# Patient Record
Sex: MALE | Race: WHITE | NOT HISPANIC OR LATINO | URBAN - METROPOLITAN AREA
[De-identification: names, ages, dates, MRNs, and addresses within clinical notes are randomized per-mention and may not be internally consistent; named-entity substitution may affect disease eponyms.]

---

## 2021-01-15 DIAGNOSIS — Z23 NEED FOR VACCINATION: ICD-10-CM

## 2023-08-24 ENCOUNTER — APPOINTMENT (RX ONLY)
Dept: URBAN - METROPOLITAN AREA CLINIC 15 | Facility: CLINIC | Age: 78
Setting detail: DERMATOLOGY
End: 2023-08-24

## 2023-08-24 DIAGNOSIS — L57.0 ACTINIC KERATOSIS: ICD-10-CM

## 2023-08-24 DIAGNOSIS — D69.2 OTHER NONTHROMBOCYTOPENIC PURPURA: ICD-10-CM

## 2023-08-24 PROBLEM — D48.5 NEOPLASM OF UNCERTAIN BEHAVIOR OF SKIN: Status: ACTIVE | Noted: 2023-08-24

## 2023-08-24 PROCEDURE — 17000 DESTRUCT PREMALG LESION: CPT | Mod: 59

## 2023-08-24 PROCEDURE — 11102 TANGNTL BX SKIN SINGLE LES: CPT

## 2023-08-24 PROCEDURE — 99202 OFFICE O/P NEW SF 15 MIN: CPT | Mod: 25

## 2023-08-24 PROCEDURE — ? LIQUID NITROGEN

## 2023-08-24 PROCEDURE — ? DEFER

## 2023-08-24 PROCEDURE — 17003 DESTRUCT PREMALG LES 2-14: CPT

## 2023-08-24 PROCEDURE — ? COUNSELING

## 2023-08-24 PROCEDURE — ? BIOPSY BY SHAVE METHOD

## 2023-08-24 ASSESSMENT — LOCATION SIMPLE DESCRIPTION DERM
LOCATION SIMPLE: LEFT CHEEK
LOCATION SIMPLE: LEFT EAR
LOCATION SIMPLE: RIGHT EAR
LOCATION SIMPLE: RIGHT FOREARM
LOCATION SIMPLE: RIGHT HAND
LOCATION SIMPLE: LEFT FOREARM

## 2023-08-24 ASSESSMENT — LOCATION DETAILED DESCRIPTION DERM
LOCATION DETAILED: LEFT SUPERIOR HELIX
LOCATION DETAILED: RIGHT ULNAR DORSAL HAND
LOCATION DETAILED: LEFT PROXIMAL DORSAL FOREARM
LOCATION DETAILED: RIGHT RADIAL DORSAL HAND
LOCATION DETAILED: LEFT PROXIMAL RADIAL DORSAL FOREARM
LOCATION DETAILED: RIGHT SUPERIOR HELIX
LOCATION DETAILED: LEFT ANTIHELIX
LOCATION DETAILED: LEFT SUPERIOR CENTRAL MALAR CHEEK
LOCATION DETAILED: LEFT CENTRAL MALAR CHEEK
LOCATION DETAILED: RIGHT PROXIMAL DORSAL FOREARM
LOCATION DETAILED: RIGHT VENTRAL PROXIMAL FOREARM

## 2023-08-24 ASSESSMENT — LOCATION ZONE DERM
LOCATION ZONE: EAR
LOCATION ZONE: FACE
LOCATION ZONE: HAND
LOCATION ZONE: ARM

## 2023-08-24 NOTE — PROCEDURE: DEFER
Size Of Lesion In Cm (Optional): 0
Instructions (Optional): Patient requests minimal treatment today due to ongoing cardiology issues that he would like to address first\\n He will schedule a follow up to treat
Introduction Text (Please End With A Colon): Excision:
Detail Level: Detailed
Procedure To Be Performed At Next Visit: Cryotherapy

## 2023-08-24 NOTE — PROCEDURE: BIOPSY BY SHAVE METHOD
Detail Level: Detailed
Depth Of Biopsy: dermis
Was A Bandage Applied: Yes
Size Of Lesion In Cm: 1
X Size Of Lesion In Cm: 0
Biopsy Type: H and E
Biopsy Method: Personna blade
Anesthesia Type: 0.05% lidocaine without epinephrine
Hemostasis: Aluminum Chloride and Electrocautery
Wound Care: Petrolatum
Dressing: pressure dressing with telfa
Destruction After The Procedure: No
Type Of Destruction Used: Curettage
Curettage Text: The wound bed was treated with curettage after the biopsy was performed.
Cryotherapy Text: The wound bed was treated with cryotherapy after the biopsy was performed.
Electrodesiccation Text: The wound bed was treated with electrodesiccation after the biopsy was performed.
Electrodesiccation And Curettage Text: The wound bed was treated with electrodesiccation and curettage after the biopsy was performed.
Silver Nitrate Text: The wound bed was treated with silver nitrate after the biopsy was performed.
Lab: [id number]
Lab Facility: 
Consent: Written consent was obtained and risks were reviewed including but not limited to scarring, infection, bleeding, scabbing, incomplete removal, nerve damage and allergy to anesthesia.
Post-Care Instructions: I reviewed with the patient in detail post-care instructions. Patient is to keep the biopsy site dry overnight. Gentle cleansing daily.  Apply petroleum ointment daily until healed. Patient may apply hydrogen peroxide soaks to remove any crusting.
Notification Instructions: Patient will be notified of biopsy results. However, patient instructed to call the office if not contacted within 2 weeks.
Billing Type: Third-Party Bill
Information: Selecting Yes will display possible errors in your note based on the variables you have selected. This validation is only offered as a suggestion for you. PLEASE NOTE THAT THE VALIDATION TEXT WILL BE REMOVED WHEN YOU FINALIZE YOUR NOTE. IF YOU WANT TO FAX A PRELIMINARY NOTE YOU WILL NEED TO TOGGLE THIS TO 'NO' IF YOU DO NOT WANT IT IN YOUR FAXED NOTE.

## 2023-08-24 NOTE — PROCEDURE: LIQUID NITROGEN
Duration Of Freeze Thaw-Cycle (Seconds): 0
Post-Care Instructions: I reviewed with the patient in detail post-care instructions. Patient is to wear sunprotection, and avoid picking at any of the treated lesions. Pt may apply Vaseline to crusted or scabbing areas.
Detail Level: Detailed
Show Applicator Variable?: Yes
Render Note In Bullet Format When Appropriate: No
Consent: The patient's consent was obtained including but not limited to risks of crusting, scabbing, blistering, scarring, darker or lighter pigmentary change, recurrence, incomplete removal and infection.
Number Of Freeze-Thaw Cycles: 2 freeze-thaw cycles

## 2023-10-24 ENCOUNTER — APPOINTMENT (RX ONLY)
Dept: URBAN - METROPOLITAN AREA CLINIC 36 | Facility: CLINIC | Age: 78
Setting detail: DERMATOLOGY
End: 2023-10-24

## 2023-10-24 PROBLEM — D03.39 MELANOMA IN SITU OF OTHER PARTS OF FACE: Status: ACTIVE | Noted: 2023-10-24

## 2023-10-24 PROCEDURE — 11642 EXC F/E/E/N/L MAL+MRG 1.1-2: CPT

## 2023-10-24 PROCEDURE — ? EXCISION

## 2023-10-24 PROCEDURE — 13132 CMPLX RPR F/C/C/M/N/AX/G/H/F: CPT

## 2023-10-24 NOTE — PROCEDURE: EXCISION
Referring Physician (Optional): Renita Goldberg MD
Surgeon (Optional): Aron
Biopsy Photograph Reviewed: Yes
Previous Accession (Optional): [id number]
Size Of Lesion In Cm: 0.8
X Size Of Lesion In Cm (Optional): 0.4
Size Of Margin In Cm: 0.5
Anesthesia Volume In Cc: 10
Was An Eye Clamp Used?: No
Eye Clamp Note Details: An eye clamp was used during the procedure.
Excision Method: Fusiform
Saucerization Depth: dermis and superficial adipose tissue
Repair Type: Complex
Suturegard Retention Suture: 2-0 Nylon
Retention Suture Bite Size: 3 mm
Number Of Hemigard Strips Per Side: 1
Intermediate / Complex Repair - Final Wound Length In Cm: 3.3
Width Of Defect Perpendicular To Closure In Cm (Required): 1.4
Distance Of Undermining In Cm (Required): 1.5
Undermining Type: Entire Wound
Debridement Text: The wound edges were debrided prior to proceeding with the closure to facilitate wound healing.
Helical Rim Text: The closure involved the helical rim.
Vermilion Border Text: The closure involved the vermilion border.
Nostril Rim Text: The closure involved the nostril rim.
Retention Suture Text: Retention sutures were placed to support the closure and prevent dehiscence.
Primary Defect Length (In Cm): 0
Lab: [id number]
Lab Facility: 
Graft Donor Site Bandage (Optional-Leave Blank If You Don't Want In Note): Steri-strips and a pressure bandage were applied to the donor site.
Epidermal Closure Graft Donor Site (Optional): simple interrupted
Billing Type: Third-Party Bill
Excision Depth: fascia
Scalpel Size: 15 blade
Anesthesia Type: 1% lidocaine with epinephrine and a 1:10 solution of 8.4% sodium bicarbonate
Additional Anesthesia Volume In Cc: 6
Hemostasis: Electrocautery
Estimated Blood Loss (Cc): minimal
Detail Level: Detailed
Anesthesia Type: 1% lidocaine with epinephrine
Anesthesia Volume In Cc: 7
Deep Sutures: 4-0 Maxon
Epidermal Sutures: 5-0 Prolene
Epidermal Closure: running subcuticular
Wound Care: No ointment
Dressing: steri-strips
Wound Check: 21 days
Suturegard Intro: Intraoperative tissue expansion was performed, utilizing the SUTUREGARD device, in order to reduce wound tension.
Suturegard Body: The suture ends were repeatedly re-tightened and re-clamped to achieve the desired tissue expansion.
Hemigard Intro: Due to skin fragility and wound tension, it was decided to use HEMIGARD adhesive retention suture devices to permit a linear closure. The skin was cleaned and dried for a 6cm distance away from the wound. Excessive hair, if present, was removed to allow for adhesion.
Hemigard Postcare Instructions: The HEMIGARD strips are to remain completely dry for at least 5-7 days.
Positioning (Leave Blank If You Do Not Want): The patient was placed in a comfortable position exposing the surgical site.
Pre-Excision Curettage Text (Leave Blank If You Do Not Want): Prior to drawing the surgical margin the visible lesion was removed with electrodesiccation and curettage to clearly define the lesion size.
Complex Repair Preamble Text (Leave Blank If You Do Not Want): Extensive wide undermining was performed.
Intermediate Repair Preamble Text (Leave Blank If You Do Not Want): Undermining was performed with blunt dissection.
Curvilinear Excision Additional Text (Leave Blank If You Do Not Want): The margin was drawn around the clinically apparent lesion.  A curvilinear shape was then drawn on the skin incorporating the lesion and margins.  Incisions were then made along these lines to the appropriate tissue plane and the lesion was extirpated.
Fusiform Excision Additional Text (Leave Blank If You Do Not Want): The margin was drawn around the clinically apparent lesion.  A fusiform shape was then drawn on the skin incorporating the lesion and margins.  Incisions were then made along these lines to the appropriate tissue plane and the lesion was extirpated.
Elliptical Excision Additional Text (Leave Blank If You Do Not Want): The margin was drawn around the clinically apparent lesion.  An elliptical shape was then drawn on the skin incorporating the lesion and margins.  Incisions were then made along these lines to the appropriate tissue plane and the lesion was extirpated.
Saucerization Excision Additional Text (Leave Blank If You Do Not Want): The margin was drawn around the clinically apparent lesion.  Incisions were then made along these lines, in a tangential fashion, to the appropriate tissue plane and the lesion was extirpated.
Slit Excision Additional Text (Leave Blank If You Do Not Want): A linear line was drawn on the skin overlying the lesion. An incision was made slowly until the lesion was visualized.  Once visualized, the lesion was removed with blunt dissection.
Excisional Biopsy Additional Text (Leave Blank If You Do Not Want): The margin was drawn around the clinically apparent lesion. An elliptical shape was then drawn on the skin incorporating the lesion and margins.  Incisions were then made along these lines to the appropriate tissue plane and the lesion was extirpated.
Perilesional Excision Additional Text (Leave Blank If You Do Not Want): The margin was drawn around the clinically apparent lesion. Incisions were then made along these lines to the appropriate tissue plane and the lesion was extirpated.
Repair Performed By Another Provider Text (Leave Blank If You Do Not Want): After the tissue was excised the defect was repaired by another provider.
No Repair - Repaired With Adjacent Surgical Defect Text (Leave Blank If You Do Not Want): After the excision the defect was repaired concurrently with another surgical defect which was in close approximation.
Adjacent Tissue Transfer Text: The defect edges were debeveled with a #15 scalpel blade. Given the location of the defect and the proximity to free margins an adjacent tissue transfer was deemed most appropriate. Using a sterile surgical marker, an appropriate flap was drawn incorporating the defect and placing the expected incisions within the relaxed skin tension lines where possible. The area thus outlined was incised deep to adipose tissue with a #15 scalpel blade. The skin margins were undermined to an appropriate distance in all directions utilizing iris scissors and carried over to close the primary defect.
Advancement Flap (Single) Text: The defect edges were debeveled with a #15 scalpel blade.  Given the location of the defect and the proximity to free margins a single advancement flap was deemed most appropriate.  Using a sterile surgical marker, an appropriate advancement flap was drawn incorporating the defect and placing the expected incisions within the relaxed skin tension lines where possible.    The area thus outlined was incised deep to adipose tissue with a #15 scalpel blade.  The skin margins were undermined to an appropriate distance in all directions utilizing iris scissors.
Advancement Flap (Double) Text: The defect edges were debeveled with a #15 scalpel blade.  Given the location of the defect and the proximity to free margins a double advancement flap was deemed most appropriate.  Using a sterile surgical marker, the appropriate advancement flaps were drawn incorporating the defect and placing the expected incisions within the relaxed skin tension lines where possible.    The area thus outlined was incised deep to adipose tissue with a #15 scalpel blade.  The skin margins were undermined to an appropriate distance in all directions utilizing iris scissors.
Burow's Advancement Flap Text: The defect edges were debeveled with a #15 scalpel blade.  Given the location of the defect and the proximity to free margins a Burow's advancement flap was deemed most appropriate.  Using a sterile surgical marker, the appropriate advancement flap was drawn incorporating the defect and placing the expected incisions within the relaxed skin tension lines where possible.    The area thus outlined was incised deep to adipose tissue with a #15 scalpel blade.  The skin margins were undermined to an appropriate distance in all directions utilizing iris scissors.
Chonodrocutaneous Helical Advancement Flap Text: The defect edges were debeveled with a #15 scalpel blade.  Given the location of the defect and the proximity to free margins a chondrocutaneous helical advancement flap was deemed most appropriate.  Using a sterile surgical marker, the appropriate advancement flap was drawn incorporating the defect and placing the expected incisions within the relaxed skin tension lines where possible.    The area thus outlined was incised deep to adipose tissue with a #15 scalpel blade.  The skin margins were undermined to an appropriate distance in all directions utilizing iris scissors.
Crescentic Advancement Flap Text: The defect edges were debeveled with a #15 scalpel blade.  Given the location of the defect and the proximity to free margins a crescentic advancement flap was deemed most appropriate.  Using a sterile surgical marker, the appropriate advancement flap was drawn incorporating the defect and placing the expected incisions within the relaxed skin tension lines where possible.    The area thus outlined was incised deep to adipose tissue with a #15 scalpel blade.  The skin margins were undermined to an appropriate distance in all directions utilizing iris scissors.
A-T Advancement Flap Text: The defect edges were debeveled with a #15 scalpel blade.  Given the location of the defect, shape of the defect and the proximity to free margins an A-T advancement flap was deemed most appropriate.  Using a sterile surgical marker, an appropriate advancement flap was drawn incorporating the defect and placing the expected incisions within the relaxed skin tension lines where possible.    The area thus outlined was incised deep to adipose tissue with a #15 scalpel blade.  The skin margins were undermined to an appropriate distance in all directions utilizing iris scissors.
O-T Advancement Flap Text: The defect edges were debeveled with a #15 scalpel blade.  Given the location of the defect, shape of the defect and the proximity to free margins an O-T advancement flap was deemed most appropriate.  Using a sterile surgical marker, an appropriate advancement flap was drawn incorporating the defect and placing the expected incisions within the relaxed skin tension lines where possible.    The area thus outlined was incised deep to adipose tissue with a #15 scalpel blade.  The skin margins were undermined to an appropriate distance in all directions utilizing iris scissors.
O-L Flap Text: The defect edges were debeveled with a #15 scalpel blade.  Given the location of the defect, shape of the defect and the proximity to free margins an O-L flap was deemed most appropriate.  Using a sterile surgical marker, an appropriate advancement flap was drawn incorporating the defect and placing the expected incisions within the relaxed skin tension lines where possible.    The area thus outlined was incised deep to adipose tissue with a #15 scalpel blade.  The skin margins were undermined to an appropriate distance in all directions utilizing iris scissors.
O-Z Flap Text: The defect edges were debeveled with a #15 scalpel blade.  Given the location of the defect, shape of the defect and the proximity to free margins an O-Z flap was deemed most appropriate.  Using a sterile surgical marker, an appropriate transposition flap was drawn incorporating the defect and placing the expected incisions within the relaxed skin tension lines where possible. The area thus outlined was incised deep to adipose tissue with a #15 scalpel blade.  The skin margins were undermined to an appropriate distance in all directions utilizing iris scissors.
Double O-Z Flap Text: The defect edges were debeveled with a #15 scalpel blade.  Given the location of the defect, shape of the defect and the proximity to free margins a Double O-Z flap was deemed most appropriate.  Using a sterile surgical marker, an appropriate transposition flap was drawn incorporating the defect and placing the expected incisions within the relaxed skin tension lines where possible. The area thus outlined was incised deep to adipose tissue with a #15 scalpel blade.  The skin margins were undermined to an appropriate distance in all directions utilizing iris scissors.
V-Y Flap Text: The defect edges were debeveled with a #15 scalpel blade.  Given the location of the defect, shape of the defect and the proximity to free margins a V-Y flap was deemed most appropriate.  Using a sterile surgical marker, an appropriate advancement flap was drawn incorporating the defect and placing the expected incisions within the relaxed skin tension lines where possible.    The area thus outlined was incised deep to adipose tissue with a #15 scalpel blade.  The skin margins were undermined to an appropriate distance in all directions utilizing iris scissors.
Advancement-Rotation Flap Text: The defect edges were debeveled with a #15 scalpel blade.  Given the location of the defect, shape of the defect and the proximity to free margins an advancement-rotation flap was deemed most appropriate.  Using a sterile surgical marker, an appropriate flap was drawn incorporating the defect and placing the expected incisions within the relaxed skin tension lines where possible. The area thus outlined was incised deep to adipose tissue with a #15 scalpel blade.  The skin margins were undermined to an appropriate distance in all directions utilizing iris scissors.
Mercedes Flap Text: The defect edges were debeveled with a #15 scalpel blade.  Given the location of the defect, shape of the defect and the proximity to free margins a Mercedes flap was deemed most appropriate.  Using a sterile surgical marker, an appropriate advancement flap was drawn incorporating the defect and placing the expected incisions within the relaxed skin tension lines where possible. The area thus outlined was incised deep to adipose tissue with a #15 scalpel blade.  The skin margins were undermined to an appropriate distance in all directions utilizing iris scissors.
Modified Advancement Flap Text: The defect edges were debeveled with a #15 scalpel blade.  Given the location of the defect, shape of the defect and the proximity to free margins a modified advancement flap was deemed most appropriate.  Using a sterile surgical marker, an appropriate advancement flap was drawn incorporating the defect and placing the expected incisions within the relaxed skin tension lines where possible.    The area thus outlined was incised deep to adipose tissue with a #15 scalpel blade.  The skin margins were undermined to an appropriate distance in all directions utilizing iris scissors.
Mucosal Advancement Flap Text: Given the location of the defect, shape of the defect and the proximity to free margins a mucosal advancement flap was deemed most appropriate. Incisions were made with a 15 blade scalpel in the appropriate fashion along the cutaneous vermilion border and the mucosal lip. The remaining actinically damaged mucosal tissue was excised.  The mucosal advancement flap was then elevated to the gingival sulcus with care taken to preserve the neurovascular structures and advanced into the primary defect. Care was taken to ensure that precise realignment of the vermilion border was achieved.
Peng Advancement Flap Text: The defect edges were debeveled with a #15 scalpel blade.  Given the location of the defect, shape of the defect and the proximity to free margins a Peng advancement flap was deemed most appropriate.  Using a sterile surgical marker, an appropriate advancement flap was drawn incorporating the defect and placing the expected incisions within the relaxed skin tension lines where possible. The area thus outlined was incised deep to adipose tissue with a #15 scalpel blade.  The skin margins were undermined to an appropriate distance in all directions utilizing iris scissors.
Hatchet Flap Text: The defect edges were debeveled with a #15 scalpel blade.  Given the location of the defect, shape of the defect and the proximity to free margins a hatchet flap was deemed most appropriate.  Using a sterile surgical marker, an appropriate hatchet flap was drawn incorporating the defect and placing the expected incisions within the relaxed skin tension lines where possible.    The area thus outlined was incised deep to adipose tissue with a #15 scalpel blade.  The skin margins were undermined to an appropriate distance in all directions utilizing iris scissors.
Rotation Flap Text: The defect edges were debeveled with a #15 scalpel blade.  Given the location of the defect, shape of the defect and the proximity to free margins a rotation flap was deemed most appropriate.  Using a sterile surgical marker, an appropriate rotation flap was drawn incorporating the defect and placing the expected incisions within the relaxed skin tension lines where possible.    The area thus outlined was incised deep to adipose tissue with a #15 scalpel blade.  The skin margins were undermined to an appropriate distance in all directions utilizing iris scissors.
Bilateral Rotation Flap Text: The defect edges were debeveled with a #15 scalpel blade. Given the location of the defect, shape of the defect and the proximity to free margins a bilateral rotation flap was deemed most appropriate. Using a sterile surgical marker, an appropriate rotation flap was drawn incorporating the defect and placing the expected incisions within the relaxed skin tension lines where possible. The area thus outlined was incised deep to adipose tissue with a #15 scalpel blade. The skin margins were undermined to an appropriate distance in all directions utilizing iris scissors. Following this, the designed flap was carried over into the primary defect and sutured into place.
Spiral Flap Text: The defect edges were debeveled with a #15 scalpel blade.  Given the location of the defect, shape of the defect and the proximity to free margins a spiral flap was deemed most appropriate.  Using a sterile surgical marker, an appropriate rotation flap was drawn incorporating the defect and placing the expected incisions within the relaxed skin tension lines where possible. The area thus outlined was incised deep to adipose tissue with a #15 scalpel blade.  The skin margins were undermined to an appropriate distance in all directions utilizing iris scissors.
Staged Advancement Flap Text: The defect edges were debeveled with a #15 scalpel blade.  Given the location of the defect, shape of the defect and the proximity to free margins a staged advancement flap was deemed most appropriate.  Using a sterile surgical marker, an appropriate advancement flap was drawn incorporating the defect and placing the expected incisions within the relaxed skin tension lines where possible. The area thus outlined was incised deep to adipose tissue with a #15 scalpel blade.  The skin margins were undermined to an appropriate distance in all directions utilizing iris scissors.
Star Wedge Flap Text: The defect edges were debeveled with a #15 scalpel blade.  Given the location of the defect, shape of the defect and the proximity to free margins a star wedge flap was deemed most appropriate.  Using a sterile surgical marker, an appropriate rotation flap was drawn incorporating the defect and placing the expected incisions within the relaxed skin tension lines where possible. The area thus outlined was incised deep to adipose tissue with a #15 scalpel blade.  The skin margins were undermined to an appropriate distance in all directions utilizing iris scissors.
Transposition Flap Text: The defect edges were debeveled with a #15 scalpel blade.  Given the location of the defect and the proximity to free margins a transposition flap was deemed most appropriate.  Using a sterile surgical marker, an appropriate transposition flap was drawn incorporating the defect.    The area thus outlined was incised deep to adipose tissue with a #15 scalpel blade.  The skin margins were undermined to an appropriate distance in all directions utilizing iris scissors.
Muscle Hinge Flap Text: The defect edges were debeveled with a #15 scalpel blade.  Given the size, depth and location of the defect and the proximity to free margins a muscle hinge flap was deemed most appropriate.  Using a sterile surgical marker, an appropriate hinge flap was drawn incorporating the defect. The area thus outlined was incised with a #15 scalpel blade.  The skin margins were undermined to an appropriate distance in all directions utilizing iris scissors.
Mustarde Flap Text: The defect edges were debeveled with a #15 scalpel blade.  Given the size, depth and location of the defect and the proximity to free margins a Mustarde flap was deemed most appropriate. Using a sterile surgical marker, an appropriate flap was drawn incorporating the defect. The area thus outlined was incised with a #15 scalpel blade. The skin margins were undermined to an appropriate distance in all directions utilizing iris scissors. Following this, the designed flap was carried into the primary defect and sutured into place.
Nasal Turnover Hinge Flap Text: The defect edges were debeveled with a #15 scalpel blade.  Given the size, depth, location of the defect and the defect being full thickness a nasal turnover hinge flap was deemed most appropriate.  Using a sterile surgical marker, an appropriate hinge flap was drawn incorporating the defect. The area thus outlined was incised with a #15 scalpel blade. The flap was designed to recreate the nasal mucosal lining and the alar rim. The skin margins were undermined to an appropriate distance in all directions utilizing iris scissors.
Nasalis-Muscle-Based Myocutaneous Island Pedicle Flap Text: Using a #15 blade, an incision was made around the donor flap to the level of the nasalis muscle. Wide lateral undermining was then performed in both the subcutaneous plane above the nasalis muscle, and in a submuscular plane just above periosteum. This allowed the formation of a free nasalis muscle axial pedicle (based on the angular artery) which was still attached to the actual cutaneous flap, increasing its mobility and vascular viability. Hemostasis was obtained with pinpoint electrocoagulation. The flap was mobilized into position and the pivotal anchor points positioned and stabilized with buried interrupted sutures. Subcutaneous and dermal tissues were closed in a multilayered fashion with sutures. Tissue redundancies were excised, and the epidermal edges were apposed without significant tension and sutured with sutures.
Orbicularis Oris Muscle Flap Text: The defect edges were debeveled with a #15 scalpel blade.  Given that the defect affected the competency of the oral sphincter an obicularis oris muscle flap was deemed most appropriate to restore this competency and normal muscle function.  Using a sterile surgical marker, an appropriate flap was drawn incorporating the defect. The area thus outlined was incised with a #15 scalpel blade.
Melolabial Transposition Flap Text: The defect edges were debeveled with a #15 scalpel blade.  Given the location of the defect and the proximity to free margins a melolabial flap was deemed most appropriate.  Using a sterile surgical marker, an appropriate melolabial transposition flap was drawn incorporating the defect.    The area thus outlined was incised deep to adipose tissue with a #15 scalpel blade.  The skin margins were undermined to an appropriate distance in all directions utilizing iris scissors.
Rhombic Flap Text: The defect edges were debeveled with a #15 scalpel blade.  Given the location of the defect and the proximity to free margins a rhombic flap was deemed most appropriate.  Using a sterile surgical marker, an appropriate rhombic flap was drawn incorporating the defect.    The area thus outlined was incised deep to adipose tissue with a #15 scalpel blade.  The skin margins were undermined to an appropriate distance in all directions utilizing iris scissors.
Rhomboid Transposition Flap Text: The defect edges were debeveled with a #15 scalpel blade.  Given the location of the defect and the proximity to free margins a rhomboid transposition flap was deemed most appropriate.  Using a sterile surgical marker, an appropriate rhomboid flap was drawn incorporating the defect.    The area thus outlined was incised deep to adipose tissue with a #15 scalpel blade.  The skin margins were undermined to an appropriate distance in all directions utilizing iris scissors.
Bi-Rhombic Flap Text: The defect edges were debeveled with a #15 scalpel blade.  Given the location of the defect and the proximity to free margins a bi-rhombic flap was deemed most appropriate.  Using a sterile surgical marker, an appropriate rhombic flap was drawn incorporating the defect. The area thus outlined was incised deep to adipose tissue with a #15 scalpel blade.  The skin margins were undermined to an appropriate distance in all directions utilizing iris scissors.
Helical Rim Advancement Flap Text: The defect edges were debeveled with a #15 blade scalpel.  Given the location of the defect and the proximity to free margins (helical rim) a double helical rim advancement flap was deemed most appropriate.  Using a sterile surgical marker, the appropriate advancement flaps were drawn incorporating the defect and placing the expected incisions between the helical rim and antihelix where possible.  The area thus outlined was incised through and through with a #15 scalpel blade.  With a skin hook and iris scissors, the flaps were gently and sharply undermined and freed up.
Bilateral Helical Rim Advancement Flap Text: The defect edges were debeveled with a #15 blade scalpel.  Given the location of the defect and the proximity to free margins (helical rim) a bilateral helical rim advancement flap was deemed most appropriate.  Using a sterile surgical marker, the appropriate advancement flaps were drawn incorporating the defect and placing the expected incisions between the helical rim and antihelix where possible.  The area thus outlined was incised through and through with a #15 scalpel blade.  With a skin hook and iris scissors, the flaps were gently and sharply undermined and freed up.
Ear Star Wedge Flap Text: The defect edges were debeveled with a #15 blade scalpel.  Given the location of the defect and the proximity to free margins (helical rim) an ear star wedge flap was deemed most appropriate.  Using a sterile surgical marker, the appropriate flap was drawn incorporating the defect and placing the expected incisions between the helical rim and antihelix where possible.  The area thus outlined was incised through and through with a #15 scalpel blade.
Banner Transposition Flap Text: The defect edges were debeveled with a #15 scalpel blade.  Given the location of the defect and the proximity to free margins a Banner transposition flap was deemed most appropriate.  Using a sterile surgical marker, an appropriate flap drawn around the defect. The area thus outlined was incised deep to adipose tissue with a #15 scalpel blade.  The skin margins were undermined to an appropriate distance in all directions utilizing iris scissors.
Bilobed Flap Text: The defect edges were debeveled with a #15 scalpel blade.  Given the location of the defect and the proximity to free margins a bilobe flap was deemed most appropriate.  Using a sterile surgical marker, an appropriate bilobe flap drawn around the defect.    The area thus outlined was incised deep to adipose tissue with a #15 scalpel blade.  The skin margins were undermined to an appropriate distance in all directions utilizing iris scissors.
Bilobed Transposition Flap Text: The defect edges were debeveled with a #15 scalpel blade.  Given the location of the defect and the proximity to free margins a bilobed transposition flap was deemed most appropriate.  Using a sterile surgical marker, an appropriate bilobe flap drawn around the defect.    The area thus outlined was incised deep to adipose tissue with a #15 scalpel blade.  The skin margins were undermined to an appropriate distance in all directions utilizing iris scissors.
Trilobed Flap Text: The defect edges were debeveled with a #15 scalpel blade.  Given the location of the defect and the proximity to free margins a trilobed flap was deemed most appropriate.  Using a sterile surgical marker, an appropriate trilobed flap drawn around the defect.    The area thus outlined was incised deep to adipose tissue with a #15 scalpel blade.  The skin margins were undermined to an appropriate distance in all directions utilizing iris scissors.
Dorsal Nasal Flap Text: The defect edges were debeveled with a #15 scalpel blade.  Given the location of the defect and the proximity to free margins a dorsal nasal flap was deemed most appropriate.  Using a sterile surgical marker, an appropriate dorsal nasal flap was drawn around the defect.    The area thus outlined was incised deep to adipose tissue with a #15 scalpel blade.  The skin margins were undermined to an appropriate distance in all directions utilizing iris scissors.
Island Pedicle Flap Text: The defect edges were debeveled with a #15 scalpel blade.  Given the location of the defect, shape of the defect and the proximity to free margins an island pedicle advancement flap was deemed most appropriate.  Using a sterile surgical marker, an appropriate advancement flap was drawn incorporating the defect, outlining the appropriate donor tissue and placing the expected incisions within the relaxed skin tension lines where possible.    The area thus outlined was incised deep to adipose tissue with a #15 scalpel blade.  The skin margins were undermined to an appropriate distance in all directions around the primary defect and laterally outward around the island pedicle utilizing iris scissors.  There was minimal undermining beneath the pedicle flap.
Island Pedicle Flap With Canthal Suspension Text: The defect edges were debeveled with a #15 scalpel blade.  Given the location of the defect, shape of the defect and the proximity to free margins an island pedicle advancement flap was deemed most appropriate.  Using a sterile surgical marker, an appropriate advancement flap was drawn incorporating the defect, outlining the appropriate donor tissue and placing the expected incisions within the relaxed skin tension lines where possible. The area thus outlined was incised deep to adipose tissue with a #15 scalpel blade.  The skin margins were undermined to an appropriate distance in all directions around the primary defect and laterally outward around the island pedicle utilizing iris scissors.  There was minimal undermining beneath the pedicle flap. A suspension suture was placed in the canthal tendon to prevent tension and prevent ectropion.
Alar Island Pedicle Flap Text: The defect edges were debeveled with a #15 scalpel blade.  Given the location of the defect, shape of the defect and the proximity to the alar rim an island pedicle advancement flap was deemed most appropriate.  Using a sterile surgical marker, an appropriate advancement flap was drawn incorporating the defect, outlining the appropriate donor tissue and placing the expected incisions within the nasal ala running parallel to the alar rim. The area thus outlined was incised with a #15 scalpel blade.  The skin margins were undermined minimally to an appropriate distance in all directions around the primary defect and laterally outward around the island pedicle utilizing iris scissors.  There was minimal undermining beneath the pedicle flap.
Double Island Pedicle Flap Text: The defect edges were debeveled with a #15 scalpel blade.  Given the location of the defect, shape of the defect and the proximity to free margins a double island pedicle advancement flap was deemed most appropriate.  Using a sterile surgical marker, an appropriate advancement flap was drawn incorporating the defect, outlining the appropriate donor tissue and placing the expected incisions within the relaxed skin tension lines where possible.    The area thus outlined was incised deep to adipose tissue with a #15 scalpel blade.  The skin margins were undermined to an appropriate distance in all directions around the primary defect and laterally outward around the island pedicle utilizing iris scissors.  There was minimal undermining beneath the pedicle flap.
Island Pedicle Flap-Requiring Vessel Identification Text: The defect edges were debeveled with a #15 scalpel blade.  Given the location of the defect, shape of the defect and the proximity to free margins an island pedicle advancement flap was deemed most appropriate.  Using a sterile surgical marker, an appropriate advancement flap was drawn, based on the axial vessel mentioned above, incorporating the defect, outlining the appropriate donor tissue and placing the expected incisions within the relaxed skin tension lines where possible.    The area thus outlined was incised deep to adipose tissue with a #15 scalpel blade.  The skin margins were undermined to an appropriate distance in all directions around the primary defect and laterally outward around the island pedicle utilizing iris scissors.  There was minimal undermining beneath the pedicle flap.
Keystone Flap Text: The defect edges were debeveled with a #15 scalpel blade.  Given the location of the defect, shape of the defect a keystone flap was deemed most appropriate.  Using a sterile surgical marker, an appropriate keystone flap was drawn incorporating the defect, outlining the appropriate donor tissue and placing the expected incisions within the relaxed skin tension lines where possible. The area thus outlined was incised deep to adipose tissue with a #15 scalpel blade.  The skin margins were undermined to an appropriate distance in all directions around the primary defect and laterally outward around the flap utilizing iris scissors.
O-T Plasty Text: The defect edges were debeveled with a #15 scalpel blade.  Given the location of the defect, shape of the defect and the proximity to free margins an O-T plasty was deemed most appropriate.  Using a sterile surgical marker, an appropriate O-T plasty was drawn incorporating the defect and placing the expected incisions within the relaxed skin tension lines where possible.    The area thus outlined was incised deep to adipose tissue with a #15 scalpel blade.  The skin margins were undermined to an appropriate distance in all directions utilizing iris scissors.
O-Z Plasty Text: The defect edges were debeveled with a #15 scalpel blade.  Given the location of the defect, shape of the defect and the proximity to free margins an O-Z plasty (double transposition flap) was deemed most appropriate.  Using a sterile surgical marker, the appropriate transposition flaps were drawn incorporating the defect and placing the expected incisions within the relaxed skin tension lines where possible.    The area thus outlined was incised deep to adipose tissue with a #15 scalpel blade.  The skin margins were undermined to an appropriate distance in all directions utilizing iris scissors.  Hemostasis was achieved with electrocautery.  The flaps were then transposed into place, one clockwise and the other counterclockwise, and anchored with interrupted buried subcutaneous sutures.
Double O-Z Plasty Text: The defect edges were debeveled with a #15 scalpel blade.  Given the location of the defect, shape of the defect and the proximity to free margins a Double O-Z plasty (double transposition flap) was deemed most appropriate.  Using a sterile surgical marker, the appropriate transposition flaps were drawn incorporating the defect and placing the expected incisions within the relaxed skin tension lines where possible. The area thus outlined was incised deep to adipose tissue with a #15 scalpel blade.  The skin margins were undermined to an appropriate distance in all directions utilizing iris scissors.  Hemostasis was achieved with electrocautery.  The flaps were then transposed into place, one clockwise and the other counterclockwise, and anchored with interrupted buried subcutaneous sutures.
V-Y Plasty Text: The defect edges were debeveled with a #15 scalpel blade.  Given the location of the defect, shape of the defect and the proximity to free margins an V-Y advancement flap was deemed most appropriate.  Using a sterile surgical marker, an appropriate advancement flap was drawn incorporating the defect and placing the expected incisions within the relaxed skin tension lines where possible.    The area thus outlined was incised deep to adipose tissue with a #15 scalpel blade.  The skin margins were undermined to an appropriate distance in all directions utilizing iris scissors.
H Plasty Text: Given the location of the defect, shape of the defect and the proximity to free margins a H-plasty was deemed most appropriate for repair.  Using a sterile surgical marker, the appropriate advancement arms of the H-plasty were drawn incorporating the defect and placing the expected incisions within the relaxed skin tension lines where possible. The area thus outlined was incised deep to adipose tissue with a #15 scalpel blade. The skin margins were undermined to an appropriate distance in all directions utilizing iris scissors.  The opposing advancement arms were then advanced into place in opposite direction and anchored with interrupted buried subcutaneous sutures.
W Plasty Text: The lesion was extirpated to the level of the fat with a #15 scalpel blade.  Given the location of the defect, shape of the defect and the proximity to free margins a W-plasty was deemed most appropriate for repair.  Using a sterile surgical marker, the appropriate transposition arms of the W-plasty were drawn incorporating the defect and placing the expected incisions within the relaxed skin tension lines where possible.    The area thus outlined was incised deep to adipose tissue with a #15 scalpel blade.  The skin margins were undermined to an appropriate distance in all directions utilizing iris scissors.  The opposing transposition arms were then transposed into place in opposite direction and anchored with interrupted buried subcutaneous sutures.
Z Plasty Text: The lesion was extirpated to the level of the fat with a #15 scalpel blade.  Given the location of the defect, shape of the defect and the proximity to free margins a Z-plasty was deemed most appropriate for repair.  Using a sterile surgical marker, the appropriate transposition arms of the Z-plasty were drawn incorporating the defect and placing the expected incisions within the relaxed skin tension lines where possible.    The area thus outlined was incised deep to adipose tissue with a #15 scalpel blade.  The skin margins were undermined to an appropriate distance in all directions utilizing iris scissors.  The opposing transposition arms were then transposed into place in opposite direction and anchored with interrupted buried subcutaneous sutures.
Double Z Plasty Text: The lesion was extirpated to the level of the fat with a #15 scalpel blade. Given the location of the defect, shape of the defect and the proximity to free margins a double Z-plasty was deemed most appropriate for repair. Using a sterile surgical marker, the appropriate transposition arms of the double Z-plasty were drawn incorporating the defect and placing the expected incisions within the relaxed skin tension lines where possible. The area thus outlined was incised deep to adipose tissue with a #15 scalpel blade. The skin margins were undermined to an appropriate distance in all directions utilizing iris scissors. The opposing transposition arms were then transposed and carried over into place in opposite direction and anchored with interrupted buried subcutaneous sutures.
Zygomaticofacial Flap Text: Given the location of the defect, shape of the defect and the proximity to free margins a zygomaticofacial flap was deemed most appropriate for repair.  Using a sterile surgical marker, the appropriate flap was drawn incorporating the defect and placing the expected incisions within the relaxed skin tension lines where possible. The area thus outlined was incised deep to adipose tissue with a #15 scalpel blade with preservation of a vascular pedicle.  The skin margins were undermined to an appropriate distance in all directions utilizing iris scissors.  The flap was then placed into the defect and anchored with interrupted buried subcutaneous sutures.
Cheek Interpolation Flap Text: A decision was made to reconstruct the defect utilizing an interpolation axial flap and a staged reconstruction.  A telfa template was made of the defect.  This telfa template was then used to outline the Cheek Interpolation flap.  The donor area for the pedicle flap was then injected with anesthesia.  The flap was excised through the skin and subcutaneous tissue down to the layer of the underlying musculature.  The interpolation flap was carefully excised within this deep plane to maintain its blood supply.  The edges of the donor site were undermined.   The donor site was closed in a primary fashion.  The pedicle was then rotated into position and sutured.  Once the tube was sutured into place, adequate blood supply was confirmed with blanching and refill.  The pedicle was then wrapped with xeroform gauze and dressed appropriately with a telfa and gauze bandage to ensure continued blood supply and protect the attached pedicle.
Cheek-To-Nose Interpolation Flap Text: A decision was made to reconstruct the defect utilizing an interpolation axial flap and a staged reconstruction.  A telfa template was made of the defect.  This telfa template was then used to outline the Cheek-To-Nose Interpolation flap.  The donor area for the pedicle flap was then injected with anesthesia.  The flap was excised through the skin and subcutaneous tissue down to the layer of the underlying musculature.  The interpolation flap was carefully excised within this deep plane to maintain its blood supply.  The edges of the donor site were undermined.   The donor site was closed in a primary fashion.  The pedicle was then rotated into position and sutured.  Once the tube was sutured into place, adequate blood supply was confirmed with blanching and refill.  The pedicle was then wrapped with xeroform gauze and dressed appropriately with a telfa and gauze bandage to ensure continued blood supply and protect the attached pedicle.
Interpolation Flap Text: A decision was made to reconstruct the defect utilizing an interpolation axial flap and a staged reconstruction.  A telfa template was made of the defect.  This telfa template was then used to outline the interpolation flap.  The donor area for the pedicle flap was then injected with anesthesia.  The flap was excised through the skin and subcutaneous tissue down to the layer of the underlying musculature.  The interpolation flap was carefully excised within this deep plane to maintain its blood supply.  The edges of the donor site were undermined.   The donor site was closed in a primary fashion.  The pedicle was then rotated into position and sutured.  Once the tube was sutured into place, adequate blood supply was confirmed with blanching and refill.  The pedicle was then wrapped with xeroform gauze and dressed appropriately with a telfa and gauze bandage to ensure continued blood supply and protect the attached pedicle.
Melolabial Interpolation Flap Text: A decision was made to reconstruct the defect utilizing an interpolation axial flap and a staged reconstruction.  A telfa template was made of the defect.  This telfa template was then used to outline the melolabial interpolation flap.  The donor area for the pedicle flap was then injected with anesthesia.  The flap was excised through the skin and subcutaneous tissue down to the layer of the underlying musculature.  The pedicle flap was carefully excised within this deep plane to maintain its blood supply.  The edges of the donor site were undermined.   The donor site was closed in a primary fashion.  The pedicle was then rotated into position and sutured.  Once the tube was sutured into place, adequate blood supply was confirmed with blanching and refill.  The pedicle was then wrapped with xeroform gauze and dressed appropriately with a telfa and gauze bandage to ensure continued blood supply and protect the attached pedicle.
Mastoid Interpolation Flap Text: A decision was made to reconstruct the defect utilizing an interpolation axial flap and a staged reconstruction.  A telfa template was made of the defect.  This telfa template was then used to outline the mastoid interpolation flap.  The donor area for the pedicle flap was then injected with anesthesia.  The flap was excised through the skin and subcutaneous tissue down to the layer of the underlying musculature.  The pedicle flap was carefully excised within this deep plane to maintain its blood supply.  The edges of the donor site were undermined.   The donor site was closed in a primary fashion.  The pedicle was then rotated into position and sutured.  Once the tube was sutured into place, adequate blood supply was confirmed with blanching and refill.  The pedicle was then wrapped with xeroform gauze and dressed appropriately with a telfa and gauze bandage to ensure continued blood supply and protect the attached pedicle.
Posterior Auricular Interpolation Flap Text: A decision was made to reconstruct the defect utilizing an interpolation axial flap and a staged reconstruction.  A telfa template was made of the defect.  This telfa template was then used to outline the posterior auricular interpolation flap.  The donor area for the pedicle flap was then injected with anesthesia.  The flap was excised through the skin and subcutaneous tissue down to the layer of the underlying musculature.  The pedicle flap was carefully excised within this deep plane to maintain its blood supply.  The edges of the donor site were undermined.   The donor site was closed in a primary fashion.  The pedicle was then rotated into position and sutured.  Once the tube was sutured into place, adequate blood supply was confirmed with blanching and refill.  The pedicle was then wrapped with xeroform gauze and dressed appropriately with a telfa and gauze bandage to ensure continued blood supply and protect the attached pedicle.
Paramedian Forehead Flap Text: A decision was made to reconstruct the defect utilizing an interpolation axial flap and a staged reconstruction.  A telfa template was made of the defect.  This telfa template was then used to outline the paramedian forehead pedicle flap.  The donor area for the pedicle flap was then injected with anesthesia.  The flap was excised through the skin and subcutaneous tissue down to the layer of the underlying musculature.  The pedicle flap was carefully excised within this deep plane to maintain its blood supply.  The edges of the donor site were undermined.   The donor site was closed in a primary fashion.  The pedicle was then rotated into position and sutured.  Once the tube was sutured into place, adequate blood supply was confirmed with blanching and refill.  The pedicle was then wrapped with xeroform gauze and dressed appropriately with a telfa and gauze bandage to ensure continued blood supply and protect the attached pedicle.
Abbe Flap (Upper To Lower Lip) Text: The defect of the lower lip was assessed and measured.  Given the location and size of the defect, an Abbe flap was deemed most appropriate. Using a sterile surgical marker, an appropriate Abbe flap was measured and drawn on the upper lip. Local anesthesia was then infiltrated.  A scalpel was then used to incise the upper lip through and through the skin, vermilion, muscle and mucosa, leaving the flap pedicled on the opposite side.  The flap was then rotated and transferred to the lower lip defect.  The flap was then sutured into place with a three layer technique, closing the orbicularis oris muscle layer with subcutaneous buried sutures, followed by a mucosal layer and an epidermal layer.
Abbe Flap (Lower To Upper Lip) Text: The defect of the upper lip was assessed and measured.  Given the location and size of the defect, an Abbe flap was deemed most appropriate. Using a sterile surgical marker, an appropriate Abbe flap was measured and drawn on the lower lip. Local anesthesia was then infiltrated. A scalpel was then used to incise the upper lip through and through the skin, vermilion, muscle and mucosa, leaving the flap pedicled on the opposite side.  The flap was then rotated and transferred to the lower lip defect.  The flap was then sutured into place with a three layer technique, closing the orbicularis oris muscle layer with subcutaneous buried sutures, followed by a mucosal layer and an epidermal layer.
Estlander Flap (Upper To Lower Lip) Text: The defect of the lower lip was assessed and measured.  Given the location and size of the defect, an Estlander flap was deemed most appropriate. Using a sterile surgical marker, an appropriate Estlander flap was measured and drawn on the upper lip. Local anesthesia was then infiltrated. A scalpel was then used to incise the lateral aspect of the flap, through skin, muscle and mucosa, leaving the flap pedicled medially.  The flap was then rotated and positioned to fill the lower lip defect.  The flap was then sutured into place with a three layer technique, closing the orbicularis oris muscle layer with subcutaneous buried sutures, followed by a mucosal layer and an epidermal layer.
Lip Wedge Excision Repair Text: Given the location of the defect and the proximity to free margins a full thickness wedge repair was deemed most appropriate.  Using a sterile surgical marker, the appropriate repair was drawn incorporating the defect and placing the expected incisions perpendicular to the vermilion border.  The vermilion border was also meticulously outlined to ensure appropriate reapproximation during the repair.  The area thus outlined was incised through and through with a #15 scalpel blade.  The muscularis and dermis were reaproximated with deep sutures following hemostasis. Care was taken to realign the vermilion border before proceeding with the superficial closure.  Once the vermilion was realigned the superfical and mucosal closure was finished.
Ftsg Text: The defect edges were debeveled with a #15 scalpel blade.  Given the location of the defect, shape of the defect and the proximity to free margins a full thickness skin graft was deemed most appropriate.  Using a sterile surgical marker, the primary defect shape was transferred to the donor site. The area thus outlined was incised deep to adipose tissue with a #15 scalpel blade.  The harvested graft was then trimmed of adipose tissue until only dermis and epidermis was left.  The skin margins of the secondary defect were undermined to an appropriate distance in all directions utilizing iris scissors.  The secondary defect was closed with interrupted buried subcutaneous sutures.  The skin edges were then re-apposed with running  sutures.  The skin graft was then placed in the primary defect and oriented appropriately.
Split-Thickness Skin Graft Text: The defect edges were debeveled with a #15 scalpel blade.  Given the location of the defect, shape of the defect and the proximity to free margins a split thickness skin graft was deemed most appropriate.  Using a sterile surgical marker, the primary defect shape was transferred to the donor site. The split thickness graft was then harvested.  The skin graft was then placed in the primary defect and oriented appropriately.
Pinch Graft Text: The defect edges were debeveled with a #15 scalpel blade. Given the location of the defect, shape of the defect and the proximity to free margins a pinch graft was deemed most appropriate. Using a sterile surgical marker, the primary defect shape was transferred to the donor site. The area thus outlined was incised deep to adipose tissue with a #15 scalpel blade.  The harvested graft was then trimmed of adipose tissue until only dermis and epidermis was left. The skin margins of the secondary defect were undermined to an appropriate distance in all directions utilizing iris scissors.  The secondary defect was closed with interrupted buried subcutaneous sutures.  The skin edges were then re-apposed with running  sutures.  The skin graft was then placed in the primary defect and oriented appropriately.
Burow's Graft Text: The defect edges were debeveled with a #15 scalpel blade.  Given the location of the defect, shape of the defect, the proximity to free margins and the presence of a standing cone deformity a Burow's skin graft was deemed most appropriate. The standing cone was removed and this tissue was then trimmed to the shape of the primary defect. The adipose tissue was also removed until only dermis and epidermis were left.  The skin margins of the secondary defect were undermined to an appropriate distance in all directions utilizing iris scissors.  The secondary defect was closed with interrupted buried subcutaneous sutures.  The skin edges were then re-apposed with running  sutures.  The skin graft was then placed in the primary defect and oriented appropriately.
Cartilage Graft Text: The defect edges were debeveled with a #15 scalpel blade.  Given the location of the defect, shape of the defect, the fact the defect involved a full thickness cartilage defect a cartilage graft was deemed most appropriate.  An appropriate donor site was identified, cleansed, and anesthetized. The cartilage graft was then harvested and transferred to the recipient site, oriented appropriately and then sutured into place.  The secondary defect was then repaired using a primary closure.
Composite Graft Text: The defect edges were debeveled with a #15 scalpel blade.  Given the location of the defect, shape of the defect, the proximity to free margins and the fact the defect was full thickness a composite graft was deemed most appropriate.  The defect was outline and then transferred to the donor site.  A full thickness graft was then excised from the donor site. The graft was then placed in the primary defect, oriented appropriately and then sutured into place.  The secondary defect was then repaired using a primary closure.
Epidermal Autograft Text: The defect edges were debeveled with a #15 scalpel blade.  Given the location of the defect, shape of the defect and the proximity to free margins an epidermal autograft was deemed most appropriate.  Using a sterile surgical marker, the primary defect shape was transferred to the donor site. The epidermal graft was then harvested.  The skin graft was then placed in the primary defect and oriented appropriately.
Dermal Autograft Text: The defect edges were debeveled with a #15 scalpel blade.  Given the location of the defect, shape of the defect and the proximity to free margins a dermal autograft was deemed most appropriate.  Using a sterile surgical marker, the primary defect shape was transferred to the donor site. The area thus outlined was incised deep to adipose tissue with a #15 scalpel blade.  The harvested graft was then trimmed of adipose and epidermal tissue until only dermis was left.  The skin graft was then placed in the primary defect and oriented appropriately.
Skin Substitute Text: The defect edges were debeveled with a #15 scalpel blade.  Given the location of the defect, shape of the defect and the proximity to free margins a skin substitute graft was deemed most appropriate.  The graft material was trimmed to fit the size of the defect. The graft was then placed in the primary defect and oriented appropriately.
Tissue Cultured Epidermal Autograft Text: The defect edges were debeveled with a #15 scalpel blade.  Given the location of the defect, shape of the defect and the proximity to free margins a tissue cultured epidermal autograft was deemed most appropriate.  The graft was then trimmed to fit the size of the defect.  The graft was then placed in the primary defect and oriented appropriately.
Xenograft Text: The defect edges were debeveled with a #15 scalpel blade.  Given the location of the defect, shape of the defect and the proximity to free margins a xenograft was deemed most appropriate.  The graft was then trimmed to fit the size of the defect.  The graft was then placed in the primary defect and oriented appropriately.
Purse String (Intermediate) Text: Given the location of the defect and the characteristics of the surrounding skin a purse string intermediate closure was deemed most appropriate.  Undermining was performed circumfirentially around the surgical defect.  A purse string suture was then placed and tightened.
Purse String (Simple) Text: Given the location of the defect and the characteristics of the surrounding skin a purse string simple closure was deemed most appropriate.  Undermining was performed circumferentially around the surgical defect.  A purse string suture was then placed and tightened.
Partial Purse String (Intermediate) Text: Given the location of the defect and the characteristics of the surrounding skin an intermediate purse string closure was deemed most appropriate.  Undermining was performed circumferentially around the surgical defect.  A purse string suture was then placed and tightened. Wound tension of the circular defect prevented complete closure of the wound.
Partial Purse String (Simple) Text: Given the location of the defect and the characteristics of the surrounding skin a simple purse string closure was deemed most appropriate.  Undermining was performed circumferentially around the surgical defect.  A purse string suture was then placed and tightened. Wound tension of the circular defect prevented complete closure of the wound.
Complex Repair And Single Advancement Flap Text: The defect edges were debeveled with a #15 scalpel blade.  The primary defect was closed partially with a complex linear closure.  Given the location of the remaining defect, shape of the defect and the proximity to free margins a single advancement flap was deemed most appropriate for complete closure of the defect.  Using a sterile surgical marker, an appropriate advancement flap was drawn incorporating the defect and placing the expected incisions within the relaxed skin tension lines where possible.    The area thus outlined was incised deep to adipose tissue with a #15 scalpel blade.  The skin margins were undermined to an appropriate distance in all directions utilizing iris scissors.
Complex Repair And Double Advancement Flap Text: The defect edges were debeveled with a #15 scalpel blade.  The primary defect was closed partially with a complex linear closure.  Given the location of the remaining defect, shape of the defect and the proximity to free margins a double advancement flap was deemed most appropriate for complete closure of the defect.  Using a sterile surgical marker, an appropriate advancement flap was drawn incorporating the defect and placing the expected incisions within the relaxed skin tension lines where possible.    The area thus outlined was incised deep to adipose tissue with a #15 scalpel blade.  The skin margins were undermined to an appropriate distance in all directions utilizing iris scissors.
Complex Repair And Modified Advancement Flap Text: The defect edges were debeveled with a #15 scalpel blade.  The primary defect was closed partially with a complex linear closure.  Given the location of the remaining defect, shape of the defect and the proximity to free margins a modified advancement flap was deemed most appropriate for complete closure of the defect.  Using a sterile surgical marker, an appropriate advancement flap was drawn incorporating the defect and placing the expected incisions within the relaxed skin tension lines where possible.    The area thus outlined was incised deep to adipose tissue with a #15 scalpel blade.  The skin margins were undermined to an appropriate distance in all directions utilizing iris scissors.
Complex Repair And A-T Advancement Flap Text: The defect edges were debeveled with a #15 scalpel blade.  The primary defect was closed partially with a complex linear closure.  Given the location of the remaining defect, shape of the defect and the proximity to free margins an A-T advancement flap was deemed most appropriate for complete closure of the defect.  Using a sterile surgical marker, an appropriate advancement flap was drawn incorporating the defect and placing the expected incisions within the relaxed skin tension lines where possible.    The area thus outlined was incised deep to adipose tissue with a #15 scalpel blade.  The skin margins were undermined to an appropriate distance in all directions utilizing iris scissors.
Complex Repair And O-T Advancement Flap Text: The defect edges were debeveled with a #15 scalpel blade.  The primary defect was closed partially with a complex linear closure.  Given the location of the remaining defect, shape of the defect and the proximity to free margins an O-T advancement flap was deemed most appropriate for complete closure of the defect.  Using a sterile surgical marker, an appropriate advancement flap was drawn incorporating the defect and placing the expected incisions within the relaxed skin tension lines where possible.    The area thus outlined was incised deep to adipose tissue with a #15 scalpel blade.  The skin margins were undermined to an appropriate distance in all directions utilizing iris scissors.
Complex Repair And O-L Flap Text: The defect edges were debeveled with a #15 scalpel blade.  The primary defect was closed partially with a complex linear closure.  Given the location of the remaining defect, shape of the defect and the proximity to free margins an O-L flap was deemed most appropriate for complete closure of the defect.  Using a sterile surgical marker, an appropriate flap was drawn incorporating the defect and placing the expected incisions within the relaxed skin tension lines where possible.    The area thus outlined was incised deep to adipose tissue with a #15 scalpel blade.  The skin margins were undermined to an appropriate distance in all directions utilizing iris scissors.
Complex Repair And Bilobe Flap Text: The defect edges were debeveled with a #15 scalpel blade.  The primary defect was closed partially with a complex linear closure.  Given the location of the remaining defect, shape of the defect and the proximity to free margins a bilobe flap was deemed most appropriate for complete closure of the defect.  Using a sterile surgical marker, an appropriate advancement flap was drawn incorporating the defect and placing the expected incisions within the relaxed skin tension lines where possible.    The area thus outlined was incised deep to adipose tissue with a #15 scalpel blade.  The skin margins were undermined to an appropriate distance in all directions utilizing iris scissors.
Complex Repair And Melolabial Flap Text: The defect edges were debeveled with a #15 scalpel blade.  The primary defect was closed partially with a complex linear closure.  Given the location of the remaining defect, shape of the defect and the proximity to free margins a melolabial flap was deemed most appropriate for complete closure of the defect.  Using a sterile surgical marker, an appropriate advancement flap was drawn incorporating the defect and placing the expected incisions within the relaxed skin tension lines where possible.    The area thus outlined was incised deep to adipose tissue with a #15 scalpel blade.  The skin margins were undermined to an appropriate distance in all directions utilizing iris scissors.
Complex Repair And Rotation Flap Text: The defect edges were debeveled with a #15 scalpel blade.  The primary defect was closed partially with a complex linear closure.  Given the location of the remaining defect, shape of the defect and the proximity to free margins a rotation flap was deemed most appropriate for complete closure of the defect.  Using a sterile surgical marker, an appropriate advancement flap was drawn incorporating the defect and placing the expected incisions within the relaxed skin tension lines where possible.    The area thus outlined was incised deep to adipose tissue with a #15 scalpel blade.  The skin margins were undermined to an appropriate distance in all directions utilizing iris scissors.
Complex Repair And Rhombic Flap Text: The defect edges were debeveled with a #15 scalpel blade.  The primary defect was closed partially with a complex linear closure.  Given the location of the remaining defect, shape of the defect and the proximity to free margins a rhombic flap was deemed most appropriate for complete closure of the defect.  Using a sterile surgical marker, an appropriate advancement flap was drawn incorporating the defect and placing the expected incisions within the relaxed skin tension lines where possible.    The area thus outlined was incised deep to adipose tissue with a #15 scalpel blade.  The skin margins were undermined to an appropriate distance in all directions utilizing iris scissors.
Complex Repair And Transposition Flap Text: The defect edges were debeveled with a #15 scalpel blade.  The primary defect was closed partially with a complex linear closure.  Given the location of the remaining defect, shape of the defect and the proximity to free margins a transposition flap was deemed most appropriate for complete closure of the defect.  Using a sterile surgical marker, an appropriate advancement flap was drawn incorporating the defect and placing the expected incisions within the relaxed skin tension lines where possible.    The area thus outlined was incised deep to adipose tissue with a #15 scalpel blade.  The skin margins were undermined to an appropriate distance in all directions utilizing iris scissors.
Complex Repair And V-Y Plasty Text: The defect edges were debeveled with a #15 scalpel blade.  The primary defect was closed partially with a complex linear closure.  Given the location of the remaining defect, shape of the defect and the proximity to free margins a V-Y plasty was deemed most appropriate for complete closure of the defect.  Using a sterile surgical marker, an appropriate advancement flap was drawn incorporating the defect and placing the expected incisions within the relaxed skin tension lines where possible.    The area thus outlined was incised deep to adipose tissue with a #15 scalpel blade.  The skin margins were undermined to an appropriate distance in all directions utilizing iris scissors.
Complex Repair And M Plasty Text: The defect edges were debeveled with a #15 scalpel blade.  The primary defect was closed partially with a complex linear closure.  Given the location of the remaining defect, shape of the defect and the proximity to free margins an M plasty was deemed most appropriate for complete closure of the defect.  Using a sterile surgical marker, an appropriate advancement flap was drawn incorporating the defect and placing the expected incisions within the relaxed skin tension lines where possible.    The area thus outlined was incised deep to adipose tissue with a #15 scalpel blade.  The skin margins were undermined to an appropriate distance in all directions utilizing iris scissors.
Complex Repair And Double M Plasty Text: The defect edges were debeveled with a #15 scalpel blade.  The primary defect was closed partially with a complex linear closure.  Given the location of the remaining defect, shape of the defect and the proximity to free margins a double M plasty was deemed most appropriate for complete closure of the defect.  Using a sterile surgical marker, an appropriate advancement flap was drawn incorporating the defect and placing the expected incisions within the relaxed skin tension lines where possible.    The area thus outlined was incised deep to adipose tissue with a #15 scalpel blade.  The skin margins were undermined to an appropriate distance in all directions utilizing iris scissors.
Complex Repair And W Plasty Text: The defect edges were debeveled with a #15 scalpel blade.  The primary defect was closed partially with a complex linear closure.  Given the location of the remaining defect, shape of the defect and the proximity to free margins a W plasty was deemed most appropriate for complete closure of the defect.  Using a sterile surgical marker, an appropriate advancement flap was drawn incorporating the defect and placing the expected incisions within the relaxed skin tension lines where possible.    The area thus outlined was incised deep to adipose tissue with a #15 scalpel blade.  The skin margins were undermined to an appropriate distance in all directions utilizing iris scissors.
Complex Repair And Z Plasty Text: The defect edges were debeveled with a #15 scalpel blade.  The primary defect was closed partially with a complex linear closure.  Given the location of the remaining defect, shape of the defect and the proximity to free margins a Z plasty was deemed most appropriate for complete closure of the defect.  Using a sterile surgical marker, an appropriate advancement flap was drawn incorporating the defect and placing the expected incisions within the relaxed skin tension lines where possible.    The area thus outlined was incised deep to adipose tissue with a #15 scalpel blade.  The skin margins were undermined to an appropriate distance in all directions utilizing iris scissors.
Complex Repair And Dorsal Nasal Flap Text: The defect edges were debeveled with a #15 scalpel blade.  The primary defect was closed partially with a complex linear closure.  Given the location of the remaining defect, shape of the defect and the proximity to free margins a dorsal nasal flap was deemed most appropriate for complete closure of the defect.  Using a sterile surgical marker, an appropriate flap was drawn incorporating the defect and placing the expected incisions within the relaxed skin tension lines where possible.    The area thus outlined was incised deep to adipose tissue with a #15 scalpel blade.  The skin margins were undermined to an appropriate distance in all directions utilizing iris scissors.
Complex Repair And Ftsg Text: The defect edges were debeveled with a #15 scalpel blade.  The primary defect was closed partially with a complex linear closure.  Given the location of the defect, shape of the defect and the proximity to free margins a full thickness skin graft was deemed most appropriate to repair the remaining defect.  The graft was trimmed to fit the size of the remaining defect.  The graft was then placed in the primary defect, oriented appropriately, and sutured into place.
Complex Repair And Burow's Graft Text: The defect edges were debeveled with a #15 scalpel blade.  The primary defect was closed partially with a complex linear closure.  Given the location of the defect, shape of the defect, the proximity to free margins and the presence of a standing cone deformity a Burow's graft was deemed most appropriate to repair the remaining defect.  The graft was trimmed to fit the size of the remaining defect.  The graft was then placed in the primary defect, oriented appropriately, and sutured into place.
Complex Repair And Split-Thickness Skin Graft Text: The defect edges were debeveled with a #15 scalpel blade.  The primary defect was closed partially with a complex linear closure.  Given the location of the defect, shape of the defect and the proximity to free margins a split thickness skin graft was deemed most appropriate to repair the remaining defect.  The graft was trimmed to fit the size of the remaining defect.  The graft was then placed in the primary defect, oriented appropriately, and sutured into place.
Complex Repair And Epidermal Autograft Text: The defect edges were debeveled with a #15 scalpel blade.  The primary defect was closed partially with a complex linear closure.  Given the location of the defect, shape of the defect and the proximity to free margins an epidermal autograft was deemed most appropriate to repair the remaining defect.  The graft was trimmed to fit the size of the remaining defect.  The graft was then placed in the primary defect, oriented appropriately, and sutured into place.
Complex Repair And Dermal Autograft Text: The defect edges were debeveled with a #15 scalpel blade.  The primary defect was closed partially with a complex linear closure.  Given the location of the defect, shape of the defect and the proximity to free margins an dermal autograft was deemed most appropriate to repair the remaining defect.  The graft was trimmed to fit the size of the remaining defect.  The graft was then placed in the primary defect, oriented appropriately, and sutured into place.
Complex Repair And Tissue Cultured Epidermal Autograft Text: The defect edges were debeveled with a #15 scalpel blade.  The primary defect was closed partially with a complex linear closure.  Given the location of the defect, shape of the defect and the proximity to free margins an tissue cultured epidermal autograft was deemed most appropriate to repair the remaining defect.  The graft was trimmed to fit the size of the remaining defect.  The graft was then placed in the primary defect, oriented appropriately, and sutured into place.
Complex Repair And Xenograft Text: The defect edges were debeveled with a #15 scalpel blade.  The primary defect was closed partially with a complex linear closure.  Given the location of the defect, shape of the defect and the proximity to free margins a xenograft was deemed most appropriate to repair the remaining defect.  The graft was trimmed to fit the size of the remaining defect.  The graft was then placed in the primary defect, oriented appropriately, and sutured into place.
Complex Repair And Skin Substitute Graft Text: The defect edges were debeveled with a #15 scalpel blade.  The primary defect was closed partially with a complex linear closure.  Given the location of the remaining defect, shape of the defect and the proximity to free margins a skin substitute graft was deemed most appropriate to repair the remaining defect.  The graft was trimmed to fit the size of the remaining defect.  The graft was then placed in the primary defect, oriented appropriately, and sutured into place.
Path Notes (To The Dermatopathologist): Please check margins.
Consent was obtained from the patient. The risks and benefits to therapy were discussed in detail. Specifically, the risks of infection, scarring, bleeding, prolonged wound healing, incomplete removal, allergy to anesthesia, nerve injury and recurrence were addressed. Prior to the procedure, the treatment site was clearly identified and confirmed by the patient. All components of Universal Protocol/PAUSE Rule completed.
Post-Care Instructions: I reviewed with the patient in detail post-care instructions. Patient is not to engage in any heavy lifting, exercise, or swimming for the next 14 days. Should the patient develop any fevers, chills, bleeding, severe pain patient will contact the office immediately.
Home Suture Removal Text: Patient was provided a home suture removal kit and will remove their sutures at home.  If they have any questions or difficulties they will call the office.
Where Do You Want The Question To Include Opioid Counseling Located?: Case Summary Tab
Information: Selecting Yes will display possible errors in your note based on the variables you have selected. This validation is only offered as a suggestion for you. PLEASE NOTE THAT THE VALIDATION TEXT WILL BE REMOVED WHEN YOU FINALIZE YOUR NOTE. IF YOU WANT TO FAX A PRELIMINARY NOTE YOU WILL NEED TO TOGGLE THIS TO 'NO' IF YOU DO NOT WANT IT IN YOUR FAXED NOTE.

## 2023-10-26 ENCOUNTER — APPOINTMENT (RX ONLY)
Dept: URBAN - METROPOLITAN AREA CLINIC 22 | Facility: CLINIC | Age: 78
Setting detail: DERMATOLOGY
End: 2023-10-26

## 2023-10-26 DIAGNOSIS — Z85.820 PERSONAL HISTORY OF MALIGNANT MELANOMA OF SKIN: ICD-10-CM

## 2023-10-26 DIAGNOSIS — L81.4 OTHER MELANIN HYPERPIGMENTATION: ICD-10-CM

## 2023-10-26 DIAGNOSIS — L82.1 OTHER SEBORRHEIC KERATOSIS: ICD-10-CM

## 2023-10-26 DIAGNOSIS — L57.0 ACTINIC KERATOSIS: ICD-10-CM

## 2023-10-26 DIAGNOSIS — D22 MELANOCYTIC NEVI: ICD-10-CM

## 2023-10-26 PROBLEM — D22.5 MELANOCYTIC NEVI OF TRUNK: Status: ACTIVE | Noted: 2023-10-26

## 2023-10-26 PROCEDURE — 99213 OFFICE O/P EST LOW 20 MIN: CPT | Mod: 25,24

## 2023-10-26 PROCEDURE — ? PRESCRIPTION

## 2023-10-26 PROCEDURE — 17000 DESTRUCT PREMALG LESION: CPT | Mod: 79

## 2023-10-26 PROCEDURE — ? SUNSCREEN RECOMMENDATIONS

## 2023-10-26 PROCEDURE — ? LIQUID NITROGEN

## 2023-10-26 PROCEDURE — ? COUNSELING

## 2023-10-26 PROCEDURE — ? TREATMENT REGIMEN

## 2023-10-26 PROCEDURE — 17003 DESTRUCT PREMALG LES 2-14: CPT | Mod: 79

## 2023-10-26 ASSESSMENT — LOCATION DETAILED DESCRIPTION DERM
LOCATION DETAILED: NASAL DORSUM
LOCATION DETAILED: LEFT CENTRAL TEMPLE
LOCATION DETAILED: RIGHT CRUS OF HELIX
LOCATION DETAILED: LEFT CENTRAL MALAR CHEEK
LOCATION DETAILED: RIGHT DORSAL THUMB METACARPOPHALANGEAL JOINT
LOCATION DETAILED: INFERIOR THORACIC SPINE
LOCATION DETAILED: INFERIOR MID FOREHEAD
LOCATION DETAILED: RIGHT TRAGUS
LOCATION DETAILED: SUPERIOR MID FOREHEAD
LOCATION DETAILED: RIGHT INFERIOR CENTRAL MALAR CHEEK
LOCATION DETAILED: RIGHT SUPERIOR HELIX
LOCATION DETAILED: LEFT VENTRAL PROXIMAL FOREARM
LOCATION DETAILED: RIGHT NASAL ALA
LOCATION DETAILED: RIGHT VENTRAL PROXIMAL FOREARM
LOCATION DETAILED: RIGHT RADIAL DORSAL HAND
LOCATION DETAILED: LEFT MEDIAL ZYGOMA
LOCATION DETAILED: RIGHT CENTRAL TEMPLE
LOCATION DETAILED: RIGHT SUPERIOR LATERAL NECK
LOCATION DETAILED: RIGHT CENTRAL MALAR CHEEK
LOCATION DETAILED: LEFT DISTAL POSTERIOR UPPER ARM

## 2023-10-26 ASSESSMENT — LOCATION SIMPLE DESCRIPTION DERM
LOCATION SIMPLE: RIGHT CHEEK
LOCATION SIMPLE: RIGHT THUMB
LOCATION SIMPLE: INFERIOR FOREHEAD
LOCATION SIMPLE: RIGHT TEMPLE
LOCATION SIMPLE: LEFT TEMPLE
LOCATION SIMPLE: RIGHT HAND
LOCATION SIMPLE: LEFT CHEEK
LOCATION SIMPLE: NECK
LOCATION SIMPLE: SUPERIOR FOREHEAD
LOCATION SIMPLE: NOSE
LOCATION SIMPLE: LEFT UPPER ARM
LOCATION SIMPLE: UPPER BACK
LOCATION SIMPLE: LEFT ZYGOMA
LOCATION SIMPLE: RIGHT FOREARM
LOCATION SIMPLE: RIGHT NOSE
LOCATION SIMPLE: RIGHT EAR
LOCATION SIMPLE: LEFT FOREARM

## 2023-10-26 ASSESSMENT — LOCATION ZONE DERM
LOCATION ZONE: EAR
LOCATION ZONE: HAND
LOCATION ZONE: FINGER
LOCATION ZONE: ARM
LOCATION ZONE: NECK
LOCATION ZONE: TRUNK
LOCATION ZONE: NOSE
LOCATION ZONE: FACE

## 2023-10-26 NOTE — PROCEDURE: TREATMENT REGIMEN
Initiate Treatment: Compounded 5FU on forehead, nose, temples and cheeks twice a day for 5 days
Detail Level: Simple

## 2023-10-26 NOTE — PROCEDURE: SUNSCREEN RECOMMENDATIONS
Products Recommended: Rodrigo molina \\nElta md UV clear
Detail Level: Zone
General Sunscreen Counseling: I recommended a broad spectrum sunscreen with a SPF of 30 or higher.  I explained that SPF 30 sunscreens block approximately 97 percent of the sun's harmful rays.  Sunscreens should be applied at least 15 minutes prior to expected sun exposure and then every 2 hours after that as long as sun exposure continues. If swimming or exercising sunscreen should be reapplied every 45 minutes to an hour after getting wet or sweating.  One ounce, or the equivalent of a shot glass full of sunscreen, is adequate to protect the skin not covered by a bathing suit. I also recommended a lip balm with a sunscreen as well. Sun protective clothing can be used in lieu of sunscreen but must be worn the entire time you are exposed to the sun's rays.

## 2023-10-26 NOTE — PROCEDURE: MIPS QUALITY
Detail Level: Detailed
Quality 226: Preventive Care And Screening: Tobacco Use: Screening And Cessation Intervention: Patient screened for tobacco use and is an ex/non-smoker
Quality 130: Documentation Of Current Medications In The Medical Record: Current Medications Documented
Quality 137: Melanoma: Continuity Of Care - Recall System: Patient information entered into a recall system that includes: target date for the next exam specified AND a process to follow up with patients regarding missed or unscheduled appointments
none

## 2023-10-26 NOTE — PROCEDURE: LIQUID NITROGEN
Detail Level: Detailed
Render Note In Bullet Format When Appropriate: No
Duration Of Freeze Thaw-Cycle (Seconds): 0
Post-Care Instructions: I reviewed with the patient in detail post-care instructions. Patient is to wear sunprotection, and avoid picking at any of the treated lesions. Pt may apply Vaseline to crusted or scabbing areas.
Show Aperture Variable?: Yes
Number Of Freeze-Thaw Cycles: 2 freeze-thaw cycles
Consent: The patient's consent was obtained including but not limited to risks of crusting, scabbing, blistering, scarring, darker or lighter pigmentary change, recurrence, incomplete removal and infection.

## 2023-10-31 ENCOUNTER — APPOINTMENT (RX ONLY)
Dept: URBAN - METROPOLITAN AREA CLINIC 36 | Facility: CLINIC | Age: 78
Setting detail: DERMATOLOGY
End: 2023-10-31

## 2023-10-31 DIAGNOSIS — Z48.02 ENCOUNTER FOR REMOVAL OF SUTURES: ICD-10-CM

## 2023-10-31 PROCEDURE — ? SUTURE REMOVAL (GLOBAL PERIOD)

## 2023-10-31 ASSESSMENT — LOCATION ZONE DERM: LOCATION ZONE: FACE

## 2023-10-31 ASSESSMENT — LOCATION SIMPLE DESCRIPTION DERM: LOCATION SIMPLE: RIGHT CHEEK

## 2023-10-31 ASSESSMENT — LOCATION DETAILED DESCRIPTION DERM: LOCATION DETAILED: RIGHT CENTRAL MALAR CHEEK

## 2023-10-31 NOTE — PROCEDURE: SUTURE REMOVAL (GLOBAL PERIOD)
Detail Level: Detailed
Add 19402 Cpt? (Important Note: In 2017 The Use Of 54889 Is Being Tracked By Cms To Determine Future Global Period Reimbursement For Global Periods): no

## 2023-12-20 ENCOUNTER — APPOINTMENT (RX ONLY)
Dept: URBAN - METROPOLITAN AREA CLINIC 36 | Facility: CLINIC | Age: 78
Setting detail: DERMATOLOGY
End: 2023-12-20

## 2023-12-20 DIAGNOSIS — Z41.9 ENCOUNTER FOR PROCEDURE FOR PURPOSES OTHER THAN REMEDYING HEALTH STATE, UNSPECIFIED: ICD-10-CM

## 2023-12-20 PROCEDURE — ? FRAXEL

## 2023-12-20 ASSESSMENT — LOCATION ZONE DERM: LOCATION ZONE: FACE

## 2023-12-20 ASSESSMENT — LOCATION SIMPLE DESCRIPTION DERM: LOCATION SIMPLE: RIGHT CHEEK

## 2023-12-20 ASSESSMENT — LOCATION DETAILED DESCRIPTION DERM: LOCATION DETAILED: RIGHT CENTRAL MALAR CHEEK

## 2023-12-20 NOTE — PROCEDURE: FRAXEL
Detail Level: Zone
Wavelength: 1550nm
Consent: Written consent obtained, risks reviewed including but not limited to pain and incomplete improvement.
Large Plastic Eye Shield Text: The ocular mucosa was anesthetized with tetracaine. Once adequate anesthesia was optained, large plastic eye shields were inserted and remained in place until the procedure was completed.
Energy(Mj/Cm2): 1
Anesthesia Type: 1% lidocaine with epinephrine
Medium Metal Eye Shield Text: The ocular mucosa was anesthetized with tetracaine. Once adequate anesthesia was optained, medium metal eye shields were inserted and remained in place until the procedure was completed.
Indication: surgical scars
Add Post-Care Below To The Note: No
Tip: 15mm
Energy(Mj/Cm2): 70
Treatment Level: 5
Location: right cheek
Number Of Passes: 4
Post-Care Instructions: I reviewed with the patient in detail post-care instructions. Patient should avoid sun until area fully healed.
Large Metal Eye Shield Text: The ocular mucosa was anesthetized with tetracaine. Once adequate anesthesia was optained, large metal eye shields were inserted and remained in place until the procedure was completed.
Small Plastic Eye Shield Text: The ocular mucosa was anesthetized with tetracaine. Once adequate anesthesia was optained, small plastic eye shields were inserted and remained in place until the procedure was completed.
Medium Plastic Eye Shield Text: The ocular mucosa was anesthetized with tetracaine. Once adequate anesthesia was optained, medium plastic eye shields were inserted and remained in place until the procedure was completed.
Small Metal Eye Shield Text: The ocular mucosa was anesthetized with tetracaine. Once adequate anesthesia was optained, small metal eye shields were inserted and remained in place until the procedure was completed.

## 2024-03-13 ENCOUNTER — HOSPITAL ENCOUNTER (OUTPATIENT)
Dept: LAB | Facility: MEDICAL CENTER | Age: 79
End: 2024-03-13
Attending: FAMILY MEDICINE
Payer: MEDICARE

## 2024-03-13 LAB
ALBUMIN SERPL BCP-MCNC: 4.1 G/DL (ref 3.2–4.9)
ALBUMIN/GLOB SERPL: 1.4 G/DL
ALP SERPL-CCNC: 94 U/L (ref 30–99)
ALT SERPL-CCNC: 14 U/L (ref 2–50)
ANION GAP SERPL CALC-SCNC: 12 MMOL/L (ref 7–16)
AST SERPL-CCNC: 14 U/L (ref 12–45)
BASOPHILS # BLD AUTO: 0.7 % (ref 0–1.8)
BASOPHILS # BLD: 0.07 K/UL (ref 0–0.12)
BILIRUB SERPL-MCNC: 0.4 MG/DL (ref 0.1–1.5)
BUN SERPL-MCNC: 63 MG/DL (ref 8–22)
CALCIUM ALBUM COR SERPL-MCNC: 9 MG/DL (ref 8.5–10.5)
CALCIUM SERPL-MCNC: 9.1 MG/DL (ref 8.5–10.5)
CHLORIDE SERPL-SCNC: 114 MMOL/L (ref 96–112)
CO2 SERPL-SCNC: 16 MMOL/L (ref 20–33)
CREAT SERPL-MCNC: 5.6 MG/DL (ref 0.5–1.4)
EOSINOPHIL # BLD AUTO: 0.37 K/UL (ref 0–0.51)
EOSINOPHIL NFR BLD: 3.8 % (ref 0–6.9)
ERYTHROCYTE [DISTWIDTH] IN BLOOD BY AUTOMATED COUNT: 49.9 FL (ref 35.9–50)
GFR SERPLBLD CREATININE-BSD FMLA CKD-EPI: 10 ML/MIN/1.73 M 2
GLOBULIN SER CALC-MCNC: 2.9 G/DL (ref 1.9–3.5)
GLUCOSE SERPL-MCNC: 111 MG/DL (ref 65–99)
HCT VFR BLD AUTO: 33.1 % (ref 42–52)
HGB BLD-MCNC: 10.6 G/DL (ref 14–18)
IMM GRANULOCYTES # BLD AUTO: 0.03 K/UL (ref 0–0.11)
IMM GRANULOCYTES NFR BLD AUTO: 0.3 % (ref 0–0.9)
LYMPHOCYTES # BLD AUTO: 0.97 K/UL (ref 1–4.8)
LYMPHOCYTES NFR BLD: 10 % (ref 22–41)
MCH RBC QN AUTO: 28.6 PG (ref 27–33)
MCHC RBC AUTO-ENTMCNC: 32 G/DL (ref 32.3–36.5)
MCV RBC AUTO: 89.2 FL (ref 81.4–97.8)
MONOCYTES # BLD AUTO: 0.83 K/UL (ref 0–0.85)
MONOCYTES NFR BLD AUTO: 8.6 % (ref 0–13.4)
NEUTROPHILS # BLD AUTO: 7.41 K/UL (ref 1.82–7.42)
NEUTROPHILS NFR BLD: 76.6 % (ref 44–72)
NRBC # BLD AUTO: 0 K/UL
NRBC BLD-RTO: 0 /100 WBC (ref 0–0.2)
NT-PROBNP SERPL IA-MCNC: ABNORMAL PG/ML (ref 0–125)
PLATELET # BLD AUTO: 194 K/UL (ref 164–446)
PMV BLD AUTO: 10.7 FL (ref 9–12.9)
POTASSIUM SERPL-SCNC: 5.5 MMOL/L (ref 3.6–5.5)
PROT SERPL-MCNC: 7 G/DL (ref 6–8.2)
RBC # BLD AUTO: 3.71 M/UL (ref 4.7–6.1)
SODIUM SERPL-SCNC: 142 MMOL/L (ref 135–145)
WBC # BLD AUTO: 9.7 K/UL (ref 4.8–10.8)

## 2024-03-13 PROCEDURE — 83880 ASSAY OF NATRIURETIC PEPTIDE: CPT

## 2024-03-13 PROCEDURE — 85025 COMPLETE CBC W/AUTO DIFF WBC: CPT

## 2024-03-13 PROCEDURE — 36415 COLL VENOUS BLD VENIPUNCTURE: CPT

## 2024-03-13 PROCEDURE — 80053 COMPREHEN METABOLIC PANEL: CPT

## 2024-03-21 ENCOUNTER — HOSPITAL ENCOUNTER (OUTPATIENT)
Dept: LAB | Facility: MEDICAL CENTER | Age: 79
End: 2024-03-21
Attending: NURSE PRACTITIONER
Payer: MEDICARE

## 2024-03-21 LAB
ALBUMIN SERPL BCP-MCNC: 4 G/DL (ref 3.2–4.9)
BUN SERPL-MCNC: 85 MG/DL (ref 8–22)
CALCIUM ALBUM COR SERPL-MCNC: 9 MG/DL (ref 8.5–10.5)
CALCIUM SERPL-MCNC: 9 MG/DL (ref 8.5–10.5)
CHLORIDE SERPL-SCNC: 109 MMOL/L (ref 96–112)
CO2 SERPL-SCNC: 16 MMOL/L (ref 20–33)
CREAT SERPL-MCNC: 6.13 MG/DL (ref 0.5–1.4)
GFR SERPLBLD CREATININE-BSD FMLA CKD-EPI: 9 ML/MIN/1.73 M 2
GLUCOSE SERPL-MCNC: 97 MG/DL (ref 65–99)
PHOSPHATE SERPL-MCNC: 5.4 MG/DL (ref 2.5–4.5)
POTASSIUM SERPL-SCNC: 5 MMOL/L (ref 3.6–5.5)
SODIUM SERPL-SCNC: 140 MMOL/L (ref 135–145)

## 2024-03-21 PROCEDURE — 36415 COLL VENOUS BLD VENIPUNCTURE: CPT

## 2024-03-21 PROCEDURE — 80069 RENAL FUNCTION PANEL: CPT

## 2024-03-26 ENCOUNTER — HOSPITAL ENCOUNTER (OUTPATIENT)
Dept: LAB | Facility: MEDICAL CENTER | Age: 79
End: 2024-03-26
Attending: INTERNAL MEDICINE
Payer: MEDICARE

## 2024-03-26 LAB
APPEARANCE UR: CLEAR
BACTERIA #/AREA URNS HPF: NEGATIVE /HPF
BILIRUB UR QL STRIP.AUTO: NEGATIVE
COLOR UR: YELLOW
CREAT UR-MCNC: 113.77 MG/DL
CREAT UR-MCNC: 115.57 MG/DL
EPI CELLS #/AREA URNS HPF: NEGATIVE /HPF
GLUCOSE UR STRIP.AUTO-MCNC: NEGATIVE MG/DL
HYALINE CASTS #/AREA URNS LPF: ABNORMAL /LPF
KETONES UR STRIP.AUTO-MCNC: NEGATIVE MG/DL
LEUKOCYTE ESTERASE UR QL STRIP.AUTO: NEGATIVE
MICRO URNS: ABNORMAL
MICROALBUMIN UR-MCNC: 17.4 MG/DL
MICROALBUMIN/CREAT UR: 153 MG/G (ref 0–30)
NITRITE UR QL STRIP.AUTO: NEGATIVE
PH UR STRIP.AUTO: 5.5 [PH] (ref 5–8)
PROT UR QL STRIP: 30 MG/DL
PROT UR-MCNC: 37 MG/DL (ref 0–15)
RBC # URNS HPF: ABNORMAL /HPF
RBC UR QL AUTO: NEGATIVE
SP GR UR STRIP.AUTO: 1.02
UROBILINOGEN UR STRIP.AUTO-MCNC: 0.2 MG/DL
WBC #/AREA URNS HPF: ABNORMAL /HPF

## 2024-03-26 PROCEDURE — 81001 URINALYSIS AUTO W/SCOPE: CPT

## 2024-03-26 PROCEDURE — 84156 ASSAY OF PROTEIN URINE: CPT

## 2024-03-26 PROCEDURE — 82570 ASSAY OF URINE CREATININE: CPT | Mod: 91

## 2024-03-26 PROCEDURE — 82043 UR ALBUMIN QUANTITATIVE: CPT

## 2024-03-27 ENCOUNTER — HOSPITAL ENCOUNTER (OUTPATIENT)
Dept: LAB | Facility: MEDICAL CENTER | Age: 79
End: 2024-03-27
Attending: INTERNAL MEDICINE
Payer: MEDICARE

## 2024-03-27 LAB
ALBUMIN SERPL BCP-MCNC: 4.3 G/DL (ref 3.2–4.9)
BUN SERPL-MCNC: 63 MG/DL (ref 8–22)
CALCIUM ALBUM COR SERPL-MCNC: 8.6 MG/DL (ref 8.5–10.5)
CALCIUM SERPL-MCNC: 8.8 MG/DL (ref 8.5–10.5)
CHLORIDE SERPL-SCNC: 104 MMOL/L (ref 96–112)
CO2 SERPL-SCNC: 21 MMOL/L (ref 20–33)
CREAT SERPL-MCNC: 5.33 MG/DL (ref 0.5–1.4)
GFR SERPLBLD CREATININE-BSD FMLA CKD-EPI: 10 ML/MIN/1.73 M 2
GLUCOSE SERPL-MCNC: 111 MG/DL (ref 65–99)
PHOSPHATE SERPL-MCNC: 4.4 MG/DL (ref 2.5–4.5)
POTASSIUM SERPL-SCNC: 4.3 MMOL/L (ref 3.6–5.5)
SODIUM SERPL-SCNC: 140 MMOL/L (ref 135–145)

## 2024-03-27 PROCEDURE — 80069 RENAL FUNCTION PANEL: CPT

## 2024-03-27 PROCEDURE — 36415 COLL VENOUS BLD VENIPUNCTURE: CPT

## 2024-04-04 ENCOUNTER — HOSPITAL ENCOUNTER (OUTPATIENT)
Dept: LAB | Facility: MEDICAL CENTER | Age: 79
End: 2024-04-04
Attending: INTERNAL MEDICINE
Payer: MEDICARE

## 2024-04-04 ENCOUNTER — HOSPITAL ENCOUNTER (OUTPATIENT)
Dept: LAB | Facility: MEDICAL CENTER | Age: 79
End: 2024-04-04
Attending: FAMILY MEDICINE
Payer: MEDICARE

## 2024-04-04 LAB
APPEARANCE UR: CLEAR
BACTERIA #/AREA URNS HPF: NEGATIVE /HPF
BASOPHILS # BLD AUTO: 0.7 % (ref 0–1.8)
BASOPHILS # BLD: 0.07 K/UL (ref 0–0.12)
BILIRUB UR QL STRIP.AUTO: NEGATIVE
COLOR UR: YELLOW
EOSINOPHIL # BLD AUTO: 0.49 K/UL (ref 0–0.51)
EOSINOPHIL NFR BLD: 5.2 % (ref 0–6.9)
EPI CELLS #/AREA URNS HPF: NEGATIVE /HPF
ERYTHROCYTE [DISTWIDTH] IN BLOOD BY AUTOMATED COUNT: 45.5 FL (ref 35.9–50)
EST. AVERAGE GLUCOSE BLD GHB EST-MCNC: 128 MG/DL
GLUCOSE UR STRIP.AUTO-MCNC: NEGATIVE MG/DL
HBA1C MFR BLD: 6.1 % (ref 4–5.6)
HCT VFR BLD AUTO: 33.1 % (ref 42–52)
HGB BLD-MCNC: 10.6 G/DL (ref 14–18)
HYALINE CASTS #/AREA URNS LPF: ABNORMAL /LPF
IMM GRANULOCYTES # BLD AUTO: 0.04 K/UL (ref 0–0.11)
IMM GRANULOCYTES NFR BLD AUTO: 0.4 % (ref 0–0.9)
KETONES UR STRIP.AUTO-MCNC: NEGATIVE MG/DL
LEUKOCYTE ESTERASE UR QL STRIP.AUTO: NEGATIVE
LYMPHOCYTES # BLD AUTO: 0.86 K/UL (ref 1–4.8)
LYMPHOCYTES NFR BLD: 9.1 % (ref 22–41)
MCH RBC QN AUTO: 28.3 PG (ref 27–33)
MCHC RBC AUTO-ENTMCNC: 32 G/DL (ref 32.3–36.5)
MCV RBC AUTO: 88.3 FL (ref 81.4–97.8)
MICRO URNS: ABNORMAL
MONOCYTES # BLD AUTO: 0.78 K/UL (ref 0–0.85)
MONOCYTES NFR BLD AUTO: 8.3 % (ref 0–13.4)
NEUTROPHILS # BLD AUTO: 7.18 K/UL (ref 1.82–7.42)
NEUTROPHILS NFR BLD: 76.3 % (ref 44–72)
NITRITE UR QL STRIP.AUTO: NEGATIVE
NRBC # BLD AUTO: 0 K/UL
NRBC BLD-RTO: 0 /100 WBC (ref 0–0.2)
PH UR STRIP.AUTO: 7 [PH] (ref 5–8)
PLATELET # BLD AUTO: 215 K/UL (ref 164–446)
PMV BLD AUTO: 10.8 FL (ref 9–12.9)
PROT UR QL STRIP: 30 MG/DL
RBC # BLD AUTO: 3.75 M/UL (ref 4.7–6.1)
RBC # URNS HPF: ABNORMAL /HPF
RBC UR QL AUTO: NEGATIVE
SP GR UR STRIP.AUTO: 1.01
UROBILINOGEN UR STRIP.AUTO-MCNC: 0.2 MG/DL
WBC # BLD AUTO: 9.4 K/UL (ref 4.8–10.8)
WBC #/AREA URNS HPF: ABNORMAL /HPF

## 2024-04-04 PROCEDURE — 83550 IRON BINDING TEST: CPT

## 2024-04-04 PROCEDURE — 83540 ASSAY OF IRON: CPT

## 2024-04-04 PROCEDURE — 82570 ASSAY OF URINE CREATININE: CPT

## 2024-04-04 PROCEDURE — 82728 ASSAY OF FERRITIN: CPT

## 2024-04-04 PROCEDURE — 81001 URINALYSIS AUTO W/SCOPE: CPT

## 2024-04-04 PROCEDURE — 83036 HEMOGLOBIN GLYCOSYLATED A1C: CPT | Mod: GA

## 2024-04-04 PROCEDURE — 85025 COMPLETE CBC W/AUTO DIFF WBC: CPT

## 2024-04-04 PROCEDURE — 36415 COLL VENOUS BLD VENIPUNCTURE: CPT

## 2024-04-04 PROCEDURE — 84156 ASSAY OF PROTEIN URINE: CPT

## 2024-04-04 PROCEDURE — 80069 RENAL FUNCTION PANEL: CPT

## 2024-04-04 PROCEDURE — 82570 ASSAY OF URINE CREATININE: CPT | Mod: 91

## 2024-04-04 PROCEDURE — 82043 UR ALBUMIN QUANTITATIVE: CPT

## 2024-04-05 LAB
ALBUMIN SERPL BCP-MCNC: 4.3 G/DL (ref 3.2–4.9)
BUN SERPL-MCNC: 63 MG/DL (ref 8–22)
CALCIUM ALBUM COR SERPL-MCNC: 8.7 MG/DL (ref 8.5–10.5)
CALCIUM SERPL-MCNC: 8.9 MG/DL (ref 8.5–10.5)
CHLORIDE SERPL-SCNC: 102 MMOL/L (ref 96–112)
CO2 SERPL-SCNC: 24 MMOL/L (ref 20–33)
CREAT SERPL-MCNC: 5.59 MG/DL (ref 0.5–1.4)
CREAT UR-MCNC: 81.69 MG/DL
CREAT UR-MCNC: 82.38 MG/DL
FERRITIN SERPL-MCNC: 178 NG/ML (ref 22–322)
GFR SERPLBLD CREATININE-BSD FMLA CKD-EPI: 10 ML/MIN/1.73 M 2
GLUCOSE SERPL-MCNC: 116 MG/DL (ref 65–99)
IRON SATN MFR SERPL: 15 % (ref 15–55)
IRON SERPL-MCNC: 41 UG/DL (ref 50–180)
MICROALBUMIN UR-MCNC: 17.3 MG/DL
MICROALBUMIN/CREAT UR: 210 MG/G (ref 0–30)
PHOSPHATE SERPL-MCNC: 4.8 MG/DL (ref 2.5–4.5)
POTASSIUM SERPL-SCNC: 4.1 MMOL/L (ref 3.6–5.5)
PROT UR-MCNC: 38 MG/DL (ref 0–15)
PROT/CREAT UR: 465 MG/G (ref 15–68)
SODIUM SERPL-SCNC: 142 MMOL/L (ref 135–145)
TIBC SERPL-MCNC: 268 UG/DL (ref 250–450)
UIBC SERPL-MCNC: 227 UG/DL (ref 110–370)

## 2024-04-24 ENCOUNTER — HOSPITAL ENCOUNTER (OUTPATIENT)
Dept: LAB | Facility: MEDICAL CENTER | Age: 79
End: 2024-04-24
Attending: FAMILY MEDICINE
Payer: MEDICARE

## 2024-04-24 PROCEDURE — 84480 ASSAY TRIIODOTHYRONINE (T3): CPT

## 2024-04-24 PROCEDURE — 83880 ASSAY OF NATRIURETIC PEPTIDE: CPT

## 2024-04-24 PROCEDURE — 82607 VITAMIN B-12: CPT

## 2024-04-24 PROCEDURE — 84439 ASSAY OF FREE THYROXINE: CPT

## 2024-04-24 PROCEDURE — 80053 COMPREHEN METABOLIC PANEL: CPT

## 2024-04-24 PROCEDURE — 36415 COLL VENOUS BLD VENIPUNCTURE: CPT

## 2024-04-24 PROCEDURE — 84443 ASSAY THYROID STIM HORMONE: CPT

## 2024-04-24 PROCEDURE — 82746 ASSAY OF FOLIC ACID SERUM: CPT

## 2024-04-25 LAB
ALBUMIN SERPL BCP-MCNC: 3.9 G/DL (ref 3.2–4.9)
ALBUMIN/GLOB SERPL: 1.1 G/DL
ALP SERPL-CCNC: 87 U/L (ref 30–99)
ALT SERPL-CCNC: 43 U/L (ref 2–50)
ANION GAP SERPL CALC-SCNC: 16 MMOL/L (ref 7–16)
AST SERPL-CCNC: 25 U/L (ref 12–45)
BILIRUB SERPL-MCNC: 0.5 MG/DL (ref 0.1–1.5)
BUN SERPL-MCNC: 70 MG/DL (ref 8–22)
CALCIUM ALBUM COR SERPL-MCNC: 9.2 MG/DL (ref 8.5–10.5)
CALCIUM SERPL-MCNC: 9.1 MG/DL (ref 8.5–10.5)
CHLORIDE SERPL-SCNC: 101 MMOL/L (ref 96–112)
CO2 SERPL-SCNC: 21 MMOL/L (ref 20–33)
CREAT SERPL-MCNC: 6.72 MG/DL (ref 0.5–1.4)
FOLATE SERPL-MCNC: 5 NG/ML
GFR SERPLBLD CREATININE-BSD FMLA CKD-EPI: 8 ML/MIN/1.73 M 2
GLOBULIN SER CALC-MCNC: 3.4 G/DL (ref 1.9–3.5)
GLUCOSE SERPL-MCNC: 132 MG/DL (ref 65–99)
NT-PROBNP SERPL IA-MCNC: ABNORMAL PG/ML (ref 0–125)
POTASSIUM SERPL-SCNC: 4.2 MMOL/L (ref 3.6–5.5)
PROT SERPL-MCNC: 7.3 G/DL (ref 6–8.2)
SODIUM SERPL-SCNC: 138 MMOL/L (ref 135–145)
T3 SERPL-MCNC: 77.8 NG/DL (ref 60–181)
T4 FREE SERPL-MCNC: 1.24 NG/DL (ref 0.93–1.7)
TSH SERPL DL<=0.005 MIU/L-ACNC: 1.21 UIU/ML (ref 0.38–5.33)
VIT B12 SERPL-MCNC: 614 PG/ML (ref 211–911)

## 2024-04-26 ENCOUNTER — HOSPITAL ENCOUNTER (OUTPATIENT)
Dept: LAB | Facility: MEDICAL CENTER | Age: 79
End: 2024-04-26
Attending: STUDENT IN AN ORGANIZED HEALTH CARE EDUCATION/TRAINING PROGRAM
Payer: MEDICARE

## 2024-04-26 ENCOUNTER — TELEPHONE (OUTPATIENT)
Dept: VASCULAR SURGERY | Facility: MEDICAL CENTER | Age: 79
End: 2024-04-26
Payer: MEDICARE

## 2024-04-26 LAB
HAV IGM SERPL QL IA: NORMAL
HBV CORE AB SERPL QL IA: NONREACTIVE
HBV CORE IGM SER QL: NORMAL
HBV SURFACE AB SERPL IA-ACNC: <3.5 MIU/ML (ref 0–10)
HBV SURFACE AG SER QL: NORMAL
HCV AB SER QL: NORMAL

## 2024-04-26 PROCEDURE — 80074 ACUTE HEPATITIS PANEL: CPT

## 2024-04-26 PROCEDURE — 36415 COLL VENOUS BLD VENIPUNCTURE: CPT

## 2024-04-26 PROCEDURE — 86706 HEP B SURFACE ANTIBODY: CPT

## 2024-04-26 PROCEDURE — 86704 HEP B CORE ANTIBODY TOTAL: CPT

## 2024-04-26 PROCEDURE — 86480 TB TEST CELL IMMUN MEASURE: CPT

## 2024-04-29 LAB
GAMMA INTERFERON BACKGROUND BLD IA-ACNC: 0.03 IU/ML
M TB IFN-G BLD-IMP: NEGATIVE
M TB IFN-G CD4+ BCKGRND COR BLD-ACNC: 0 IU/ML
MITOGEN IGNF BCKGRD COR BLD-ACNC: 6.6 IU/ML
QFT TB2 - NIL TBQ2: 0 IU/ML

## 2024-05-09 ENCOUNTER — OFFICE VISIT (OUTPATIENT)
Dept: VASCULAR SURGERY | Facility: MEDICAL CENTER | Age: 79
End: 2024-05-09
Payer: MEDICARE

## 2024-05-09 VITALS
HEART RATE: 72 BPM | TEMPERATURE: 98.5 F | WEIGHT: 175 LBS | SYSTOLIC BLOOD PRESSURE: 132 MMHG | HEIGHT: 70 IN | DIASTOLIC BLOOD PRESSURE: 60 MMHG | BODY MASS INDEX: 25.05 KG/M2 | OXYGEN SATURATION: 96 %

## 2024-05-09 DIAGNOSIS — N18.6 ESRD (END STAGE RENAL DISEASE) (HCC): ICD-10-CM

## 2024-05-09 PROCEDURE — 3075F SYST BP GE 130 - 139MM HG: CPT | Performed by: SURGERY

## 2024-05-09 PROCEDURE — 3078F DIAST BP <80 MM HG: CPT | Performed by: SURGERY

## 2024-05-09 PROCEDURE — 99203 OFFICE O/P NEW LOW 30 MIN: CPT | Mod: 50,59,25,24 | Performed by: SURGERY

## 2024-05-09 RX ORDER — SODIUM BICARBONATE 650 MG/1
TABLET ORAL
COMMUNITY

## 2024-05-09 RX ORDER — MONTELUKAST SODIUM 10 MG/1
10 TABLET ORAL
COMMUNITY

## 2024-05-09 RX ORDER — NITROGLYCERIN 0.4 MG/1
TABLET SUBLINGUAL
COMMUNITY

## 2024-05-09 RX ORDER — ROSUVASTATIN CALCIUM 20 MG/1
1 TABLET, COATED ORAL
COMMUNITY

## 2024-05-09 RX ORDER — TAMSULOSIN HYDROCHLORIDE 0.4 MG/1
1 CAPSULE ORAL DAILY
COMMUNITY

## 2024-05-09 RX ORDER — METOPROLOL SUCCINATE 100 MG/1
150 TABLET, EXTENDED RELEASE ORAL
COMMUNITY

## 2024-05-09 RX ORDER — AMLODIPINE BESYLATE 10 MG/1
1 TABLET ORAL DAILY
COMMUNITY

## 2024-05-09 RX ORDER — TERAZOSIN 5 MG/1
1 CAPSULE ORAL
COMMUNITY

## 2024-05-09 RX ORDER — FUROSEMIDE 40 MG/1
40 TABLET ORAL DAILY
COMMUNITY

## 2024-05-09 RX ORDER — CLOPIDOGREL BISULFATE 75 MG
1 TABLET ORAL DAILY
COMMUNITY

## 2024-05-09 ASSESSMENT — FIBROSIS 4 INDEX: FIB4 SCORE: 1.35

## 2024-05-09 NOTE — H&P
Vascular Surgery            New Patient Consultation    Patient:Edy Bennett  MRN:[id number]  Primary care physician:Denise Carver M.D.  Referring Provider: Denise Carver M.D.    Vascular Consultant: Shilo Mercedes MD    Date: 5/9/2024  _____________________________________________________    Chief Complaint:     Chief Complaint   Patient presents with    New Patient     ESRD         History of Present Illness:   Edy Bennett  is a 78 y.o. year old male who suffers from end-stage renal disease and was referred for evaluation of an AV fistula.  The patient has a PermCath in his right anterior chest wall.  On examination he appears to have an adequate cephalic vein in the left arm for radiocephalic fistula.    Past Medical History:   No past medical history on file.  Past Surgical History:   No past surgical history on file.  Allergies:   No Known Allergies  Medications:     Outpatient Encounter Medications as of 5/9/2024   Medication Sig Dispense Refill    amLODIPine (NORVASC) 10 MG Tab Take 1 Tablet by mouth every day.      apixaban (ELIQUIS) 5mg Tab Take 2.5 mg by mouth.      furosemide (LASIX) 40 MG Tab Take 40 mg by mouth 2 times a day.      metoprolol SR (TOPROL XL) 100 MG TABLET SR 24 HR TAKE 1.5 TABLETS BY MOUTH DAILY      montelukast (SINGULAIR) 10 MG Tab Take 10 mg by mouth.      PLAVIX 75 MG Tab Take 1 Tablet by mouth every day.      rosuvastatin (CRESTOR) 20 MG Tab Take 1 Tablet by mouth every day.      sodium bicarbonate (SODIUM BICARBONATE) 650 MG Tab Take  by mouth.      tamsulosin (FLOMAX) 0.4 MG capsule Take 1 Capsule by mouth.      terazosin (HYTRIN) 5 MG Cap Take 1 Capsule by mouth.      nitroglycerin (NITROSTAT) 0.4 MG SL Tab Place  under the tongue.       No facility-administered encounter medications on file as of 5/9/2024.     Social History:     Social History     Socioeconomic History    Marital status:      Spouse name: Not on file    Number of  "children: Not on file    Years of education: Not on file    Highest education level: Not on file   Occupational History    Not on file   Tobacco Use    Smoking status: Never    Smokeless tobacco: Never   Vaping Use    Vaping Use: Never used   Substance and Sexual Activity    Alcohol use: Never    Drug use: Never    Sexual activity: Not on file   Other Topics Concern    Not on file   Social History Narrative    Not on file     Social Determinants of Health     Financial Resource Strain: Not on file   Food Insecurity: Not on file   Transportation Needs: Not on file   Physical Activity: Not on file   Stress: Not on file   Social Connections: Not on file   Intimate Partner Violence: Not on file   Housing Stability: Not on file      Social History     Tobacco Use   Smoking Status Never   Smokeless Tobacco Never     Social History     Substance and Sexual Activity   Alcohol Use Never     Social History     Substance and Sexual Activity   Drug Use Never      Family History:   No family history on file.    Review of Systems:   Constitutional: No complaints     Exam:   /60 (BP Location: Right arm, Patient Position: Sitting, BP Cuff Size: Adult)   Pulse 72   Temp 36.9 °C (98.5 °F) (Temporal)   Ht 1.778 m (5' 10\")   Wt 79.4 kg (175 lb)   SpO2 96%   BMI 25.11 kg/m²     Constitutional: Alert, oriented, no acute distress  HEENT:  Normocephalic and atraumatic, EOMI  Neck:   Supple, no JVD,   Cardiovascular: Regular rate and rhythm,   Pulmonary:  Good air entry bilaterally,    Abdominal:  Soft, non-tender, non-distended  Musculoskeletal: No tenderness, no deformity  Neurological:  CN II-XII grossly intact, no focal deficits  Skin:   Skin is warm and dry. No rash noted.  Vascular exam: Upper extremities warm and adequately perfused, no edema     Lower extremities warm and adequately perfused, no edema           Imaging:       Assessment and Plan:   -End-stage renal disease need for hemodialysis " access    Plan-radiocephalic AV fistula left upper extremity    Discussed the risk benefits alternatives expectations and patient agrees to proceed.             _____________________________________________________  Shilo Mercedes MD  [facility] Vascular Surgery [facility]  [phone]  [address], [place] [address]

## 2024-05-15 ENCOUNTER — TELEPHONE (OUTPATIENT)
Dept: VASCULAR SURGERY | Facility: MEDICAL CENTER | Age: 79
End: 2024-05-15
Payer: MEDICARE

## 2024-05-15 NOTE — TELEPHONE ENCOUNTER
I called patient and left a message for him to call back to schedule procedure with Dr. Mercedes.

## 2024-05-17 ENCOUNTER — HOSPITAL ENCOUNTER (OUTPATIENT)
Facility: MEDICAL CENTER | Age: 79
End: 2024-05-17
Attending: SURGERY | Admitting: SURGERY
Payer: MEDICARE

## 2024-05-17 ENCOUNTER — TELEPHONE (OUTPATIENT)
Dept: VASCULAR SURGERY | Facility: MEDICAL CENTER | Age: 79
End: 2024-05-17
Payer: MEDICARE

## 2024-05-17 NOTE — TELEPHONE ENCOUNTER
Procedure on 6/10 @ 0730 with Dr. Mercedes.      Blood thinner instructions:     Hold Eliquis 48 hours prior to surgery and continue taking Plavix per Dr. Mercedes.

## 2024-05-17 NOTE — TELEPHONE ENCOUNTER
Patient is scheduled for procedure with Dr. Mercedes for 6/10 @ 7:30 am. Patient is to check in @ 5:30 am in the [facility] 1st floor.     Patient has been instructed nothing to eat or drink 8 hours prior and he will need a .    Patient is taking Eliquis and Plavix. Pending instructions per MD.

## 2024-05-20 ENCOUNTER — APPOINTMENT (OUTPATIENT)
Dept: ADMISSIONS | Facility: MEDICAL CENTER | Age: 79
End: 2024-05-20
Attending: SURGERY
Payer: MEDICARE

## 2024-05-24 ENCOUNTER — PRE-ADMISSION TESTING (OUTPATIENT)
Dept: ADMISSIONS | Facility: MEDICAL CENTER | Age: 79
End: 2024-05-24
Attending: SURGERY
Payer: MEDICARE

## 2024-05-24 RX ORDER — SITAGLIPTIN 25 MG/1
1 TABLET, FILM COATED ORAL DAILY
COMMUNITY

## 2024-06-06 ENCOUNTER — TELEPHONE (OUTPATIENT)
Dept: VASCULAR SURGERY | Facility: MEDICAL CENTER | Age: 79
End: 2024-06-06
Payer: MEDICARE

## 2024-06-06 NOTE — TELEPHONE ENCOUNTER
I called pt and left a message to confirm his procedure with Dr. Mercedes for 6/10 @ 7:30 am. Pt to check in @ 5:30 am in the [address].

## 2024-06-10 NOTE — OR NURSING
Pt's wife called to report pt not feeling well-has not been able to sleep all night, has non productive cough, increasing SOB, and overall generalized edema.  Pt reports last dialysis was Friday, 6/7.  Encouraged wife to take pt to nearest ER for treatment. Will notify Dr. Mercedes of pt condition and reschedule procedure.

## 2024-06-13 ENCOUNTER — TELEPHONE (OUTPATIENT)
Dept: VASCULAR SURGERY | Facility: MEDICAL CENTER | Age: 79
End: 2024-06-13
Payer: MEDICARE

## 2024-06-13 ENCOUNTER — APPOINTMENT (OUTPATIENT)
Dept: ADMISSIONS | Facility: MEDICAL CENTER | Age: 79
End: 2024-06-13
Attending: SURGERY
Payer: MEDICARE

## 2024-06-13 ENCOUNTER — HOSPITAL ENCOUNTER (OUTPATIENT)
Facility: MEDICAL CENTER | Age: 79
End: 2024-06-13
Attending: SURGERY | Admitting: SURGERY
Payer: MEDICARE

## 2024-06-13 DIAGNOSIS — G89.18 POST-OP PAIN: ICD-10-CM

## 2024-06-13 NOTE — TELEPHONE ENCOUNTER
I called patient and scheduled procedure with Dr. Mercedes for 7/01 @ 8:30 am. Patient is to check in @ 6:30 am in the [facility] 1st floor.     Patient has been instructed nothing to eat or drink 8 hours prior and he will need a .     Patient has been instructed to hold his Eliquis 48 hours prior to procedure per surgery protocol.

## 2024-06-19 ENCOUNTER — PRE-ADMISSION TESTING (OUTPATIENT)
Dept: ADMISSIONS | Facility: MEDICAL CENTER | Age: 79
End: 2024-06-19
Attending: SURGERY
Payer: MEDICARE

## 2024-06-19 NOTE — OR NURSING
PAT Medications Instructions per Anesthesia guidelines sent via email. Pt verbalized understanding of medication instructions and that instructions would be sent via email. Pt's wife reported that Dr Mercedes's office gave instructions for Eliquis and Plavix. Educated pt and pts wife to follow Dr Mercedes's Instruction on medications     Pt reported he had and EKG done at [facility], Faxed a medical record request

## 2024-06-20 ENCOUNTER — PRE-ADMISSION TESTING (OUTPATIENT)
Dept: ADMISSIONS | Facility: MEDICAL CENTER | Age: 79
End: 2024-06-20
Attending: SURGERY
Payer: MEDICARE

## 2024-06-20 DIAGNOSIS — Z01.812 PRE-PROCEDURAL LABORATORY EXAMINATION: ICD-10-CM

## 2024-06-20 LAB
ALBUMIN SERPL BCP-MCNC: 4.4 G/DL (ref 3.2–4.9)
ALBUMIN/GLOB SERPL: 1.5 G/DL
ALP SERPL-CCNC: 92 U/L (ref 30–99)
ALT SERPL-CCNC: 38 U/L (ref 2–50)
ANION GAP SERPL CALC-SCNC: 16 MMOL/L (ref 7–16)
AST SERPL-CCNC: 23 U/L (ref 12–45)
BASOPHILS # BLD AUTO: 1.2 % (ref 0–1.8)
BASOPHILS # BLD: 0.1 K/UL (ref 0–0.12)
BILIRUB SERPL-MCNC: 0.5 MG/DL (ref 0.1–1.5)
BUN SERPL-MCNC: 41 MG/DL (ref 8–22)
CALCIUM ALBUM COR SERPL-MCNC: 9.6 MG/DL (ref 8.5–10.5)
CALCIUM SERPL-MCNC: 9.9 MG/DL (ref 8.5–10.5)
CHLORIDE SERPL-SCNC: 97 MMOL/L (ref 96–112)
CO2 SERPL-SCNC: 30 MMOL/L (ref 20–33)
CREAT SERPL-MCNC: 4.34 MG/DL (ref 0.5–1.4)
EOSINOPHIL # BLD AUTO: 0.4 K/UL (ref 0–0.51)
EOSINOPHIL NFR BLD: 4.9 % (ref 0–6.9)
ERYTHROCYTE [DISTWIDTH] IN BLOOD BY AUTOMATED COUNT: 53.1 FL (ref 35.9–50)
GFR SERPLBLD CREATININE-BSD FMLA CKD-EPI: 13 ML/MIN/1.73 M 2
GLOBULIN SER CALC-MCNC: 3 G/DL (ref 1.9–3.5)
GLUCOSE SERPL-MCNC: 126 MG/DL (ref 65–99)
HCT VFR BLD AUTO: 36 % (ref 42–52)
HGB BLD-MCNC: 11.4 G/DL (ref 14–18)
IMM GRANULOCYTES # BLD AUTO: 0.07 K/UL (ref 0–0.11)
IMM GRANULOCYTES NFR BLD AUTO: 0.9 % (ref 0–0.9)
LYMPHOCYTES # BLD AUTO: 1.48 K/UL (ref 1–4.8)
LYMPHOCYTES NFR BLD: 18.3 % (ref 22–41)
MCH RBC QN AUTO: 29.5 PG (ref 27–33)
MCHC RBC AUTO-ENTMCNC: 31.7 G/DL (ref 32.3–36.5)
MCV RBC AUTO: 93.3 FL (ref 81.4–97.8)
MONOCYTES # BLD AUTO: 0.77 K/UL (ref 0–0.85)
MONOCYTES NFR BLD AUTO: 9.5 % (ref 0–13.4)
NEUTROPHILS # BLD AUTO: 5.27 K/UL (ref 1.82–7.42)
NEUTROPHILS NFR BLD: 65.2 % (ref 44–72)
NRBC # BLD AUTO: 0 K/UL
NRBC BLD-RTO: 0 /100 WBC (ref 0–0.2)
PLATELET # BLD AUTO: 277 K/UL (ref 164–446)
PMV BLD AUTO: 10.3 FL (ref 9–12.9)
POTASSIUM SERPL-SCNC: 4.3 MMOL/L (ref 3.6–5.5)
PROT SERPL-MCNC: 7.4 G/DL (ref 6–8.2)
RBC # BLD AUTO: 3.86 M/UL (ref 4.7–6.1)
SODIUM SERPL-SCNC: 143 MMOL/L (ref 135–145)
WBC # BLD AUTO: 8.1 K/UL (ref 4.8–10.8)

## 2024-06-20 PROCEDURE — 85025 COMPLETE CBC W/AUTO DIFF WBC: CPT

## 2024-06-20 PROCEDURE — 80053 COMPREHEN METABOLIC PANEL: CPT

## 2024-06-20 PROCEDURE — 36415 COLL VENOUS BLD VENIPUNCTURE: CPT

## 2024-06-28 ENCOUNTER — TELEPHONE (OUTPATIENT)
Dept: VASCULAR SURGERY | Facility: MEDICAL CENTER | Age: 79
End: 2024-06-28
Payer: MEDICARE

## 2024-07-01 ENCOUNTER — ANESTHESIA EVENT (OUTPATIENT)
Dept: SURGERY | Facility: MEDICAL CENTER | Age: 79
End: 2024-07-01
Payer: MEDICARE

## 2024-07-01 ENCOUNTER — ANESTHESIA (OUTPATIENT)
Dept: SURGERY | Facility: MEDICAL CENTER | Age: 79
End: 2024-07-01
Payer: MEDICARE

## 2024-07-01 VITALS
RESPIRATION RATE: 16 BRPM | HEART RATE: 63 BPM | SYSTOLIC BLOOD PRESSURE: 143 MMHG | HEIGHT: 70 IN | OXYGEN SATURATION: 95 % | WEIGHT: 171.3 LBS | DIASTOLIC BLOOD PRESSURE: 68 MMHG | BODY MASS INDEX: 24.52 KG/M2 | TEMPERATURE: 95.9 F

## 2024-07-01 LAB
ALBUMIN SERPL BCP-MCNC: 4.1 G/DL (ref 3.2–4.9)
ALBUMIN/GLOB SERPL: 1.5 G/DL
ALP SERPL-CCNC: 84 U/L (ref 30–99)
ALT SERPL-CCNC: 28 U/L (ref 2–50)
ANION GAP SERPL CALC-SCNC: 20 MMOL/L (ref 7–16)
AST SERPL-CCNC: 17 U/L (ref 12–45)
BILIRUB SERPL-MCNC: 0.4 MG/DL (ref 0.1–1.5)
BUN SERPL-MCNC: 71 MG/DL (ref 8–22)
CALCIUM ALBUM COR SERPL-MCNC: 9.2 MG/DL (ref 8.5–10.5)
CALCIUM SERPL-MCNC: 9.3 MG/DL (ref 8.5–10.5)
CHLORIDE SERPL-SCNC: 97 MMOL/L (ref 96–112)
CO2 SERPL-SCNC: 23 MMOL/L (ref 20–33)
CREAT SERPL-MCNC: 7.1 MG/DL (ref 0.5–1.4)
EKG IMPRESSION: NORMAL
ERYTHROCYTE [DISTWIDTH] IN BLOOD BY AUTOMATED COUNT: 49.2 FL (ref 35.9–50)
GFR SERPLBLD CREATININE-BSD FMLA CKD-EPI: 7 ML/MIN/1.73 M 2
GLOBULIN SER CALC-MCNC: 2.8 G/DL (ref 1.9–3.5)
GLUCOSE SERPL-MCNC: 123 MG/DL (ref 65–99)
HCT VFR BLD AUTO: 32.3 % (ref 42–52)
HGB BLD-MCNC: 10.5 G/DL (ref 14–18)
INR PPP: 1.17 (ref 0.87–1.13)
MCH RBC QN AUTO: 29.7 PG (ref 27–33)
MCHC RBC AUTO-ENTMCNC: 32.5 G/DL (ref 32.3–36.5)
MCV RBC AUTO: 91.2 FL (ref 81.4–97.8)
PLATELET # BLD AUTO: 216 K/UL (ref 164–446)
PMV BLD AUTO: 10.2 FL (ref 9–12.9)
POTASSIUM SERPL-SCNC: 3.8 MMOL/L (ref 3.6–5.5)
PROT SERPL-MCNC: 6.9 G/DL (ref 6–8.2)
PROTHROMBIN TIME: 15 SEC (ref 12–14.6)
RBC # BLD AUTO: 3.54 M/UL (ref 4.7–6.1)
SODIUM SERPL-SCNC: 140 MMOL/L (ref 135–145)
WBC # BLD AUTO: 8.9 K/UL (ref 4.8–10.8)

## 2024-07-01 PROCEDURE — 700101 HCHG RX REV CODE 250: Performed by: SURGERY

## 2024-07-01 PROCEDURE — 160009 HCHG ANES TIME/MIN: Performed by: SURGERY

## 2024-07-01 PROCEDURE — 93010 ELECTROCARDIOGRAM REPORT: CPT | Performed by: INTERNAL MEDICINE

## 2024-07-01 PROCEDURE — 700105 HCHG RX REV CODE 258: Performed by: SURGERY

## 2024-07-01 PROCEDURE — 160035 HCHG PACU - 1ST 60 MINS PHASE I: Performed by: SURGERY

## 2024-07-01 PROCEDURE — 160048 HCHG OR STATISTICAL LEVEL 1-5: Performed by: SURGERY

## 2024-07-01 PROCEDURE — 160028 HCHG SURGERY MINUTES - 1ST 30 MINS LEVEL 3: Performed by: SURGERY

## 2024-07-01 PROCEDURE — 160039 HCHG SURGERY MINUTES - EA ADDL 1 MIN LEVEL 3: Performed by: SURGERY

## 2024-07-01 PROCEDURE — 110454 HCHG SHELL REV 250: Performed by: SURGERY

## 2024-07-01 PROCEDURE — 160025 RECOVERY II MINUTES (STATS): Performed by: SURGERY

## 2024-07-01 PROCEDURE — 85610 PROTHROMBIN TIME: CPT

## 2024-07-01 PROCEDURE — 80053 COMPREHEN METABOLIC PANEL: CPT

## 2024-07-01 PROCEDURE — 700101 HCHG RX REV CODE 250: Performed by: ANESTHESIOLOGY

## 2024-07-01 PROCEDURE — 85027 COMPLETE CBC AUTOMATED: CPT

## 2024-07-01 PROCEDURE — 36821 AV FUSION DIRECT ANY SITE: CPT | Mod: LT | Performed by: SURGERY

## 2024-07-01 PROCEDURE — 700111 HCHG RX REV CODE 636 W/ 250 OVERRIDE (IP): Performed by: SURGERY

## 2024-07-01 PROCEDURE — 93005 ELECTROCARDIOGRAM TRACING: CPT | Performed by: SURGERY

## 2024-07-01 PROCEDURE — 160002 HCHG RECOVERY MINUTES (STAT): Performed by: SURGERY

## 2024-07-01 PROCEDURE — 700105 HCHG RX REV CODE 258: Performed by: ANESTHESIOLOGY

## 2024-07-01 PROCEDURE — 160046 HCHG PACU - 1ST 60 MINS PHASE II: Performed by: SURGERY

## 2024-07-01 PROCEDURE — 700111 HCHG RX REV CODE 636 W/ 250 OVERRIDE (IP): Performed by: ANESTHESIOLOGY

## 2024-07-01 RX ORDER — HYDROCODONE BITARTRATE AND ACETAMINOPHEN 5; 325 MG/1; MG/1
1 TABLET ORAL EVERY 4 HOURS PRN
Qty: 10 TABLET | Refills: 0 | Status: SHIPPED | OUTPATIENT
Start: 2024-07-01 | End: 2024-07-03

## 2024-07-01 RX ORDER — SODIUM CHLORIDE 9 MG/ML
INJECTION, SOLUTION INTRAVENOUS
Status: DISCONTINUED | OUTPATIENT
Start: 2024-07-01 | End: 2024-07-01 | Stop reason: SURG

## 2024-07-01 RX ORDER — MAGNESIUM HYDROXIDE 1200 MG/15ML
LIQUID ORAL
Status: COMPLETED | OUTPATIENT
Start: 2024-07-01 | End: 2024-07-01

## 2024-07-01 RX ORDER — SODIUM CHLORIDE 9 MG/ML
INJECTION, SOLUTION INTRAVENOUS CONTINUOUS
Status: DISCONTINUED | OUTPATIENT
Start: 2024-07-01 | End: 2024-07-01 | Stop reason: HOSPADM

## 2024-07-01 RX ORDER — ONDANSETRON 2 MG/ML
INJECTION INTRAMUSCULAR; INTRAVENOUS PRN
Status: DISCONTINUED | OUTPATIENT
Start: 2024-07-01 | End: 2024-07-01 | Stop reason: SURG

## 2024-07-01 RX ORDER — HYDROMORPHONE HYDROCHLORIDE 1 MG/ML
0.1 INJECTION, SOLUTION INTRAMUSCULAR; INTRAVENOUS; SUBCUTANEOUS
Status: DISCONTINUED | OUTPATIENT
Start: 2024-07-01 | End: 2024-07-01 | Stop reason: HOSPADM

## 2024-07-01 RX ORDER — HALOPERIDOL 5 MG/ML
1 INJECTION INTRAMUSCULAR
Status: DISCONTINUED | OUTPATIENT
Start: 2024-07-01 | End: 2024-07-01 | Stop reason: HOSPADM

## 2024-07-01 RX ORDER — OXYCODONE HCL 5 MG/5 ML
10 SOLUTION, ORAL ORAL
Status: DISCONTINUED | OUTPATIENT
Start: 2024-07-01 | End: 2024-07-01 | Stop reason: HOSPADM

## 2024-07-01 RX ORDER — HYDROMORPHONE HYDROCHLORIDE 1 MG/ML
0.2 INJECTION, SOLUTION INTRAMUSCULAR; INTRAVENOUS; SUBCUTANEOUS
Status: DISCONTINUED | OUTPATIENT
Start: 2024-07-01 | End: 2024-07-01 | Stop reason: HOSPADM

## 2024-07-01 RX ORDER — ETOMIDATE 2 MG/ML
INJECTION INTRAVENOUS PRN
Status: DISCONTINUED | OUTPATIENT
Start: 2024-07-01 | End: 2024-07-01 | Stop reason: SURG

## 2024-07-01 RX ORDER — DIPHENHYDRAMINE HYDROCHLORIDE 50 MG/ML
12.5 INJECTION INTRAMUSCULAR; INTRAVENOUS
Status: DISCONTINUED | OUTPATIENT
Start: 2024-07-01 | End: 2024-07-01 | Stop reason: HOSPADM

## 2024-07-01 RX ORDER — MIDAZOLAM HYDROCHLORIDE 1 MG/ML
INJECTION INTRAMUSCULAR; INTRAVENOUS PRN
Status: DISCONTINUED | OUTPATIENT
Start: 2024-07-01 | End: 2024-07-01 | Stop reason: SURG

## 2024-07-01 RX ORDER — HYDRALAZINE HYDROCHLORIDE 20 MG/ML
5 INJECTION INTRAMUSCULAR; INTRAVENOUS
Status: DISCONTINUED | OUTPATIENT
Start: 2024-07-01 | End: 2024-07-01 | Stop reason: HOSPADM

## 2024-07-01 RX ORDER — MEPERIDINE HYDROCHLORIDE 25 MG/ML
12.5 INJECTION INTRAMUSCULAR; INTRAVENOUS; SUBCUTANEOUS
Status: DISCONTINUED | OUTPATIENT
Start: 2024-07-01 | End: 2024-07-01 | Stop reason: HOSPADM

## 2024-07-01 RX ORDER — LIDOCAINE HYDROCHLORIDE 20 MG/ML
INJECTION, SOLUTION EPIDURAL; INFILTRATION; INTRACAUDAL; PERINEURAL PRN
Status: DISCONTINUED | OUTPATIENT
Start: 2024-07-01 | End: 2024-07-01 | Stop reason: SURG

## 2024-07-01 RX ORDER — EPHEDRINE SULFATE 50 MG/ML
INJECTION, SOLUTION INTRAVENOUS PRN
Status: DISCONTINUED | OUTPATIENT
Start: 2024-07-01 | End: 2024-07-01 | Stop reason: SURG

## 2024-07-01 RX ORDER — ACETAMINOPHEN 500 MG
1000 TABLET ORAL EVERY 6 HOURS PRN
COMMUNITY

## 2024-07-01 RX ORDER — SODIUM CHLORIDE 9 MG/ML
INJECTION, SOLUTION INTRAVENOUS ONCE
Status: DISCONTINUED | OUTPATIENT
Start: 2024-07-01 | End: 2024-07-01 | Stop reason: HOSPADM

## 2024-07-01 RX ORDER — CEFAZOLIN SODIUM 1 G/3ML
INJECTION, POWDER, FOR SOLUTION INTRAMUSCULAR; INTRAVENOUS PRN
Status: DISCONTINUED | OUTPATIENT
Start: 2024-07-01 | End: 2024-07-01 | Stop reason: SURG

## 2024-07-01 RX ORDER — LABETALOL HYDROCHLORIDE 5 MG/ML
5 INJECTION, SOLUTION INTRAVENOUS
Status: DISCONTINUED | OUTPATIENT
Start: 2024-07-01 | End: 2024-07-01 | Stop reason: HOSPADM

## 2024-07-01 RX ORDER — OXYCODONE HCL 5 MG/5 ML
5 SOLUTION, ORAL ORAL
Status: DISCONTINUED | OUTPATIENT
Start: 2024-07-01 | End: 2024-07-01 | Stop reason: HOSPADM

## 2024-07-01 RX ORDER — DEXAMETHASONE SODIUM PHOSPHATE 4 MG/ML
INJECTION, SOLUTION INTRA-ARTICULAR; INTRALESIONAL; INTRAMUSCULAR; INTRAVENOUS; SOFT TISSUE PRN
Status: DISCONTINUED | OUTPATIENT
Start: 2024-07-01 | End: 2024-07-01 | Stop reason: SURG

## 2024-07-01 RX ORDER — HYDROMORPHONE HYDROCHLORIDE 1 MG/ML
0.4 INJECTION, SOLUTION INTRAMUSCULAR; INTRAVENOUS; SUBCUTANEOUS
Status: DISCONTINUED | OUTPATIENT
Start: 2024-07-01 | End: 2024-07-01 | Stop reason: HOSPADM

## 2024-07-01 RX ADMIN — EPHEDRINE SULFATE 5 MG: 50 INJECTION, SOLUTION INTRAVENOUS at 09:21

## 2024-07-01 RX ADMIN — ETOMIDATE 10 MG: 2 INJECTION, SOLUTION INTRAVENOUS at 08:56

## 2024-07-01 RX ADMIN — SODIUM CHLORIDE: 9 INJECTION, SOLUTION INTRAVENOUS at 08:51

## 2024-07-01 RX ADMIN — LIDOCAINE HYDROCHLORIDE 100 MG: 20 INJECTION, SOLUTION EPIDURAL; INFILTRATION; INTRACAUDAL at 08:56

## 2024-07-01 RX ADMIN — DEXAMETHASONE SODIUM PHOSPHATE 8 MG: 4 INJECTION INTRA-ARTICULAR; INTRALESIONAL; INTRAMUSCULAR; INTRAVENOUS; SOFT TISSUE at 08:56

## 2024-07-01 RX ADMIN — PROPOFOL 100 MG: 10 INJECTION, EMULSION INTRAVENOUS at 08:56

## 2024-07-01 RX ADMIN — ONDANSETRON 8 MG: 2 INJECTION INTRAMUSCULAR; INTRAVENOUS at 09:24

## 2024-07-01 RX ADMIN — FENTANYL CITRATE 50 MCG: 50 INJECTION, SOLUTION INTRAMUSCULAR; INTRAVENOUS at 09:07

## 2024-07-01 RX ADMIN — CEFAZOLIN 2 G: 1 INJECTION, POWDER, FOR SOLUTION INTRAMUSCULAR; INTRAVENOUS at 08:56

## 2024-07-01 RX ADMIN — ROCURONIUM BROMIDE 20 MG: 10 INJECTION, SOLUTION INTRAVENOUS at 09:06

## 2024-07-01 RX ADMIN — MIDAZOLAM HYDROCHLORIDE 2 MG: 2 INJECTION, SOLUTION INTRAMUSCULAR; INTRAVENOUS at 08:51

## 2024-07-01 RX ADMIN — FENTANYL CITRATE 25 MCG: 50 INJECTION, SOLUTION INTRAMUSCULAR; INTRAVENOUS at 09:27

## 2024-07-01 RX ADMIN — FENTANYL CITRATE 25 MCG: 50 INJECTION, SOLUTION INTRAMUSCULAR; INTRAVENOUS at 09:18

## 2024-07-01 RX ADMIN — EPHEDRINE SULFATE 5 MG: 50 INJECTION, SOLUTION INTRAVENOUS at 09:22

## 2024-07-01 ASSESSMENT — PAIN DESCRIPTION - PAIN TYPE
TYPE: SURGICAL PAIN

## 2024-07-01 ASSESSMENT — FIBROSIS 4 INDEX: FIB4 SCORE: 1.02

## 2024-07-16 ENCOUNTER — OFFICE VISIT (OUTPATIENT)
Dept: VASCULAR SURGERY | Facility: MEDICAL CENTER | Age: 79
End: 2024-07-16
Payer: MEDICARE

## 2024-07-16 VITALS
SYSTOLIC BLOOD PRESSURE: 124 MMHG | WEIGHT: 171 LBS | DIASTOLIC BLOOD PRESSURE: 56 MMHG | HEART RATE: 73 BPM | TEMPERATURE: 98.7 F | OXYGEN SATURATION: 99 % | HEIGHT: 70 IN | BODY MASS INDEX: 24.48 KG/M2

## 2024-07-16 DIAGNOSIS — N18.6 ESRD (END STAGE RENAL DISEASE) (HCC): ICD-10-CM

## 2024-07-16 PROCEDURE — 99024 POSTOP FOLLOW-UP VISIT: CPT | Performed by: SURGERY

## 2024-07-16 PROCEDURE — 3074F SYST BP LT 130 MM HG: CPT | Performed by: SURGERY

## 2024-07-16 PROCEDURE — 3078F DIAST BP <80 MM HG: CPT | Performed by: SURGERY

## 2024-07-16 ASSESSMENT — FIBROSIS 4 INDEX: FIB4 SCORE: 1.13

## 2024-07-19 ENCOUNTER — TELEPHONE (OUTPATIENT)
Dept: VASCULAR SURGERY | Facility: MEDICAL CENTER | Age: 79
End: 2024-07-19
Payer: MEDICARE

## 2024-08-20 ENCOUNTER — APPOINTMENT (OUTPATIENT)
Dept: ADMISSIONS | Facility: MEDICAL CENTER | Age: 79
End: 2024-08-20
Attending: SURGERY
Payer: MEDICARE

## 2024-08-26 ENCOUNTER — PRE-ADMISSION TESTING (OUTPATIENT)
Dept: ADMISSIONS | Facility: MEDICAL CENTER | Age: 79
End: 2024-08-26
Attending: SURGERY
Payer: MEDICARE

## 2024-08-26 RX ORDER — FUROSEMIDE 40 MG
40 TABLET ORAL
COMMUNITY
Start: 2024-07-23

## 2024-08-26 RX ORDER — SEVELAMER CARBONATE 800 MG/1
800 TABLET, FILM COATED ORAL
COMMUNITY

## 2024-08-26 NOTE — OR NURSING
Preadmit Note:     Pt and wife educated on medications to hold day of procedure (9/16/2024) and instructed to stop vitamins and supplements per Anesthesia Protocol. Pt acknowleged understanding of NPO status, pre procedure instructions, and location check-in. All questions answered.

## 2024-09-11 ENCOUNTER — TELEPHONE (OUTPATIENT)
Dept: VASCULAR SURGERY | Facility: MEDICAL CENTER | Age: 79
End: 2024-09-11
Payer: MEDICARE

## 2024-09-11 NOTE — TELEPHONE ENCOUNTER
I called patient and confirmed procedure with Dr. Miller for 9/16 @ 7:30 am and checking in @ 5:30 am in the [facility] 2nd floor.

## 2024-09-16 ENCOUNTER — ANESTHESIA EVENT (OUTPATIENT)
Dept: SURGERY | Facility: MEDICAL CENTER | Age: 79
End: 2024-09-16
Payer: MEDICARE

## 2024-09-16 ENCOUNTER — PHARMACY VISIT (OUTPATIENT)
Dept: PHARMACY | Facility: MEDICAL CENTER | Age: 79
End: 2024-09-16
Payer: COMMERCIAL

## 2024-09-16 ENCOUNTER — HOSPITAL ENCOUNTER (OUTPATIENT)
Facility: MEDICAL CENTER | Age: 79
End: 2024-09-16
Attending: SURGERY | Admitting: SURGERY
Payer: MEDICARE

## 2024-09-16 ENCOUNTER — ANESTHESIA (OUTPATIENT)
Dept: SURGERY | Facility: MEDICAL CENTER | Age: 79
End: 2024-09-16
Payer: MEDICARE

## 2024-09-16 VITALS
HEART RATE: 71 BPM | DIASTOLIC BLOOD PRESSURE: 71 MMHG | WEIGHT: 173.72 LBS | BODY MASS INDEX: 24.87 KG/M2 | TEMPERATURE: 97.9 F | HEIGHT: 70 IN | RESPIRATION RATE: 18 BRPM | OXYGEN SATURATION: 90 % | SYSTOLIC BLOOD PRESSURE: 142 MMHG

## 2024-09-16 DIAGNOSIS — G89.18 POSTOPERATIVE PAIN: ICD-10-CM

## 2024-09-16 LAB
ALBUMIN SERPL BCP-MCNC: 4 G/DL (ref 3.2–4.9)
ALBUMIN/GLOB SERPL: 1.5 G/DL
ALP SERPL-CCNC: 85 U/L (ref 30–99)
ALT SERPL-CCNC: 63 U/L (ref 2–50)
ANION GAP SERPL CALC-SCNC: 18 MMOL/L (ref 7–16)
AST SERPL-CCNC: 36 U/L (ref 12–45)
BILIRUB SERPL-MCNC: 0.6 MG/DL (ref 0.1–1.5)
BUN SERPL-MCNC: 63 MG/DL (ref 8–22)
CALCIUM ALBUM COR SERPL-MCNC: 9.2 MG/DL (ref 8.5–10.5)
CALCIUM SERPL-MCNC: 9.2 MG/DL (ref 8.5–10.5)
CHLORIDE SERPL-SCNC: 97 MMOL/L (ref 96–112)
CO2 SERPL-SCNC: 24 MMOL/L (ref 20–33)
CREAT SERPL-MCNC: 7.39 MG/DL (ref 0.5–1.4)
ERYTHROCYTE [DISTWIDTH] IN BLOOD BY AUTOMATED COUNT: 52.8 FL (ref 35.9–50)
GFR SERPLBLD CREATININE-BSD FMLA CKD-EPI: 7 ML/MIN/1.73 M 2
GLOBULIN SER CALC-MCNC: 2.7 G/DL (ref 1.9–3.5)
GLUCOSE SERPL-MCNC: 131 MG/DL (ref 65–99)
HCT VFR BLD AUTO: 35.1 % (ref 42–52)
HGB BLD-MCNC: 11.3 G/DL (ref 14–18)
INR PPP: 1.41 (ref 0.87–1.13)
MCH RBC QN AUTO: 31.4 PG (ref 27–33)
MCHC RBC AUTO-ENTMCNC: 32.2 G/DL (ref 32.3–36.5)
MCV RBC AUTO: 97.5 FL (ref 81.4–97.8)
PLATELET # BLD AUTO: 185 K/UL (ref 164–446)
PMV BLD AUTO: 11 FL (ref 9–12.9)
POTASSIUM SERPL-SCNC: 4.3 MMOL/L (ref 3.6–5.5)
PROT SERPL-MCNC: 6.7 G/DL (ref 6–8.2)
PROTHROMBIN TIME: 17.5 SEC (ref 12–14.6)
RBC # BLD AUTO: 3.6 M/UL (ref 4.7–6.1)
SODIUM SERPL-SCNC: 139 MMOL/L (ref 135–145)
WBC # BLD AUTO: 8.6 K/UL (ref 4.8–10.8)

## 2024-09-16 PROCEDURE — 700101 HCHG RX REV CODE 250: Performed by: ANESTHESIOLOGY

## 2024-09-16 PROCEDURE — 36415 COLL VENOUS BLD VENIPUNCTURE: CPT

## 2024-09-16 PROCEDURE — 160036 HCHG PACU - EA ADDL 30 MINS PHASE I: Performed by: SURGERY

## 2024-09-16 PROCEDURE — 160028 HCHG SURGERY MINUTES - 1ST 30 MINS LEVEL 3: Performed by: SURGERY

## 2024-09-16 PROCEDURE — 49324 LAP INSERT TUNNEL IP CATH: CPT | Performed by: SURGERY

## 2024-09-16 PROCEDURE — 80053 COMPREHEN METABOLIC PANEL: CPT

## 2024-09-16 PROCEDURE — 700111 HCHG RX REV CODE 636 W/ 250 OVERRIDE (IP): Mod: JZ

## 2024-09-16 PROCEDURE — 160048 HCHG OR STATISTICAL LEVEL 1-5: Performed by: SURGERY

## 2024-09-16 PROCEDURE — 700105 HCHG RX REV CODE 258: Performed by: SURGERY

## 2024-09-16 PROCEDURE — 160025 RECOVERY II MINUTES (STATS): Performed by: SURGERY

## 2024-09-16 PROCEDURE — 85027 COMPLETE CBC AUTOMATED: CPT

## 2024-09-16 PROCEDURE — RXMED WILLOW AMBULATORY MEDICATION CHARGE: Performed by: SURGERY

## 2024-09-16 PROCEDURE — 160002 HCHG RECOVERY MINUTES (STAT): Performed by: SURGERY

## 2024-09-16 PROCEDURE — 160035 HCHG PACU - 1ST 60 MINS PHASE I: Performed by: SURGERY

## 2024-09-16 PROCEDURE — 700101 HCHG RX REV CODE 250: Performed by: SURGERY

## 2024-09-16 PROCEDURE — 85610 PROTHROMBIN TIME: CPT

## 2024-09-16 PROCEDURE — 160039 HCHG SURGERY MINUTES - EA ADDL 1 MIN LEVEL 3: Performed by: SURGERY

## 2024-09-16 PROCEDURE — C1750 CATH, HEMODIALYSIS,LONG-TERM: HCPCS | Performed by: SURGERY

## 2024-09-16 PROCEDURE — 700111 HCHG RX REV CODE 636 W/ 250 OVERRIDE (IP): Performed by: ANESTHESIOLOGY

## 2024-09-16 PROCEDURE — 700102 HCHG RX REV CODE 250 W/ 637 OVERRIDE(OP): Performed by: ANESTHESIOLOGY

## 2024-09-16 PROCEDURE — 160046 HCHG PACU - 1ST 60 MINS PHASE II: Performed by: SURGERY

## 2024-09-16 PROCEDURE — A9270 NON-COVERED ITEM OR SERVICE: HCPCS | Performed by: ANESTHESIOLOGY

## 2024-09-16 PROCEDURE — 160009 HCHG ANES TIME/MIN: Performed by: SURGERY

## 2024-09-16 DEVICE — IMPLANTABLE DEVICE: Type: IMPLANTABLE DEVICE | Site: ABDOMEN | Status: FUNCTIONAL

## 2024-09-16 RX ORDER — HYDROMORPHONE HYDROCHLORIDE 1 MG/ML
0.1 INJECTION, SOLUTION INTRAMUSCULAR; INTRAVENOUS; SUBCUTANEOUS
Status: DISCONTINUED | OUTPATIENT
Start: 2024-09-16 | End: 2024-09-16 | Stop reason: HOSPADM

## 2024-09-16 RX ORDER — OXYCODONE HCL 5 MG/5 ML
10 SOLUTION, ORAL ORAL
Status: COMPLETED | OUTPATIENT
Start: 2024-09-16 | End: 2024-09-16

## 2024-09-16 RX ORDER — DIPHENHYDRAMINE HYDROCHLORIDE 50 MG/ML
12.5 INJECTION INTRAMUSCULAR; INTRAVENOUS
Status: DISCONTINUED | OUTPATIENT
Start: 2024-09-16 | End: 2024-09-16 | Stop reason: HOSPADM

## 2024-09-16 RX ORDER — HYDROMORPHONE HYDROCHLORIDE 1 MG/ML
0.2 INJECTION, SOLUTION INTRAMUSCULAR; INTRAVENOUS; SUBCUTANEOUS
Status: DISCONTINUED | OUTPATIENT
Start: 2024-09-16 | End: 2024-09-16 | Stop reason: HOSPADM

## 2024-09-16 RX ORDER — ALBUTEROL SULFATE 5 MG/ML
2.5 SOLUTION RESPIRATORY (INHALATION)
Status: DISCONTINUED | OUTPATIENT
Start: 2024-09-16 | End: 2024-09-16 | Stop reason: HOSPADM

## 2024-09-16 RX ORDER — BUPIVACAINE HYDROCHLORIDE AND EPINEPHRINE 5; 5 MG/ML; UG/ML
INJECTION, SOLUTION EPIDURAL; INTRACAUDAL; PERINEURAL
Status: DISCONTINUED
Start: 2024-09-16 | End: 2024-09-16 | Stop reason: HOSPADM

## 2024-09-16 RX ORDER — BUPIVACAINE HYDROCHLORIDE AND EPINEPHRINE 5; 5 MG/ML; UG/ML
INJECTION, SOLUTION PERINEURAL
Status: DISCONTINUED | OUTPATIENT
Start: 2024-09-16 | End: 2024-09-16 | Stop reason: HOSPADM

## 2024-09-16 RX ORDER — MEPERIDINE HYDROCHLORIDE 25 MG/ML
12.5 INJECTION INTRAMUSCULAR; INTRAVENOUS; SUBCUTANEOUS
Status: DISCONTINUED | OUTPATIENT
Start: 2024-09-16 | End: 2024-09-16 | Stop reason: HOSPADM

## 2024-09-16 RX ORDER — SODIUM CHLORIDE 9 MG/ML
INJECTION, SOLUTION INTRAVENOUS ONCE
Status: COMPLETED | OUTPATIENT
Start: 2024-09-16 | End: 2024-09-16

## 2024-09-16 RX ORDER — OXYCODONE HCL 5 MG/5 ML
5 SOLUTION, ORAL ORAL
Status: COMPLETED | OUTPATIENT
Start: 2024-09-16 | End: 2024-09-16

## 2024-09-16 RX ORDER — CEFAZOLIN SODIUM 1 G/3ML
INJECTION, POWDER, FOR SOLUTION INTRAMUSCULAR; INTRAVENOUS PRN
Status: DISCONTINUED | OUTPATIENT
Start: 2024-09-16 | End: 2024-09-16 | Stop reason: SURG

## 2024-09-16 RX ORDER — TRAMADOL HYDROCHLORIDE 50 MG/1
50-100 TABLET ORAL EVERY 6 HOURS PRN
Qty: 15 TABLET | Refills: 0 | Status: SHIPPED | OUTPATIENT
Start: 2024-09-16 | End: 2024-09-19

## 2024-09-16 RX ORDER — HYDROMORPHONE HYDROCHLORIDE 1 MG/ML
0.4 INJECTION, SOLUTION INTRAMUSCULAR; INTRAVENOUS; SUBCUTANEOUS
Status: DISCONTINUED | OUTPATIENT
Start: 2024-09-16 | End: 2024-09-16 | Stop reason: HOSPADM

## 2024-09-16 RX ORDER — HALOPERIDOL 5 MG/ML
1 INJECTION INTRAMUSCULAR
Status: DISCONTINUED | OUTPATIENT
Start: 2024-09-16 | End: 2024-09-16 | Stop reason: HOSPADM

## 2024-09-16 RX ORDER — ONDANSETRON 2 MG/ML
4 INJECTION INTRAMUSCULAR; INTRAVENOUS
Status: DISCONTINUED | OUTPATIENT
Start: 2024-09-16 | End: 2024-09-16 | Stop reason: HOSPADM

## 2024-09-16 RX ORDER — METOCLOPRAMIDE HYDROCHLORIDE 5 MG/ML
INJECTION INTRAMUSCULAR; INTRAVENOUS PRN
Status: DISCONTINUED | OUTPATIENT
Start: 2024-09-16 | End: 2024-09-16 | Stop reason: SURG

## 2024-09-16 RX ADMIN — SUGAMMADEX 200 MG: 100 INJECTION, SOLUTION INTRAVENOUS at 08:43

## 2024-09-16 RX ADMIN — METOCLOPRAMIDE HYDROCHLORIDE 10 MG: 5 INJECTION INTRAMUSCULAR; INTRAVENOUS at 08:12

## 2024-09-16 RX ADMIN — CEFAZOLIN 2 G: 1 INJECTION, POWDER, FOR SOLUTION INTRAMUSCULAR; INTRAVENOUS at 08:17

## 2024-09-16 RX ADMIN — SODIUM CHLORIDE: 9 INJECTION, SOLUTION INTRAVENOUS at 06:56

## 2024-09-16 RX ADMIN — FENTANYL CITRATE 50 MCG: 50 INJECTION, SOLUTION INTRAMUSCULAR; INTRAVENOUS at 09:15

## 2024-09-16 RX ADMIN — ROCURONIUM BROMIDE 30 MG: 10 INJECTION, SOLUTION INTRAVENOUS at 08:12

## 2024-09-16 RX ADMIN — OXYCODONE HYDROCHLORIDE 10 MG: 5 SOLUTION ORAL at 09:15

## 2024-09-16 RX ADMIN — PROPOFOL 140 MG: 10 INJECTION, EMULSION INTRAVENOUS at 08:12

## 2024-09-16 ASSESSMENT — FIBROSIS 4 INDEX
FIB4 SCORE: 1.13
FIB4 SCORE: 1.13

## 2024-09-16 ASSESSMENT — PAIN SCALES - GENERAL: PAIN_LEVEL: 2

## 2024-09-16 ASSESSMENT — PAIN DESCRIPTION - PAIN TYPE
TYPE: SURGICAL PAIN

## 2024-09-16 NOTE — OR NURSING
0909 Report received from MANOJ Sheldon. Assumed care of patient at this time.    0946 Report called to phase 2 RN, April, all question and concerns addressed at this time.    0954 Patient taken to phase 2 on navid.

## 2024-09-16 NOTE — OR NURSING
0845 received patient from OR. Report from Anesthesiologist and OR RN. Patient on 6L at 99 % O2. VSS. Monitor connected. No airway in place. S/P peritoneal dialysis cath placement, incision visualized dressing in place, CDI.     2396 Hearing aids given back to patient. Tolerating oral fluids well.     2604 Report given to Phuong ARANDA.

## 2024-09-16 NOTE — H&P
VASCULAR SURGERY SERVICE  H&P    -------------------------------------------------------------------------------------------------  Date: 9/16/2024    Surgeon: Marco Antonio Miller MD  -------------------------------------------------------------------------------------------------    HPI:  This is a 78 y.o. male who is presenting today for elective surgery.  No specific complaints at this time. Questions addressed.      Past Medical History:   Diagnosis Date    Arrhythmia 03/13/2024    Due to kidney failure treatment    Breath shortness     with exertion    Cataract     shon iol    Congestive heart failure (HCC)     Diabetes ([facility])     Dialysis patient ([facility])     monday wednesday friday at [facility]    Hemorrhagic disorder ([facility])     plavix eliquis    High cholesterol     Hypertension 05/24/2024    states controlled    Pneumonia     3/24    Renal disorder     Stroke ([facility]) 2021    tia       Past Surgical History:   Procedure Laterality Date    AV FISTULA CREATION Left 07/01/2024    Procedure: CREATION OF LEFT UPPER EXTREMITY DIALYSIS FISTULA;  Surgeon: Shilo Mercedes M.D.;  Location: SURGERY [facility];  Service: General    OTHER ORTHOPEDIC SURGERY  2004    bunion r foot    OTHER      tonsils adenoids    OTHER      dialysis catheter    OTHER      shon iol    OTHER CARDIAC SURGERY      cardiac stents       Current Facility-Administered Medications   Medication Dose Route Frequency Provider Last Rate Last Admin    lidocaine (Xylocaine) 1 % injection 0.5 mL  0.5 mL Intradermal Once PRN Marco Antonio Miller M.D.        BUPIVACAINE-EPINEPHRINE (PF) 0.5% [id number] INJ SOLN                Social History     Socioeconomic History    Marital status:      Spouse name: Not on file    Number of children: Not on file    Years of education: Not on file    Highest education level: Not on file   Occupational History    Not on file   Tobacco Use    Smoking status: Never    Smokeless tobacco: Never    Tobacco comments:     " Used chewing tobacco many years ago.   Vaping Use    Vaping status: Never Used   Substance and Sexual Activity    Alcohol use: Not Currently    Drug use: Never    Sexual activity: Not on file   Other Topics Concern    Not on file   Social History Narrative    Not on file     Social Determinants of Health     Financial Resource Strain: Not on file   Food Insecurity: Not on file   Transportation Needs: Not on file   Physical Activity: Not on file   Stress: Not on file   Social Connections: Not on file   Intimate Partner Violence: Not on file   Housing Stability: Not on file       Family History   Problem Relation Age of Onset    Cancer Mother         Brain cancer    Heart Disease Father         Several By-pass Surgeries    Cancer Daughter         Thyroid Cancer    Heart Disease Brother         Fatal Heart Attack       Allergies:  Patient has no known allergies.    Review of Systems:  Noncontributory except as per HPI    Physical Exam:  BP (!) 156/83   Pulse 75   Temp 36.7 °C (98 °F) (Temporal)   Resp 18   Ht 1.778 m (5' 10\")   Wt 78.8 kg (173 lb 11.6 oz)   SpO2 94%     Constitutional: Alert, oriented, no acute distress  HEENT:  Normocephalic and atraumatic, EOMI  Neck:   Supple, no JVD,   Cardiovascular: Regular rate and rhythm,   Pulmonary:  Good air entry bilaterally,    Abdominal:  Soft, non-tender, non-distended     Aortic impulse not widened  Musculoskeletal: No edema, no tenderness  Neurological:  CN II-XII grossly intact, no focal deficits  Skin:   Skin is warm and dry. No rash noted.  Psychiatric:  Normal mood and affect.  Vascular:  Mature LRCAVF with strong thrill, left hand well perfused    Labs:  Recent Labs     09/16/24  0639   WBC 8.6   RBC 3.60*   HEMOGLOBIN 11.3*   HEMATOCRIT 35.1*   MCV 97.5   MCH 31.4   MCHC 32.2*   RDW 52.8*   PLATELETCT 185   MPV 11.0                   Assessment/Plan:  This is a 78 y.o. male here today for elective surgery: laparoscopic CAPD catheter placement    I " explained the details of the operation, alternatives, and potential risks, including but not limited to bleeding, infection, and risks of anesthesia.  All questions were answered. Patient understands and agrees to proceed.      Marco Antonio Miller MD  [facility] Vascular Surgery   ___________________________________________________________________  Patient:Edy Juaquin Bennett   MRN:[id number]   CSN:[id number]

## 2024-09-16 NOTE — ANESTHESIA TIME REPORT
Anesthesia Start and Stop Event Times       Date Time Event    9/16/2024 0808 Ready for Procedure     0808 Anesthesia Start     0846 Anesthesia Stop          Responsible Staff  09/16/24      Name Role Begin End    Bart Mohamud M.D. Anesth 0808 0846          Overtime Reason:  no overtime (within assigned shift)    Comments:

## 2024-09-16 NOTE — PROGRESS NOTES
[facility]  Vascular Surgery Service  ___________________________________       9/16/2024    To Whom it May Concern:      Edy Bennett underwent left arm fistula creation at [facility] on 7/1/24.    The fistula has matured nicely and is OK to access any time for hemodialysis.     Once the fistula has been used 3 times with no difficulties the tunneled hemodialysis catheter can be removed, which can be done either at the [facility] center on [facility] or at my office.     If there are any questions or concerns, please contact my office at [phone]     Thank you,          Marco Antonio Miller MD  [facility] Vascular Surgery  [address] [place] [address]   Office [phone]

## 2024-09-16 NOTE — OP REPORT
VASCULAR SURGERY SERVICE                       Operative Note  _____________________________________________________    Date: 2024    Patient: Edy Bennett  : 1945  MRN: [id number]  _____________________________________________________      Preoperative Diagnosis:  -End-stage renal disease on peritoneal dialysis    Postoperative Diagnosis:  -End-stage renal disease on peritoneal dialysis    Procedure:  -Laparoscopic insertion of CAPD catheter (50093)    _____________________________________________________    Surgeon:                                 Marco Antonio Miller MD    Assistant:   none    Anesthesia:                             General anesthesia plus local anesthetic    EBL:                                        minimal    Complications:                        none    Disposition:                             Tolerated well, sent to recovery in stable condition    Justification for use of Surgical First Assist:  An experienced first assistant was utilized during this operation due to the complexity of the operation.  My assistant participated with patient preparation for surgery, incision, surgical exposure including retraction, dissection, and ligation to isolate the target structures and preserve nearby structures, and closure of the field of dissection.  The presence of the expert assist increased the efficiency of the operation and decreased the risk of intraoperative surgical complications.    _____________________________________________________      Procedure Summary:  Following informed consent, the patient was placed supine on the operating table, and general anesthesia was administered.  The patient was prepped and draped sterile in the usual fashion. Surgical time-out was called, and everyone was in agreement.  The patient did receive preoperative prophylactic antibiotics.      Local anesthetic was used at all incisions.  We began with a 5 mm incision in the left upper  quadrant and inserted a Veress needle and insufflated to a pressure of 15.  A 5 mm port was placed and the camera was inserted.  There is no evidence of trauma from insertion of the Veress needle or trocar.  The abdomen was inspected and there was no evidence of diffuse inflammatory or malignant process.  Local anesthetic was infiltrated overlying the right rectus muscle and then a 1 cm incision was made.  A subcutaneous tunnel was made with an 11 mm trocar from the right upper quadrant down to the right lower quadrant and then inserted through the fascia into the intra-abdominal space.  Using this port we advanced a peritoneal catheter down into the pelvis and then the port was removed and the 2 cuffs were positioned in the tunnel in the abdominal wall.  Insufflation was released and the ports were removed.  The incisions were closed with subcuticular absorbable suture and bandages were applied to the incisions and the catheter site.  The threaded connector was attached to the end of the catheter followed by the transfer kit and a Betadine cap was applied to the end of the transfer kit.      The patient was then sent to recovery in stable condition.  All counts were correct at the conclusion of the case.      Marco Antonio Miller MD  [facility]

## 2024-09-16 NOTE — ANESTHESIA PROCEDURE NOTES
Airway    Date/Time: 9/16/2024 8:12 AM    Performed by: Bart Mohamud M.D.  Authorized by: Bart Mohamud M.D.    Location:  OR  Urgency:  Elective  Indications for Airway Management:  Anesthesia      Spontaneous Ventilation: absent    Sedation Level:  Deep  Preoxygenated: Yes    Patient Position:  Sniffing  Final Airway Type:  Endotracheal airway  Final Endotracheal Airway:  ETT  Cuffed: Yes    Technique Used for Successful ETT Placement:  Direct laryngoscopy    Insertion Site:  Oral  Blade Type:  Denisse  Laryngoscope Blade/Videolaryngoscope Blade Size:  3  ETT Size (mm):  7.0  Measured from:  Teeth  ETT to Teeth (cm):  21  Placement Verified by: auscultation and capnometry    Cormack-Lehane Classification:  Grade IIa - partial view of glottis  Number of Attempts at Approach:  1

## 2024-09-16 NOTE — ANESTHESIA PREPROCEDURE EVALUATION
Case: [id number] Date/Time: 09/16/24 0715    Procedure: LAPAROSCOPIC INSERTION OF PERITONEAL DIALYSIS CATHETER PLACEMENT    Pre-op diagnosis: END STAGE RENAL DISEASE    Location: [facility] ROOM 23 / SURGERY SAME DAY [facility]    Surgeons: Marco Antonio Miller M.D.            Relevant Problems   No relevant active problems       Physical Exam    Airway   Mallampati: II  TM distance: >3 FB  Neck ROM: full       Cardiovascular - normal exam  Rhythm: regular  Rate: normal  (-) murmur     Dental - normal exam           Pulmonary - normal exam  Breath sounds clear to auscultation     Abdominal    Neurological - normal exam                   Anesthesia Plan    ASA 3       Plan - general       Airway plan will be ETT          Induction: intravenous    Postoperative Plan: Postoperative administration of opioids is intended.    Pertinent diagnostic labs and testing reviewed    Informed Consent:    Anesthetic plan and risks discussed with patient.    Use of blood products discussed with: patient whom consented to blood products.

## 2024-09-16 NOTE — DISCHARGE INSTRUCTIONS
_____________________________________________________  _____________________________________________________      Discharge Instructions: Laparoscopic Peritoneal Catheter Insertion  ================================================    1. DIET: Resume regular diet    2. ACTIVITIES: After discharge from the hospital, you may resume routine activities. However, there should be no heavy lifting (greater than 15 pounds) and no strenuous activities for 2 weeks. Otherwise, routine activities of daily living are acceptable.    3. DRIVING: You may drive whenever you are off pain medications and are able to perform the activities needed to drive, i.e. turning, bending, twisting, etc.    4. BATHING: OK to shower starting two days after surgery.  Avoid submerging the incisions in water (tub, bath, pool) for at least a week. If the incisions are covered with skin glue it is waterproof and it will start to peel off on its own in 5-7 days which is normal.  If you have white bandages, you can remove then two days after surgery but leave the bandage on the catheter site.    5. BOWEL FUNCTION: A few patients, after this operation, will develop either frequent or loose stools after meals. This usually corrects itself after a few days to a few weeks. If this occurs, do not worry; it is not unusual and will resolve. Much more common than loose stools, is constipation. The combination of pain medication and decreased activity level can cause constipation in otherwise normal patients. If you feel this is occurring, take a laxative (Milk of Magnesia, Ex-Lax, Senokot, etc.) until the problem has resolved.    6. PAIN MEDICATION: You may be given a prescription for pain medication at discharge. Please take these as directed. It is important to remember not to take medications on an empty stomach as this may cause nausea.  You can transition from the prescription-strength pain medication to over-the-counter medications as your pain improves,  such as tylenol, ibuprofen, or Aleve.    7.CALL IF YOU HAVE: (1) Fevers to more than 101.5 F, (2) Unusual chest or leg pain, (3) Drainage or fluid from incision that may be foul smelling, increased tenderness or soreness at the wound or the wound edges are no longer together, redness or swelling at the incision site. Please do not hesitate to call with any other questions.     8. APPOINTMENT: Contact our office at [phone] to schedule a follow-up appointment about 2 weeks following your procedure.    If you have any additional questions, please do not hesitate to call the office and speak to either myself or the physician on call.    Office address:   [facility]  [facility] [address]  [place] [id number] [phone]  Fax [phone]     _____________________________________________________  _____________________________________________________      If any questions arise, call your provider.  If your provider is not available, please feel free to call the Surgical Center at [phone].    MEDICATIONS: Resume taking daily medication.  Take prescribed pain medication with food.  If no medication is prescribed, you may take non-aspirin pain medication if needed.  PAIN MEDICATION CAN BE VERY CONSTIPATING.  Take a stool softener or laxative such as senokot, pericolace, or milk of magnesia if needed.    Last pain medication given: oxycodone given at 9:15 am. You may take your tramadol after 12 pm.    What to Expect Post Anesthesia    Rest and take it easy for the first 24 hours.  A responsible adult is recommended to remain with you during that time.  It is normal to feel sleepy.  We encourage you to not do anything that requires balance, judgment or coordination.    FOR 24 HOURS DO NOT:  Drive, operate machinery or run household appliances.  Drink beer or alcoholic beverages.  Make important decisions or sign legal documents.    To avoid nausea, slowly advance diet as tolerated, avoiding spicy or  greasy foods for the first day.  Add more substantial food to your diet according to your provider's instructions.  Babies can be fed formula or breast milk as soon as they are hungry.  INCREASE FLUIDS AND FIBER TO AVOID CONSTIPATION.    MILD FLU-LIKE SYMPTOMS ARE NORMAL.  YOU MAY EXPERIENCE GENERALIZED MUSCLE ACHES, THROAT IRRITATION, HEADACHE AND/OR SOME NAUSEA.

## 2024-09-16 NOTE — ANESTHESIA POSTPROCEDURE EVALUATION
Patient: Edy Bennett    Procedure Summary       Date: 09/16/24 Room / Location: [facility] ROOM 23 / SURGERY SAME DAY [facility]    Anesthesia Start: 0808 Anesthesia Stop: 0846    Procedure: LAPAROSCOPIC INSERTION OF PERITONEAL DIALYSIS CATHETER PLACEMENT (Abdomen) Diagnosis: (END STAGE RENAL DISEASE)    Surgeons: Marco Antonio Miller M.D. Responsible Provider: Bart Mohamud M.D.    Anesthesia Type: general ASA Status: 3            Final Anesthesia Type: general  Last vitals  BP   Blood Pressure : (!) 145/83    Temp   36.6 °C (97.9 °F)    Pulse   72   Resp   18    SpO2   99 %      Anesthesia Post Evaluation    Patient location during evaluation: PACU  Patient participation: complete - patient participated  Level of consciousness: awake and alert  Pain score: 2    Airway patency: patent  Anesthetic complications: no  Cardiovascular status: hemodynamically stable  Respiratory status: acceptable  Hydration status: euvolemic    PONV: none          No notable events documented.     Nurse Pain Score: 0 (NPRS)

## 2024-09-16 NOTE — OR NURSING
0954 Pt arrived from PACU, transferred from [facility] to chair, tolerated well.  Visualized abdominal dressing and lap stab, CDI. Small amount of serous drainage to gauze.  VSS.   1000 Wife brought to bedside.  Pt admits to minor abdominal pain, denies nausea.   1005 Discharge instructions reviewed with pt and wife.  Answered all questions and concerns.    1010 Pt up to change clothes, tolerated well. Pt meets dischare criteria.   1015 Pt PIV removed, tolerated well.    1018 Pt escorted out via wheelchair to d/c home with wife.

## 2024-10-01 ENCOUNTER — APPOINTMENT (OUTPATIENT)
Dept: VASCULAR SURGERY | Facility: MEDICAL CENTER | Age: 79
End: 2024-10-01
Payer: MEDICARE

## 2024-10-01 VITALS
OXYGEN SATURATION: 97 % | HEART RATE: 71 BPM | WEIGHT: 174.2 LBS | HEIGHT: 70 IN | TEMPERATURE: 97.5 F | BODY MASS INDEX: 24.94 KG/M2 | DIASTOLIC BLOOD PRESSURE: 62 MMHG | SYSTOLIC BLOOD PRESSURE: 128 MMHG

## 2024-10-01 DIAGNOSIS — N18.6 ESRD (END STAGE RENAL DISEASE) (HCC): ICD-10-CM

## 2024-10-01 PROCEDURE — 99999 PR NO CHARGE: CPT | Performed by: SURGERY

## 2024-10-01 ASSESSMENT — FIBROSIS 4 INDEX: FIB4 SCORE: 1.89

## 2024-11-12 ENCOUNTER — HOSPITAL ENCOUNTER (OUTPATIENT)
Dept: RADIOLOGY | Facility: MEDICAL CENTER | Age: 79
End: 2024-11-12
Attending: FAMILY MEDICINE
Payer: MEDICARE

## 2024-11-12 DIAGNOSIS — I25.10 ATHEROSCLEROSIS OF NATIVE CORONARY ARTERY OF NATIVE HEART WITHOUT ANGINA PECTORIS: ICD-10-CM

## 2024-11-12 DIAGNOSIS — Z49.02: ICD-10-CM

## 2024-11-12 DIAGNOSIS — R20.9 COLD FEET: ICD-10-CM

## 2024-11-12 PROCEDURE — 74018 RADEX ABDOMEN 1 VIEW: CPT

## 2024-11-12 PROCEDURE — 93922 UPR/L XTREMITY ART 2 LEVELS: CPT

## 2024-12-12 ENCOUNTER — APPOINTMENT (OUTPATIENT)
Dept: VASCULAR SURGERY | Facility: MEDICAL CENTER | Age: 79
End: 2024-12-12
Payer: MEDICARE

## 2024-12-12 VITALS
HEART RATE: 66 BPM | BODY MASS INDEX: 24.91 KG/M2 | HEIGHT: 70 IN | SYSTOLIC BLOOD PRESSURE: 104 MMHG | WEIGHT: 174 LBS | OXYGEN SATURATION: 100 % | DIASTOLIC BLOOD PRESSURE: 56 MMHG | TEMPERATURE: 97.3 F

## 2024-12-12 DIAGNOSIS — K40.90 REDUCIBLE LEFT INGUINAL HERNIA: ICD-10-CM

## 2024-12-12 DIAGNOSIS — T85.611A PERITONEAL DIALYSIS CATHETER DYSFUNCTION, INITIAL ENCOUNTER (HCC): ICD-10-CM

## 2024-12-12 DIAGNOSIS — N18.6 ESRD (END STAGE RENAL DISEASE) (HCC): ICD-10-CM

## 2024-12-12 PROCEDURE — 3074F SYST BP LT 130 MM HG: CPT | Performed by: SURGERY

## 2024-12-12 PROCEDURE — 3078F DIAST BP <80 MM HG: CPT | Performed by: SURGERY

## 2024-12-12 PROCEDURE — 99214 OFFICE O/P EST MOD 30 MIN: CPT | Performed by: SURGERY

## 2024-12-12 ASSESSMENT — FIBROSIS 4 INDEX: FIB4 SCORE: 1.89

## 2024-12-12 NOTE — PROGRESS NOTES
Vascular Surgery  Presurgical H&P    Patient:Edy Bennett  MRN:[id number]  Primary care physician:Denise Carver M.D.    Vascular Consultant: Marco Antonio Miller MD    12/12/2024  _____________________________________________________    Chief Complaint/Reason for Visit:     Malfunctioning peritoneal dialysis catheter  Reducible left inguinal hernia    History of Present Illness:   Edy Bennett is a 79 y.o. year old male with end-stage renal disease on hemodialysis who underwent placement of a peritoneal dialysis catheter on 9/16/2024.  Apparently the catheter has been intermittently functional.  The peritoneal dialysis team has also been hesitant to start using the catheter for peritoneal dialysis because they identified that he has a reducible left inguinal hernia.  Patient has previous imaging that shows that the catheter had flipped up into the abdominal cavity out of the pelvis, and then more recent imaging which shows that the catheter had gone back down into the pelvis, so the exact current position of the catheter is unknown.    Patient sees Dr. Rose and Dr. Milian at [facility] nephrology      Past Medical History:     Past Medical History:   Diagnosis Date    Arrhythmia 03/13/2024    Due to kidney failure treatment    Breath shortness     with exertion    Cataract     shon iol    Congestive heart failure (HCC)     Diabetes (HCC)     Dialysis patient (HCC)     monday wednesday friday at [facility]    Hemorrhagic disorder ([facility])     plavix eliquis    High cholesterol     Hypertension 05/24/2024    states controlled    Pneumonia     3/24    Renal disorder     Stroke ([facility]) 2021    tia     Past Surgical History:     Past Surgical History:   Procedure Laterality Date    CATH PLACEMENT CAPD N/A 9/16/2024    Procedure: LAPAROSCOPIC INSERTION OF PERITONEAL DIALYSIS CATHETER PLACEMENT;  Surgeon: Marco Antonio Miller M.D.;  Location: SURGERY SAME DAY [facility];  Service: Vascular    AV FISTULA  CREATION Left 07/01/2024    Procedure: CREATION OF LEFT UPPER EXTREMITY DIALYSIS FISTULA;  Surgeon: Shilo Mercedes M.D.;  Location: SURGERY [facility];  Service: General    OTHER ORTHOPEDIC SURGERY  2004    bunion r foot    OTHER      tonsils adenoids    OTHER      dialysis catheter    OTHER      shon iol    OTHER CARDIAC SURGERY      cardiac stents     Allergies:   No Known Allergies  Medications:     Outpatient Encounter Medications as of 12/12/2024   Medication Sig Dispense Refill    furosemide (LASIX) 40 MG Tab Take 40 mg by mouth. DO NOT TAKE DAY OF SURGERY 9/16/2024      Budeson-Glycopyrrol-Formoterol (BREZTRI AEROSPHERE INH) Inhale 1 Puff every day.     MEDICATION INSTRUCTIONS FOR SURGERY/PROCEDURE SCHEDULED FOR 9/16/2024   CONTINUE TAKING MED PRIOR TO SURGERY      sevelamer carbonate (RENVELA) 800 MG Tab tablet Take 800 mg by mouth 3 times a day with meals.  MEDICATION INSTRUCTIONS FOR SURGERY/PROCEDURE SCHEDULED FOR 9/16/2024   CONTINUE TAKING MED PRIOR TO SURGERY      acetaminophen (TYLENOL) 500 MG Tab Take 1,000 mg by mouth every 6 hours as needed for Moderate Pain. MEDICATION INSTRUCTIONS FOR SURGERY/PROCEDURE SCHEDULED FOR 9/16/2024   CONTINUE TAKING MED PRIOR TO SURGERY      amLODIPine (NORVASC) 5 MG Tab Take 5 mg by mouth every day.     MEDICATION INSTRUCTIONS FOR SURGERY/PROCEDURE SCHEDULED FOR 9/16/2024   CONTINUE TAKING MED PRIOR TO SURGERY      apixaban (ELIQUIS) 5mg Tab Take 5 mg by mouth 2 times a day. REFER TO PRESCRIBING DOCTOR OR SURGEON FOR SURGERY ON 9/16/2024      furosemide (LASIX) 80 MG Tab Take 40 mg by mouth every day.    (Patient not taking: Reported on 8/26/2024)      metoprolol SR (TOPROL XL) 100 MG TABLET SR 24 HR Take 100 mg by mouth at bedtime.   MEDICATION INSTRUCTIONS FOR SURGERY/PROCEDURE SCHEDULED FOR 9/16/2024   CONTINUE TAKING MED PRIOR TO SURGERY      montelukast (SINGULAIR) 10 MG Tab Take 10 mg by mouth at bedtime. MEDICATION INSTRUCTIONS FOR SURGERY/PROCEDURE SCHEDULED  FOR 9/16/2024   CONTINUE TAKING MED PRIOR TO SURGERY      PLAVIX 75 MG Tab Take 1 Tablet by mouth every day.   REFER TO PRESCRIBING DOCTOR OR SURGEON FOR SURGERY ON 9/16/2024      rosuvastatin (CRESTOR) 20 MG Tab Take 1 Tablet by mouth at bedtime. MEDICATION INSTRUCTIONS FOR SURGERY/PROCEDURE SCHEDULED FOR 9/16/2024   CONTINUE TAKING MED PRIOR TO SURGERY      tamsulosin (FLOMAX) 0.4 MG capsule Take 0.4 mg by mouth every day. MEDICATION INSTRUCTIONS FOR SURGERY/PROCEDURE SCHEDULED FOR 9/16/2024   CONTINUE TAKING MED PRIOR TO SURGERY      terazosin (HYTRIN) 5 MG Cap Take 1 Capsule by mouth at bedtime. MEDICATION INSTRUCTIONS FOR SURGERY/PROCEDURE SCHEDULED FOR 9/16/2024   CONTINUE TAKING MED PRIOR TO SURGERY      nitroglycerin (NITROSTAT) 0.4 MG SL Tab Place 0.4 mg under the tongue as needed for Chest Pain.     MEDICATION INSTRUCTIONS FOR SURGERY/PROCEDURE SCHEDULED FOR 9/16/2024   CONTINUE TAKING MED PRIOR TO SURGERY       No facility-administered encounter medications on file as of 12/12/2024.     Social History:     Social History     Socioeconomic History    Marital status:      Spouse name: Not on file    Number of children: Not on file    Years of education: Not on file    Highest education level: Not on file   Occupational History    Not on file   Tobacco Use    Smoking status: Never    Smokeless tobacco: Never    Tobacco comments:     Used chewing tobacco many years ago.   Vaping Use    Vaping status: Never Used   Substance and Sexual Activity    Alcohol use: Not Currently    Drug use: Never    Sexual activity: Not on file   Other Topics Concern    Not on file   Social History Narrative    Not on file     Social Drivers of Health     Financial Resource Strain: Not on file   Food Insecurity: Not on file   Transportation Needs: Not on file   Physical Activity: Not on file   Stress: Not on file   Social Connections: Not on file   Intimate Partner Violence: Not on file   Housing Stability: Not on file     "  Social History     Tobacco Use   Smoking Status Never   Smokeless Tobacco Never   Tobacco Comments    Used chewing tobacco many years ago.     Social History     Substance and Sexual Activity   Alcohol Use Not Currently     Social History     Substance and Sexual Activity   Drug Use Never      Family History:     Family History   Problem Relation Age of Onset    Cancer Mother         Brain cancer    Heart Disease Father         Several By-pass Surgeries    Cancer Daughter         Thyroid Cancer    Heart Disease Brother         Fatal Heart Attack       Review of Systems:   Constitutional: Negative for fever or chills  HENT:    Negative for hearing loss or tinnitus    Eyes:    Negative for blurred vision or loss of vision  Respiratory:  Negative for cough or hemoptysis  Cardiac:  Negative for chest pain or palpitations  Vascular:  Negative for claudication, Negative for rest pain  Gastrointestinal: Negative for vomiting or abdominal pain     Negative for hematochezia or melena   Genitourinary: Negative for dysuria or hematuria   Musculoskeletal: Negative for myalgias or acute joint pain  Skin:   Negative for itching or rash  Neurological:  Negative for dizziness or headaches     Negative for speech disturbance     Negative for extremity weakness or paresthesias  Endo/Heme:  Negative for easy bruising or bleeding         Exam:   /56 (BP Location: Right arm, Patient Position: Sitting, BP Cuff Size: Adult)   Pulse 66   Temp 36.3 °C (97.3 °F) (Temporal)   Ht 1.778 m (5' 10\")   Wt 78.9 kg (174 lb)   SpO2 100%   BMI 24.97 kg/m²     Constitutional: Alert, oriented, no acute distress  HEENT:  Normocephalic and atraumatic, EOMI  Neck:   Supple, no JVD,   Cardiovascular: Regular rate and rhythm,   Pulmonary:  Good air entry bilaterally,    Abdominal:  Soft, non-tender, non-distended.  PD catheter site clean dry and intact  Musculoskeletal: No tenderness, no deformity  Neurological:  CN II-XII grossly intact, no " focal deficits  Skin:   Skin is warm and dry. No rash noted.  Vascular exam: Extremities warm and well-perfused      Assessment and Plan:   -End-stage renal disease on dialysis  -Malfunctioning peritoneal dialysis catheter  -Reducible left inguinal hernia    Patient will need a laparoscopic repositioning or replacement of his peritoneal dialysis catheter.  I can also perform a laparoscopic totally extraperitoneal repair of his inguinal hernia at the same time.  A TEPP repair will minimize violation of the peritoneal lining and should have minimal impact on the function of his peritoneal dialysis catheter going forward.  Patient was initially referred to Dr. Garrison to repair this hernia however I think it would be more efficient surgically for me to proceed and repair both during the same procedure.  Patient has routine hemodialysis sessions on Monday Wednesday and Friday at 3:00 in the afternoon.  We will try to schedule around this or if surgery will interfere with his scheduled dialysis then we will adjust his dialysis schedule temporarily to accommodate surgery.    _____________________________________________________  Marco Antonio Miller MD  [facility]  [phone]  [address]  [address], [place] [address]

## 2024-12-17 ENCOUNTER — APPOINTMENT (OUTPATIENT)
Dept: ADMISSIONS | Facility: MEDICAL CENTER | Age: 79
End: 2024-12-17
Attending: SURGERY
Payer: MEDICARE

## 2024-12-18 ENCOUNTER — ANESTHESIA (OUTPATIENT)
Dept: SURGERY | Facility: MEDICAL CENTER | Age: 79
End: 2024-12-18
Payer: MEDICARE

## 2024-12-18 ENCOUNTER — PHARMACY VISIT (OUTPATIENT)
Dept: PHARMACY | Facility: MEDICAL CENTER | Age: 79
End: 2024-12-18
Payer: COMMERCIAL

## 2024-12-18 ENCOUNTER — HOSPITAL ENCOUNTER (OUTPATIENT)
Facility: MEDICAL CENTER | Age: 79
End: 2024-12-18
Attending: SURGERY | Admitting: SURGERY
Payer: MEDICARE

## 2024-12-18 ENCOUNTER — ANESTHESIA EVENT (OUTPATIENT)
Dept: SURGERY | Facility: MEDICAL CENTER | Age: 79
End: 2024-12-18
Payer: MEDICARE

## 2024-12-18 VITALS
TEMPERATURE: 97.3 F | WEIGHT: 167.11 LBS | DIASTOLIC BLOOD PRESSURE: 64 MMHG | SYSTOLIC BLOOD PRESSURE: 115 MMHG | OXYGEN SATURATION: 93 % | BODY MASS INDEX: 23.92 KG/M2 | HEIGHT: 70 IN | RESPIRATION RATE: 18 BRPM | HEART RATE: 61 BPM

## 2024-12-18 DIAGNOSIS — G89.18 POSTOPERATIVE PAIN: ICD-10-CM

## 2024-12-18 PROBLEM — N28.9 RENAL DISEASE: Status: ACTIVE | Noted: 2024-12-18

## 2024-12-18 PROBLEM — I10 HTN (HYPERTENSION): Status: ACTIVE | Noted: 2024-12-18

## 2024-12-18 PROBLEM — N18.9 CHRONIC RENAL INSUFFICIENCY: Status: ACTIVE | Noted: 2024-12-18

## 2024-12-18 LAB
ALBUMIN SERPL BCP-MCNC: 3.9 G/DL (ref 3.2–4.9)
ALBUMIN/GLOB SERPL: 1.4 G/DL
ALP SERPL-CCNC: 105 U/L (ref 30–99)
ALT SERPL-CCNC: 38 U/L (ref 2–50)
ANION GAP SERPL CALC-SCNC: 16 MMOL/L (ref 7–16)
AST SERPL-CCNC: 24 U/L (ref 12–45)
BILIRUB SERPL-MCNC: 0.7 MG/DL (ref 0.1–1.5)
BUN SERPL-MCNC: 40 MG/DL (ref 8–22)
CALCIUM ALBUM COR SERPL-MCNC: 10 MG/DL (ref 8.5–10.5)
CALCIUM SERPL-MCNC: 9.9 MG/DL (ref 8.5–10.5)
CHLORIDE SERPL-SCNC: 98 MMOL/L (ref 96–112)
CO2 SERPL-SCNC: 24 MMOL/L (ref 20–33)
CREAT SERPL-MCNC: 5.24 MG/DL (ref 0.5–1.4)
EKG IMPRESSION: NORMAL
ERYTHROCYTE [DISTWIDTH] IN BLOOD BY AUTOMATED COUNT: 60 FL (ref 35.9–50)
GFR SERPLBLD CREATININE-BSD FMLA CKD-EPI: 11 ML/MIN/1.73 M 2
GLOBULIN SER CALC-MCNC: 2.8 G/DL (ref 1.9–3.5)
GLUCOSE BLD STRIP.AUTO-MCNC: 109 MG/DL (ref 65–99)
GLUCOSE SERPL-MCNC: 110 MG/DL (ref 65–99)
HCT VFR BLD AUTO: 38.8 % (ref 42–52)
HGB BLD-MCNC: 12.6 G/DL (ref 14–18)
MCH RBC QN AUTO: 31.2 PG (ref 27–33)
MCHC RBC AUTO-ENTMCNC: 32.5 G/DL (ref 32.3–36.5)
MCV RBC AUTO: 96 FL (ref 81.4–97.8)
PLATELET # BLD AUTO: 201 K/UL (ref 164–446)
PMV BLD AUTO: 11.1 FL (ref 9–12.9)
POTASSIUM SERPL-SCNC: 4.7 MMOL/L (ref 3.6–5.5)
PROT SERPL-MCNC: 6.7 G/DL (ref 6–8.2)
RBC # BLD AUTO: 4.04 M/UL (ref 4.7–6.1)
SODIUM SERPL-SCNC: 138 MMOL/L (ref 135–145)
WBC # BLD AUTO: 8 K/UL (ref 4.8–10.8)

## 2024-12-18 PROCEDURE — 160041 HCHG SURGERY MINUTES - EA ADDL 1 MIN LEVEL 4: Performed by: SURGERY

## 2024-12-18 PROCEDURE — 82962 GLUCOSE BLOOD TEST: CPT

## 2024-12-18 PROCEDURE — 700111 HCHG RX REV CODE 636 W/ 250 OVERRIDE (IP): Performed by: STUDENT IN AN ORGANIZED HEALTH CARE EDUCATION/TRAINING PROGRAM

## 2024-12-18 PROCEDURE — 700105 HCHG RX REV CODE 258: Performed by: SURGERY

## 2024-12-18 PROCEDURE — 80053 COMPREHEN METABOLIC PANEL: CPT

## 2024-12-18 PROCEDURE — 160009 HCHG ANES TIME/MIN: Performed by: SURGERY

## 2024-12-18 PROCEDURE — 85027 COMPLETE CBC AUTOMATED: CPT

## 2024-12-18 PROCEDURE — 160046 HCHG PACU - 1ST 60 MINS PHASE II: Performed by: SURGERY

## 2024-12-18 PROCEDURE — 160025 RECOVERY II MINUTES (STATS): Performed by: SURGERY

## 2024-12-18 PROCEDURE — 700101 HCHG RX REV CODE 250: Performed by: STUDENT IN AN ORGANIZED HEALTH CARE EDUCATION/TRAINING PROGRAM

## 2024-12-18 PROCEDURE — 93005 ELECTROCARDIOGRAM TRACING: CPT | Mod: TC | Performed by: SURGERY

## 2024-12-18 PROCEDURE — 700101 HCHG RX REV CODE 250: Performed by: SURGERY

## 2024-12-18 PROCEDURE — 93010 ELECTROCARDIOGRAM REPORT: CPT | Performed by: INTERNAL MEDICINE

## 2024-12-18 PROCEDURE — 160035 HCHG PACU - 1ST 60 MINS PHASE I: Performed by: SURGERY

## 2024-12-18 PROCEDURE — C1781 MESH (IMPLANTABLE): HCPCS | Performed by: SURGERY

## 2024-12-18 PROCEDURE — 160029 HCHG SURGERY MINUTES - 1ST 30 MINS LEVEL 4: Performed by: SURGERY

## 2024-12-18 PROCEDURE — 160002 HCHG RECOVERY MINUTES (STAT): Performed by: SURGERY

## 2024-12-18 PROCEDURE — RXMED WILLOW AMBULATORY MEDICATION CHARGE: Performed by: SURGERY

## 2024-12-18 PROCEDURE — 160048 HCHG OR STATISTICAL LEVEL 1-5: Performed by: SURGERY

## 2024-12-18 DEVICE — MESH PROGRIP LAPROSCOPIC SELF FIXATING (1/CA): Type: IMPLANTABLE DEVICE | Status: FUNCTIONAL

## 2024-12-18 RX ORDER — HALOPERIDOL 5 MG/ML
1 INJECTION INTRAMUSCULAR
Status: DISCONTINUED | OUTPATIENT
Start: 2024-12-18 | End: 2024-12-18 | Stop reason: HOSPADM

## 2024-12-18 RX ORDER — CLOPIDOGREL BISULFATE 75 MG/1
75 TABLET ORAL DAILY
COMMUNITY

## 2024-12-18 RX ORDER — OXYCODONE HCL 5 MG/5 ML
5 SOLUTION, ORAL ORAL
Status: DISCONTINUED | OUTPATIENT
Start: 2024-12-18 | End: 2024-12-18 | Stop reason: HOSPADM

## 2024-12-18 RX ORDER — GENTAMICIN SULFATE 1 MG/G
1 CREAM TOPICAL
COMMUNITY
Start: 2024-12-10

## 2024-12-18 RX ORDER — ROCURONIUM BROMIDE 10 MG/ML
INJECTION, SOLUTION INTRAVENOUS PRN
Status: DISCONTINUED | OUTPATIENT
Start: 2024-12-18 | End: 2024-12-18 | Stop reason: SURG

## 2024-12-18 RX ORDER — ONDANSETRON 2 MG/ML
INJECTION INTRAMUSCULAR; INTRAVENOUS PRN
Status: DISCONTINUED | OUTPATIENT
Start: 2024-12-18 | End: 2024-12-18 | Stop reason: SURG

## 2024-12-18 RX ORDER — ALBUTEROL SULFATE 5 MG/ML
2.5 SOLUTION RESPIRATORY (INHALATION)
Status: DISCONTINUED | OUTPATIENT
Start: 2024-12-18 | End: 2024-12-18 | Stop reason: HOSPADM

## 2024-12-18 RX ORDER — OXYCODONE HCL 5 MG/5 ML
10 SOLUTION, ORAL ORAL
Status: DISCONTINUED | OUTPATIENT
Start: 2024-12-18 | End: 2024-12-18 | Stop reason: HOSPADM

## 2024-12-18 RX ORDER — EPHEDRINE SULFATE 50 MG/ML
5 INJECTION, SOLUTION INTRAVENOUS
Status: DISCONTINUED | OUTPATIENT
Start: 2024-12-18 | End: 2024-12-18 | Stop reason: HOSPADM

## 2024-12-18 RX ORDER — HYDROCODONE BITARTRATE AND ACETAMINOPHEN 5; 325 MG/1; MG/1
1-2 TABLET ORAL EVERY 4 HOURS PRN
Qty: 20 TABLET | Refills: 0 | Status: SHIPPED | OUTPATIENT
Start: 2024-12-18 | End: 2024-12-21

## 2024-12-18 RX ORDER — ONDANSETRON 2 MG/ML
4 INJECTION INTRAMUSCULAR; INTRAVENOUS
Status: DISCONTINUED | OUTPATIENT
Start: 2024-12-18 | End: 2024-12-18 | Stop reason: HOSPADM

## 2024-12-18 RX ORDER — HYDRALAZINE HYDROCHLORIDE 20 MG/ML
5 INJECTION INTRAMUSCULAR; INTRAVENOUS
Status: DISCONTINUED | OUTPATIENT
Start: 2024-12-18 | End: 2024-12-18 | Stop reason: HOSPADM

## 2024-12-18 RX ORDER — DIPHENHYDRAMINE HYDROCHLORIDE 50 MG/ML
12.5 INJECTION INTRAMUSCULAR; INTRAVENOUS
Status: DISCONTINUED | OUTPATIENT
Start: 2024-12-18 | End: 2024-12-18 | Stop reason: HOSPADM

## 2024-12-18 RX ORDER — BUPIVACAINE HYDROCHLORIDE AND EPINEPHRINE 5; 5 MG/ML; UG/ML
INJECTION, SOLUTION EPIDURAL; INTRACAUDAL; PERINEURAL
Status: DISCONTINUED | OUTPATIENT
Start: 2024-12-18 | End: 2024-12-18 | Stop reason: HOSPADM

## 2024-12-18 RX ORDER — DEXAMETHASONE SODIUM PHOSPHATE 4 MG/ML
INJECTION, SOLUTION INTRA-ARTICULAR; INTRALESIONAL; INTRAMUSCULAR; INTRAVENOUS; SOFT TISSUE PRN
Status: DISCONTINUED | OUTPATIENT
Start: 2024-12-18 | End: 2024-12-18 | Stop reason: SURG

## 2024-12-18 RX ORDER — SODIUM CHLORIDE 9 MG/ML
INJECTION, SOLUTION INTRAVENOUS ONCE
Status: COMPLETED | OUTPATIENT
Start: 2024-12-18 | End: 2024-12-18

## 2024-12-18 RX ORDER — CEFAZOLIN SODIUM 1 G/3ML
INJECTION, POWDER, FOR SOLUTION INTRAMUSCULAR; INTRAVENOUS PRN
Status: DISCONTINUED | OUTPATIENT
Start: 2024-12-18 | End: 2024-12-18 | Stop reason: SURG

## 2024-12-18 RX ORDER — LABETALOL HYDROCHLORIDE 5 MG/ML
5 INJECTION, SOLUTION INTRAVENOUS
Status: DISCONTINUED | OUTPATIENT
Start: 2024-12-18 | End: 2024-12-18 | Stop reason: HOSPADM

## 2024-12-18 RX ORDER — DEXMEDETOMIDINE HYDROCHLORIDE 100 UG/ML
INJECTION, SOLUTION INTRAVENOUS PRN
Status: DISCONTINUED | OUTPATIENT
Start: 2024-12-18 | End: 2024-12-18 | Stop reason: SURG

## 2024-12-18 RX ADMIN — PROPOFOL 150 MG: 10 INJECTION, EMULSION INTRAVENOUS at 11:07

## 2024-12-18 RX ADMIN — CEFAZOLIN 2 G: 1 INJECTION, POWDER, FOR SOLUTION INTRAMUSCULAR; INTRAVENOUS at 11:10

## 2024-12-18 RX ADMIN — ROCURONIUM BROMIDE 50 MG: 50 INJECTION, SOLUTION INTRAVENOUS at 11:08

## 2024-12-18 RX ADMIN — ROCURONIUM BROMIDE 10 MG: 50 INJECTION, SOLUTION INTRAVENOUS at 12:07

## 2024-12-18 RX ADMIN — DEXMEDETOMIDINE HYDROCHLORIDE 10 MCG: 100 INJECTION, SOLUTION INTRAVENOUS at 11:16

## 2024-12-18 RX ADMIN — ONDANSETRON 4 MG: 2 INJECTION INTRAMUSCULAR; INTRAVENOUS at 12:29

## 2024-12-18 RX ADMIN — DEXMEDETOMIDINE HYDROCHLORIDE 15 MCG: 100 INJECTION, SOLUTION INTRAVENOUS at 11:06

## 2024-12-18 RX ADMIN — SUGAMMADEX 200 MG: 100 INJECTION, SOLUTION INTRAVENOUS at 12:29

## 2024-12-18 RX ADMIN — DEXAMETHASONE SODIUM PHOSPHATE 4 MG: 4 INJECTION INTRA-ARTICULAR; INTRALESIONAL; INTRAMUSCULAR; INTRAVENOUS; SOFT TISSUE at 11:12

## 2024-12-18 RX ADMIN — FENTANYL CITRATE 50 MCG: 50 INJECTION, SOLUTION INTRAMUSCULAR; INTRAVENOUS at 12:29

## 2024-12-18 RX ADMIN — SODIUM CHLORIDE: 9 INJECTION, SOLUTION INTRAVENOUS at 10:50

## 2024-12-18 ASSESSMENT — PAIN DESCRIPTION - PAIN TYPE
TYPE: SURGICAL PAIN
TYPE: SURGICAL PAIN

## 2024-12-18 ASSESSMENT — FIBROSIS 4 INDEX: FIB4 SCORE: 1.89

## 2024-12-18 NOTE — ANESTHESIA POSTPROCEDURE EVALUATION
Patient: Edy Bennett    Procedure Summary       Date: 12/18/24 Room / Location: [place] [address] / SURGERY [facility]    Anesthesia Start: 1050 Anesthesia Stop: 1235    Procedures:       LAPAROSCOPIC LEFT INGUINAL HERNIA REPAIR WITH MESH, LAPAROSCOPIC REPOSITIONING OF PERITONEAL DIALYSIS CATHETER (Abdomen)      REPOSITION, CATHETER, CAPD (Abdomen) Diagnosis: (END STAGE RENAL DISEASE, LEFT INGUINAL HERNIA)    Surgeons: Marco Antonio Miller M.D. Responsible Provider: Vindo Cadet M.D.    Anesthesia Type: general ASA Status: 3            Final Anesthesia Type: general  Last vitals  BP   Blood Pressure : 114/59    Temp   36.3 °C (97.3 °F)    Pulse   62   Resp   13    SpO2   95 %      Anesthesia Post Evaluation    Patient location during evaluation: PACU  Patient participation: complete - patient participated  Level of consciousness: awake and alert    Airway patency: patent  Anesthetic complications: no  Cardiovascular status: hemodynamically stable  Respiratory status: acceptable  Hydration status: euvolemic    PONV: none          No notable events documented.     Nurse Pain Score: 4 (NPRS)

## 2024-12-18 NOTE — ANESTHESIA TIME REPORT
Anesthesia Start and Stop Event Times       Date Time Event    12/18/2024 1035 Ready for Procedure     1050 Anesthesia Start     1235 Anesthesia Stop          Responsible Staff  12/18/24      Name Role Begin Ashanti Brock Ma, M.D. Anesth 1050 1235          Overtime Reason:  no overtime (within assigned shift)    Comments:

## 2024-12-18 NOTE — PROGRESS NOTES
Medication history reviewed with PT's spouse, Kailey at bedside    Med rec is complete per spouse reporting    Allergies reviewed.     Spouse denies any outpatient antibiotics in the last 30 days.     Patient is taking Eliquis 5mg. Last dose was 12/15/2024.    PT receives dialysis from [facility] on [address] ([phone]) on Mon, Wed and Fri. Last treatment was 12/16/2024. PT no longer receives Mircera as part of Dialysis. Last Mircera dose was 09/18/2024.  PT does not receive antibiotics as part of dialysis.    Preferred pharmacy for this visit - [facility] on [address] ([phone])

## 2024-12-18 NOTE — ANESTHESIA PREPROCEDURE EVALUATION
Case: [id number] Date/Time: 12/18/24 0945    Procedures:       LAPAROSCOPIC LEFT INGUINAL HERNIA REPAIR WITH MESH, LAPAROSCOPIC REPOSITIONING OF PERITONEAL DIALYSIS CATHETER      INSERTION, CATHETER, CAPD    Pre-op diagnosis: END STAGE RENAL DISEASE, INGUINAL HERNIA    Location: [facility] OR 14 / SURGERY [facility]    Surgeons: Marco Antonio Miller M.D.            Relevant Problems   CARDIAC   (positive) HTN (hypertension)         (positive) Chronic renal insufficiency   (positive) Renal disease       Physical Exam    Airway   Mallampati: II  TM distance: >3 FB  Neck ROM: full       Cardiovascular - normal exam  Rhythm: regular  Rate: normal  (-) murmur     Dental - normal exam           Pulmonary - normal exam  Breath sounds clear to auscultation     Abdominal    Neurological - normal exam                   Anesthesia Plan    ASA 3   ASA physical status 3 criteria: ESRD undergoing regularly scheduled dialysis    Plan - general       Airway plan will be ETT          Induction: intravenous    Postoperative Plan: Postoperative administration of opioids is intended.    Pertinent diagnostic labs and testing reviewed    Informed Consent:    Anesthetic plan and risks discussed with patient.    Use of blood products discussed with: patient whom consented to blood products.

## 2024-12-18 NOTE — OP REPORT
Operative Report     Date:    12/18/2024    Pre-operative Diagnosis:  Reducible left inguinal hernia, malfunctioning CAPD catheter    Post-operative Diagnosis: Reducible left inguinal hernia, malfunctioning CAPD catheter    Procedure:   Laparoscopic Left Inguinal Hernia Repair with Mesh      Laparoscopic repositioning of existing CAPD catheter    Surgeon:   Marc oAntonio Miller M.D.      Assistant:    Keerthi Stapleton PA-C    Anesthesia:   General plus local 0.5% Marcaine with epinephrine          Blood Loss:   Minimal    Specimen:   None    Findings:   Moderate indirect hernia      Laparoscopic Progrip mesh for the repair    Wound classification: Clean    Disposition:   PACU in stable condition  ---------------------------------------------------------    History:  79 y.o. male evaluated in the clinic and found to have a reducible left inguinal hernia and also the patient has been having difficulty getting his peritoneal dialysis catheter to drain appropriately.  Plan will be to perform a laparoscopic totally extraperitoneal preperitoneal hernia repair and also reposition his CAPD catheter at the same time.  I had an extensive conversation with the patient regarding the recommendation for surgery.  I explained the details of the operation, alternatives, and potential risks, including but not limited to bleeding, infection, injury to vessels or nerves, injury to organs or intestines, and risks of anesthesia.  All questions were answered. They understand and agree to proceed.  Informed consent was obtained.    PROCEDURE:  The patient voluntarily voided their urinary bladder just prior to being brought back to the OR.  The patient was taken back to the operating room and placed in supine position.  General endotracheal anesthesia was administered and the patient was intubated. Intravenous antibiotics were administered by the anesthesiologist in correct time interval. Sequential compression devices were placed. All bony  prominences were protected.  The abdomen was prepped and draped in a sterile fashion.  A time-out was performed and the patient and procedure were both verified.      Marcaine 0.5% with epinephrine was used to infiltrate all port sites. A 2cm transverse incision was made below the umbilicus.  The subcutaneous tissues were spread bluntly to expose the fascia.  The anterior fascia was open sharply along the entire length of the incision to expose the right rectus muscular bundle.  This muscular bundle was swept laterally to expose the posterior fascia.  A 10mm dissecting balloon was then guided through the incision, along the pre-peritoneal space, and down to the pubic symphysis.  The balloon was then hand-pump inflated under direct visualization.  The balloon was deflated and removed.  A 12mm Storey port was placed through the incision, the balloon was inflated, and the collar was cinched down to the skin level to secure the port.  Gas was applied to the port and insufflation was achieved.  A 10mm laparoscope was placed through the port.  There was no evidence of vascular injury or injury to the peritoneum.  Dissection by the dissecting balloon was adequate.    Two 5mm ports were placed in the lower midline between the umbilicus and the pubic symphysis under direct visualization.  Dissection was begun on the left side.  The epigastric vascular bundle was identified in its usual location.  The loose areolar tissue was swept down laterally to free the peritoneum.  Dissection continued medially until the cord structures were identified.  The cord was carefully dissected free from surrounding attachments.  The vas deferens was identified and protected.  The femoral, direct, and indirect spaces were all examined and the peritoneum was swept down posteriorly.  The area of dissection was noted to be hemostatic. Pre-peritoneal fat was cleared off the pubic symphysis and Armani's ligament.    At this point a laparoscopic  progrip mesh was introduced into the pre-peritoneal space through the Storey port.  It was laid out flat and care was taken to cover the femoral, direct, and indirect spaces entirely.  The mesh overlapped the midline by 1cm.    The gas was released slowly and the mesh appeared to lay in the pre-peritoneal space without wrinkles or folds.  The Storey port was removed.      At this point I entered the peritoneal cavity with a Veress needle and established insufflation to a pressure of 15.  I inserted an 11 mm trocar at the umbilical site and examined the CAPD catheter position and it was found to be favoring the right side of the abdomen.  The catheter was clean and flushed well.  It was repositioned out of the pelvis and appeared to sit correctly.  The pneumoperitoneum was released.  The masoud-umbilical port site fascia was closed with an 0 vicryl suture.  The fascia was noted to be tight and well approximated.  All remaining ports were then removed.  All skin incisions were closed with interrupted 4-0 Vicryl subcuticular sutures and dressed with skin glue.     The patient tolerated the procedure well and there were no immediate apparent complications. All sponge, sharps, and instrument counts were correct on 2 separate occasions. The patient was awakened, extubated, and transferred to the PACU in satisfactory condition.     Marco Antonio Miller MD  [facility]   Vo[place] preferred or call my office [phone]  __________________________________________________  Patient:Edy Bennett   MRN:[id number]

## 2024-12-18 NOTE — PROGRESS NOTES
1234: Pt arrived from OR post surgery under anesthesia. Pt is arousable to voice. 3 sites with dermabond and peritoneal catheter site are CDI. Cardiac rhythm appears to be SR.     1300: Pt awake; tolerating water.     1400: Report to MANOJ Mackey.     1410: Pt to phase II with RN.

## 2024-12-18 NOTE — ANESTHESIA PROCEDURE NOTES
Airway    Date/Time: 12/18/2024 11:09 AM    Performed by: Vinod Cadet M.D.  Authorized by: Vinod Cadet M.D.    Location:  OR  Urgency:  Elective  Indications for Airway Management:  Anesthesia      Spontaneous Ventilation: absent    Sedation Level:  Deep  Preoxygenated: Yes    Mask Difficulty Assessment:  0 - not attempted  Final Airway Type:  Endotracheal airway  Final Endotracheal Airway:  ETT  Cuffed: Yes    Technique Used for Successful ETT Placement:  Direct laryngoscopy    Insertion Site:  Oral  Blade Type:  Denisse  Laryngoscope Blade/Videolaryngoscope Blade Size:  3  ETT Size (mm):  7.5  Measured from:  Lips  ETT to Lips (cm):  23  Placement Verified by: auscultation and capnometry    Cormack-Lehane Classification:  Grade I - full view of glottis  Number of Attempts at Approach:  1

## 2024-12-18 NOTE — OR NURSING
1416 Pt arrives to phase II from PACU. VSS. Pt reports 4/10 pain. Dressing to L abd clean, dry, intact.      Discharge instructions reviewed with pt and spouse . Both verbalize understanding. Copy of instructions sent home with pt.     IV removed. Dressing remains clean, dry and intact. VSS. Pt dressed independently.     Pt wheeled to car with all belongings.

## 2024-12-19 ENCOUNTER — TELEPHONE (OUTPATIENT)
Dept: VASCULAR SURGERY | Facility: MEDICAL CENTER | Age: 79
End: 2024-12-19
Payer: MEDICARE

## 2024-12-31 ENCOUNTER — OFFICE VISIT (OUTPATIENT)
Dept: VASCULAR SURGERY | Facility: MEDICAL CENTER | Age: 79
End: 2024-12-31
Payer: MEDICARE

## 2024-12-31 ENCOUNTER — HOSPITAL ENCOUNTER (OUTPATIENT)
Dept: RADIOLOGY | Facility: MEDICAL CENTER | Age: 79
End: 2024-12-31
Attending: FAMILY MEDICINE

## 2024-12-31 VITALS
OXYGEN SATURATION: 97 % | TEMPERATURE: 97.2 F | DIASTOLIC BLOOD PRESSURE: 58 MMHG | SYSTOLIC BLOOD PRESSURE: 102 MMHG | HEART RATE: 68 BPM | HEIGHT: 70 IN | BODY MASS INDEX: 23.19 KG/M2 | WEIGHT: 162 LBS

## 2024-12-31 ASSESSMENT — FIBROSIS 4 INDEX: FIB4 SCORE: 1.49

## 2024-12-31 NOTE — PROGRESS NOTES
"                 VASCULAR SURGERY                 Clinic Progress Note  _________________________________    Vitals for Today's Visit (12/31/2024)  /58 (BP Location: Right arm, Patient Position: Sitting, BP Cuff Size: Adult)   Pulse 68   Temp 36.2 °C (97.2 °F) (Temporal)   Ht 1.778 m (5' 10\")   Wt 73.5 kg (162 lb)   SpO2 97%   BMI 23.24 kg/m²   _________________________________          12/31/2024  ***                {[facility]:[id number]}  [facility]  [phone]  [address], [place] [address]  "

## 2025-01-08 ENCOUNTER — APPOINTMENT (OUTPATIENT)
Dept: ADMISSIONS | Facility: MEDICAL CENTER | Age: 80
End: 2025-01-08
Attending: SURGERY
Payer: MEDICARE

## 2025-01-10 ENCOUNTER — PRE-ADMISSION TESTING (OUTPATIENT)
Dept: ADMISSIONS | Facility: MEDICAL CENTER | Age: 80
End: 2025-01-10
Attending: SURGERY
Payer: MEDICARE

## 2025-01-10 NOTE — PREADMIT AVS NOTE
Current Medications   Medication Instructions    clopidogrel (PLAVIX) 75 MG Tab Follow instructions from surgeon or specialist.         acetaminophen (TYLENOL) 500 MG Tab As needed medication, may take if needed, including morning of procedure     traMADol (ULTRAM) 50 MG Tab As needed medication, may take if needed, including morning of procedure     furosemide (LASIX) 40 MG Tab Hold medication day of procedure    sevelamer carbonate (RENVELA) 800 MG Tab tablet Continue taking medication as prescribed    amLODIPine (NORVASC) 5 MG Tab Continue taking medication as prescribed, including morning of procedure     apixaban (ELIQUIS) 5mg Tab Follow instructions from surgeon or specialist.    metoprolol SR (TOPROL XL) 100 MG TABLET SR 24 HR Ok to continue and take night before procedure    montelukast (SINGULAIR) 10 MG Tab Ok to continue and take night before procedure    rosuvastatin (CRESTOR) 20 MG Tab Ok to continue and take night before procedure    tamsulosin (FLOMAX) 0.4 MG capsule Continue taking medication as prescribed, including morning of procedure     terazosin (HYTRIN) 5 MG Cap Ok to continue and take night before procedure    nitroglycerin (NITROSTAT) 0.4 MG SL Tab As needed medication, may take if needed, including morning of procedure

## 2025-01-10 NOTE — OR NURSING
RN tele pre admit appointment completed with spouse, Kailey:  Medication instructions given according to the Guideline for Pre-Operative Medication Management.  Spouse aware medication instructions can be reviewed in the pre admit AVS note.    Preparing For Your Procedure packet reviewed with patient including fasting and bathing guidelines.  Patient instructed to stop taking all vitamins and herbal supplements 7 days prior to surgery and instructed to stop any NSAIDS 5 days prior to surgery unless otherwise instructed by medical provider. Surgery date 1/14/2025.     Spouse verbalizes understanding of all instructions given. No further questions at this time.

## 2025-01-14 ENCOUNTER — ANESTHESIA (OUTPATIENT)
Dept: SURGERY | Facility: MEDICAL CENTER | Age: 80
End: 2025-01-14
Payer: MEDICARE

## 2025-01-14 ENCOUNTER — HOSPITAL ENCOUNTER (OUTPATIENT)
Facility: MEDICAL CENTER | Age: 80
End: 2025-01-14
Attending: SURGERY | Admitting: SURGERY
Payer: MEDICARE

## 2025-01-14 ENCOUNTER — APPOINTMENT (OUTPATIENT)
Dept: RADIOLOGY | Facility: MEDICAL CENTER | Age: 80
End: 2025-01-14
Attending: SURGERY
Payer: MEDICARE

## 2025-01-14 ENCOUNTER — ANESTHESIA EVENT (OUTPATIENT)
Dept: SURGERY | Facility: MEDICAL CENTER | Age: 80
End: 2025-01-14
Payer: MEDICARE

## 2025-01-14 VITALS
WEIGHT: 163.8 LBS | RESPIRATION RATE: 16 BRPM | BODY MASS INDEX: 23.45 KG/M2 | SYSTOLIC BLOOD PRESSURE: 120 MMHG | OXYGEN SATURATION: 97 % | DIASTOLIC BLOOD PRESSURE: 66 MMHG | HEART RATE: 73 BPM | HEIGHT: 70 IN | TEMPERATURE: 97.2 F

## 2025-01-14 LAB
ALBUMIN SERPL BCP-MCNC: 3.8 G/DL (ref 3.2–4.9)
ALBUMIN/GLOB SERPL: 1.5 G/DL
ALP SERPL-CCNC: 103 U/L (ref 30–99)
ALT SERPL-CCNC: 27 U/L (ref 2–50)
ANION GAP SERPL CALC-SCNC: 13 MMOL/L (ref 7–16)
AST SERPL-CCNC: 29 U/L (ref 12–45)
BILIRUB SERPL-MCNC: 0.9 MG/DL (ref 0.1–1.5)
BUN SERPL-MCNC: 28 MG/DL (ref 8–22)
CALCIUM ALBUM COR SERPL-MCNC: 9.6 MG/DL (ref 8.5–10.5)
CALCIUM SERPL-MCNC: 9.4 MG/DL (ref 8.5–10.5)
CHLORIDE SERPL-SCNC: 101 MMOL/L (ref 96–112)
CO2 SERPL-SCNC: 25 MMOL/L (ref 20–33)
CREAT SERPL-MCNC: 3.42 MG/DL (ref 0.5–1.4)
EKG IMPRESSION: NORMAL
ERYTHROCYTE [DISTWIDTH] IN BLOOD BY AUTOMATED COUNT: 69.2 FL (ref 35.9–50)
GFR SERPLBLD CREATININE-BSD FMLA CKD-EPI: 18 ML/MIN/1.73 M 2
GLOBULIN SER CALC-MCNC: 2.6 G/DL (ref 1.9–3.5)
GLUCOSE SERPL-MCNC: 117 MG/DL (ref 65–99)
HCT VFR BLD AUTO: 38.2 % (ref 42–52)
HGB BLD-MCNC: 12.2 G/DL (ref 14–18)
INR PPP: 1.47 (ref 0.87–1.13)
MCH RBC QN AUTO: 32.3 PG (ref 27–33)
MCHC RBC AUTO-ENTMCNC: 31.9 G/DL (ref 32.3–36.5)
MCV RBC AUTO: 101.1 FL (ref 81.4–97.8)
PLATELET # BLD AUTO: 161 K/UL (ref 164–446)
PMV BLD AUTO: 11 FL (ref 9–12.9)
POTASSIUM SERPL-SCNC: 4.2 MMOL/L (ref 3.6–5.5)
PROT SERPL-MCNC: 6.4 G/DL (ref 6–8.2)
PROTHROMBIN TIME: 17.9 SEC (ref 12–14.6)
RBC # BLD AUTO: 3.78 M/UL (ref 4.7–6.1)
SODIUM SERPL-SCNC: 139 MMOL/L (ref 135–145)
WBC # BLD AUTO: 6.1 K/UL (ref 4.8–10.8)

## 2025-01-14 PROCEDURE — C1874 STENT, COATED/COV W/DEL SYS: HCPCS | Performed by: SURGERY

## 2025-01-14 PROCEDURE — 37228 PR REVASCULARIZE TIBIAL/PERON ARTERY,ANGIOPL*: CPT | Mod: RT | Performed by: SURGERY

## 2025-01-14 PROCEDURE — 700111 HCHG RX REV CODE 636 W/ 250 OVERRIDE (IP): Mod: JZ | Performed by: STUDENT IN AN ORGANIZED HEALTH CARE EDUCATION/TRAINING PROGRAM

## 2025-01-14 PROCEDURE — 700105 HCHG RX REV CODE 258: Performed by: STUDENT IN AN ORGANIZED HEALTH CARE EDUCATION/TRAINING PROGRAM

## 2025-01-14 PROCEDURE — 700111 HCHG RX REV CODE 636 W/ 250 OVERRIDE (IP): Performed by: SURGERY

## 2025-01-14 PROCEDURE — 160025 RECOVERY II MINUTES (STATS): Performed by: SURGERY

## 2025-01-14 PROCEDURE — C1887 CATHETER, GUIDING: HCPCS | Performed by: SURGERY

## 2025-01-14 PROCEDURE — 160038 HCHG SURGERY MINUTES - EA ADDL 1 MIN LEVEL 2: Performed by: SURGERY

## 2025-01-14 PROCEDURE — 160027 HCHG SURGERY MINUTES - 1ST 30 MINS LEVEL 2: Performed by: SURGERY

## 2025-01-14 PROCEDURE — 36246 INS CATH ABD/L-EXT ART 2ND: CPT | Mod: 59,LT | Performed by: SURGERY

## 2025-01-14 PROCEDURE — C1894 INTRO/SHEATH, NON-LASER: HCPCS | Performed by: SURGERY

## 2025-01-14 PROCEDURE — C1769 GUIDE WIRE: HCPCS | Performed by: SURGERY

## 2025-01-14 PROCEDURE — 160046 HCHG PACU - 1ST 60 MINS PHASE II: Performed by: SURGERY

## 2025-01-14 PROCEDURE — 700102 HCHG RX REV CODE 250 W/ 637 OVERRIDE(OP): Performed by: STUDENT IN AN ORGANIZED HEALTH CARE EDUCATION/TRAINING PROGRAM

## 2025-01-14 PROCEDURE — 110454 HCHG SHELL REV 250: Performed by: SURGERY

## 2025-01-14 PROCEDURE — 75625 CONTRAST EXAM ABDOMINL AORTA: CPT | Mod: 26 | Performed by: SURGERY

## 2025-01-14 PROCEDURE — 85027 COMPLETE CBC AUTOMATED: CPT

## 2025-01-14 PROCEDURE — 36200 PLACE CATHETER IN AORTA: CPT | Mod: 59 | Performed by: SURGERY

## 2025-01-14 PROCEDURE — 700111 HCHG RX REV CODE 636 W/ 250 OVERRIDE (IP): Performed by: STUDENT IN AN ORGANIZED HEALTH CARE EDUCATION/TRAINING PROGRAM

## 2025-01-14 PROCEDURE — 700101 HCHG RX REV CODE 250: Performed by: STUDENT IN AN ORGANIZED HEALTH CARE EDUCATION/TRAINING PROGRAM

## 2025-01-14 PROCEDURE — 160002 HCHG RECOVERY MINUTES (STAT): Performed by: SURGERY

## 2025-01-14 PROCEDURE — 80053 COMPREHEN METABOLIC PANEL: CPT

## 2025-01-14 PROCEDURE — C1725 CATH, TRANSLUMIN NON-LASER: HCPCS | Performed by: SURGERY

## 2025-01-14 PROCEDURE — 160048 HCHG OR STATISTICAL LEVEL 1-5: Performed by: SURGERY

## 2025-01-14 PROCEDURE — 93010 ELECTROCARDIOGRAM REPORT: CPT | Performed by: INTERNAL MEDICINE

## 2025-01-14 PROCEDURE — 700117 HCHG RX CONTRAST REV CODE 255: Performed by: SURGERY

## 2025-01-14 PROCEDURE — C1760 CLOSURE DEV, VASC: HCPCS | Performed by: SURGERY

## 2025-01-14 PROCEDURE — 85610 PROTHROMBIN TIME: CPT

## 2025-01-14 PROCEDURE — 160036 HCHG PACU - EA ADDL 30 MINS PHASE I: Performed by: SURGERY

## 2025-01-14 PROCEDURE — 160009 HCHG ANES TIME/MIN: Performed by: SURGERY

## 2025-01-14 PROCEDURE — 502000 HCHG MISC OR IMPLANTS RC 0278: Performed by: SURGERY

## 2025-01-14 PROCEDURE — 160035 HCHG PACU - 1ST 60 MINS PHASE I: Performed by: SURGERY

## 2025-01-14 PROCEDURE — A9270 NON-COVERED ITEM OR SERVICE: HCPCS | Performed by: STUDENT IN AN ORGANIZED HEALTH CARE EDUCATION/TRAINING PROGRAM

## 2025-01-14 PROCEDURE — 93005 ELECTROCARDIOGRAM TRACING: CPT | Mod: TC | Performed by: SURGERY

## 2025-01-14 PROCEDURE — 75716 ARTERY X-RAYS ARMS/LEGS: CPT | Mod: 26,59 | Performed by: SURGERY

## 2025-01-14 DEVICE — DEVICE CLSR 6FR HMST IMPL SLF STS PLUS ANGIOSEAL (10EA/CA): Type: IMPLANTABLE DEVICE | Site: GROIN | Status: FUNCTIONAL

## 2025-01-14 DEVICE — IMPLANTABLE DEVICE: Type: IMPLANTABLE DEVICE | Site: GROIN | Status: FUNCTIONAL

## 2025-01-14 RX ORDER — HEPARIN SODIUM,PORCINE 1000/ML
VIAL (ML) INJECTION PRN
Status: DISCONTINUED | OUTPATIENT
Start: 2025-01-14 | End: 2025-01-14 | Stop reason: SURG

## 2025-01-14 RX ORDER — CEFAZOLIN SODIUM 1 G/3ML
INJECTION, POWDER, FOR SOLUTION INTRAMUSCULAR; INTRAVENOUS PRN
Status: DISCONTINUED | OUTPATIENT
Start: 2025-01-14 | End: 2025-01-14 | Stop reason: SURG

## 2025-01-14 RX ORDER — OXYCODONE HCL 5 MG/5 ML
10 SOLUTION, ORAL ORAL
Status: COMPLETED | OUTPATIENT
Start: 2025-01-14 | End: 2025-01-14

## 2025-01-14 RX ORDER — LABETALOL HYDROCHLORIDE 5 MG/ML
5 INJECTION, SOLUTION INTRAVENOUS
Status: DISCONTINUED | OUTPATIENT
Start: 2025-01-14 | End: 2025-01-14 | Stop reason: HOSPADM

## 2025-01-14 RX ORDER — ONDANSETRON 2 MG/ML
INJECTION INTRAMUSCULAR; INTRAVENOUS PRN
Status: DISCONTINUED | OUTPATIENT
Start: 2025-01-14 | End: 2025-01-14 | Stop reason: SURG

## 2025-01-14 RX ORDER — OXYCODONE HCL 5 MG/5 ML
5 SOLUTION, ORAL ORAL
Status: COMPLETED | OUTPATIENT
Start: 2025-01-14 | End: 2025-01-14

## 2025-01-14 RX ORDER — HEPARIN SODIUM,PORCINE 1000/ML
VIAL (ML) INJECTION
Status: DISCONTINUED | OUTPATIENT
Start: 2025-01-14 | End: 2025-01-14 | Stop reason: HOSPADM

## 2025-01-14 RX ORDER — SODIUM CHLORIDE 9 MG/ML
INJECTION, SOLUTION INTRAVENOUS
Status: DISCONTINUED | OUTPATIENT
Start: 2025-01-14 | End: 2025-01-14 | Stop reason: SURG

## 2025-01-14 RX ORDER — DIPHENHYDRAMINE HYDROCHLORIDE 50 MG/ML
12.5 INJECTION INTRAMUSCULAR; INTRAVENOUS
Status: DISCONTINUED | OUTPATIENT
Start: 2025-01-14 | End: 2025-01-14 | Stop reason: HOSPADM

## 2025-01-14 RX ORDER — IPRATROPIUM BROMIDE AND ALBUTEROL SULFATE 2.5; .5 MG/3ML; MG/3ML
3 SOLUTION RESPIRATORY (INHALATION)
Status: DISCONTINUED | OUTPATIENT
Start: 2025-01-14 | End: 2025-01-14 | Stop reason: HOSPADM

## 2025-01-14 RX ORDER — HYDRALAZINE HYDROCHLORIDE 20 MG/ML
5 INJECTION INTRAMUSCULAR; INTRAVENOUS
Status: DISCONTINUED | OUTPATIENT
Start: 2025-01-14 | End: 2025-01-14 | Stop reason: HOSPADM

## 2025-01-14 RX ORDER — SODIUM CHLORIDE, SODIUM LACTATE, POTASSIUM CHLORIDE, CALCIUM CHLORIDE 600; 310; 30; 20 MG/100ML; MG/100ML; MG/100ML; MG/100ML
INJECTION, SOLUTION INTRAVENOUS CONTINUOUS
Status: DISCONTINUED | OUTPATIENT
Start: 2025-01-14 | End: 2025-01-14 | Stop reason: HOSPADM

## 2025-01-14 RX ORDER — SODIUM CHLORIDE 9 MG/ML
INJECTION, SOLUTION INTRAVENOUS ONCE
Status: DISCONTINUED | OUTPATIENT
Start: 2025-01-14 | End: 2025-01-14 | Stop reason: HOSPADM

## 2025-01-14 RX ORDER — HYDROMORPHONE HYDROCHLORIDE 1 MG/ML
0.1 INJECTION, SOLUTION INTRAMUSCULAR; INTRAVENOUS; SUBCUTANEOUS
Status: DISCONTINUED | OUTPATIENT
Start: 2025-01-14 | End: 2025-01-14 | Stop reason: HOSPADM

## 2025-01-14 RX ORDER — IODIXANOL 270 MG/ML
INJECTION, SOLUTION INTRAVASCULAR
Status: DISCONTINUED | OUTPATIENT
Start: 2025-01-14 | End: 2025-01-14 | Stop reason: HOSPADM

## 2025-01-14 RX ORDER — LIDOCAINE HYDROCHLORIDE 20 MG/ML
INJECTION, SOLUTION EPIDURAL; INFILTRATION; INTRACAUDAL; PERINEURAL PRN
Status: DISCONTINUED | OUTPATIENT
Start: 2025-01-14 | End: 2025-01-14 | Stop reason: SURG

## 2025-01-14 RX ORDER — EPHEDRINE SULFATE 50 MG/ML
5 INJECTION, SOLUTION INTRAVENOUS
Status: DISCONTINUED | OUTPATIENT
Start: 2025-01-14 | End: 2025-01-14 | Stop reason: HOSPADM

## 2025-01-14 RX ORDER — HYDROMORPHONE HYDROCHLORIDE 1 MG/ML
0.2 INJECTION, SOLUTION INTRAMUSCULAR; INTRAVENOUS; SUBCUTANEOUS
Status: DISCONTINUED | OUTPATIENT
Start: 2025-01-14 | End: 2025-01-14 | Stop reason: HOSPADM

## 2025-01-14 RX ORDER — ONDANSETRON 2 MG/ML
4 INJECTION INTRAMUSCULAR; INTRAVENOUS
Status: DISCONTINUED | OUTPATIENT
Start: 2025-01-14 | End: 2025-01-14 | Stop reason: HOSPADM

## 2025-01-14 RX ORDER — HYDROMORPHONE HYDROCHLORIDE 1 MG/ML
0.4 INJECTION, SOLUTION INTRAMUSCULAR; INTRAVENOUS; SUBCUTANEOUS
Status: DISCONTINUED | OUTPATIENT
Start: 2025-01-14 | End: 2025-01-14 | Stop reason: HOSPADM

## 2025-01-14 RX ORDER — PROTAMINE SULFATE 10 MG/ML
INJECTION, SOLUTION INTRAVENOUS PRN
Status: DISCONTINUED | OUTPATIENT
Start: 2025-01-14 | End: 2025-01-14 | Stop reason: SURG

## 2025-01-14 RX ORDER — DEXAMETHASONE SODIUM PHOSPHATE 4 MG/ML
INJECTION, SOLUTION INTRA-ARTICULAR; INTRALESIONAL; INTRAMUSCULAR; INTRAVENOUS; SOFT TISSUE PRN
Status: DISCONTINUED | OUTPATIENT
Start: 2025-01-14 | End: 2025-01-14 | Stop reason: SURG

## 2025-01-14 RX ADMIN — DEXAMETHASONE SODIUM PHOSPHATE 4 MG: 4 INJECTION INTRA-ARTICULAR; INTRALESIONAL; INTRAMUSCULAR; INTRAVENOUS; SOFT TISSUE at 07:41

## 2025-01-14 RX ADMIN — OXYCODONE HYDROCHLORIDE 5 MG: 5 SOLUTION ORAL at 12:25

## 2025-01-14 RX ADMIN — FENTANYL CITRATE 25 MCG: 50 INJECTION, SOLUTION INTRAMUSCULAR; INTRAVENOUS at 09:45

## 2025-01-14 RX ADMIN — HEPARIN SODIUM 2000 UNITS: 1000 INJECTION, SOLUTION INTRAVENOUS; SUBCUTANEOUS at 08:36

## 2025-01-14 RX ADMIN — FENTANYL CITRATE 25 MCG: 50 INJECTION, SOLUTION INTRAMUSCULAR; INTRAVENOUS at 11:09

## 2025-01-14 RX ADMIN — PROPOFOL 150 MG: 10 INJECTION, EMULSION INTRAVENOUS at 07:38

## 2025-01-14 RX ADMIN — HEPARIN SODIUM 2000 UNITS: 1000 INJECTION, SOLUTION INTRAVENOUS; SUBCUTANEOUS at 09:06

## 2025-01-14 RX ADMIN — FENTANYL CITRATE 25 MCG: 50 INJECTION, SOLUTION INTRAMUSCULAR; INTRAVENOUS at 07:50

## 2025-01-14 RX ADMIN — FENTANYL CITRATE 25 MCG: 50 INJECTION, SOLUTION INTRAMUSCULAR; INTRAVENOUS at 09:36

## 2025-01-14 RX ADMIN — HEPARIN SODIUM 7000 UNITS: 1000 INJECTION, SOLUTION INTRAVENOUS; SUBCUTANEOUS at 08:06

## 2025-01-14 RX ADMIN — HEPARIN SODIUM 2000 UNITS: 1000 INJECTION, SOLUTION INTRAVENOUS; SUBCUTANEOUS at 09:36

## 2025-01-14 RX ADMIN — LIDOCAINE HYDROCHLORIDE 100 MG: 20 INJECTION, SOLUTION EPIDURAL; INFILTRATION; INTRACAUDAL; PERINEURAL at 07:38

## 2025-01-14 RX ADMIN — SODIUM CHLORIDE: 9 INJECTION, SOLUTION INTRAVENOUS at 07:33

## 2025-01-14 RX ADMIN — CEFAZOLIN 2 G: 1 INJECTION, POWDER, FOR SOLUTION INTRAMUSCULAR; INTRAVENOUS at 07:41

## 2025-01-14 RX ADMIN — PROTAMINE SULFATE 30 MG: 10 INJECTION, SOLUTION INTRAVENOUS at 10:30

## 2025-01-14 RX ADMIN — FENTANYL CITRATE 25 MCG: 50 INJECTION, SOLUTION INTRAMUSCULAR; INTRAVENOUS at 11:44

## 2025-01-14 RX ADMIN — FENTANYL CITRATE 25 MCG: 50 INJECTION, SOLUTION INTRAMUSCULAR; INTRAVENOUS at 12:00

## 2025-01-14 RX ADMIN — ONDANSETRON 4 MG: 2 INJECTION INTRAMUSCULAR; INTRAVENOUS at 10:02

## 2025-01-14 ASSESSMENT — PAIN DESCRIPTION - PAIN TYPE
TYPE: ACUTE PAIN
TYPE: ACUTE PAIN;SURGICAL PAIN

## 2025-01-14 ASSESSMENT — FIBROSIS 4 INDEX
FIB4 SCORE: 1.49
FIB4 SCORE: 1.49

## 2025-01-14 ASSESSMENT — PAIN SCALES - GENERAL: PAIN_LEVEL: 2

## 2025-01-14 NOTE — PROGRESS NOTES
Medication history reviewed with PT's spouse, Kailey at bedside     Med rec is complete per spouse reporting    Allergies reviewed.     Spouse denies any outpatient antibiotics in the last 30 days.     Patient is taking Eliquis 5mg. Last dose was 01/12/2025 in the PM.    PT receives dialysis on Mon, Wed and Fri from [facility] on [address] ([phone]). Last treatment was 01/13/2025. PT is scheduled for dialysis on 01/15/2025 at 1550. It is unknown if PT receives Mircera or antibiotics during dialysis.     Preferred pharmacy for this visit - [facility] on [address] ([phone])

## 2025-01-14 NOTE — ANESTHESIA PROCEDURE NOTES
Airway    Date/Time: 1/14/2025 7:40 AM    Performed by: Antonio Castro MD, PhD  Authorized by: Antonio Castro MD, PhD    Location:  OR  Urgency:  Elective  Indications for Airway Management:  Anesthesia      Spontaneous Ventilation: absent    Sedation Level:  Deep  Preoxygenated: Yes    Mask Difficulty Assessment:  1 - vent by mask  Final Airway Type:  Supraglottic airway  Final Supraglottic Airway:  Standard LMA    SGA Size:  4  Number of Attempts at Approach:  1

## 2025-01-14 NOTE — ANESTHESIA TIME REPORT
Anesthesia Start and Stop Event Times       Date Time Event    1/14/2025 0710 Ready for Procedure     0733 Anesthesia Start     1123 Anesthesia Stop          Responsible Staff  01/14/25      Name Role Begin End    Antonio Castro MD, PhD Anesth 0733 1123          Overtime Reason:  no overtime (within assigned shift)    Comments:

## 2025-01-14 NOTE — OR NURSING
1120 Pt arrived to PACU with Anesthesiologist and Kavita ARANDA. AAOx4. Even, unlabored respirations. VSS. mild pain. no nausea. Bilateral groin and L ankle dressing CDI.     1230 POC update given to Ruben over the phone. All questions answered.     1320 Pt meets phase 1 requirements of bedrest x2 hrs per Angela SCHUSTER criteria. Report called to MANOJ Quintanilla 1320.     1335 Pt transported to Phase 2-31 with RN. Chart and belongings with patient.

## 2025-01-14 NOTE — H&P
VASCULAR SURGERY SERVICE  H&P    -------------------------------------------------------------------------------------------------  Date: 1/14/2025    Surgeon: Marco Antonio Miller MD  -------------------------------------------------------------------------------------------------    HPI:  This is a 79 y.o. male who is presenting today for elective surgery: bilateral lower extremity angiogram with possible intervention.  No specific complaints at this time. Questions addressed.      Past Medical History:   Diagnosis Date    Arrhythmia 03/13/2024    Due to kidney failure treatment    Breath shortness     with exertion    Cataract     shon iol    Congestive heart failure ([facility])     Diabetes ([facility])     not on any medication at this time, being monitored at dialysis    Dialysis patient ([facility]    Foot ulcer ([facility])     left foot. being seen by wound care    Hemorrhagic disorder ([facility])     plavix eliquis    High cholesterol     Hypertension 05/24/2024    states controlled    Pain     Pneumonia     3/24    Renal disorder     Sleep apnea 01/10/2025    Home sleep study done 1/3/2025 - no results yet.    Stroke ([facility]) 2021    tia       Past Surgical History:   Procedure Laterality Date    INGUINAL HERNIA LAPAROSCOPIC N/A 12/18/2024    Procedure: LAPAROSCOPIC LEFT INGUINAL HERNIA REPAIR WITH MESH, LAPAROSCOPIC REPOSITIONING OF PERITONEAL DIALYSIS CATHETER;  Surgeon: Marco Antonio Miller M.D.;  Location: [facility];  Service: Vascular    CATH PLACEMENT CAPD N/A 12/18/2024    Procedure: REPOSITION, CATHETER, CAPD;  Surgeon: Marco Antonio Miller M.D.;  Location: [facility];  Service: Vascular    CATH PLACEMENT CAPD N/A 09/16/2024    Procedure: LAPAROSCOPIC INSERTION OF PERITONEAL DIALYSIS CATHETER PLACEMENT;  Surgeon: Marco Antonio Miller M.D.;  Location: SURGERY SAME DAY [facility];  Service: Vascular    AV FISTULA CREATION Left 07/01/2024    Procedure: CREATION OF LEFT  UPPER EXTREMITY DIALYSIS FISTULA;  Surgeon: Shilo Mercedes M.D.;  Location: SURGERY [facility];  Service: General    OTHER ORTHOPEDIC SURGERY  2004    bunion r foot    OTHER      tonsils adenoids    OTHER      dialysis catheter in upper right chest    OTHER      shon iol    OTHER CARDIAC SURGERY      cardiac stents       Current Facility-Administered Medications   Medication Dose Route Frequency Provider Last Rate Last Admin    lidocaine (Xylocaine) 1 % injection 0.5 mL  0.5 mL Intradermal Once PRN Marco Antonio Miller M.D.        NS infusion   Intravenous Once Marco Antonio Miller M.D.           Social History     Socioeconomic History    Marital status:      Spouse name: Not on file    Number of children: Not on file    Years of education: Not on file    Highest education level: Not on file   Occupational History    Not on file   Tobacco Use    Smoking status: Never    Smokeless tobacco: Never    Tobacco comments:     Used chewing tobacco many years ago.   Vaping Use    Vaping status: Never Used   Substance and Sexual Activity    Alcohol use: Not Currently    Drug use: Never    Sexual activity: Not on file   Other Topics Concern    Not on file   Social History Narrative    Not on file     Social Drivers of Health     Financial Resource Strain: Not on file   Food Insecurity: Not on file   Transportation Needs: Not on file   Physical Activity: Not on file   Stress: Not on file   Social Connections: Not on file   Intimate Partner Violence: Not on file   Housing Stability: Not on file       Family History   Problem Relation Age of Onset    Cancer Mother         Brain cancer    Heart Disease Father         Several By-pass Surgeries    Cancer Daughter         Thyroid Cancer    Heart Disease Brother         Fatal Heart Attack       Allergies:  Patient has no known allergies.    Review of Systems:  Noncontributory except as per HPI    Physical Exam:  /72   Pulse 74   Temp 36.4 °C (97.5 °F) (Temporal)   Resp  "18   Ht 1.778 m (5' 10\")   Wt 74.3 kg (163 lb 12.8 oz)   SpO2 97%     Constitutional: Alert, oriented, no acute distress  HEENT:  Normocephalic and atraumatic, EOMI  Neck:   Supple, no JVD,   Cardiovascular: Regular rate and rhythm,   Pulmonary:  Good air entry bilaterally,    Abdominal:  Soft, non-tender, non-distended     Aortic impulse not widened  Musculoskeletal: No edema, no tenderness  Neurological:  CN II-XII grossly intact, no focal deficits  Skin:   Skin is warm and dry. No rash noted.  Psychiatric:  Normal mood and affect.  Vascular:  Feet adequately perfused, pedal pulses nonpalpable.  Scattered toe ulcerations bilaterally     Labs:  Recent Labs     01/14/25 0614   WBC 6.1   RBC 3.78*   HEMOGLOBIN 12.2*   HEMATOCRIT 38.2*   .1*   MCH 32.3   MCHC 31.9*   RDW 69.2*   PLATELETCT 161*   MPV 11.0     Recent Labs     01/14/25 0614   SODIUM 139   POTASSIUM 4.2   CHLORIDE 101   CO2 25   GLUCOSE 117*   BUN 28*   CREATININE 3.42*   CALCIUM 9.4     Recent Labs     01/14/25 0614   INR 1.47*     Recent Labs     01/14/25 0614   ASTSGOT 29   ALTSGPT 27   TBILIRUBIN 0.9   ALKPHOSPHAT 103*   GLOBULIN 2.6   INR 1.47*       Assessment/Plan:  This is a 79 y.o. male who is presenting today for elective surgery: bilateral lower extremity angiogram with possible intervention.      I explained the details of the operation, alternatives, and potential risks, including but not limited to bleeding, infection, and risks of anesthesia.  All questions were answered. Patient understands and agrees to proceed.      Marco Antonio Miller MD  [facility]   ___________________________________________________________________  Patient:Edy Bennett   MRN:[id number]   CSN:[id number]    "

## 2025-01-14 NOTE — OR NURSING
Arrived from PACU AXO, Pt's VSS; denies N/V; states pain is at tolerable level. Dressing CDI to R, L groin & L ankle.     D/c orders received. IV dc'd. Pt changed into clothing with assistance. Discharge reviewed, Pt and family verbalized understanding and questions answered. Patient states ready to d/c home. Pt dc'd in w/c with wife.   No

## 2025-01-14 NOTE — OP REPORT
VASCULAR SURGERY SERVICE  Operative Note  ___________________________________    Date: 2025    Patient: Edy Bennett  : 1945  MRN: [id number]  ___________________________________      Preoperative Diagnosis:  -  ***    Postoperative Diagnosis:  -  ***    Procedure:  -  ***  -  ***  -  ***  -  ***  -  ***  -  ***    LLE angio with attempted wire placement antegrade and retrograde through the posterior tibial artery, unsuccessful  Right lower extremity angiogram with 3.5 mm shockwave intravascular lithotripsy of the right proximal peroneal artery, subsequent extravasation, subsequent placement of 4 mm x 15 mm balloon expandable covered papyrus stent  -------------------------------------------------------------------------------------------------    Surgeon:                                 Marco Antonio Miller MD    Assistant:   None    Anesthesia:                             General plus local anesthetic    EBL:                                        minimal***    Findings:   ***    Complications:                        none***    Disposition:                             Tolerated well, sent to recovery in stable condition***    -----------------------------------------------------------------------------------------------------    History:  Edy Bennett is a 79 y.o. male ***    Procedure Summary:  The patient was properly identified in the preoperative area, taken to the operating room, and placed in a supine position and IV sedation was given to make patient comfortable.  The patient was prepped and draped in the usual sterile fashion.  Surgical timeout was called to identify the correct patient, procedure, and equipment.  Everyone was in agreement.    Local anesthetic was infiltrated and then we accessed the ***        Patient tolerated the well and was sent to recovery in stable condition.      Postoperative plan:  ***      {Vascular Consultants:[id number]}  [facility]  How Severe Are Your Spot(S)?: moderate Surgery  [phone]       What Type Of Note Output Would You Prefer (Optional)?: Bullet Format What Is The Reason For Today's Visit?: Full Body Skin Examination to be performed at a later date.    At this point the left common femoral artery was accessed percutaneously with ultrasound guidance and an Omni Flush catheter was taken up over into the right common femoral artery.  We performed a right lower extremity runoff angiography which showed single-vessel peroneal runoff with a high-grade stenosis proximally.  I advanced a 90 cm sheath up over into the right popliteal artery and I crossed the peroneal artery occlusion and I treated it with shockwave intravascular lithotripsy.  After successful treatment angiogram was repeated and this showed significant extravasation from the artery.  At this point I reinflated the shockwave balloon and I left it in place for 5 minutes and when the balloon was taken down angiogram showed ongoing extravasation.  I then reinflated the balloon for 10 minutes and repeat angiogram showed ongoing extravasation.  At this point I reinflated the balloon and I gave a dose of protamine that was achieved near complete reversal of the patient's systemic heparin.  I allowed this to circulate for 3 minutes I then took the balloon down and allowed circulation through the area of extravasation and I then reinflated the balloon for 15 minutes.  When I took the balloon down again there was still ongoing extravasation.  The balloon was reinflated.  At this point it appeared that I would have no choice but to place a covered stent to control the extravasation.  The peroneal artery was approximately 3 mm in diameter the smallest covered stent that could potentially address the issue was a 4 mm balloon expandable covered coronary stent called a papyrus stent.  I selected a 15 mm version of the stent and I slid it into the area of extravasation and deployed it and there was no further extravasation.  There was also good patency of the peroneal artery and good perfusion of the right foot.    The femoral sheaths were removed and the sites were closed with 6 Romansh  Angio-Seal closure devices which deployed successfully.  The left posterior tibial sheath was removed and manual pressure was held for hemostasis.  Dry sterile bandages were placed.  Patient tolerated the well and was sent to recovery in stable condition.    Postoperative plan:  Patient has had successful revascularization of his right lower extremity.  We will monitor his clinical progress with wound healing, there is a good chance that the wounds will heal after this revascularization.  The left lower extremity still requires revascularization for limb salvage.  The patient is a candidate for a left popliteal to distal posterior tibial artery bypass which will be arranged at a later date.      Marco Antonio Miller MD  [facility]  [phone]       What Is The Reason For Today's Visit? (Being Monitored For X): concerning skin lesions on an annual basis

## 2025-01-14 NOTE — ANESTHESIA PREPROCEDURE EVALUATION
Case: [id number] Date/Time: 01/14/25 0715    Procedure: AORTOGRAM WITH RUNOFF, POSSIBLE INTERVENTION    Pre-op diagnosis: PERIPHERAL ARTERIAL OCCLUSIVE DISEASE    Location: [facility] OR 19 / SURGERY [facility]    Surgeons: Marco Antonio Miller M.D.          78 yo M w/ HTN, ESRD on peritoneal dialysis, poor perfusion to left foot      Relevant Problems   CARDIAC   (positive) HTN (hypertension)         (positive) Chronic renal insufficiency   (positive) Renal disease       Physical Exam    Airway   Mallampati: II  TM distance: >3 FB  Neck ROM: full       Cardiovascular - normal exam  Rhythm: regular  Rate: normal  (-) murmur     Dental - normal exam           Pulmonary - normal exam  Breath sounds clear to auscultation     Abdominal    Neurological - normal exam                   Anesthesia Plan    ASA 3   ASA physical status 3 criteria: ESRD undergoing regularly scheduled dialysis    Plan - general       Airway plan will be LMA          Induction: intravenous    Postoperative Plan: Postoperative administration of opioids is intended.    Pertinent diagnostic labs and testing reviewed    Informed Consent:    Anesthetic plan and risks discussed with patient.    Use of blood products discussed with: patient whom consented to blood products.

## 2025-01-14 NOTE — DISCHARGE INSTRUCTIONS
HOME CARE INSTRUCTIONS    ACTIVITY: Rest and take it easy for the first 24 hours.  A responsible adult is recommended to remain with you during that time.  It is normal to feel sleepy.  We encourage you to not do anything that requires balance, judgment or coordination.    FOR 24 HOURS DO NOT:  Drive, operate machinery or run household appliances.  Drink beer or alcoholic beverages.  Make important decisions or sign legal documents.    SPECIAL INSTRUCTIONS:          VASCULAR SURGERY SERVICE    Discharge Instructions  Procedure: Peripheral Angiography    1) Activity:  Ok to resume normal activities.  No strenuous activity or lifting over 15 pounds for 2 days    2) Bandage: Remove your bandage 1 day after the procedure.  A new bandage is not necessary.    3) Bathing:   It is OK to shower any time but no soaking in a bath or pool for 3 days after the procedure. The bandage is waterproof. After the dressing removed the puncture site can get wet directly.    4) Diet:  You can resume your normal diet after your procedure.    5) Medications:  You may resume all of your normal home medications. You can take Tylenol or Ibuprofen or similar over-the-counter medications for pain.    6) Follow-up appointments: Please call [phone] to schedule a follow up appointment with your surgeon in 2-4 weeks to discuss the results of the procedure.     7) Call [phone]  if you have any concerns after the procedure, such as increased pain, swelling, drainage, or other symptoms.  You should go to the ER if you are having severe symptoms, such as chest pain, shortness of breath, bleeding, or other similar symptoms.    Office address:   [facility]  [address]  [place] [id number] [phone]  Fax [phone]    DIET: To avoid nausea, slowly advance diet as tolerated, avoiding spicy or greasy foods for the first day.  Add more substantial food to your diet according to your physician's instructions.  Babies can be  fed formula or breast milk as soon as they are hungry.  INCREASE FLUIDS AND FIBER TO AVOID CONSTIPATION.    MEDICATIONS: Resume taking daily medication.  Take prescribed pain medication with food.  If no medication is prescribed, you may take non-aspirin pain medication if needed.  PAIN MEDICATION CAN BE VERY CONSTIPATING.  Take a stool softener or laxative such as senokot, pericolace, or milk of magnesia if needed.    Last pain medication given at 12:25pm Oxycodone.    A follow-up appointment should be arranged with your doctor; call to schedule.    You should CALL YOUR PHYSICIAN if you develop:  Fever greater than 101 degrees F.  Pain not relieved by medication, or persistent nausea or vomiting.  Excessive bleeding (blood soaking through dressing) or unexpected drainage from the wound.  Extreme redness or swelling around the incision site, drainage of pus or foul smelling drainage.  Inability to urinate or empty your bladder within 8 hours.  Problems with breathing or chest pain.    You should call 911 if you develop problems with breathing or chest pain.  If you are unable to contact your doctor or surgical center, you should go to the nearest emergency room or urgent care center.  Physician's telephone #: [phone]    MILD FLU-LIKE SYMPTOMS ARE NORMAL.  YOU MAY EXPERIENCE GENERALIZED MUSCLE ACHES, THROAT IRRITATION, HEADACHE AND/OR SOME NAUSEA.    If any questions arise, call your doctor.  If your doctor is not available, please feel free to call the Surgical Center at [phone].  The Center is open Monday through Friday from 7AM to 7PM.      A registered nurse may call you a few days after your surgery to see how you are doing after your procedure.    You may also receive a survey in the mail within the next two weeks and we ask that you take a few moments to complete the survey and return it to us.  Our goal is to provide you with very good care and we value your comments.     Depression / Suicide  Risk    As you are discharged from this [facility] facility, it is important to learn how to keep safe from harming yourself.    Recognize the warning signs:  Abrupt changes in personality, positive or negative- including increase in energy   Giving away possessions  Change in eating patterns- significant weight changes-  positive or negative  Change in sleeping patterns- unable to sleep or sleeping all the time   Unwillingness or inability to communicate  Depression  Unusual sadness, discouragement and loneliness  Talk of wanting to die  Neglect of personal appearance   Rebelliousness- reckless behavior  Withdrawal from people/activities they love  Confusion- inability to concentrate     If you or a loved one observes any of these behaviors or has concerns about self-harm, here's what you can do:  Talk about it- your feelings and reasons for harming yourself  Remove any means that you might use to hurt yourself (examples: pills, rope, extension cords, firearm)  Get professional help from the community (Mental Health, Substance Abuse, psychological counseling)  Do not be alone:Call your Safe Contact- someone whom you trust who will be there for you.  Call your local CRISIS HOTLINE 247-0279 or 498-947-1987  Call your local Children's Mobile Crisis Response Team [place] (480) 752-3412 or www.ReadyDock  Call the toll free National Suicide Prevention Hotlines   National Suicide Prevention Lifeline 042-207-JXZW (0452)  National Hope Line Network 800-SUICIDE (369-5897)    I acknowledge receipt and understanding of these Home Care instructions.

## 2025-01-14 NOTE — ANESTHESIA POSTPROCEDURE EVALUATION
Patient: Edy Bennett    Procedure Summary       Date: 01/14/25 Room / Location: [place] [address] / SURGERY [facility]    Anesthesia Start: 0733 Anesthesia Stop: 1123    Procedure: AORTOGRAM WITH RUNOFF, POSSIBLE INTERVENTION (Groin) Diagnosis: (PERIPHERAL ARTERIAL OCCLUSIVE DISEASE)    Surgeons: Marco Antonio Miller M.D. Responsible Provider: Antonio Castro MD, PhD    Anesthesia Type: general ASA Status: 3            Final Anesthesia Type: general  Last vitals  BP   Blood Pressure : 120/72    Temp   36.4 °C (97.5 °F)    Pulse   74   Resp   18    SpO2   97 %      Anesthesia Post Evaluation    Patient location during evaluation: PACU  Patient participation: complete - patient participated  Level of consciousness: awake  Pain score: 2    Airway patency: patent  Anesthetic complications: no  Cardiovascular status: hemodynamically stable  Respiratory status: acceptable  Hydration status: acceptable    PONV: none          No notable events documented.     Nurse Pain Score: 3 (NPRS)

## 2025-01-15 ENCOUNTER — OFFICE VISIT (OUTPATIENT)
Dept: VASCULAR SURGERY | Facility: MEDICAL CENTER | Age: 80
End: 2025-01-15
Payer: MEDICARE

## 2025-01-15 VITALS
HEIGHT: 68 IN | BODY MASS INDEX: 24.71 KG/M2 | OXYGEN SATURATION: 100 % | HEART RATE: 60 BPM | TEMPERATURE: 97.6 F | DIASTOLIC BLOOD PRESSURE: 56 MMHG | WEIGHT: 163 LBS | SYSTOLIC BLOOD PRESSURE: 94 MMHG

## 2025-01-15 DIAGNOSIS — I77.9 PAOD (PERIPHERAL ARTERIAL OCCLUSIVE DISEASE) (HCC): ICD-10-CM

## 2025-01-15 PROCEDURE — 3074F SYST BP LT 130 MM HG: CPT | Performed by: SURGERY

## 2025-01-15 PROCEDURE — 3078F DIAST BP <80 MM HG: CPT | Performed by: SURGERY

## 2025-01-15 PROCEDURE — 99024 POSTOP FOLLOW-UP VISIT: CPT | Performed by: SURGERY

## 2025-01-15 ASSESSMENT — FIBROSIS 4 INDEX: FIB4 SCORE: 2.67

## 2025-01-16 NOTE — PROGRESS NOTES
Vascular Surgery  Presurgical H&P    Patient:Edy Bennett  MRN:[id number]  Primary care physician:Denise Carver M.D.    Vascular Consultant: Marco Antonio Miller MD    1/16/2025  _____________________________________________________    Chief Complaint/Reason for Visit:     PAOD and bilateral nonhealing toe ulcerations    History of Present Illness:   Edy Bennett is a 79 y.o. year old male with known history of peripheral arterial disease predominantly affecting his tibial arteries.  Yesterday the patient underwent bilateral lower extremity angiogram.  This showed good patency of the iliac and femoral and popliteal vessels bilaterally.  On the right side he had complete chronic total occlusion of the anterior tibial and posterior tibial artery without option for recanalization, he did have a focal peroneal stenosis which was treated initially with shockwave intravascular lithotripsy followed by placement of a covered stent.  On the left side he has three-vessel tibial artery occlusion and an attempt was made to recanalize the posterior tibial artery which was unsuccessful.  Patient therefore still needs revascularization for the left foot in order to heal his toe ulcerations and it is only remaining option at this point would be a left popliteal to posterior tibial artery bypass.    Past Medical History:     Past Medical History:   Diagnosis Date    Arrhythmia 03/13/2024    Due to kidney failure treatment    Breath shortness     with exertion    Cataract     shon iol    Congestive heart failure ([facility])     Diabetes ([facility])     not on any medication at this time, being monitored at dialysis    Dialysis patient ([facility])     monday wednesday friday at [facility]    Foot ulcer ([facility])     left foot. being seen by wound care    Hemorrhagic disorder ([facility])     plavix eliquis    High cholesterol     Hypertension 05/24/2024    states controlled    Pain     Pneumonia     3/24    Renal disorder     Sleep apnea  01/10/2025    Home sleep study done 1/3/2025 - no results yet.    Stroke ([facility]) 2021    tia     Past Surgical History:     Past Surgical History:   Procedure Laterality Date    AORTOGRAM WITH RUNOFF  1/14/2025    Procedure: AORTOGRAM WITH RUNOFF, right peroneal artery intravascular lithotripsy and stent placement;  Surgeon: Marco Antonio Miller M.D.;  Location: SURGERY [facility];  Service: Vascular    INGUINAL HERNIA LAPAROSCOPIC N/A 12/18/2024    Procedure: LAPAROSCOPIC LEFT INGUINAL HERNIA REPAIR WITH MESH, LAPAROSCOPIC REPOSITIONING OF PERITONEAL DIALYSIS CATHETER;  Surgeon: Marco Antonio Miller M.D.;  Location: SURGERY [facility];  Service: Vascular    CATH PLACEMENT CAPD N/A 12/18/2024    Procedure: REPOSITION, CATHETER, CAPD;  Surgeon: Marco Antonio Miller M.D.;  Location: SURGERY [facility];  Service: Vascular    CATH PLACEMENT CAPD N/A 09/16/2024    Procedure: LAPAROSCOPIC INSERTION OF PERITONEAL DIALYSIS CATHETER PLACEMENT;  Surgeon: Marco Antonio Miller M.D.;  Location: SURGERY SAME DAY [facility];  Service: Vascular    AV FISTULA CREATION Left 07/01/2024    Procedure: CREATION OF LEFT UPPER EXTREMITY DIALYSIS FISTULA;  Surgeon: Shilo Mercedes M.D.;  Location: SURGERY [facility];  Service: General    OTHER ORTHOPEDIC SURGERY  2004    bunion r foot    OTHER      tonsils adenoids    OTHER      dialysis catheter in upper right chest    OTHER      shon iol    OTHER CARDIAC SURGERY      cardiac stents     Allergies:   No Known Allergies  Medications:     Outpatient Encounter Medications as of 1/15/2025   Medication Sig Dispense Refill    clopidogrel (PLAVIX) 75 MG Tab Take 75 mg by mouth every day.      gentamicin (GARAMYCIN) 0.1 % cream Apply 1 Application topically 1 time a day as needed (port access).      acetaminophen (TYLENOL) 500 MG Tab Take 500-1,000 mg by mouth every 6 hours as needed.      traMADol (ULTRAM) 50 MG Tab Take 50 mg by mouth 2 times a day as needed.      furosemide (LASIX) 40 MG Tab Take 40  mg by mouth 2 times a day.      sevelamer carbonate (RENVELA) 800 MG Tab tablet Take 800 mg by mouth 3 times a day with meals.      amLODIPine (NORVASC) 5 MG Tab Take 5 mg by mouth every day.         apixaban (ELIQUIS) 5mg Tab Take 5 mg by mouth 2 times a day.      metoprolol SR (TOPROL XL) 100 MG TABLET SR 24 HR Take 100 mg by mouth at bedtime.         montelukast (SINGULAIR) 10 MG Tab Take 10 mg by mouth at bedtime.      rosuvastatin (CRESTOR) 20 MG Tab Take 20 mg by mouth at bedtime.         tamsulosin (FLOMAX) 0.4 MG capsule Take 0.4 mg by mouth every day.         terazosin (HYTRIN) 5 MG Cap Take 1 Capsule by mouth at bedtime.         nitroglycerin (NITROSTAT) 0.4 MG SL Tab Place 0.4 mg under the tongue as needed for Chest Pain.          No facility-administered encounter medications on file as of 1/15/2025.     Social History:     Social History     Socioeconomic History    Marital status:      Spouse name: Not on file    Number of children: Not on file    Years of education: Not on file    Highest education level: Not on file   Occupational History    Not on file   Tobacco Use    Smoking status: Never    Smokeless tobacco: Never    Tobacco comments:     Used chewing tobacco many years ago.   Vaping Use    Vaping status: Never Used   Substance and Sexual Activity    Alcohol use: Not Currently    Drug use: Never    Sexual activity: Not on file   Other Topics Concern    Not on file   Social History Narrative    Not on file     Social Drivers of Health     Financial Resource Strain: Not on file   Food Insecurity: Not on file   Transportation Needs: Not on file   Physical Activity: Not on file   Stress: Not on file   Social Connections: Not on file   Intimate Partner Violence: Not on file   Housing Stability: Not on file      Social History     Tobacco Use   Smoking Status Never   Smokeless Tobacco Never   Tobacco Comments    Used chewing tobacco many years ago.     Social History     Substance and Sexual  "Activity   Alcohol Use Not Currently     Social History     Substance and Sexual Activity   Drug Use Never      Family History:     Family History   Problem Relation Age of Onset    Cancer Mother         Brain cancer    Heart Disease Father         Several By-pass Surgeries    Cancer Daughter         Thyroid Cancer    Heart Disease Brother         Fatal Heart Attack       Review of Systems:   Constitutional: Negative for fever or chills  HENT:   Negative for hearing loss or tinnitus    Eyes:    Negative for blurred vision or loss of vision  Respiratory:  Negative for cough or hemoptysis  Cardiac:  Negative for chest pain or palpitations  Vascular:  Negative for claudication, Negative for rest pain  Gastrointestinal: Negative for vomiting or abdominal pain     Negative for hematochezia or melena   Genitourinary: Negative for dysuria or hematuria   Musculoskeletal: Negative for myalgias or acute joint pain  Skin:   Negative for itching or rash  Neurological:  Negative for dizziness or headaches     Negative for speech disturbance     Negative for extremity weakness or paresthesias  Endo/Heme:  Negative for easy bruising or bleeding         Exam:   BP 94/56 (BP Location: Right arm, Patient Position: Sitting, BP Cuff Size: Adult)   Pulse 60   Temp 36.4 °C (97.6 °F) (Temporal)   Ht 1.727 m (5' 8\")   Wt 73.9 kg (163 lb)   SpO2 100%   BMI 24.78 kg/m²     Constitutional: Alert, oriented, no acute distress  HEENT:  Normocephalic and atraumatic, EOMI  Neck:   Supple, no JVD,   Cardiovascular: Regular rate and rhythm,   Pulmonary:  Good air entry bilaterally,    Abdominal:  Soft, non-tender, non-distended  Musculoskeletal: No tenderness, no deformity  Neurological:  CN II-XII grossly intact, no focal deficits  Skin:   Skin is warm and dry. No rash noted.  Vascular exam: Extremities warm and adequately perfused.  There are scattered toe ulcerations bilaterally.  Nonpalpable pedal pulses on the left side      Assessment " and Plan:   - Peripheral arterial occlusive disease with left lower extremity arterial insufficiency and nonhealing toe ulcerations.    Recommend left popliteal to posterior tibial bypass for limb salvage.I explained the details of the operation, alternatives, and potential risks, including but not limited to bleeding, infection, and risks of anesthesia.  All questions were answered. Patient understands and agrees to proceed.            _____________________________________________________  Marco Antonio Miller MD  [facility]  [phone]  [address], [place] [address]

## 2025-01-17 ENCOUNTER — PRE-ADMISSION TESTING (OUTPATIENT)
Dept: ADMISSIONS | Facility: MEDICAL CENTER | Age: 80
End: 2025-01-17
Payer: MEDICARE

## 2025-01-17 NOTE — PREADMIT AVS NOTE
Current Medications   Medication Instructions    clopidogrel (PLAVIX) 75 MG Tab FOLLOW INSTRUCTIONS FROM SURGEON OR SPECIALIST    gentamicin (GARAMYCIN) 0.1 % cream CONTINUE TAKING MEDICATION AS PRESCRIBED.     acetaminophen (TYLENOL) 500 MG Tab CONTINUE TAKING MEDICATION AS PRESCRIBED.     traMADol (ULTRAM) 50 MG Tab CONTINUE TAKING MEDICATION AS PRESCRIBED.     furosemide (LASIX) 40 MG Tab HOLD AM OF SURGERY/PROCEDURE.    sevelamer carbonate (RENVELA) 800 MG Tab tablet CONTINUE TAKING MEDICATION AS PRESCRIBED.     amLODIPine (NORVASC) 5 MG Tab CONTINUE TAKING MEDICATION AS PRESCRIBED.     apixaban (ELIQUIS) 5mg Tab FOLLOW INSTRUCTIONS FROM SURGEON OR SPECIALIST    metoprolol SR (TOPROL XL) 100 MG TABLET SR 24 HR CONTINUE TAKING MEDICATION AS PRESCRIBED.     montelukast (SINGULAIR) 10 MG Tab CONTINUE TAKING MEDICATION AS PRESCRIBED.     rosuvastatin (CRESTOR) 20 MG Tab CONTINUE TAKING MEDICATION AS PRESCRIBED.     tamsulosin (FLOMAX) 0.4 MG capsule CONTINUE TAKING MEDICATION AS PRESCRIBED.     terazosin (HYTRIN) 5 MG Cap CONTINUE TAKING MEDICATION AS PRESCRIBED.     nitroglycerin (NITROSTAT) 0.4 MG SL Tab CONTINUE TAKING MEDICATION AS PRESCRIBED.

## 2025-01-20 ENCOUNTER — HOSPITAL ENCOUNTER (INPATIENT)
Facility: MEDICAL CENTER | Age: 80
LOS: 3 days | End: 2025-01-23
Attending: SURGERY | Admitting: SURGERY
Payer: MEDICARE

## 2025-01-20 ENCOUNTER — ANESTHESIA EVENT (OUTPATIENT)
Dept: SURGERY | Facility: MEDICAL CENTER | Age: 80
End: 2025-01-20
Payer: MEDICARE

## 2025-01-20 ENCOUNTER — ANESTHESIA (OUTPATIENT)
Dept: SURGERY | Facility: MEDICAL CENTER | Age: 80
End: 2025-01-20
Payer: MEDICARE

## 2025-01-20 DIAGNOSIS — I73.9 PERIPHERAL ARTERIAL DISEASE (HCC): ICD-10-CM

## 2025-01-20 PROBLEM — G62.9 PERIPHERAL NEUROPATHY: Status: ACTIVE | Noted: 2025-01-20

## 2025-01-20 PROBLEM — Z99.2 ESRD ON PERITONEAL DIALYSIS (HCC): Status: ACTIVE | Noted: 2024-12-18

## 2025-01-20 PROBLEM — N18.6 ESRD ON PERITONEAL DIALYSIS (HCC): Status: ACTIVE | Noted: 2024-12-18

## 2025-01-20 PROBLEM — N18.9 ANEMIA IN CHRONIC RENAL DISEASE: Status: ACTIVE | Noted: 2025-01-20

## 2025-01-20 PROBLEM — N40.0 BPH (BENIGN PROSTATIC HYPERPLASIA): Status: ACTIVE | Noted: 2025-01-20

## 2025-01-20 PROBLEM — G47.00 INSOMNIA: Status: ACTIVE | Noted: 2025-01-20

## 2025-01-20 PROBLEM — E78.5 DYSLIPIDEMIA: Status: ACTIVE | Noted: 2025-01-20

## 2025-01-20 PROBLEM — D63.1 ANEMIA IN CHRONIC RENAL DISEASE: Status: ACTIVE | Noted: 2025-01-20

## 2025-01-20 LAB
ABO + RH BLD: NORMAL
ABO GROUP BLD: NORMAL
ALBUMIN SERPL BCP-MCNC: 3.7 G/DL (ref 3.2–4.9)
ALBUMIN/GLOB SERPL: 1.5 G/DL
ALP SERPL-CCNC: 98 U/L (ref 30–99)
ALT SERPL-CCNC: 13 U/L (ref 2–50)
ANION GAP SERPL CALC-SCNC: 21 MMOL/L (ref 7–16)
AST SERPL-CCNC: 28 U/L (ref 12–45)
BILIRUB SERPL-MCNC: 0.5 MG/DL (ref 0.1–1.5)
BLD GP AB SCN SERPL QL: NORMAL
BUN SERPL-MCNC: 68 MG/DL (ref 8–22)
CALCIUM ALBUM COR SERPL-MCNC: 9.2 MG/DL (ref 8.5–10.5)
CALCIUM SERPL-MCNC: 9 MG/DL (ref 8.5–10.5)
CHLORIDE SERPL-SCNC: 97 MMOL/L (ref 96–112)
CO2 SERPL-SCNC: 23 MMOL/L (ref 20–33)
CREAT SERPL-MCNC: 7.6 MG/DL (ref 0.5–1.4)
ERYTHROCYTE [DISTWIDTH] IN BLOOD BY AUTOMATED COUNT: 63.4 FL (ref 35.9–50)
GFR SERPLBLD CREATININE-BSD FMLA CKD-EPI: 7 ML/MIN/1.73 M 2
GLOBULIN SER CALC-MCNC: 2.4 G/DL (ref 1.9–3.5)
GLUCOSE BLD STRIP.AUTO-MCNC: 94 MG/DL (ref 65–99)
GLUCOSE BLD STRIP.AUTO-MCNC: 98 MG/DL (ref 65–99)
GLUCOSE SERPL-MCNC: 102 MG/DL (ref 65–99)
HCT VFR BLD AUTO: 39.4 % (ref 42–52)
HGB BLD-MCNC: 12.6 G/DL (ref 14–18)
INR PPP: 1.31 (ref 0.87–1.13)
MCH RBC QN AUTO: 31.6 PG (ref 27–33)
MCHC RBC AUTO-ENTMCNC: 32 G/DL (ref 32.3–36.5)
MCV RBC AUTO: 98.7 FL (ref 81.4–97.8)
PLATELET # BLD AUTO: 186 K/UL (ref 164–446)
PMV BLD AUTO: 11.1 FL (ref 9–12.9)
POTASSIUM SERPL-SCNC: 4.6 MMOL/L (ref 3.6–5.5)
PROT SERPL-MCNC: 6.1 G/DL (ref 6–8.2)
PROTHROMBIN TIME: 16.3 SEC (ref 12–14.6)
RBC # BLD AUTO: 3.99 M/UL (ref 4.7–6.1)
RH BLD: NORMAL
SODIUM SERPL-SCNC: 141 MMOL/L (ref 135–145)
WBC # BLD AUTO: 7.3 K/UL (ref 4.8–10.8)

## 2025-01-20 PROCEDURE — C1729 CATH, DRAINAGE: HCPCS | Performed by: SURGERY

## 2025-01-20 PROCEDURE — 85610 PROTHROMBIN TIME: CPT

## 2025-01-20 PROCEDURE — 160035 HCHG PACU - 1ST 60 MINS PHASE I: Performed by: SURGERY

## 2025-01-20 PROCEDURE — 700111 HCHG RX REV CODE 636 W/ 250 OVERRIDE (IP): Performed by: SURGERY

## 2025-01-20 PROCEDURE — 700101 HCHG RX REV CODE 250: Performed by: ANESTHESIOLOGY

## 2025-01-20 PROCEDURE — 160048 HCHG OR STATISTICAL LEVEL 1-5: Performed by: SURGERY

## 2025-01-20 PROCEDURE — 36415 COLL VENOUS BLD VENIPUNCTURE: CPT

## 2025-01-20 PROCEDURE — 160002 HCHG RECOVERY MINUTES (STAT): Performed by: SURGERY

## 2025-01-20 PROCEDURE — 3E0T3BZ INTRODUCTION OF ANESTHETIC AGENT INTO PERIPHERAL NERVES AND PLEXI, PERCUTANEOUS APPROACH: ICD-10-PCS | Performed by: ANESTHESIOLOGY

## 2025-01-20 PROCEDURE — A9270 NON-COVERED ITEM OR SERVICE: HCPCS | Performed by: PHYSICIAN ASSISTANT

## 2025-01-20 PROCEDURE — 82962 GLUCOSE BLOOD TEST: CPT | Mod: 91

## 2025-01-20 PROCEDURE — 160009 HCHG ANES TIME/MIN: Performed by: SURGERY

## 2025-01-20 PROCEDURE — 86901 BLOOD TYPING SEROLOGIC RH(D): CPT

## 2025-01-20 PROCEDURE — 86900 BLOOD TYPING SEROLOGIC ABO: CPT | Mod: 91

## 2025-01-20 PROCEDURE — 85027 COMPLETE CBC AUTOMATED: CPT

## 2025-01-20 PROCEDURE — 700105 HCHG RX REV CODE 258: Performed by: ANESTHESIOLOGY

## 2025-01-20 PROCEDURE — 3E1M39Z IRRIGATION OF PERITONEAL CAVITY USING DIALYSATE, PERCUTANEOUS APPROACH: ICD-10-PCS | Performed by: INTERNAL MEDICINE

## 2025-01-20 PROCEDURE — 770001 HCHG ROOM/CARE - MED/SURG/GYN PRIV*

## 2025-01-20 PROCEDURE — 99223 1ST HOSP IP/OBS HIGH 75: CPT | Performed by: HOSPITALIST

## 2025-01-20 PROCEDURE — 041N09Q BYPASS LEFT POPLITEAL ARTERY TO LOWER EXTREMITY ARTERY WITH AUTOLOGOUS VENOUS TISSUE, OPEN APPROACH: ICD-10-PCS | Performed by: SURGERY

## 2025-01-20 PROCEDURE — 700105 HCHG RX REV CODE 258: Performed by: SURGERY

## 2025-01-20 PROCEDURE — 64445 NJX AA&/STRD SCIATIC NRV IMG: CPT | Performed by: SURGERY

## 2025-01-20 PROCEDURE — 110454 HCHG SHELL REV 250: Performed by: SURGERY

## 2025-01-20 PROCEDURE — 06BQ0ZZ EXCISION OF LEFT SAPHENOUS VEIN, OPEN APPROACH: ICD-10-PCS | Performed by: SURGERY

## 2025-01-20 PROCEDURE — 160029 HCHG SURGERY MINUTES - 1ST 30 MINS LEVEL 4: Performed by: SURGERY

## 2025-01-20 PROCEDURE — 35571 ART BYP POP-TIBL-PRL-OTHER: CPT | Mod: LT | Performed by: SURGERY

## 2025-01-20 PROCEDURE — 700111 HCHG RX REV CODE 636 W/ 250 OVERRIDE (IP): Performed by: ANESTHESIOLOGY

## 2025-01-20 PROCEDURE — 80053 COMPREHEN METABOLIC PANEL: CPT

## 2025-01-20 PROCEDURE — 86850 RBC ANTIBODY SCREEN: CPT

## 2025-01-20 PROCEDURE — 90945 DIALYSIS ONE EVALUATION: CPT

## 2025-01-20 PROCEDURE — 160041 HCHG SURGERY MINUTES - EA ADDL 1 MIN LEVEL 4: Performed by: SURGERY

## 2025-01-20 PROCEDURE — 700111 HCHG RX REV CODE 636 W/ 250 OVERRIDE (IP): Performed by: STUDENT IN AN ORGANIZED HEALTH CARE EDUCATION/TRAINING PROGRAM

## 2025-01-20 PROCEDURE — 64447 NJX AA&/STRD FEMORAL NRV IMG: CPT | Performed by: SURGERY

## 2025-01-20 PROCEDURE — 700101 HCHG RX REV CODE 250: Performed by: SURGERY

## 2025-01-20 PROCEDURE — 700102 HCHG RX REV CODE 250 W/ 637 OVERRIDE(OP): Performed by: PHYSICIAN ASSISTANT

## 2025-01-20 RX ORDER — HEPARIN SODIUM,PORCINE 1000/ML
VIAL (ML) INJECTION PRN
Status: DISCONTINUED | OUTPATIENT
Start: 2025-01-20 | End: 2025-01-20 | Stop reason: SURG

## 2025-01-20 RX ORDER — FUROSEMIDE 20 MG/1
40 TABLET ORAL 2 TIMES DAILY
Status: DISCONTINUED | OUTPATIENT
Start: 2025-01-20 | End: 2025-01-23 | Stop reason: HOSPADM

## 2025-01-20 RX ORDER — OXYCODONE HCL 5 MG/5 ML
5 SOLUTION, ORAL ORAL
Status: DISCONTINUED | OUTPATIENT
Start: 2025-01-20 | End: 2025-01-20 | Stop reason: HOSPADM

## 2025-01-20 RX ORDER — TERAZOSIN 5 MG/1
5 CAPSULE ORAL
Status: DISCONTINUED | OUTPATIENT
Start: 2025-01-20 | End: 2025-01-23 | Stop reason: HOSPADM

## 2025-01-20 RX ORDER — ROSUVASTATIN CALCIUM 20 MG/1
20 TABLET, COATED ORAL
Status: DISCONTINUED | OUTPATIENT
Start: 2025-01-20 | End: 2025-01-23 | Stop reason: HOSPADM

## 2025-01-20 RX ORDER — CEFAZOLIN SODIUM 1 G/3ML
INJECTION, POWDER, FOR SOLUTION INTRAMUSCULAR; INTRAVENOUS PRN
Status: DISCONTINUED | OUTPATIENT
Start: 2025-01-20 | End: 2025-01-20 | Stop reason: SURG

## 2025-01-20 RX ORDER — SODIUM CHLORIDE 9 MG/ML
INJECTION, SOLUTION INTRAVENOUS ONCE
Status: COMPLETED | OUTPATIENT
Start: 2025-01-20 | End: 2025-01-21

## 2025-01-20 RX ORDER — SODIUM BICARBONATE 325 MG/1
325 TABLET ORAL 2 TIMES DAILY PRN
Status: ON HOLD | COMMUNITY
End: 2025-02-11

## 2025-01-20 RX ORDER — TAMSULOSIN HYDROCHLORIDE 0.4 MG/1
0.4 CAPSULE ORAL EVERY MORNING
Status: DISCONTINUED | OUTPATIENT
Start: 2025-01-21 | End: 2025-01-23 | Stop reason: HOSPADM

## 2025-01-20 RX ORDER — CLOPIDOGREL BISULFATE 75 MG/1
75 TABLET ORAL DAILY
Status: DISCONTINUED | OUTPATIENT
Start: 2025-01-21 | End: 2025-01-23 | Stop reason: HOSPADM

## 2025-01-20 RX ORDER — ONDANSETRON 2 MG/ML
INJECTION INTRAMUSCULAR; INTRAVENOUS PRN
Status: DISCONTINUED | OUTPATIENT
Start: 2025-01-20 | End: 2025-01-20 | Stop reason: SURG

## 2025-01-20 RX ORDER — AMLODIPINE BESYLATE 2.5 MG/1
2.5 TABLET ORAL EVERY MORNING
Status: DISCONTINUED | OUTPATIENT
Start: 2025-01-21 | End: 2025-01-23 | Stop reason: HOSPADM

## 2025-01-20 RX ORDER — SODIUM CHLORIDE 9 MG/ML
INJECTION, SOLUTION INTRAVENOUS CONTINUOUS
Status: DISCONTINUED | OUTPATIENT
Start: 2025-01-20 | End: 2025-01-20 | Stop reason: HOSPADM

## 2025-01-20 RX ORDER — HALOPERIDOL 5 MG/ML
1 INJECTION INTRAMUSCULAR
Status: DISCONTINUED | OUTPATIENT
Start: 2025-01-20 | End: 2025-01-20 | Stop reason: HOSPADM

## 2025-01-20 RX ORDER — SODIUM CHLORIDE 9 MG/ML
INJECTION, SOLUTION INTRAVENOUS
Status: DISCONTINUED | OUTPATIENT
Start: 2025-01-20 | End: 2025-01-20 | Stop reason: SURG

## 2025-01-20 RX ORDER — MEPERIDINE HYDROCHLORIDE 25 MG/ML
12.5 INJECTION INTRAMUSCULAR; INTRAVENOUS; SUBCUTANEOUS
Status: DISCONTINUED | OUTPATIENT
Start: 2025-01-20 | End: 2025-01-20 | Stop reason: HOSPADM

## 2025-01-20 RX ORDER — PROTAMINE SULFATE 10 MG/ML
INJECTION, SOLUTION INTRAVENOUS PRN
Status: DISCONTINUED | OUTPATIENT
Start: 2025-01-20 | End: 2025-01-20 | Stop reason: SURG

## 2025-01-20 RX ORDER — ACETAMINOPHEN 325 MG/1
650 TABLET ORAL EVERY 4 HOURS PRN
Status: DISCONTINUED | OUTPATIENT
Start: 2025-01-20 | End: 2025-01-23 | Stop reason: HOSPADM

## 2025-01-20 RX ORDER — SEVELAMER CARBONATE 800 MG/1
800 TABLET, FILM COATED ORAL
Status: DISCONTINUED | OUTPATIENT
Start: 2025-01-20 | End: 2025-01-22

## 2025-01-20 RX ORDER — METOPROLOL SUCCINATE 100 MG/1
100 TABLET, EXTENDED RELEASE ORAL
Status: DISCONTINUED | OUTPATIENT
Start: 2025-01-20 | End: 2025-01-23 | Stop reason: HOSPADM

## 2025-01-20 RX ORDER — MIDAZOLAM HYDROCHLORIDE 1 MG/ML
INJECTION INTRAMUSCULAR; INTRAVENOUS PRN
Status: DISCONTINUED | OUTPATIENT
Start: 2025-01-20 | End: 2025-01-20 | Stop reason: SURG

## 2025-01-20 RX ORDER — LIDOCAINE HYDROCHLORIDE 20 MG/ML
INJECTION, SOLUTION EPIDURAL; INFILTRATION; INTRACAUDAL; PERINEURAL PRN
Status: DISCONTINUED | OUTPATIENT
Start: 2025-01-20 | End: 2025-01-20 | Stop reason: SURG

## 2025-01-20 RX ORDER — DEXAMETHASONE SODIUM PHOSPHATE 4 MG/ML
INJECTION, SOLUTION INTRA-ARTICULAR; INTRALESIONAL; INTRAMUSCULAR; INTRAVENOUS; SOFT TISSUE PRN
Status: DISCONTINUED | OUTPATIENT
Start: 2025-01-20 | End: 2025-01-20 | Stop reason: SURG

## 2025-01-20 RX ORDER — NITROGLYCERIN 0.4 MG/1
0.4 TABLET SUBLINGUAL PRN
Status: DISCONTINUED | OUTPATIENT
Start: 2025-01-20 | End: 2025-01-23 | Stop reason: HOSPADM

## 2025-01-20 RX ORDER — LIDOCAINE HYDROCHLORIDE 40 MG/ML
SOLUTION TOPICAL PRN
Status: DISCONTINUED | OUTPATIENT
Start: 2025-01-20 | End: 2025-01-20 | Stop reason: SURG

## 2025-01-20 RX ORDER — ALBUTEROL SULFATE 5 MG/ML
2.5 SOLUTION RESPIRATORY (INHALATION)
Status: DISCONTINUED | OUTPATIENT
Start: 2025-01-20 | End: 2025-01-20 | Stop reason: HOSPADM

## 2025-01-20 RX ORDER — HEPARIN SODIUM,PORCINE 1000/ML
VIAL (ML) INJECTION
Status: DISCONTINUED | OUTPATIENT
Start: 2025-01-20 | End: 2025-01-20 | Stop reason: HOSPADM

## 2025-01-20 RX ORDER — SODIUM CHLORIDE, SODIUM GLUCONATE, SODIUM ACETATE, POTASSIUM CHLORIDE AND MAGNESIUM CHLORIDE 526; 502; 368; 37; 30 MG/100ML; MG/100ML; MG/100ML; MG/100ML; MG/100ML
INJECTION, SOLUTION INTRAVENOUS
Status: DISCONTINUED | OUTPATIENT
Start: 2025-01-20 | End: 2025-01-20 | Stop reason: SURG

## 2025-01-20 RX ORDER — OXYCODONE HCL 5 MG/5 ML
10 SOLUTION, ORAL ORAL
Status: DISCONTINUED | OUTPATIENT
Start: 2025-01-20 | End: 2025-01-20 | Stop reason: HOSPADM

## 2025-01-20 RX ORDER — HYDRALAZINE HYDROCHLORIDE 20 MG/ML
5 INJECTION INTRAMUSCULAR; INTRAVENOUS
Status: DISCONTINUED | OUTPATIENT
Start: 2025-01-20 | End: 2025-01-20 | Stop reason: HOSPADM

## 2025-01-20 RX ORDER — MONTELUKAST SODIUM 10 MG/1
10 TABLET ORAL
Status: DISCONTINUED | OUTPATIENT
Start: 2025-01-20 | End: 2025-01-23 | Stop reason: HOSPADM

## 2025-01-20 RX ORDER — SODIUM CHLORIDE 9 MG/ML
INJECTION, SOLUTION INTRAVENOUS CONTINUOUS
Status: ACTIVE | OUTPATIENT
Start: 2025-01-20 | End: 2025-01-21

## 2025-01-20 RX ORDER — EPHEDRINE SULFATE 50 MG/ML
INJECTION, SOLUTION INTRAVENOUS PRN
Status: DISCONTINUED | OUTPATIENT
Start: 2025-01-20 | End: 2025-01-20 | Stop reason: SURG

## 2025-01-20 RX ORDER — DIPHENHYDRAMINE HYDROCHLORIDE 50 MG/ML
12.5 INJECTION INTRAMUSCULAR; INTRAVENOUS
Status: DISCONTINUED | OUTPATIENT
Start: 2025-01-20 | End: 2025-01-20 | Stop reason: HOSPADM

## 2025-01-20 RX ORDER — ROPIVACAINE HYDROCHLORIDE 5 MG/ML
INJECTION, SOLUTION EPIDURAL; INFILTRATION; PERINEURAL PRN
Status: DISCONTINUED | OUTPATIENT
Start: 2025-01-20 | End: 2025-01-20 | Stop reason: SURG

## 2025-01-20 RX ADMIN — METOPROLOL SUCCINATE 100 MG: 100 TABLET, EXTENDED RELEASE ORAL at 22:23

## 2025-01-20 RX ADMIN — PROTAMINE SULFATE 40 MG: 10 INJECTION, SOLUTION INTRAVENOUS at 17:28

## 2025-01-20 RX ADMIN — TERAZOSIN HYDROCHLORIDE 5 MG: 5 CAPSULE ORAL at 22:23

## 2025-01-20 RX ADMIN — HEPARIN SODIUM 7000 UNITS: 1000 INJECTION, SOLUTION INTRAVENOUS; SUBCUTANEOUS at 15:52

## 2025-01-20 RX ADMIN — ROCURONIUM BROMIDE 25 MG: 10 INJECTION, SOLUTION INTRAVENOUS at 14:08

## 2025-01-20 RX ADMIN — ROPIVACAINE HYDROCHLORIDE 20 ML: 5 INJECTION EPIDURAL; INFILTRATION; PERINEURAL at 14:02

## 2025-01-20 RX ADMIN — CEFAZOLIN 2 G: 1 INJECTION, POWDER, FOR SOLUTION INTRAMUSCULAR; INTRAVENOUS at 14:08

## 2025-01-20 RX ADMIN — PROTAMINE SULFATE 40 MG: 10 INJECTION, SOLUTION INTRAVENOUS at 16:47

## 2025-01-20 RX ADMIN — SODIUM CHLORIDE: 9 INJECTION, SOLUTION INTRAVENOUS at 13:15

## 2025-01-20 RX ADMIN — ROPIVACAINE HYDROCHLORIDE 20 ML: 5 INJECTION EPIDURAL; INFILTRATION; PERINEURAL at 14:05

## 2025-01-20 RX ADMIN — SODIUM CHLORIDE, SODIUM GLUCONATE, SODIUM ACETATE, POTASSIUM CHLORIDE AND MAGNESIUM CHLORIDE: 526; 502; 368; 37; 30 INJECTION, SOLUTION INTRAVENOUS at 14:17

## 2025-01-20 RX ADMIN — EPHEDRINE SULFATE 10 MG: 50 INJECTION, SOLUTION INTRAVENOUS at 15:29

## 2025-01-20 RX ADMIN — ONDANSETRON 8 MG: 2 INJECTION INTRAMUSCULAR; INTRAVENOUS at 17:34

## 2025-01-20 RX ADMIN — HEPARIN SODIUM 7000 UNITS: 1000 INJECTION, SOLUTION INTRAVENOUS; SUBCUTANEOUS at 16:58

## 2025-01-20 RX ADMIN — ROSUVASTATIN CALCIUM 20 MG: 20 TABLET, FILM COATED ORAL at 22:23

## 2025-01-20 RX ADMIN — MONTELUKAST 10 MG: 10 TABLET, FILM COATED ORAL at 22:23

## 2025-01-20 RX ADMIN — MIDAZOLAM HYDROCHLORIDE 2 MG: 1 INJECTION, SOLUTION INTRAMUSCULAR; INTRAVENOUS at 13:54

## 2025-01-20 RX ADMIN — EPHEDRINE SULFATE 5 MG: 50 INJECTION, SOLUTION INTRAVENOUS at 16:58

## 2025-01-20 RX ADMIN — SODIUM CHLORIDE: 9 INJECTION, SOLUTION INTRAVENOUS at 13:54

## 2025-01-20 RX ADMIN — DEXAMETHASONE SODIUM PHOSPHATE 4 MG: 4 INJECTION INTRA-ARTICULAR; INTRALESIONAL; INTRAMUSCULAR; INTRAVENOUS; SOFT TISSUE at 14:05

## 2025-01-20 RX ADMIN — FENTANYL CITRATE 100 MCG: 50 INJECTION, SOLUTION INTRAMUSCULAR; INTRAVENOUS at 13:54

## 2025-01-20 RX ADMIN — LIDOCAINE HYDROCHLORIDE 4 ML: 40 SOLUTION TOPICAL at 14:09

## 2025-01-20 RX ADMIN — EPHEDRINE SULFATE 10 MG: 50 INJECTION, SOLUTION INTRAVENOUS at 14:42

## 2025-01-20 RX ADMIN — LIDOCAINE HYDROCHLORIDE 100 MG: 20 INJECTION, SOLUTION EPIDURAL; INFILTRATION; INTRACAUDAL; PERINEURAL at 14:08

## 2025-01-20 RX ADMIN — DEXAMETHASONE SODIUM PHOSPHATE 4 MG: 4 INJECTION INTRA-ARTICULAR; INTRALESIONAL; INTRAMUSCULAR; INTRAVENOUS; SOFT TISSUE at 14:02

## 2025-01-20 RX ADMIN — PROPOFOL 150 MG: 10 INJECTION, EMULSION INTRAVENOUS at 14:08

## 2025-01-20 ASSESSMENT — FIBROSIS 4 INDEX: FIB4 SCORE: 2.67

## 2025-01-20 NOTE — PROGRESS NOTES
Med rec complete per pt SO at bedside.     Plavix was last taken on 1/18/25  Eliquis was last taken on 1/17/25

## 2025-01-20 NOTE — ANESTHESIA PROCEDURE NOTES
Arterial Line    Performed by: Ananth Groves M.D.  Authorized by: Ananth Groves M.D.    Start Time:  1/20/2025 2:13 PM  End Time:  1/20/2025 2:15 PM  Localization: ultrasound guidance  Image captured, interpreted and electronically stored.  Patient Location:  OR  Indication: continuous blood pressure monitoring        Catheter Size:  20 G  Seldinger Technique?: Yes    Laterality:  Right  Site:  Radial artery  Line Secured:  Antimicrobial disc, tape and transparent dressing  Events: patient tolerated procedure well with no complications

## 2025-01-20 NOTE — ANESTHESIA PREPROCEDURE EVALUATION
Case: [id number] Anesthesia Start Date/Time: 01/20/25 1354    Procedure: LEFT LOWER EXTREMITY BYPASS, FEMORAL TIBIAL BYPASS    Anesthesia type: general, peripheral nerve block    Pre-op diagnosis: PERIPHEREAL ARTERIAL OCCLUSIVE DISEASE    Location: [facility] 04 / SURGERY [facility]    Surgeons: Marco Antonio Miller M.D.          ESRD-Dialysis  Chronic Anticoagulation  HLD  PAD  Peripheral Neuropathy    Relevant Problems   CARDIAC   (positive) HTN (hypertension)       Physical Exam    Airway   Mallampati: II  TM distance: >3 FB  Neck ROM: full       Cardiovascular - normal exam  Rhythm: regular  Rate: normal  (-) murmur     Dental - normal exam           Pulmonary - normal exam  Breath sounds clear to auscultation     Abdominal    Neurological - normal exam         Other findings: Pt has neuropathy in left foot              Anesthesia Plan    ASA 3   ASA physical status 3 criteria: ESRD undergoing regularly scheduled dialysis and a thrombophilic disease requiring anticoagulation    Plan - general and peripheral nerve block     Peripheral nerve block will be post-op pain control  Airway plan will be ETT          Induction: intravenous    Postoperative Plan: Postoperative administration of opioids is intended.    Pertinent diagnostic labs and testing reviewed    Informed Consent:    Anesthetic plan and risks discussed with patient and spouse.    Use of blood products discussed with: patient and spouse whom.

## 2025-01-20 NOTE — ANESTHESIA PROCEDURE NOTES
Peripheral Block    Date/Time: 1/20/2025 2:04 PM    Performed by: Ananth Groves M.D.  Authorized by: Ananth Groves M.D.    Patient Location:  OR  Start Time:  1/20/2025 2:04 PM  End Time:  1/20/2025 2:06 PM  Reason for Block: at surgeon's request and post-op pain management ONLY    patient identified, IV checked, site marked, risks and benefits discussed, surgical consent, monitors and equipment checked, pre-op evaluation and timeout performed    Patient Position:  Supine  Prep: ChloraPrep    Monitoring:  Heart rate, continuous pulse ox and cardiac monitor  Block Region:  Lower Extremity  Lower Extremity - Block Type:  SCIATIC nerve block, lateral approach    Laterality:  Left  Procedures: ultrasound guided  Image captured, interpreted and electronically stored.  Local Infiltration:  Lidocaine  Strength:  1 %  Dose:  3 ml  Block Type:  Single-shot  Needle Length:  100mm  Needle Gauge:  21 G  Needle Localization:  Ultrasound guidance  Ultrasound picture in chart  Injection Assessment:  Negative aspiration for heme, no paresthesia on injection, incremental injection and local visualized surrounding nerve on ultrasound

## 2025-01-20 NOTE — ANESTHESIA PROCEDURE NOTES
Airway    Date/Time: 1/20/2025 2:09 PM    Performed by: Ananth Groves M.D.  Authorized by: Ananth Groves M.D.    Location:  OR  Urgency:  Elective  Difficult Airway: No    Indications for Airway Management:  Anesthesia      Spontaneous Ventilation: absent    Sedation Level:  Deep  Preoxygenated: Yes    Patient Position:  Sniffing  Final Airway Type:  Endotracheal airway  Final Endotracheal Airway:  ETT  Cuffed: Yes    Technique Used for Successful ETT Placement:  Direct laryngoscopy  Devices/Methods Used in Placement:  Intubating stylet    Insertion Site:  Oral  Blade Type:  Denisse  Laryngoscope Blade/Videolaryngoscope Blade Size:  4  ETT Size (mm):  7.5  Measured from:  Teeth  ETT to Teeth (cm):  23  Placement Verified by: auscultation and capnometry    Cormack-Lehane Classification:  Grade I - full view of glottis  Number of Attempts at Approach:  1

## 2025-01-20 NOTE — ANESTHESIA PROCEDURE NOTES
Peripheral IV    Date/Time: 1/20/2025 2:17 PM    Performed by: Ananth Groves M.D.  Authorized by: Ananth Groves M.D.    Size:  18 G  Laterality:  Right  Peripheral IV Location:  Forearm  Site Prep:  Alcohol  Technique:  Direct puncture  Attempts:  1

## 2025-01-20 NOTE — ANESTHESIA PROCEDURE NOTES
Peripheral Block    Date/Time: 1/20/2025 2:01 PM    Performed by: Ananth Groves M.D.  Authorized by: Ananth Groves M.D.    Patient Location:  OR  Start Time:  1/20/2025 2:01 PM  End Time:  1/20/2025 2:03 PM  Reason for Block: at surgeon's request and post-op pain management ONLY    patient identified, IV checked, site marked, risks and benefits discussed, surgical consent, monitors and equipment checked, pre-op evaluation and timeout performed    Patient Position:  Supine  Prep: ChloraPrep    Monitoring:  Heart rate, continuous pulse ox and cardiac monitor  Block Region:  Lower Extremity  Lower Extremity - Block Type:  Selective FEMORAL nerve block at the Adductor Canal    Laterality:  Left  Procedures: ultrasound guided  Image captured, interpreted and electronically stored.  Local Infiltration:  Lidocaine  Strength:  1 %  Dose:  3 ml  Block Type:  Single-shot  Needle Length:  100mm  Needle Gauge:  21 G  Needle Localization:  Ultrasound guidance  Ultrasound picture in chart  Injection Assessment:  Negative aspiration for heme, no paresthesia on injection, incremental injection and local visualized surrounding nerve on ultrasound  Evidence of intravascular injection: No

## 2025-01-21 LAB
ALBUMIN SERPL BCP-MCNC: 3.2 G/DL (ref 3.2–4.9)
ALBUMIN/GLOB SERPL: 1.4 G/DL
ALP SERPL-CCNC: 91 U/L (ref 30–99)
ALT SERPL-CCNC: 11 U/L (ref 2–50)
ANION GAP SERPL CALC-SCNC: 20 MMOL/L (ref 7–16)
AST SERPL-CCNC: 29 U/L (ref 12–45)
BILIRUB SERPL-MCNC: 0.5 MG/DL (ref 0.1–1.5)
BUN SERPL-MCNC: 72 MG/DL (ref 8–22)
CALCIUM ALBUM COR SERPL-MCNC: 9.2 MG/DL (ref 8.5–10.5)
CALCIUM SERPL-MCNC: 8.6 MG/DL (ref 8.5–10.5)
CHLORIDE SERPL-SCNC: 93 MMOL/L (ref 96–112)
CO2 SERPL-SCNC: 23 MMOL/L (ref 20–33)
CREAT SERPL-MCNC: 7.51 MG/DL (ref 0.5–1.4)
ERYTHROCYTE [DISTWIDTH] IN BLOOD BY AUTOMATED COUNT: 63.8 FL (ref 35.9–50)
GFR SERPLBLD CREATININE-BSD FMLA CKD-EPI: 7 ML/MIN/1.73 M 2
GLOBULIN SER CALC-MCNC: 2.3 G/DL (ref 1.9–3.5)
GLUCOSE SERPL-MCNC: 320 MG/DL (ref 65–99)
HAV IGM SERPL QL IA: NORMAL
HBV CORE IGM SER QL: NORMAL
HBV SURFACE AB SERPL IA-ACNC: 195 MIU/ML (ref 0–10)
HBV SURFACE AG SER QL: NORMAL
HCT VFR BLD AUTO: 34.6 % (ref 42–52)
HCV AB SER QL: NORMAL
HGB BLD-MCNC: 10.9 G/DL (ref 14–18)
MCH RBC QN AUTO: 31.4 PG (ref 27–33)
MCHC RBC AUTO-ENTMCNC: 31.5 G/DL (ref 32.3–36.5)
MCV RBC AUTO: 99.7 FL (ref 81.4–97.8)
PHOSPHATE SERPL-MCNC: 7.4 MG/DL (ref 2.5–4.5)
PLATELET # BLD AUTO: 173 K/UL (ref 164–446)
PMV BLD AUTO: 11.3 FL (ref 9–12.9)
POTASSIUM SERPL-SCNC: 4.4 MMOL/L (ref 3.6–5.5)
PROT SERPL-MCNC: 5.5 G/DL (ref 6–8.2)
RBC # BLD AUTO: 3.47 M/UL (ref 4.7–6.1)
SODIUM SERPL-SCNC: 136 MMOL/L (ref 135–145)
WBC # BLD AUTO: 12.5 K/UL (ref 4.8–10.8)

## 2025-01-21 PROCEDURE — 700102 HCHG RX REV CODE 250 W/ 637 OVERRIDE(OP): Performed by: PHYSICIAN ASSISTANT

## 2025-01-21 PROCEDURE — 36415 COLL VENOUS BLD VENIPUNCTURE: CPT

## 2025-01-21 PROCEDURE — 97163 PT EVAL HIGH COMPLEX 45 MIN: CPT

## 2025-01-21 PROCEDURE — 80074 ACUTE HEPATITIS PANEL: CPT

## 2025-01-21 PROCEDURE — 99233 SBSQ HOSP IP/OBS HIGH 50: CPT | Performed by: GENERAL PRACTICE

## 2025-01-21 PROCEDURE — 770001 HCHG ROOM/CARE - MED/SURG/GYN PRIV*

## 2025-01-21 PROCEDURE — 80053 COMPREHEN METABOLIC PANEL: CPT

## 2025-01-21 PROCEDURE — 97535 SELF CARE MNGMENT TRAINING: CPT

## 2025-01-21 PROCEDURE — 700111 HCHG RX REV CODE 636 W/ 250 OVERRIDE (IP): Performed by: PHYSICIAN ASSISTANT

## 2025-01-21 PROCEDURE — 90945 DIALYSIS ONE EVALUATION: CPT

## 2025-01-21 PROCEDURE — 700102 HCHG RX REV CODE 250 W/ 637 OVERRIDE(OP): Performed by: GENERAL PRACTICE

## 2025-01-21 PROCEDURE — 85027 COMPLETE CBC AUTOMATED: CPT

## 2025-01-21 PROCEDURE — 84100 ASSAY OF PHOSPHORUS: CPT

## 2025-01-21 PROCEDURE — A9270 NON-COVERED ITEM OR SERVICE: HCPCS | Performed by: PHYSICIAN ASSISTANT

## 2025-01-21 PROCEDURE — A9270 NON-COVERED ITEM OR SERVICE: HCPCS | Performed by: GENERAL PRACTICE

## 2025-01-21 PROCEDURE — 86706 HEP B SURFACE ANTIBODY: CPT

## 2025-01-21 RX ORDER — DIPHENHYDRAMINE HYDROCHLORIDE 50 MG/ML
25 INJECTION INTRAMUSCULAR; INTRAVENOUS EVERY 6 HOURS PRN
Status: DISCONTINUED | OUTPATIENT
Start: 2025-01-21 | End: 2025-01-21

## 2025-01-21 RX ORDER — OXYCODONE HYDROCHLORIDE 10 MG/1
10 TABLET ORAL
Status: DISCONTINUED | OUTPATIENT
Start: 2025-01-21 | End: 2025-01-21

## 2025-01-21 RX ORDER — SCOPOLAMINE 1 MG/3D
1 PATCH, EXTENDED RELEASE TRANSDERMAL
Status: DISCONTINUED | OUTPATIENT
Start: 2025-01-21 | End: 2025-01-21

## 2025-01-21 RX ORDER — POLYETHYLENE GLYCOL 3350 17 G/17G
1 POWDER, FOR SOLUTION ORAL DAILY
Status: DISCONTINUED | OUTPATIENT
Start: 2025-01-21 | End: 2025-01-23 | Stop reason: HOSPADM

## 2025-01-21 RX ORDER — ONDANSETRON 2 MG/ML
4 INJECTION INTRAMUSCULAR; INTRAVENOUS EVERY 4 HOURS PRN
Status: DISCONTINUED | OUTPATIENT
Start: 2025-01-21 | End: 2025-01-23 | Stop reason: HOSPADM

## 2025-01-21 RX ORDER — OXYCODONE HYDROCHLORIDE 10 MG/1
10 TABLET ORAL
Status: DISCONTINUED | OUTPATIENT
Start: 2025-01-21 | End: 2025-01-23 | Stop reason: HOSPADM

## 2025-01-21 RX ORDER — OXYCODONE HYDROCHLORIDE 5 MG/1
5 TABLET ORAL EVERY 6 HOURS PRN
Qty: 20 TABLET | Refills: 0 | Status: SHIPPED | OUTPATIENT
Start: 2025-01-21 | End: 2025-01-26

## 2025-01-21 RX ORDER — HALOPERIDOL 5 MG/ML
1 INJECTION INTRAMUSCULAR EVERY 6 HOURS PRN
Status: DISCONTINUED | OUTPATIENT
Start: 2025-01-21 | End: 2025-01-21

## 2025-01-21 RX ORDER — MORPHINE SULFATE 4 MG/ML
4 INJECTION INTRAVENOUS
Status: DISCONTINUED | OUTPATIENT
Start: 2025-01-21 | End: 2025-01-21

## 2025-01-21 RX ORDER — DEXAMETHASONE SODIUM PHOSPHATE 4 MG/ML
4 INJECTION, SOLUTION INTRA-ARTICULAR; INTRALESIONAL; INTRAMUSCULAR; INTRAVENOUS; SOFT TISSUE
Status: DISCONTINUED | OUTPATIENT
Start: 2025-01-21 | End: 2025-01-21

## 2025-01-21 RX ORDER — HEPARIN SODIUM 5000 [USP'U]/ML
5000 INJECTION, SOLUTION INTRAVENOUS; SUBCUTANEOUS EVERY 8 HOURS
Status: DISCONTINUED | OUTPATIENT
Start: 2025-01-21 | End: 2025-01-21

## 2025-01-21 RX ORDER — GENTAMICIN SULFATE 1 MG/G
CREAM TOPICAL DAILY
Status: DISCONTINUED | OUTPATIENT
Start: 2025-01-22 | End: 2025-01-23 | Stop reason: HOSPADM

## 2025-01-21 RX ORDER — POLYETHYLENE GLYCOL 3350 17 G/17G
1 POWDER, FOR SOLUTION ORAL
Status: DISCONTINUED | OUTPATIENT
Start: 2025-01-21 | End: 2025-01-23 | Stop reason: HOSPADM

## 2025-01-21 RX ORDER — HYDROMORPHONE HYDROCHLORIDE 1 MG/ML
0.5 INJECTION, SOLUTION INTRAMUSCULAR; INTRAVENOUS; SUBCUTANEOUS
Status: DISCONTINUED | OUTPATIENT
Start: 2025-01-21 | End: 2025-01-23 | Stop reason: HOSPADM

## 2025-01-21 RX ORDER — AMOXICILLIN 250 MG
2 CAPSULE ORAL 2 TIMES DAILY
Status: DISCONTINUED | OUTPATIENT
Start: 2025-01-21 | End: 2025-01-23 | Stop reason: HOSPADM

## 2025-01-21 RX ORDER — OXYCODONE HYDROCHLORIDE 5 MG/1
5 TABLET ORAL
Status: DISCONTINUED | OUTPATIENT
Start: 2025-01-21 | End: 2025-01-21

## 2025-01-21 RX ORDER — OXYCODONE HYDROCHLORIDE 5 MG/1
5 TABLET ORAL
Status: DISCONTINUED | OUTPATIENT
Start: 2025-01-21 | End: 2025-01-23 | Stop reason: HOSPADM

## 2025-01-21 RX ADMIN — SENNOSIDES AND DOCUSATE SODIUM 2 TABLET: 50; 8.6 TABLET ORAL at 17:27

## 2025-01-21 RX ADMIN — ACETAMINOPHEN 650 MG: 325 TABLET ORAL at 20:57

## 2025-01-21 RX ADMIN — SEVELAMER CARBONATE 800 MG: 800 TABLET, FILM COATED ORAL at 12:20

## 2025-01-21 RX ADMIN — CLOPIDOGREL BISULFATE 75 MG: 75 TABLET ORAL at 06:38

## 2025-01-21 RX ADMIN — SEVELAMER CARBONATE 800 MG: 800 TABLET, FILM COATED ORAL at 17:26

## 2025-01-21 RX ADMIN — APIXABAN 5 MG: 5 TABLET, FILM COATED ORAL at 17:26

## 2025-01-21 RX ADMIN — HEPARIN SODIUM 5000 UNITS: 5000 INJECTION, SOLUTION INTRAVENOUS; SUBCUTANEOUS at 09:24

## 2025-01-21 RX ADMIN — OXYCODONE 5 MG: 5 TABLET ORAL at 10:31

## 2025-01-21 RX ADMIN — FUROSEMIDE 40 MG: 20 TABLET ORAL at 17:26

## 2025-01-21 RX ADMIN — TAMSULOSIN HYDROCHLORIDE 0.4 MG: 0.4 CAPSULE ORAL at 06:38

## 2025-01-21 RX ADMIN — POLYETHYLENE GLYCOL 3350 1 PACKET: 17 POWDER, FOR SOLUTION ORAL at 08:24

## 2025-01-21 RX ADMIN — MONTELUKAST 10 MG: 10 TABLET, FILM COATED ORAL at 20:53

## 2025-01-21 RX ADMIN — SEVELAMER CARBONATE 800 MG: 800 TABLET, FILM COATED ORAL at 08:23

## 2025-01-21 RX ADMIN — FUROSEMIDE 40 MG: 20 TABLET ORAL at 06:38

## 2025-01-21 RX ADMIN — SENNOSIDES AND DOCUSATE SODIUM 2 TABLET: 50; 8.6 TABLET ORAL at 08:23

## 2025-01-21 RX ADMIN — ROSUVASTATIN CALCIUM 20 MG: 20 TABLET, FILM COATED ORAL at 20:53

## 2025-01-21 RX ADMIN — AMLODIPINE BESYLATE 2.5 MG: 2.5 TABLET ORAL at 06:38

## 2025-01-21 SDOH — ECONOMIC STABILITY: TRANSPORTATION INSECURITY
IN THE PAST 12 MONTHS, HAS THE LACK OF TRANSPORTATION KEPT YOU FROM MEDICAL APPOINTMENTS OR FROM GETTING MEDICATIONS?: NO

## 2025-01-21 SDOH — ECONOMIC STABILITY: TRANSPORTATION INSECURITY
IN THE PAST 12 MONTHS, HAS LACK OF RELIABLE TRANSPORTATION KEPT YOU FROM MEDICAL APPOINTMENTS, MEETINGS, WORK OR FROM GETTING THINGS NEEDED FOR DAILY LIVING?: NO

## 2025-01-21 ASSESSMENT — COGNITIVE AND FUNCTIONAL STATUS - GENERAL
HELP NEEDED FOR BATHING: A LITTLE
MOVING TO AND FROM BED TO CHAIR: A LOT
MOBILITY SCORE: 10
TURNING FROM BACK TO SIDE WHILE IN FLAT BAD: A LOT
STANDING UP FROM CHAIR USING ARMS: A LOT
WALKING IN HOSPITAL ROOM: A LOT
MOVING TO AND FROM BED TO CHAIR: A LOT
SUGGESTED CMS G CODE MODIFIER MOBILITY: CL
TOILETING: A LITTLE
WALKING IN HOSPITAL ROOM: A LOT
DRESSING REGULAR UPPER BODY CLOTHING: A LITTLE
SUGGESTED CMS G CODE MODIFIER DAILY ACTIVITY: CJ
CLIMB 3 TO 5 STEPS WITH RAILING: TOTAL
DRESSING REGULAR UPPER BODY CLOTHING: A LITTLE
DAILY ACTIVITIY SCORE: 20
TURNING FROM BACK TO SIDE WHILE IN FLAT BAD: A LOT
SUGGESTED CMS G CODE MODIFIER MOBILITY: CL
DRESSING REGULAR LOWER BODY CLOTHING: A LITTLE
STANDING UP FROM CHAIR USING ARMS: A LOT
MOVING TO AND FROM BED TO CHAIR: A LOT
MOVING FROM LYING ON BACK TO SITTING ON SIDE OF FLAT BED: A LITTLE
TURNING FROM BACK TO SIDE WHILE IN FLAT BAD: A LITTLE
STANDING UP FROM CHAIR USING ARMS: A LOT
MOBILITY SCORE: 11
WALKING IN HOSPITAL ROOM: TOTAL
HELP NEEDED FOR BATHING: A LITTLE
MOVING FROM LYING ON BACK TO SITTING ON SIDE OF FLAT BED: A LOT
MOVING FROM LYING ON BACK TO SITTING ON SIDE OF FLAT BED: A LOT
DRESSING REGULAR LOWER BODY CLOTHING: A LITTLE
CLIMB 3 TO 5 STEPS WITH RAILING: TOTAL

## 2025-01-21 ASSESSMENT — PAIN DESCRIPTION - PAIN TYPE
TYPE: ACUTE PAIN;SURGICAL PAIN
TYPE: ACUTE PAIN
TYPE: ACUTE PAIN;SURGICAL PAIN

## 2025-01-21 ASSESSMENT — PATIENT HEALTH QUESTIONNAIRE - PHQ9
SUM OF ALL RESPONSES TO PHQ9 QUESTIONS 1 AND 2: 0
1. LITTLE INTEREST OR PLEASURE IN DOING THINGS: NOT AT ALL
2. FEELING DOWN, DEPRESSED, IRRITABLE, OR HOPELESS: NOT AT ALL

## 2025-01-21 ASSESSMENT — SOCIAL DETERMINANTS OF HEALTH (SDOH)

## 2025-01-21 ASSESSMENT — LIFESTYLE VARIABLES
AVERAGE NUMBER OF DAYS PER WEEK YOU HAVE A DRINK CONTAINING ALCOHOL: 0
DOES PATIENT WANT TO STOP DRINKING: NO
TOTAL SCORE: 0
EVER FELT BAD OR GUILTY ABOUT YOUR DRINKING: NO
ALCOHOL_USE: YES
CONSUMPTION TOTAL: NEGATIVE
HOW MANY TIMES IN THE PAST YEAR HAVE YOU HAD 5 OR MORE DRINKS IN A DAY: 0
ON A TYPICAL DAY WHEN YOU DRINK ALCOHOL HOW MANY DRINKS DO YOU HAVE: 1
TOTAL SCORE: 0
HAVE YOU EVER FELT YOU SHOULD CUT DOWN ON YOUR DRINKING: NO
HAVE PEOPLE ANNOYED YOU BY CRITICIZING YOUR DRINKING: NO
TOTAL SCORE: 0
EVER HAD A DRINK FIRST THING IN THE MORNING TO STEADY YOUR NERVES TO GET RID OF A HANGOVER: NO

## 2025-01-21 ASSESSMENT — GAIT ASSESSMENTS
DISTANCE (FEET): 1
GAIT LEVEL OF ASSIST: MODERATE ASSIST
ASSISTIVE DEVICE: FRONT WHEEL WALKER
DEVIATION: BRADYKINETIC;SHUFFLED GAIT

## 2025-01-21 NOTE — ANESTHESIA TIME REPORT
Anesthesia Start and Stop Event Times       Date Time Event    1/20/2025 1331 Ready for Procedure     1354 Anesthesia Start     1759 Anesthesia Stop          Responsible Staff  01/20/25      Name Role Begin End    Ananth Groves M.D. Anesth 1354 1539    Nicolás Lim D.O. Anesth 1539 1755          Overtime Reason:  overtime    Comments: Overtime for Germain

## 2025-01-21 NOTE — PROGRESS NOTES
[facility] Services Progress Note     CAPD Drain initiated at 0900 aseptically per Dr. EMILY Pa.  Better drain with pt repositioning to left side completed tx at 0940.     Total UF : 200 mL ( last fill 2000 mL-2200 mL CAPD drain)  Effluent clear and yellow  Tolerated well; PD catheter aseptically capped and clamped ; secured to patient.

## 2025-01-21 NOTE — OR NURSING
1756 Pt arrived to PACU with Anesthesiologist and OR RN. AAOx4. Even, unlabored respirations. VSS. Denies pain. Denies nausea. LLE dressing CDI with prevena vac.    1840 POC update given to Kailey, wife, over the phone. All questions answered. Belongings returned to bedside. Hearing aids back in patient's ears.     1905 Right radial art line removed. Gauze and tegaderm placed,     1944 Pt meets floor criteria. Report called to MANOJ Gonzalez on GSU. Pt transported to T429 with transport. Chart, belongings, and glasses with patient.

## 2025-01-21 NOTE — CONSULTS
[facility] -  CONSULTATION NOTE               Author: Kingston Pa M.D. Date & Time: 1/21/2025  8:42 AM       REASON FOR CONSULTATION:   Inpatient hemodialysis management.      HISTORY OF PRESENT ILLNESS:    79 y.o. male with HTN, ESRD on PD, PVD/ gangrenous left foot, BPH admitted for post op monitoring after he underwent left leg bypass surgery on 1/20 with Dr. Miller. He recently underwent hernial repair and was temporarily transferred to in center HD via [facility]. He has returned to  PD  4 days ago, doing low fill.volumes and has been tolerating at home.   Patient was connected to cycler last night. Total UF reported to be 276ml. Reports of low drain UF alarm last night but no major issues with PD overnight. Pt reports no complaints today morning  No F/C/N/V/CP/SOB.  No melena, hematochezia, hematemesis.  No HA, visual changes,or abdominal pain.    REVIEW OF SYSTEMS:    10 point ROS was performed and is as per HPI or otherwise negative    PAST MEDICAL HISTORY:   Past Medical History:   Diagnosis Date    A-V fistula ([facility])     Left Arm    Arrhythmia 03/13/2024    Due to kidney failure treatment    Breath shortness     with exertion    Cataract     shon iol    Congestive heart failure ([facility])     Diabetes ([facility])     not on any medication at this time, being monitored at dialysis    Dialysis patient ([facility])     monday wednesday friday at [facility]    Dialysis patient ([facility]) 01/17/2025    Starting Peritoneal Dialysis @ Home on 1/17/2025. Follows Nephrologist MD Milian.    Foot ulcer ([facility])     left foot. being seen by wound care    Hemorrhagic disorder ([facility])     plavix eliquis    High cholesterol     Hypertension 05/24/2024    states controlled    DARIEN (obstructive sleep apnea) 01/17/2025    O2 @ 2.5L Per Patient's Wife.    Pain     Peritoneal dialysis catheter in place ([facility]) 01/17/2025    Pneumonia     3/24    Renal disorder     Sleep apnea 01/10/2025    Home sleep study done  1/3/2025 - no results yet.    Stroke ([facility]) 2021    tia       PAST SURGICAL HISTORY:   Past Surgical History:   Procedure Laterality Date    AORTOGRAM WITH RUNOFF  1/14/2025    Procedure: AORTOGRAM WITH RUNOFF, right peroneal artery intravascular lithotripsy and stent placement;  Surgeon: Marco Antonio Miller M.D.;  Location: SURGERY [facility];  Service: Vascular    INGUINAL HERNIA LAPAROSCOPIC N/A 12/18/2024    Procedure: LAPAROSCOPIC LEFT INGUINAL HERNIA REPAIR WITH MESH, LAPAROSCOPIC REPOSITIONING OF PERITONEAL DIALYSIS CATHETER;  Surgeon: Marco Antonio Miller M.D.;  Location: SURGERY [facility];  Service: Vascular    CATH PLACEMENT CAPD N/A 12/18/2024    Procedure: REPOSITION, CATHETER, CAPD;  Surgeon: Marco Antonio Miller M.D.;  Location: SURGERY [facility];  Service: Vascular    CATH PLACEMENT CAPD N/A 09/16/2024    Procedure: LAPAROSCOPIC INSERTION OF PERITONEAL DIALYSIS CATHETER PLACEMENT;  Surgeon: Marco Antonio Miller M.D.;  Location: SURGERY SAME [facility];  Service: Vascular    AV FISTULA CREATION Left 07/01/2024    Procedure: CREATION OF LEFT UPPER EXTREMITY DIALYSIS FISTULA;  Surgeon: Shilo Mercedes M.D.;  Location: SURGERY [facility];  Service: General    OTHER ORTHOPEDIC SURGERY  2004    bunion r foot    OTHER      tonsils adenoids    OTHER      dialysis catheter in upper right chest    OTHER      shon iol    OTHER CARDIAC SURGERY      cardiac stents       FAMILY HISTORY:   family history includes Cancer in his daughter and mother; Heart Disease in his brother and father.    SOCIAL HISTORY:   Social History     Tobacco Use    Smoking status: Never    Smokeless tobacco: Never    Tobacco comments:     Used chewing tobacco many years ago.   Vaping Use    Vaping status: Never Used   Substance Use Topics    Alcohol use: Not Currently    Drug use: Never       MEDICATIONS:   Home medications reviewed and documented in chart    No current facility-administered medications on file prior to encounter.      Current Outpatient Medications on File Prior to Encounter   Medication Sig Dispense Refill    sodium bicarbonate 325 MG Tab Take 325 mg by mouth 2 times a day as needed. Indications: Heartburn      furosemide (LASIX) 40 MG Tab Take 40 mg by mouth 2 times a day.      sevelamer carbonate (RENVELA) 800 MG Tab tablet Take 800 mg by mouth 3 times a day with meals.      amLODIPine (NORVASC) 5 MG Tab Take 2.5 mg by mouth every morning.         metoprolol SR (TOPROL XL) 100 MG TABLET SR 24 HR Take 100 mg by mouth at bedtime.         montelukast (SINGULAIR) 10 MG Tab Take 10 mg by mouth at bedtime.      rosuvastatin (CRESTOR) 20 MG Tab Take 20 mg by mouth at bedtime.         tamsulosin (FLOMAX) 0.4 MG capsule Take 0.4 mg by mouth every morning.         terazosin (HYTRIN) 5 MG Cap Take 1 Capsule by mouth at bedtime.         clopidogrel (PLAVIX) 75 MG Tab Take 75 mg by mouth every day.      gentamicin (GARAMYCIN) 0.1 % cream Apply 1 Application topically 1 time a day as needed (port access).      acetaminophen (TYLENOL) 500 MG Tab Take 500-1,000 mg by mouth every 6 hours as needed.      apixaban (ELIQUIS) 5mg Tab Take 5 mg by mouth 2 times a day.      nitroglycerin (NITROSTAT) 0.4 MG SL Tab Place 0.4 mg under the tongue as needed for Chest Pain.         Current Facility-Administered Medications   Medication Dose Route Frequency Provider Last Rate Last Admin    Pharmacy Consult Request ...Pain Management Review 1 Each  1 Each Other PHARMACY TO DOSE MICHELE MayberryA.-C.        ondansetron (Zofran) syringe/vial injection 4 mg  4 mg Intravenous Q4HRS PRN ANJU Mayberry.A.-C.        dexamethasone (Decadron) injection 4 mg  4 mg Intravenous Once PRN ANJU Mayberry.A.-C.        diphenhydrAMINE (Benadryl) injection 25 mg  25 mg Intravenous Q6HRS PRN ANJU Mayberry.A.-C.        haloperidol lactate (Haldol) injection 1 mg  1 mg Intravenous Q6HRS PRN ANJU Mayberry.A.-C.        scopolamine (Transderm-Scop) patch 1 Patch  1  Patch Transdermal Q72HRS PRN PEYTON Mayberry-C.        heparin injection 5,000 Units  5,000 Units Subcutaneous Q8HRS KRUNAL Mayberry.-C.   5,000 Units at 01/21/25 0824    oxyCODONE immediate-release (Roxicodone) tablet 5 mg  5 mg Oral Q3HRS PRN MICHELE MayberryA.-C.        Or    oxyCODONE immediate release (Roxicodone) tablet 10 mg  10 mg Oral Q3HRS PRN ANJU Mayberry.A.-C.        Or    HYDROmorphone (Dilaudid) injection 0.5 mg  0.5 mg Intravenous Q3HRS PRN KRUNAL Mayberry.-C.        [Held by provider] apixaban (Eliquis) tablet 5 mg  5 mg Oral BID OZIEL Sequeira.O.        senna-docusate (Pericolace Or Senokot S) 8.6-50 MG per tablet 2 Tablet  2 Tablet Oral BID Rosalie Lyon D.O.   2 Tablet at 01/21/25 0823    And    polyethylene glycol/lytes (Miralax) Packet 1 Packet  1 Packet Oral QDAY PRN OZIEL Sequeira.O.        polyethylene glycol/lytes (Miralax) Packet 1 Packet  1 Packet Oral DAILY Rosalie Lyon D.O.   1 Packet at 01/21/25 0824    amLODIPine (Norvasc) tablet 2.5 mg  2.5 mg Oral QAM ANJU Mayberry.A.-C.   2.5 mg at 01/21/25 0638    clopidogrel (Plavix) tablet 75 mg  75 mg Oral DAILY ANJU Mayberry.A.-C.   75 mg at 01/21/25 0638    furosemide (Lasix) tablet 40 mg  40 mg Oral BID ANJU Mayberry.A.-C.   40 mg at 01/21/25 0638    metoprolol SR (Toprol XL) tablet 100 mg  100 mg Oral QHS ANJU Mayberry.A.-C.   100 mg at 01/20/25 2223    montelukast (Singulair) tablet 10 mg  10 mg Oral QHS ANJU Mayberry.A.-C.   10 mg at 01/20/25 2223    nitroglycerin (Nitrostat) tablet 0.4 mg  0.4 mg Sublingual PRN Keerthi Stapleton P.A.-C.        rosuvastatin (Crestor) tablet 20 mg  20 mg Oral QHS MICHELE MayberryA.-C.   20 mg at 01/20/25 2223    sevelamer carbonate (Renvela) tablet 800 mg  800 mg Oral TID WITH MEALS MICHELE MayberryA.-CSaima   800 mg at 01/21/25 0823    tamsulosin (Flomax) capsule 0.4 mg  0.4 mg Oral QAM PEYTON Mayberry-CSaima   0.4 mg at 01/21/25 0638    terazosin (Hytrin) capsule 5 mg   "5 mg Oral QHS Keerthi Stapleton P.A.-C.   5 mg at 01/20/25 2223    acetaminophen (Tylenol) tablet 650 mg  650 mg Oral Q4HRS PRN Keerthi Stapleton P.A.-C.            ALLERGIES:  Patient has no known allergies.    VS:  /63   Pulse 70   Temp 36.5 °C (97.7 °F) (Temporal)   Resp 18   Ht 1.778 m (5' 10\")   Wt 72.7 kg (160 lb 4.4 oz)   SpO2 100%   BMI 23.00 kg/m²   Physical Exam  Constitutional:       General: He is in acute distress.      Appearance: He is ill-appearing.   Eyes:      Conjunctiva/sclera: Conjunctivae normal.   Cardiovascular:      Rate and Rhythm: Normal rate.   Pulmonary:      Effort: Pulmonary effort is normal.   Abdominal:      General: There is no distension.      Palpations: Abdomen is soft.      Tenderness: There is no abdominal tenderness.      Comments: PD cath in RLQ   Musculoskeletal:         General: No swelling.      Right lower leg: No edema.      Left lower leg: Edema present.      Comments: Mild edema in LLE wound vac in place    Skin:     General: Skin is warm.   Neurological:      Mental Status: He is oriented to person, place, and time.   Psychiatric:         Mood and Affect: Mood normal.            FLUID BALANCE:    Intake/Output Summary (Last 24 hours) at 1/21/2025 0449  Last data filed at 1/21/2025 0426  Gross per 24 hour   Intake 450 ml   Output 180 ml   Net 270 ml       LABS:  Recent Labs     01/20/25  1233 01/21/25  0017   RBC 3.99* 3.47*   HEMOGLOBIN 12.6* 10.9*   HEMATOCRIT 39.4* 34.6*   PLATELETCT 186 173   PROTHROMBTM 16.3*  --    INR 1.31*  --        Recent Labs     01/20/25  1233 01/21/25  0017   SODIUM 141 136   POTASSIUM 4.6 4.4   CHLORIDE 97 93*   CO2 23 23   GLUCOSE 102* 320*   BUN 68* 72*   CREATININE 7.60* 7.51*   CALCIUM 9.0 8.6        IMAGING:  All imaging reviewed from admission to present day    No orders to display       IMPRESSION:  # ESRD on CCPD  - Pt was transitioned to  iHD during masoud-op  s/p inguinal hernial repair,   - Still has [facility] PC  - Returned to " PD 4 days ago   - Home PD prescription CCPD 2.5% alternating with 1.5% 7 exchanges 8 hrs 30 min, total FV 7700cc, no last fill  # HTN  - Goal BP < 140/90  - At goal  # Anemia of CKD  - Goal Hgb 10-11  - At goal   # CKD-MBD  - Ca 8.6   - PO4 7.4   # Chronic hypoxic respiratory failure   - On home oxygen 2-2.5L at night       SUGGESTIONS:  - Continue CCPD during hospital stay home prescription   - PD exit site care per protocol   - No dietary protein restrictions  - Dose all meds per ESRD  - No need for ROSALIND as Hb at goal   - Continue home P binders  - Continue home BP medications   - Renal diet  - Need to arrange for dialysis perm cath removal as out patient if no issues with PD    I called his wife to find out about home PD prescription   Discussed with home PD nurse    Thank you for the consultation!

## 2025-01-21 NOTE — HOSPITAL COURSE
This is a 79-year-old male with past medical history of moderate aortic stenosis, hypertension, CAD with 3 SYDNEE 8/2024,???  On Eliquis, ESRD on PD, BPH, and severe PAD who was admitted on 1/20 for elective left popliteal to posterior tibial artery bypass.    Patient admitted for postoperative pain management.  Added Robaxin and gabapentin for pain.  Patient currently has Prevena in place, to continue with this.    Nephrology consulted for resumption of peritoneal dialysis.

## 2025-01-21 NOTE — THERAPY
"Physical Therapy   Initial Evaluation     Patient Name: Edy Bennett  Age:  79 y.o., Sex:  male  Medical Record #: [id number]  Today's Date: 1/21/2025     Precautions  Precautions: Fall Risk  Comments: L LE prevena    Assessment  Edy Bennett is a 79 y.o. male who presented 1/20/2025 with severe peripheral vascular disease and patient underwent left leg bypass surgery with Dr. Miller.  Continue at this point with postoperative pain management. Patient does have end-stage renal disease on peritoneal dialysis will get nephrology to continue with peritoneal dialysis while he is here.  Patient will need monitoring of his labs, vitals and optimization of his medication management including hypertension. Patient seen for PT eval and needed [employer] people to demo STS at EOB. He has significant L LE weakness and pain and is unable to bear weight. He has a supportive wife and will benefit from placement for further therapy at this time. Recommend IPR.        Plan    Physical Therapy Initial Treatment Plan   Treatment Plan : Bed Mobility, Equipment, Gait Training, Neuro Re-Education / Balance, Self Care / Home Evaluation, Stair Training, Therapeutic Exercise, Therapeutic Activities, Family / Caregiver Training  Treatment Frequency: 4 Times per Week  Duration: Until Therapy Goals Met    DC Equipment Recommendations: Unable to determine at this time  Discharge Recommendations: Recommend post-acute placement for additional physical therapy services prior to discharge home       Subjective    \"I can't move my L LE at all\"     Objective       01/21/25 1130   Precautions   Precautions Fall Risk   Comments L LE prevena   Vitals   Vitals Comments All VSS   Pain 0 - 10 Group   Location Leg   Location Orientation Left   Therapist Pain Assessment 6;During Activity;Nurse Notified   Prior Living Situation   Prior Services Intermittent Physical Support for ADL Per Family   Housing / Facility 1 Story House   Steps Into Home 0 "   Steps In Home 0   Bathroom Set up Walk In Shower;Shower Chair;Grab Bars   Equipment Owned Single Point Cane;Front-Wheel Walker;Tub / Shower Seat;Grab Bar(s) In Tub / Shower   Lives with - Patient's Self Care Capacity Spouse   Comments wife assists with PD and household duties. She can provide assist as needed for ADLs as well   Prior Level of Functional Mobility   Bed Mobility Independent   Transfer Status Independent   Ambulation Independent   Ambulation Distance community   Assistive Devices Used Front-Wheel Walker   Comments Patient recently started to used the FWW for mobility after his most recent hospital stay. Prior to that he was indep with all mobility   History of Falls   History of Falls No   Cognition    Cognition / Consciousness WDL   Level of Consciousness Alert   Comments [place], pleasant and  cooperative   Active ROM Upper Body   Active ROM Upper Body  WDL   Strength Upper Body   Upper Body Strength  WDL   Sensation Upper Body   Upper Extremity Sensation  WDL   Active ROM Lower Body    Active ROM Lower Body  X   Comments L knee flexion and extensio limited by weakness and pain   Strength Lower Body   Lower Body Strength  X   Lt Hip Flexion Strength 3- (F-)   Lt Knee Flexion Strength 2+ (P+)   Lt Knee Extension Strength 2 (P)   Lt Ankle Dorsiflexion Strength 2+ (P+)   Lt Ankle Plantar Flexion Strength 2+ (P+)   Comments L LE limited by pain   Sensation Lower Body   Lower Extremity Sensation   X   Comments reports his L LE in numb, but sensation is intact to LT   Coordination Upper Body   Coordination WDL   Coordination Lower Body    Coordination Lower Body  WDL   Other Treatments   Other Treatments Provided extensive dicussion about DC recs and expected progression of mobility. Educated about patn control and the pathologies of bedrest   Balance Assessment   Sitting Balance (Static) Fair   Sitting Balance (Dynamic) Fair -   Standing Balance (Static) Poor   Standing Balance (Dynamic) Poor -   Weight  Shift Sitting Fair   Weight Shift Standing Poor   Comments w/FWW   Bed Mobility    Supine to Sit Moderate Assist   Sit to Supine Moderate Assist   Scooting Minimal Assist   Comments needs assist for L LE management   Gait Analysis   Gait Level Of Assist Moderate Assist   Assistive Device Front Wheel Walker   Distance (Feet) 1   Deviation Bradykinetic;Shuffled Gait   Weight Bearing Status not putting weight on L LE 2/2 pain and weakness   Comments 3 side hops at EOB with [employer] people   Functional Mobility   Sit to Stand Moderate Assist   Bed, Chair, Wheelchair Transfer Unable to Participate   Mobility supine>EOB>STS>hops>BTB   6 Clicks Assessment - How much HELP from from another person do you currently need... (If the patient hasn't done an activity recently, how much help from another person do you think he/she would need if he/she tried?)   Turning from your back to your side while in a flat bed without using bedrails? 2   Moving from lying on your back to sitting on the side of a flat bed without using bedrails? 2   Moving to and from a bed to a chair (including a wheelchair)? 2   Standing up from a chair using your arms (e.g., wheelchair, or bedside chair)? 2   Walking in hospital room? 2   Climbing 3-5 steps with a railing? 1   6 clicks Mobility Score 11   Activity Tolerance   Sitting Edge of Bed 10 min   Standing 1 min   Edema / Skin Assessment   Comments L LE medial prevena   Patient / Family Goals    Patient / Family Goal #1 to walk   Short Term Goals    Short Term Goal # 1 in 6 visits patient will demo all bed mobility indep from flat surface for safe DC   Short Term Goal # 2 in 6 visits patient will demo all functional transfers with Sup and LRAD for safe DC   Short Term Goal # 3 in 6 visits patient will ambualte 200' with Sup and LRAD for safe DC   Education Group   Education Provided Role of Physical Therapist;Gait Training;Use of Assistive Device   Role of Physical Therapist Patient Response  Patient;Acceptance;Explanation;Demonstration;Verbal Demonstration;Action Demonstration   Gait Training Patient Response Patient;Acceptance;Explanation;Demonstration;Verbal Demonstration;Action Demonstration   Use of Assistive Device Patient Response Patient;Acceptance;Explanation;Demonstration;Verbal Demonstration;Action Demonstration   Physical Therapy Initial Treatment Plan    Treatment Plan  Bed Mobility;Equipment;Gait Training;Neuro Re-Education / Balance;Self Care / Home Evaluation;Stair Training;Therapeutic Exercise;Therapeutic Activities;Family / Caregiver Training   Treatment Frequency 4 Times per Week   Duration Until Therapy Goals Met   Problem List    Problems Pain;Impaired Bed Mobility;Impaired Transfers;Impaired Ambulation;Functional Strength Deficit;Impaired Balance;Impaired Coordination;Decreased Activity Tolerance   Anticipated Discharge Equipment and Recommendations   DC Equipment Recommendations Unable to determine at this time   Discharge Recommendations Recommend post-acute placement for additional physical therapy services prior to discharge home

## 2025-01-21 NOTE — CARE PLAN
The patient is Stable - Low risk of patient condition declining or worsening    Shift Goals  Clinical Goals: Pain/nausea control, HD, wound care, mobility, POC  Patient Goals: Pain control, POC  Family Goals: updates    Progress made toward(s) clinical / shift goals:  Medicated for pain; see MAR. Patient tolerated HD/PD Patient worked with PT, unable to bear weight on surgical leg.  MD notified.  2 RN skin check performed; see PN. Voiding in urinal+ BM. Patient tolerating diet. POC discussed, patient/wife verbalized understanding.     Patient is not progressing towards the following goals: NA.

## 2025-01-21 NOTE — OP REPORT
VASCULAR SURGERY SERVICE                       Operative Note  _____________________________________________________    Date: 2025    Patient: Edy Bennett  : 1945  MRN: [id number]  _____________________________________________________      Preoperative Diagnosis:  - ***    Postoperative Diagnosis:  Same    Procedure:  - ***    _____________________________________________________    Surgeon:                                 {Vascular Consultants:[id number]}    Assistant:   {Vascular Assists:[id number]}    Anesthesia:                             General anesthesia plus local anesthetic***    EBL:                                        minimal ***    Heparin:                                  Systemically heparinized***    Specimen:   None sent***    Findings:   ***    Complications:                        none***    Disposition:                             Tolerated well, sent to recovery in stable condition***    ***Justification for use of Surgical First Assist:  An experienced first assistant was utilized during this operation due to the complexity of the operation.  My assistant participated with patient preparation for surgery, incision, surgical exposure including retraction, dissection, and ligation to isolate the target structures and preserve nearby structures, and closure of the field of dissection.  The presence of the expert assist increased the efficiency of the operation and decreased the risk of intraoperative surgical complications.    _____________________________________________________      History:  79 y.o. male presenting with ***.      I conducted a thorough preoperative discussion regarding our findings and recommendations.  I explained the operation, alternatives, and potential risks, including but not limited to bleeding, infection, injury to vessels or nerves, possible multiple incisions, use of xray and contrast exposure, risks of anesthesia, and global risks such as stroke,  heart attack, pulmonary complications, and even potentially not surviving the operation or the recovery.  All questions were answered. They understand and agree to proceed.    Procedure Summary:  Following informed consent, the patient was placed supine on the operating table, and general anesthesia was administered.  The patient was prepped and draped sterile in the usual fashion. Surgical time-out was called, and everyone was in agreement.  The patient did receive preoperative prophylactic antibiotics.      ***    The patient was then sent to recovery in stable condition.  All counts were correct at the conclusion of the case.       Postoperative Plan:    ***      {Vascular Consultants:[id number]}  [facility]       opened longitudinally.  The vein was placed in a reversed configuration, a lora was fashioned on the vein and it was anastomosed to the popliteal artery in an end-to-side fashion using a running 6-0 Prolene suture line.  The anastomosis was pressurized and found to be hemostatic and the vein was pressurized and found to have good flow throughout.  The vein conduit was tunneled through the anatomic tunnel and the chest tube was removed.  The posterior tibial artery was clamped and opened longitudinally.  A lora was fashioned in the distal end of the vein and it was anastomosed to the posterior tibial artery in an end-to-side fashion using a running 6-0 Prolene suture line.  The anastomosis was flushed and irrigated prior to completion and once completed was pressurized and found to be hemostatic and then outflow was restored.  There was a brisk Doppler signal distal to the anastomosis and the foot appeared to be well-perfused.    The incisions were irrigated and suctioned clean.  Hemostasis was assured.  The long vein harvest incision was closed with multiple layers of absorbable suture and staples for the skin.  The incision used to expose the posterior tibial artery was closed with interrupted 2-0 nylon sutures.  A customizable Prevena bandage was used to cover both incisions.    The patient was then sent to recovery in stable condition.  All counts were correct at the conclusion of the case.     Postoperative Plan:    Patient has undergone successful bypass revascularization of the left foot.  He stands a good chance of healing his wounds.  We will monitor the patency of the bypass and the clinical progress of the wound healing.  If this bypass thromboses the patient will have no additional remaining revascularization options in the left leg.      Of note the patient has also undergone endovascular revascularization of the right leg which was performed 6 days ago.  There was single-vessel peroneal runoff and there  was a segmental occlusion of the proximal peroneal artery which was treated with intravascular lithotripsy and stent placement.  We will need to continue monitoring the perfusion of the right foot and healing of the right foot wounds as well.  If the stent in the peroneal artery occludes, there may be an option to perform a popliteal to peroneal interposition bypass to improve perfusion of the right foot foot and allow for wound healing        Marco Antonio Miller MD  [facility]

## 2025-01-21 NOTE — DISCHARGE PLANNING
PM&R referral from Dr. Sainz. Surgical debility with ongoing medical management Pending OT evaluation anticipate need for IRF level of care. Per chart review spouse support. Medicare provider. Consult pended at this time.

## 2025-01-21 NOTE — ASSESSMENT & PLAN NOTE
severe PAD who was admitted on 1/20 for elective left popliteal to posterior tibial artery bypass.  Continue pain management  Renal diet   Optimize fluid status

## 2025-01-21 NOTE — PROGRESS NOTES
4 Eyes Skin Assessment Completed by MANOJ Gonzalez and YOUNG Franks.    Head WDL  Ears WDL  Nose WDL  Mouth WDL  Neck WDL  Breast/Chest WDL  Shoulder Blades WDL  Spine WDL  (R) Arm/Elbow/Hand WDL  (L) Arm/Elbow/Hand WDL  Abdomen WDL  Groin WDL  Scrotum/Coccyx/Buttocks Non-Blanching and Discoloration  (R) Leg WDL  (L) Leg Incision, Prevena  (R) Heel/Foot/Toe  black, redness on toes  (L) Heel/Foot/Toe wound on heel, redness and black toes          Devices In Places Blood Pressure Cuff, Pulse Ox, Central Line, and Nasal Cannula      Interventions In Place Sacral Mepilex    Possible Skin Injury Yes    Pictures Uploaded Into Epic Yes  Wound Consult Placed Yes  RN Wound Prevention Protocol Ordered Yes

## 2025-01-21 NOTE — PROGRESS NOTES
Bedside report received from NOC RN; assumed care. Dialysis RNs bedside x 2 this morning from HD/PD. Assessment complete. A&O x 4. VSS. 100% on 2L NC, doffed and at 96%. Patient denying SOB, nausea, vomiting, dizziness. Numbness/tingling to  LLE. Medicated for pain; see MAR.  Abdomen round. Right upper chest HD cath; clamped; CDI. Peritoneal dialysis catheter clamped/taped, CDI.  LLE swelling/prevena plus wound vac with sang drainage noted in cannister. Hyperactive BS x 4 noted. + void; yellow urine noted in urinal.+ eructation. + flatus. LBM 01/21. Patient tolerating renal diet. Patient uncertain on ambulatory status, patient reported not sleeping entire NOC shift. Discussed plan of care with patient/wife. All questions answered. High fall risk. Bed alarm on/engaged. Bed in locked/lowest position.  Call light/personal belongings within reach.  All needs met, patient talking with wife at present time.

## 2025-01-21 NOTE — PROGRESS NOTES
[facility]     CCPD x 10 hours, 2000 mL fills x 4 cycles using all 1.5 % PD solution ordered per Dr. PANFILO Dailey    Orders reviewed,verified and updated, CCPD cycler therapy settings verified.    CCPD treatment initiated at 2045 without any issues  Patient and PD access assessed prior to therapy    Patient AOx4, resting calmly, interactive, (+) abd distention, denies pain/abd discomfort, stable VS.    Patient with intact and patent RLQ PD catheter aseptically connected per policy.    RLQ PD catheter and exit site in good condition, no signs of infection noted, cleansed with antiseptic and dressings changed per policy     Initial drain:  39 mL     Report given to primary care nurse Carlos Fuller RN     Patient on dwell 1/5 prior to departure from bedside.     Please contact on call dialysis RN for any issues with persistent alarms/assistance with troubleshooting or patient not tolerating therapy.      For on-call Dialysis RN, pls contact [employer] at [phone] or call [facility] PD service support hotline at [phone]

## 2025-01-21 NOTE — PROGRESS NOTES
"                 VASCULAR SURGERY               Inpatient Progress Note  _________________________________    Vitals (1/21/2025)  /63   Pulse 68   Temp 36.4 °C (97.5 °F) (Temporal)   Resp 16   Ht 1.778 m (5' 10\")   Wt 72.7 kg (160 lb 4.4 oz)   SpO2 99%   BMI 23.00 kg/m²   _________________________________    1/20/25 Left pop-PT bypass with irGSV (Angela/ROSELYN)    1/21/25  Stable overnight.  Pain controlled.  Patient was able to stand up and bear weight on his left leg.      Left lower extremity Prevena bandages clean dry and intact with minimal output.  No significant lower extremity edema.  Brisk left posterior tibial Doppler signal at the ankle consistent with bypass patency      -Bypass patent, left foot much better perfused, the patient has a good chance of healing his left toe ulcerations  -Activity as tolerated  -Restart Eliquis today.  Continue Plavix  -Patient was evaluated by physical therapy and we are considering sending him to acute rehab in the next day or 2      Appreciate Hospitalist service's support  Following along      Marco Antonio Miller MD  [facility]  [employer] preferred or call my office [phone]  __________________________________________________________________  Patient:Edy Bennett   MRN:[id number]   CSN:[id number]    "

## 2025-01-21 NOTE — PROGRESS NOTES
[facility] Progress Notes     Disconnected aseptically from PD Cycler at 0640.     Pt stable, VSS, alert and oriented.  Effluent clear and yellow, no signs of bleeding/fibrin clots.  Reported low drain UF overnight per patient     24 hour UF= +1685 ML (I.Drain= 39 ML + Total UF= 276 ML - Last fill= 2000 ML )  Dr. Post notified of patient positive balance output.      Report given to Ailyn ARANDA

## 2025-01-21 NOTE — PROGRESS NOTES
Virtual Nurse rounding complete. VRN portion of admission profile completed.     Round Needs: No needs at this time. Bedside RN entered room at the end of the call and PD RN at bedside.

## 2025-01-21 NOTE — H&P
Hospital Medicine History & Physical Note    Date of Service  1/20/2025    Primary Care Physician  Denise Carver M.D.    Consultants  vascular surgery, nephrology    Specialist Names: Dr Angela Dailey of nephrology    Code Status  No Order    Chief Complaint  No chief complaint on file.      History of Presenting Illness  Edy Bennett is a 79 y.o. male who presented 1/20/2025 with severe peripheral vascular disease and patient underwent left leg bypass surgery with Dr. Miller.  Continue at this point with postoperative pain management  Patient does have end-stage renal disease on peritoneal dialysis will get nephrology to continue with peritoneal dialysis while he is here.  Patient will need monitoring of his labs, vitals and optimization of his medication management including hypertension.    I discussed the plan of care with patient, bedside RN, and Dr Miller  and Dr Dailey of Nephrology.    Review of Systems  Review of Systems   Unable to perform ROS: Acuity of condition       Past Medical History   has a past medical history of A-V fistula ([facility]), Arrhythmia (03/13/2024), Breath shortness, Cataract, Congestive heart failure ([facility]), Diabetes ([facility]), Dialysis patient ([facility]), Dialysis patient ([facility]) (01/17/2025), Foot ulcer ([facility]), Hemorrhagic disorder ([facility]), High cholesterol, Hypertension (05/24/2024), DARIEN (obstructive sleep apnea) (01/17/2025), Pain, Peritoneal dialysis catheter in place ([facility]) (01/17/2025), Pneumonia, Renal disorder, Sleep apnea (01/10/2025), and Stroke ([facility]) (2021).    Surgical History   has a past surgical history that includes other; other orthopedic surgery (2004); other cardiac surgery; other; other; av fistula creation (Left, 07/01/2024); cath placement capd (N/A, 09/16/2024); inguinal hernia laparoscopic (N/A, 12/18/2024); cath placement capd (N/A, 12/18/2024); and aortogram with runoff (1/14/2025).     Family History  family history includes Cancer in his daughter and mother;  Heart Disease in his brother and father.   Family history reviewed with patient. There is no family history that is pertinent to the chief complaint.     Social History   reports that he has never smoked. He has never used smokeless tobacco. He reports that he does not currently use alcohol. He reports that he does not use drugs.    Allergies  No Known Allergies    Medications  Prior to Admission Medications   Prescriptions Last Dose Informant Patient Reported? Taking?   acetaminophen (TYLENOL) 500 MG Tab Unknown Significant Other, Patient Yes No   Sig: Take 500-1,000 mg by mouth every 6 hours as needed.   amLODIPine (NORVASC) 5 MG Tab 1/20/2025 at  9:00 AM Significant Other, Patient Yes Yes   Sig: Take 2.5 mg by mouth every morning.      apixaban (ELIQUIS) 5mg Tab 1/17/2025 Noon Significant Other, Patient Yes No   Sig: Take 5 mg by mouth 2 times a day.   clopidogrel (PLAVIX) 75 MG Tab 1/18/2025 Morning Significant Other, Patient Yes No   Sig: Take 75 mg by mouth every day.   furosemide (LASIX) 40 MG Tab 1/19/2025 Evening Significant Other, Patient Yes Yes   Sig: Take 40 mg by mouth 2 times a day.   gentamicin (GARAMYCIN) 0.1 % cream  Significant Other, Patient Yes No   Sig: Apply 1 Application topically 1 time a day as needed (port access).   metoprolol SR (TOPROL XL) 100 MG TABLET SR 24 HR 1/19/2025 Evening Significant Other, Patient Yes Yes   Sig: Take 100 mg by mouth at bedtime.      montelukast (SINGULAIR) 10 MG Tab 1/19/2025 Bedtime Significant Other, Patient Yes Yes   Sig: Take 10 mg by mouth at bedtime.   nitroglycerin (NITROSTAT) 0.4 MG SL Tab Unknown Significant Other, Patient Yes No   Sig: Place 0.4 mg under the tongue as needed for Chest Pain.   rosuvastatin (CRESTOR) 20 MG Tab 1/19/2025 Bedtime Significant Other, Patient Yes Yes   Sig: Take 20 mg by mouth at bedtime.      sevelamer carbonate (RENVELA) 800 MG Tab tablet 1/19/2025 Significant Other, Patient Yes Yes   Sig: Take 800 mg by mouth 3 times a  day with meals.   sodium bicarbonate 325 MG Tab 1/19/2025  Yes Yes   Sig: Take 325 mg by mouth 2 times a day as needed. Indications: Heartburn   tamsulosin (FLOMAX) 0.4 MG capsule 1/19/2025 Morning Significant Other, Patient Yes Yes   Sig: Take 0.4 mg by mouth every morning.      terazosin (HYTRIN) 5 MG Cap 1/19/2025 Bedtime Significant Other, Patient Yes Yes   Sig: Take 1 Capsule by mouth at bedtime.      traMADol (ULTRAM) 50 MG Tab 1/19/2025 Significant Other, Patient Yes Yes   Sig: Take 50 mg by mouth 2 times a day as needed.      Facility-Administered Medications: None       Physical Exam  Temp:  [36.3 °C (97.4 °F)] 36.3 °C (97.4 °F)  Pulse:  [64] 64  Resp:  [16] 16  BP: (129)/(60) 129/60  SpO2:  [99 %] 99 %  Blood Pressure : 129/60   Temperature: 36.3 °C (97.4 °F)   Pulse: 64   Respiration: 16   Pulse Oximetry: 99 %       Physical Exam  Vitals and nursing note reviewed. Exam conducted with a chaperone present.   Constitutional:       General: He is awake.      Appearance: Normal appearance. He is well-developed and normal weight. He is ill-appearing.      Interventions: Nasal cannula in place.   HENT:      Head: Normocephalic and atraumatic.      Right Ear: External ear normal.      Left Ear: External ear normal.      Nose: Nose normal.      Mouth/Throat:      Mouth: Mucous membranes are dry.      Pharynx: Oropharynx is clear. No oropharyngeal exudate or posterior oropharyngeal erythema.   Eyes:      General:         Right eye: No discharge.         Left eye: No discharge.      Extraocular Movements: Extraocular movements intact.      Conjunctiva/sclera: Conjunctivae normal.      Pupils: Pupils are equal, round, and reactive to light.   Neck:      Thyroid: No thyromegaly.      Vascular: No JVD.   Cardiovascular:      Rate and Rhythm: Normal rate and regular rhythm.      Pulses: Normal pulses.      Heart sounds: Normal heart sounds.   Pulmonary:      Effort: Accessory muscle usage present.      Breath sounds:  Decreased air movement present. Examination of the right-upper field reveals rhonchi. Examination of the left-upper field reveals rhonchi. Examination of the right-middle field reveals rhonchi. Examination of the left-middle field reveals rhonchi. Rhonchi present.   Chest:      Chest wall: No tenderness.   Abdominal:      General: Abdomen is flat. Bowel sounds are normal. There is no distension.      Palpations: Abdomen is soft. There is no mass.      Tenderness: There is no abdominal tenderness. There is no guarding or rebound.   Musculoskeletal:         General: Normal range of motion.      Cervical back: Normal range of motion and neck supple.      Right lower leg: No edema.      Left lower leg: No edema.   Lymphadenopathy:      Cervical: No cervical adenopathy.   Skin:     General: Skin is warm and dry.      Capillary Refill: Capillary refill takes more than 3 seconds.      Findings: Wound (Surgical wound) present. No rash.          Neurological:      General: No focal deficit present.      Mental Status: He is alert and oriented to person, place, and time. Mental status is at baseline.      GCS: GCS eye subscore is 4. GCS verbal subscore is 5. GCS motor subscore is 6.      Cranial Nerves: No cranial nerve deficit.      Sensory: No sensory deficit.      Deep Tendon Reflexes: Reflexes are normal and symmetric.   Psychiatric:         Attention and Perception: He is inattentive.         Mood and Affect: Affect is flat.         Speech: Speech is delayed.         Behavior: Behavior is slowed.         Cognition and Memory: Cognition is impaired.         Laboratory:  Recent Labs     01/20/25  1233   WBC 7.3   RBC 3.99*   HEMOGLOBIN 12.6*   HEMATOCRIT 39.4*   MCV 98.7*   MCH 31.6   MCHC 32.0*   RDW 63.4*   PLATELETCT 186   MPV 11.1     Recent Labs     01/20/25  1233   SODIUM 141   POTASSIUM 4.6   CHLORIDE 97   CO2 23   GLUCOSE 102*   BUN 68*   CREATININE 7.60*   CALCIUM 9.0     Recent Labs     01/20/25  1233   ALTSGPT  "13   ASTSGOT 28   ALKPHOSPHAT 98   TBILIRUBIN 0.5   GLUCOSE 102*     Recent Labs     01/20/25  1233   INR 1.31*     No results for input(s): \"NTPROBNP\" in the last 72 hours.      No results for input(s): \"TROPONINT\" in the last 72 hours.    Imaging:  No orders to display       X-Ray:  I have personally reviewed the images and compared with prior images.  EKG:  I have personally reviewed the images and compared with prior images.    Assessment/Plan:  Justification for Admission Status  I anticipate this patient is appropriate for observation status at this time because   patient requires postoperative dialysis     Patient will need a Med/Surg bed on SURGICAL service .  The need is secondary to postoperative dialysis.    Peripheral arterial disease (HCC)- (present on admission)  Assessment & Plan  Patient has undergone left leg bypass this afternoon  Continue pain management  Diet per surgery  Optimize fluid status    HTN (hypertension)- (present on admission)  Assessment & Plan  Optimize blood pressure management keep systolic blood pressure less than 140 diastolic under 90  Continue metoprolol  mg nightly, Norvasc 5 mg daily, as needed labetalol    ESRD on peritoneal dialysis (HCC)- (present on admission)  Assessment & Plan  Continue peritoneal dialysis and nephrology consultation    BPH (benign prostatic hyperplasia)  Assessment & Plan  Continue terazosin and Flomax  Patient follows with urology    Insomnia- (present on admission)  Assessment & Plan  Continue tramadol 50 mg nightly    Dyslipidemia- (present on admission)  Assessment & Plan  Low-fat low-cholesterol diet  Crestor nightly  Fasting lipid panel    Anemia in chronic renal disease- (present on admission)  Assessment & Plan  Monitor H&H if drops below 7 or 21 transfuse currently he does not require transfusion  Check iron panel        VTE prophylaxis: SCDs/TEDs  "

## 2025-01-21 NOTE — ASSESSMENT & PLAN NOTE
Optimize blood pressure management keep systolic blood pressure less than 140 diastolic under 90  Continue metoprolol  mg nightly, Norvasc 5 mg daily, as needed labetalol

## 2025-01-21 NOTE — OR SURGEON
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    Anesthesiologist/Type of Anesthesia:  Anesthesiologist: Ananth Groves M.D.; Nicolás Lim D.O./General    Surgical Staff:  Circulator: Mikey Stone R.N.  Relief Circulator: Chan Leal R.N.  Relief Scrub: Lorena Pittman  Scrub Person: Constantino Madrigal  First Assist: Keerthi Stapleton P.A.-C.    Specimens removed if any:  * No specimens in log *    Estimated Blood Loss: 125cc    Findings: brisk PT doppler signal at the ankle at the conclusion of the case    Complications: none        1/20/2025 5:54 PM Marco Antonio Miller M.D.

## 2025-01-21 NOTE — PROGRESS NOTES
Hospital Medicine Daily Progress Note    Date of Service  1/21/2025    Chief Complaint  Edy Bennett is a 79 y.o. male admitted 1/20/2025 with PAD    Hospital Course  This is a 79-year-old male with past medical history of moderate aortic stenosis, hypertension, CAD with 3 SYDNEE 8/2024,???  On Eliquis, ESRD on PD, BPH, and severe PAD who was admitted on 1/20 for elective left popliteal to posterior tibial artery bypass.    Patient admitted for postoperative pain management.  Nephrology consulted for resumption of peritoneal dialysis.    Interval Problem Update  PAD - s/p bypass - prevanna in place     ESRD on PD - nephrology following - resume PD     PT/OT - recommend IRF - consult placed     Monitor hemoglobin, ensure pain is well-controlled, ensure patient is having flatus and bowel movement.    Patient's wife is at bedside, updated on plan of care, all question answered.     I have discussed this patient's plan of care and discharge plan at IDT rounds today with Case Management, Nursing, Nursing leadership, and other members of the IDT team.    Consultants/Specialty  vascular surgery    Code Status  Full Code    Disposition  Patient is not medically clear to discharge to IRF  I have placed the appropriate orders for post-discharge needs.    Review of Systems  Review of Systems   All other systems reviewed and are negative.       Physical Exam  Temp:  [36.4 °C (97.5 °F)-36.6 °C (97.9 °F)] 36.4 °C (97.5 °F)  Pulse:  [60-77] 68  Resp:  [14-18] 16  BP: (101-148)/(49-79) 114/63  SpO2:  [95 %-100 %] 99 %    Physical Exam  Vitals and nursing note reviewed.   Constitutional:       General: He is not in acute distress.     Appearance: He is ill-appearing.   HENT:      Head: Normocephalic and atraumatic.   Eyes:      Extraocular Movements: Extraocular movements intact.      Conjunctiva/sclera: Conjunctivae normal.      Pupils: Pupils are equal, round, and reactive to light.   Cardiovascular:      Rate and Rhythm:  Normal rate and regular rhythm.      Pulses: Normal pulses.      Heart sounds: No murmur heard.     No friction rub. No gallop.      Comments: Right TDC  Pulmonary:      Effort: Pulmonary effort is normal. No respiratory distress.      Breath sounds: Normal breath sounds. No wheezing, rhonchi or rales.   Abdominal:      General: Bowel sounds are normal. There is no distension.      Palpations: Abdomen is soft.      Tenderness: There is no abdominal tenderness.      Comments: PD catheter in place,no evidence of infection   Musculoskeletal:         General: No swelling or tenderness. Normal range of motion.      Cervical back: Normal range of motion and neck supple. No muscular tenderness.      Right lower leg: No edema.      Left lower leg: No edema.   Skin:     General: Skin is warm and dry.      Capillary Refill: Capillary refill takes less than 2 seconds.      Findings: No bruising, erythema or rash.      Comments: LLE prevanna, surgical site intact, no evidence of infection, noted ecchymosis   Neurological:      General: No focal deficit present.      Mental Status: He is alert and oriented to person, place, and time.         Fluids    Intake/Output Summary (Last 24 hours) at 1/21/2025 1447  Last data filed at 1/21/2025 0940  Gross per 24 hour   Intake 450 ml   Output -1305 ml   Net 1755 ml        Laboratory  Recent Labs     01/20/25  1233 01/21/25  0017   WBC 7.3 12.5*   RBC 3.99* 3.47*   HEMOGLOBIN 12.6* 10.9*   HEMATOCRIT 39.4* 34.6*   MCV 98.7* 99.7*   MCH 31.6 31.4   MCHC 32.0* 31.5*   RDW 63.4* 63.8*   PLATELETCT 186 173   MPV 11.1 11.3     Recent Labs     01/20/25  1233 01/21/25  0017   SODIUM 141 136   POTASSIUM 4.6 4.4   CHLORIDE 97 93*   CO2 23 23   GLUCOSE 102* 320*   BUN 68* 72*   CREATININE 7.60* 7.51*   CALCIUM 9.0 8.6     Recent Labs     01/20/25  1233   INR 1.31*               Imaging  No orders to display        Assessment/Plan  * Peripheral arterial disease (HCC)- (present on  admission)  Assessment & Plan  severe PAD who was admitted on 1/20 for elective left popliteal to posterior tibial artery bypass.  Continue pain management  Renal diet   Optimize fluid status    ESRD on peritoneal dialysis (HCC)- (present on admission)  Assessment & Plan  Continue peritoneal dialysis and nephrology consultation    BPH (benign prostatic hyperplasia)  Assessment & Plan  Continue terazosin and Flomax  Patient follows with urology    Insomnia- (present on admission)  Assessment & Plan  Continue tramadol 50 mg nightly    Dyslipidemia- (present on admission)  Assessment & Plan  Low-fat low-cholesterol diet  Crestor nightly    Anemia in chronic renal disease- (present on admission)  Assessment & Plan  Monitor H&H if drops below 7 or 21 transfuse currently he does not require transfusion    HTN (hypertension)- (present on admission)  Assessment & Plan  Optimize blood pressure management keep systolic blood pressure less than 140 diastolic under 90  Continue metoprolol  mg nightly, Norvasc 5 mg daily, as needed labetalol         VTE prophylaxis: Eliquis    I have performed a physical exam and reviewed and updated ROS and Plan today (1/21/2025). In review of yesterday's note (1/20/2025), there are no changes except as documented above.    Greater than 51 minutes spent prepping to see patient (e.g. reviewing EMR) obtaining and/or reviewing separately obtained history. Performing a medically appropriate examination and evaluation. Independently interpreting results and communicating results to patient/family/caregiver. Counseling and educating the patient/family/caregiver. Ordering medications, tests, and/or procedures. Referring and communicating with other health care professionals.  Documenting clinical information in EPIC. Care coordination.

## 2025-01-22 LAB
ALBUMIN SERPL BCP-MCNC: 2.7 G/DL (ref 3.2–4.9)
BUN SERPL-MCNC: 68 MG/DL (ref 8–22)
CALCIUM ALBUM COR SERPL-MCNC: 9.6 MG/DL (ref 8.5–10.5)
CALCIUM SERPL-MCNC: 8.6 MG/DL (ref 8.5–10.5)
CHLORIDE SERPL-SCNC: 95 MMOL/L (ref 96–112)
CO2 SERPL-SCNC: 24 MMOL/L (ref 20–33)
CREAT SERPL-MCNC: 7.2 MG/DL (ref 0.5–1.4)
ERYTHROCYTE [DISTWIDTH] IN BLOOD BY AUTOMATED COUNT: 63 FL (ref 35.9–50)
GFR SERPLBLD CREATININE-BSD FMLA CKD-EPI: 7 ML/MIN/1.73 M 2
GLUCOSE SERPL-MCNC: 160 MG/DL (ref 65–99)
HCT VFR BLD AUTO: 30.3 % (ref 42–52)
HGB BLD-MCNC: 9.7 G/DL (ref 14–18)
MCH RBC QN AUTO: 31.4 PG (ref 27–33)
MCHC RBC AUTO-ENTMCNC: 32 G/DL (ref 32.3–36.5)
MCV RBC AUTO: 98.1 FL (ref 81.4–97.8)
PHOSPHATE SERPL-MCNC: 7.5 MG/DL (ref 2.5–4.5)
PLATELET # BLD AUTO: 157 K/UL (ref 164–446)
PMV BLD AUTO: 11.3 FL (ref 9–12.9)
POTASSIUM SERPL-SCNC: 4.3 MMOL/L (ref 3.6–5.5)
RBC # BLD AUTO: 3.09 M/UL (ref 4.7–6.1)
SODIUM SERPL-SCNC: 135 MMOL/L (ref 135–145)
WBC # BLD AUTO: 8.6 K/UL (ref 4.8–10.8)

## 2025-01-22 PROCEDURE — 80069 RENAL FUNCTION PANEL: CPT

## 2025-01-22 PROCEDURE — 700102 HCHG RX REV CODE 250 W/ 637 OVERRIDE(OP): Performed by: GENERAL PRACTICE

## 2025-01-22 PROCEDURE — 36415 COLL VENOUS BLD VENIPUNCTURE: CPT

## 2025-01-22 PROCEDURE — 97602 WOUND(S) CARE NON-SELECTIVE: CPT

## 2025-01-22 PROCEDURE — 97165 OT EVAL LOW COMPLEX 30 MIN: CPT

## 2025-01-22 PROCEDURE — A9270 NON-COVERED ITEM OR SERVICE: HCPCS | Performed by: INTERNAL MEDICINE

## 2025-01-22 PROCEDURE — 90945 DIALYSIS ONE EVALUATION: CPT

## 2025-01-22 PROCEDURE — 97535 SELF CARE MNGMENT TRAINING: CPT

## 2025-01-22 PROCEDURE — A9270 NON-COVERED ITEM OR SERVICE: HCPCS | Performed by: PHYSICIAN ASSISTANT

## 2025-01-22 PROCEDURE — 85027 COMPLETE CBC AUTOMATED: CPT

## 2025-01-22 PROCEDURE — 770001 HCHG ROOM/CARE - MED/SURG/GYN PRIV*

## 2025-01-22 PROCEDURE — A9270 NON-COVERED ITEM OR SERVICE: HCPCS | Performed by: GENERAL PRACTICE

## 2025-01-22 PROCEDURE — 700102 HCHG RX REV CODE 250 W/ 637 OVERRIDE(OP): Performed by: PHYSICIAN ASSISTANT

## 2025-01-22 PROCEDURE — 99232 SBSQ HOSP IP/OBS MODERATE 35: CPT | Performed by: GENERAL PRACTICE

## 2025-01-22 PROCEDURE — 99223 1ST HOSP IP/OBS HIGH 75: CPT | Performed by: STUDENT IN AN ORGANIZED HEALTH CARE EDUCATION/TRAINING PROGRAM

## 2025-01-22 PROCEDURE — 700102 HCHG RX REV CODE 250 W/ 637 OVERRIDE(OP): Performed by: INTERNAL MEDICINE

## 2025-01-22 RX ORDER — GUAIFENESIN 600 MG/1
600 TABLET, EXTENDED RELEASE ORAL EVERY 12 HOURS
Status: DISCONTINUED | OUTPATIENT
Start: 2025-01-22 | End: 2025-01-23 | Stop reason: HOSPADM

## 2025-01-22 RX ORDER — SEVELAMER CARBONATE 800 MG/1
1600 TABLET, FILM COATED ORAL
Status: DISCONTINUED | OUTPATIENT
Start: 2025-01-22 | End: 2025-01-23 | Stop reason: HOSPADM

## 2025-01-22 RX ORDER — GUAIFENESIN 600 MG/1
600 TABLET, EXTENDED RELEASE ORAL EVERY 12 HOURS
Status: ON HOLD
Start: 2025-01-22 | End: 2025-02-11

## 2025-01-22 RX ADMIN — ACETAMINOPHEN 650 MG: 325 TABLET ORAL at 05:54

## 2025-01-22 RX ADMIN — OXYCODONE HYDROCHLORIDE 10 MG: 10 TABLET ORAL at 18:20

## 2025-01-22 RX ADMIN — OXYCODONE HYDROCHLORIDE 10 MG: 10 TABLET ORAL at 15:42

## 2025-01-22 RX ADMIN — SENNOSIDES AND DOCUSATE SODIUM 2 TABLET: 50; 8.6 TABLET ORAL at 20:39

## 2025-01-22 RX ADMIN — GENTAMICIN SULFATE 1 APPLICATION: 1 CREAM TOPICAL at 17:26

## 2025-01-22 RX ADMIN — SEVELAMER CARBONATE 800 MG: 800 TABLET, FILM COATED ORAL at 08:43

## 2025-01-22 RX ADMIN — MONTELUKAST 10 MG: 10 TABLET, FILM COATED ORAL at 20:40

## 2025-01-22 RX ADMIN — AMLODIPINE BESYLATE 2.5 MG: 2.5 TABLET ORAL at 08:44

## 2025-01-22 RX ADMIN — POLYETHYLENE GLYCOL 3350 1 PACKET: 17 POWDER, FOR SOLUTION ORAL at 08:42

## 2025-01-22 RX ADMIN — OXYCODONE 5 MG: 5 TABLET ORAL at 08:51

## 2025-01-22 RX ADMIN — TAMSULOSIN HYDROCHLORIDE 0.4 MG: 0.4 CAPSULE ORAL at 08:42

## 2025-01-22 RX ADMIN — FUROSEMIDE 40 MG: 20 TABLET ORAL at 08:42

## 2025-01-22 RX ADMIN — APIXABAN 5 MG: 5 TABLET, FILM COATED ORAL at 08:43

## 2025-01-22 RX ADMIN — CLOPIDOGREL BISULFATE 75 MG: 75 TABLET ORAL at 08:43

## 2025-01-22 RX ADMIN — ROSUVASTATIN CALCIUM 20 MG: 20 TABLET, FILM COATED ORAL at 20:40

## 2025-01-22 RX ADMIN — SEVELAMER CARBONATE 800 MG: 800 TABLET, FILM COATED ORAL at 11:37

## 2025-01-22 RX ADMIN — POLYETHYLENE GLYCOL 3350 1 PACKET: 17 POWDER, FOR SOLUTION ORAL at 08:44

## 2025-01-22 RX ADMIN — APIXABAN 5 MG: 5 TABLET, FILM COATED ORAL at 20:40

## 2025-01-22 RX ADMIN — SEVELAMER CARBONATE 1600 MG: 800 TABLET, FILM COATED ORAL at 17:26

## 2025-01-22 RX ADMIN — GUAIFENESIN 600 MG: 600 TABLET, EXTENDED RELEASE ORAL at 11:38

## 2025-01-22 ASSESSMENT — COGNITIVE AND FUNCTIONAL STATUS - GENERAL
TOILETING: A LOT
DRESSING REGULAR LOWER BODY CLOTHING: A LOT
HELP NEEDED FOR BATHING: A LOT
DAILY ACTIVITIY SCORE: 18
SUGGESTED CMS G CODE MODIFIER DAILY ACTIVITY: CK

## 2025-01-22 ASSESSMENT — PAIN DESCRIPTION - PAIN TYPE
TYPE: ACUTE PAIN
TYPE: ACUTE PAIN;SURGICAL PAIN
TYPE: ACUTE PAIN

## 2025-01-22 ASSESSMENT — ACTIVITIES OF DAILY LIVING (ADL): TOILETING: INDEPENDENT

## 2025-01-22 NOTE — PROGRESS NOTES
Bedside report received from NOC RN; assumed care. Dialysis RNs bedside at change of shift completing PD. Assessment complete. A&O x 4. VSS. 97% on RA. Patient denying SOB, nausea, vomiting, dizziness. Numbness/tingling to LLE. Medicated for pain; see MAR.  Abdomen round. Right upper chest HD cath; clamped; CDI. Peritoneal dialysis catheter clamped/taped, CDI.  LLE swelling/prevena plus wound vac with sang drainage noted in cannister. Normoactive BS x 4 noted. + void; yellow urine noted in urinal.+ eructation. + flatus. LBM 01/21. Patient tolerating renal diet. Patients goal is to get up to BSC/chair today. Discussed plan of care with patient. All questions answered. High fall risk. Bed frame alarm on/engaged. Bed in locked/lowest position.  Call light/personal belongings within reach.  All needs met, patient sitting upright in bed at present time.

## 2025-01-22 NOTE — PROGRESS NOTES
2 RN skin check complete. (See pictures)    Devices in place: PIV x 2 right forearm enwrapped in [place]. LAVF, Right upper chest perma cath. Peritoneal catheter to RLQ abdomen.  Prevena [place] to LLE from inner thigh to heel.     Scabs/skin tear noted to bilateral cheeks, mepitel one place. HD/Peritoneal cath with CDI dressings. LAVF, open to air beneath left no no sleeve. Abdomen round. Lap stabs from hernia surgery, CDI with dermabond/LAURA. Mild swelling/redness/scabs to lower abdomen/groin region. BLE swelling, left greater than right. LLE prevena plus wound vac, CDI, sang drainage noted in cannister. Scabs noted to BLE. Necrotic toes noted to bilateral feet (see pictures), painted with betadine. Right heel linear tear, CDI. Blanching/non-blanching redness to sacrum. Dry skin noted to heels.     Skin assessed under devices: above as much as possible.  Confirmed pressure ulcers found on: pending wound team.   New potential pressure ulcers noted on: sacum.   Wound consult placed: NA.     The following interventions in place:  TAPS system ordered. Q2 turns being prompted, patient able to turn self to the left/supine.  Offloading LLE upon pillows. Mepitel one himanshu cheeks.  Protective mepilex to heels/sacrum. Margaux cream applied.

## 2025-01-22 NOTE — DISCHARGE PLANNING
Following for post acute services. Spoke with spouse Kailey about goals and expectation for IRF level of care. Discussed therapy plan for 3 hours per day 5 days a week. Discussed therapy schedules 30/45 or 60 minute sessions typically 1.5 hours between breakfast and lunch and another 1.5 hours between lunch and dinner. Discussed PT/OT and SLP roles. Discussed potential length of stay. Discussed comprehensive medical surveillance by physiatry as well as hospitalist team. Discussed patient to nursing ratio. Discussed insurance Medicare / coverage for the program. Discussed visitation and clothing requirements. Kailey will be primary support for Keven  to return to the community. Discussed participation with therapy program when visiting.  Kailey would like to see how thing go in the morning and to see if the nerve block wears off so Keven can show more promise for a home discharge with OP support. Discussed HH and OP options. Discussed the ability to manage PD at [facility]hb . Kailey is in favor of IRF if needed. Kailey and Keven to discuss and call with a follow up.

## 2025-01-22 NOTE — PROGRESS NOTES
"[facility] -  PROGRESS NOTE               Author: Kingston Pa M.D. Date & Time: 1/22/2025  12:19 PM     HPI:  79 y.o. male with HTN, ESRD on PD, PVD/ gangrenous left foot, BPH admitted for monitoring s/p  left leg bypass surgery on 1/20 with Dr. Miller. He recently underwent hernial repair and was temporarily transferred to in center HD via [facility]. He has returned to  PD  4 days ago, doing low fill.volumes and has been tolerating at home.   Patient was connected to cycler last night. Total UF reported to be 276ml. Reports of low drain UF alarm last night but no major issues with PD overnight. Pt reports no complaints today morning  No F/C/N/V/CP/SOB.  No melena, hematochezia, hematemesis.  No HA, visual changes,or abdominal pain.       DAILY NEPHROLOGY SUMMARY:  1/21: consult done   1/22: No issues with PD overnight, UF 1137cc, VSS, , feels good, wife at bed side     REVIEW OF SYSTEMS:    10 point ROS reviewed and is as per HPI/daily summary or otherwise negative    PMH/PSH/SH/FH:  Reviewed and unchanged since admission note    CURRENT MEDICATIONS:  Reviewed from admission to present day    VS:  /77   Pulse 66   Temp 36.2 °C (97.2 °F) (Temporal)   Resp 18   Ht 1.778 m (5' 10\")   Wt 72.7 kg (160 lb 4.4 oz)   SpO2 97%   BMI 23.00 kg/m²   Physical Exam  Constitutional:       General: He is not in acute distress.     Appearance: He is not ill-appearing.   Eyes:      Conjunctiva/sclera: Conjunctivae normal.   Cardiovascular:      Rate and Rhythm: Normal rate.   Pulmonary:      Effort: Pulmonary effort is normal.   Abdominal:      General: There is no distension.      Palpations: Abdomen is soft.      Tenderness: There is no abdominal tenderness.      Comments: PD cath in RLQ   Musculoskeletal:         General: No swelling.      Right lower leg: No edema.      Left lower leg: Edema present.      Comments: Mild edema in LLE wound vac in place    Skin:     General: Skin is " warm.   Neurological:      Mental Status: He is oriented to person, place, and time.   Psychiatric:         Mood and Affect: Mood normal.         Fluids:    Intake/Output Summary (Last 24 hours) at 1/22/2025 1219  Last data filed at 1/22/2025 0743  Gross per 24 hour   Intake 120 ml   Output 1137 ml   Net -1017 ml       LABS:  Labs reviewed, pertinent labs below.    Recent Labs     01/20/25  1233 01/21/25  0017 01/22/25  0618   WBC 7.3 12.5* 8.6   RBC 3.99* 3.47* 3.09*   HEMOGLOBIN 12.6* 10.9* 9.7*   HEMATOCRIT 39.4* 34.6* 30.3*   MCV 98.7* 99.7* 98.1*   MCH 31.6 31.4 31.4   MCHC 32.0* 31.5* 32.0*   RDW 63.4* 63.8* 63.0*   PLATELETCT 186 173 157*   MPV 11.1 11.3 11.3       Recent Labs     01/20/25  1233 01/21/25  0017 01/22/25  0618   SODIUM 141 136 135   POTASSIUM 4.6 4.4 4.3   CHLORIDE 97 93* 95*   CO2 23 23 24   GLUCOSE 102* 320* 160*   BUN 68* 72* 68*   CREATININE 7.60* 7.51* 7.20*   CALCIUM 9.0 8.6 8.6        IMAGING:  All imaging reviewed from admission to present day      IMPRESSION:  # ESRD on CCPD  - Pt was transitioned to  iHD during masoud-op  s/p inguinal hernial repair,   - Still has [facility] PC  - Returned to PD 4 days ago   - Home PD prescription CCPD 2.5% alternating with 1.5% 7 exchanges 8 hrs 30 min, total FV 7700cc, no last fill  # HTN  - Goal BP < 140/90  - At goal  # Anemia of CKD  - Goal Hgb 10-11  - At goal   # CKD-MBD  - Ca 8.6   - PO4 7.4   # Chronic hypoxic respiratory failure   - On home oxygen 2-2.5L at night         SUGGESTIONS:  - Continue CCPD during hospital stay home prescription   - PD exit site care per protocol   - Bowel regimen to avoid constipation with PD  - No dietary protein restrictions  - Dose all meds per ESRD  - No need for ROSALIND as Hb at goal   - Continue home P binders,increase renvela 1600 mg tid ac   - Continue home BP medications   - Renal diet  - Arrange for dialysis perm cath removal as out patient     Pending discharge to [facility] rehab

## 2025-01-22 NOTE — CARE PLAN
Problem: Knowledge Deficit - Standard  Goal: Patient and family/care givers will demonstrate understanding of plan of care, disease process/condition, diagnostic tests and medications  Outcome: Progressing     Problem: Skin Integrity  Goal: Skin integrity is maintained or improved  Outcome: Progressing     Problem: Pain - Standard  Goal: Alleviation of pain or a reduction in pain to the patient’s comfort goal  Outcome: Progressing     Problem: Fall Risk  Goal: Patient will remain free from falls  Outcome: Progressing     Problem: Psychosocial  Goal: Patient's level of anxiety will decrease  Outcome: Progressing     Problem: Communication  Goal: The ability to communicate needs accurately and effectively will improve  Outcome: Progressing     Problem: Hemodynamics  Goal: Patient's hemodynamics, fluid balance and neurologic status will be stable or improve  Outcome: Progressing     Problem: Respiratory  Goal: Patient will achieve/maintain optimum respiratory ventilation and gas exchange  Outcome: Progressing     Problem: Nutrition  Goal: Patient's nutritional and fluid intake will be adequate or improve  Outcome: Progressing  Goal: Enteral nutrition will be maintained or improve  Outcome: Progressing  Goal: Enteral nutrition will be maintained or improve  Outcome: Progressing     Problem: Urinary Elimination  Goal: Establish and maintain regular urinary output  Outcome: Progressing     Problem: Bowel Elimination  Goal: Establish and maintain regular bowel function  Outcome: Progressing     Problem: Gastrointestinal Irritability  Goal: Nausea and vomiting will be absent or improve  Outcome: Progressing  Goal: Diarrhea will be absent or improved  Outcome: Progressing     Problem: Mobility  Goal: Patient's capacity to carry out activities will improve  Outcome: Progressing     Problem: Self Care  Goal: Patient will have the ability to perform ADLs independently or with assistance (bathe, groom, dress, toilet and  feed)  Outcome: Progressing     Problem: Infection - Standard  Goal: Patient will remain free from infection  Outcome: Progressing     Problem: Wound/ / Incision Healing  Goal: Patient's wound/surgical incision will decrease in size and heals properly  Outcome: Progressing   The patient is Stable - Low risk of patient condition declining or worsening    Shift Goals  Clinical Goals: diaylsis, pain management, skin integrity  Patient Goals: rest  Family Goals: updates    Progress made toward(s) clinical / shift goals:  Peritoneal dialysis in progress. Patient tolerating well without signs of complication. Dialysis RN changed dressing to both hemodialysis and peritoneal dialysis catheters.  Patient medicated for pain per MAR. See notes for skin check. Prevena plus in place to LLE C/D/I.     Patient is not progressing towards the following goals:

## 2025-01-22 NOTE — PROGRESS NOTES
Bedside report received.  Assessment complete.  A&O x 4. Patient calls appropriately.  Patient ambulates with X2 assist w/FWW. Bed alarm on. High Fall risk  Patient has 3/10 pain. Patient medicated per MAR.  Denies N&V. Tolerating renal diet.  Prevena plus to LLE   + flatus, + 1/21 BM per RN report.  Patient denies SOB.  SCD's refused.  Patient has family at bedside.  Review plan with of care with patient. Call light and personal belongings within reach. Hourly rounding in place. All needs met at this time.

## 2025-01-22 NOTE — ANESTHESIA POSTPROCEDURE EVALUATION
Patient: Edy Bennett    Procedure Summary       Date: 01/20/25 Room / Location: [facility] 04 / SURGERY [facility]    Anesthesia Start: 1354 Anesthesia Stop: 1759    Procedure: LEFT LOWER EXTREMITY BYPASS, POPLITIAL TO TIBIAL BYPASS (Left: Leg Lower) Diagnosis: (LEFT FOOT GANGRENE)    Surgeons: Marco Antonio Miller M.D. Responsible Provider: Nicolás Lim D.O.    Anesthesia Type: general, peripheral nerve block ASA Status: 3            Final Anesthesia Type: general, peripheral nerve block  Last vitals  BP   Blood Pressure : 116/59, Arterial BP: 118/48    Temp   36.4 °C (97.5 °F)    Pulse   66   Resp   18    SpO2   97 %      Anesthesia Post Evaluation    Patient location during evaluation: PACU  Patient participation: complete - patient participated  Level of consciousness: awake and alert    Airway patency: patent  Anesthetic complications: no  Cardiovascular status: hemodynamically stable  Respiratory status: acceptable  Hydration status: euvolemic    PONV: none    patient able to participate, but full recovery from regional anesthesia has not occurred and is not expected within the stipulated timeframe for the completion of the evaluation      No notable events documented.     Nurse Pain Score: 2 (NPRS)

## 2025-01-22 NOTE — PROGRESS NOTES
[facility] Medicine Daily Progress Note    Date of Service  1/22/2025    Chief Complaint  Edy Bennett is a 79 y.o. male admitted 1/20/2025 with PAD    Hospital Course  This is a 79-year-old male with past medical history of moderate aortic stenosis, hypertension, CAD with 3 SYDNEE 8/2024,???  On Eliquis, ESRD on PD, BPH, and severe PAD who was admitted on 1/20 for elective left popliteal to posterior tibial artery bypass.    Patient admitted for postoperative pain management.  Nephrology consulted for resumption of peritoneal dialysis.    Interval Problem Update  PAD - s/p bypass - prevanna in place.  Surgical site intact, no evidence of bleeding or infection.    ESRD on PD - nephrology following - resume PD     PT/OT - recommend IRF - consult placed     Hemoglobin stable, pain is well-controlled, patient had bowel movement 1/21.    Notified acute rehab the patient is medically clear, 1/22 AM.  Pending reevaluation by PT/OT today.    I have discussed this patient's plan of care and discharge plan at IDT rounds today with Case Management, Nursing, Nursing leadership, and other members of the IDT team.    Consultants/Specialty  vascular surgery    Code Status  Full Code    Disposition  Patient is medically clear to discharge to IRF  I have placed the appropriate orders for post-discharge needs.    Review of Systems  Review of Systems   All other systems reviewed and are negative.       Physical Exam  Temp:  [36.2 °C (97.2 °F)-36.4 °C (97.5 °F)] 36.2 °C (97.2 °F)  Pulse:  [64-68] 66  Resp:  [16-18] 18  BP: (113-137)/(58-77) 120/77  SpO2:  [97 %-99 %] 97 %    Physical Exam  Vitals and nursing note reviewed.   Constitutional:       General: He is not in acute distress.     Appearance: He is ill-appearing.   HENT:      Head: Normocephalic and atraumatic.   Eyes:      Extraocular Movements: Extraocular movements intact.      Conjunctiva/sclera: Conjunctivae normal.      Pupils: Pupils are equal, round, and reactive to  light.   Cardiovascular:      Rate and Rhythm: Normal rate and regular rhythm.      Pulses: Normal pulses.      Heart sounds: No murmur heard.     No friction rub. No gallop.      Comments: Right TDC  Pulmonary:      Effort: Pulmonary effort is normal. No respiratory distress.      Breath sounds: Normal breath sounds. No wheezing, rhonchi or rales.   Abdominal:      General: Bowel sounds are normal. There is no distension.      Palpations: Abdomen is soft.      Tenderness: There is no abdominal tenderness.      Comments: PD catheter in place,no evidence of infection   Musculoskeletal:         General: No swelling or tenderness. Normal range of motion.      Cervical back: Normal range of motion and neck supple. No muscular tenderness.      Right lower leg: No edema.      Left lower leg: No edema.   Skin:     General: Skin is warm and dry.      Capillary Refill: Capillary refill takes less than 2 seconds.      Findings: No bruising, erythema or rash.      Comments: LLE prevanna, surgical site intact, no evidence of infection, noted ecchymosis   Neurological:      General: No focal deficit present.      Mental Status: He is alert and oriented to person, place, and time.         Fluids    Intake/Output Summary (Last 24 hours) at 1/22/2025 1142  Last data filed at 1/22/2025 0743  Gross per 24 hour   Intake 120 ml   Output 1137 ml   Net -1017 ml        Laboratory  Recent Labs     01/20/25  1233 01/21/25  0017 01/22/25  0618   WBC 7.3 12.5* 8.6   RBC 3.99* 3.47* 3.09*   HEMOGLOBIN 12.6* 10.9* 9.7*   HEMATOCRIT 39.4* 34.6* 30.3*   MCV 98.7* 99.7* 98.1*   MCH 31.6 31.4 31.4   MCHC 32.0* 31.5* 32.0*   RDW 63.4* 63.8* 63.0*   PLATELETCT 186 173 157*   MPV 11.1 11.3 11.3     Recent Labs     01/20/25  1233 01/21/25  0017 01/22/25  0618   SODIUM 141 136 135   POTASSIUM 4.6 4.4 4.3   CHLORIDE 97 93* 95*   CO2 23 23 24   GLUCOSE 102* 320* 160*   BUN 68* 72* 68*   CREATININE 7.60* 7.51* 7.20*   CALCIUM 9.0 8.6 8.6     Recent Labs      01/20/25  1233   INR 1.31*               Imaging  No orders to display        Assessment/Plan  * Peripheral arterial disease (HCC)- (present on admission)  Assessment & Plan  severe PAD who was admitted on 1/20 for elective left popliteal to posterior tibial artery bypass.  Continue pain management  Renal diet   Optimize fluid status    ESRD on peritoneal dialysis (HCC)- (present on admission)  Assessment & Plan  Continue peritoneal dialysis and nephrology consultation    BPH (benign prostatic hyperplasia)  Assessment & Plan  Continue terazosin and Flomax  Patient follows with urology    Insomnia- (present on admission)  Assessment & Plan  Continue tramadol 50 mg nightly    Dyslipidemia- (present on admission)  Assessment & Plan  Low-fat low-cholesterol diet  Crestor nightly    Anemia in chronic renal disease- (present on admission)  Assessment & Plan  Monitor H&H if drops below 7 or 21 transfuse currently he does not require transfusion    HTN (hypertension)- (present on admission)  Assessment & Plan  Optimize blood pressure management keep systolic blood pressure less than 140 diastolic under 90  Continue metoprolol  mg nightly, Norvasc 5 mg daily, as needed labetalol         VTE prophylaxis: Eliquis    I have performed a physical exam and reviewed and updated ROS and Plan today (1/22/2025). In review of yesterday's note (1/21/2025), there are no changes except as documented above.

## 2025-01-22 NOTE — CONSULTS
Physical Medicine and Rehabilitation Consultation              Date of initial consultation: 2025  Requesting provider: Rosalie Lyon D.O.   Consulting provider: Livan Meade D.O.  Reason for consultation: assess for acute inpatient rehab appropriateness  LOS: 2 Day(s)    Chief complaint: bypass, weakness    HPI: The patient is a 79 y.o.  male with a past medical history of moderate aortic stenosis, hypertension, CAD with stents on eliquis, ESRD on PD, BPH and severe PAD who presented on 2025 10:52 AM for elective left bypass graft.  2025, the patient was taken to the OR for left popliteal to posterior tibial artery bypass by Marco Antonio Miller MD. Current labs remarkable for WBC 8.6, Hgb 9.7, platelets 157, creatinine 7.2, GFR 7. Vitals remarkable for 0 to 2 L supplemental O2.  Patient seen by PT with recommendation for post-acute placement. PM&R consulted to evaluate for possible inpatient rehab admission.      On my evaluation, the patient is complaining of left leg weakness, pain and swelling. He states he has decrease sensation over the lower left leg. His wife states she helps him with peritoneal dialysis and that she has 2 months of supplies at home. Patient denies fevers, chills, headache, chest pain, shortness of breath, cough, abdominal pain, nausea, vomiting, dysuria.      Social Hx:  Patient lives in a single-story house with spouse, who is able to help upon discharge.  0 DARIEL  At prior level of function, patient was independent with front wheel walker.    THERAPY:  PT: Functional mobility   : Ambulating 1 foot mod assist with front wheel walker, sit to stand mod assist.  Unable to do bed/chair transfer.    OT: ADLs  Pending    SLP:   None    IMAGIN2024: Ultrasound extremity artery lower bilateral with GLENYS  IMPRESSION:  1.  Occlusions likely chronic are noted in the distal anterior and posterior tibial arteries.  2.  No proximal high-grade stenoses or occlusions in the  lower extremity arterial vasculature.    PROCEDURES:  1/20/2025: Marco Antonio Miller MD  Left popliteal to posterior tibial artery bypass with ipsilateral reversed greater saphenous vein     PMH:  Past Medical History:   Diagnosis Date    A-V fistula (Formerly [facility])     Left Arm    Arrhythmia 03/13/2024    Due to kidney failure treatment    Breath shortness     with exertion    Cataract     shon iol    Congestive heart failure (Formerly [facility])     Diabetes (Formerly [facility])     not on any medication at this time, being monitored at dialysis    Dialysis patient (Formerly [facility])     monday wednesday friday at [facility]    Dialysis patient (Formerly [facility]) 01/17/2025    Starting Peritoneal Dialysis @ Home on 1/17/2025. Follows Nephrologist MD Milian.    Foot ulcer (Formerly [facility])     left foot. being seen by wound care    Hemorrhagic disorder (Formerly [facility])     plavix eliquis    High cholesterol     Hypertension 05/24/2024    states controlled    DARIEN (obstructive sleep apnea) 01/17/2025    O2 @ 2.5L Per Patient's Wife.    Pain     Peritoneal dialysis catheter in place (Formerly [facility]) 01/17/2025    Pneumonia     3/24    Renal disorder     Sleep apnea 01/10/2025    Home sleep study done 1/3/2025 - no results yet.    Stroke (Formerly [facility]) 2021    tia       PSH:  Past Surgical History:   Procedure Laterality Date    VA VEIN BYPASS GRAFT,FEM-TIBIAL Left 1/20/2025    Procedure: LEFT LOWER EXTREMITY BYPASS, POPLITIAL TO TIBIAL BYPASS;  Surgeon: Marco Antonio Miller M.D.;  Location: [facility];  Service: Vascular    AORTOGRAM WITH RUNOFF  1/14/2025    Procedure: AORTOGRAM WITH RUNOFF, right peroneal artery intravascular lithotripsy and stent placement;  Surgeon: Marco Antonio Miller M.D.;  Location: [facility];  Service: Vascular    INGUINAL HERNIA LAPAROSCOPIC N/A 12/18/2024    Procedure: LAPAROSCOPIC LEFT INGUINAL HERNIA REPAIR WITH MESH, LAPAROSCOPIC REPOSITIONING OF PERITONEAL DIALYSIS CATHETER;  Surgeon: Marco Antonio Miller M.D.;  Location: [facility];  Service: Vascular    CATH  PLACEMENT CAPD N/A 12/18/2024    Procedure: REPOSITION, CATHETER, CAPD;  Surgeon: Marco Antonio Miller M.D.;  Location: SURGERY [facility];  Service: Vascular    CATH PLACEMENT CAPD N/A 09/16/2024    Procedure: LAPAROSCOPIC INSERTION OF PERITONEAL DIALYSIS CATHETER PLACEMENT;  Surgeon: Marco Antonio Miller M.D.;  Location: SURGERY SAME DAY [facility];  Service: Vascular    AV FISTULA CREATION Left 07/01/2024    Procedure: CREATION OF LEFT UPPER EXTREMITY DIALYSIS FISTULA;  Surgeon: Shilo Mercedes M.D.;  Location: SURGERY [facility];  Service: General    OTHER ORTHOPEDIC SURGERY  2004    bunion r foot    OTHER      tonsils adenoids    OTHER      dialysis catheter in upper right chest    OTHER      shon iol    OTHER CARDIAC SURGERY      cardiac stents       FHX:  Family History   Problem Relation Age of Onset    Cancer Mother         Brain cancer    Heart Disease Father         Several By-pass Surgeries    Cancer Daughter         Thyroid Cancer    Heart Disease Brother         Fatal Heart Attack       Medications:  Current Facility-Administered Medications   Medication Dose    Pharmacy Consult Request ...Pain Management Review 1 Each  1 Each    ondansetron (Zofran) syringe/vial injection 4 mg  4 mg    oxyCODONE immediate-release (Roxicodone) tablet 5 mg  5 mg    Or    oxyCODONE immediate release (Roxicodone) tablet 10 mg  10 mg    Or    HYDROmorphone (Dilaudid) injection 0.5 mg  0.5 mg    apixaban (Eliquis) tablet 5 mg  5 mg    senna-docusate (Pericolace Or Senokot S) 8.6-50 MG per tablet 2 Tablet  2 Tablet    And    polyethylene glycol/lytes (Miralax) Packet 1 Packet  1 Packet    polyethylene glycol/lytes (Miralax) Packet 1 Packet  1 Packet    gentamicin (Garamycin) 0.1 % cream      amLODIPine (Norvasc) tablet 2.5 mg  2.5 mg    clopidogrel (Plavix) tablet 75 mg  75 mg    furosemide (Lasix) tablet 40 mg  40 mg    metoprolol SR (Toprol XL) tablet 100 mg  100 mg    montelukast (Singulair) tablet 10 mg  10 mg     "nitroglycerin (Nitrostat) tablet 0.4 mg  0.4 mg    rosuvastatin (Crestor) tablet 20 mg  20 mg    sevelamer carbonate (Renvela) tablet 800 mg  800 mg    tamsulosin (Flomax) capsule 0.4 mg  0.4 mg    terazosin (Hytrin) capsule 5 mg  5 mg    acetaminophen (Tylenol) tablet 650 mg  650 mg       Allergies:  No Known Allergies      Physical Exam:  Vitals: /77   Pulse 66   Temp 36.2 °C (97.2 °F) (Temporal)   Resp 18   Ht 1.778 m (5' 10\")   Wt 72.7 kg (160 lb 4.4 oz)   SpO2 97%   Gen: NAD  Head: Normocephalic, atraumatic  Eyes/ Nose/ Mouth: PERRL, moist mucous membranes  Cardio: good distal perfusion, warm extremities  Pulm: normal respiratory effort, no cyanosis   Abd: Soft, Nontender, Nondistended   Ext: Left leg with prevena wound vac over incision, leg swelling, and L great toe necrotic wound.   Skin:             Mental status: Alert. Oriented to person, place, month and year.   Speech: fluent, no aphasia or dysarthria    Motor:      Upper Extremity  Myotome R L   Shoulder flexion C5 5 5   Elbow flexion C5 5 5   Wrist extension C6 5 5   Elbow extension C7 5 5   Finger flexion C8 5 5   Finger abduction T1 5 5     Lower Extremity Myotome R L   Hip flexion L2 5 2/5   Knee extension L3 5 2/5   Ankle dorsiflexion L4 5 2/5   Toe extension L5 5 2/5   Ankle plantarflexion S1 5 2/5     Sensory:   Decreased sensation to light touch over left lower leg, otherwise intact to light touch through out arms and legs.    DTRs: 2+ in bilateral  biceps  No clonus at bilateral ankles  Negative babinski b/l  Negative Buckley b/l     Tone: no spasticity noted, no cogwheeling noted      Labs: Reviewed and significant for   Recent Labs     01/20/25  1233 01/21/25  0017 01/22/25  0618   RBC 3.99* 3.47* 3.09*   HEMOGLOBIN 12.6* 10.9* 9.7*   HEMATOCRIT 39.4* 34.6* 30.3*   PLATELETCT 186 173 157*   PROTHROMBTM 16.3*  --   --    INR 1.31*  --   --      Recent Labs     01/20/25  1233 01/21/25  0017 01/22/25  0618   SODIUM 141 136 135 "   POTASSIUM 4.6 4.4 4.3   CHLORIDE 97 93* 95*   CO2 23 23 24   GLUCOSE 102* 320* 160*   BUN 68* 72* 68*   CREATININE 7.60* 7.51* 7.20*   CALCIUM 9.0 8.6 8.6     Recent Results (from the past 24 hours)   CBC WITHOUT DIFFERENTIAL    Collection Time: 01/22/25  6:18 AM   Result Value Ref Range    WBC 8.6 4.8 - 10.8 K/uL    RBC 3.09 (L) 4.70 - 6.10 M/uL    Hemoglobin 9.7 (L) 14.0 - 18.0 g/dL    Hematocrit 30.3 (L) 42.0 - 52.0 %    MCV 98.1 (H) 81.4 - 97.8 fL    MCH 31.4 27.0 - 33.0 pg    MCHC 32.0 (L) 32.3 - 36.5 g/dL    RDW 63.0 (H) 35.9 - 50.0 fL    Platelet Count 157 (L) 164 - 446 K/uL    MPV 11.3 9.0 - 12.9 fL   Renal Function Panel    Collection Time: 01/22/25  6:18 AM   Result Value Ref Range    Sodium 135 135 - 145 mmol/L    Potassium 4.3 3.6 - 5.5 mmol/L    Chloride 95 (L) 96 - 112 mmol/L    Co2 24 20 - 33 mmol/L    Glucose 160 (H) 65 - 99 mg/dL    Creatinine 7.20 (HH) 0.50 - 1.40 mg/dL    Bun 68 (H) 8 - 22 mg/dL    Calcium 8.6 8.5 - 10.5 mg/dL    Correct Calcium 9.6 8.5 - 10.5 mg/dL    Phosphorus 7.5 (H) 2.5 - 4.5 mg/dL    Albumin 2.7 (L) 3.2 - 4.9 g/dL   ESTIMATED GFR    Collection Time: 01/22/25  6:18 AM   Result Value Ref Range    GFR (CKD-EPI) 7 (A) >60 mL/min/1.73 m 2         ASSESSMENT:  Patient is a 79 y.o. male admitted with severe PAD now s/p left popliteal to posterior tibial artery bypass.       [facility] Code / Diagnosis to Support: 0016 - Debility (Non-Cardiac, Non-Pulmonary)  See DISPO details below for recommendations on appropriate level of rehab for this diagnosis.    Barriers to transfer include: Insurance authorization, TCCs to verify disposition, medical clearance and bed availability     Assessment and Plan:    DISPO:  - patient is a good candidate for IRF. Need to confirm that the patient has all needed supplies and arrangements for successful home peritoneal dialysis. Another potential barrier to IRF is wound vac, however is a prevena vac and will likely be able to come off prior to discharge  home. Would need to confirm with surgeon.   - He has deficits in ADLs and mobility secondary to recent bypass graft. Patient has good family support from spouse and a stable discharge environment which is single-story home.   - TCC to assist with insurance auth and DC support    - PMR to follow in the periphery for rehab appropriateness, please reach out with questions or request for medical management     Medical Complexity:    Impaired mobility and ADLs  -PT and OT while in-house     Severe PAD  -has left great toe wound, appears necrotic at the tip of the toe.   -S/p Left popliteal to posterior tibial artery bypass with ipsilateral reversed greater saphenous vein by Marco Antonio Miller MD on 1/20/2025.  -wound care, currently with prevena wound vac. Definitive plan needed.   -Eliquis, Plavix    Pain management  -prn Tylenol, Oxy 5, Oxy 10, IV Dilaudid.  Using mostly Oxy 5 for pain relief.    ESRD  -On peritoneal dialysis at home, which was continued for hospitalization  -Nephrology following  -Lasix 40 mg po BID  -Sevelamer  -PD exit site skin care  -will need to make sure all needed arrangements are made for home PD prior to IRF. Wife says she has 2 months of supplies ready to go.     Anemia  -Likely from renal disease  -Hgb 12.6 --> 10.9 --> 9.7.  Will need to be monitored closely for need for transfusion.    Hypertension  -Amlodipine  -Metoprolol    Dyslipidemia  -Crestor    Seasonal allergies  -Montelukast    BPH  -Flomax    Bowel management  -MiraLAX, senna/docusate  -Last BM 1/21    DVT PPX: Eliquis as above      Thank you for allowing us to participate in the care of this patient.     Total time spent: 85 minutes.     Livan Meade D.O.   Physical Medicine and Rehabilitation     Please note that this dictation was created using voice recognition software. I have made every reasonable attempt to correct obvious errors, but there may be errors of grammar and possibly content that I did not discover before  finalizing the note.

## 2025-01-22 NOTE — PROGRESS NOTES
[facility] Services    Initial Drain: 3 mL    Patient is alert and oriented x 4, no reported  pain and discomfort. BP and HR stable and within normal limits, no SOB and afebrile. PD catheter on Right upper abdominal quadrant, and Right chest tunneled CVC dressing are clean dry and intact. PD and CVC access dressing changed aseptically per protocol and no sign and symptoms of infection. Gentamicin  ordered.      Patient aseptically connected to PD cycler at 2245. PD treatment troubleshooting and emergency disconnect in-services provided to PCN. All questions answered.    Report given to PCN PANFILO Mota RN     [facility] Answering service number: [phone]    See dialysis treatment flow sheet for details.

## 2025-01-22 NOTE — PROGRESS NOTES
[facility] Services Progress Notes     Disconnected aseptically from PD Cycler at 0720.     Pt stable, VSS, alert and oriented.  Effluent clear and yellow, no signs of bleeding/fibrin clots.  No alarms on machine was noted or reported before disconnection.     24 hour UF= 1137 ML  (I.Drain= 3 ML + Total UF= 1134 ML - Last fill= 0)     Report given to Ailyn ma RN

## 2025-01-22 NOTE — PROGRESS NOTES
4 Eyes Skin Assessment Completed by MANOJ Rao and MANOJ Michael     Head Scabs to R cheek  Ears WDL  Nose WDL  Mouth WDL  Neck WDL  Breast/Chest WDL  Shoulder Blades WDL  Spine WDL  (R) Arm/Elbow/Hand WDL  (L) Arm/Elbow/Hand AV fistula  Abdomen previous lap sites  Groin WDL  Scrotum/Coccyx/Buttocks redness blanching/Non-Blanching and Discoloration  (R) Leg WDL  (L) Leg Incision, Prevena, scab  (R) Heel/Foot/Toe  black, redness on toes, crack and red/blanching to heel, dry/flaky  (L) Heel/Foot/Toe redness/blanching heel and black toes, dry/flaky              Devices In Places: Pulse Ox, Central Line, and Nasal Cannula, peritoneal dialysis catheter, X2 PIV, Prevena Plus wound vac        Interventions In Place Sacral Mepilex, heel mepilex, low air loss mattress, pillows, barrier paste, patient able to turn independently but Q2 turn reminders in place     Possible Skin Injury Yes     Pictures Uploaded Into Epic Yes, done previously  Wound Consult Placed Yes, done previously  RN Wound Prevention Protocol Ordered Yes, done previously

## 2025-01-22 NOTE — PROGRESS NOTES
"                 VASCULAR SURGERY               Inpatient Progress Note  _________________________________    Vitals (1/21/2025)  /77   Pulse 66   Temp 36.2 °C (97.2 °F) (Temporal)   Resp 18   Ht 1.778 m (5' 10\")   Wt 72.7 kg (160 lb 4.4 oz)   SpO2 97%   BMI 23.00 kg/m²   _________________________________    1/20/25 Left pop-PT bypass with irGSV (Angela/ROSELYN)    1/21/25  Stable overnight.  Pain controlled.  Patient was able to stand up and bear weight on his left leg.    1/22/25  Stable overnight.  Pain controlled.  Patient was able to stand up and bear weight on his left leg yesterday, pending PT today.    Left lower extremity Prevena bandages clean dry and intact with minimal output.  No significant lower extremity edema.  Brisk left posterior tibial Doppler signal at the ankle consistent with bypass patency      -Bypass patent, left foot much better perfused, the patient has a good chance of healing his left toe ulcerations  -Activity as tolerated  -Restarted Eliquis.  Continue Plavix  -Patient was evaluated by physical therapy and we are considering sending him to acute rehab in the next day or 2      Appreciate Hospitalist service's support  Following along      Marco Antonio Miller MD  [facility]  [place] preferred or call my office [phone]  __________________________________________________________________  Patient:Edy Bennett   MRN:[id number]   CSN:[id number]    "

## 2025-01-23 ENCOUNTER — HOSPITAL ENCOUNTER (INPATIENT)
Facility: REHABILITATION | Age: 80
DRG: 299 | End: 2025-01-23
Attending: PHYSICAL MEDICINE & REHABILITATION | Admitting: PHYSICAL MEDICINE & REHABILITATION
Payer: MEDICARE

## 2025-01-23 VITALS
SYSTOLIC BLOOD PRESSURE: 120 MMHG | TEMPERATURE: 97.5 F | HEIGHT: 70 IN | RESPIRATION RATE: 18 BRPM | BODY MASS INDEX: 22.95 KG/M2 | WEIGHT: 160.27 LBS | OXYGEN SATURATION: 98 % | HEART RATE: 81 BPM | DIASTOLIC BLOOD PRESSURE: 63 MMHG

## 2025-01-23 PROBLEM — I73.9 PAD (PERIPHERAL ARTERY DISEASE) (HCC): Status: ACTIVE | Noted: 2025-01-23

## 2025-01-23 LAB
ALBUMIN SERPL BCP-MCNC: 3 G/DL (ref 3.2–4.9)
BUN SERPL-MCNC: 64 MG/DL (ref 8–22)
CALCIUM ALBUM COR SERPL-MCNC: 9.1 MG/DL (ref 8.5–10.5)
CALCIUM SERPL-MCNC: 8.3 MG/DL (ref 8.5–10.5)
CHLORIDE SERPL-SCNC: 94 MMOL/L (ref 96–112)
CO2 SERPL-SCNC: 24 MMOL/L (ref 20–33)
CREAT SERPL-MCNC: 7.52 MG/DL (ref 0.5–1.4)
ERYTHROCYTE [DISTWIDTH] IN BLOOD BY AUTOMATED COUNT: 62.6 FL (ref 35.9–50)
GFR SERPLBLD CREATININE-BSD FMLA CKD-EPI: 7 ML/MIN/1.73 M 2
GLUCOSE SERPL-MCNC: 175 MG/DL (ref 65–99)
HCT VFR BLD AUTO: 30.9 % (ref 42–52)
HGB BLD-MCNC: 9.9 G/DL (ref 14–18)
MCH RBC QN AUTO: 31.6 PG (ref 27–33)
MCHC RBC AUTO-ENTMCNC: 32 G/DL (ref 32.3–36.5)
MCV RBC AUTO: 98.7 FL (ref 81.4–97.8)
PHOSPHATE SERPL-MCNC: 7.1 MG/DL (ref 2.5–4.5)
PLATELET # BLD AUTO: 162 K/UL (ref 164–446)
PMV BLD AUTO: 11 FL (ref 9–12.9)
POTASSIUM SERPL-SCNC: 4.4 MMOL/L (ref 3.6–5.5)
RBC # BLD AUTO: 3.13 M/UL (ref 4.7–6.1)
SODIUM SERPL-SCNC: 135 MMOL/L (ref 135–145)
WBC # BLD AUTO: 8.6 K/UL (ref 4.8–10.8)

## 2025-01-23 PROCEDURE — 94760 N-INVAS EAR/PLS OXIMETRY 1: CPT

## 2025-01-23 PROCEDURE — 85027 COMPLETE CBC AUTOMATED: CPT

## 2025-01-23 PROCEDURE — A9270 NON-COVERED ITEM OR SERVICE: HCPCS | Performed by: PHYSICIAN ASSISTANT

## 2025-01-23 PROCEDURE — 90945 DIALYSIS ONE EVALUATION: CPT

## 2025-01-23 PROCEDURE — 700102 HCHG RX REV CODE 250 W/ 637 OVERRIDE(OP): Performed by: PHYSICIAN ASSISTANT

## 2025-01-23 PROCEDURE — 99239 HOSP IP/OBS DSCHRG MGMT >30: CPT | Performed by: GENERAL PRACTICE

## 2025-01-23 PROCEDURE — 700102 HCHG RX REV CODE 250 W/ 637 OVERRIDE(OP): Performed by: INTERNAL MEDICINE

## 2025-01-23 PROCEDURE — 700102 HCHG RX REV CODE 250 W/ 637 OVERRIDE(OP): Performed by: GENERAL PRACTICE

## 2025-01-23 PROCEDURE — 80069 RENAL FUNCTION PANEL: CPT

## 2025-01-23 PROCEDURE — 770010 HCHG ROOM/CARE - REHAB SEMI PRIVAT*

## 2025-01-23 PROCEDURE — A9270 NON-COVERED ITEM OR SERVICE: HCPCS | Performed by: PHYSICAL MEDICINE & REHABILITATION

## 2025-01-23 PROCEDURE — A9270 NON-COVERED ITEM OR SERVICE: HCPCS | Performed by: INTERNAL MEDICINE

## 2025-01-23 PROCEDURE — 3E1M39Z IRRIGATION OF PERITONEAL CAVITY USING DIALYSATE, PERCUTANEOUS APPROACH: ICD-10-PCS | Performed by: PHYSICAL MEDICINE & REHABILITATION

## 2025-01-23 PROCEDURE — 700102 HCHG RX REV CODE 250 W/ 637 OVERRIDE(OP): Performed by: PHYSICAL MEDICINE & REHABILITATION

## 2025-01-23 PROCEDURE — A9270 NON-COVERED ITEM OR SERVICE: HCPCS | Performed by: GENERAL PRACTICE

## 2025-01-23 PROCEDURE — 99223 1ST HOSP IP/OBS HIGH 75: CPT | Performed by: PHYSICAL MEDICINE & REHABILITATION

## 2025-01-23 PROCEDURE — 36415 COLL VENOUS BLD VENIPUNCTURE: CPT

## 2025-01-23 RX ORDER — POLYETHYLENE GLYCOL 3350 17 G/17G
1 POWDER, FOR SOLUTION ORAL
Status: DISCONTINUED | OUTPATIENT
Start: 2025-01-23 | End: 2025-01-23

## 2025-01-23 RX ORDER — METHOCARBAMOL 500 MG/1
500 TABLET, FILM COATED ORAL 4 TIMES DAILY
Status: DISCONTINUED | OUTPATIENT
Start: 2025-01-23 | End: 2025-01-30

## 2025-01-23 RX ORDER — AMOXICILLIN 250 MG
2 CAPSULE ORAL EVERY EVENING
Status: DISCONTINUED | OUTPATIENT
Start: 2025-01-23 | End: 2025-01-23

## 2025-01-23 RX ORDER — SEVELAMER CARBONATE 800 MG/1
1600 TABLET, FILM COATED ORAL
Status: ON HOLD
Start: 2025-01-23 | End: 2025-02-11

## 2025-01-23 RX ORDER — GENTAMICIN SULFATE 1 MG/G
CREAM TOPICAL DAILY
Status: DISCONTINUED | OUTPATIENT
Start: 2025-01-23 | End: 2025-02-14 | Stop reason: HOSPADM

## 2025-01-23 RX ORDER — MONTELUKAST SODIUM 10 MG/1
10 TABLET ORAL
Status: CANCELLED | OUTPATIENT
Start: 2025-01-23

## 2025-01-23 RX ORDER — GABAPENTIN 100 MG/1
100 CAPSULE ORAL 2 TIMES DAILY
Status: CANCELLED | OUTPATIENT
Start: 2025-01-23

## 2025-01-23 RX ORDER — LIDOCAINE 4 G/G
1-2 PATCH TOPICAL
Status: DISCONTINUED | OUTPATIENT
Start: 2025-01-23 | End: 2025-02-14 | Stop reason: HOSPADM

## 2025-01-23 RX ORDER — TAMSULOSIN HYDROCHLORIDE 0.4 MG/1
0.4 CAPSULE ORAL EVERY MORNING
Status: CANCELLED | OUTPATIENT
Start: 2025-01-24

## 2025-01-23 RX ORDER — GUAIFENESIN 600 MG/1
600 TABLET, EXTENDED RELEASE ORAL EVERY 12 HOURS
Status: DISCONTINUED | OUTPATIENT
Start: 2025-01-23 | End: 2025-02-07

## 2025-01-23 RX ORDER — TAMSULOSIN HYDROCHLORIDE 0.4 MG/1
0.4 CAPSULE ORAL EVERY MORNING
Status: DISCONTINUED | OUTPATIENT
Start: 2025-01-24 | End: 2025-01-30

## 2025-01-23 RX ORDER — ACETAMINOPHEN 325 MG/1
650 TABLET ORAL EVERY 4 HOURS PRN
Status: DISCONTINUED | OUTPATIENT
Start: 2025-01-23 | End: 2025-02-14 | Stop reason: HOSPADM

## 2025-01-23 RX ORDER — OXYCODONE HYDROCHLORIDE 10 MG/1
10 TABLET ORAL
Status: CANCELLED | OUTPATIENT
Start: 2025-01-23

## 2025-01-23 RX ORDER — OXYCODONE HYDROCHLORIDE 5 MG/1
5 TABLET ORAL
Status: DISCONTINUED | OUTPATIENT
Start: 2025-01-23 | End: 2025-01-29

## 2025-01-23 RX ORDER — BISACODYL 5 MG
5 TABLET, DELAYED RELEASE (ENTERIC COATED) ORAL
Status: DISCONTINUED | OUTPATIENT
Start: 2025-01-23 | End: 2025-02-14 | Stop reason: HOSPADM

## 2025-01-23 RX ORDER — GABAPENTIN 100 MG/1
100 CAPSULE ORAL 2 TIMES DAILY
Status: DISCONTINUED | OUTPATIENT
Start: 2025-01-23 | End: 2025-02-03

## 2025-01-23 RX ORDER — SODIUM PHOSPHATE,MONO-DIBASIC 19G-7G/118
1 ENEMA (ML) RECTAL
Status: DISCONTINUED | OUTPATIENT
Start: 2025-01-23 | End: 2025-01-26

## 2025-01-23 RX ORDER — GUAIFENESIN 600 MG/1
600 TABLET, EXTENDED RELEASE ORAL EVERY 12 HOURS
Status: CANCELLED | OUTPATIENT
Start: 2025-01-23

## 2025-01-23 RX ORDER — AMLODIPINE BESYLATE 5 MG/1
2.5 TABLET ORAL EVERY MORNING
Status: DISCONTINUED | OUTPATIENT
Start: 2025-01-24 | End: 2025-01-28

## 2025-01-23 RX ORDER — TERAZOSIN 5 MG/1
5 CAPSULE ORAL
Status: CANCELLED | OUTPATIENT
Start: 2025-01-23

## 2025-01-23 RX ORDER — AMOXICILLIN 250 MG
2 CAPSULE ORAL 2 TIMES DAILY
Status: DISCONTINUED | OUTPATIENT
Start: 2025-01-23 | End: 2025-02-14 | Stop reason: HOSPADM

## 2025-01-23 RX ORDER — CLOPIDOGREL BISULFATE 75 MG/1
75 TABLET ORAL DAILY
Status: CANCELLED | OUTPATIENT
Start: 2025-01-24

## 2025-01-23 RX ORDER — SEVELAMER CARBONATE 800 MG/1
1600 TABLET, FILM COATED ORAL
Status: CANCELLED | OUTPATIENT
Start: 2025-01-23

## 2025-01-23 RX ORDER — METHOCARBAMOL 500 MG/1
500 TABLET, FILM COATED ORAL 4 TIMES DAILY
Status: ON HOLD
Start: 2025-01-23 | End: 2025-02-11

## 2025-01-23 RX ORDER — ROSUVASTATIN CALCIUM 20 MG/1
20 TABLET, COATED ORAL
Status: CANCELLED | OUTPATIENT
Start: 2025-01-23

## 2025-01-23 RX ORDER — TERAZOSIN 5 MG/1
5 CAPSULE ORAL
Status: DISCONTINUED | OUTPATIENT
Start: 2025-01-23 | End: 2025-01-30

## 2025-01-23 RX ORDER — POLYETHYLENE GLYCOL 3350 17 G/17G
1 POWDER, FOR SOLUTION ORAL
Status: DISCONTINUED | OUTPATIENT
Start: 2025-01-23 | End: 2025-02-14 | Stop reason: HOSPADM

## 2025-01-23 RX ORDER — AMLODIPINE BESYLATE 5 MG/1
2.5 TABLET ORAL EVERY MORNING
Status: CANCELLED | OUTPATIENT
Start: 2025-01-24

## 2025-01-23 RX ORDER — OXYCODONE HYDROCHLORIDE 5 MG/1
5 TABLET ORAL
Status: CANCELLED | OUTPATIENT
Start: 2025-01-23

## 2025-01-23 RX ORDER — GABAPENTIN 100 MG/1
100 CAPSULE ORAL 2 TIMES DAILY
Status: DISCONTINUED | OUTPATIENT
Start: 2025-01-23 | End: 2025-01-23 | Stop reason: HOSPADM

## 2025-01-23 RX ORDER — ECHINACEA PURPUREA EXTRACT 125 MG
2 TABLET ORAL PRN
Status: DISCONTINUED | OUTPATIENT
Start: 2025-01-23 | End: 2025-02-14 | Stop reason: HOSPADM

## 2025-01-23 RX ORDER — CLOPIDOGREL BISULFATE 75 MG/1
75 TABLET ORAL DAILY
Status: DISCONTINUED | OUTPATIENT
Start: 2025-01-24 | End: 2025-02-14 | Stop reason: HOSPADM

## 2025-01-23 RX ORDER — FUROSEMIDE 40 MG/1
40 TABLET ORAL 2 TIMES DAILY
Status: DISCONTINUED | OUTPATIENT
Start: 2025-01-23 | End: 2025-01-28

## 2025-01-23 RX ORDER — ACETAMINOPHEN 325 MG/1
650 TABLET ORAL EVERY 4 HOURS PRN
Status: CANCELLED | OUTPATIENT
Start: 2025-01-23

## 2025-01-23 RX ORDER — SEVELAMER CARBONATE 800 MG/1
1600 TABLET, FILM COATED ORAL
Status: DISCONTINUED | OUTPATIENT
Start: 2025-01-23 | End: 2025-01-30

## 2025-01-23 RX ORDER — GENTAMICIN SULFATE 1 MG/G
CREAM TOPICAL DAILY
Status: DISCONTINUED | OUTPATIENT
Start: 2025-01-24 | End: 2025-01-23

## 2025-01-23 RX ORDER — METHOCARBAMOL 500 MG/1
500 TABLET, FILM COATED ORAL 4 TIMES DAILY
Status: DISCONTINUED | OUTPATIENT
Start: 2025-01-23 | End: 2025-01-23 | Stop reason: HOSPADM

## 2025-01-23 RX ORDER — MONTELUKAST SODIUM 10 MG/1
10 TABLET ORAL
Status: DISCONTINUED | OUTPATIENT
Start: 2025-01-23 | End: 2025-02-14 | Stop reason: HOSPADM

## 2025-01-23 RX ORDER — ALUMINA, MAGNESIA, AND SIMETHICONE 2400; 2400; 240 MG/30ML; MG/30ML; MG/30ML
20 SUSPENSION ORAL
Status: DISCONTINUED | OUTPATIENT
Start: 2025-01-23 | End: 2025-02-14 | Stop reason: HOSPADM

## 2025-01-23 RX ORDER — METOPROLOL SUCCINATE 100 MG/1
100 TABLET, EXTENDED RELEASE ORAL
Status: DISCONTINUED | OUTPATIENT
Start: 2025-01-23 | End: 2025-02-05

## 2025-01-23 RX ORDER — ONDANSETRON 2 MG/ML
4 INJECTION INTRAMUSCULAR; INTRAVENOUS 4 TIMES DAILY PRN
Status: DISCONTINUED | OUTPATIENT
Start: 2025-01-23 | End: 2025-02-14 | Stop reason: HOSPADM

## 2025-01-23 RX ORDER — GABAPENTIN 100 MG/1
100 CAPSULE ORAL 2 TIMES DAILY
Status: ON HOLD
Start: 2025-01-23 | End: 2025-02-11

## 2025-01-23 RX ORDER — HYDRALAZINE HYDROCHLORIDE 25 MG/1
25 TABLET, FILM COATED ORAL EVERY 8 HOURS PRN
Status: DISCONTINUED | OUTPATIENT
Start: 2025-01-23 | End: 2025-02-14 | Stop reason: HOSPADM

## 2025-01-23 RX ORDER — METHOCARBAMOL 500 MG/1
500 TABLET, FILM COATED ORAL 4 TIMES DAILY
Status: CANCELLED | OUTPATIENT
Start: 2025-01-23

## 2025-01-23 RX ORDER — ONDANSETRON 4 MG/1
4 TABLET, ORALLY DISINTEGRATING ORAL 4 TIMES DAILY PRN
Status: DISCONTINUED | OUTPATIENT
Start: 2025-01-23 | End: 2025-02-14 | Stop reason: HOSPADM

## 2025-01-23 RX ORDER — FUROSEMIDE 20 MG/1
40 TABLET ORAL 2 TIMES DAILY
Status: CANCELLED | OUTPATIENT
Start: 2025-01-23

## 2025-01-23 RX ORDER — OXYCODONE HYDROCHLORIDE 5 MG/1
5 TABLET ORAL
Status: DISCONTINUED | OUTPATIENT
Start: 2025-01-23 | End: 2025-02-04

## 2025-01-23 RX ORDER — TRAZODONE HYDROCHLORIDE 50 MG/1
50 TABLET ORAL
Status: DISCONTINUED | OUTPATIENT
Start: 2025-01-23 | End: 2025-02-14 | Stop reason: HOSPADM

## 2025-01-23 RX ORDER — GENTAMICIN SULFATE 1 MG/G
CREAM TOPICAL DAILY
Status: CANCELLED | OUTPATIENT
Start: 2025-01-24 | End: 2025-01-25

## 2025-01-23 RX ORDER — CARBOXYMETHYLCELLULOSE SODIUM 5 MG/ML
1 SOLUTION/ DROPS OPHTHALMIC PRN
Status: DISCONTINUED | OUTPATIENT
Start: 2025-01-23 | End: 2025-02-14 | Stop reason: HOSPADM

## 2025-01-23 RX ORDER — OXYCODONE HYDROCHLORIDE 10 MG/1
10 TABLET ORAL
Status: DISCONTINUED | OUTPATIENT
Start: 2025-01-23 | End: 2025-02-04

## 2025-01-23 RX ORDER — OMEPRAZOLE 20 MG/1
20 CAPSULE, DELAYED RELEASE ORAL DAILY
Status: DISCONTINUED | OUTPATIENT
Start: 2025-01-24 | End: 2025-02-03

## 2025-01-23 RX ORDER — METOPROLOL SUCCINATE 100 MG/1
100 TABLET, EXTENDED RELEASE ORAL
Status: CANCELLED | OUTPATIENT
Start: 2025-01-23

## 2025-01-23 RX ORDER — ROSUVASTATIN CALCIUM 10 MG/1
20 TABLET, COATED ORAL
Status: DISCONTINUED | OUTPATIENT
Start: 2025-01-23 | End: 2025-02-14 | Stop reason: HOSPADM

## 2025-01-23 RX ORDER — LACTULOSE 10 G/15ML
30 SOLUTION ORAL
Status: DISCONTINUED | OUTPATIENT
Start: 2025-01-23 | End: 2025-02-14 | Stop reason: HOSPADM

## 2025-01-23 RX ADMIN — SENNOSIDES AND DOCUSATE SODIUM 2 TABLET: 50; 8.6 TABLET ORAL at 21:14

## 2025-01-23 RX ADMIN — TAMSULOSIN HYDROCHLORIDE 0.4 MG: 0.4 CAPSULE ORAL at 09:41

## 2025-01-23 RX ADMIN — CLOPIDOGREL BISULFATE 75 MG: 75 TABLET ORAL at 09:42

## 2025-01-23 RX ADMIN — GENTAMICIN SULFATE: 1 CREAM TOPICAL at 18:15

## 2025-01-23 RX ADMIN — METHOCARBAMOL 500 MG: 500 TABLET ORAL at 21:00

## 2025-01-23 RX ADMIN — GUAIFENESIN 600 MG: 600 TABLET, EXTENDED RELEASE ORAL at 09:41

## 2025-01-23 RX ADMIN — MONTELUKAST 10 MG: 10 TABLET, FILM COATED ORAL at 21:15

## 2025-01-23 RX ADMIN — POLYETHYLENE GLYCOL 3350 1 PACKET: 17 POWDER, FOR SOLUTION ORAL at 09:41

## 2025-01-23 RX ADMIN — OXYCODONE HYDROCHLORIDE 10 MG: 10 TABLET ORAL at 01:57

## 2025-01-23 RX ADMIN — OXYCODONE 5 MG: 5 TABLET ORAL at 21:14

## 2025-01-23 RX ADMIN — TERAZOSIN HYDROCHLORIDE 5 MG: 5 CAPSULE ORAL at 21:15

## 2025-01-23 RX ADMIN — APIXABAN 5 MG: 5 TABLET, FILM COATED ORAL at 21:15

## 2025-01-23 RX ADMIN — ROSUVASTATIN CALCIUM 20 MG: 10 TABLET, FILM COATED ORAL at 21:15

## 2025-01-23 RX ADMIN — GABAPENTIN 100 MG: 100 CAPSULE ORAL at 21:14

## 2025-01-23 RX ADMIN — OXYCODONE HYDROCHLORIDE 10 MG: 10 TABLET ORAL at 18:46

## 2025-01-23 RX ADMIN — SENNOSIDES AND DOCUSATE SODIUM 2 TABLET: 50; 8.6 TABLET ORAL at 09:42

## 2025-01-23 RX ADMIN — SEVELAMER CARBONATE 1600 MG: 800 TABLET, FILM COATED ORAL at 18:09

## 2025-01-23 RX ADMIN — APIXABAN 5 MG: 5 TABLET, FILM COATED ORAL at 09:41

## 2025-01-23 RX ADMIN — SEVELAMER CARBONATE 1600 MG: 800 TABLET, FILM COATED ORAL at 09:41

## 2025-01-23 RX ADMIN — METHOCARBAMOL 500 MG: 500 TABLET ORAL at 18:09

## 2025-01-23 RX ADMIN — GUAIFENESIN 600 MG: 600 TABLET ORAL at 21:15

## 2025-01-23 ASSESSMENT — PAIN DESCRIPTION - PAIN TYPE
TYPE: ACUTE PAIN
TYPE: SURGICAL PAIN
TYPE: ACUTE PAIN

## 2025-01-23 ASSESSMENT — FIBROSIS 4 INDEX: FIB4 SCORE: 4.16

## 2025-01-23 ASSESSMENT — LIFESTYLE VARIABLES
TOTAL SCORE: 0
AVERAGE NUMBER OF DAYS PER WEEK YOU HAVE A DRINK CONTAINING ALCOHOL: 0
DOES PATIENT WANT TO STOP DRINKING: NO
HOW MANY TIMES IN THE PAST YEAR HAVE YOU HAD 5 OR MORE DRINKS IN A DAY: 0
ALCOHOL_USE: NO
TOTAL SCORE: 0
EVER_SMOKED: NEVER
HAVE YOU EVER FELT YOU SHOULD CUT DOWN ON YOUR DRINKING: NO
TOTAL SCORE: 0
EVER HAD A DRINK FIRST THING IN THE MORNING TO STEADY YOUR NERVES TO GET RID OF A HANGOVER: NO
ON A TYPICAL DAY WHEN YOU DRINK ALCOHOL HOW MANY DRINKS DO YOU HAVE: 0
CONSUMPTION TOTAL: NEGATIVE
HAVE PEOPLE ANNOYED YOU BY CRITICIZING YOUR DRINKING: NO
EVER FELT BAD OR GUILTY ABOUT YOUR DRINKING: NO

## 2025-01-23 ASSESSMENT — PATIENT HEALTH QUESTIONNAIRE - PHQ9
2. FEELING DOWN, DEPRESSED, IRRITABLE, OR HOPELESS: NOT AT ALL
SUM OF ALL RESPONSES TO PHQ9 QUESTIONS 1 AND 2: 0
1. LITTLE INTEREST OR PLEASURE IN DOING THINGS: NOT AT ALL
1. LITTLE INTEREST OR PLEASURE IN DOING THINGS: NOT AT ALL
SUM OF ALL RESPONSES TO PHQ9 QUESTIONS 1 AND 2: 0
2. FEELING DOWN, DEPRESSED, IRRITABLE, OR HOPELESS: NOT AT ALL

## 2025-01-23 NOTE — PREADMISSION SCREENING NOTE
Pre-Admission Screening Form    Patient Information:   Name: Edy Bennett     MRN: [id number]       : 1945      Age: 79 y.o.   Gender: male      Race: White [7]       Marital Status:  [2]  Family Contact: Kailey Bennett        Relationship: Spouse [17]  Home Phone: [phone]           Cell Phone: [phone]  Advanced Directives: None  Code Status:  FULL  Current Attending Provider: Rosalie Lyon D.O.  Referring Physician: Dr. Lyon      Physiatrist Consult: Dr. Meade       Referral Date: 10/22/2025  Primary Payor Source:  MEDICARE  Secondary Payor Source:  [facility]    Medical Information:   Date of Admission to Acute Care [place]2025  Room Number: T429/00  Rehabilitation Diagnosis: 0016 - Debility (Non-Cardiac, Non-Pulmonary)  Immunization History   Administered Date(s) Administered    COVID-19, mRNA, LNP-S, PF, lilian-sucrose, 30 mcg/0.3 mL 2021, 2022    Covid-19 Mrna (Spikevax) Moderna 12+ Years 2023    Hepatitis B Vaccine, CpG Adjuvanted (Heplisav-B) 2024, 2024, 2024, 2024    Influenza Vaccine Adult HD 2023    PFIZER PURPLE CAP SARS-COV-2 VACCINATION (12+) 2021, 03/10/2021    Zoster Vaccine Live (ZVL) (Zostavax) - HISTORICAL DATA 2013     No Known Allergies  Past Medical History:   Diagnosis Date    A-V fistula (HCC)     Left Arm    Arrhythmia 2024    Due to kidney failure treatment    Breath shortness     with exertion    Cataract     shon iol    Congestive heart failure (HCC)     Diabetes (HCC)     not on any medication at this time, being monitored at dialysis    Dialysis patient (Formerly [facility])      at [facility]    Dialysis patient (Formerly [facility]) 2025    Starting Peritoneal Dialysis @ Home on 2025. Follows Nephrologist MD Milian.    Foot ulcer (HCC)     left foot. being seen by wound care    Hemorrhagic disorder (Formerly [facility])     plavix eliquis    High cholesterol     Hypertension 2024     states controlled    DARIEN (obstructive sleep apnea) 01/17/2025    O2 @ 2.5L Per Patient's Wife.    Pain     Peritoneal dialysis catheter in place (Formerly [facility]) 01/17/2025    Pneumonia     3/24    Renal disorder     Sleep apnea 01/10/2025    Home sleep study done 1/3/2025 - no results yet.    Stroke (Formerly [facility]) 2021    tia     Past Surgical History:   Procedure Laterality Date    FL VEIN BYPASS GRAFT,FEM-TIBIAL Left 1/20/2025    Procedure: LEFT LOWER EXTREMITY BYPASS, POPLITIAL TO TIBIAL BYPASS;  Surgeon: Marco Antonio Miller M.D.;  Location: [facility];  Service: Vascular    AORTOGRAM WITH RUNOFF  1/14/2025    Procedure: AORTOGRAM WITH RUNOFF, right peroneal artery intravascular lithotripsy and stent placement;  Surgeon: Marco Antonio Miller M.D.;  Location: [facility];  Service: Vascular    INGUINAL HERNIA LAPAROSCOPIC N/A 12/18/2024    Procedure: LAPAROSCOPIC LEFT INGUINAL HERNIA REPAIR WITH MESH, LAPAROSCOPIC REPOSITIONING OF PERITONEAL DIALYSIS CATHETER;  Surgeon: Marco Antonio Miller M.D.;  Location: [facility];  Service: Vascular    CATH PLACEMENT CAPD N/A 12/18/2024    Procedure: REPOSITION, CATHETER, CAPD;  Surgeon: Marco Antonio Miller M.D.;  Location: [facility];  Service: Vascular    CATH PLACEMENT CAPD N/A 09/16/2024    Procedure: LAPAROSCOPIC INSERTION OF PERITONEAL DIALYSIS CATHETER PLACEMENT;  Surgeon: Marco Antonio Miller M.D.;  Location: SURGERY SAME DAY [facility];  Service: Vascular    AV FISTULA CREATION Left 07/01/2024    Procedure: CREATION OF LEFT UPPER EXTREMITY DIALYSIS FISTULA;  Surgeon: Shilo Mercedes M.D.;  Location: [facility];  Service: General    OTHER ORTHOPEDIC SURGERY  2004    bunion r foot    OTHER      tonsils adenoids    OTHER      dialysis catheter in upper right chest    OTHER      shon iol    OTHER CARDIAC SURGERY      cardiac stents       History Leading to Admission, Conditions that Caused the Need for Rehab (CMS):   Roderick Elias  M.D.  Physician  [facility]     H&P     Signed     Date of Service: 1/20/2025  4:01 PM    Expand All Collapse All    Hospital Medicine History & Physical Note     Date of Service  1/20/2025     Primary Care Physician  Denise Carver M.D.     Consultants  vascular surgery, nephrology     Specialist Names: Dr Angela Dailey of nephrology     Code Status  No Order     Chief Complaint  No chief complaint on file.        History of Presenting Illness  Edy Bennett is a 79 y.o. male who presented 1/20/2025 with severe peripheral vascular disease and patient underwent left leg bypass surgery with Dr. Miller.  Continue at this point with postoperative pain management  Patient does have end-stage renal disease on peritoneal dialysis will get nephrology to continue with peritoneal dialysis while he is here.  Patient will need monitoring of his labs, vitals and optimization of his medication management including hypertension.     I discussed the plan of care with patient, bedside RN, and Dr Miller  and Dr Dailey of Nephrology.     Justification for Admission Status  I anticipate this patient is appropriate for observation status at this time because   patient requires postoperative dialysis      Patient will need a Med/Surg bed on SURGICAL service .  The need is secondary to postoperative dialysis.     Peripheral arterial disease (HCC)- (present on admission)  Assessment & Plan  Patient has undergone left leg bypass this afternoon  Continue pain management  Diet per surgery  Optimize fluid status     HTN (hypertension)- (present on admission)  Assessment & Plan  Optimize blood pressure management keep systolic blood pressure less than 140 diastolic under 90  Continue metoprolol  mg nightly, Norvasc 5 mg daily, as needed labetalol     ESRD on peritoneal dialysis (HCC)- (present on admission)  Assessment & Plan  Continue peritoneal dialysis and nephrology consultation     BPH (benign prostatic  hyperplasia)  Assessment & Plan  Continue terazosin and Flomax  Patient follows with urology     Insomnia- (present on admission)  Assessment & Plan  Continue tramadol 50 mg nightly     Dyslipidemia- (present on admission)  Assessment & Plan  Low-fat low-cholesterol diet  Crestor nightly  Fasting lipid panel     Anemia in chronic renal disease- (present on admission)  Assessment & Plan  Monitor H&H if drops below 7 or 21 transfuse currently he does not require transfusion  Check iron panel           VTE prophylaxis: SCDs/TEDs      Marco Antonio Miller M.D.  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  Physician  Physical Medicine & Rehab     Consults     Signed     Date of Service: 1/22/2025 11:45 AM  Consult Orders  IP Consult For Physiatry [[id number]] ordered by Rosalie Lyon D.O. at 01/22/25 0902       Expand All Collapse All                                                    Physical Medicine and Rehabilitation Consultation                                                                                  Date of initial consultation: 1/22/2025  Requesting provider: Rosalei Lyon D.O.   Consulting provider: Livan Meade D.O.  Reason for consultation: assess for acute inpatient rehab appropriateness  LOS: 2 Day(s)     Chief complaint: bypass, weakness     HPI: The patient is a 79 y.o.  male with a past medical history of moderate aortic stenosis, hypertension, CAD with stents on eliquis, ESRD on PD, BPH and severe PAD who presented on 1/20/2025 10:52 AM for elective left bypass graft.   2025, the patient was taken to the OR for left popliteal to posterior tibial artery bypass by Marco Antonio Miller MD. Current labs remarkable for WBC 8.6, Hgb 9.7, platelets 157, creatinine 7.2, GFR 7. Vitals remarkable for 0 to 2 L supplemental O2.  Patient seen by PT with recommendation for post-acute placement. PM&R consulted to evaluate for possible inpatient rehab admission.       On my evaluation, the patient is complaining of left leg weakness, pain and swelling. He states he has decrease sensation over the lower left leg. His wife states she helps him with peritoneal dialysis and that she has 2 months of supplies at home. Patient denies fevers, chills, headache, chest pain, shortness of breath, cough, abdominal pain, nausea, vomiting, dysuria.       Social Hx:  Patient lives in a single-story house with spouse, who is able to help upon discharge.  0 DARIEL  At prior level of function, patient was independent with front wheel walker.     THERAPY:  PT: Functional mobility   : Ambulating 1 foot mod assist with front wheel walker, sit to stand mod assist.  Unable to do bed/chair transfer.     OT: ADLs  Pending     SLP:   None     IMAGIN2024: Ultrasound extremity artery lower bilateral with GLENYS  IMPRESSION:  1.  Occlusions likely chronic are noted in the distal anterior and posterior tibial arteries.  2.  No proximal high-grade stenoses or occlusions in the lower extremity arterial vasculature.     PROCEDURES:  2025: Marco Antonio Miller MD  Left popliteal to posterior tibial artery bypass with ipsilateral reversed greater saphenous vein      PMH:    Past Medical History  Past Medical History:  Diagnosis Date   A-V fistula ([facility])      Left Arm   Arrhythmia 2024    Due to kidney failure treatment   Breath shortness      with exertion   Cataract      shon iol   Congestive heart failure (HCC)     Diabetes ([facility])      not on any medication at this time, being monitored at dialysis   Dialysis patient  (Formerly [facility])      monday wednesday friday at [facility]   Dialysis patient (Formerly [facility]) 01/17/2025    Starting Peritoneal Dialysis @ Home on 1/17/2025. Follows Nephrologist MD Milian.   Foot ulcer (Formerly [facility])      left foot. being seen by wound care   Hemorrhagic disorder (Formerly [facility])      plavix eliquis   High cholesterol     Hypertension 05/24/2024    states controlled   DARIEN (obstructive sleep apnea) 01/17/2025    O2 @ 2.5L Per Patient's Wife.   Pain     Peritoneal dialysis catheter in place (Formerly [facility]) 01/17/2025   Pneumonia      3/24   Renal disorder     Sleep apnea 01/10/2025    Home sleep study done 1/3/2025 - no results yet.   Stroke (Formerly [facility]) 2021    tia          PSH:    Past Surgical History  Past Surgical History:  Procedure Laterality Date   LA VEIN BYPASS GRAFT,FEM-TIBIAL Left 1/20/2025    Procedure: LEFT LOWER EXTREMITY BYPASS, POPLITIAL TO TIBIAL BYPASS;  Surgeon: Marco Antonio Miller M.D.;  Location: SURGERY [facility];  Service: Vascular   AORTOGRAM WITH RUNOFF   1/14/2025    Procedure: AORTOGRAM WITH RUNOFF, right peroneal artery intravascular lithotripsy and stent placement;  Surgeon: Marco Antonio Miller M.D.;  Location: [facility];  Service: Vascular   INGUINAL HERNIA LAPAROSCOPIC N/A 12/18/2024    Procedure: LAPAROSCOPIC LEFT INGUINAL HERNIA REPAIR WITH MESH, LAPAROSCOPIC REPOSITIONING OF PERITONEAL DIALYSIS CATHETER;  Surgeon: Marco Antonio Miller M.D.;  Location: SURGERY [facility];  Service: Vascular   CATH PLACEMENT CAPD N/A 12/18/2024    Procedure: REPOSITION, CATHETER, CAPD;  Surgeon: Marco Antonio Miller M.D.;  Location: SURGERY [facility];  Service: Vascular   CATH PLACEMENT CAPD N/A 09/16/2024    Procedure: LAPAROSCOPIC INSERTION OF PERITONEAL DIALYSIS CATHETER PLACEMENT;  Surgeon: Marco Antonio Miller M.D.;  Location: SURGERY SAME DAY [facility];  Service: Vascular   AV FISTULA CREATION Left 07/01/2024    Procedure: CREATION OF LEFT UPPER EXTREMITY DIALYSIS FISTULA;  Surgeon: Shilo Mercedes M.D.;  Location:  "SURGERY [facility];  Service: General   OTHER ORTHOPEDIC SURGERY   2004    bunion r foot   OTHER        tonsils adenoids   OTHER        dialysis catheter in upper right chest   OTHER        shon iol   OTHER CARDIAC SURGERY        cardiac stents          FHX:    Family History  Family History  Problem Relation Age of Onset   Cancer Mother          Brain cancer   Heart Disease Father          Several By-pass Surgeries   Cancer Daughter          Thyroid Cancer   Heart Disease Brother          Fatal Heart Attack          Medications:    Current Facility-Administered Medications  Medication Dose   Pharmacy Consult Request ...Pain Management Review 1 Each  1 Each   ondansetron (Zofran) syringe/vial injection 4 mg  4 mg   oxyCODONE immediate-release (Roxicodone) tablet 5 mg  5 mg    Or   oxyCODONE immediate release (Roxicodone) tablet 10 mg  10 mg    Or   HYDROmorphone (Dilaudid) injection 0.5 mg  0.5 mg   apixaban (Eliquis) tablet 5 mg  5 mg   senna-docusate (Pericolace Or Senokot S) 8.6-50 MG per tablet 2 Tablet  2 Tablet    And   polyethylene glycol/lytes (Miralax) Packet 1 Packet  1 Packet   polyethylene glycol/lytes (Miralax) Packet 1 Packet  1 Packet   gentamicin (Garamycin) 0.1 % cream     amLODIPine (Norvasc) tablet 2.5 mg  2.5 mg   clopidogrel (Plavix) tablet 75 mg  75 mg   furosemide (Lasix) tablet 40 mg  40 mg   metoprolol SR (Toprol XL) tablet 100 mg  100 mg   montelukast (Singulair) tablet 10 mg  10 mg   nitroglycerin (Nitrostat) tablet 0.4 mg  0.4 mg   rosuvastatin (Crestor) tablet 20 mg  20 mg   sevelamer carbonate (Renvela) tablet 800 mg  800 mg   tamsulosin (Flomax) capsule 0.4 mg  0.4 mg   terazosin (Hytrin) capsule 5 mg  5 mg   acetaminophen (Tylenol) tablet 650 mg  650 mg        Allergies:    Allergies  No Known Allergies          Physical Exam:  Vitals: /77   Pulse 66   Temp 36.2 °C (97.2 °F) (Temporal)   Resp 18   Ht 1.778 m (5' 10\")   Wt 72.7 kg (160 lb 4.4 oz)   SpO2 97%   Gen: " NAD  Head: Normocephalic, atraumatic  Eyes/ Nose/ Mouth: PERRL, moist mucous membranes  Cardio: good distal perfusion, warm extremities  Pulm: normal respiratory effort, no cyanosis   Abd: Soft, Nontender, Nondistended   Ext: Left leg with prevena wound vac over incision, leg swelling, and L great toe necrotic wound.   Skin:                Mental status: Alert. Oriented to person, place, month and year.   Speech: fluent, no aphasia or dysarthria     Motor:                              Upper Extremity  Myotome R L  Shoulder flexion C5 5 5  Elbow flexion C5 5 5  Wrist extension C6 5 5  Elbow extension C7 5 5  Finger flexion C8 5 5  Finger abduction T1 5 5       Lower Extremity Myotome R L  Hip flexion L2 5 2/5  Knee extension L3 5 2/5  Ankle dorsiflexion L4 5 2/5  Toe extension L5 5 2/5  Ankle plantarflexion S1 5 2/5     Sensory:   Decreased sensation to light touch over left lower leg, otherwise intact to light touch through out arms and legs.     DTRs: 2+ in bilateral  biceps  No clonus at bilateral ankles  Negative babinski b/l  Negative Buckley b/l      Tone: no spasticity noted, no cogwheeling noted        Labs: Reviewed and significant for     Recent Labs    01/20/25  1233 01/21/25  0017 01/22/25  0618  RBC 3.99* 3.47* 3.09*  HEMOGLOBIN 12.6* 10.9* 9.7*  HEMATOCRIT 39.4* 34.6* 30.3*  PLATELETCT 186 173 157*  PROTHROMBTM 16.3*  --   --   INR 1.31*  --   --        Recent Labs    01/20/25  1233 01/21/25  0017 01/22/25  0618  SODIUM 141 136 135  POTASSIUM 4.6 4.4 4.3  CHLORIDE 97 93* 95*  CO2 23 23 24  GLUCOSE 102* 320* 160*  BUN 68* 72* 68*  CREATININE 7.60* 7.51* 7.20*  CALCIUM 9.0 8.6 8.6       Recent Results  Recent Results (from the past 24 hours)  CBC WITHOUT DIFFERENTIAL    Collection Time: 01/22/25  6:18 AM  Result Value Ref Range    WBC 8.6 4.8 - 10.8 K/uL    RBC 3.09 (L) 4.70 - 6.10 M/uL    Hemoglobin 9.7 (L) 14.0 - 18.0 g/dL    Hematocrit 30.3 (L) 42.0 - 52.0 %    MCV 98.1 (H) 81.4 - 97.8 fL     MCH 31.4 27.0 - 33.0 pg    MCHC 32.0 (L) 32.3 - 36.5 g/dL    RDW 63.0 (H) 35.9 - 50.0 fL    Platelet Count 157 (L) 164 - 446 K/uL    MPV 11.3 9.0 - 12.9 fL  Renal Function Panel    Collection Time: 01/22/25  6:18 AM  Result Value Ref Range    Sodium 135 135 - 145 mmol/L    Potassium 4.3 3.6 - 5.5 mmol/L    Chloride 95 (L) 96 - 112 mmol/L    Co2 24 20 - 33 mmol/L    Glucose 160 (H) 65 - 99 mg/dL    Creatinine 7.20 (HH) 0.50 - 1.40 mg/dL    Bun 68 (H) 8 - 22 mg/dL    Calcium 8.6 8.5 - 10.5 mg/dL    Correct Calcium 9.6 8.5 - 10.5 mg/dL    Phosphorus 7.5 (H) 2.5 - 4.5 mg/dL    Albumin 2.7 (L) 3.2 - 4.9 g/dL  ESTIMATED GFR    Collection Time: 01/22/25  6:18 AM  Result Value Ref Range    GFR (CKD-EPI) 7 (A) >60 mL/min/1.73 m 2             ASSESSMENT:  Patient is a 79 y.o. male admitted with severe PAD now s/p left popliteal to posterior tibial artery bypass.        [facility] Code / Diagnosis to Support: 0016 - Debility (Non-Cardiac, Non-Pulmonary)  See DISPO details below for recommendations on appropriate level of rehab for this diagnosis.     Barriers to transfer include: Insurance authorization, TCCs to verify disposition, medical clearance and bed availability      Assessment and Plan:     DISPO:  - patient is a good candidate for IRF. Need to confirm that the patient has all needed supplies and arrangements for successful home peritoneal dialysis. Another potential barrier to IRF is wound vac, however is a prevena vac and will likely be able to come off prior to discharge home. Would need to confirm with surgeon.   - He has deficits in ADLs and mobility secondary to recent bypass graft. Patient has good family support from spouse and a stable discharge environment which is single-story home.   - TCC to assist with insurance auth and DC support    - PMR to follow in the periphery for rehab appropriateness, please reach out with questions or request for medical management      Medical Complexity:     Impaired mobility and  ADLs  -PT and OT while in-house      Severe PAD  -has left great toe wound, appears necrotic at the tip of the toe.   -S/p Left popliteal to posterior tibial artery bypass with ipsilateral reversed greater saphenous vein by Marco Antonio Miller MD on 1/20/2025.  -wound care, currently with prevena wound vac. Definitive plan needed.   -Eliquis, Plavix     Pain management  -prn Tylenol, Oxy 5, Oxy 10, IV Dilaudid.  Using mostly Oxy 5 for pain relief.     ESRD  -On peritoneal dialysis at home, which was continued for hospitalization  -Nephrology following  -Lasix 40 mg po BID  -Sevelamer  -PD exit site skin care  -will need to make sure all needed arrangements are made for home PD prior to IRF. Wife says she has 2 months of supplies ready to go.      Anemia  -Likely from renal disease  -Hgb 12.6 --> 10.9 --> 9.7.  Will need to be monitored closely for need for transfusion.     Hypertension  -Amlodipine  -Metoprolol     Dyslipidemia  -Crestor     Seasonal allergies  -Montelukast     BPH  -Flomax     Bowel management  -MiraLAX, senna/docusate  -Last BM 1/21     DVT PPX: Eliquis as above        Thank you for allowing us to participate in the care of this patient.      Total time spent: 85 minutes.      Livan Meade D.O.   Physical Medicine and Rehabilitation      Please note that this dictation was created using voice recognition software. I have made every reasonable attempt to correct obvious errors, but there may be errors of grammar and possibly content that I did not discover before finalizing the note.        Co-morbidities:  as listed above and below   Potential Risk - Complications: Contractures, Deep Vein Thrombosis, Incontinence, Pain, Perceptual Impairment, Pressure Ulcer, and infection and wound risk Renal risk  Level of Risk: High    Ongoing Medical Management Needed (Medical/Nursing Needs):   Patient Active Problem List    Diagnosis Date Noted    Anemia in chronic renal disease 01/20/2025    Peripheral  "arterial disease (HCC) 01/20/2025    Dyslipidemia 01/20/2025    Peripheral neuropathy 01/20/2025    Insomnia 01/20/2025    BPH (benign prostatic hyperplasia) 01/20/2025    Renal disease 12/18/2024    ESRD on peritoneal dialysis (HCC) 12/18/2024    HTN (hypertension) 12/18/2024       Current Vital Signs:   Temperature: 36.4 °C (97.5 °F) Pulse: 81 Respiration: 18 Blood Pressure : 116/66  Weight: 72.7 kg (160 lb 4.4 oz) Height: 177.8 cm (5' 10\")  Pulse Oximetry: 98 % O2 (LPM): 0      Completed Laboratory Reports:  Recent Labs     01/20/25  1233 01/21/25  0017 01/22/25  0618 01/23/25  0224   WBC 7.3 12.5* 8.6 8.6   HEMOGLOBIN 12.6* 10.9* 9.7* 9.9*   HEMATOCRIT 39.4* 34.6* 30.3* 30.9*   PLATELETCT 186 173 157* 162*   SODIUM 141 136 135 135   POTASSIUM 4.6 4.4 4.3 4.4   BUN 68* 72* 68* 64*   CREATININE 7.60* 7.51* 7.20* 7.52*   ALBUMIN 3.7 3.2 2.7* 3.0*   GLUCOSE 102* 320* 160* 175*   INR 1.31*  --   --   --      Additional Labs: Not Applicable    Prior Living Situation:   Housing / Facility: 1 Story House  Steps Into Home: 0  Steps In Home: 0  Lives with - Patient's Self Care Capacity: Spouse  Equipment Owned: Single Point Cane, 4-Wheel Walker, Front-Wheel Walker, Grab Bar(s) In Tub / Shower, Grab Bar(s) By Toilet    Prior Level of Function / Living Situation:   Physical Therapy: Prior Services: Intermittent Physical Support for ADL Per Family  Housing / Facility: 1 Story House  Steps Into Home: 0  Steps In Home: 0  Bathroom Set up: Walk In Shower, Built-In Shower Chair, Grab Bars  Equipment Owned: Single Point Cane, 4-Wheel Walker, Front-Wheel Walker, Grab Bar(s) In Tub / Shower, Grab Bar(s) By Toilet  Lives with - Patient's Self Care Capacity: Spouse  Bed Mobility: Independent  Transfer Status: Independent  Ambulation: Independent  Assistive Devices Used: Front-Wheel Walker  Current Level of Function:   Gait Level Of Assist: Moderate Assist  Assistive Device: Front Wheel Walker  Distance (Feet): 1  Deviation: " Bradykinetic, Shuffled Gait  Weight Bearing Status: not putting weight on L LE 2/2 pain and weakness  Supine to Sit: Moderate Assist  Sit to Supine: Minimal Assist  Scooting: Minimal Assist  Comments: HOB slightly elevated  Sit to Stand: Moderate Assist  Bed, Chair, Wheelchair Transfer: Unable to Participate  Sitting in Chair: NT  Sitting Edge of Bed: >10 min  Standing: <1 min  Occupational Therapy:   Self Feeding: Independent  Grooming / Hygiene: Independent  Bathing: Independent  Dressing: Independent  Toileting: Independent  Medication Management: Independent  Laundry: Independent  Kitchen Mobility: Independent  Finances: Independent  Home Management: Independent  Shopping: Independent  Prior Level Of Mobility: Independent Without Device in Community, Independent Without Device in Home  Driving / Transportation: Driving Independent  Prior Services: Intermittent Physical Support for ADL Per Family  Housing / Facility: [address]  Current Level of Function:   Lower Body Dressing: Moderate Assist (don/doff socks)  Speech Language Pathology:      Rehabilitation Prognosis/Potential: Good  Estimated Length of Stay: 10-14 days    Nursing:      Continent    Scope/Intensity of Services Recommended:  Physical Therapy: 1.5 hr / day  5 days / week. Therapeutic Interventions Required: Maximize Endurance, Mobility, Strength, and Safety  Occupational Therapy: 1.5 hr / day 5 days / week. Therapeutic Interventions Required: Maximize Self Care, ADLs, IADLs, and Energy Conservation  Rehabilitation Nursin/7. Therapeutic Interventions Required: Monitor Pain, Skin, Wound(s), Vital Signs, Intake and Output, Labs, Safety, and Family Training  Rehabilitation Physician: 3 - 5 days / week. Therapeutic Interventions Required: Medical Management  Respiratory Care: evaluate. Therapeutic Interventions Required: Pulmonary Toileting  Dietician: Consult . Therapeutic Interventions Required: nutritional need     He requires 24-hour  rehabilitation nursing to manage bowel and bladder function, skin care, surgical incision, nutrition and fluid intake, pulmonary hygiene, pain control, safety, medication management, and patient/family goals. In addition, rehabilitation nursing will reiterate and reinforce therapy skills and equipment use, including ADLs, as well as provide education to the patient and family. Edy Bennett is willing to participate in and is able to tolerate the proposed plan of care.    Rehabilitation Goals and Plan (Expected frequency & duration of treatment in the IRF):   Return to the Community, Modified Independent Level of Care, Outpatient Support, and Family Able to Provide 24/7 Assistance  Anticipated Date of Rehabilitation Admission: 01/23/2025  Patient/Family oriented IRF level of care/facility/plan: Yes  Patient/Family willing to participate in IRF care/facility/plan: Yes  Patient able to tolerate IRF level of care proposed: Yes  Patient has potential to benefit IRF level of care proposed: Yes  Comments: Not Applicable    Special Needs or Precautions - Medical Necessity:  Safety Concerns/Precautions:  Fall Risk / High Risk for Falls and Balance  Complex Wound Care: Post surgical with prevenna  Pain Management  Special Equipment: PD  Cardiac Precautions  Current Medications:    Current Facility-Administered Medications Ordered in Epic   Medication Dose Route Frequency Provider Last Rate Last Admin    guaiFENesin ER (Mucinex) tablet 600 mg  600 mg Oral Q12HRS Rosalie Lyon D.O.   600 mg at 01/22/25 1138    sevelamer carbonate (Renvela) tablet 1,600 mg  1,600 mg Oral TID WITH MEALS Kingston Pa M.D.   1,600 mg at 01/22/25 1726    Pharmacy Consult Request ...Pain Management Review 1 Each  1 Each Other PHARMACY TO DOSE Keerthi Stapleton P.A.-C.        ondansetron (Zofran) syringe/vial injection 4 mg  4 mg Intravenous Q4HRS PRN Keerthi Stapleton P.A.-C.        oxyCODONE immediate-release (Roxicodone) tablet 5 mg  5 mg  Oral Q3HRS PRN ANJU Mayberry.A.-C.   5 mg at 01/22/25 0851    Or    oxyCODONE immediate release (Roxicodone) tablet 10 mg  10 mg Oral Q3HRS PRN ANJU Mayberry.A.-C.   10 mg at 01/23/25 0157    Or    HYDROmorphone (Dilaudid) injection 0.5 mg  0.5 mg Intravenous Q3HRS PRN ANJU Mayberry.A.-C.        apixaban (Eliquis) tablet 5 mg  5 mg Oral BID Rosalie Fabara, D.O.   5 mg at 01/22/25 2040    senna-docusate (Pericolace Or Senokot S) 8.6-50 MG per tablet 2 Tablet  2 Tablet Oral BID Rosalie Fabkaia, D.O.   2 Tablet at 01/22/25 2039    And    polyethylene glycol/lytes (Miralax) Packet 1 Packet  1 Packet Oral QDAY PRN Rosalie Lyon, D.O.   1 Packet at 01/22/25 0842    polyethylene glycol/lytes (Miralax) Packet 1 Packet  1 Packet Oral DAILY Rosalie Sonali, D.O.   1 Packet at 01/22/25 0844    gentamicin (Garamycin) 0.1 % cream   Topical DAILY Kingston Pa M.D.   1 Application at 01/22/25 1726    amLODIPine (Norvasc) tablet 2.5 mg  2.5 mg Oral QAM ANJU Mayberry.A.-C.   2.5 mg at 01/22/25 0844    clopidogrel (Plavix) tablet 75 mg  75 mg Oral DAILY ANJU Mayberry.A.-C.   75 mg at 01/22/25 0843    furosemide (Lasix) tablet 40 mg  40 mg Oral BID ANJU Mayberry.A.-C.   40 mg at 01/22/25 0842    metoprolol SR (Toprol XL) tablet 100 mg  100 mg Oral QHS Keerthi Stapleton P.A.-C.   100 mg at 01/20/25 2223    montelukast (Singulair) tablet 10 mg  10 mg Oral QHS ANJU Mabyerry.A.-C.   10 mg at 01/22/25 2040    nitroglycerin (Nitrostat) tablet 0.4 mg  0.4 mg Sublingual PRN PEYTON Mayberry-C.        rosuvastatin (Crestor) tablet 20 mg  20 mg Oral QHS PEYTON Mayberry-C.   20 mg at 01/22/25 2040    tamsulosin (Flomax) capsule 0.4 mg  0.4 mg Oral QAM PEYTON Mayberry-CSaima   0.4 mg at 01/22/25 0842    terazosin (Hytrin) capsule 5 mg  5 mg Oral QHS MICHELE MayberryASaima-C.   5 mg at 01/20/25 2223    acetaminophen (Tylenol) tablet 650 mg  650 mg Oral Q4HRS PRN MICHELE MayberryASaima-C.   650 mg at 01/22/25 0551      Current Outpatient Medications Ordered in Epic   Medication Sig Dispense Refill    sevelamer carbonate (RENVELA) 800 MG Tab tablet Take 2 Tablets by mouth 3 times a day with meals.      guaiFENesin ER (MUCINEX) 600 MG TABLET SR 12 HR Take 1 Tablet by mouth every 12 hours.      oxyCODONE immediate-release (ROXICODONE) 5 MG Tab Take 1 Tablet by mouth every 6 hours as needed for Severe Pain for up to 5 days. 20 Tablet 0     Diet:   DIET ORDERS (From admission to next 24h)       Start     Ordered    01/21/25 1449  Diet Order Diet: Renal; Second Modifier: (optional): Cardiac  ALL MEALS        Question Answer Comment   Diet: Renal    Second Modifier: (optional) Cardiac        01/21/25 1448                    Anticipated Discharge Destination / Patient/Family Goal:  Destination: Home with Assistance Support System: Spouse  Anticipated home health services: OT, PT, Nursing, and Aide  Previously used HH service/ provider: Not Applicable  Anticipated DME Needs: Walker  Outpatient Services: PT  Alternative resources to address additional identified needs:   No future appointments.  Joaquim Morillo  Clinical Admissions Coordinator     Discharge Planning     Signed     Date of Service: 1/21/2025  4:06 PM      Following for post acute services. Spoke with spouse Kailey about goals and expectation for IRF level of care. Discussed therapy plan for 3 hours per day 5 days a week. Discussed therapy schedules 30/45 or 60 minute sessions typically 1.5 hours between breakfast and lunch and another 1.5 hours between lunch and dinner. Discussed PT/OT and SLP roles. Discussed potential length of stay. Discussed comprehensive medical surveillance by physiatry as well as hospitalist team. Discussed patient to nursing ratio. Discussed insurance Medicare / coverage for the program. Discussed visitation and clothing requirements. Kailey will be primary support for Keven  to return to the community. Discussed participation with  therapy program when visiting.  Kailey would like to see how thing go in the morning and to see if the nerve block wears off so Keven can show more promise for a home discharge with OP support. Discussed HH and OP options. Discussed the ability to manage PD at rehb . Kailey is in favor of IRF if needed. Kailey and Keven to discuss and call with a follow up.     Joaquim Morillo  Clinical Admissions Coordinator     Discharge Planning     Signed     Date of Service: 1/22/2025  3:54 PM      Spouse called back and is interested in IRF level of care. Pending plan of care moving forward.           Pre-Screen Completed: 1/23/2025 7:53 AM Joaquim Morillo

## 2025-01-23 NOTE — THERAPY
Occupational Therapy   Initial Evaluation     Patient Name: Edy Bennett  Age:  79 y.o., Sex:  male  Medical Record #: [id number]  Today's Date: 1/22/2025     Precautions  Precautions: Fall Risk  Comments: L LE prevena    Assessment    Patient is 79 y.o. male admitted with severe peripheral vascular disease, s/p L LE bypass sx. Other pertinent medical history includes PAD, ESRD on dialysis, HTN, anemia, DLD, peripheral neuropathy, and insomnia. Pt seen for OT evaluation and treatment. Pt very motivated to work with therapy. Pt required min-mod A for bed mobility, mod A to stand, and mod A for socks. Pt lives with his wife who was present for eval and very supportive. Pt's spouse reported ability to assist as needed upon d/c. Pt and spouse educated regarding the role of OT, adaptive clothing/strategies for LB dressing, and the importance of elevation/ice at rest. Pt current functional performance limited by generalized weakness, impaired activity tolerance, impaired balance, and pain. Pt will benefit from skilled OT while admitted to acute care.     Plan    Occupational Therapy Initial Treatment Plan   Treatment Interventions: Self Care / Activities of Daily Living, Adaptive Equipment, Neuro Re-Education / Balance, Therapeutic Exercises, Therapeutic Activity, Family / Caregiver Training  Treatment Frequency: 4 Times per Week  Duration: Until Therapy Goals Met    DC Equipment Recommendations: Unable to determine at this time  Discharge Recommendations: Recommend post-acute placement for additional occupational therapy services prior to discharge home      Objective     01/22/25 1702   Prior Living Situation   Prior Services Intermittent Physical Support for ADL Per Family   Housing / Facility 1 Story House   Steps Into Home 0   Steps In Home 0   Bathroom Set up Walk In Shower;Built-In Shower Chair;Grab Bars   Equipment Owned Single Point Cane;4-Wheel Walker;Front-Wheel Walker;Grab Bar(s) In Tub / Shower;Grab  Bar(s) By Toilet   Lives with - Patient's Self Care Capacity Spouse   Comments Pt's spouse present and supportive.   Prior Level of ADL Function   Self Feeding Independent   Grooming / Hygiene Independent   Bathing Independent   Dressing Independent   Toileting Independent   Prior Level of IADL Function   Medication Management Independent   Laundry Independent   Kitchen Mobility Independent   Finances Independent   Home Management Independent   Shopping Independent   Prior Level Of Mobility Independent Without Device in Community;Independent Without Device in Home   Driving / Transportation Driving Independent   History of Falls   History of Falls No   Precautions   Precautions Fall Risk   Comments L LE prevena   Pain   Pain Scales 0 to 10 Scale    Pain 0 - 10 Group   Therapist Pain Assessment During Activity;Nurse Notified  (not rated, very motivated to work with therapy)   Non Verbal Descriptors   Non Verbal Scale  Calm   Cognition    Cognition / Consciousness WDL   Level of Consciousness Alert   Comments [place], very pleasant and cooperative, motivated to work with therapy   Passive ROM Upper Body   Passive ROM Upper Body WDL   Active ROM Upper Body   Active ROM Upper Body  WDL   Strength Upper Body   Upper Body Strength  WDL   Sensation Upper Body   Upper Extremity Sensation  WDL   Upper Body Muscle Tone   Upper Body Muscle Tone  WDL   Coordination Upper Body   Coordination WDL   Balance Assessment   Sitting Balance (Static) Fair   Sitting Balance (Dynamic) Fair   Standing Balance (Static) Poor   Standing Balance (Dynamic) Poor -   Weight Shift Sitting Fair   Weight Shift Standing Poor   Comments w/ FWW   Bed Mobility    Supine to Sit Moderate Assist   Sit to Supine Minimal Assist   Scooting Minimal Assist   Comments HOB slightly elevated   ADL Assessment   Lower Body Dressing Moderate Assist  (don/doff socks)   Functional Mobility   Sit to Stand Moderate Assist   Bed, Chair, Wheelchair Transfer Unable to  Participate   Mobility EOB>stand x2>lateral scoots with WB through LE>supine   Comments w/ FWW   Visual Perception   Visual Perception  Not Tested   Activity Tolerance   Sitting in Chair NT   Sitting Edge of Bed >10 min   Standing <1 min   Comments limited by weakness, pain   Patient / Family Goals   Patient / Family Goal #1 to get stronger   Short Term Goals   Short Term Goal # 1 Pt will perform ADL transfer w/ supv   Short Term Goal # 2 Pt will perform LB dressing w/ AE and supv   Short Term Goal # 3 Pt will perform toilet hygiene w/ min A   Education Group   Education Provided Role of Occupational Therapist;Activities of Daily Living  (importance of elevation and ice at rest, adaptive strategies/clothing for LB dressing)   Role of Occupational Therapist Patient Response Patient;Family;Acceptance;Explanation;Verbal Demonstration   ADL Patient Response Patient;Family;Acceptance;Explanation;Demonstration;Verbal Demonstration;Action Demonstration   Occupational Therapy Initial Treatment Plan    Treatment Interventions Self Care / Activities of Daily Living;Adaptive Equipment;Neuro Re-Education / Balance;Therapeutic Exercises;Therapeutic Activity;Family / Caregiver Training   Treatment Frequency 4 Times per Week   Duration Until Therapy Goals Met   Problem List   Problem List Decreased Active Daily Living Skills;Decreased Homemaking Skills;Decreased Functional Mobility;Decreased Activity Tolerance;Impaired Postural Control / Balance;Limited Knowledge of Post Op Precautions

## 2025-01-23 NOTE — DOCUMENTATION QUERY
[facility]                                                                       Query Response Note      PATIENT:               SWAPNA ROSS  ACCT #:                  [id number]  MRN:                     [id number]  :                      1945  ADMIT DATE:       2025 10:52 AM  DISCH DATE:          RESPONDING  PROVIDER #:        [id number]           QUERY TEXT:    POA deep tissue pressure injury sacrococcygeal area is noted in the Wound Team Eval. Can you clarify if this is a relevant diagnosis?    The patient's Clinical Indicators include:   Wound Note:  Sacrococcygeal area sDTI POA  Risk Factors: PAD, DM, htn  Treatment: wound team eval, TAPs turning system, sacral offloading dressing    Important Note:  if new diagnosis or change to documentation made via query please include in daily documentation and/or DC Summary    Thank you,  Spencer Rowan RN, BSN, CCDS  Clinical   Connect via [employer]  Options provided:   -- POA deep tissue pressure injury sacrococcygeal area ruled in   -- Other explanation of clinical findings, please specify   -- Findings of no clinical significance      Query created by: Spencer Rowan on 2025 8:16 AM    RESPONSE TEXT:    Findings of no clinical significance          Electronically signed by:  LILI NY MD 2025 8:19 AM

## 2025-01-23 NOTE — CARE PLAN
The patient is Stable - Low risk of patient condition declining or worsening    Shift Goals  Clinical Goals: Pain control, increase mobility, monitor BP, PO tolerance, CMS checks, rest  Patient Goals: Pain control, get OOB  Family Goals: updates    Progress made toward(s) clinical / shift goals:  Medicated for pain; see MAR. Patient EOB/stand x 2 over shift. Patient tolerating diet. + CMS in place. No new drainage in prevena [place]. Patient turning while in bed.     Patient is not progressing towards the following goals: NA.

## 2025-01-23 NOTE — PROGRESS NOTES
2 RN skin check complete. (See pictures)     Devices in place: PIV x 2 right forearm enwrapped in [place]. LAVF, Right upper chest perma cath. Peritoneal catheter to RLQ abdomen.  Prevena [place] to LLE from inner thigh to heel.      Scabs noted to bilateral cheeks, mepitel one place. HD/Peritoneal cath with CDI dressings. LAVF, open to air beneath left no no sleeve. Abdomen round. Lap stabs from hernia surgery, CDI with dermabond/LAURA. Mild swelling/redness/scabs to lower abdomen/groin region. BLE swelling, left greater than right. LLE prevena plus wound vac, CDI, sang drainage noted in cannister. Scabs noted to BLE. Necrotic toes noted to bilateral feet (see pictures), painted with betadine. Right heel linear tear, CDI. Blanching/non-blanching redness to sacrum. Dry skin noted to heels.      Skin assessed under devices: above as much as possible.  Confirmed pressure ulcers found on: pending wound team.   New potential pressure ulcers noted on: sacum.   Wound consult placed: NA.      The following interventions in place:  TAPS system to be placed after mobiity. Q2 turns being prompted, patient able to turn self to the left/supine.  Offloading LLE upon pillows. Mepitel removed. Protective mepilex to heels/sacrum. Margaux cream being applied.

## 2025-01-23 NOTE — PROGRESS NOTES
4 Eyes Skin Assessment Completed by MANOJ Rao and MANOJ Phillips     Head Scabs to R cheek, red spot on L lower eyelid  Ears WDL  Nose WDL  Mouth WDL  Neck WDL  Breast/Chest Hemodialysis catheter R chest   Shoulder Blades WDL  Spine WDL  (R) Arm/Elbow/Hand Bruising  (L) Arm/Elbow/Hand AV fistula  Abdomen previous lap sites  Groin WDL  Scrotum/Coccyx/Buttocks redness blanching/Non-Blanching and Discoloration  (R) Leg Scattered scabs  (L) Leg Incision, Prevena, scabs, swelling  (R) Heel/Foot/Toe  black, redness on toes, crack and red/blanching to heel, dry/flaky  (L) Heel/Foot/Toe redness/blanching heel and black toes, dry/flaky, bruising              Devices In Places: Pulse Ox, Central Line, and Nasal Cannula, peritoneal dialysis catheter, X2 PIV, Prevena Plus wound vac, Hemodialysis catheter         Interventions In Place Sacral Mepilex, heel mepilex, low air loss mattress, pillows, barrier paste, patient able to turn independently - Q2 turn reminders in place, heels floated on pillow     Possible Skin Injury Yes     Pictures Uploaded Into Epic Yes, done previously  Wound Consult Placed Yes, done previously  RN Wound Prevention Protocol Ordered Yes, done previously

## 2025-01-23 NOTE — PROGRESS NOTES
"                 VASCULAR SURGERY               Inpatient Progress Note  _________________________________    Vitals (1/21/2025)  /63   Pulse 81   Temp 36.4 °C (97.5 °F) (Temporal)   Resp 18   Ht 1.778 m (5' 10\")   Wt 72.7 kg (160 lb 4.4 oz)   SpO2 98%   BMI 23.00 kg/m²   _________________________________    1/20/25 Left pop-PT bypass with irGSV (Angela/ROSELYN)    1/21/25  Stable overnight.  Pain controlled.  Patient was able to stand up and bear weight on his left leg.    1/22/25  Stable overnight.  Pain controlled.  Patient was able to stand up and bear weight on his left leg yesterday, pending PT today.    Left lower extremity Prevena bandages clean dry and intact with minimal output.  No significant lower extremity edema.  Brisk left posterior tibial Doppler signal at the ankle consistent with bypass patency      -Bypass patent, left foot much better perfused, the patient has a good chance of healing his left toe ulcerations  -Activity as tolerated  -Restarted Eliquis.  Continue Plavix  -Patient was evaluated by physical therapy and we are considering sending him to acute rehab in the next day or 2      Appreciate Hospitalist service's support  Following along      Marco Antonio Miller MD  [facility]  [place] preferred or call my office [phone]  __________________________________________________________________  Patient:Edy Bennett   MRN:[id number]   CSN:[id number]    "

## 2025-01-23 NOTE — PROGRESS NOTES
Assumed care of patient at 0715. Bedside report received. Assessment complete.  A&Ox4. Denies CP/SOB.  Reporting 3/10 pain. Declined intervention at this time.  Educated patient regarding pharmacologic and non pharmacologic modalities for pain management.  Skin: LLE Prevena CDI, sacrum red/non-blanching, bilateral toes red/black with dryness/cracking to both heels. Heel mepilex in place, sacral mepilex in place.   Tolerating renal diet. Denies N/V.  + void. Last BM 1/21/25  Pt ambulates x2 assist.  L AV fistula +bruit + thrill on auscultation.  All needs met at this time. Call light within reach. Pt calls appropriately. Bed low and locked, non skid socks in place. Hourly rounding in place.

## 2025-01-23 NOTE — PROGRESS NOTES
Bedside report received.  Assessment complete.  A&O x 4. Patient calls appropriately.  Patient ambulates with max assist. Bed frame alarm on d/t low air loss mattress. High fall risk  Patient has 4/10 pain. Patient declines intervention at this time.  Denies N&V. Tolerating renal diet.  Prevena plus to LLE C/D/I.  + void, + flatus, - BM.  Patient denies SOB.  SCD's refused.  Patient is currently receiving peritoneal dialysis  Review plan with of care with patient. Call light and personal belongings within reach. Hourly rounding in place. All needs met at this time.

## 2025-01-23 NOTE — FLOWSHEET NOTE
01/23/25 1447   Events/Summary/Plan   Events/Summary/Plan RT Consult   Vital Signs   Pulse 81   Respiration 16   Pulse Oximetry 96 %   $ Pulse Oximetry (Spot Check) Yes   Respiratory Assessment   Level of Consciousness Alert   Chest Exam   Work Of Breathing / Effort Within Normal Limits   Breath Sounds   RUL Breath Sounds Clear   RML Breath Sounds Clear   RLL Breath Sounds Clear   ANAYELI Breath Sounds Clear   LLL Breath Sounds Clear   Oxygen   O2 Delivery Device Room air w/o2 available   Smoking History   Have you ever smoked Never

## 2025-01-23 NOTE — DISCHARGE SUMMARY
Discharge Summary    CHIEF COMPLAINT ON ADMISSION  No chief complaint on file.      Reason for Admission  Disease of artery (HCC)    Admission Date  1/20/2025     CODE STATUS  Full Code    HPI & HOSPITAL COURSE  This is a 79-year-old male with past medical history of moderate aortic stenosis, hypertension, CAD with 3 SYDNEE 8/2024,???  On Eliquis, ESRD on PD, BPH, and severe PAD who was admitted on 1/20 for elective left popliteal to posterior tibial artery bypass.    Patient admitted for postoperative pain management.  Added Robaxin and gabapentin for pain.  Patient currently has Prevena in place, to continue with this.    Nephrology consulted for resumption of peritoneal dialysis.    Therefore, he is discharged in good and stable condition to an inpatient rehabilitation hospital.    The patient met 2-midnight criteria for an inpatient stay at the time of discharge.      FOLLOW UP ITEMS POST DISCHARGE  Primary care physician  Nephrology  Vascular surgery    DISCHARGE DIAGNOSES  Principal Problem:    Peripheral arterial disease (HCC) (POA: Yes)  Active Problems:    ESRD on peritoneal dialysis (HCC) (POA: Yes)    HTN (hypertension) (POA: Yes)    Anemia in chronic renal disease (POA: Yes)    Dyslipidemia (POA: Yes)    Peripheral neuropathy (POA: Yes)    Insomnia (POA: Yes)    BPH (benign prostatic hyperplasia) (POA: Unknown)  Resolved Problems:    * No resolved hospital problems. *      FOLLOW UP  Denise Carver M.D.  [id number]  [place] [id number] [phone]    Follow up      Marco Antonio Miller M.D.  [address] [facility] [address]  [place] [id number] [phone]    Follow up        MEDICATIONS ON DISCHARGE     Medication List        START taking these medications        Instructions   gabapentin 100 MG Caps  Commonly known as: Neurontin   Take 1 Capsule by mouth 2 times a day.  Dose: 100 mg     guaiFENesin  MG Tb12  Commonly known as: Mucinex   Take 1 Tablet by mouth every 12 hours.  Dose:  600 mg     methocarbamol 500 MG Tabs  Commonly known as: Robaxin   Take 1 Tablet by mouth 4 times a day.  Dose: 500 mg     oxyCODONE immediate-release 5 MG Tabs  Commonly known as: Roxicodone   Take 1 Tablet by mouth every 6 hours as needed for Severe Pain for up to 5 days.  Dose: 5 mg            CHANGE how you take these medications        Instructions   sevelamer carbonate 800 MG Tabs tablet  What changed: how much to take  Commonly known as: Renvela   Take 2 Tablets by mouth 3 times a day with meals.  Dose: 1,600 mg            CONTINUE taking these medications        Instructions   acetaminophen 500 MG Tabs  Commonly known as: Tylenol   Take 500-1,000 mg by mouth every 6 hours as needed.  Dose: 500-1,000 mg     amLODIPine 5 MG Tabs  Commonly known as: Norvasc   Take 2.5 mg by mouth every morning.   Dose: 2.5 mg     apixaban 5mg Tabs  Commonly known as: Eliquis   Take 5 mg by mouth 2 times a day.  Dose: 5 mg     clopidogrel 75 MG Tabs  Commonly known as: Plavix   Take 75 mg by mouth every day.  Dose: 75 mg     furosemide 40 MG Tabs  Commonly known as: Lasix   Take 40 mg by mouth 2 times a day.  Dose: 40 mg     gentamicin 0.1 % cream  Commonly known as: Garamycin   Apply 1 Application topically 1 time a day as needed (port access).  Dose: 1 Application     metoprolol  MG Tb24  Commonly known as: Toprol XL   Take 100 mg by mouth at bedtime.   Dose: 100 mg     montelukast 10 MG Tabs  Commonly known as: Singulair   Take 10 mg by mouth at bedtime.  Dose: 10 mg     nitroglycerin 0.4 MG Subl  Commonly known as: Nitrostat   Place 0.4 mg under the tongue as needed for Chest Pain.  Dose: 0.4 mg     rosuvastatin 20 MG Tabs  Commonly known as: Crestor   Take 20 mg by mouth at bedtime.   Dose: 20 mg     sodium bicarbonate 325 MG Tabs   Take 325 mg by mouth 2 times a day as needed. Indications: Heartburn  Dose: 325 mg     tamsulosin 0.4 MG capsule  Commonly known as: Flomax   Take 0.4 mg by mouth every morning.   Dose:  0.4 mg     terazosin 5 MG Caps  Commonly known as: Hytrin   Take 1 Capsule by mouth at bedtime.   Dose: 1 Capsule            STOP taking these medications      traMADol 50 MG Tabs  Commonly known as: Ultram              Allergies  No Known Allergies    DIET  Orders Placed This Encounter   Procedures    Diet Order Diet: Renal; Second Modifier: (optional): Cardiac     Standing Status:   Standing     Number of Occurrences:   1     Order Specific Question:   Diet:     Answer:   Renal [8]     Order Specific Question:   Second Modifier: (optional)     Answer:   Cardiac [6]       ACTIVITY  As tolerated and directed by rehab.  Weight bearing as tolerated    LINES, DRAINS, AND WOUNDS  This is an automated list. Peripheral IVs will be removed prior to discharge.  Peripheral IV 01/20/25 20 G Anterior;Right Forearm (Active)   Site Assessment Clean;Dry;Intact 01/22/25 2040   Dressing Type Transparent 01/22/25 2040   Line Status Saline locked 01/22/25 2040   Dressing Status Clean;Dry;Intact 01/22/25 2040   Dressing Intervention N/A 01/22/25 2040   Dressing Change Due 01/27/25 01/22/25 2040   Infiltration Grading ([facility]) 0 01/22/25 2040   Phlebitis Scale ([facility] Only) 0 01/22/25 2040       Peripheral IV 01/20/25 18 G Right Forearm (Active)   Site Assessment Clean;Dry;Intact 01/22/25 2040   Dressing Type Transparent;Coban 01/22/25 2040   Line Status Saline locked 01/22/25 2040   Dressing Status Clean;Dry;Intact 01/22/25 2040   Dressing Intervention N/A 01/22/25 2040   Dressing Change Due 01/27/25 01/22/25 2040   Infiltration Grading ([facility]) 0 01/22/25 2040   Phlebitis Scale ([facility] Only) 0 01/22/25 2040     Peritoneal Dialysis Catheter Continuous cycling [place]-neck/flanged collar Right lower abdomen (Active)   Site Assessment Clean;Dry;Intact 01/23/25 0610   PD Catheter Status Deaccessed;Patent 01/23/25 0610   Site Condition No complications 01/23/25 0610   Drainage Description Clear;Yellow 01/23/25 0610   PD Dressing  Type Gauze 01/23/25 0610   Dressing Status Clean;Dry;Intact 01/23/25 0610   Dressing Intervention N/A 01/23/25 0610      Wound 07/01/24 Incision Arm Left Mastisol, steri strips, 4x4s,, tegaderm (Active)       Wound 09/16/24 Incision Abdomen Bilateral Dermabond (Active)       Wound 12/18/24 Incision Abdomen dermabond, 2 x 2 gauze , tegaderm (Active)       Wound 01/14/25 Incision Groin Bilateral 2x2, tegaderm (Active)       Wound 01/20/25 Incision Leg Left Luna, Prevena (Active)   Wound Image   01/20/25 1952   Site Assessment VISHAL 01/22/25 2040   Periwound Assessment VISHAL 01/22/25 2040   Margins Defined edges;VISHAL 01/22/25 2040   Closure Other (Comment) 01/22/25 2040   Drainage Amount VISHAL 01/22/25 2040   Drainage Description Sanguineous 01/22/25 2040   Dressing Status Clean;Dry;Intact 01/22/25 2040   Dressing Changed Observed 01/22/25 2040   Dressing Options Wound Vac 01/22/25 2040       Wound 01/20/25 Arterial Ulcer Toe, Hallux;Toe, 5th;Toe, 4th;Toe, 3rd;Toe, 2nd Bilateral (Active)   Wound Image     01/22/25 1700   Site Assessment Red;Painful;Black 01/22/25 2040   Periwound Assessment Red 01/22/25 2040   Margins Attached edges 01/22/25 2040   Closure None 01/22/25 2040   Drainage Amount None 01/22/25 2040   Treatments Offloading 01/22/25 2040   Wound Cleansing Povidone-Iodine 01/22/25 2040   Dressing Status Open to Air 01/22/25 2040   Dressing Cleansing/Solutions Normal Saline 01/22/25 0851   NEXT Weekly Photo (Inpatient Only) 01/29/25 01/22/25 1700   Wound Team Following Not following 01/22/25 1700   Non-staged Wound Description Full thickness 01/22/25 1700       Wound 01/20/25 Pressure Injury Coccyx evolving sDTI POA (Active)   Wound Image    01/22/25 1700   Site Assessment Pink;Red 01/22/25 2040   Periwound Assessment Intact 01/22/25 2040   Margins Attached edges 01/22/25 2040   Closure None 01/22/25 2040   Drainage Amount None 01/22/25 2040   Drainage Description Serosanguineous 01/22/25 1700   Treatments  Offloading 01/22/25 2040   Offloading/DME Other (comment) 01/21/25 2053   Dressing Status Clean;Dry;Intact 01/22/25 2040   Dressing Changed Observed 01/22/25 2040   Dressing Cleansing/Solutions Not Applicable 01/22/25 1700   Dressing Options Offloading Dressing - Sacral 01/22/25 2040   Dressing Change/Treatment Frequency Every 72 hrs, and As Needed 01/22/25 2040   NEXT Dressing Change/Treatment Date 01/25/25 01/22/25 2040   NEXT Weekly Photo (Inpatient Only) 01/29/25 01/22/25 1700   Wound Team Following Not following 01/22/25 1700   WOUND NURSE ONLY - Pressure Injury Stage DTPI 01/22/25 1700     HD Catheter Double Lumen (Active)   Site Assessment Clean;Dry;Intact 01/22/25 2040   Site Prep Chlorhexidine 01/21/25 2245   Treatment Performed Other (Comment) 01/22/25 0851   Dressing Type Transparent Film 01/22/25 2040   Dressing Status Clean;Dry;Intact 01/22/25 2040   Dressing Intervention N/A 01/22/25 2040   Dressing Change Due 01/25/25 01/22/25 2040      Peripheral IV 01/20/25 20 G Anterior;Right Forearm (Active)   Site Assessment Clean;Dry;Intact 01/22/25 2040   Dressing Type Transparent 01/22/25 2040   Line Status Saline locked 01/22/25 2040   Dressing Status Clean;Dry;Intact 01/22/25 2040   Dressing Intervention N/A 01/22/25 2040   Dressing Change Due 01/27/25 01/22/25 2040   Infiltration Grading ([facility]) 0 01/22/25 2040   Phlebitis Scale ([facility] Only) 0 01/22/25 2040       Peripheral IV 01/20/25 18 G Right Forearm (Active)   Site Assessment Clean;Dry;Intact 01/22/25 2040   Dressing Type Transparent;Coban 01/22/25 2040   Line Status Saline locked 01/22/25 2040   Dressing Status Clean;Dry;Intact 01/22/25 2040   Dressing Intervention N/A 01/22/25 2040   Dressing Change Due 01/27/25 01/22/25 2040   Infiltration Grading ([facility]) 0 01/22/25 2040   Phlebitis Scale ([facility] Only) 0 01/22/25 2040       Hemodialysis AV Graft/Fistula 01/21/25 Left AV fistula Forearm (Active)   AV Fistula Status Bruit absent;Thrill  absent 01/22/25 2040   Site Assessment Clean;Dry;Intact 01/22/25 2040   Status Patent 01/21/25 0823   Dressing Type Open to Air 01/22/25 2040   Dressing Status Clean;Dry;Intact 01/22/25 0851   Dressing Intervention N/A 01/22/25 2040               MENTAL STATUS ON TRANSFER             CONSULTATIONS  Vascular surgery    PROCEDURES  Left popliteal to posterior tibial artery bypass    LABORATORY  Lab Results   Component Value Date    SODIUM 135 01/23/2025    POTASSIUM 4.4 01/23/2025    CHLORIDE 94 (L) 01/23/2025    CO2 24 01/23/2025    GLUCOSE 175 (H) 01/23/2025    BUN 64 (H) 01/23/2025    CREATININE 7.52 (HH) 01/23/2025        Lab Results   Component Value Date    WBC 8.6 01/23/2025    HEMOGLOBIN 9.9 (L) 01/23/2025    HEMATOCRIT 30.9 (L) 01/23/2025    PLATELETCT 162 (L) 01/23/2025      No orders to display      Total time of the discharge process exceeds 45 minutes.

## 2025-01-23 NOTE — WOUND TEAM
[facility]  Inpatient Services  Initial Wound and Skin Care Evaluation    Admission Date: 1/20/2025     Last order of IP CONSULT TO WOUND CARE was found on 1/21/2025 from Hospital Encounter on 1/16/2025     HPI, PMH, SH: Reviewed    Past Surgical History:   Procedure Laterality Date    MO VEIN BYPASS GRAFT,FEM-TIBIAL Left 1/20/2025    Procedure: LEFT LOWER EXTREMITY BYPASS, POPLITIAL TO TIBIAL BYPASS;  Surgeon: Marco Antonio Miller M.D.;  Location: SURGERY [facility];  Service: Vascular    AORTOGRAM WITH RUNOFF  1/14/2025    Procedure: AORTOGRAM WITH RUNOFF, right peroneal artery intravascular lithotripsy and stent placement;  Surgeon: Marco Antonio Miller M.D.;  Location: [facility];  Service: Vascular    INGUINAL HERNIA LAPAROSCOPIC N/A 12/18/2024    Procedure: LAPAROSCOPIC LEFT INGUINAL HERNIA REPAIR WITH MESH, LAPAROSCOPIC REPOSITIONING OF PERITONEAL DIALYSIS CATHETER;  Surgeon: Marco Antonio Miller M.D.;  Location: SURGERY [facility];  Service: Vascular    CATH PLACEMENT CAPD N/A 12/18/2024    Procedure: REPOSITION, CATHETER, CAPD;  Surgeon: Marco Antonio Miller M.D.;  Location: SURGERY [facility];  Service: Vascular    CATH PLACEMENT CAPD N/A 09/16/2024    Procedure: LAPAROSCOPIC INSERTION OF PERITONEAL DIALYSIS CATHETER PLACEMENT;  Surgeon: Marco Antonio Miller M.D.;  Location: SURGERY [facility] DAY [facility];  Service: Vascular    AV FISTULA CREATION Left 07/01/2024    Procedure: CREATION OF LEFT UPPER EXTREMITY DIALYSIS FISTULA;  Surgeon: Shilo Mercedes M.D.;  Location: SURGERY [facility];  Service: General    OTHER ORTHOPEDIC SURGERY  2004    bunion r foot    OTHER      tonsils adenoids    OTHER      dialysis catheter in upper right chest    OTHER      shon iol    OTHER CARDIAC SURGERY      cardiac stents     Social History     Tobacco Use    Smoking status: Never    Smokeless tobacco: Never    Tobacco comments:     Used chewing tobacco many years ago.   Substance Use Topics    Alcohol  use: Not Currently     No chief complaint on file.    Diagnosis: Disease of artery (HCC) [I77.9]    Unit where seen by Wound Team: T429/00     WOUND CONSULT RELATED TO:  Sacrum and Bilateral Toes    WOUND TEAM PLAN OF CARE - Frequency of Follow-up:   Nursing to follow dressing orders written for wound care. Contact wound team if area fails to progress, deteriorates or with any questions/concerns if something comes up before next scheduled follow up (See below as to whether wound is following and frequency of wound follow up)   Not following, consult as needed  - any area    WOUND HISTORY:   Pt is a 79yr old male with severe PVD s/p LLE bypass last week. Pt now presents for RLE bypass. Pt has history of PAD, ESRD on peritoneal dialysis, HTN and peripheral neuropathy. Wound team was consulted regarding small wound to the sacrococcygeal and bilateral discolored toes. Pt states his sacral wound started at home due to decreased mobility and he has been sitting on pillows.        WOUND ASSESSMENT/LDA       Wound 01/20/25 Arterial Ulcer Toe, Hallux;Toe, 5th;Toe, 4th;Toe, 3rd;Toe, 2nd Bilateral (Active)   Wound Image     01/22/25 1700   Site Assessment Purple;Red;Black    Periwound Assessment Intact    Margins Attached edges    Closure None    Drainage Amount None    Treatments Offloading    Wound Cleansing Povidone-Iodine    Dressing Status Open to Air    NEXT Weekly Photo (Inpatient Only) 01/29/25    Wound Team Following Not following    Non-staged Wound Description Full thickness        Wound 01/20/25 Pressure Injury Coccyx evolving sDTI POA (Active)   Wound Image    01/22/25 1700   Site Assessment Pink;Purple;Red    Periwound Assessment Intact    Margins Attached edges    Closure None    Drainage Amount Scant    Drainage Description Serosanguineous    Treatments Cleansed;Site care;Offloading    Offloading/DME Other (comment)    Dressing Status Clean;Dry;Intact    Dressing Changed Changed    Dressing Cleansing/Solutions  Not Applicable    Dressing Options Offloading Dressing - Sacral    Dressing Change/Treatment Frequency Every 72 hrs, and As Needed    NEXT Dressing Change/Treatment Date 01/25/25    NEXT Weekly Photo (Inpatient Only) 01/29/25    Wound Team Following Not following    WOUND NURSE ONLY - Pressure Injury Stage DTPI                Vascular:    GLENYS:   No results found.    Lab Values:    Lab Results   Component Value Date/Time    WBC 8.6 01/22/2025 06:18 AM    RBC 3.09 (L) 01/22/2025 06:18 AM    HEMOGLOBIN 9.7 (L) 01/22/2025 06:18 AM    HEMATOCRIT 30.3 (L) 01/22/2025 06:18 AM    HBA1C 6.1 (H) 04/04/2024 10:25 AM         Culture Results show:  No results found for this or any previous visit (from the past 720 hours).    Pain Level/Medicated:  None, Tolerated without pain medication       INTERVENTIONS BY WOUND TEAM:  Chart and images reviewed. Discussed with bedside RN. All areas of concern (based on picture review, LDA review and discussion with bedside RN) have been thoroughly assessed. Documentation of areas based on significant findings. This RN in to assess patient. Performed standard wound care which includes appropriate positioning, dressing removal and non-selective debridement. Pictures and measurements obtained weekly if/when required.    Wound:  Sacrococcygeal area sDTI POA  Preparation for Dressing removal: Removed without difficulty  Cleansed/Non-selectively Debrided with:  Moist warm washcloth  Tory wound: Cleansed with Moist warm washcloth, Prepped with N/A  Primary Dressing:  Sacral offloading dressing  Secondary (Outer) Dressing: KODY Bed (Cloth chux removed and replaced with glide sheet and dri germaine pads)     Wound:  Bilateral Toes  Preparation for Dressing removal: Open to air  Cleansed/Non-selectively Debrided with:  N/A  Tory wound: Cleansed with N/A, Prepped with N/A  Primary Dressing:  Betadine    Bilateral heels intact, pt has fissure to the right lateral heel that is nearly resolved. Continued with  heel offloading dressings and pillows to further offload.     Advanced Wound Care Discharge Planning  Number of Clinicians necessary to complete wound care: 1  Is patient requiring IV pain medications for dressing changes:  No   Length of time for dressing change 30 min. (This does not include chart review, pre-medication time, set up, clean up or time spent charting.)    Interdisciplinary consultation: Patient, Bedside RN (Steph), Kamala AARON (Wound RN).  Pressure injury and staging reviewed with N/A.    EVALUATION / RATIONALE FOR TREATMENT:     Date:  01/22/25  Wound Status:  Initial evaluation    Pt with small POA sDTI to the sacrococcygeal area will likely evolve into a stage 2. Offloading measures in place. Cloth chux removed from bed. Pt bilateral toes with necrosis related to PAD. Heel offloading dressings and pillows used to offload pressure to heels.          Goals: Steady decrease in wound area and depth weekly.    NURSING PLAN OF CARE ORDERS:  Skin care: See Skin Care orders  RN Prevention Protocol    NUTRITION RECOMMENDATIONS   Wound Team Recommendations:  N/A    DIET ORDERS (From admission to next 24h)       Start     Ordered    01/21/25 1449  Diet Order Diet: Renal; Second Modifier: (optional): Cardiac  ALL MEALS        Question Answer Comment   Diet: Renal    Second Modifier: (optional) Cardiac        01/21/25 1448                    PREVENTATIVE INTERVENTIONS:    Q shift Jalen - performed per nursing policy  Q shift pressure point assessments - performed per nursing policy    Surface/Positioning  Low Airloss - Currently in Place  TAPs Turning system - Applied this Visit    Offloading/Redistribution  Sacral offloading dressing (Silicone dressing) - Changed  Heel offloading dressing (Silicone dressing) - Changed  Float Heels off Bed with Pillows - Applied this Visit           Containment/Moisture Prevention    Dri-germaine pad - Applied this Visit    Anticipated discharge plans:  Self/Family Care        Vac  Discharge Needs:  Vac Discharge plan is purely a recommendation from wound team and not a requirement for discharge unless otherwise stated by physician.  Not Applicable Pt not on a wound vac

## 2025-01-23 NOTE — PROGRESS NOTES
1445 Pt arrived at [facility] from transport via transport.  to follow. Initial assessment initiated. Pt oriented to room and facility routine and safety measures; pt education binder provided and discussed. Pt A/O x 4, continent of bowel and bladder. Max 2 assist for transfers. All wounds photographed and documented; photos uploaded to . Pt denies pain or discomfort at this time. Pt positioned for comfort in bed. Call light within reach, safety measures in place.

## 2025-01-23 NOTE — CARE PLAN
Problem: Knowledge Deficit - Standard  Goal: Patient and family/care givers will demonstrate understanding of plan of care, disease process/condition, diagnostic tests and medications  Outcome: Progressing     Problem: Skin Integrity  Goal: Skin integrity is maintained or improved  Outcome: Progressing     Problem: Pain - Standard  Goal: Alleviation of pain or a reduction in pain to the patient’s comfort goal  Outcome: Progressing     Problem: Fall Risk  Goal: Patient will remain free from falls  Outcome: Progressing     Problem: Psychosocial  Goal: Patient's level of anxiety will decrease  Outcome: Progressing     Problem: Communication  Goal: The ability to communicate needs accurately and effectively will improve  Outcome: Progressing     Problem: Hemodynamics  Goal: Patient's hemodynamics, fluid balance and neurologic status will be stable or improve  Outcome: Progressing     Problem: Respiratory  Goal: Patient will achieve/maintain optimum respiratory ventilation and gas exchange  Outcome: Progressing     Problem: Nutrition  Goal: Patient's nutritional and fluid intake will be adequate or improve  Outcome: Progressing  Goal: Enteral nutrition will be maintained or improve  Outcome: Progressing  Goal: Enteral nutrition will be maintained or improve  Outcome: Progressing     Problem: Urinary Elimination  Goal: Establish and maintain regular urinary output  Outcome: Progressing     Problem: Bowel Elimination  Goal: Establish and maintain regular bowel function  Outcome: Progressing     Problem: Gastrointestinal Irritability  Goal: Nausea and vomiting will be absent or improve  Outcome: Progressing  Goal: Diarrhea will be absent or improved  Outcome: Progressing     Problem: Mobility  Goal: Patient's capacity to carry out activities will improve  Outcome: Progressing     Problem: Self Care  Goal: Patient will have the ability to perform ADLs independently or with assistance (bathe, groom, dress, toilet and  feed)  Outcome: Progressing     Problem: Infection - Standard  Goal: Patient will remain free from infection  Outcome: Progressing     Problem: Wound/ / Incision Healing  Goal: Patient's wound/surgical incision will decrease in size and heals properly  Outcome: Progressing   The patient is Stable - Low risk of patient condition declining or worsening    Shift Goals  Clinical Goals: Pain management, skin intergrity, peritoneal dialysis  Patient Goals: rest, get at least 4 hours of sleep  Family Goals: updates    Progress made toward(s) clinical / shift goals:  Patient educated on the importance of mobility and Q2 turns to prevent skin breakdown. Patient verbalizes understanding. Patient received peritoneal dialysis overnight. Patient was able to sleep greater then four hours on NOC shift per patient goal. Prevena plus remains intact to LLE, pulse found via doppler. Patient LLE sensitive to touch and minimal movement.     Patient is not progressing towards the following goals:

## 2025-01-23 NOTE — PROGRESS NOTES
[facility] Services Progress Note     CCPD x 8.5 hours, 1100 mL fills x 7 cycles using 2.5% and 1.5% PD solution ordered per      CCPD treatment initiated at 1800 without any issues.  Patient AOx4, no stomach issues, no edema, denies pain/abd discomfort, stable VS.                 Patient with intact and patent RLQ PD catheter aseptically connected per policy.  RLQ PD catheter and exit site in good condition, no signs of infection noted, cleansed with antiseptic solution and dressing changed per policy. Gentamicin cream applied at exit site.     Initial drain: 2 mL      Report/Inservice given PCN Ana Maria RN, will relay instruction and troubleshooting to nocturnal nurse.     Patient on dwell 1/7 prior to departure from bedside.     Please contact on call dialysis RN for any issues with persistent alarms/assistance with troubleshooting or patient not tolerating therapy.      For on-call Dialysis RN, pls contact [facility] at [phone].

## 2025-01-23 NOTE — PROGRESS NOTES
"[facility] -  PROGRESS NOTE               Author: Kingston Pa M.D. Date & Time: 1/23/2025  9:04 AM     HPI:  79 y.o. male with HTN, ESRD on PD, PVD/ gangrenous left foot, BPH admitted for monitoring s/p  left leg bypass surgery on 1/20 with Dr. Miller. He recently underwent hernial repair and was temporarily transferred to in center HD via [facility]. He has returned to  PD  4 days ago, doing low fill.volumes and has been tolerating at home.   Patient was connected to cycler last night. Total UF reported to be 276ml. Reports of low drain UF alarm last night but no major issues with PD overnight. Pt reports no complaints today morning  No F/C/N/V/CP/SOB.  No melena, hematochezia, hematemesis.  No HA, visual changes,or abdominal pain.       DAILY NEPHROLOGY SUMMARY:  1/21: consult done   1/22: No issues with PD overnight, UF 1137cc, VSS, , feels good, wife at bed side   1/23: No issues with PD overnight 653 cc of UF , not had any BM yesterday and today    REVIEW OF SYSTEMS:    10 point ROS reviewed and is as per HPI/daily summary or otherwise negative    PMH/PSH/SH/FH:  Reviewed and unchanged since admission note    CURRENT MEDICATIONS:  Reviewed from admission to present day    VS:  /66   Pulse 81   Temp 36.4 °C (97.5 °F) (Temporal)   Resp 18   Ht 1.778 m (5' 10\")   Wt 72.7 kg (160 lb 4.4 oz)   SpO2 98%   BMI 23.00 kg/m²   Physical Exam  Constitutional:       General: He is not in acute distress.     Appearance: He is not ill-appearing.   Eyes:      Conjunctiva/sclera: Conjunctivae normal.   Cardiovascular:      Rate and Rhythm: Normal rate.   Pulmonary:      Effort: Pulmonary effort is normal.   Abdominal:      General: There is no distension.      Palpations: Abdomen is soft.      Tenderness: There is no abdominal tenderness.      Comments: PD cath in RLQ   Musculoskeletal:         General: No swelling.      Right lower leg: No edema.      Left lower leg: Edema present. "      Comments: Mild edema in LLE wound vac in place    Skin:     General: Skin is warm.   Neurological:      Mental Status: He is oriented to person, place, and time.   Psychiatric:         Mood and Affect: Mood normal.         Fluids:    Intake/Output Summary (Last 24 hours) at 1/23/2025 0904  Last data filed at 1/23/2025 0610  Gross per 24 hour   Intake --   Output 855 ml   Net -855 ml       LABS:  Labs reviewed, pertinent labs below.    Recent Labs     01/21/25 0017 01/22/25 0618 01/23/25 0224   WBC 12.5* 8.6 8.6   RBC 3.47* 3.09* 3.13*   HEMOGLOBIN 10.9* 9.7* 9.9*   HEMATOCRIT 34.6* 30.3* 30.9*   MCV 99.7* 98.1* 98.7*   MCH 31.4 31.4 31.6   MCHC 31.5* 32.0* 32.0*   RDW 63.8* 63.0* 62.6*   PLATELETCT 173 157* 162*   MPV 11.3 11.3 11.0       Recent Labs     01/21/25 0017 01/22/25 0618 01/23/25 0224   SODIUM 136 135 135   POTASSIUM 4.4 4.3 4.4   CHLORIDE 93* 95* 94*   CO2 23 24 24   GLUCOSE 320* 160* 175*   BUN 72* 68* 64*   CREATININE 7.51* 7.20* 7.52*   CALCIUM 8.6 8.6 8.3*        IMAGING:  All imaging reviewed from admission to present day      IMPRESSION:  # ESRD on CCPD  - Pt was transitioned to  iHD during masoud-op  s/p inguinal hernial repair,   - Still has [facility]  - Returned to PD 1/17/25  - Home PD prescription CCPD 2.5% alternating with 1.5% 7 exchanges 8 hrs 30 min, total FV 7700cc, no last fill  # HTN  - Goal BP < 140/90  - At goal  # Anemia of CKD  - Goal Hgb 10-11  - At goal   # CKD-MBD  - Ca 8.6   - PO4 7.4   # Chronic hypoxic respiratory failure   - On home oxygen 2-2.5L at night         SUGGESTIONS:  - Continue CCPD during hospital stay home prescription   - PD exit site care per protocol   - Bowel regimen to avoid constipation with PD  - No dietary protein restrictions  - Dose all meds per ESRD  - Start ROSALIND  TIW   - Continue home P binders, renvela increased to 1600 mg tid ac   - Continue home BP medications   - Renal diet  - Arrange for dialysis perm cath removal if PD treatments going well  > 1 week, resumed PD 1/17     [facility]

## 2025-01-23 NOTE — DISCHARGE PLANNING
Medically cleared transportation arranged with [facility] [employer] transport [id number] Spoke spouse she is aware. Attending team notified.

## 2025-01-23 NOTE — PROGRESS NOTES
[facility] Progress Note        [employer] disconnected aseptically at 0610. Pt stable, VSS, alert and oriented. Procedure explained to pt and pt verbalized understanding.     PD exit site CDI with gauze dressing in place.     Effluent clear yellow with no fibrin noted.     24 hour UF: 655mL (653mL total UF + 2mL initial drain - 0mL last fill)

## 2025-01-23 NOTE — DISCHARGE PLANNING
Case Management Discharge Planning    Admission Date: 1/20/2025  GMLOS: 4  ALOS: 3    6-Clicks ADL Score: 18  6-Clicks Mobility Score: 10  PT and/or OT Eval ordered: Yes  Post-acute Referrals Ordered: Yes  Post-acute Choice Obtained: Yes  Has referral(s) been sent to post-acute provider:  Yes      Anticipated Discharge Dispo:      DME Needed: No    Action(s) Taken: COBRA completed. Bedside RN notified.     Escalations Completed: None    Medically Clear: Yes

## 2025-01-23 NOTE — PROGRESS NOTES
PIVx2 DC. Educated on DC instructions. All questions/ concerns addressed. Signed copy put in appropriate folder, copy of instructions provided to patient, copy of instructions put in COBRA. COBRA given to medical transport. Pt DC by medical transport with prevena in place, working appropriately. Medical transport provided extra wound vac canister. Pt belongings brought to Rehab by wife. PT DC without incident.

## 2025-01-23 NOTE — WOUND TEAM
Assisted Wound RN Les with skin assessment and wound consult evaluation for pressure injury.  Additional staff necessary for turning/standing from chair, repositioning patient, assisting with dressing application and supplies and determining progress, assessment and staging of pressure injuries and/or complicated wound care.

## 2025-01-24 ENCOUNTER — APPOINTMENT (OUTPATIENT)
Dept: OCCUPATIONAL THERAPY | Facility: REHABILITATION | Age: 80
DRG: 299 | End: 2025-01-24
Attending: PHYSICAL MEDICINE & REHABILITATION
Payer: MEDICARE

## 2025-01-24 ENCOUNTER — APPOINTMENT (OUTPATIENT)
Dept: PHYSICAL THERAPY | Facility: REHABILITATION | Age: 80
DRG: 299 | End: 2025-01-24
Attending: PHYSICAL MEDICINE & REHABILITATION
Payer: MEDICARE

## 2025-01-24 ENCOUNTER — APPOINTMENT (OUTPATIENT)
Dept: RADIOLOGY | Facility: REHABILITATION | Age: 80
DRG: 299 | End: 2025-01-24
Attending: PHYSICAL MEDICINE & REHABILITATION
Payer: MEDICARE

## 2025-01-24 PROBLEM — I25.10 CAD (CORONARY ARTERY DISEASE): Status: ACTIVE | Noted: 2025-01-24

## 2025-01-24 LAB
25(OH)D3 SERPL-MCNC: 37 NG/ML (ref 30–100)
ALBUMIN SERPL BCP-MCNC: 3 G/DL (ref 3.2–4.9)
ALBUMIN/GLOB SERPL: 1.2 G/DL
ALP SERPL-CCNC: 86 U/L (ref 30–99)
ALT SERPL-CCNC: 8 U/L (ref 2–50)
ANION GAP SERPL CALC-SCNC: 15 MMOL/L (ref 7–16)
AST SERPL-CCNC: 27 U/L (ref 12–45)
BASOPHILS # BLD AUTO: 0.6 % (ref 0–1.8)
BASOPHILS # BLD: 0.04 K/UL (ref 0–0.12)
BILIRUB SERPL-MCNC: 0.5 MG/DL (ref 0.1–1.5)
BUN SERPL-MCNC: 63 MG/DL (ref 8–22)
CALCIUM ALBUM COR SERPL-MCNC: 9.1 MG/DL (ref 8.5–10.5)
CALCIUM SERPL-MCNC: 8.3 MG/DL (ref 8.5–10.5)
CHLORIDE SERPL-SCNC: 95 MMOL/L (ref 96–112)
CO2 SERPL-SCNC: 27 MMOL/L (ref 20–33)
CREAT SERPL-MCNC: 7.57 MG/DL (ref 0.5–1.4)
EOSINOPHIL # BLD AUTO: 0.13 K/UL (ref 0–0.51)
EOSINOPHIL NFR BLD: 1.9 % (ref 0–6.9)
ERYTHROCYTE [DISTWIDTH] IN BLOOD BY AUTOMATED COUNT: 62.1 FL (ref 35.9–50)
EST. AVERAGE GLUCOSE BLD GHB EST-MCNC: 120 MG/DL
GFR SERPLBLD CREATININE-BSD FMLA CKD-EPI: 7 ML/MIN/1.73 M 2
GLOBULIN SER CALC-MCNC: 2.5 G/DL (ref 1.9–3.5)
GLUCOSE SERPL-MCNC: 113 MG/DL (ref 65–99)
HBA1C MFR BLD: 5.8 % (ref 4–5.6)
HCT VFR BLD AUTO: 31.8 % (ref 42–52)
HGB BLD-MCNC: 10.1 G/DL (ref 14–18)
IMM GRANULOCYTES # BLD AUTO: 0.04 K/UL (ref 0–0.11)
IMM GRANULOCYTES NFR BLD AUTO: 0.6 % (ref 0–0.9)
LYMPHOCYTES # BLD AUTO: 0.68 K/UL (ref 1–4.8)
LYMPHOCYTES NFR BLD: 9.7 % (ref 22–41)
MAGNESIUM SERPL-MCNC: 2.6 MG/DL (ref 1.5–2.5)
MCH RBC QN AUTO: 31.8 PG (ref 27–33)
MCHC RBC AUTO-ENTMCNC: 31.8 G/DL (ref 32.3–36.5)
MCV RBC AUTO: 100 FL (ref 81.4–97.8)
MONOCYTES # BLD AUTO: 0.45 K/UL (ref 0–0.85)
MONOCYTES NFR BLD AUTO: 6.4 % (ref 0–13.4)
NEUTROPHILS # BLD AUTO: 5.65 K/UL (ref 1.82–7.42)
NEUTROPHILS NFR BLD: 80.8 % (ref 44–72)
NRBC # BLD AUTO: 0 K/UL
NRBC BLD-RTO: 0 /100 WBC (ref 0–0.2)
NT-PROBNP SERPL IA-MCNC: ABNORMAL PG/ML (ref 0–125)
PHOSPHATE SERPL-MCNC: 7 MG/DL (ref 2.5–4.5)
PLATELET # BLD AUTO: 156 K/UL (ref 164–446)
PMV BLD AUTO: 10.9 FL (ref 9–12.9)
POTASSIUM SERPL-SCNC: 4.2 MMOL/L (ref 3.6–5.5)
PROT SERPL-MCNC: 5.5 G/DL (ref 6–8.2)
RBC # BLD AUTO: 3.18 M/UL (ref 4.7–6.1)
SODIUM SERPL-SCNC: 137 MMOL/L (ref 135–145)
TSH SERPL DL<=0.005 MIU/L-ACNC: 4.82 UIU/ML (ref 0.38–5.33)
WBC # BLD AUTO: 7 K/UL (ref 4.8–10.8)

## 2025-01-24 PROCEDURE — 84100 ASSAY OF PHOSPHORUS: CPT

## 2025-01-24 PROCEDURE — 90945 DIALYSIS ONE EVALUATION: CPT

## 2025-01-24 PROCEDURE — 97602 WOUND(S) CARE NON-SELECTIVE: CPT

## 2025-01-24 PROCEDURE — 80053 COMPREHEN METABOLIC PANEL: CPT

## 2025-01-24 PROCEDURE — 97163 PT EVAL HIGH COMPLEX 45 MIN: CPT

## 2025-01-24 PROCEDURE — A9270 NON-COVERED ITEM OR SERVICE: HCPCS | Performed by: PHYSICAL MEDICINE & REHABILITATION

## 2025-01-24 PROCEDURE — 83880 ASSAY OF NATRIURETIC PEPTIDE: CPT

## 2025-01-24 PROCEDURE — 83036 HEMOGLOBIN GLYCOSYLATED A1C: CPT

## 2025-01-24 PROCEDURE — 84443 ASSAY THYROID STIM HORMONE: CPT

## 2025-01-24 PROCEDURE — 99232 SBSQ HOSP IP/OBS MODERATE 35: CPT | Performed by: PHYSICAL MEDICINE & REHABILITATION

## 2025-01-24 PROCEDURE — 36415 COLL VENOUS BLD VENIPUNCTURE: CPT

## 2025-01-24 PROCEDURE — 83735 ASSAY OF MAGNESIUM: CPT

## 2025-01-24 PROCEDURE — 700102 HCHG RX REV CODE 250 W/ 637 OVERRIDE(OP): Performed by: PHYSICAL MEDICINE & REHABILITATION

## 2025-01-24 PROCEDURE — 97167 OT EVAL HIGH COMPLEX 60 MIN: CPT

## 2025-01-24 PROCEDURE — 85025 COMPLETE CBC W/AUTO DIFF WBC: CPT

## 2025-01-24 PROCEDURE — 82306 VITAMIN D 25 HYDROXY: CPT

## 2025-01-24 PROCEDURE — 97535 SELF CARE MNGMENT TRAINING: CPT

## 2025-01-24 PROCEDURE — 74018 RADEX ABDOMEN 1 VIEW: CPT

## 2025-01-24 PROCEDURE — 770010 HCHG ROOM/CARE - REHAB SEMI PRIVAT*

## 2025-01-24 RX ADMIN — FUROSEMIDE 40 MG: 40 TABLET ORAL at 08:17

## 2025-01-24 RX ADMIN — ROSUVASTATIN CALCIUM 20 MG: 10 TABLET, FILM COATED ORAL at 21:34

## 2025-01-24 RX ADMIN — TERAZOSIN HYDROCHLORIDE 5 MG: 5 CAPSULE ORAL at 21:32

## 2025-01-24 RX ADMIN — SENNOSIDES AND DOCUSATE SODIUM 2 TABLET: 50; 8.6 TABLET ORAL at 08:16

## 2025-01-24 RX ADMIN — SEVELAMER CARBONATE 1600 MG: 800 TABLET, FILM COATED ORAL at 08:16

## 2025-01-24 RX ADMIN — SEVELAMER CARBONATE 1600 MG: 800 TABLET, FILM COATED ORAL at 17:32

## 2025-01-24 RX ADMIN — OXYCODONE 5 MG: 5 TABLET ORAL at 05:48

## 2025-01-24 RX ADMIN — METHOCARBAMOL 500 MG: 500 TABLET ORAL at 13:54

## 2025-01-24 RX ADMIN — GUAIFENESIN 600 MG: 600 TABLET ORAL at 21:33

## 2025-01-24 RX ADMIN — APIXABAN 5 MG: 5 TABLET, FILM COATED ORAL at 08:17

## 2025-01-24 RX ADMIN — CLOPIDOGREL BISULFATE 75 MG: 75 TABLET ORAL at 08:17

## 2025-01-24 RX ADMIN — METHOCARBAMOL 500 MG: 500 TABLET ORAL at 17:32

## 2025-01-24 RX ADMIN — GENTAMICIN SULFATE: 1 CREAM TOPICAL at 17:15

## 2025-01-24 RX ADMIN — SEVELAMER CARBONATE 1600 MG: 800 TABLET, FILM COATED ORAL at 12:04

## 2025-01-24 RX ADMIN — METHOCARBAMOL 500 MG: 500 TABLET ORAL at 21:33

## 2025-01-24 RX ADMIN — GABAPENTIN 100 MG: 100 CAPSULE ORAL at 21:33

## 2025-01-24 RX ADMIN — GABAPENTIN 100 MG: 100 CAPSULE ORAL at 08:16

## 2025-01-24 RX ADMIN — GUAIFENESIN 600 MG: 600 TABLET ORAL at 08:15

## 2025-01-24 RX ADMIN — OXYCODONE 5 MG: 5 TABLET ORAL at 17:30

## 2025-01-24 RX ADMIN — METHOCARBAMOL 500 MG: 500 TABLET ORAL at 08:17

## 2025-01-24 RX ADMIN — OXYCODONE 5 MG: 5 TABLET ORAL at 21:32

## 2025-01-24 RX ADMIN — OMEPRAZOLE 20 MG: 20 CAPSULE, DELAYED RELEASE ORAL at 08:17

## 2025-01-24 RX ADMIN — MONTELUKAST 10 MG: 10 TABLET, FILM COATED ORAL at 21:33

## 2025-01-24 RX ADMIN — TAMSULOSIN HYDROCHLORIDE 0.4 MG: 0.4 CAPSULE ORAL at 08:16

## 2025-01-24 RX ADMIN — MAGNESIUM HYDROXIDE 30 ML: 1200 LIQUID ORAL at 05:49

## 2025-01-24 RX ADMIN — OXYCODONE HYDROCHLORIDE 10 MG: 10 TABLET ORAL at 10:27

## 2025-01-24 RX ADMIN — APIXABAN 5 MG: 5 TABLET, FILM COATED ORAL at 21:34

## 2025-01-24 RX ADMIN — OXYCODONE 5 MG: 5 TABLET ORAL at 13:55

## 2025-01-24 RX ADMIN — SENNOSIDES AND DOCUSATE SODIUM 2 TABLET: 50; 8.6 TABLET ORAL at 21:33

## 2025-01-24 RX ADMIN — AMLODIPINE BESYLATE 2.5 MG: 5 TABLET ORAL at 05:48

## 2025-01-24 RX ADMIN — OXYCODONE 5 MG: 5 TABLET ORAL at 10:08

## 2025-01-24 ASSESSMENT — PAIN DESCRIPTION - PAIN TYPE
TYPE: SURGICAL PAIN
TYPE: SURGICAL PAIN;ACUTE PAIN
TYPE: ACUTE PAIN;SURGICAL PAIN
TYPE: ACUTE PAIN;SURGICAL PAIN

## 2025-01-24 ASSESSMENT — BRIEF INTERVIEW FOR MENTAL STATUS (BIMS)
INITIAL REPETITION OF BED BLUE SOCK - FIRST ATTEMPT: 3
ASKED TO RECALL SOCK: YES, NO CUE REQUIRED
ASKED TO RECALL BLUE: YES, NO CUE REQUIRED
WHAT DAY OF THE WEEK IS IT: CORRECT
WHAT YEAR IS IT: CORRECT
BIMS SUMMARY SCORE: 15
WHAT MONTH IS IT: ACCURATE WITHIN 5 DAYS
ASKED TO RECALL BED: YES, NO CUE REQUIRED

## 2025-01-24 ASSESSMENT — GAIT ASSESSMENTS: GAIT LEVEL OF ASSIST: UNABLE TO PARTICIPATE

## 2025-01-24 ASSESSMENT — ACTIVITIES OF DAILY LIVING (ADL)
BED_CHAIR_WHEELCHAIR_TRANSFER_DESCRIPTION: SQUAT PIVOT TRANSFER TO WHEELCHAIR
TOILETING: INDEPENDENT

## 2025-01-24 NOTE — CARE PLAN
"The patient is Stable - Low risk of patient condition declining or worsening    Shift Goals  Clinical Goals: Comfort and safety  Patient Goals: Comfort    Progress made toward(s) clinical / shift goals:    Problem: Knowledge Deficit - Standard  Goal: Patient and family/care givers will demonstrate understanding of plan of care, disease process/condition, diagnostic tests and medications  Outcome: Progressing     Problem: Pain - Standard  Goal: Alleviation of pain or a reduction in pain to the patient’s comfort goal  Outcome: Progressing     Problem: Skin Integrity  Goal: Skin integrity is maintained or improved  Outcome: Progressing     Problem: Fall Risk - Rehab  Goal: Patient will remain free from falls  Outcome: Progressing  Note: Trinh Spivey Fall risk Assessment Score: 22    High fall risk Interventions   - Bed and strip alarm   - Yellow sign by the door   - Yellow wrist band \"Fall risk\"  - Room near to the nurse station  - Do not leave patient unattended in the bathroom  - Fall risk education provided       Patient is not progressing towards the following goals:      "

## 2025-01-24 NOTE — PROGRESS NOTES
[facility]    24 hour UF: 1531 mL. (Total UF 1281 mL + I drain 250 mL - last fill 0 mL )    Patient is alert and oriented x 4.Report Left foot pain, PCN notified, scheduled pain med was given. VS within normal limits. PD catheter on Right lower abdominal quadrant, catherter intact, dressing is CDI. No reported concerns and alarms from PCN.   Patient aseptically disconnected from [employer] cycler at 0530. Effluent is clear,yellow and no fibrin noted.      Report given to PCN AUSTIN Lutz RN    See dialysis treatment flow sheet for details.

## 2025-01-24 NOTE — H&P
"  Physical Medicine & Rehabilitation  History and Physical (H&P)  &     Post Admission Physician Evaluation (CHEN)       Date of Admission: 1/23/2025  Date of Service: 1/23/2025   Edy Bennett  RH04/02    [facility] Code to Support Admission: 0016 - Debility (Non-Cardiac, Non-Pulmonary)  Etiologic Diagnosis: PAD (peripheral artery disease) (Formerly [facility])    _______________________________________________    Chief Complaint: PVD with bypass    History of Present Illness:  Adapted from the PM&R Consult by Dr. Meade:   \"HPI: The patient is a 79 y.o.  male with a past medical history of moderate aortic stenosis, hypertension, CAD with stents on eliquis, ESRD on PD, BPH and severe PAD who presented on 1/20/2025 10:52 AM for elective left bypass graft.  01/20/2025, the patient was taken to the OR for left popliteal to posterior tibial artery bypass by Marco Antonio Miller MD. Current labs remarkable for WBC 8.6, Hgb 9.7, platelets 157, creatinine 7.2, GFR 7. Vitals remarkable for 0 to 2 L supplemental O2.  Patient seen by PT with recommendation for post-acute placement. PM&R consulted to evaluate for possible inpatient rehab admission.\"    Patient admitted to acute rehab 1/23/2025.  On admission patient reports he is concerned about the pain.  He has a lot of pain whenever he moves.  Discussed scheduled medications which he is okay with.  Also has trouble with peritoneal dialysis if he is to constipated.    Review of Systems:     14 point ROS reviewed and negative except as stated above.      Past Medical History:  Past Medical History:   Diagnosis Date    A-V fistula (Formerly [facility])     Left Arm    Arrhythmia 03/13/2024    Due to kidney failure treatment    Breath shortness     with exertion    Cataract     shon iol    Congestive heart failure (Formerly [facility])     Diabetes (Formerly [facility])     not on any medication at this time, being monitored at dialysis    Dialysis patient (Formerly [facility])     monday wednesday friday at [facility]    Dialysis patient (Formerly [facility]) 01/17/2025 "    Starting Peritoneal Dialysis @ Home on 1/17/2025. Follows Nephrologist MD Milian.    Foot ulcer ([facility])     left foot. being seen by wound care    Hemorrhagic disorder ([facility])     plavix eliquis    High cholesterol     Hypertension 05/24/2024    states controlled    DARIEN (obstructive sleep apnea) 01/17/2025    O2 @ 2.5L Per Patient's Wife.    Pain     Peritoneal dialysis catheter in place ([facility]) 01/17/2025    Pneumonia     3/24    Renal disorder     Sleep apnea 01/10/2025    Home sleep study done 1/3/2025 - no results yet.    Stroke ([facility]) 2021    tia       Past Surgical History:  Past Surgical History:   Procedure Laterality Date    VT VEIN BYPASS GRAFT,FEM-TIBIAL Left 1/20/2025    Procedure: LEFT LOWER EXTREMITY BYPASS, POPLITIAL TO TIBIAL BYPASS;  Surgeon: Marco Antonio Miller M.D.;  Location: [facility];  Service: Vascular    AORTOGRAM WITH RUNOFF  1/14/2025    Procedure: AORTOGRAM WITH RUNOFF, right peroneal artery intravascular lithotripsy and stent placement;  Surgeon: Marco Antonio Miller M.D.;  Location: SURGERY [facility];  Service: Vascular    INGUINAL HERNIA LAPAROSCOPIC N/A 12/18/2024    Procedure: LAPAROSCOPIC LEFT INGUINAL HERNIA REPAIR WITH MESH, LAPAROSCOPIC REPOSITIONING OF PERITONEAL DIALYSIS CATHETER;  Surgeon: Marco Antonio Miller M.D.;  Location: SURGERY [facility];  Service: Vascular    CATH PLACEMENT CAPD N/A 12/18/2024    Procedure: REPOSITION, CATHETER, CAPD;  Surgeon: Marco Antonio Miller M.D.;  Location: SURGERY [facility];  Service: Vascular    CATH PLACEMENT CAPD N/A 09/16/2024    Procedure: LAPAROSCOPIC INSERTION OF PERITONEAL DIALYSIS CATHETER PLACEMENT;  Surgeon: Marco Antonio Miller M.D.;  Location: SURGERY SAME DAY [facility];  Service: Vascular    AV FISTULA CREATION Left 07/01/2024    Procedure: CREATION OF LEFT UPPER EXTREMITY DIALYSIS FISTULA;  Surgeon: Shilo Mercedes M.D.;  Location: SURGERY [facility];  Service: General    OTHER ORTHOPEDIC SURGERY  2004    bunion r foot     OTHER      tonsils adenoids    OTHER      dialysis catheter in upper right chest    OTHER      shon iol    OTHER CARDIAC SURGERY      cardiac stents       Family History:  Family History   Problem Relation Age of Onset    Cancer Mother         Brain cancer    Heart Disease Father         Several By-pass Surgeries    Cancer Daughter         Thyroid Cancer    Heart Disease Brother         Fatal Heart Attack       Medications:  Current Facility-Administered Medications   Medication Dose    Pharmacy Consult Request ...Pain Management Review 1 Each  1 Each    lidocaine (Asperflex) 4 % patch 1-2 Patch  1-2 Patch    hydrALAZINE (Apresoline) tablet 25 mg  25 mg    senna-docusate (Pericolace Or Senokot S) 8.6-50 MG per tablet 2 Tablet  2 Tablet    And    polyethylene glycol/lytes (Miralax) Packet 1 Packet  1 Packet    lactulose 20 GM/30ML solution 30 mL  30 mL    docusate sodium (Enemeez) enema 283 mg  283 mg    bisacodyl EC (Dulcolax) tablet 5 mg  5 mg    magnesium hydroxide (Milk Of Magnesia) suspension 30 mL  30 mL    sodium phosphate enema 1 Enema  1 Enema    [START ON 1/24/2025] omeprazole (PriLOSEC) capsule 20 mg  20 mg    carboxymethylcellulose (Refresh Tears) 0.5 % ophthalmic drops 1 Drop  1 Drop    benzocaine-menthol (Cepacol) lozenge 1 Lozenge  1 Lozenge    mag hydrox-al hydrox-simeth (Maalox Plus Es Or Mylanta Ds) suspension 20 mL  20 mL    ondansetron (Zofran ODT) dispertab 4 mg  4 mg    Or    ondansetron (Zofran) syringe/vial injection 4 mg  4 mg    traZODone (Desyrel) tablet 50 mg  50 mg    sodium chloride (Ocean) 0.65 % nasal spray 2 Spray  2 Spray    [START ON 1/24/2025] amLODIPine (Norvasc) tablet 2.5 mg  2.5 mg    apixaban (Eliquis) tablet 5 mg  5 mg    [START ON 1/24/2025] clopidogrel (Plavix) tablet 75 mg  75 mg    furosemide (Lasix) tablet 40 mg  40 mg    gabapentin (Neurontin) capsule 100 mg  100 mg    [START ON 1/24/2025] gentamicin (Garamycin) 0.1 % cream      guaiFENesin ER (Mucinex) tablet 600 mg   600 mg    methocarbamol (Robaxin) tablet 500 mg  500 mg    metoprolol SR (Toprol XL) tablet 100 mg  100 mg    montelukast (Singulair) tablet 10 mg  10 mg    rosuvastatin (Crestor) tablet 20 mg  20 mg    sevelamer carbonate (Renvela) tablet 1,600 mg  1,600 mg    [START ON 1/24/2025] tamsulosin (Flomax) capsule 0.4 mg  0.4 mg    terazosin (Hytrin) capsule 5 mg  5 mg    acetaminophen (Tylenol) tablet 650 mg  650 mg    oxyCODONE immediate-release (Roxicodone) tablet 5 mg  5 mg    Or    oxyCODONE immediate release (Roxicodone) tablet 10 mg  10 mg       Allergies:  Patient has no known allergies.    Psychosocial History:  Housing / Facility: [address]  Steps Into Home: 0  Steps In Home: 0  Lives with - Patient's Self Care Capacity: Spouse  Equipment Owned: Single Point Cane, 4-Wheel Walker, Front-Wheel Walker, Grab Bar(s) In Tub / Shower, Grab Bar(s) By Toilet    Level of Function Prior to Disability:  Housing / Facility: 1 [address]  Steps Into Home: 0  Steps In Home: 0  Bathroom Set up: Walk In Shower, Built-In Shower Chair, Grab Bars  Equipment Owned: Single Point Cane, 4-Wheel Walker, Front-Wheel Walker, Grab Bar(s) In Tub / Shower, Grab Bar(s) By Toilet  Lives with - Patient's Self Care Capacity: Spouse  Bed Mobility: Independent  Transfer Status: Independent  Ambulation: Independent  Assistive Devices Used: Front-Wheel Walker    Self Feeding: Independent  Grooming / Hygiene: Independent  Bathing: Independent  Dressing: Independent  Toileting: Independent  Medication Management: Independent  Laundry: Independent  Kitchen Mobility: Independent  Finances: Independent  Home Management: Independent  Shopping: Independent  Prior Level Of Mobility: Independent Without Device in Community, Independent Without Device in Home  Driving / Transportation: Driving Independent  Prior Services: Intermittent Physical Support for ADL Per Family  Housing / Facility: [address] [facility]    Level of Function Prior to Admission to [facility]:  Gait Level Of Assist: Moderate Assist  Assistive Device: Front Wheel Walker  Distance (Feet): 1  Deviation: Bradykinetic, Shuffled Gait  Weight Bearing Status: not putting weight on L LE 2/2 pain and weakness  Supine to Sit: Moderate Assist  Sit to Supine: Minimal Assist  Scooting: Minimal Assist  Comments: HOB slightly elevated  Sit to Stand: Moderate Assist  Bed, Chair, Wheelchair Transfer: Unable to Participate  Sitting in Chair: NT  Sitting Edge of Bed: >10 min  Standing: <1 min    Lower Body Dressing: Moderate Assist (don/doff socks)     CURRENT LEVEL OF FUNCTION:   Same as level of function prior to admission to [facility]    Physical Examination:     VITAL SIGNS:   height is 1.707 m (5' 7.2\") and weight is 79 kg (174 lb 2.6 oz). His oral temperature is 37 °C (98.6 °F). His blood pressure is 102/72 and his pulse is 81. His respiration is 16 and oxygen saturation is 96%.   GENERAL: No apparent distress  HEENT: Normocephalic/atraumatic and EOMI  CARDIAC: Regular rate and rhythm  LUNGS: Clear to auscultation   ABDOMINAL: soft, nontender, and nondistended    EXTREMITIES: no spasticity or no edema  NEURO:  Mental status:  A&Ox4 (person, place, date, situation) answers questions appropriately follows commands  Speech: fluent, no aphasia or dysarthria  Moves right lower extremity volitionally, moves both upper extremities volitionally against gravity, left lower extremity limited by pain extensive wound VAC present.      Radiology:  Arterial ultrasound 11/12/2024  IMPRESSION:     1.  Occlusions likely chronic are noted in the distal anterior and posterior tibial arteries.     2.  No proximal high-grade stenoses or occlusions in the lower extremity arterial vasculature.    Laboratory Values:  Recent Labs     01/21/25  0017 01/22/25  0618 01/23/25  0224   SODIUM 136 135 135   POTASSIUM 4.4 4.3 4.4   CHLORIDE 93* 95* 94*   CO2 23 24 24   GLUCOSE 320* 160* 175*   BUN 72* 68* " 64*   CREATININE 7.51* 7.20* 7.52*   CALCIUM 8.6 8.6 8.3*     Recent Labs     01/21/25  0017 01/22/25  0618 01/23/25  0224   WBC 12.5* 8.6 8.6   RBC 3.47* 3.09* 3.13*   HEMOGLOBIN 10.9* 9.7* 9.9*   HEMATOCRIT 34.6* 30.3* 30.9*   MCV 99.7* 98.1* 98.7*   MCH 31.4 31.4 31.6   MCHC 31.5* 32.0* 32.0*   RDW 63.8* 63.0* 62.6*   PLATELETCT 173 157* 162*   MPV 11.3 11.3 11.0           Primary Rehabilitation Diagnosis:    This patient is a 79 y.o. male admitted for acute inpatient rehabilitation with PAD (peripheral artery disease) (HCC).    Impairments:   ADLs/IADLs  Mobility    Secondary Diagnosis/Medical Co-morbidities Affecting Function  BPH, impaired, ESRD on PD, HTN, impaired ADLs, impaired mobility     Relevant Changes Since Preadmission Evaluation:    Status unchanged    The patient's rehabilitation potential is Excellent  The patient's medical prognosis is excellent    Rehabilitation Plan:   Discussion and Recommendations:   1. The patient requires an acute inpatient rehabilitation program with a coordinated program of care at an intensity and frequency not available at a lower level of care. This recommendation is substantiated by the patient's medical physicians who recommend that the patient's intervention and assessment of medical issues needs to be done at an acute level of care for patient's safety and maximum outcome.   2. A coordinated program of care will be supplied by an interdisciplinary team of physical therapy, occupational therapy, rehab physician, rehab nursing, and, if needed, speech therapy and rehab psychology. Rehab team presents a patient-specific rehabilitation and education program concentrating on prevention of future problems related to accessibility, mobility, skin, bowel, bladder, sexuality, and psychosocial and medical/surgical problems.   3. Need for Rehabilitation Physician: The rehab physician will be evaluating the patient on a multi-weekly basis to help coordinate the program of care.  The rehab physician communicates between medical physicians, therapists, and nurses to maximize the patient's potential outcome. Specific areas in which the rehab physician will be providing daily assessment include the following:   A. Assessing the patient's heart rate and blood pressure response (vitals monitoring) to activity and making adjustments in medications or conservative measures as needed.   B. The rehab physician will be assessing the frequency at which the program can be increased to allow the patient to reach optimal functional outcome.   C. The rehab physician will also provide assessments in daily skin care, especially in light of patient's impairments in mobility.   D. The rehab physician will provide special expertise in understanding how to work with functional impairment and recommend appropriate interventions, compensatory techniques, and education that will facilitate the patient's outcome.   4. Rehab R.N.   The rehab RN will be working with patient to carry over in room mobility and activities of daily living when the patient is not in 3 hours of skilled therapy. Rehab nursing will be working in conjunction with rehab physician to address all the medical issues above and continue to assess laboratory work and discuss abnormalities with the treating physicians, assess vitals, and response to activity, and discuss and report abnormalities with the rehab physician. Rehab RN will also continue daily skin care, supervise bladder/bowel program, instruct in medication administration, and ensure patient safety.   5. Rehab Therapy: Therapies to treat at intensity and frequency of (may change after completion of evaluation by all therapeutic disciplines):       PT:  Physical therapy to address mobility, transfer, gait training and evaluation for adaptive equipment needs 1.5 hour/day at least 5 days/week for the duration of the ELOS (see below)       OT:  Occupational therapy to address ADLs, self-care,  home management training, functional mobility/transfers and assistive device evaluation, and community re-integration 1.5 hour/day at least 5 days/week for the duration of the ELOS (see below).        ST/Dysphagia:  Speech therapy to address speech, language, and cognitive deficits as well as swallowing difficulties with retraining/dysphagia management and community re-integration with comprehension, expression, cognitive training 0 hour/day at least 5 days/week for the duration of the ELOS (see below).     Medical management / Rehabilitation Issues/ Adverse Potential as part of rehabilitation plan     Rehabilitation Issues/Adverse Potential  1.  PVD s/p bypass graft: Patient demonstrates functional deficits in strength, balance, coordination, and ADL's. Patient is admitted to [facility] for comprehensive rehabilitation therapy as described below.   Rehabilitation nursing monitors bowel and bladder control, educates on medication administration, co-morbidities and monitors patient safety.    2.  Neurostimulants: None at this time but continue to assess daily for need to initiate should status change.    3.  DVT prophylaxis:  Patient is on Eliquis for anticoagulation upon transfer. Encourage OOB. Monitor daily for signs and symptoms of DVT including but not limited to swelling and pain to prevent the development of DVT that may interfere with therapies.    4.  GI prophylaxis:  On prilosec to prevent gastritis/dyspepsia which may interfere with therapies.    5.  Pain: Controlled with Tylenol     6.  Nutrition/Dysphagia: Dietician monitors nutrient intake, recommend supplements prn and provide nutrition education to pt/family to promote optimal nutrition for wound healing/recovery.     7.  Bladder/bowel:  Start bowel and bladder program as described below, to prevent constipation, urinary retention (which may lead to UTI), and urinary incontinence (which will impact upon pt's functional  independence).   - TV Q3h while awake with post void bladder scans, I&O cath for PVRs >400  - up to commode after meal     8.  Skin/dermal ulcer prophylaxis: Monitor for new skin conditions with q.2 h. turns as required to prevent the development of skin breakdown.     9.  Cognition/Behavior: As needed psychologist provides adjustment counseling to illness and psychosocial barriers that may be potential barriers to rehabilitation.     10. Respiratory therapy: RT performs O2 management prn, breathing retraining, pulmonary hygiene and bronchospasm management prn to optimize participation in therapies.     Medical Co-Morbidities/Adverse Potential Affecting Function:    S/p left popliteal to posterior tibial artery bypass Dr. Miller 1/20/2025  PT and OT for mobility and ADLs. Per guidelines, 15 hours per week between PT, OT and/or SLP.  Follow-up vascular surgery  Continue Eliquis and Plavix  Continue statin    Type 2 diabetes mellitus with hyperglycemia -outpatient medication had been Januvia 1 tablet twice daily    Pain -Tylenol and oxycodone as needed.  Scheduled Robaxin, gabapentin.  Schedule oxycodone contraindicated based on renal function.    Anemia -a.m. labs    ESRD on PD -nephrology following.  On sevelamer    History of moderate aortic stenosis -stable    Hypertension -on Norvasc 2.5 mg every morning, Lasix 40 mg twice daily    CAD s/p SYDNEE 3x -on aspirin and statin.  Had been on Imdur 30 mg daily    Gout -Home medication-allopurinol daily    Hyperlipidemia -continue statin    Recent left inguinal hernia repair -12/18/2024 Dr. Miller    Bowel - Patient on Senna-docusate for constipation prophylaxis.     BPH- TV/PVR/BS PRN.  Continue Flomax and Hytrin    Insomnia -continue trazodone at night      Upcoming Labs/imaging: Admission labs     DVT PROPHYLAXIS: On Eliquis    HOSPITALIST FOLLOWING: Yes    CODE STATUS: Full code    DISPO: Home with spouse    SHAE: TBD    MEDS SENT TO: TBD    DISCHARGE SPECIALIST FOLLOW  UP: Vascular surgery and other specialists as previously scheduled    Patient to scheduled follow up with their PCP within 2 weeks from discharge from the [facility].     I personally performed a complete drug regimen review and no potential clinically significant medication issues were identified.     Goals/Expected Level of Function Based on Current Medical and Functional Status:  (may change based on patient's medical status and rate of impairment recovery)  Transfers:   Modified Independent  Mobility/Gait:   Modified Independent  ADL's:   Modified Independent    DISPOSITION: Discharge to pre-morbid independent living setting with the supportive care of patient's family.    ELOS: 10 to 14 days  ____________________________________    Dr. Mere Klein DO, MS  [facility] - Physical Medicine & Rehabilitation   ____________________________________    Pt was seen today for 75 min, and entire time spent in face-to-face contact was >50% in counseling and coordination of care as detailed in A/P above.

## 2025-01-24 NOTE — PROGRESS NOTES
NEW ADMIT FALL PREVENTION EDUCATION                                    AND INTERVENTION       Fall Prevention Education Video watched: Yes  Strip Alarm in place: Yes  Alarming seatbelt No   Does patient need a posey bed?: No   Does patient have the right size non-skid sock?: Yes      Admitting RN: Nataly Dash

## 2025-01-24 NOTE — PROGRESS NOTES
[facility] Services Progress Note     CCPD x 8.5 hours, 1100 mL fills x 7 cycles using 2.5% and 1.5% PD solution ordered per      Orders reviewed, CCPD cycler therapy settings verified.     CCPD treatment initiated at 1820 without any issues.  Patient AOx4, VSS. Denies pain. Edema present on lower left and right extremities. No stomach issues.                 Patient with intact and patent RLQ PD catheter aseptically connected per policy.  RLQ PD catheter and exit site in good condition, no signs of infection noted, cleansed with antiseptic solution and dressing changed per policy. Gentamicin applied at exit site.     Initial drain: 250 mL      Report/Inservice given FELICIANO Campbell RN, will relay instruction and troubleshooting to nocturnal nurse.     Patient on dwell 1/7 prior to departure from bedside.     Please contact on call dialysis RN for any issues with persistent alarms/assistance with troubleshooting or patient not tolerating therapy.      For on-call Dialysis RN, pls contact [facility] at [phone].

## 2025-01-24 NOTE — PROGRESS NOTES
"[facility] -  PROGRESS NOTE               Author: FLOR Perdomo Date & Time: 1/24/2025  10:55 AM     HPI:  79 y.o. male with HTN, ESRD on PD, PVD/ gangrenous left foot, BPH admitted for monitoring s/p  left leg bypass surgery on 1/20 with Dr. Miller. He recently underwent hernial repair and was temporarily transferred to in center HD via [facility]. He has returned to  PD  4 days ago, doing low fill.volumes and has been tolerating at home.   Patient was connected to cycler last night. Total UF reported to be 276ml. Reports of low drain UF alarm last night but no major issues with PD overnight. Pt reports no complaints today morning  No F/C/N/V/CP/SOB.  No melena, hematochezia, hematemesis.  No HA, visual changes,or abdominal pain.     DAILY NEPHROLOGY SUMMARY:    1/21: consult done   1/22: No issues with PD overnight, UF 1137cc, VSS, , feels good, wife at bed side   1/23: No issues with PD overnight 653 cc of UF , not had any BM yesterday and today  [facility]--------------------  1/24: CCPD 1.5L UF. VSS. PO4 elevated. Pt seen during PD, wife present. C/O pain.    REVIEW OF SYSTEMS:    10 point ROS reviewed and is as per HPI/daily summary or otherwise negative    PMH/PSH/SH/FH: Reviewed and unchanged since admission note  CURRENT MEDICATIONS: Reviewed from admission to present day    VS:  /72   Pulse 70   Temp 37 °C (98.6 °F)   Resp 18   Ht 1.707 m (5' 7.2\")   Wt 79 kg (174 lb 2.6 oz)   SpO2 98%   BMI 27.12 kg/m²   Physical Exam  Vitals and nursing note reviewed.   Constitutional:       Appearance: Normal appearance.   HENT:      Head: Normocephalic and atraumatic.      Nose: Nose normal.      Mouth/Throat:      Mouth: Mucous membranes are moist.   Eyes:      General: No scleral icterus.     Extraocular Movements: Extraocular movements intact.      Conjunctiva/sclera: Conjunctivae normal.   Cardiovascular:      " Rate and Rhythm: Normal rate and regular rhythm.      Comments: No edema  Pulmonary:      Effort: Pulmonary effort is normal.      Breath sounds: Normal breath sounds.   Abdominal:      General: There is no distension.      Palpations: Abdomen is soft.      Comments: PD cath in RLQ    Musculoskeletal:         General: No deformity.      Cervical back: Normal range of motion and neck supple.      Right lower leg: No edema.      Left lower leg: Edema present.      Comments: Mild edema in LLE wound vac in place     Skin:     General: Skin is warm and dry.      Findings: No rash.   Neurological:      General: No focal deficit present.      Mental Status: He is alert and oriented to person, place, and time. Mental status is at baseline.   Psychiatric:         Mood and Affect: Mood normal.         Behavior: Behavior normal. Behavior is cooperative.         FLUID BALANCE:  In: 360 [P.O.:360]  Out: 1781     LABS:  Recent Labs     01/22/25  0618 01/23/25  0224 01/24/25  0519   SODIUM 135 135 137   POTASSIUM 4.3 4.4 4.2   CHLORIDE 95* 94* 95*   CO2 24 24 27   GLUCOSE 160* 175* 113*   BUN 68* 64* 63*   CREATININE 7.20* 7.52* 7.57*   CALCIUM 8.6 8.3* 8.3*     Recent Labs     01/22/25  0618 01/23/25  0224 01/24/25  0519   WBC 8.6 8.6 7.0   RBC 3.09* 3.13* 3.18*   HEMOGLOBIN 9.7* 9.9* 10.1*   HEMATOCRIT 30.3* 30.9* 31.8*   MCV 98.1* 98.7* 100.0*   MCH 31.4 31.6 31.8   MCHC 32.0* 32.0* 31.8*   RDW 63.0* 62.6* 62.1*   PLATELETCT 157* 162* 156*   MPV 11.3 11.0 10.9       IMPRESSION:  # ESRD on CCPD  - Pt was transitioned to  iHD during masoud-op  s/p inguinal hernial repair,   - Still has [facility] PC  - Returned to PD 1/17/25  - Home PD prescription CCPD 2.5% alternating with 1.5% 7 exchanges 8 hrs 30 min,   total FV 7700cc, no last fill  # HTN  - Goal BP < 140/90  - At goal  # Anemia of CKD  - Goal Hgb 10-11  - At goal   # CKD-MBD  - Ca 8.6   - PO4 7.4   # Chronic hypoxic respiratory failure   - On home oxygen 2-2.5L at night          SUGGESTIONS:  - Continue CCPD during hospital stay home prescription   - PD exit site care per protocol   - Bowel regimen to avoid constipation with PD  - No dietary protein restrictions  - Dose all meds per ESRD  - ROSALIND  TIW   - Continue Renvela 1600 mg tid ac   - Continue home BP medications   - Renal diet  - Arrange for dialysis PC removal if PD treatments going well > 1 week, resumed PD 1/17     Order placed for Monday 1/27 w/ IR   Please call or page Nephrology with questions or concerns  Thank you,

## 2025-01-24 NOTE — PROGRESS NOTES
NURSING DAILY NOTE    Name: Edy Bennett   Date of Admission: 1/23/2025   Admitting Diagnosis: PAD (peripheral artery disease) ([facility])  Attending Physician: Mere Klein D.o.  Allergies: Patient has no known allergies.    Safety  Patient Assist     Patient Precautions     Precaution Comments     Bed Transfer Status     Toilet Transfer Status      Assistive Devices     Oxygen  Room air w/o2 available  Diet/Therapeutic Dining  Current Diet Order   Procedures    Diet Order Diet: Renal; Second Modifier: (optional): Cardiac     Pill Administration  whole  Agitated Behavioral Scale     ABS Level of Severity       Fall Risk  Has the patient had a fall this admission?   No  Trinh Spivey Fall Risk Scoring  22, HIGH RISK  Fall Risk Safety Measures  bed alarm, chair alarm, poor balance, and low vision/ hearing    Vitals  Temperature: 37 °C (98.6 °F)  Temp src: Oral  Pulse: 81  Respiration: 16  Blood Pressure : 102/72  Blood Pressure MAP (Calculated): 82 MM HG  BP Location: Right, Upper Arm  Patient BP Position: Cota's Position     Oxygen  Pulse Oximetry: 96 %  O2 Delivery Device: Room air w/o2 available    Bowel and Bladder  Last Bowel Movement  01/21/25  Stool Type     Bowel Device     Continent  Bladder: Did not void   Bowel: No movement  Bladder Function     Genitourinary Assessment   Bladder Assessment (WDL):  WDL Except  Smith Catheter: Not Applicable  Urinary Symptoms: Anuric/ Dialysis Patient    Skin  Jalen Score   14  Sensory Interventions   Bed Types: Low Airloss  Skin Preventative Measures: Pillows in Use for Support / Positioning  Moisture Interventions  Moisturizers/Barriers: Moisturizer       Pain  Pain Rating Scale  3 - Sometimes distracts me  Pain Location  Leg  Pain Location Orientation  Left  Pain Interventions   Declines    ADLs    Bathing      Linen Change      Personal Hygiene     Chlorhexidine Bath      Oral Care     Teeth/Dentures      Shave     Nutrition Percentage Eaten     Environmental Precautions     Patient Turns/Positioning  Patient turns self independently side to side without assistance, to offload sacral area  Patient Turns Assistance/Tolerance     Bed Positions     Head of Bed Elevated         Psychosocial/Neurologic Assessment  Psychosocial Assessment  Psychosocial (WDL):  Within Defined Limits  Family Behaviors: Family Present  Neurologic Assessment  Neuro (WDL): Exceptions to WDL  Level of Consciousness: Alert  Orientation Level: Oriented X4  Cognition: Follows commands  Speech: Clear  Motor Function/Sensation Assessment: Sensation  LLE Sensation: Pain  EENT (WDL):  WDL Except    Cardio/Pulmonary Assessment  Edema   LLE Edema: Generalized, 1+  Respiratory Breath Sounds  RUL Breath Sounds: Clear  RML Breath Sounds: Clear  RLL Breath Sounds: Clear  ANAYELI Breath Sounds: Clear  LLL Breath Sounds: Clear  Cardiac Assessment   Cardiac (WDL):  WDL Except (hx of CHF,)

## 2025-01-24 NOTE — PROGRESS NOTES
NURSING DAILY NOTE    Name: Edy Bennett   Date of Admission: 1/23/2025   Admitting Diagnosis: PAD (peripheral artery disease) ([facility])  Attending Physician: ROSANNA PATEL D.O.  Allergies: Patient has no known allergies.    Safety  Patient Assist     Patient Precautions     Precaution Comments     Bed Transfer Status     Toilet Transfer Status      Assistive Devices  Rails, Wheelchair  Oxygen  None - Room Air  Diet/Therapeutic Dining  Current Diet Order   Procedures    Diet Order Diet: Renal; Second Modifier: (optional): Cardiac     Pill Administration  whole  Agitated Behavioral Scale     ABS Level of Severity       Fall Risk  Has the patient had a fall this admission?   No  Trinh Spivey Fall Risk Scoring  22, HIGH RISK  Fall Risk Safety Measures  bed alarm, chair alarm, poor balance, and low vision/ hearing    Vitals  Temperature: 36.8 °C (98.2 °F)  Temp src: Oral  Pulse: 68  Respiration: 16  Blood Pressure : 117/69  Blood Pressure MAP (Calculated): 85 MM HG  BP Location: Left, Upper Arm  Patient BP Position: Supine     Oxygen  Pulse Oximetry: 99 %  O2 (LPM): 0  O2 Delivery Device: None - Room Air    Bowel and Bladder  Last Bowel Movement  01/21/25  Stool Type     Bowel Device     Continent  Bladder: Did not void   Bowel: No movement  Bladder Function     Genitourinary Assessment   Bladder Assessment (WDL):  WDL Except  Smith Catheter: Not Applicable  Urinary Symptoms: Anuric/ Dialysis Patient    Skin  Jalen Score   16  Sensory Interventions   Bed Types: Low Airloss  Skin Preventative Measures: Pillows in Use to Float Heels, Pillows in Use for Support / Positioning  Moisture Interventions  Moisturizers/Barriers: Barrier Wipes      Pain  Pain Rating Scale  1 - Hardly Notice Pain  Pain Location  Leg  Pain Location Orientation  Left  Pain Interventions   Rest    ADLs    Bathing      Linen Change      Personal Hygiene     Chlorhexidine Bath   Not  Completed - Off Floor  Oral Care     Teeth/Dentures     Shave     Nutrition Percentage Eaten     Environmental Precautions     Patient Turns/Positioning  Patient turns self independently side to side without assistance, to offload sacral area  Patient Turns Assistance/Tolerance     Bed Positions     Head of Bed Elevated         Psychosocial/Neurologic Assessment  Psychosocial Assessment  Psychosocial (WDL):  Within Defined Limits  Patient Behaviors: Fatigue  Family Behaviors: No Family Present  Neurologic Assessment  Neuro (WDL): Exceptions to WDL  Level of Consciousness: Alert  Orientation Level: Oriented X4  Cognition: Follows commands  Speech: Clear  Motor Function/Sensation Assessment: Sensation  LLE Sensation: Pain  EENT (WDL):  WDL Except    Cardio/Pulmonary Assessment  Edema   LLE Edema: Generalized, 1+  Respiratory Breath Sounds  RUL Breath Sounds: Clear  RML Breath Sounds: Clear  RLL Breath Sounds: Clear  ANAYELI Breath Sounds: Clear  LLL Breath Sounds: Clear  Cardiac Assessment   Cardiac (WDL):  WDL Except (hx of CHF,)

## 2025-01-24 NOTE — CARE PLAN
The patient is Stable - Low risk of patient condition declining or worsening         Progress made toward(s) clinical / shift goals:    Problem: Knowledge Deficit - Standard  Goal: Patient and family/care givers will demonstrate understanding of plan of care, disease process/condition, diagnostic tests and medications  Outcome: Progressing   Patient educated on the POC and medications administered. All questions and concern addressed at this time   Problem: Pain - Standard  Goal: Alleviation of pain or a reduction in pain to the patient’s comfort goal  Outcome: Progressing   Pain with movement. Declined any medication at this time   Problem: Skin Integrity  Goal: Skin integrity is maintained or improved  Outcome: Progressing   Skin assessment completed. Pictures uploaded into epic   Problem: Fall Risk - Rehab  Goal: Patient will remain free from falls  Outcome: Progressing

## 2025-01-24 NOTE — CARE PLAN
"The patient is Stable - Low risk of patient condition declining or worsening         Progress made toward(s) clinical / shift goals:      Problem: Pain - Standard  Goal: Alleviation of pain or a reduction in pain to the patient’s comfort goal  Outcome: Progressing  Note: Scheduled pain meds given per MAR for c/o left leg incision pain with adequate relief. Pt sleeps good. Will continue to monitor.     Problem: Fall Risk - Rehab  Goal: Patient will remain free from falls  Outcome: Progressing  Note: Trinh Spivey Fall risk Assessment Score: 22    High fall risk Interventions   - Alarming seatbelt  - Bed and strip alarm   - Yellow sign by the door   - Yellow wrist band \"Fall risk\"  - Room near to the nurse station  - Do not leave patient unattended in the bathroom  - Fall risk education provided     Pt using call light appropriately when in need of assistance.          Patient is not progressing towards the following goals:      "

## 2025-01-24 NOTE — IDT DISCHARGE PLANNING
CASE MANAGEMENT INITIAL ASSESSMENT    Admit Date:  1/23/2025     CM reviewed the medical chart and will meet with the patient to discuss role of case management / discharge planning / team conference.       Diagnosis: PAD (peripheral artery disease) ([facility]) [I73.9]    Co-morbidities:   Patient Active Problem List    Diagnosis Date Noted    PAD (peripheral artery disease) ([facility]) 01/23/2025    Anemia in chronic renal disease 01/20/2025    Peripheral arterial disease ([facility]) 01/20/2025    Dyslipidemia 01/20/2025    Peripheral neuropathy 01/20/2025    Insomnia 01/20/2025    BPH (benign prostatic hyperplasia) 01/20/2025    Renal disease 12/18/2024    ESRD on peritoneal dialysis ([facility]) 12/18/2024    HTN (hypertension) 12/18/2024     Prior Living Situation:  With spouse.     Prior Level of Function:   Independent    Support Systems:  Primary : Kailey Bennett         Previous Services Utilized:        Other Information:        Primary Payor Source: Medicare A, Medicare B  Primary Care Practitioner : Denise Carver    Patient / Family Goal:  Patient / Family Goal: Return home.    Plan:  1. Continue to follow patient through hospitalization and provide discharge planning in collaboration with patient, family, physicians and ancillary services.     2. Utilize community resources to ensure a safe discharge.

## 2025-01-24 NOTE — PROGRESS NOTES
4 Eyes Skin Assessment Completed by Nataly RN and Lila RN.    Head Facial Scab  Ears WDL  Nose WDL  Mouth WDL  Neck WDL  Breast/Chest Double Lumen HD catheter  Shoulder Blades WDL  Spine WDL  (R) Arm/Elbow/Hand Bruising  (L) Arm/Elbow/Hand Bruising  Abdomen LLQ peritoneal dialysis catheter  Groin WDL  Scrotum/Coccyx/Buttocks Redness, DTI  (R) Leg scabbing, groin puncture  (L) Leg incision and prevena drain   (R) Heel/Foot/Toe Redness and Blanching, groin puncture, dryness crack, gangrene on toes  (L) Heel/Foot/Toe Redness and Blanching, gangrene on toes           Devices In Places Prevena drain       Interventions In Place Low Air Loss Mattress    Possible Skin Injury Yes    Pictures Uploaded Into Epic Yes  Wound Consult Placed Yes  RN Wound Prevention Protocol Ordered Yes eye

## 2025-01-24 NOTE — PROGRESS NOTES
"  Physical Medicine & Rehabilitation Progress Note    Encounter Date: 1/24/2025    Chief Complaint: PVD s/p bypass    Interval Events (Subjective):  VSS  Voiding some  BM 1/24    Seen and examined in his room. Has saturated Prevena. Pain limiting therapy right now. D/w them reduced therapy schedule which they are amenable to.       ROS: 14 point ROS negative unless otherwise specified in the HPI    Objective:  VITAL SIGNS: /72   Pulse 70   Temp 37 °C (98.6 °F)   Resp 18   Ht 1.707 m (5' 7.2\")   Wt 79 kg (174 lb 2.6 oz)   SpO2 98%   BMI 27.12 kg/m²     GEN: No apparent distress  HEENT: Head normocephalic, atraumatic.  Sclera nonicteric bilaterally, no ocular discharge appreciated bilaterally.  CV: Extremities warm and well-perfused, + BLE peripheral edema  PULMONARY: Breathing nonlabored on room air, no respiratory accessory muscle use.  Not requiring supplemental oxygen.  SKIN: Vac now working.   PSYCH: Mood and affect within normal limits.  NEURO: Awake alert.  Conversational.  Logical thought content.      Laboratory Values:  Recent Results (from the past 72 hours)   CBC WITHOUT DIFFERENTIAL    Collection Time: 01/22/25  6:18 AM   Result Value Ref Range    WBC 8.6 4.8 - 10.8 K/uL    RBC 3.09 (L) 4.70 - 6.10 M/uL    Hemoglobin 9.7 (L) 14.0 - 18.0 g/dL    Hematocrit 30.3 (L) 42.0 - 52.0 %    MCV 98.1 (H) 81.4 - 97.8 fL    MCH 31.4 27.0 - 33.0 pg    MCHC 32.0 (L) 32.3 - 36.5 g/dL    RDW 63.0 (H) 35.9 - 50.0 fL    Platelet Count 157 (L) 164 - 446 K/uL    MPV 11.3 9.0 - 12.9 fL   Renal Function Panel    Collection Time: 01/22/25  6:18 AM   Result Value Ref Range    Sodium 135 135 - 145 mmol/L    Potassium 4.3 3.6 - 5.5 mmol/L    Chloride 95 (L) 96 - 112 mmol/L    Co2 24 20 - 33 mmol/L    Glucose 160 (H) 65 - 99 mg/dL    Creatinine 7.20 (HH) 0.50 - 1.40 mg/dL    Bun 68 (H) 8 - 22 mg/dL    Calcium 8.6 8.5 - 10.5 mg/dL    Correct Calcium 9.6 8.5 - 10.5 mg/dL    Phosphorus 7.5 (H) 2.5 - 4.5 mg/dL    Albumin " 2.7 (L) 3.2 - 4.9 g/dL   ESTIMATED GFR    Collection Time: 01/22/25  6:18 AM   Result Value Ref Range    GFR (CKD-EPI) 7 (A) >60 mL/min/1.73 m 2   CBC WITHOUT DIFFERENTIAL    Collection Time: 01/23/25  2:24 AM   Result Value Ref Range    WBC 8.6 4.8 - 10.8 K/uL    RBC 3.13 (L) 4.70 - 6.10 M/uL    Hemoglobin 9.9 (L) 14.0 - 18.0 g/dL    Hematocrit 30.9 (L) 42.0 - 52.0 %    MCV 98.7 (H) 81.4 - 97.8 fL    MCH 31.6 27.0 - 33.0 pg    MCHC 32.0 (L) 32.3 - 36.5 g/dL    RDW 62.6 (H) 35.9 - 50.0 fL    Platelet Count 162 (L) 164 - 446 K/uL    MPV 11.0 9.0 - 12.9 fL   Renal Function Panel    Collection Time: 01/23/25  2:24 AM   Result Value Ref Range    Sodium 135 135 - 145 mmol/L    Potassium 4.4 3.6 - 5.5 mmol/L    Chloride 94 (L) 96 - 112 mmol/L    Co2 24 20 - 33 mmol/L    Glucose 175 (H) 65 - 99 mg/dL    Creatinine 7.52 (HH) 0.50 - 1.40 mg/dL    Bun 64 (H) 8 - 22 mg/dL    Calcium 8.3 (L) 8.5 - 10.5 mg/dL    Correct Calcium 9.1 8.5 - 10.5 mg/dL    Phosphorus 7.1 (H) 2.5 - 4.5 mg/dL    Albumin 3.0 (L) 3.2 - 4.9 g/dL   ESTIMATED GFR    Collection Time: 01/23/25  2:24 AM   Result Value Ref Range    GFR (CKD-EPI) 7 (A) >60 mL/min/1.73 m 2   CBC with Differential    Collection Time: 01/24/25  5:19 AM   Result Value Ref Range    WBC 7.0 4.8 - 10.8 K/uL    RBC 3.18 (L) 4.70 - 6.10 M/uL    Hemoglobin 10.1 (L) 14.0 - 18.0 g/dL    Hematocrit 31.8 (L) 42.0 - 52.0 %    .0 (H) 81.4 - 97.8 fL    MCH 31.8 27.0 - 33.0 pg    MCHC 31.8 (L) 32.3 - 36.5 g/dL    RDW 62.1 (H) 35.9 - 50.0 fL    Platelet Count 156 (L) 164 - 446 K/uL    MPV 10.9 9.0 - 12.9 fL    Neutrophils-Polys 80.80 (H) 44.00 - 72.00 %    Lymphocytes 9.70 (L) 22.00 - 41.00 %    Monocytes 6.40 0.00 - 13.40 %    Eosinophils 1.90 0.00 - 6.90 %    Basophils 0.60 0.00 - 1.80 %    Immature Granulocytes 0.60 0.00 - 0.90 %    Nucleated RBC 0.00 0.00 - 0.20 /100 WBC    Neutrophils (Absolute) 5.65 1.82 - 7.42 K/uL    Lymphs (Absolute) 0.68 (L) 1.00 - 4.80 K/uL    Monos (Absolute)  0.45 0.00 - 0.85 K/uL    Eos (Absolute) 0.13 0.00 - 0.51 K/uL    Baso (Absolute) 0.04 0.00 - 0.12 K/uL    Immature Granulocytes (abs) 0.04 0.00 - 0.11 K/uL    NRBC (Absolute) 0.00 K/uL   Comp Metabolic Panel (CMP)    Collection Time: 01/24/25  5:19 AM   Result Value Ref Range    Sodium 137 135 - 145 mmol/L    Potassium 4.2 3.6 - 5.5 mmol/L    Chloride 95 (L) 96 - 112 mmol/L    Co2 27 20 - 33 mmol/L    Anion Gap 15.0 7.0 - 16.0    Glucose 113 (H) 65 - 99 mg/dL    Bun 63 (H) 8 - 22 mg/dL    Creatinine 7.57 (HH) 0.50 - 1.40 mg/dL    Calcium 8.3 (L) 8.5 - 10.5 mg/dL    Correct Calcium 9.1 8.5 - 10.5 mg/dL    AST(SGOT) 27 12 - 45 U/L    ALT(SGPT) 8 2 - 50 U/L    Alkaline Phosphatase 86 30 - 99 U/L    Total Bilirubin 0.5 0.1 - 1.5 mg/dL    Albumin 3.0 (L) 3.2 - 4.9 g/dL    Total Protein 5.5 (L) 6.0 - 8.2 g/dL    Globulin 2.5 1.9 - 3.5 g/dL    A-G Ratio 1.2 g/dL   Magnesium    Collection Time: 01/24/25  5:19 AM   Result Value Ref Range    Magnesium 2.6 (H) 1.5 - 2.5 mg/dL   Phosphorus    Collection Time: 01/24/25  5:19 AM   Result Value Ref Range    Phosphorus 7.0 (H) 2.5 - 4.5 mg/dL   TSH with Reflex to FT4    Collection Time: 01/24/25  5:19 AM   Result Value Ref Range    TSH 4.820 0.380 - 5.330 uIU/mL   Vitamin D, 25-hydroxy (blood)    Collection Time: 01/24/25  5:19 AM   Result Value Ref Range    25-Hydroxy   Vitamin D 25 37 30 - 100 ng/mL   proBrain Natriuretic Peptide, NT    Collection Time: 01/24/25  5:19 AM   Result Value Ref Range    NT-proBNP >78911 (H) 0 - 125 pg/mL   ESTIMATED GFR    Collection Time: 01/24/25  5:19 AM   Result Value Ref Range    GFR (CKD-EPI) 7 (A) >60 mL/min/1.73 m 2       Medications:  Scheduled Medications   Medication Dose Frequency    Pharmacy Consult Request  1 Each PHARMACY TO DOSE    omeprazole  20 mg DAILY    amLODIPine  2.5 mg QAM    apixaban  5 mg BID    clopidogrel  75 mg DAILY    furosemide  40 mg BID    gabapentin  100 mg BID    guaiFENesin ER  600 mg Q12HRS    methocarbamol  500  mg 4X/DAY    metoprolol SR  100 mg QHS    montelukast  10 mg QHS    rosuvastatin  20 mg QHS    sevelamer carbonate  1,600 mg TID WITH MEALS    tamsulosin  0.4 mg QAM    terazosin  5 mg QHS    oxyCODONE immediate-release  5 mg 5X/DAY    senna-docusate  2 Tablet BID    gentamicin   DAILY AT 1800     PRN medications: lidocaine, hydrALAZINE, lactulose, docusate sodium, bisacodyl EC, magnesium hydroxide, sodium phosphate, carboxymethylcellulose, benzocaine-menthol, mag hydrox-al hydrox-simeth, ondansetron **OR** ondansetron, traZODone, sodium chloride, acetaminophen, oxyCODONE immediate-release **OR** oxyCODONE immediate-release, senna-docusate **AND** polyethylene glycol/lytes    Diet:  Current Diet Order   Procedures    Diet Order Diet: Renal; Second Modifier: (optional): Cardiac       Medical Decision Making and Plan:  S/p left popliteal to posterior tibial artery bypass Dr. Miller 1/20/2025  PT and OT for mobility and ADLs. Per guidelines, 15 hours per week between PT, OT and/or SLP.  Follow-up vascular surgery  Continue Eliquis and Plavix  Continue statin    1/24 Prevena saturation. Wound consult. Vasc surg to eval as well. Prevena now working.      Type 2 diabetes mellitus with hyperglycemia -outpatient medication had been Januvia 1 tablet twice daily     Pain -Tylenol and oxycodone as needed.  Scheduled Robaxin, gabapentin.  Schedule oxycodone contraindicated based on renal function.     Anemia - 1/24 Improving    Thrombocytopenia - Mild stable monitor 1/24     ESRD on PD -nephrology following.  On sevelamer    Hypermagnesemia - Nephro managing    Hyperphosphatemia - Nephro managing      History of moderate aortic stenosis -stable     Hypertension -on Norvasc 2.5 mg every morning, Lasix 40 mg twice daily. 1/24 VSS     CAD s/p SYDNEE 3x -on aspirin and statin.  Had been on Imdur 30 mg daily     Gout - No meds at home.      Hyperlipidemia -continue statin     Recent left inguinal hernia repair -12/18/2024   Angela     Bowel - Patient on Senna-docusate for constipation prophylaxis.  Constipation.  Large BM     BPH- TV/PVR/BS PRN.  Continue Flomax and Hytrin     Insomnia -continue trazodone at night        Upcoming Labs/imagin/27     DVT PROPHYLAXIS: On Eliquis     HOSPITALIST FOLLOWING: No    Consultants following: Nephrology     CODE STATUS: Full code     DISPO: Home with spouse     SHAE: TBD     MEDS SENT TO: TBD     DISCHARGE SPECIALIST FOLLOW UP: Vascular surgery and other specialists as previously scheduled     Patient to scheduled follow up with their PCP within 2 weeks from discharge from the [facility].   ____________________________________    Dr. Mere Klein DO, MS  [facility] - Physical Medicine & Rehabilitation   ____________________________________

## 2025-01-25 ENCOUNTER — APPOINTMENT (OUTPATIENT)
Dept: OCCUPATIONAL THERAPY | Facility: REHABILITATION | Age: 80
DRG: 299 | End: 2025-01-25
Attending: PHYSICAL MEDICINE & REHABILITATION
Payer: MEDICARE

## 2025-01-25 ENCOUNTER — APPOINTMENT (OUTPATIENT)
Dept: PHYSICAL THERAPY | Facility: REHABILITATION | Age: 80
DRG: 299 | End: 2025-01-25
Attending: PHYSICAL MEDICINE & REHABILITATION
Payer: MEDICARE

## 2025-01-25 PROCEDURE — 97116 GAIT TRAINING THERAPY: CPT

## 2025-01-25 PROCEDURE — 97535 SELF CARE MNGMENT TRAINING: CPT

## 2025-01-25 PROCEDURE — 700102 HCHG RX REV CODE 250 W/ 637 OVERRIDE(OP): Performed by: PHYSICAL MEDICINE & REHABILITATION

## 2025-01-25 PROCEDURE — 97110 THERAPEUTIC EXERCISES: CPT

## 2025-01-25 PROCEDURE — 770010 HCHG ROOM/CARE - REHAB SEMI PRIVAT*

## 2025-01-25 PROCEDURE — 97530 THERAPEUTIC ACTIVITIES: CPT

## 2025-01-25 PROCEDURE — 90945 DIALYSIS ONE EVALUATION: CPT

## 2025-01-25 PROCEDURE — A9270 NON-COVERED ITEM OR SERVICE: HCPCS | Performed by: PHYSICAL MEDICINE & REHABILITATION

## 2025-01-25 RX ADMIN — GENTAMICIN SULFATE: 1 CREAM TOPICAL at 19:30

## 2025-01-25 RX ADMIN — OXYCODONE 5 MG: 5 TABLET ORAL at 11:37

## 2025-01-25 RX ADMIN — OXYCODONE 5 MG: 5 TABLET ORAL at 13:37

## 2025-01-25 RX ADMIN — GUAIFENESIN 600 MG: 600 TABLET ORAL at 21:05

## 2025-01-25 RX ADMIN — METHOCARBAMOL 500 MG: 500 TABLET ORAL at 08:11

## 2025-01-25 RX ADMIN — METHOCARBAMOL 500 MG: 500 TABLET ORAL at 17:54

## 2025-01-25 RX ADMIN — SEVELAMER CARBONATE 1600 MG: 800 TABLET, FILM COATED ORAL at 08:11

## 2025-01-25 RX ADMIN — TAMSULOSIN HYDROCHLORIDE 0.4 MG: 0.4 CAPSULE ORAL at 08:12

## 2025-01-25 RX ADMIN — METHOCARBAMOL 500 MG: 500 TABLET ORAL at 13:36

## 2025-01-25 RX ADMIN — OMEPRAZOLE 20 MG: 20 CAPSULE, DELAYED RELEASE ORAL at 08:11

## 2025-01-25 RX ADMIN — MONTELUKAST 10 MG: 10 TABLET, FILM COATED ORAL at 21:05

## 2025-01-25 RX ADMIN — SEVELAMER CARBONATE 1600 MG: 800 TABLET, FILM COATED ORAL at 17:53

## 2025-01-25 RX ADMIN — SENNOSIDES AND DOCUSATE SODIUM 2 TABLET: 50; 8.6 TABLET ORAL at 08:11

## 2025-01-25 RX ADMIN — SENNOSIDES AND DOCUSATE SODIUM 2 TABLET: 50; 8.6 TABLET ORAL at 21:04

## 2025-01-25 RX ADMIN — ROSUVASTATIN CALCIUM 20 MG: 10 TABLET, FILM COATED ORAL at 21:04

## 2025-01-25 RX ADMIN — CLOPIDOGREL BISULFATE 75 MG: 75 TABLET ORAL at 08:12

## 2025-01-25 RX ADMIN — FUROSEMIDE 40 MG: 40 TABLET ORAL at 08:11

## 2025-01-25 RX ADMIN — APIXABAN 5 MG: 5 TABLET, FILM COATED ORAL at 08:12

## 2025-01-25 RX ADMIN — GABAPENTIN 100 MG: 100 CAPSULE ORAL at 21:05

## 2025-01-25 RX ADMIN — GABAPENTIN 100 MG: 100 CAPSULE ORAL at 08:12

## 2025-01-25 RX ADMIN — APIXABAN 5 MG: 5 TABLET, FILM COATED ORAL at 21:04

## 2025-01-25 RX ADMIN — GUAIFENESIN 600 MG: 600 TABLET ORAL at 08:11

## 2025-01-25 RX ADMIN — SEVELAMER CARBONATE 1600 MG: 800 TABLET, FILM COATED ORAL at 11:38

## 2025-01-25 RX ADMIN — OXYCODONE 5 MG: 5 TABLET ORAL at 17:53

## 2025-01-25 RX ADMIN — OXYCODONE 5 MG: 5 TABLET ORAL at 05:54

## 2025-01-25 RX ADMIN — OXYCODONE 5 MG: 5 TABLET ORAL at 21:06

## 2025-01-25 RX ADMIN — METHOCARBAMOL 500 MG: 500 TABLET ORAL at 21:05

## 2025-01-25 ASSESSMENT — PAIN DESCRIPTION - PAIN TYPE
TYPE: SURGICAL PAIN

## 2025-01-25 ASSESSMENT — GAIT ASSESSMENTS
ASSISTIVE DEVICE: PARALLEL BARS
DEVIATION: ANTALGIC;BRADYKINETIC;STEP TO
DISTANCE (FEET): 10
GAIT LEVEL OF ASSIST: CONTACT GUARD ASSIST

## 2025-01-25 ASSESSMENT — ACTIVITIES OF DAILY LIVING (ADL)
BED_CHAIR_WHEELCHAIR_TRANSFER_DESCRIPTION: SQUAT PIVOT TRANSFER TO WHEELCHAIR
BED_CHAIR_WHEELCHAIR_TRANSFER_DESCRIPTION: INCREASED TIME;INITIAL PREPARATION FOR TASK;SET-UP OF EQUIPMENT;SQUAT PIVOT TRANSFER TO WHEELCHAIR;SUPERVISION FOR SAFETY;VERBAL CUEING

## 2025-01-25 NOTE — THERAPY
Occupational Therapy   Initial Evaluation     Patient Name: Edy Bennett  Age:  79 y.o., Sex:  male  Medical Record #: [id number]  Today's Date: 1/24/2025     Subjective    Pt willing to work with OT this morning despite pain in LLE.      Objective       01/24/25 0831   OT Charge Group   Charges Yes   OT Self Care / ADL (Units) 1   OT Evaluation OT Evaluation High   OT Total Time Spent   OT Individual Total Time Spent (Mins) 60   Prior Living Situation   Prior Services Home With Outpatient Therapy  (wound care)   Housing / Facility 1 Story House   Bathroom Set up Walk In Shower;Grab Bars  (2-3 inch barrier. Grab bar by toilet and shower)   Lives with - Patient's Self Care Capacity Spouse   Prior Level of ADL Function   Self Feeding Independent   Grooming / Hygiene Independent   Bathing Independent   Dressing Independent   Toileting Independent   Prior Level of IADL Function   Medication Management Independent   Laundry Independent   Kitchen Mobility Independent   Finances Independent   Home Management Independent   Shopping Independent   Leisure Interests Family;Hobbies;Travel;Exercise  (likes to go hiking/walking with his wife)   Prior Functioning: Everyday Activities   Self Care Independent   Indoor Mobility (Ambulation) Independent   Stairs Independent   Functional Cognition Independent   Prior Device Use Walker   Balance Assessment   Sitting Balance (Static) Fair   Sitting Balance (Dynamic) Poor   Standing Balance (Static) Trace   Standing Balance (Dynamic) Dependent   Weight Shift Sitting Fair   Weight Shift Standing Absent   Bed Mobility    Supine to Sit Contact Guard Assist   Sit to Stand Maximal Assist   Hearing, Speech, and Vision   Ability to Hear Minimal difficulty   Ability to See in Adequate Light Impaired   Expression of Ideas and Wants Without difficulty   Understanding Verbal and Non-Verbal Content Understands   Functional Level of Assist   Grooming Supervision;Seated   Grooming Description  Set-up of equipment;Seated in wheelchair at sink   Bathing   (unable to participate due to pt needing to have bowel movement and increased time needed during transfers; likely at mod A level)   Upper Body Dressing   (unable to complete due to time)   Lower Body Dressing   (unable to complete due to time)   Toileting Maximal Assist   Toileting Description Set-up of equipment;Initial preparation for task;Increased time;Assist for standing balance;Assist to pull pants down;Assist to pull pants up;Supervision for safety;Grab bar   Bed, Chair, Wheelchair Transfer Minimal Assist   Bed Chair Wheelchair Transfer Description Increased time;Initial preparation for task;Set-up of equipment;Supervision for safety;Squat pivot transfer to wheelchair  (lateral scoot to the right from bed to chair)   Toilet Transfers Total Assist X 2  (Pt stood with GB w/ max-total A while commode was placed under him)   Tub / Shower Transfers Unable to Participate  (attempted scoot from WC to shower bench but unable without extensive pain)   Eating   Assistance Needed Independent   CARE Score - Eating 6   Oral Hygiene   Assistance Needed Set-up / clean-up   CARE Score - Oral Hygiene 5   Shower/Bathe Self   Reason if not Attempted Environmental limitations   CARE Score - Shower/Bathe Self 10   Upper Body Dressing   Reason if not Attempted Environmental limitations   CARE Score - Upper Body Dressing 10   Lower Body Dressing   Reason if not Attempted Environmental limitations   CARE Score - Lower Body Dressing 10   Putting On/Taking Off Footwear   Reason if not Attempted Environmental limitations   CARE Score - Putting On/Taking Off Footwear 10   Toileting Hygiene   Assistance Needed Physical assistance   Physical Assistance Level 76% or more   CARE Score - Toileting Hygiene 2   Toilet Transfer   Assistance Needed Physical assistance   Physical Assistance Level Total assistance   CARE Score - Toilet Transfer 1   Interdisciplinary Plan of Care  Collaboration   IDT Collaboration with  Nursing;Therapy Tech   Patient Position at End of Therapy Seated   Collaboration Comments Per RN, ok to get left leg wet. Therapy tech provided assist for transfers   OT DME Recommendations   Bathroom Equipment   (TBD)       Assessment  HPI: The patient is a 79 y.o. male with a past medical history of moderate aortic stenosis, hypertension, CAD with stents on eliquis, ESRD on PD, BPH and severe PAD who presented on 1/20/2025 10:52 AM for elective left bypass graft. 01/20/2025, the patient was taken to the OR for left popliteal to posterior tibial artery bypass by Marco Antonio Miller MD.     Pt presents as mod-total A for transfers; max A for toileting; supervision in seated for grooming. Unable to obtain data for other ADL due to pt needing to have a bowel movement and taking prolonged time for transfers. Pt is currently significantly below independent PLOF. He has good support from his wife and lives in a handicap accessible home. Prior to onset of current disease process, pt was active and traveling with his wife. He would like to return to this level of function.  Additional factors influencing patient status / progress (ie: cognitive factors, co-morbidities, social support, etc): on peritoneal dialysis, has a lot of pain in LLE.      Plan  Recommend Occupational Therapy 30-60 minutes per day 5-7 days per week for 2-3 weeks for the following treatments:  OT Group Therapy, OT Self Care/ADL, OT Community Reintegration, OT Neuro Re-Ed/Balance, OT Therapeutic Activity, OT Evaluation, and OT Therapeutic Exercise.    Passport items to be completed:  Perform bathroom transfers, complete dressing, complete feeding, get ready for the day, prepare a simple meal, participate in household tasks, adapt home for safety needs, demonstrate home exercise program, complete caregiver training     Goals:  Long term and short term goals have been discussed with patient and spouse and they are in  agreement.    Occupational Therapy Goals (Active)       Problem: Bathing       Dates: Start:  01/24/25         Goal: STG-Within one week, patient will bathe with min A       Dates: Start:  01/24/25               Problem: Dressing       Dates: Start:  01/24/25         Goal: STG-Within one week, patient will dress LB with min A       Dates: Start:  01/24/25               Problem: Functional Transfers       Dates: Start:  01/24/25         Goal: STG-Within one week, patient will transfer to toilet with min A       Dates: Start:  01/24/25               Problem: OT Long Term Goals       Dates: Start:  01/24/25         Goal: LTG-By discharge, patient will complete basic self care tasks mod I       Dates: Start:  01/24/25            Goal: LTG-By discharge, patient will perform bathroom transfers mod I       Dates: Start:  01/24/25               Problem: Toileting       Dates: Start:  01/24/25         Goal: STG-Within one week, patient will complete toileting tasks with min A        Dates: Start:  01/24/25

## 2025-01-25 NOTE — PROGRESS NOTES
[facility] Progress Note        [employer] connected aseptically at 1620 X8.5 hours. Pt stable, VSS, alert and oriented. PD exit site CDI. Dressing changed today and applied Gentamycin and covered with gauze dressing.      Education provided to primary RN regarding troubleshooting. Report given to MANOJ Suggs.     Call [facility]/On Call at [phone] for further assistance.

## 2025-01-25 NOTE — THERAPY
Missed Therapy    Patient Name: Edy Bennett  Age:  79 y.o., Sex:  male  Medical Record #: [id number]  Today's Date: 1/24/2025    Discussed missed therapy with Dr Mere Klein    Left LE/incisional Prevana/wound VAC was just finished getting fixed for leaks/blockages and would like to keep left LE still for a while to allow the negative pressure vacuum to do its job and not create an error

## 2025-01-25 NOTE — THERAPY
Physical Therapy   Initial Evaluation     Patient Name: Edy Bennett  Age:  79 y.o., Sex:  male  Medical Record #: [id number]  Today's Date: 1/24/2025     Subjective    Patient is in room, up in wheelchair with spouse, very limited by left leg pains. Per discussion with patient and spouse (Zaina) - patient has had a really rough past year. Came home last March from a 2 month [place] vacation with a lot of weakness and found to be in kidney failure. Started dialysis and eventually was feeling slightly better (Initially hemodialysis followed by AV fistula that infiltrated and then peritoneal dialysis). Continued with complaints of LE cramping and then noted toe ulcerations. Found to have significant peripheral arterial disease in bilateral LE. Tried to have stent placements bilaterally - right side worked but left dis not and had to do the popliteal posterior tibial artery bypass earlier this week      Objective       01/24/25 1031   PT Charge Group   PT Evaluation PT Evaluation High   Supervising Physical Therapist Saulo Hernández, PT   PT Total Time Spent   PT Individual Total Time Spent (Mins) 60   Prior Living Situation   Prior Services Home With Outpatient Therapy  (recceiving wound care services for toes/feet due to ischemia)   Housing / Facility [facility]   Steps Into Home 0   Steps In Home 0   Rail None   Equipment Owned Single Point Cane;Front-Wheel Walker   Lives with - Patient's Self Care Capacity Spouse  (Zaina)   Comments [facility] at [facility]   Prior Level of Functional Mobility   Bed Mobility Independent   Transfer Status Independent   Ambulation Independent   Distance Ambulation (Feet)   (household distances due to onset of LE cramping and fatigue over the past several months)   Assistive Devices Used Front-Wheel Walker  (or SPC)   Stairs Independent   Prior Functioning: Everyday Activities   Self Care Independent   Indoor Mobility (Ambulation) Independent   Stairs Independent   Functional  Cognition Independent   Prior Device Use Walker   Passive ROM Lower Body   Passive ROM Lower Body   (very limited left LE due to incisional pain. Left foot swollen. Necrotic distal toes, Prevena in place along Incision andd sponge engorged with blood with full cannister - MD notified)   Active ROM Lower Body    Comments   (limited left LE throughout due to pain. Specifically having pain with left knee flexion/extension)   Strength Lower Body   Comments Right LE is strong at 4+/5-5/5 throughout. Left LE pain limited and is generally tested at 2+/5 in hip/knee/ankle   Sensation Lower Body   Lower Extremity Sensation   Not Tested   Lower Body Muscle Tone   Lower Body Muscle Tone  WDL   Balance Assessment   Sitting Balance (Static) Fair +   Sitting Balance (Dynamic) Fair   Standing Balance (Static) Dependent   Standing Balance (Dynamic) Dependent   Weight Shift Sitting Good   Weight Shift Standing Absent   Bed Mobility    Supine to Sit Moderate Assist   Sit to Supine Moderate Assist   Sit to Stand Maximal Assist   Scooting Standby Assist   Rolling Moderate Assist to Lt.;Moderate Assist to Rt.   Neurological Concerns   Neurological Concerns No   Coordination Lower Body    Coordination Lower Body  Not Tested   Roll Left and Right   Assistance Needed Physical assistance   Physical Assistance Level 51%-75%   CARE Score - Roll Left and Right 2   Sit to Lying   Assistance Needed Physical assistance   Physical Assistance Level 51%-75%   CARE Score - Sit to Lying 2   Lying to Sitting on Side of Bed   Assistance Needed Physical assistance   Physical Assistance Level 51%-75%   CARE Score - Lying to Sitting on Side of Bed 2   Sit to Stand   Assistance Needed Physical assistance   Physical Assistance Level 76% or more   CARE Score - Sit to Stand 2   Chair/Bed-to-Chair Transfer   Assistance Needed Physical assistance   Physical Assistance Level 26%-50%   CARE Score - Chair/Bed-to-Chair Transfer 3   Car Transfer   Reason if not  Attempted Safety concerns   CARE Score - Car Transfer 88   Walk 10 Feet   Reason if not Attempted Medical concerns   CARE Score - Walk 10 Feet 88   Walk 50 Feet with Two Turns   Reason if not Attempted Medical concerns   CARE Score - Walk 50 Feet with Two Turns 88   Walk 150 Feet   Reason if not Attempted Medical concerns   CARE Score - Walk 150 Feet 88   Walking 10 Feet on Uneven Surfaces   Reason if not Attempted Medical concerns   CARE Score - Walking 10 Feet on Uneven Surfaces 88   1 Step (Curb)   Reason if not Attempted Medical concerns   CARE Score - 1 Step (Curb) 88   4 Steps   Reason if not Attempted Medical concerns   CARE Score - 4 Steps 88   12 Steps   Reason if not Attempted Medical concerns   CARE Score - 12 Steps 88   Picking Up Object   Reason if not Attempted Medical concerns   CARE Score - Picking Up Object 88   Wheel 50 Feet with Two Turns   Assistance Needed Verbal cues   CARE Score - Wheel 50 Feet with Two Turns 4   Wheel 150 Feet   Assistance Needed Verbal cues   CARE Score - Wheel 150 Feet 4   Gait Functional Level of Assist    Gait Level Of Assist Unable to Participate   Wheelchair Functional Level of Assist   Wheelchair Assist Stand by Assist   Distance Wheelchair (Feet or Distance) 150   Wheelchair Description Verbal cueing   Stairs Functional Level of Assist   Level of Assist with Stairs Unable to Participate   Transfer Functional Level of Assist   Bed, Chair, Wheelchair Transfer Moderate Assist   Bed Chair Wheelchair Transfer Description Squat pivot transfer to wheelchair   Problem List    Problems Pain;Impaired Bed Mobility;Impaired Transfers;Impaired Ambulation;Functional ROM Deficit;Functional Strength Deficit;Impaired Balance;Decreased Activity Tolerance   Precautions   Precautions Fall Risk;Other (See Comments)  (left LE Prevena/Wound VAC)   Current Discharge Plan   Current Discharge Plan Return to Prior Living Situation   Interdisciplinary Plan of Care Collaboration   IDT  "Collaboration with  Nursing;Physician   Patient Position at End of Therapy In Bed;Call Light within Reach;Family / Friend in Room;Bed Alarm On   Collaboration Comments elevated pain levels, left leg negative pressure dressing not working/leaking   PT DME Recommendations   Wheelchair 18\" Width;Elevating Leg Rests   Cushion Standard   Physical Therapist Assigned   Assigned PT / Treatment Time / Comments Ernie MCCURDY   Benefit   Therapy Benefit Patient Would Benefit from Inpatient Rehabilitation Physical Therapy to Maximize Functional [place] with ADLs, IADLs and Mobility.   Strengths & Barriers   Strengths Able to follow instructions;Alert and oriented;Independent prior level of function;Supportive family;Pleasant and cooperative;Motivated for self care and independence   Barriers Pain;Pain poorly managed;Limited mobility     Instruction in supine left quad sets to improve knee extension tolerance, ankle pumps to improve ankle mobility/posterior chain mobility tolerance/foot swelling and single limb bridge (Right)  Lengthy discussion regarding prognosis and gradual increase in weight bearing through LE     Assessment  Adapted from the PM&R Consult by Dr. Meade:   \"HPI: The patient is a 79 y.o.  male with a past medical history of moderate aortic stenosis, hypertension, CAD with stents on eliquis, ESRD on PD, BPH and severe PAD who presented on 1/20/2025 10:52 AM for elective left bypass graft.  01/20/2025, the patient was taken to the OR for left popliteal to posterior tibial artery bypass by Marco Antonio Miller MD. Current labs remarkable for WBC 8.6, Hgb 9.7, platelets 157, creatinine 7.2, GFR 7. Vitals remarkable for 0 to 2 L supplemental O2.  Patient seen by PT with recommendation for post-acute placement. PM&R consulted to evaluate for possible inpatient rehab admission.\".  Additional factors influencing patient status / progress (ie: cognitive factors, co-morbidities, social support, etc): Patient has been " struggling over the past year plus with a complex series of medical events including coronary artery disease with 3 stents placed 8/2023, kidney failure 3/2024 and started on dialysis, inguinal hernia repair, peripheral arterial disease with right LE stent placement and now recent left pop-posterior tibial bypass. PLOF was very active and independent playing 5-6 rounds of golf/week and pickle ball as of 1 year ago     Patient is extremely pain limited and unable to tolerate getting into fully standing position with FWW despite multiple attempts and modifications. Dealing with a faulty seal/Prevena unit and leaking bandages    Plan  Recommend Physical Therapy  minutes per day 5-7 days per week for 2 weeks  for the following treatments:  PT Gait Training, PT Self Care/Home Eval, PT Therapeutic Exercises, PT Neuro Re-Ed/Balance, PT Therapeutic Activity, and PT Manual Therapy.    Passport items to be completed:  Get in/out of bed safely, in/out of a vehicle, safely use mobility device, walk or wheel around home/community, navigate up and down stairs, show how to get up/down from the ground, ensure home is accessible, demonstrate HEP, complete caregiver training    Goals:  Long term and short term goals have been discussed with patient and spouse and they are in agreement.    Physical Therapy Problems (Active)       Problem: Balance       Dates: Start:  01/24/25         Goal: STG-Within one week, patient will tolerate standing in parallel bars with SBA for 2 minutes       Dates: Start:  01/24/25               Problem: Mobility       Dates: Start:  01/24/25         Goal: STG-Within one week, patient will ambulate 10 feet with FWW CGA       Dates: Start:  01/24/25               Problem: Mobility Transfers       Dates: Start:  01/24/25         Goal: STG-Within one week, patient will perform bed mobility with minimal assist to manage legs        Dates: Start:  01/24/25            Goal: STG-Within one week, patient will  sit to stand with minimal assist and FWW       Dates: Start:  01/24/25               Problem: PT-Long Term Goals       Dates: Start:  01/24/25         Goal: LTG-By discharge, patient will ambulate with  feet SBA       Dates: Start:  01/24/25            Goal: LTG-By discharge, patient will transfer one surface to another with FWW supervised       Dates: Start:  01/24/25            Goal: LTG-By discharge, patient will perform home exercise program       Dates: Start:  01/24/25            Goal: LTG-By discharge, patient will ambulate up/down 4-6 stairs with bilateral rails supervised        Dates: Start:  01/24/25            Goal: LTG-By discharge, patient will transfer in/out of a car with FWW supervised        Dates: Start:  01/24/25

## 2025-01-25 NOTE — CARE PLAN
The patient is Stable - Low risk of patient condition declining or worsening    Shift Goals  Clinical Goals: Comfort and safety  Patient Goals: Comfort    Progress made toward(s) clinical / shift goals:      Problem: Pain - Standard  Goal: Alleviation of pain or a reduction in pain to the patient’s comfort goal  Outcome: Progressing  Note: Oxycodone 5mg given as scheduled per MAR for c/o left leg surgical incision with adequate relief. Pt sleeps good. Will continue to monitor.     Problem: Bladder / Voiding  Goal: Patient will establish and maintain regular urinary output  Outcome: Progressing  Note: Pt continent of bladder. Voiding adequately using urinal. He denies dysuria. Pt also on continuous peritoneal dialysis thru the night.     Problem: Bowel Elimination  Goal: Patient will participate in bowel management program  Outcome: Progressing  Note: Pt continent of bowel. On bowel meds. LBM 1/24/25.       Patient is not progressing towards the following goals:

## 2025-01-25 NOTE — PROGRESS NOTES
NURSING DAILY NOTE    Name: Edy Bennett   Date of Admission: 1/23/2025   Admitting Diagnosis: PAD (peripheral artery disease) ([facility])  Attending Physician: ROSANNA PATEL D.O.  Allergies: Patient has no known allergies.    Safety  Patient Assist     Patient Precautions  Fall Risk, Other (See Comments) (left LE Prevena/Wound VAC)  Precaution Comments     Bed Transfer Status  Moderate Assist  Toilet Transfer Status   Total Assist X 2 (Pt stood with GB w/ max-total A while commode was placed under him)  Assistive Devices  Rails, Wheelchair  Oxygen  None - Room Air  Diet/Therapeutic Dining  Current Diet Order   Procedures    Diet Order Diet: Renal; Second Modifier: (optional): Cardiac     Pill Administration  whole  Agitated Behavioral Scale     ABS Level of Severity       Fall Risk  Has the patient had a fall this admission?   No  Trinh Spivey Fall Risk Scoring  22, HIGH RISK  Fall Risk Safety Measures  bed alarm, chair alarm, poor balance, and low vision/ hearing    Vitals  Temperature: 37.1 °C (98.7 °F)  Temp src: Temporal  Pulse: 74  Respiration: 18  Blood Pressure : 116/75  Blood Pressure MAP (Calculated): 89 MM HG  BP Location: Left, Upper Arm  Patient BP Position: Supine     Oxygen  Pulse Oximetry: 95 %  O2 (LPM): 0  O2 Delivery Device: None - Room Air    Bowel and Bladder  Last Bowel Movement  01/24/25  Stool Type  Type 3: Like a sausage, but with cracks on its surface  Bowel Device     Continent  Bladder: Did not void   Bowel: No movement  Bladder Function  Urine Void (mL): 200 ml  Number of Times Voided: 2  Urine Color: Yellow  Genitourinary Assessment   Bladder Assessment (WDL):  WDL Except  Smith Catheter: Not Applicable  Urinary Symptoms: Anuric/ Dialysis Patient  Urine Color: Yellow  Bladder Device: Urinal  Bladder Scan: Post Void  $ Bladder Scan Results (mL): 74    Skin  Jalen Score   16  Sensory Interventions   Bed Types: Low  Airloss  Skin Preventative Measures: Pillows in Use for Support / Positioning  Moisture Interventions  Moisturizers/Barriers: Barrier Wipes      Pain  Pain Rating Scale  5 - Interrupts some activities  Pain Location  Leg  Pain Location Orientation  Left  Pain Interventions   Medication (see MAR)    ADLs    Bathing      Linen Change      Personal Hygiene     Chlorhexidine Bath   Not Completed - Off Floor  Oral Care     Teeth/Dentures     Shave     Nutrition Percentage Eaten  Lunch, Between % Consumed  Environmental Precautions     Patient Turns/Positioning  Patient turns self independently side to side without assistance, to offload sacral area  Patient Turns Assistance/Tolerance     Bed Positions  Bed Controls On  Head of Bed Elevated  Self regulated      Psychosocial/Neurologic Assessment  Psychosocial Assessment  Psychosocial (WDL):  WDL Except  Patient Behaviors: Anxious  Family Behaviors: No Family Present  Neurologic Assessment  Neuro (WDL): Within Defined Limits  Level of Consciousness: Alert  Orientation Level: Oriented X4  Cognition: Follows commands, Appropriate judgement, Appropriate safety awareness  Speech: Clear  Motor Function/Sensation Assessment: Sensation  LLE Sensation: Pain  EENT (WDL):  WDL Except    Cardio/Pulmonary Assessment  Edema   LLE Edema: Generalized, 1+  Respiratory Breath Sounds  RUL Breath Sounds: Clear  RML Breath Sounds: Clear  RLL Breath Sounds: Diminished  ANAYELI Breath Sounds: Clear  LLL Breath Sounds: Diminished  Cardiac Assessment   Cardiac (WDL):  Within Defined Limits

## 2025-01-25 NOTE — PROGRESS NOTES
"[facility] -  PROGRESS NOTE               Author: Kingston Pa M.D. Date & Time: 1/25/2025  8:50 AM     HPI:  79 y.o. male with HTN, ESRD on PD, PVD/ gangrenous left foot, BPH admitted for monitoring s/p  left leg bypass surgery on 1/20 with Dr. Miller. He recently underwent hernial repair and was temporarily transferred to in center HD via [facility]. He has returned to  PD  4 days ago, doing low fill.volumes and has been tolerating at home.   Patient was connected to cycler last night. Total UF reported to be 276ml. Reports of low drain UF alarm last night but no major issues with PD overnight. Pt reports no complaints today morning  No F/C/N/V/CP/SOB.  No melena, hematochezia, hematemesis.  No HA, visual changes,or abdominal pain.     DAILY NEPHROLOGY SUMMARY:    1/21: consult done   1/22: No issues with PD overnight, UF 1137cc, VSS, , feels good, wife at bed side   1/23: No issues with PD overnight 653 cc of UF , not had any BM yesterday and today  --------------------[facility]--------------------  1/24: CCPD 1.5L UF. VSS. PO4 elevated. Pt seen during PD, wife present. C/O pain.  1/25: No issues with PD overnight, net UF 1415ml. No sob, had BM yesterday, states he use O2 when he sleeps to help with nose bleed    REVIEW OF SYSTEMS:    10 point ROS reviewed and is as per HPI/daily summary or otherwise negative    PMH/PSH/SH/FH: Reviewed and unchanged since admission note  CURRENT MEDICATIONS: Reviewed from admission to present day    VS:  /64   Pulse 71   Temp 37.1 °C (98.8 °F) (Oral)   Resp 18   Ht 1.707 m (5' 7.2\")   Wt 79 kg (174 lb 2.6 oz)   SpO2 97%   BMI 27.12 kg/m²   Physical Exam  Vitals and nursing note reviewed.   Constitutional:       Appearance: Normal appearance.   HENT:      Head: Normocephalic and atraumatic.      Nose: Nose normal.      Mouth/Throat:      Mouth: Mucous membranes are moist.   Eyes:      General: No scleral " icterus.     Conjunctiva/sclera: Conjunctivae normal.   Cardiovascular:      Rate and Rhythm: Normal rate.      Comments: No edema  Pulmonary:      Effort: Pulmonary effort is normal.      Breath sounds: No wheezing or rales.   Abdominal:      General: There is no distension.      Palpations: Abdomen is soft.      Comments: PD cath in RLQ    Musculoskeletal:         General: No deformity.      Cervical back: Neck supple.      Right lower leg: No edema.      Left lower leg: Edema present.      Comments: Mild edema in LLE wound vac in place     Skin:     General: Skin is warm and dry.      Findings: No rash.   Neurological:      General: No focal deficit present.      Mental Status: He is alert and oriented to person, place, and time. Mental status is at baseline.   Psychiatric:         Mood and Affect: Mood normal.         Behavior: Behavior normal. Behavior is cooperative.         FLUID BALANCE:  In: 240 [P.O.:240]  Out: 350     LABS:  Recent Labs     01/23/25  0224 01/24/25  0519   SODIUM 135 137   POTASSIUM 4.4 4.2   CHLORIDE 94* 95*   CO2 24 27   GLUCOSE 175* 113*   BUN 64* 63*   CREATININE 7.52* 7.57*   CALCIUM 8.3* 8.3*     Recent Labs     01/23/25  0224 01/24/25  0519   WBC 8.6 7.0   RBC 3.13* 3.18*   HEMOGLOBIN 9.9* 10.1*   HEMATOCRIT 30.9* 31.8*   MCV 98.7* 100.0*   MCH 31.6 31.8   MCHC 32.0* 31.8*   RDW 62.6* 62.1*   PLATELETCT 162* 156*   MPV 11.0 10.9       IMPRESSION:  # ESRD on CCPD  - Pt was transitioned to  iHD during masoud-op  s/p inguinal hernial repair,   - Still has [facility]  - Returned to PD 1/17/25  - Home PD prescription CCPD 2.5% alternating with 1.5% 7 exchanges 8 hrs 30 min,   total FV 7700cc, no last fill  # HTN  - Goal BP < 140/90  - At goal  # Anemia of CKD  - Goal Hgb 10-11  - At goal   # CKD-MBD  - Ca 8.6   - PO4 7.4   # Chronic hypoxic respiratory failure   - On home oxygen 2-2.5L at night         SUGGESTIONS:  - Continue CCPD during hospital stay home prescription   - PD exit site care  per protocol   - Bowel regimen to avoid constipation with PD  - No dietary protein restrictions  - Dose all meds per ESRD  - ROSALIND  TIW if Hb < 10   - Continue Renvela 1600 mg tid ac   - Hold Amlodipine SBP low 100 at times   - Renal diet  - PD treatments going well, back on PD since 1/17      Has [facility] PC since May 2024. Pt does not want the CVC removed at this times and want to wait for an additional      week or 2 to make sure that he does encounter any issues with PD treatments     He understands risks associated with keeping the  CVC  long term      L arm AVF not fully matured,  will need intervention   - Check labs tomorrow     Please call or page Nephrology with questions or concerns  Thank you,

## 2025-01-25 NOTE — THERAPY
Occupational Therapy  Daily Treatment     Patient Name: Edy Bennett  Age:  79 y.o., Sex:  male  Medical Record #: [id number]  Today's Date: 1/25/2025     Precautions  Precautions: Fall Risk  Comments: LLE wound vac    Subjective    Pt pleasant and motivated to participate in OT     Objective     01/25/25 0931 01/25/25 1301   OT Charge Group   Charges Yes Yes   OT Self Care / ADL (Units) 4  --    OT Therapeutic Exercise (Units)  --  2   OT Total Time Spent   OT Individual Total Time Spent (Mins) 60 30   Precautions   Precautions Fall Risk Fall Risk   Comments LLE wound vac LLE wound vac   Vitals   O2 Delivery Device None - Room Air Silicone Nasal Cannula   Pain   Intervention Declines Declines   Cognition    Level of Consciousness Alert Alert   ABS (Agitated Behavior Scale)   Agitated Behavior Scale Performed No No   Sleep/Wake Cycle   Sleep & Rest Resting Asleep   Vision Screen   Vision   (Pt wears glasses)  --    Functional Level of Assist   Grooming Supervision;Seated  --    Grooming Description Increased time;Supervision for safety  (Sitting EOB)  --    Bathing Minimal Assist  --    Bathing Description Assit with back;Assit wtih lower extremities;Increased time;Initial preparation for task;Supervision for safety;Verbal cueing  (Pt tolerated EOB bed bath; requiring assist to bath pt's back and bilat feet)  --    Upper Body Dressing Stand by Assist  --    Upper Body Dressing Description Increased time;Initial preparation for task;Verbal cueing;Supervision for safety  --    Lower Body Dressing Moderate Assist  --    Lower Body Dressing Description Assist with threading into pant leg;Increased time;Initial preparation for task;Supervision for safety;Verbal cueing  (Provided assist to thread bilat LEs d/t wound vac. Pt able to partially don shorts overs bilat hips EOB; however, required further assist to don over bottom (pt able to lift bottom off bed for this therapist to don shorts the rest of the way).)  --     Bed, Chair, Wheelchair Transfer Minimal Assist  --    Bed Chair Wheelchair Transfer Description Increased time;Initial preparation for task;Set-up of equipment;Squat pivot transfer to wheelchair;Supervision for safety;Verbal cueing  --    Supine Upper Body Exercises   Supine Upper Body Exercises  --  Yes   Chest Fly  --  3 sets of 10;Bilateral;Light Resistance Theraband  (Pink theraband)   Front Arm Raise  --  3 sets of 10;Bilateral;Light Resistance Theraband  (Pink theraband)   Bicep Curl  --  3 sets of 10;Bilateral;Light Resistance Theraband  (Pink theraband)   Elbow Extension  --  3 sets of 10;Bilateral;Light Resistance Theraband  (Pink theraband)   Bed Mobility    Supine to Sit Minimal Assist  --    Scooting Standby Assist  --    Rolling Minimum Assist to Lt.  --    Interdisciplinary Plan of Care Collaboration   IDT Collaboration with  Certified Nursing Assistant;Family / Caregiver Family / Caregiver;Nursing   Patient Position at End of Therapy Seated;Chair Alarm On;Call Light within Reach;Tray Table within Reach;Phone within Reach;Family / Friend in Room In Bed;Bed Alarm On;Call Light within Reach;Tray Table within Reach;Phone within Reach;Family / Friend in Room   Collaboration Comments CNA took vitals; Pt's wife present for tx session Pt's wife present for tx; RN rounds   Oral Hygiene   Assistance Needed Set-up / clean-up  --    Physical Assistance Level No physical assistance  --    CARE Score - Oral Hygiene 5  --    Shower/Bathe Self   Assistance Needed Physical assistance  --    Physical Assistance Level 25% or less  --    CARE Score - Shower/Bathe Self 3  --    Upper Body Dressing   Assistance Needed Verbal cues  --    Physical Assistance Level No physical assistance  --    CARE Score - Upper Body Dressing 4  --    Lower Body Dressing   Assistance Needed Physical assistance  --    Physical Assistance Level 26%-50%  --    CARE Score - Lower Body Dressing 3  --    Putting On/Taking Off Footwear    Assistance Needed Physical assistance  --    Physical Assistance Level 51%-75%  --    CARE Score - Putting On/Taking Off Footwear 2  --      Assessment    Pt seen for tx twice, addressing bed bath ADL routine and UE strengthening. Pt tolerated activity well. Pt progressing towards goals. Continue OT POC.    Plan    ADLs  Functional transfers w/ LRAD  Standing tolerance/balance  Strength/Endurance    DME  OT DME Recommendations  Bathroom Equipment:  (TBD)    Passport items to be completed:  Perform bathroom transfers, complete dressing, complete feeding, get ready for the day, prepare a simple meal, participate in household tasks, adapt home for safety needs, demonstrate home exercise program, complete caregiver training     Occupational Therapy Goals (Active)       Problem: Bathing       Dates: Start:  01/24/25         Goal: STG-Within one week, patient will bathe with min A       Dates: Start:  01/24/25               Problem: Dressing       Dates: Start:  01/24/25         Goal: STG-Within one week, patient will dress LB with min A       Dates: Start:  01/24/25               Problem: Functional Transfers       Dates: Start:  01/24/25         Goal: STG-Within one week, patient will transfer to toilet with min A       Dates: Start:  01/24/25               Problem: OT Long Term Goals       Dates: Start:  01/24/25         Goal: LTG-By discharge, patient will complete basic self care tasks mod I       Dates: Start:  01/24/25            Goal: LTG-By discharge, patient will perform bathroom transfers mod I       Dates: Start:  01/24/25               Problem: Toileting       Dates: Start:  01/24/25         Goal: STG-Within one week, patient will complete toileting tasks with min A        Dates: Start:  01/24/25

## 2025-01-25 NOTE — PROGRESS NOTES
[facility] Services Progress Notes    CCPD ordered by Dr. Pa. Disconnected aseptically from PD Cycler at 0645.    Pt stable, VSS, alert and oriented.  Effluent clear and yellow, no signs of bleeding/fibrin clots.  No alarms on machine was noted or reported before disconnection.    24 hour UF= 1415mL (I.Drain= 216mL + Total UF= 1199mL - Last fill= 0)    Report given to James ARANDA

## 2025-01-25 NOTE — THERAPY
"Missed Therapy    Patient Name: Edy Bennett  Age:  79 y.o., Sex:  male  Medical Record #: [id number]  Today's Date: 1/24/2025    Discussed missed therapy with Dr. Klein and        01/24/25 1301   Therapy Missed   Missed Therapy (Minutes) 30   Reason For Missed Therapy Medical - Patient  in Procedure  (having wound vac replaced)   Cognitive Pattern Assessment   Cognitive Pattern Assessment Used BIMS   Brief Interview for Mental Status (BIMS)   Repetition of Three Words (First Attempt) 3   Temporal Orientation: Year Correct   Temporal Orientation: Month Accurate within 5 days   Temporal Orientation: Day Correct   Recall: \"Sock\" Yes, no cue required   Recall: \"Blue\" Yes, no cue required   Recall: \"Bed\" Yes, no cue required   BIMS Summary Score 15   Confusion Assessment Method (CAM)   Is there evidence of an acute change in mental status from the patient's baseline? No   Inattention Behavior not present   Disorganized thinking Behavior not present   Altered level of consciousness Behavior not present       "

## 2025-01-25 NOTE — PROGRESS NOTES
NURSING DAILY NOTE    Name: Edy Bennett   Date of Admission: 1/23/2025   Admitting Diagnosis: PAD (peripheral artery disease) ([facility])  Attending Physician: ROSANNA PATEL D.O.  Allergies: Patient has no known allergies.    Safety  Patient Assist     Patient Precautions  Fall Risk, Other (See Comments) (left LE Prevena/Wound VAC)  Precaution Comments     Bed Transfer Status  Moderate Assist  Toilet Transfer Status   Total Assist X 2 (Pt stood with GB w/ max-total A while commode was placed under him)  Assistive Devices  Rails, Wheelchair  Oxygen  Silicone Nasal Cannula  Diet/Therapeutic Dining  Current Diet Order   Procedures    Diet Order Diet: Renal; Second Modifier: (optional): Cardiac     Pill Administration  whole  Agitated Behavioral Scale     ABS Level of Severity       Fall Risk  Has the patient had a fall this admission?   No  Trinh Spivey Fall Risk Scoring  22, HIGH RISK  Fall Risk Safety Measures  bed alarm, chair alarm, poor balance, and low vision/ hearing    Vitals  Temperature: 37.1 °C (98.7 °F)  Temp src: Oral  Pulse: 88  Respiration: 16  Blood Pressure : 114/66  Blood Pressure MAP (Calculated): 82 MM HG  BP Location: Right, Upper Arm  Patient BP Position: Supine     Oxygen  Pulse Oximetry: 100 %  O2 (LPM): 2.5  O2 Delivery Device: Silicone Nasal Cannula    Bowel and Bladder  Last Bowel Movement  01/24/25  Stool Type  Type 3: Like a sausage, but with cracks on its surface  Bowel Device     Continent  Bladder: Did not void   Bowel: No movement  Bladder Function  Urine Void (mL): 150 ml  Number of Times Voided: 1  Urine Color: Yellow  Genitourinary Assessment   Bladder Assessment (WDL):  WDL Except  Smith Catheter: Not Applicable  Urinary Symptoms: Anuric/ Dialysis Patient  Urine Color: Yellow  Bladder Device: Urinal  Bladder Scan: Post Void  $ Bladder Scan Results (mL): 60    Skin  Jalen Score   16  Sensory Interventions   Bed Types:  Low Airloss  Skin Preventative Measures: Pillows in Use to Float Heels, Pillows in Use for Support / Positioning  Moisture Interventions  Moisturizers/Barriers: Barrier Wipes      Pain  Pain Rating Scale  1 - Hardly Notice Pain  Pain Location  Leg  Pain Location Orientation  Left  Pain Interventions   Rest    ADLs    Bathing      Linen Change      Personal Hygiene     Chlorhexidine Bath   Not Completed - Off Floor  Oral Care     Teeth/Dentures     Shave     Nutrition Percentage Eaten  Lunch, Between % Consumed  Environmental Precautions     Patient Turns/Positioning  Patient turns self independently side to side without assistance, to offload sacral area  Patient Turns Assistance/Tolerance     Bed Positions  Bed Controls On  Head of Bed Elevated  Self regulated      Psychosocial/Neurologic Assessment  Psychosocial Assessment  Psychosocial (WDL):  WDL Except  Patient Behaviors: Fatigue  Family Behaviors: No Family Present  Neurologic Assessment  Neuro (WDL): Within Defined Limits  Level of Consciousness: Alert  Orientation Level: Oriented X4  Cognition: Follows commands, Appropriate judgement, Appropriate safety awareness  Speech: Clear  Motor Function/Sensation Assessment: Sensation  LLE Sensation: Pain  EENT (WDL):  WDL Except    Cardio/Pulmonary Assessment  Edema   LLE Edema: 1+, Generalized  Respiratory Breath Sounds  RUL Breath Sounds: Clear  RML Breath Sounds: Clear  RLL Breath Sounds: Diminished  ANAYELI Breath Sounds: Clear  LLL Breath Sounds: Diminished  Cardiac Assessment   Cardiac (WDL):  Within Defined Limits

## 2025-01-25 NOTE — WOUND TEAM
[facility]  Inpatient Services  Initial Wound and Skin Care Evaluation    Admission Date: 1/23/2025     Last order of IP CONSULT TO WOUND CARE was found on 1/24/2025 from Hospital Encounter on 1/23/2025     HPI, PMH, SH: Reviewed    Past Surgical History:   Procedure Laterality Date    OH VEIN BYPASS GRAFT,FEM-TIBIAL Left 1/20/2025    Procedure: LEFT LOWER EXTREMITY BYPASS, POPLITIAL TO TIBIAL BYPASS;  Surgeon: Marco Antonio Miller M.D.;  Location: SURGERY [facility];  Service: Vascular    AORTOGRAM WITH RUNOFF  1/14/2025    Procedure: AORTOGRAM WITH RUNOFF, right peroneal artery intravascular lithotripsy and stent placement;  Surgeon: Marco Antonio Miller M.D.;  Location: [facility];  Service: Vascular    INGUINAL HERNIA LAPAROSCOPIC N/A 12/18/2024    Procedure: LAPAROSCOPIC LEFT INGUINAL HERNIA REPAIR WITH MESH, LAPAROSCOPIC REPOSITIONING OF PERITONEAL DIALYSIS CATHETER;  Surgeon: Marco Antonio Miller M.D.;  Location: SURGERY [facility];  Service: Vascular    CATH PLACEMENT CAPD N/A 12/18/2024    Procedure: REPOSITION, CATHETER, CAPD;  Surgeon: Marco Antonio Miller M.D.;  Location: SURGERY [facility];  Service: Vascular    CATH PLACEMENT CAPD N/A 09/16/2024    Procedure: LAPAROSCOPIC INSERTION OF PERITONEAL DIALYSIS CATHETER PLACEMENT;  Surgeon: Marco Antonio Miller M.D.;  Location: SURGERY SAME DAY [facility];  Service: Vascular    AV FISTULA CREATION Left 07/01/2024    Procedure: CREATION OF LEFT UPPER EXTREMITY DIALYSIS FISTULA;  Surgeon: Shilo Mercedes M.D.;  Location: SURGERY [facility];  Service: General    OTHER ORTHOPEDIC SURGERY  2004    bunion r foot    OTHER      tonsils adenoids    OTHER      dialysis catheter in upper right chest    OTHER      shon iol    OTHER CARDIAC SURGERY      cardiac stents     Social History     Tobacco Use    Smoking status: Never    Smokeless tobacco: Never    Tobacco comments:     Used chewing tobacco many years ago.   Substance Use Topics    Alcohol  use: Not Currently     No chief complaint on file.    Diagnosis: PAD (peripheral artery disease) ([facility]) [I73.9]    Unit where seen by Wound Team: [facility]04/02     WOUND CONSULT RELATED TO:  saturated prevena dressing, coccyx, gangrenous toes    WOUND TEAM PLAN OF CARE - Frequency of Follow-up:   Nursing to follow dressing orders written for wound care. Contact wound team if area fails to progress, deteriorates or with any questions/concerns if something comes up before next scheduled follow up (See below as to whether wound is following and frequency of wound follow up)   Weekly - toes, coccyx, and left leg    WOUND HISTORY:   Previously very active 79 year old male. PMH of CAD, PVD, ESRD on dialysis (recently started peritoneal dialysis 1 week ago). Also had a stent placed in right leg 1 week ago. Elective left popliteal to posterior tibial artery bypass by Marco Antonio Miller MD on 1/20/25.   Wound team consulted for a leaking and saturated Prevena dressing on his left lower leg. 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       WOUND ASSESSMENT/LDA  Wound 01/20/25 Incision Leg Left Incision,  Prevena (Active)   Date First Assessed/Time First Assessed: 01/20/25 1648   Present on Original Admission: Yes  Primary Wound Type: Incision  Location: Leg  Laterality: Left  Wound Description (Comments): Incision,  Prevena      Assessments 1/24/2025  3:00 PM   Site Assessment Clean;Dry   Periwound Assessment Dry;Clean   Drainage Description Serosanguineous   Treatments Site care   Dressing Status Dry;Clean;Intact   Dressing Changed Reinforced   Dressing Options Wound Vac   Dressing Change/Treatment Frequency Other (Comments)   Wound Team Following Weekly       Wound 01/20/25 Pressure Injury Coccyx 1/24/2025- Stage 3 (Active)   Date First Assessed/Time First Assessed: 01/20/25 2230   Present on Original Admission: Yes  Hand Hygiene Completed: Yes  Primary Wound  Type: Pressure Injury  Location: Coccyx  Wound Description (Comments): 1/24/2025- Stage 3      Assessments 1/24/2025  3:00 PM   Wound Image     Site Assessment Clean;Pink;Yellow   Periwound Assessment Clean;Dry;Intact   Margins Defined edges   Drainage Amount Scant   Drainage Description Serous   Treatments Cleansed;Nonselective debridement;Site care   Wound Cleansing Approved Wound Cleanser   Periwound Protectant Barrier Paste   Dressing Status Clean;Dry;Intact   Dressing Changed Changed   Dressing Options Silicone Adhesive Foam   Dressing Change/Treatment Frequency Every 48 hrs, and As Needed   NEXT Dressing Change/Treatment Date 01/26/25   Wound Team Following Weekly   Pressure Injury Stage Stage 3   Wound Length (cm) 0.5 cm   Wound Width (cm) 0.5 cm   Wound Depth (cm) 0.1 cm   Wound Surface Area (cm^2) 0.25 cm^2   Wound Volume (cm^3) 0.025 cm^3   Wound Odor None   WOUND NURSE ONLY - Time Spent with Patient (mins) 120       Wound 01/23/25 Toe, 2nd;Toe, 4th;Toe, Hallux Left Gangrene (Active)   Date First Assessed/Time First Assessed: 01/23/25 1600   Location: Toe, 2nd;Toe, 4th;Toe, Hallux  Laterality: Left  Wound Description (Comments): Gangrene      Assessments 1/24/2025  3:00 PM   Site Assessment Clean;Dry;Black   Periwound Assessment Clean;Dry;Intact   Margins Attached edges   Drainage Amount None   Dressing Status Clean;Dry;Intact   Dressing Changed Observed       Wound 01/23/25 Toe, 2nd;Toe, 4th;Toe, Hallux Right Gangrene (Active)   Date First Assessed/Time First Assessed: 01/23/25 1600   Location: Toe, 2nd;Toe, 4th;Toe, Hallux  Laterality: Right  Wound Description (Comments): Gangrene      Assessments 1/24/2025  3:00 PM   Site Assessment Clean;Dry;Black   Periwound Assessment Clean;Dry;Intact   Margins Attached edges   Drainage Amount None   Wound Team Following Weekly   WOUND NURSE ONLY - Time Spent with Patient (mins) 120        Vascular:    GLENYS:   No results found.    Lab Values:    Lab Results    Component Value Date/Time    WBC 7.0 01/24/2025 05:19 AM    RBC 3.18 (L) 01/24/2025 05:19 AM    HEMOGLOBIN 10.1 (L) 01/24/2025 05:19 AM    HEMATOCRIT 31.8 (L) 01/24/2025 05:19 AM    HBA1C 5.8 (H) 01/24/2025 05:19 AM         Culture Results show:  No results found for this or any previous visit (from the past 720 hours).    Pain Level/Medicated:  PO pain medications administered by bedside RN Ifeanyi prior       INTERVENTIONS BY WOUND TEAM:  Chart and images reviewed. Discussed with bedside RN. All areas of concern (based on picture review, LDA review and discussion with bedside RN) have been thoroughly assessed. Documentation of areas based on significant findings. This RN in to assess patient. Performed standard wound care which includes appropriate positioning, dressing removal and non-selective debridement. Pictures and measurements obtained weekly if/when required.    Wound:  Left leg-saturated prevena.   Prevena trouble shooting  was successful with changing out trac pad, and fixing a leak in the drape. The prevena machine was still alarming a leak so placed on an Ulta Machine, and there is no further leak alarm. The dressing is now sucked down with a good suction at 125mmHg. Small serosanguinous drainage in the cannister. Dr. Ochoa was notified and he plans to change out the entire dressing tomorrow at the bedside. Extra supplies (Prevena plus) were obtained and left at the bedside.      Wound:  Toes-  Assessed only. Pt was in pain and the dressing change had just been done by nursing. Orders placed to continue betadine that will keep them clean and dry.      Wound:  Coccyx-  Stage 3- barrier cream and silicone adhesive foam used.   Advanced Wound Care Discharge Planning  Number of Clinicians necessary to complete wound care:     Is patient requiring IV pain medications for dressing changes:  No   Length of time for dressing change 15 min. (This does not include chart review, pre-medication time, set up,  clean up or time spent charting.)    Interdisciplinary consultation: Patient, Bedside RN (Ifeanyi), Provider Dr. Klein, Provider/Surgeon Dr. Miller , [facility] .  Pressure injury and staging reviewed with  Ifeanyi, MANOJ and Dr. Klein .    EVALUATION / RATIONALE FOR TREATMENT:     Date:  01/24/25  Wound Status:  Initial evaluation    Left leg-prevena-continue the prevena at 125mmHg and follow surgeon's orders. Prevena will prevent infection and keep incisions approximated, will decrease time to heal and prevent complications    Coccyx-The wound is very tiny so barrier cream will get into the wound base easily and keep it moist. The silicone foam protects the skin and keeps the moist barrier cream in place. Offloading.       Toes- betadine will keep the gangrene dry and free of infection. Wait for toes to evolve     Goals: Steady decrease in wound area and depth weekly.    NURSING PLAN OF CARE ORDERS:  Dressing changes: See Dressing Care orders    NUTRITION RECOMMENDATIONS   Wound Team Recommendations:  N/A- pt states he has his appetite back and is eating majority of his meals now.     DIET ORDERS (From admission to next 24h)       Start     Ordered    01/23/25 1440  Diet Order Diet: Renal; Second Modifier: (optional): Cardiac  ALL MEALS        Question Answer Comment   Diet: Renal    Second Modifier: (optional) Cardiac        01/23/25 1439                    PREVENTATIVE INTERVENTIONS:    Q shift Jalen - performed per nursing policy  Q shift pressure point assessments - performed per nursing policy    Surface/Positioning  Low Airloss - Currently in Place  Reposition q 2 hours - Ordered  TAPs Turning system - Ordered    Offloading/Redistribution  Heels floated with waffle overlay - Currently in Place  Float Heels off Bed with Pillows - Ordered           Containment/Moisture Prevention    Dri-germaine pad - Currently in Place  Barrier paste - Applied this Visit    Mobilization      Working with therapies      Anticipated  discharge plans:  Self/Family Care        Vac Discharge Needs:  Vac Discharge plan is purely a recommendation from wound team and not a requirement for discharge unless otherwise stated by physician.  Not Applicable Pt not on a wound vac- currently on an Ulta Machine. Can switch to a home wound vac at d/c if still needed. Plan of care evolving

## 2025-01-26 ENCOUNTER — APPOINTMENT (OUTPATIENT)
Dept: PHYSICAL THERAPY | Facility: REHABILITATION | Age: 80
DRG: 299 | End: 2025-01-26
Attending: PHYSICAL MEDICINE & REHABILITATION
Payer: MEDICARE

## 2025-01-26 LAB
ALBUMIN SERPL BCP-MCNC: 2.8 G/DL (ref 3.2–4.9)
BUN SERPL-MCNC: 66 MG/DL (ref 8–22)
CALCIUM ALBUM COR SERPL-MCNC: 9.3 MG/DL (ref 8.5–10.5)
CALCIUM SERPL-MCNC: 8.3 MG/DL (ref 8.5–10.5)
CHLORIDE SERPL-SCNC: 94 MMOL/L (ref 96–112)
CO2 SERPL-SCNC: 25 MMOL/L (ref 20–33)
CREAT SERPL-MCNC: 7.66 MG/DL (ref 0.5–1.4)
FERRITIN SERPL-MCNC: 631 NG/ML (ref 22–322)
GFR SERPLBLD CREATININE-BSD FMLA CKD-EPI: 7 ML/MIN/1.73 M 2
GLUCOSE SERPL-MCNC: 109 MG/DL (ref 65–99)
HCT VFR BLD AUTO: 27.3 % (ref 42–52)
HGB BLD-MCNC: 8.8 G/DL (ref 14–18)
IRON SATN MFR SERPL: 13 % (ref 15–55)
IRON SERPL-MCNC: 33 UG/DL (ref 50–180)
PHOSPHATE SERPL-MCNC: 6.6 MG/DL (ref 2.5–4.5)
POTASSIUM SERPL-SCNC: 4.1 MMOL/L (ref 3.6–5.5)
SODIUM SERPL-SCNC: 135 MMOL/L (ref 135–145)
TIBC SERPL-MCNC: 260 UG/DL (ref 250–450)
UIBC SERPL-MCNC: 227 UG/DL (ref 110–370)

## 2025-01-26 PROCEDURE — 83550 IRON BINDING TEST: CPT

## 2025-01-26 PROCEDURE — 36415 COLL VENOUS BLD VENIPUNCTURE: CPT

## 2025-01-26 PROCEDURE — A9270 NON-COVERED ITEM OR SERVICE: HCPCS | Performed by: PHYSICAL MEDICINE & REHABILITATION

## 2025-01-26 PROCEDURE — 83540 ASSAY OF IRON: CPT

## 2025-01-26 PROCEDURE — 97530 THERAPEUTIC ACTIVITIES: CPT

## 2025-01-26 PROCEDURE — 770010 HCHG ROOM/CARE - REHAB SEMI PRIVAT*

## 2025-01-26 PROCEDURE — 85018 HEMOGLOBIN: CPT

## 2025-01-26 PROCEDURE — 97110 THERAPEUTIC EXERCISES: CPT

## 2025-01-26 PROCEDURE — 700102 HCHG RX REV CODE 250 W/ 637 OVERRIDE(OP): Performed by: INTERNAL MEDICINE

## 2025-01-26 PROCEDURE — A9270 NON-COVERED ITEM OR SERVICE: HCPCS | Performed by: INTERNAL MEDICINE

## 2025-01-26 PROCEDURE — 85014 HEMATOCRIT: CPT

## 2025-01-26 PROCEDURE — 82728 ASSAY OF FERRITIN: CPT

## 2025-01-26 PROCEDURE — 700111 HCHG RX REV CODE 636 W/ 250 OVERRIDE (IP): Mod: JZ | Performed by: INTERNAL MEDICINE

## 2025-01-26 PROCEDURE — 700102 HCHG RX REV CODE 250 W/ 637 OVERRIDE(OP): Performed by: PHYSICAL MEDICINE & REHABILITATION

## 2025-01-26 PROCEDURE — 90945 DIALYSIS ONE EVALUATION: CPT

## 2025-01-26 PROCEDURE — 80069 RENAL FUNCTION PANEL: CPT

## 2025-01-26 RX ORDER — FERROUS GLUCONATE 324(38)MG
324 TABLET ORAL
Status: DISCONTINUED | OUTPATIENT
Start: 2025-01-26 | End: 2025-02-07

## 2025-01-26 RX ADMIN — OXYCODONE 5 MG: 5 TABLET ORAL at 06:06

## 2025-01-26 RX ADMIN — APIXABAN 5 MG: 5 TABLET, FILM COATED ORAL at 08:18

## 2025-01-26 RX ADMIN — OXYCODONE 5 MG: 5 TABLET ORAL at 11:06

## 2025-01-26 RX ADMIN — OXYCODONE 5 MG: 5 TABLET ORAL at 17:10

## 2025-01-26 RX ADMIN — CLOPIDOGREL BISULFATE 75 MG: 75 TABLET ORAL at 08:17

## 2025-01-26 RX ADMIN — GABAPENTIN 100 MG: 100 CAPSULE ORAL at 08:18

## 2025-01-26 RX ADMIN — GENTAMICIN SULFATE: 1 CREAM TOPICAL at 17:50

## 2025-01-26 RX ADMIN — Medication 324 MG: at 14:44

## 2025-01-26 RX ADMIN — SEVELAMER CARBONATE 1600 MG: 800 TABLET, FILM COATED ORAL at 08:15

## 2025-01-26 RX ADMIN — TAMSULOSIN HYDROCHLORIDE 0.4 MG: 0.4 CAPSULE ORAL at 08:14

## 2025-01-26 RX ADMIN — SENNOSIDES AND DOCUSATE SODIUM 2 TABLET: 50; 8.6 TABLET ORAL at 08:18

## 2025-01-26 RX ADMIN — METHOCARBAMOL 500 MG: 500 TABLET ORAL at 08:17

## 2025-01-26 RX ADMIN — OXYCODONE 5 MG: 5 TABLET ORAL at 20:27

## 2025-01-26 RX ADMIN — METHOCARBAMOL 500 MG: 500 TABLET ORAL at 13:40

## 2025-01-26 RX ADMIN — GUAIFENESIN 600 MG: 600 TABLET ORAL at 08:17

## 2025-01-26 RX ADMIN — SENNOSIDES AND DOCUSATE SODIUM 2 TABLET: 50; 8.6 TABLET ORAL at 20:28

## 2025-01-26 RX ADMIN — MONTELUKAST 10 MG: 10 TABLET, FILM COATED ORAL at 20:28

## 2025-01-26 RX ADMIN — SEVELAMER CARBONATE 1600 MG: 800 TABLET, FILM COATED ORAL at 11:06

## 2025-01-26 RX ADMIN — GABAPENTIN 100 MG: 100 CAPSULE ORAL at 20:28

## 2025-01-26 RX ADMIN — METHOCARBAMOL 500 MG: 500 TABLET ORAL at 20:28

## 2025-01-26 RX ADMIN — SEVELAMER CARBONATE 1600 MG: 800 TABLET, FILM COATED ORAL at 17:10

## 2025-01-26 RX ADMIN — METHOCARBAMOL 500 MG: 500 TABLET ORAL at 17:10

## 2025-01-26 RX ADMIN — EPOETIN ALFA-EPBX 2000 UNITS: 2000 INJECTION, SOLUTION INTRAVENOUS; SUBCUTANEOUS at 12:21

## 2025-01-26 RX ADMIN — OMEPRAZOLE 20 MG: 20 CAPSULE, DELAYED RELEASE ORAL at 08:15

## 2025-01-26 RX ADMIN — ROSUVASTATIN CALCIUM 20 MG: 10 TABLET, FILM COATED ORAL at 20:27

## 2025-01-26 RX ADMIN — APIXABAN 5 MG: 5 TABLET, FILM COATED ORAL at 20:27

## 2025-01-26 RX ADMIN — OXYCODONE 5 MG: 5 TABLET ORAL at 13:40

## 2025-01-26 ASSESSMENT — PATIENT HEALTH QUESTIONNAIRE - PHQ9
1. LITTLE INTEREST OR PLEASURE IN DOING THINGS: NOT AT ALL
2. FEELING DOWN, DEPRESSED, IRRITABLE, OR HOPELESS: NOT AT ALL
SUM OF ALL RESPONSES TO PHQ9 QUESTIONS 1 AND 2: 0

## 2025-01-26 ASSESSMENT — PAIN DESCRIPTION - PAIN TYPE
TYPE: ACUTE PAIN
TYPE: SURGICAL PAIN
TYPE: SURGICAL PAIN
TYPE: ACUTE PAIN

## 2025-01-26 NOTE — PROGRESS NOTES
[facility] Progress Note     CCPD ordered by Dr. Pa. Disconnected aseptically from PD Cycler at 0830.     Pt A/O x 4, not in distress, no bleeding, denies chest pain, and has no edema.   Effluent clear and yellow, no signs of bleeding/ fibrin clots.   No alarms on the machine was noted or reported before disconnection.   See e-flowsheet for details    24 hour UF= 828ml (I. Drain= 16ml + Total UF= 812 ml - Last fill = 0)      Report given to primary RN.

## 2025-01-26 NOTE — PROGRESS NOTES
"[facility] -  PROGRESS NOTE               Author: Kingston Pa M.D. Date & Time: 1/26/2025  6:57 AM     HPI:  79 y.o. male with HTN, ESRD on PD, PVD/ gangrenous left foot, BPH admitted for monitoring s/p  left leg bypass surgery on 1/20 with Dr. Miller. He recently underwent hernial repair and was temporarily transferred to in center HD via [facility]. He has returned to  PD  4 days ago, doing low fill.volumes and has been tolerating at home.   Patient was connected to cycler last night. Total UF reported to be 276ml. Reports of low drain UF alarm last night but no major issues with PD overnight. Pt reports no complaints today morning  No F/C/N/V/CP/SOB.  No melena, hematochezia, hematemesis.  No HA, visual changes,or abdominal pain.     DAILY NEPHROLOGY SUMMARY:    1/21: consult done   1/22: No issues with PD overnight, UF 1137cc, VSS, , feels good, wife at bed side   1/23: No issues with PD overnight 653 cc of UF , not had any BM yesterday and today  --------------------[facility]--------------------  1/24: CCPD 1.5L UF. VSS. PO4 elevated. Pt seen during PD, wife present. C/O pain.  1/25: No issues with PD overnight, net UF 1415ml. No sob, had BM yesterday, states he use O2 when he sleeps to help with nose bleed  1/26:No issues with PD overnight , net  cc, no c/o, still not able to bear weight     REVIEW OF SYSTEMS:    10 point ROS reviewed and is as per HPI/daily summary or otherwise negative    PMH/PSH/SH/FH: Reviewed and unchanged since admission note  CURRENT MEDICATIONS: Reviewed from admission to present day    VS:  BP 94/56   Pulse 82   Temp 37.1 °C (98.8 °F) (Oral)   Resp 16   Ht 1.707 m (5' 7.2\")   Wt 79 kg (174 lb 2.6 oz)   SpO2 100%   BMI 27.12 kg/m²   Physical Exam  Vitals and nursing note reviewed.   Constitutional:       Appearance: Normal appearance.   HENT:      Head: Normocephalic and atraumatic.      Nose: Nose normal. "      Mouth/Throat:      Mouth: Mucous membranes are moist.   Eyes:      General: No scleral icterus.     Conjunctiva/sclera: Conjunctivae normal.   Cardiovascular:      Rate and Rhythm: Normal rate.   Pulmonary:      Effort: Pulmonary effort is normal.      Breath sounds: No wheezing or rales.   Abdominal:      General: There is no distension.      Palpations: Abdomen is soft.      Comments: PD cath in [facility]    Musculoskeletal:         General: No deformity.      Cervical back: Neck supple.      Right lower leg: No edema.      Left lower leg: Edema present.      Comments: Mild edema in LLE wound vac in place     Skin:     General: Skin is warm and dry.      Findings: No rash.   Neurological:      General: No focal deficit present.      Mental Status: He is alert and oriented to person, place, and time. Mental status is at baseline.   Psychiatric:         Mood and Affect: Mood normal.         Behavior: Behavior normal. Behavior is cooperative.         FLUID BALANCE:  In: 240 [P.O.:240]  Out: 1715     LABS:  Recent Labs     01/24/25  0519 01/26/25  0546   SODIUM 137 135   POTASSIUM 4.2 4.1   CHLORIDE 95* 94*   CO2 27 25   GLUCOSE 113* 109*   BUN 63* 66*   CREATININE 7.57* 7.66*   CALCIUM 8.3* 8.3*     Recent Labs     01/24/25  0519 01/26/25  0546   WBC 7.0  --    RBC 3.18*  --    HEMOGLOBIN 10.1* 8.8*   HEMATOCRIT 31.8* 27.3*   .0*  --    MCH 31.8  --    MCHC 31.8*  --    RDW 62.1*  --    PLATELETCT 156*  --    MPV 10.9  --        IMPRESSION:  # ESRD on CCPD  - Pt was transitioned to  iHD during masoud-op  s/p inguinal hernial repair,   - Still has [facility] PC  - Returned to PD 1/17/25  - Home PD prescription CCPD 2.5% alternating with 1.5% 7 exchanges 8 hrs 30 min,   total FV 7700cc, no last fill  # HTN  - Goal BP < 140/90  - At goal  # Anemia of CKD  - Goal Hgb 10-11  - Below goal   - low iron stores   # CKD-MBD  - Ca 8.6   - PO4 7.4 -> 6.6   # Chronic hypoxic respiratory failure   - On home oxygen 2-2.5L at night          SUGGESTIONS:  - Continue CCPD during hospital stay home prescription   - PD exit site care per protocol   - Bowel regimen to avoid constipation with PD  - No dietary protein restrictions  - Dose all meds per ESRD  - ROSALIND  TIW if Hb < 10   - PO iron   - Continue Renvela 1600 mg tid ac   - Holding  Amlodipine SBP low 100 at times   - Renal diet  - PD treatments going well, back on PD since 1/17      Has [facility] PC since May 2024. Pt does not want the CVC removed at this times and want to wait for an additional      week or 2 to make sure that he does encounter any issues with PD treatments     He understands potential risks with keeping the  CVC  long term, may consider to get it removed later this week      L arm AVF not fully matured,  will need intervention   - Avoid Fleet enema in dialysis patients, discontinued     Please call or page Nephrology with questions or concerns  Thank you,

## 2025-01-26 NOTE — THERAPY
Physical Therapy   Daily Treatment     Patient Name: Edy Bennett  Age:  79 y.o., Sex:  male  Medical Record #: [id number]  Today's Date: 1/25/2025     Precautions  Precautions: Fall Risk  Comments: LLE wound vac    Subjective    Patient is asleep in bed, deferred treatment to later time to allow rest. Still sleeping an hour later. Patient is agreeable. Feels like today is a better day      Objective       01/25/25 1540   PT Charge Group   PT Gait Training (Units) 1   PT Therapeutic Exercise (Units) 2   PT Therapeutic Activities (Units) 1   PT Total Time Spent   PT Individual Total Time Spent (Mins) 60   Gait Functional Level of Assist    Gait Level Of Assist Contact Guard Assist   Assistive Device Parallel Bars   Distance (Feet) 10   # of Times Distance was Traveled 2   Deviation Antalgic;Bradykinetic;Step To   Stairs Functional Level of Assist   Level of Assist with Stairs Unable to Participate   Transfer Functional Level of Assist   Bed, Chair, Wheelchair Transfer Minimal Assist   Bed Chair Wheelchair Transfer Description Squat pivot transfer to wheelchair   Supine Lower Body Exercise   Bridges One Legged;1 set of 10  (right)   Ankle Pumps 1 set of 10;Bilateral   Quadriceps Isometrics 1 set of 10;Left  (10 second holds)   Other Exercises belt assisted left calf stretch 5 x 20 seconds   Sitting Lower Body Exercises   Long Arc Quad 2 sets of 10;Bilateral   Hamstring Curl 2 sets of 10;Left  (skate under foot)   Other Exercises seated heel raises 2 x 10   Bed Mobility    Supine to Sit Minimal Assist  (to leg)   Sit to Stand Minimal Assist  (pull to  bars)   Scooting Standby Assist   Interdisciplinary Plan of Care Collaboration   IDT Collaboration with  Family / Caregiver   Patient Position at End of Therapy Seated;Family / Friend in Room   Collaboration Comments spouse present         Assessment    Patient is still left LE pain limited and can be argumentative if he does not have 100% control over the  "method/technique for mobility. Overall, much better today. Achieves some weightbearing and ambulation in parallel bars. Pains left leg are more manageable with preparatory mobility exercises    Strengths: Able to follow instructions, Alert and oriented, Independent prior level of function, Supportive family, Pleasant and cooperative, Motivated for self care and independence  Barriers: Pain, Pain poorly managed, Limited mobility    Plan    Supine and seated there ex for left LE mobility/tolerance to standing, gait in parallel bars and progress to FWW     DME  PT DME Recommendations  Wheelchair: 18\" Width, Elevating Leg Rests  Cushion: Standard    Passport items to be completed:  Get in/out of bed safely, in/out of a vehicle, safely use mobility device, walk or wheel around home/community, navigate up and down stairs, show how to get up/down from the ground, ensure home is accessible, demonstrate HEP, complete caregiver training    Physical Therapy Problems (Active)       Problem: Balance       Dates: Start:  01/24/25         Goal: STG-Within one week, patient will tolerate standing in parallel bars with SBA for 2 minutes       Dates: Start:  01/24/25               Problem: Mobility       Dates: Start:  01/24/25         Goal: STG-Within one week, patient will ambulate 10 feet with FWW CGA       Dates: Start:  01/24/25               Problem: Mobility Transfers       Dates: Start:  01/24/25         Goal: STG-Within one week, patient will perform bed mobility with minimal assist to manage legs        Dates: Start:  01/24/25            Goal: STG-Within one week, patient will sit to stand with minimal assist and FWW       Dates: Start:  01/24/25               Problem: PT-Long Term Goals       Dates: Start:  01/24/25         Goal: LTG-By discharge, patient will ambulate with  feet SBA       Dates: Start:  01/24/25            Goal: LTG-By discharge, patient will transfer one surface to another with FWW supervised  "      Dates: Start:  01/24/25            Goal: LTG-By discharge, patient will perform home exercise program       Dates: Start:  01/24/25            Goal: LTG-By discharge, patient will ambulate up/down 4-6 stairs with bilateral rails supervised        Dates: Start:  01/24/25            Goal: LTG-By discharge, patient will transfer in/out of a car with FWW supervised        Dates: Start:  01/24/25

## 2025-01-26 NOTE — THERAPY
Physical Therapy   Daily Treatment     Patient Name: Edy Bennett  Age:  79 y.o., Sex:  male  Medical Record #: [id number]  Today's Date: 1/26/2025     Precautions  Precautions: Fall Risk  Comments: LLE wound vac    Subjective    Pt in room supine in bed and asleep with wife present upon entry. Pt woken up and agreeable to therapy session.     Objective       01/26/25 1431   PT Charge Group   PT Therapeutic Exercise (Units) 1   PT Therapeutic Activities (Units) 1   PT Total Time Spent   PT Individual Total Time Spent (Mins) 30   Pain   Intervention Medication (see MAR)   Pain 0 - 10 Group   Location Leg   Location Orientation Left   Pain Rating Scale (NPRS) 6   Description Aching   Comfort Goal Perform Activity;Sleep Comfortably;Comfort with Movement   Therapist Pain Assessment Prior to Activity;During Activity;Post Activity;Nurse Notified  (Pt received pain meds prior to therapy.)   Transfer Functional Level of Assist   Bed, Chair, Wheelchair Transfer Moderate Assist   Bed Chair Wheelchair Transfer Description Assist with one limb;Squat pivot transfer to wheelchair;Supervision for safety;Set-up of equipment;Requires lift   Sitting Lower Body Exercises   Long Arc Quad 2 sets of 10;Right;1 set of 10;Left   Marching 2 sets of 10  (RLE)   Hamstring Curl 2 sets of 10;Right;1 set of 10;Left   Bed Mobility    Supine to Sit Moderate Assist   Sit to Stand Moderate Assist   Interdisciplinary Plan of Care Collaboration   IDT Collaboration with  Nursing   Patient Position at End of Therapy Seated;Chair Alarm On;Call Light within Reach;Tray Table within Reach;Phone within Reach;Family / Friend in Room   Collaboration Comments Present to give pt medication.         Assessment    Pt extremely limited with LLE pain and unable to tolerate minimal movement on the left leg. Pt required active assisted with moving the left leg during bed mobility and during exercise.  Strengths: Able to follow instructions, Alert and oriented,  "Independent prior level of function, Supportive family, Pleasant and cooperative, Motivated for self care and independence  Barriers: Pain, Pain poorly managed, Limited mobility    Plan    Supine and seated there ex for left LE mobility/tolerance to standing, gait in parallel bars and progress to FWW         DME  PT DME Recommendations  Wheelchair: 18\" Width, Elevating Leg Rests  Cushion: Standard    Passport items to be completed:  Get in/out of bed safely, in/out of a vehicle, safely use mobility device, walk or wheel around home/community, navigate up and down stairs, show how to get up/down from the ground, ensure home is accessible, demonstrate HEP, complete caregiver training    Physical Therapy Problems (Active)       Problem: Balance       Dates: Start:  01/24/25         Goal: STG-Within one week, patient will tolerate standing in parallel bars with SBA for 2 minutes       Dates: Start:  01/24/25               Problem: Mobility       Dates: Start:  01/24/25         Goal: STG-Within one week, patient will ambulate 10 feet with FWW CGA       Dates: Start:  01/24/25               Problem: Mobility Transfers       Dates: Start:  01/24/25         Goal: STG-Within one week, patient will perform bed mobility with minimal assist to manage legs        Dates: Start:  01/24/25            Goal: STG-Within one week, patient will sit to stand with minimal assist and FWW       Dates: Start:  01/24/25               Problem: PT-Long Term Goals       Dates: Start:  01/24/25         Goal: LTG-By discharge, patient will ambulate with  feet SBA       Dates: Start:  01/24/25            Goal: LTG-By discharge, patient will transfer one surface to another with FWW supervised       Dates: Start:  01/24/25            Goal: LTG-By discharge, patient will perform home exercise program       Dates: Start:  01/24/25            Goal: LTG-By discharge, patient will ambulate up/down 4-6 stairs with bilateral rails supervised        " Dates: Start:  01/24/25            Goal: LTG-By discharge, patient will transfer in/out of a car with FWW supervised        Dates: Start:  01/24/25

## 2025-01-26 NOTE — PROGRESS NOTES
NURSING DAILY NOTE    Name: Edy Bennett   Date of Admission: 1/23/2025   Admitting Diagnosis: PAD (peripheral artery disease) ([facility])  Attending Physician: ROSANNA PATEL D.O.  Allergies: Patient has no known allergies.    Safety  Patient Assist     Patient Precautions  Fall Risk  Precaution Comments  LLE wound vac  Bed Transfer Status  Minimal Assist  Toilet Transfer Status   Total Assist X 2 (Pt stood with GB w/ max-total A while commode was placed under him)  Assistive Devices  Rails, Wheelchair  Oxygen  Silicone Nasal Cannula  Diet/Therapeutic Dining  Current Diet Order   Procedures    Diet Order Diet: Renal; Second Modifier: (optional): Cardiac     Pill Administration  whole  Agitated Behavioral Scale     ABS Level of Severity       Fall Risk  Has the patient had a fall this admission?   No  Trinh Spivey Fall Risk Scoring  22, HIGH RISK  Fall Risk Safety Measures  bed alarm, chair alarm, poor balance, and low vision/ hearing    Vitals  Temperature: 37.1 °C (98.7 °F)  Temp src: Oral  Pulse: 82  Respiration: 16  Blood Pressure : 104/58  Blood Pressure MAP (Calculated): 73 MM HG  BP Location: Right, Upper Arm  Patient BP Position: Supine     Oxygen  Pulse Oximetry: 100 %  O2 (LPM): 2  O2 Delivery Device: Silicone Nasal Cannula    Bowel and Bladder  Last Bowel Movement  01/24/25  Stool Type  Type 3: Like a sausage, but with cracks on its surface  Bowel Device     Continent  Bladder: Did not void   Bowel: No movement  Bladder Function  Urine Void (mL): 150 ml  Number of Times Voided: 1  Urine Color: Yellow  Genitourinary Assessment   Bladder Assessment (WDL):  WDL Except  Smith Catheter: Not Applicable  Urinary Symptoms: Anuric/ Dialysis Patient (Occassional output)  Urine Color: Yellow  Bladder Device: Urinal  Bladder Scan: Post Void  $ Bladder Scan Results (mL): 111    Skin  Jalen Score   16  Sensory Interventions   Bed Types: Low Airloss  Skin  Preventative Measures: Pillows in Use for Support / Positioning  Moisture Interventions  Moisturizers/Barriers: Barrier Wipes      Pain  Pain Rating Scale  7 - Focus of attention, prevents doing daily activities  Pain Location  Leg  Pain Location Orientation  Left  Pain Interventions   Medication (see MAR)    ADLs    Bathing      Linen Change      Personal Hygiene     Chlorhexidine Bath   Not Completed - Off Floor  Oral Care     Teeth/Dentures     Shave     Nutrition Percentage Eaten  Free Water  Environmental Precautions     Patient Turns/Positioning  Sitting up in wheelchair  Patient Turns Assistance/Tolerance     Bed Positions  Bed Controls On  Head of Bed Elevated  Self regulated      Psychosocial/Neurologic Assessment  Psychosocial Assessment  Psychosocial (WDL):  Within Defined Limits  Patient Behaviors: Anxious  Family Behaviors: Family Present  Neurologic Assessment  Neuro (WDL): Within Defined Limits  Level of Consciousness: Alert  Orientation Level: Oriented X4  Cognition: Follows commands, Appropriate judgement, Appropriate safety awareness  Speech: Clear  Motor Function/Sensation Assessment: Sensation  LLE Sensation: Pain  EENT (WDL):  WDL Except    Cardio/Pulmonary Assessment  Edema   LLE Edema: 1+, Generalized  Respiratory Breath Sounds  RUL Breath Sounds: Clear  RML Breath Sounds: Clear  RLL Breath Sounds: Diminished  ANAYELI Breath Sounds: Clear  LLL Breath Sounds: Diminished  Cardiac Assessment   Cardiac (WDL):  Within Defined Limits

## 2025-01-26 NOTE — PROGRESS NOTES
NURSING DAILY NOTE    Name: Edy Bennett   Date of Admission: 1/23/2025   Admitting Diagnosis: PAD (peripheral artery disease) ([facility])  Attending Physician: ROSANNA PATEL D.O.  Allergies: Patient has no known allergies.    Safety  Patient Assist     Patient Precautions  Fall Risk  Precaution Comments  LLE wound vac  Bed Transfer Status  Minimal Assist  Toilet Transfer Status   Total Assist X 2 (Pt stood with GB w/ max-total A while commode was placed under him)  Assistive Devices  Rails, Wheelchair  Oxygen  Silicone Nasal Cannula  Diet/Therapeutic Dining  Current Diet Order   Procedures    Diet Order Diet: Renal; Second Modifier: (optional): Cardiac     Pill Administration  whole  Agitated Behavioral Scale     ABS Level of Severity       Fall Risk  Has the patient had a fall this admission?   No  Trinh Spivey Fall Risk Scoring  22, HIGH RISK  Fall Risk Safety Measures  bed alarm, chair alarm, poor balance, and low vision/ hearing    Vitals  Temperature: 36.6 °C (97.9 °F)  Temp src: Oral  Pulse: 71  Respiration: 18  Blood Pressure : 105/59  Blood Pressure MAP (Calculated): 74 MM HG  BP Location: Right, Upper Arm  Patient BP Position: Supine     Oxygen  Pulse Oximetry: 93 %  O2 (LPM): 2  O2 Delivery Device: Silicone Nasal Cannula    Bowel and Bladder  Last Bowel Movement  01/24/25  Stool Type  Type 3: Like a sausage, but with cracks on its surface  Bowel Device     Continent  Bladder: Did not void   Bowel: No movement  Bladder Function  Urine Void (mL): 150 ml  Number of Times Voided: 1  Urine Color: Yellow  Genitourinary Assessment   Bladder Assessment (WDL):  WDL Except  Smith Catheter: Not Applicable  Urinary Symptoms: Anuric/ Dialysis Patient (Occassional output)  Urine Color: Yellow  Bladder Device: Urinal  Bladder Scan: Post Void  $ Bladder Scan Results (mL): 111    Skin  Jalen Score   16  Sensory Interventions   Bed Types: Low Airloss  Skin  Preventative Measures: Pillows in Use to Float Heels, Pillows in Use for Support / Positioning  Moisture Interventions  Moisturizers/Barriers: Barrier Wipes      Pain  Pain Rating Scale  5 - Interrupts some activities  Pain Location  Leg  Pain Location Orientation  Left  Pain Interventions   Medication (see MAR)    ADLs    Bathing      Linen Change      Personal Hygiene     Chlorhexidine Bath   Not Completed - Off Floor  Oral Care     Teeth/Dentures     Shave     Nutrition Percentage Eaten  Free Water  Environmental Precautions     Patient Turns/Positioning  Sitting up in wheelchair  Patient Turns Assistance/Tolerance     Bed Positions  Bed Controls On  Head of Bed Elevated  Self regulated      Psychosocial/Neurologic Assessment  Psychosocial Assessment  Psychosocial (WDL):  Within Defined Limits  Patient Behaviors: Fatigue  Family Behaviors: No Family Present  Neurologic Assessment  Neuro (WDL): Within Defined Limits  Level of Consciousness: Alert  Orientation Level: Oriented X4  Cognition: Follows commands, Appropriate judgement, Appropriate safety awareness  Speech: Clear  Motor Function/Sensation Assessment: Sensation  LLE Sensation: Pain  EENT (WDL):  WDL Except    Cardio/Pulmonary Assessment  Edema   LLE Edema: 1+, Generalized  Respiratory Breath Sounds  RUL Breath Sounds: Clear  RML Breath Sounds: Clear  RLL Breath Sounds: Diminished  ANAYELI Breath Sounds: Clear  LLL Breath Sounds: Diminished  Cardiac Assessment   Cardiac (WDL):  Within Defined Limits

## 2025-01-26 NOTE — CARE PLAN
The patient is Stable - Low risk of patient condition declining or worsening    Shift Goals  Clinical Goals: Comfort and safety  Patient Goals: Comfort    Progress made toward(s) clinical / shift goals:      Problem: Pain - Standard  Goal: Alleviation of pain or a reduction in pain to the patient’s comfort goal  Outcome: Progressing  Note: Oxycodone 5mg given as scheduled per MAR for c/o left leg incision pain with adequate relief. Pt sleeps good.Will continue to monitor.     Problem: Respiratory  Goal: Patient will understand use and administration of respiratory medications to improve respiratory function  Outcome: Progressing  Note: Pt on 2liter oxygen via nasal cannula at night. Saturation >90%. Lung sounds clear to diminished on the bases. No distress noted at this time. Will continue to monitor.       Patient is not progressing towards the following goals:

## 2025-01-26 NOTE — PROGRESS NOTES
[facility] Services Progress Note     CCPD x 8.5 hours, 1100 mL fills x 7 cycles using 2.5%and 1.5% PD solution ordered per Dr. Pa     CCPD treatment initiated at 1930 without any issues.  Patient AOx4, resting calmly, interactive, (-) abd distention, denies pain/abd discomfort, stable VS.                 Patient with intact and patent RLQ PD catheter aseptically connected per policy.  RLQ PD catheter and exit site in good condition, no signs of infection noted, cleansed with antiseptic solution and dressing changed. Gentamicin applied at exit site.     Initial drain: 16 mL      Report/Inservice given FELICIANO Angel RN     Patient on dwell 1/7 prior to departure from bedside.     Please contact on call dialysis RN for any issues with persistent alarms/assistance with troubleshooting or patient not tolerating therapy.      For on-call Dialysis RN, pls contact [facility] at [phone].

## 2025-01-26 NOTE — CARE PLAN
"The patient is Stable - Low risk of patient condition declining or worsening    Shift Goals  Clinical Goals: Comfort and safety  Patient Goals: Comfort    Progress made toward(s) clinical / shift goals:    Problem: Fall Risk - Rehab  Goal: Patient will remain free from falls      Trinh Spivey Fall risk Assessment : 22    High fall risk Interventions   - Bed and strip alarm   - Yellow sign by the door   - Yellow wrist band \"Fall risk\"  - Room near to the nurse station  - Do not leave patient unattended in the bathroom  - Fall risk education provided    Pt uses call light consistently and appropriately. Waits for assistance does not attempt self transfer this shift. Able to verbalize needs.  "

## 2025-01-27 ENCOUNTER — APPOINTMENT (OUTPATIENT)
Dept: PHYSICAL THERAPY | Facility: REHABILITATION | Age: 80
DRG: 299 | End: 2025-01-27
Attending: PHYSICAL MEDICINE & REHABILITATION
Payer: MEDICARE

## 2025-01-27 ENCOUNTER — APPOINTMENT (OUTPATIENT)
Dept: OCCUPATIONAL THERAPY | Facility: REHABILITATION | Age: 80
DRG: 299 | End: 2025-01-27
Attending: PHYSICAL MEDICINE & REHABILITATION
Payer: MEDICARE

## 2025-01-27 PROCEDURE — 97116 GAIT TRAINING THERAPY: CPT

## 2025-01-27 PROCEDURE — 770010 HCHG ROOM/CARE - REHAB SEMI PRIVAT*

## 2025-01-27 PROCEDURE — 97110 THERAPEUTIC EXERCISES: CPT

## 2025-01-27 PROCEDURE — 90945 DIALYSIS ONE EVALUATION: CPT

## 2025-01-27 PROCEDURE — 97530 THERAPEUTIC ACTIVITIES: CPT

## 2025-01-27 PROCEDURE — 99233 SBSQ HOSP IP/OBS HIGH 50: CPT | Performed by: PHYSICAL MEDICINE & REHABILITATION

## 2025-01-27 PROCEDURE — A9270 NON-COVERED ITEM OR SERVICE: HCPCS | Performed by: PHYSICAL MEDICINE & REHABILITATION

## 2025-01-27 PROCEDURE — 97535 SELF CARE MNGMENT TRAINING: CPT

## 2025-01-27 PROCEDURE — 700102 HCHG RX REV CODE 250 W/ 637 OVERRIDE(OP): Performed by: PHYSICAL MEDICINE & REHABILITATION

## 2025-01-27 RX ADMIN — GABAPENTIN 100 MG: 100 CAPSULE ORAL at 21:03

## 2025-01-27 RX ADMIN — APIXABAN 5 MG: 5 TABLET, FILM COATED ORAL at 21:02

## 2025-01-27 RX ADMIN — METHOCARBAMOL 500 MG: 500 TABLET ORAL at 08:29

## 2025-01-27 RX ADMIN — APIXABAN 5 MG: 5 TABLET, FILM COATED ORAL at 08:28

## 2025-01-27 RX ADMIN — ROSUVASTATIN CALCIUM 20 MG: 10 TABLET, FILM COATED ORAL at 21:03

## 2025-01-27 RX ADMIN — OXYCODONE 5 MG: 5 TABLET ORAL at 13:37

## 2025-01-27 RX ADMIN — GUAIFENESIN 600 MG: 600 TABLET ORAL at 08:29

## 2025-01-27 RX ADMIN — CLOPIDOGREL BISULFATE 75 MG: 75 TABLET ORAL at 08:28

## 2025-01-27 RX ADMIN — METHOCARBAMOL 500 MG: 500 TABLET ORAL at 13:37

## 2025-01-27 RX ADMIN — SEVELAMER CARBONATE 1600 MG: 800 TABLET, FILM COATED ORAL at 17:15

## 2025-01-27 RX ADMIN — GENTAMICIN SULFATE: 1 CREAM TOPICAL at 19:45

## 2025-01-27 RX ADMIN — SEVELAMER CARBONATE 1600 MG: 800 TABLET, FILM COATED ORAL at 11:25

## 2025-01-27 RX ADMIN — METHOCARBAMOL 500 MG: 500 TABLET ORAL at 17:15

## 2025-01-27 RX ADMIN — TAMSULOSIN HYDROCHLORIDE 0.4 MG: 0.4 CAPSULE ORAL at 08:29

## 2025-01-27 RX ADMIN — METHOCARBAMOL 500 MG: 500 TABLET ORAL at 21:04

## 2025-01-27 RX ADMIN — OXYCODONE 5 MG: 5 TABLET ORAL at 17:14

## 2025-01-27 RX ADMIN — SENNOSIDES AND DOCUSATE SODIUM 2 TABLET: 50; 8.6 TABLET ORAL at 21:06

## 2025-01-27 RX ADMIN — OXYCODONE 5 MG: 5 TABLET ORAL at 11:26

## 2025-01-27 RX ADMIN — OXYCODONE 5 MG: 5 TABLET ORAL at 21:04

## 2025-01-27 RX ADMIN — MONTELUKAST 10 MG: 10 TABLET, FILM COATED ORAL at 21:03

## 2025-01-27 RX ADMIN — OMEPRAZOLE 20 MG: 20 CAPSULE, DELAYED RELEASE ORAL at 08:29

## 2025-01-27 RX ADMIN — GUAIFENESIN 600 MG: 600 TABLET ORAL at 21:06

## 2025-01-27 RX ADMIN — SENNOSIDES AND DOCUSATE SODIUM 2 TABLET: 50; 8.6 TABLET ORAL at 08:28

## 2025-01-27 RX ADMIN — OXYCODONE 5 MG: 5 TABLET ORAL at 05:44

## 2025-01-27 RX ADMIN — GABAPENTIN 100 MG: 100 CAPSULE ORAL at 08:29

## 2025-01-27 RX ADMIN — SEVELAMER CARBONATE 1600 MG: 800 TABLET, FILM COATED ORAL at 08:29

## 2025-01-27 ASSESSMENT — CHA2DS2 SCORE
SEX: MALE
PRIOR STROKE OR TIA OR THROMBOEMBOLISM: YES
AGE 75 OR GREATER: YES
AGE 65 TO 74: NO
VASCULAR DISEASE: YES
CHA2DS2 VASC SCORE: 7
CHF OR LEFT VENTRICULAR DYSFUNCTION: YES
HYPERTENSION: YES
DIABETES: NO

## 2025-01-27 ASSESSMENT — PAIN DESCRIPTION - PAIN TYPE
TYPE: ACUTE PAIN

## 2025-01-27 ASSESSMENT — GAIT ASSESSMENTS
GAIT LEVEL OF ASSIST: CONTACT GUARD ASSIST
DISTANCE (FEET): 4
DISTANCE (FEET): 20
GAIT LEVEL OF ASSIST: MINIMAL ASSIST
ASSISTIVE DEVICE: PARALLEL BARS
ASSISTIVE DEVICE: FRONT WHEEL WALKER

## 2025-01-27 ASSESSMENT — ACTIVITIES OF DAILY LIVING (ADL)
BED_CHAIR_WHEELCHAIR_TRANSFER_DESCRIPTION: OTHER (COMMENT)
BED_CHAIR_WHEELCHAIR_TRANSFER_DESCRIPTION: OTHER (COMMENT)

## 2025-01-27 ASSESSMENT — FIBROSIS 4 INDEX: FIB4 SCORE: 4.71

## 2025-01-27 NOTE — WOUND TEAM
Wound care follow up- left leg prevena incisional dressing is CDI, purple foam sucked down with a good seal at 125mmHg per orders. Per the patient, the surgeon changed the entire dressing over the weekend. It appears new and is dry.  The cannister has a small amount of serosanguinous drainage. 150cc  There are currently no issues with the prevena. It is hooked up to the Ulta machine.   Wound nurse disconnected the Ulta machine, and connected to a new prevena plus machine.   The Ulta machine is discontinued, placed in soiled utility room per protocol, and central supply was notified.   Pt is alert and sitting up eating breakfast. He is eating 100% of the meal.     Continue pressure relief measures in place (q2 turns, floating heels).

## 2025-01-27 NOTE — CARE PLAN
Problem: Bathing  Goal: STG-Within one week, patient will bathe with min A  Outcome: Met     Problem: Dressing  Goal: STG-Within one week, patient will dress LB with min A  Outcome: Not Met     Problem: Toileting  Goal: STG-Within one week, patient will complete toileting tasks with min A   Outcome: Not Met     Problem: Functional Transfers  Goal: STG-Within one week, patient will transfer to toilet with min A  Outcome: Met     Problem: OT Long Term Goals  Goal: LTG-By discharge, patient will complete basic self care tasks mod I  Outcome: Progressing  Goal: LTG-By discharge, patient will perform bathroom transfers mod I  Outcome: Progressing

## 2025-01-27 NOTE — PROGRESS NOTES
NURSING DAILY NOTE    Name: Edy Bennett   Date of Admission: 1/23/2025   Admitting Diagnosis: PAD (peripheral artery disease) ([facility])  Attending Physician: ROSANNA PATEL D.O.  Allergies: Patient has no known allergies.    Safety  Patient Assist  Mod/Mx assist  Patient Precautions  Fall Risk  Precaution Comments  LLE wound vac  Bed Transfer Status  Moderate Assist  Toilet Transfer Status   Total Assist X 2 (Pt stood with GB w/ max-total A while commode was placed under him)  Assistive Devices  Rails, Wheelchair  Oxygen  None - Room Air  Diet/Therapeutic Dining  Current Diet Order   Procedures    Diet Order Diet: Renal; Second Modifier: (optional): Cardiac     Pill Administration  whole  Agitated Behavioral Scale     ABS Level of Severity       Fall Risk  Has the patient had a fall this admission?   No  Trinh Spivey Fall Risk Scoring  22, HIGH RISK  Fall Risk Safety Measures  bed alarm and chair alarm    Vitals  Temperature: 36.4 °C (97.5 °F)  Temp src: Oral  Pulse: 85  Respiration: 16  Blood Pressure : 109/66  Blood Pressure MAP (Calculated): 80 MM HG  BP Location: Right, Upper Arm  Patient BP Position: Cota's Position     Oxygen  Pulse Oximetry: 98 %  O2 (LPM): 2  O2 Delivery Device: None - Room Air    Bowel and Bladder  Last Bowel Movement  01/26/25  Stool Type  Type 4: Like a sausage or snake, smooth and soft  Bowel Device     Continent  Bladder: Did not void   Bowel: No movement  Bladder Function  Urine Void (mL): 200 ml  Number of Times Voided: 1  Urine Color: Yellow  Genitourinary Assessment   Bladder Assessment (WDL):  WDL Except  Smith Catheter: Not Applicable  Urinary Symptoms: Anuric/ Dialysis Patient (Occassional output)  Urine Color: Yellow  Bladder Device: Urinal  Bladder Scan: Post Void  $ Bladder Scan Results (mL): 125    Skin  Jalen Score   16  Sensory Interventions   Bed Types: Low Airloss  Skin Preventative Measures: Pillows in  Use for Support / Positioning  Moisture Interventions  Moisturizers/Barriers: Barrier Wipes      Pain  Pain Rating Scale  6 - Hard to ignore, avoid usual activities  Pain Location  Leg  Pain Location Orientation  Left  Pain Interventions   Medication (see MAR)    ADLs    Bathing   Patient Refused Bathing (Had shower with OT yesterday)  Linen Change      Personal Hygiene     Chlorhexidine Bath   Not Completed - Off Floor  Oral Care     Teeth/Dentures     Shave     Nutrition Percentage Eaten  Free Water  Environmental Precautions     Patient Turns/Positioning  Sitting up in wheelchair  Patient Turns Assistance/Tolerance     Bed Positions  Bed Controls On  Head of Bed Elevated  Self regulated      Psychosocial/Neurologic Assessment  Psychosocial Assessment  Psychosocial (WDL):  Within Defined Limits  Patient Behaviors: Anxious  Family Behaviors: Family Present  Neurologic Assessment  Neuro (WDL): Within Defined Limits  Level of Consciousness: Alert  Orientation Level: Oriented X4  Cognition: Follows commands, Appropriate judgement, Appropriate safety awareness  Speech: Clear  Pupil Assesment: No  Motor Function/Sensation Assessment: Sensation  RUE Sensation: Full sensation  LUE Sensation: Full sensation  RLE Sensation: Full sensation  LLE Sensation: Full sensation, Pain  EENT (WDL):  WDL Except    Cardio/Pulmonary Assessment  Edema   LLE Edema: 1+, Generalized  Respiratory Breath Sounds  RUL Breath Sounds: Clear  RML Breath Sounds: Clear  RLL Breath Sounds: Diminished  ANAYELI Breath Sounds: Clear  LLL Breath Sounds: Diminished  Cardiac Assessment   Cardiac (WDL):  Within Defined Limits

## 2025-01-27 NOTE — CARE PLAN
Problem: Mobility  Goal: STG-Within one week, patient will ambulate 10 feet with FWW CGA  Outcome: Progressing     Problem: Mobility Transfers  Goal: STG-Within one week, patient will perform bed mobility with minimal assist to manage legs   Outcome: Progressing  Goal: STG-Within one week, patient will sit to stand with minimal assist and FWW  Outcome: Progressing     Problem: Balance  Goal: STG-Within one week, patient will tolerate standing in parallel bars with SBA for 2 minutes  Outcome: Met

## 2025-01-27 NOTE — CARE PLAN
The patient is Stable - Low risk of patient condition declining or worsening    Shift Goals  Clinical Goals: safety, Pain control  Patient Goals: comfort    Progress made toward(s) clinical / shift goals:    Problem: Pain - Standard  Goal: Alleviation of pain or a reduction in pain to the patient’s comfort goal  Outcome: Progressing     Patient reports satisfactory pain control and decrease intensity after pharmacological pain management.

## 2025-01-27 NOTE — DISCHARGE PLANNING
Case Management/IDT follow up.   Projected dc date: 2/7/25  IDT continues to recommend IRF level of care as patient continue to make progress with all therapies.     DC needs  Home health:  PT/OT/RN/CNA  DME:  Family training:    Follow up:   PCP:  Other:     I met with patient and family providing update from IDT and discussed plan of care.  They are in agreement w/ plan.     Plan:  Continue to follow

## 2025-01-27 NOTE — PROGRESS NOTES
[facility] Services Progress Notes    Disconnected aseptically from PD Cycler at 0700.     Pt stable, VSS, alert and oriented.  Effluent clear and yellow, no signs of bleeding/fibrin clots.  No alarms on machine was noted or reported before disconnection.     24 hour UF= 845 ML (I.Drain= 65 ML + Total UF= 780 ML - Last fill= 0)     Report given to Radha ARANDA

## 2025-01-27 NOTE — CARE PLAN
The patient is Watcher - Medium risk of patient condition declining or worsening    Shift Goals  Clinical Goals: safety  Problem: Pain - Standard  Goal: Alleviation of pain or a reduction in pain to the patient’s comfort goal  Note: Scheduled oxycodone administered as ordered for pain control.     Problem: Fall Risk - Rehab  Goal: Patient will remain free from falls  Note: Call light in reach, bed locked and in lowest position, belongings in reach, bed alarm on, non-slip socks on, 3 side rails up, and reminded the patient to call for assistance.

## 2025-01-27 NOTE — PROGRESS NOTES
"  Physical Medicine & Rehabilitation Progress Note    Encounter Date: 1/27/2025    Chief Complaint: PVD s/p bypass    Interval Events (Subjective):  VSS  Voiding some  BM 1/26    Seen and examined in his room with Kailey and friend. He is doing better. His toes are better perfused. Pain is improving. He is able to do more each day, though in small increments. On regular therapy schedule this week he will see how he is able to tolerate.       ROS: 14 point ROS negative unless otherwise specified in the HPI    Objective:  VITAL SIGNS: /58   Pulse 84   Temp 37 °C (98.6 °F)   Resp 18   Ht 1.707 m (5' 7.2\")   Wt 79 kg (174 lb 2.6 oz)   SpO2 98%   BMI 27.12 kg/m²     GEN: No apparent distress  HEENT: Head normocephalic, atraumatic.  Sclera nonicteric bilaterally, no ocular discharge appreciated bilaterally.  CV: Extremities warm and well-perfused, + LLE edema, improving. RLE without edema.   PULMONARY: Breathing nonlabored on room air, no respiratory accessory muscle use.  Not requiring supplemental oxygen.  SKIN: Vac with continued sanguinous output.   PSYCH: Mood and affect within normal limits.  NEURO: Awake alert.  Conversational.  Logical thought content.      Laboratory Values:  Recent Results (from the past 72 hours)   Renal Function Panel    Collection Time: 01/26/25  5:46 AM   Result Value Ref Range    Sodium 135 135 - 145 mmol/L    Potassium 4.1 3.6 - 5.5 mmol/L    Chloride 94 (L) 96 - 112 mmol/L    Co2 25 20 - 33 mmol/L    Glucose 109 (H) 65 - 99 mg/dL    Creatinine 7.66 (HH) 0.50 - 1.40 mg/dL    Bun 66 (H) 8 - 22 mg/dL    Calcium 8.3 (L) 8.5 - 10.5 mg/dL    Correct Calcium 9.3 8.5 - 10.5 mg/dL    Phosphorus 6.6 (H) 2.5 - 4.5 mg/dL    Albumin 2.8 (L) 3.2 - 4.9 g/dL   HEMOGLOBIN AND HEMATOCRIT    Collection Time: 01/26/25  5:46 AM   Result Value Ref Range    Hemoglobin 8.8 (L) 14.0 - 18.0 g/dL    Hematocrit 27.3 (L) 42.0 - 52.0 %   ESTIMATED GFR    Collection Time: 01/26/25  5:46 AM   Result Value " Ref Range    GFR (CKD-EPI) 7 (A) >60 mL/min/1.73 m 2   IRON/TOTAL IRON BIND    Collection Time: 01/26/25  8:00 AM   Result Value Ref Range    Iron 33 (L) 50 - 180 ug/dL    Total Iron Binding 260 250 - 450 ug/dL    Unsat Iron Binding 227 110 - 370 ug/dL    % Saturation 13 (L) 15 - 55 %   FERRITIN    Collection Time: 01/26/25  8:00 AM   Result Value Ref Range    Ferritin 631.0 (H) 22.0 - 322.0 ng/mL       Medications:  Scheduled Medications   Medication Dose Frequency    epoetin  2,000 Units TUE+THU+SAT    ferrous gluconate  324 mg Q48HRS    Pharmacy Consult Request  1 Each PHARMACY TO DOSE    omeprazole  20 mg DAILY    [Held by provider] amLODIPine  2.5 mg QAM    apixaban  5 mg BID    clopidogrel  75 mg DAILY    furosemide  40 mg BID    gabapentin  100 mg BID    guaiFENesin ER  600 mg Q12HRS    methocarbamol  500 mg 4X/DAY    metoprolol SR  100 mg QHS    montelukast  10 mg QHS    rosuvastatin  20 mg QHS    sevelamer carbonate  1,600 mg TID WITH MEALS    tamsulosin  0.4 mg QAM    terazosin  5 mg QHS    oxyCODONE immediate-release  5 mg 5X/DAY    senna-docusate  2 Tablet BID    gentamicin   DAILY AT 1800     PRN medications: lidocaine, hydrALAZINE, lactulose, docusate sodium, bisacodyl EC, magnesium hydroxide, carboxymethylcellulose, benzocaine-menthol, mag hydrox-al hydrox-simeth, ondansetron **OR** ondansetron, traZODone, sodium chloride, acetaminophen, oxyCODONE immediate-release **OR** oxyCODONE immediate-release, senna-docusate **AND** polyethylene glycol/lytes    Diet:  Current Diet Order   Procedures    Diet Order Diet: Renal; Second Modifier: (optional): Cardiac       Medical Decision Making and Plan:  S/p left popliteal to posterior tibial artery bypass Dr. Miller 1/20/2025  PT and OT for mobility and ADLs. Per guidelines, 15 hours per week between PT, OT and/or SLP.  Follow-up vascular surgery  Continue Eliquis and Plavix  Continue statin    1/24 Prevena saturation. Wound consult. Vasc surg to eval as well.  [employer] now working.      Type 2 diabetes mellitus with hyperglycemia -outpatient medication had been Januvia 1 tablet twice daily     Pain -Tylenol and oxycodone as needed.  Scheduled Robaxin, gabapentin.  Schedule oxycodone contraindicated based on renal function.     Anemia - 1/24 Improving. 1/27 Drop to 8's, had a lot of OP from Prevena last week.     Thrombocytopenia - Mild stable monitor 1/24     ESRD on PD -nephrology following.  On sevelamer    Hypermagnesemia - Nephro managing    Hyperphosphatemia - Nephro managing      History of moderate aortic stenosis -stable     Hypertension -on Norvasc 2.5 mg every morning, Lasix 40 mg twice daily. 1/27 VSS     CAD s/p SYDNEE 3x -on aspirin and statin.  Had been on Imdur 30 mg daily     Gout - No meds at home.      Hyperlipidemia -continue statin     Recent left inguinal hernia repair -12/18/2024 Dr. Miller     Bowel - Patient on Senna-docusate for constipation prophylaxis. 1/23 Constipation. 1/26 Large BM     BPH- TV/PVR/BS PRN.  Continue Flomax and Hytrin     Insomnia -continue trazodone at night        Upcoming Labs/imaging:  Per Nephro     DVT PROPHYLAXIS: On Eliquis     HOSPITALIST FOLLOWING: No    Consultants following: Nephrology     CODE STATUS: Full code     DISPO: Home with spouse     SHAE: 2/7/25     MEDS SENT TO: [facility]     DISCHARGE SPECIALIST FOLLOW UP: Vascular surgery and other specialists as previously scheduled     Patient to scheduled follow up with their PCP within 2 weeks from discharge from the [facility].   ____________________________________    Dr. Mere Klein DO, MS  [facility] - Physical Medicine & Rehabilitation   ____________________________________    _____________________________________  Interdisciplinary Team Conference   Most recent IDT on 1/27/2025    I, Dr. Mere Klein DO, MS, was present and led the interdisciplinary team conference on 1/27/2025.  I led the IDT conference and agree with the IDT conference  documentation and plan of care as noted below.     Nursing:  Diet Current Diet Order   Procedures    Diet Order Diet: Renal; Second Modifier: (optional): Cardiac       Eating ADL        % of Last Meal  Oral Nutrition: Free Water   Sleep    Bowel Last BM: 01/26/25   Bladder        Physical Therapy:  Bed Mobility    Transfers Moderate Assist  Assist with one limb, Squat pivot transfer to wheelchair, Supervision for safety, Set-up of equipment, Requires lift   Mobility Contact Guard Assist   Stairs        Occupational Therapy:  Grooming Modified Independent, Seated (seated at sink)   Bathing Minimal Assist   UB Dressing Stand by Assist   LB Dressing Moderate Assist   Toileting Maximal Assist   Shower & Transfer        Respiratory Therapy:  O2 (LPM): 0  O2 Delivery Device: None - Room Air    Case Management:  Continues to work on disposition and DME needs.     BARRIERS TO DISCHARGE HOME:  Family training  Equipment  OP vs HH services   Medical Stability     Discharge Date/Disposition:  2/7/25  _____________________________________    Total time:  53 minutes. Time spent included pre-rounding review of vitals and tests, unit/floor time, face-to-face time with the patient including physical examination, care coordination, counseling of patient and/or family, ordering medications/procedures/tests, discussion with CM, PT, OT, SLP and/or other healthcare providers, and documentation in the electronic medical record.

## 2025-01-27 NOTE — PROGRESS NOTES
"[facility] -  PROGRESS NOTE               Author: IDA Marte Date & Time: 1/27/2025  10:15 AM     HPI:  79 y.o. male with HTN, ESRD on PD, PVD/ gangrenous left foot, BPH admitted for monitoring s/p  left leg bypass surgery on 1/20 with Dr. Miller. He recently underwent hernial repair and was temporarily transferred to in center HD via [facility]. He has returned to  PD  4 days ago, doing low fill.volumes and has been tolerating at home.   Patient was connected to cycler last night. Total UF reported to be 276ml. Reports of low drain UF alarm last night but no major issues with PD overnight. Pt reports no complaints today morning  No F/C/N/V/CP/SOB.  No melena, hematochezia, hematemesis.  No HA, visual changes,or abdominal pain.     DAILY NEPHROLOGY SUMMARY:    1/21: consult done   1/22: No issues with PD overnight, UF 1137cc, VSS, , feels good, wife at bed side   1/23: No issues with PD overnight 653 cc of UF , not had any BM yesterday and today  --------------------[facility]--------------------  1/24: CCPD 1.5L UF. VSS. PO4 elevated. Pt seen during PD, wife present. C/O pain.  1/25: No issues with PD overnight, net UF 1415ml. No sob, had BM yesterday, states he use O2 when he sleeps to help with nose bleed  1/26:No issues with PD overnight , net  cc, no c/o, still not able to bear weight   1/27: 780 mL Net UF removed with CCPD overnight. Coming out of BR with OT and wife. C/o no protein with meals, only had english muffin this am. BM yesterday. Denies abd pain or tenderness. Denies fever or chills.     REVIEW OF SYSTEMS:    10 point ROS reviewed and is as per HPI/daily summary or otherwise negative    PMH/PSH/SH/FH: Reviewed and unchanged since admission note  CURRENT MEDICATIONS: Reviewed from admission to present day    VS:  /53   Pulse 87   Temp 36.4 °C (97.5 °F) (Oral)   Resp 16   Ht 1.707 m (5' 7.2\")   Wt 79 kg (174 lb 2.6 " oz)   SpO2 99%   BMI 27.12 kg/m²   Physical Exam  Vitals and nursing note reviewed.   Constitutional:       Appearance: Normal appearance.   HENT:      Head: Normocephalic and atraumatic.      Nose: Nose normal.      Mouth/Throat:      Mouth: Mucous membranes are moist.   Eyes:      General: No scleral icterus.     Conjunctiva/sclera: Conjunctivae normal.   Cardiovascular:      Rate and Rhythm: Normal rate.   Pulmonary:      Effort: Pulmonary effort is normal.      Breath sounds: No wheezing or rales.   Abdominal:      General: There is no distension.      Palpations: Abdomen is soft.      Comments: PD cath in RLQ    Musculoskeletal:         General: No deformity.      Cervical back: Neck supple.      Right lower leg: No edema.      Left lower leg: Edema present.      Comments: Mild edema in LLE wound vac in place     Skin:     General: Skin is warm and dry.      Findings: No rash.   Neurological:      General: No focal deficit present.      Mental Status: He is alert and oriented to person, place, and time. Mental status is at baseline.   Psychiatric:         Mood and Affect: Mood normal.         Behavior: Behavior normal. Behavior is cooperative.         FLUID BALANCE:  In: -   Out: 953     LABS:  Recent Labs     01/26/25  0546   SODIUM 135   POTASSIUM 4.1   CHLORIDE 94*   CO2 25   GLUCOSE 109*   BUN 66*   CREATININE 7.66*   CALCIUM 8.3*     Recent Labs     01/26/25  0546   HEMOGLOBIN 8.8*   HEMATOCRIT 27.3*       IMPRESSION:  # ESRD on CCPD  - Pt was transitioned to  iHD during masoud-op  s/p inguinal hernial repair,   - Still has [facility]  - Returned to PD 1/17/25  - Home PD prescription CCPD 2.5% alternating with 1.5% 7 exchanges 8 hrs 30 min,   total FV 7700cc, no last fill  # HTN  - Goal BP < 140/90  - At goal  # Anemia of CKD  - Goal Hgb 10-11  - Below goal   - low iron stores   # CKD-MBD  - Ca 8.6   - PO4 7.4 -> 6.6   # Chronic hypoxic respiratory failure   - On home oxygen 2-2.5L at night          SUGGESTIONS:  - Continue CCPD during hospital stay home prescription   - PD exit site care per protocol   - Bowel regimen to avoid constipation with PD  - No dietary protein restrictions  - Dose all meds per ESRD  - ROSALIND  TIW if Hb < 10   - PO iron   - Continue Renvela 1600 mg tid ac   - Holding  Amlodipine SBP low 100 at times   - Renal diet  - PD treatments going well, back on PD since 1/17      Has [facility] since May 2024. Pt does not want the CVC removed at this times and want to wait for an additional      week or 2 to make sure that he does encounter any issues with PD treatments     He understands potential risks with keeping the  CVC  long term, may consider to get it removed later this week      L arm AVF not fully matured,  will need intervention   - Avoid Fleet enema in dialysis patients, discontinued     Please call or page Nephrology with questions or concerns  Thank you,

## 2025-01-27 NOTE — PROGRESS NOTES
NURSING DAILY NOTE    Name: Edy Bennett   Date of Admission: 1/23/2025   Admitting Diagnosis: PAD (peripheral artery disease) ([facility])  Attending Physician: ROSANNA PATEL D.O.  Allergies: Patient has no known allergies.    Safety  Patient Assist  Mod/Mx assist  Patient Precautions  Fall Risk  Precaution Comments  LLE wound vac  Bed Transfer Status  Moderate Assist  Toilet Transfer Status   Total Assist X 2 (Pt stood with GB w/ max-total A while commode was placed under him)  Assistive Devices  Rails, Wheelchair  Oxygen  None - Room Air  Diet/Therapeutic Dining  Current Diet Order   Procedures    Diet Order Diet: Renal; Second Modifier: (optional): Cardiac     Pill Administration  whole  Agitated Behavioral Scale     ABS Level of Severity       Fall Risk  Has the patient had a fall this admission?   No  Trinh Spivey Fall Risk Scoring  22, HIGH RISK  Fall Risk Safety Measures  bed alarm, chair alarm, poor balance, and low vision/ hearing    Vitals  Temperature: 37.3 °C (99.1 °F)  Temp src: Oral  Pulse: 77  Respiration: 16  Blood Pressure : (!) 89/58, rechecked, in chart  Blood Pressure MAP (Calculated): 68 MM HG  BP Location: Right, Upper Arm  Patient BP Position: Supine     Oxygen  Pulse Oximetry: 100 %  O2 (LPM): 2  O2 Delivery Device: None - Room Air    Bowel and Bladder  Last Bowel Movement  01/26/25  Stool Type  Type 4: Like a sausage or snake, smooth and soft  Bowel Device     Continent  Bladder: Did not void   Bowel: No movement  Bladder Function  Urine Void (mL): (P) 125 ml  Number of Times Voided: (P) 1  Urine Color: (P) Yellow  Genitourinary Assessment   Bladder Assessment (WDL):  WDL Except  Smith Catheter: Not Applicable  Urinary Symptoms: Anuric/ Dialysis Patient (occasional output)  Urine Color: (P) Yellow  Bladder Device: Urinal  Bladder Scan: Post Void  $ Bladder Scan Results (mL): (P) 1    Skin  Jalen Score   16  Sensory  Interventions   Bed Types: Low Airloss  Skin Preventative Measures: Pillows in Use for Support / Positioning  Moisture Interventions  Moisturizers/Barriers: Barrier Wipes      Pain  Pain Rating Scale  1 - Hardly Notice Pain  Pain Location  Other (Comments)  Pain Location Orientation  Mid  Pain Interventions   Medication (see MAR)    ADLs    Bathing   Patient Refused Bathing (Had shower with OT yesterday)  Linen Change      Personal Hygiene     Chlorhexidine Bath   Not Completed - Off Floor  Oral Care     Teeth/Dentures     Shave     Nutrition Percentage Eaten  Free Water  Environmental Precautions  Treaded Slipper Socks on Patient, Personal Belongings, Wastebasket, Call Bell etc. in Easy Reach, Bed in Low Position  Patient Turns/Positioning  Patient turns self independently side to side without assistance, to offload sacral area  Patient Turns Assistance/Tolerance     Bed Positions  Bed Controls On, Bed Locked  Head of Bed Elevated  Self regulated      Psychosocial/Neurologic Assessment  Psychosocial Assessment  Psychosocial (WDL):  Within Defined Limits  Patient Behaviors: Anxious  Family Behaviors: Family Present  Neurologic Assessment  Neuro (WDL): Within Defined Limits  Level of Consciousness: Alert  Orientation Level: Oriented X4  Cognition: Follows commands, Appropriate attention/concentration  Speech: Clear  Pupil Assesment: No  Motor Function/Sensation Assessment: Sensation  RUE Sensation: Full sensation  LUE Sensation: Full sensation  RLE Sensation: Full sensation  LLE Sensation: Full sensation, Pain  EENT (WDL):  WDL Except    Cardio/Pulmonary Assessment  Edema   LLE Edema: 1+, Generalized  Respiratory Breath Sounds  RUL Breath Sounds: Clear  RML Breath Sounds: Clear  RLL Breath Sounds: Diminished  ANAYELI Breath Sounds: Clear  LLL Breath Sounds: Diminished  Cardiac Assessment   Cardiac (WDL):  Within Defined Limits

## 2025-01-27 NOTE — PROGRESS NOTES
[facility] Progress Note     CCPD initiated at 1811 as ordered per  x 8.5 hours using 1.5% and 2.5% PD solution.      Pt alert and without distress. Pt denies any complaints. Pt started tx with no issues.      Aseptically connected PD cycler to PD catheter.   Exit site cleansed, no s/sx of infection, gentamicin cream applied as ordered, PD dressing changed CDI.        In-service given to Primary RNKathie with on-call Dialysis RN contact information ([phone]) and troubleshooting of machine alarms. Instructed Primary RN to please give in-service to NOC RN.      Initial drain : 65 ml     Please call for any issues with hemodynamic instability/persistent alarms/patient not tolerating therapy.

## 2025-01-28 ENCOUNTER — APPOINTMENT (OUTPATIENT)
Dept: OCCUPATIONAL THERAPY | Facility: REHABILITATION | Age: 80
DRG: 299 | End: 2025-01-28
Attending: PHYSICAL MEDICINE & REHABILITATION
Payer: MEDICARE

## 2025-01-28 ENCOUNTER — APPOINTMENT (OUTPATIENT)
Dept: PHYSICAL THERAPY | Facility: REHABILITATION | Age: 80
DRG: 299 | End: 2025-01-28
Attending: PHYSICAL MEDICINE & REHABILITATION
Payer: MEDICARE

## 2025-01-28 PROCEDURE — 97530 THERAPEUTIC ACTIVITIES: CPT | Mod: CO

## 2025-01-28 PROCEDURE — 97110 THERAPEUTIC EXERCISES: CPT | Mod: CO

## 2025-01-28 PROCEDURE — 700102 HCHG RX REV CODE 250 W/ 637 OVERRIDE(OP): Performed by: PHYSICAL MEDICINE & REHABILITATION

## 2025-01-28 PROCEDURE — 99232 SBSQ HOSP IP/OBS MODERATE 35: CPT | Performed by: PHYSICAL MEDICINE & REHABILITATION

## 2025-01-28 PROCEDURE — 97530 THERAPEUTIC ACTIVITIES: CPT

## 2025-01-28 PROCEDURE — 97110 THERAPEUTIC EXERCISES: CPT

## 2025-01-28 PROCEDURE — A9270 NON-COVERED ITEM OR SERVICE: HCPCS | Performed by: PHYSICAL MEDICINE & REHABILITATION

## 2025-01-28 PROCEDURE — 700111 HCHG RX REV CODE 636 W/ 250 OVERRIDE (IP): Mod: JZ | Performed by: INTERNAL MEDICINE

## 2025-01-28 PROCEDURE — 700102 HCHG RX REV CODE 250 W/ 637 OVERRIDE(OP): Performed by: INTERNAL MEDICINE

## 2025-01-28 PROCEDURE — 700102 HCHG RX REV CODE 250 W/ 637 OVERRIDE(OP): Performed by: NURSE PRACTITIONER

## 2025-01-28 PROCEDURE — 770010 HCHG ROOM/CARE - REHAB SEMI PRIVAT*

## 2025-01-28 PROCEDURE — 97116 GAIT TRAINING THERAPY: CPT

## 2025-01-28 PROCEDURE — A9270 NON-COVERED ITEM OR SERVICE: HCPCS | Performed by: NURSE PRACTITIONER

## 2025-01-28 PROCEDURE — 90945 DIALYSIS ONE EVALUATION: CPT

## 2025-01-28 PROCEDURE — A9270 NON-COVERED ITEM OR SERVICE: HCPCS | Performed by: INTERNAL MEDICINE

## 2025-01-28 RX ORDER — TORSEMIDE 5 MG/1
10 TABLET ORAL
Status: DISCONTINUED | OUTPATIENT
Start: 2025-01-28 | End: 2025-02-05

## 2025-01-28 RX ADMIN — METHOCARBAMOL 500 MG: 500 TABLET ORAL at 07:55

## 2025-01-28 RX ADMIN — GABAPENTIN 100 MG: 100 CAPSULE ORAL at 21:11

## 2025-01-28 RX ADMIN — GUAIFENESIN 600 MG: 600 TABLET ORAL at 07:55

## 2025-01-28 RX ADMIN — OXYCODONE 5 MG: 5 TABLET ORAL at 10:47

## 2025-01-28 RX ADMIN — GENTAMICIN SULFATE: 1 CREAM TOPICAL at 20:35

## 2025-01-28 RX ADMIN — GUAIFENESIN 600 MG: 600 TABLET ORAL at 21:12

## 2025-01-28 RX ADMIN — OXYCODONE 5 MG: 5 TABLET ORAL at 13:55

## 2025-01-28 RX ADMIN — GABAPENTIN 100 MG: 100 CAPSULE ORAL at 07:56

## 2025-01-28 RX ADMIN — SENNOSIDES AND DOCUSATE SODIUM 2 TABLET: 50; 8.6 TABLET ORAL at 21:12

## 2025-01-28 RX ADMIN — Medication 324 MG: at 13:55

## 2025-01-28 RX ADMIN — METHOCARBAMOL 500 MG: 500 TABLET ORAL at 13:55

## 2025-01-28 RX ADMIN — OMEPRAZOLE 20 MG: 20 CAPSULE, DELAYED RELEASE ORAL at 07:55

## 2025-01-28 RX ADMIN — SEVELAMER CARBONATE 1600 MG: 800 TABLET, FILM COATED ORAL at 17:08

## 2025-01-28 RX ADMIN — OXYCODONE 5 MG: 5 TABLET ORAL at 21:12

## 2025-01-28 RX ADMIN — MONTELUKAST 10 MG: 10 TABLET, FILM COATED ORAL at 21:11

## 2025-01-28 RX ADMIN — ROSUVASTATIN CALCIUM 20 MG: 10 TABLET, FILM COATED ORAL at 21:11

## 2025-01-28 RX ADMIN — FUROSEMIDE 40 MG: 40 TABLET ORAL at 07:55

## 2025-01-28 RX ADMIN — METHOCARBAMOL 500 MG: 500 TABLET ORAL at 17:08

## 2025-01-28 RX ADMIN — SEVELAMER CARBONATE 1600 MG: 800 TABLET, FILM COATED ORAL at 10:46

## 2025-01-28 RX ADMIN — APIXABAN 5 MG: 5 TABLET, FILM COATED ORAL at 21:11

## 2025-01-28 RX ADMIN — EPOETIN ALFA-EPBX 2000 UNITS: 2000 INJECTION, SOLUTION INTRAVENOUS; SUBCUTANEOUS at 23:07

## 2025-01-28 RX ADMIN — OXYCODONE 5 MG: 5 TABLET ORAL at 17:09

## 2025-01-28 RX ADMIN — SEVELAMER CARBONATE 1600 MG: 800 TABLET, FILM COATED ORAL at 07:55

## 2025-01-28 RX ADMIN — APIXABAN 5 MG: 5 TABLET, FILM COATED ORAL at 07:55

## 2025-01-28 RX ADMIN — OXYCODONE 5 MG: 5 TABLET ORAL at 05:34

## 2025-01-28 RX ADMIN — CLOPIDOGREL BISULFATE 75 MG: 75 TABLET ORAL at 07:56

## 2025-01-28 RX ADMIN — METHOCARBAMOL 500 MG: 500 TABLET ORAL at 21:11

## 2025-01-28 RX ADMIN — SENNOSIDES AND DOCUSATE SODIUM 2 TABLET: 50; 8.6 TABLET ORAL at 07:55

## 2025-01-28 RX ADMIN — TORSEMIDE 10 MG: 5 TABLET ORAL at 10:46

## 2025-01-28 RX ADMIN — TAMSULOSIN HYDROCHLORIDE 0.4 MG: 0.4 CAPSULE ORAL at 07:56

## 2025-01-28 ASSESSMENT — PAIN DESCRIPTION - PAIN TYPE
TYPE: ACUTE PAIN

## 2025-01-28 ASSESSMENT — ACTIVITIES OF DAILY LIVING (ADL)
BED_CHAIR_WHEELCHAIR_TRANSFER_DESCRIPTION: SQUAT PIVOT TRANSFER TO WHEELCHAIR;SUPERVISION FOR SAFETY;SET-UP OF EQUIPMENT
BED_CHAIR_WHEELCHAIR_TRANSFER_DESCRIPTION: SET-UP OF EQUIPMENT;ADAPTIVE EQUIPMENT
BED_CHAIR_WHEELCHAIR_TRANSFER_DESCRIPTION: INCREASED TIME;SET-UP OF EQUIPMENT;SQUAT PIVOT TRANSFER TO WHEELCHAIR

## 2025-01-28 ASSESSMENT — GAIT ASSESSMENTS
ASSISTIVE DEVICE: FRONT WHEEL WALKER
GAIT LEVEL OF ASSIST: MAXIMAL ASSIST
GAIT LEVEL OF ASSIST: CONTACT GUARD ASSIST
DISTANCE (FEET): 10
ASSISTIVE DEVICE: PARALLEL BARS
DISTANCE (FEET): 8

## 2025-01-28 ASSESSMENT — PATIENT HEALTH QUESTIONNAIRE - PHQ9
2. FEELING DOWN, DEPRESSED, IRRITABLE, OR HOPELESS: NOT AT ALL
SUM OF ALL RESPONSES TO PHQ9 QUESTIONS 1 AND 2: 0
1. LITTLE INTEREST OR PLEASURE IN DOING THINGS: NOT AT ALL

## 2025-01-28 NOTE — THERAPY
Physical Therapy   Daily Treatment     Patient Name: Edy Bennett  Age:  79 y.o., Sex:  male  Medical Record #: [id number]  Today's Date: 1/28/2025     Precautions  Precautions: Fall Risk  Comments: LLE wound vac    Subjective    Pt appreciated the use of the leg  for supine HEP and bed mobility.      Objective       01/28/25 0831   PT Charge Group   PT Therapeutic Exercise (Units) 2   PT Therapeutic Activities (Units) 2   PT Total Time Spent   PT Individual Total Time Spent (Mins) 60   Precautions   Precautions Fall Risk   Comments LLE wound vac   Gait Functional Level of Assist    Gait Level Of Assist Maximal Assist   Assistive Device Front Wheel Walker   Distance (Feet) 8   # of Times Distance was Traveled 1   Deviation Antalgic;Step To;Decreased Base Of Support;Decreased Heel Strike;Other (Comment)  (impaired weight acceptance through LLE, education and cues to offload through BUE, and avoid hopping. Education to accept more wt through LLE, as pt was attempting to remain NWB per preferance)   Wheelchair Functional Level of Assist   Wheelchair Assist Supervised   Distance Wheelchair (Feet or Distance) 120   Wheelchair Description Limited by fatigue  (BLE x 45 ft, follow by BLE propolsion)   Transfer Functional Level of Assist   Bed, Chair, Wheelchair Transfer Minimal Assist  (Min A STS, limited LLE WB, inc height of hospital bed to stand, requiring steadying assist)   Bed Chair Wheelchair Transfer Description Set-up of equipment;Adaptive equipment  (SPT FWW)   Supine Lower Body Exercise   Supine Lower Body Exercises   (performed as warm up for LLE)   Hip Adduction  1 set of 10   Short Arc Quad 1 set of 10;Bilateral   Heel Slide 1 set of 10;Bilateral  (leg  device L)   Ankle Pumps 1 set of 10;Bilateral   Gluteal Isometrics 1 set of 10;Bilateral   Quadriceps Isometrics 1 set of 10;Bilateral   Comments HEP created on CMGE, reviewed with patient and spouse.   Sitting Lower Body Exercises   Tricep  "Press 1 set of 10;Bilateral  (using wc armrests, emphasis on functional STS strengthening/ fwd wt shifting)   Bed Mobility    Supine to Sit Standby Assist  (intro to leg )   Sit to Stand Minimal Assist   Scooting Standby Assist   Neuro-Muscular Treatments   Neuro-Muscular Treatments Weight Shift Left;Weight Shift Right;Verbal Cuing;Tactile Cuing;Sequencing;Postural Changes;Postural Facilitation   Comments Impaired standing posture, fwd flexed, LLE knee flexed in stance, with minimal wt acceptance   Interdisciplinary Plan of Care Collaboration   IDT Collaboration with  Family / Caregiver   Patient Position at End of Therapy Seated;Family / Friend in Room;Call Light within Reach;Tray Table within Reach;Phone within Reach   Collaboration Comments Spouse- POC, rationale for leg          Assessment    Pt was introduced to the leg  device. Pt was educated on improved offloading through BUE and weight acceptance through LLE, to improve gait efficiency. Pt was further limited by impaired dynamic standing posture. Pt was receptive to supine LE warm up exercises and HEP handout created. Pt remains limited by antalgia, impaired LLE WB, and impaired activity tolerance.     Strengths: Able to follow instructions, Alert and oriented, Independent prior level of function, Supportive family, Pleasant and cooperative, Motivated for self care and independence  Barriers: Pain, Pain poorly managed, Limited mobility    Plan    LE mobility exercises in supine and sitting to prep soft tissues for functional movements, progressive gait in parallel bars and walker     DME  PT DME Recommendations  Wheelchair: 16\" Width, Elevating Leg Rests  Cushion: Standard    Passport items to be completed:  Get in/out of bed safely, in/out of a vehicle, safely use mobility device, walk or wheel around home/community, navigate up and down stairs, show how to get up/down from the ground, ensure home is accessible, demonstrate HEP, complete " caregiver training    Physical Therapy Problems (Active)       Problem: Mobility       Dates: Start:  01/24/25         Goal: STG-Within one week, patient will ambulate 10 feet with FWW CGA       Dates: Start:  01/24/25               Problem: Mobility Transfers       Dates: Start:  01/24/25         Goal: STG-Within one week, patient will perform bed mobility with minimal assist to manage legs        Dates: Start:  01/24/25            Goal: STG-Within one week, patient will sit to stand with minimal assist and FWW       Dates: Start:  01/24/25               Problem: PT-Long Term Goals       Dates: Start:  01/24/25         Goal: LTG-By discharge, patient will ambulate with  feet SBA       Dates: Start:  01/24/25            Goal: LTG-By discharge, patient will transfer one surface to another with FWW supervised       Dates: Start:  01/24/25            Goal: LTG-By discharge, patient will perform home exercise program       Dates: Start:  01/24/25            Goal: LTG-By discharge, patient will ambulate up/down 4-6 stairs with bilateral rails supervised        Dates: Start:  01/24/25            Goal: LTG-By discharge, patient will transfer in/out of a car with FWW supervised        Dates: Start:  01/24/25

## 2025-01-28 NOTE — PROGRESS NOTES
[facility] Progress Note     CCPD x 8.5 hours  1100 mL fills x 7 cycles   using 1.5 % PD solution on heater  2.5% solution for secondary bag   ordered per Emma Medina APRN     Orders reviewed, verified and updated, CCPD cycler therapy settings verified.     CCPD treatment initiated at 1950 without any issues.    Patient and PD access assessed prior to therapy.    Patient asleep upon arrival, wakes easily, oriented x4, interactive, (-) abd distention, denies pain/abd discomfort, stable VS.    Patient denies pain. 0 out of 10    Patient with intact and patent RLQ PD catheter aseptically connected per policy.    RLQ PD catheter and exit site in perfect condition, no signs of infection noted, cleansed with antiseptic solution and dressings changed per policy. Gentamicin applied to exit site.     Initial drain: 11 mL   Clear, yellow, no fibrin.     Report/Inservice given   Ashley López    RN, nocturnal RN     Patient on dwell 1/7 prior to departure from bedside.     Please contact on call dialysis RN for any issues with persistent alarms/assistance with troubleshooting or patient not tolerating therapy.      For on-call Dialysis RN, pls contact [facility] at [phone].

## 2025-01-28 NOTE — THERAPY
Occupational Therapy  Daily Treatment     Patient Name: Edy Bennett  Age:  79 y.o., Sex:  male  Medical Record #: [id number]  Today's Date: 1/27/2025     Precautions  Precautions: (P) Fall Risk  Comments: (P) LLE wound vac         Subjective    Pt states that he has pain when attempting to move the left calf muscles.      Objective       01/27/25 0831   OT Charge Group   OT Self Care / ADL (Units) 3   OT Therapy Activity (Units) 1   OT Total Time Spent   OT Individual Total Time Spent (Mins) 60   Precautions   Precautions Fall Risk   Comments LLE wound vac   Functional Level of Assist   Grooming Modified Independent;Seated  (seated at sink)   Toilet Transfers Minimal Assist  (lateral scoot from WC to toilet)   Sitting Lower Body Exercises   Sit to Stand   (attempted 3x in // bars. Pt able to come to partial standing without assistance. Could not tolerate assistance to come to full standing due to pain in LLE.)   Interdisciplinary Plan of Care Collaboration   IDT Collaboration with  Nursing;Nurse Practitioner   Patient Position at End of Therapy Seated;Family / Friend in Room   Collaboration Comments wound RN changed wound vac to smaller prevena unit so that pt could move around more easily. Nephrology NP in to round with pt        01/27/25 1501   OT Charge Group   OT Self Care / ADL (Units) 2   OT Total Time Spent   OT Individual Total Time Spent (Mins) 30   Precautions   Precautions Fall Risk   Comments LLE wound vac   Functional Level of Assist   Grooming Modified Independent  (washing hands at sink after toileting)   Toileting Minimal Assist   Toileting Description Assist to pull pants down;Grab bar;Supervision for safety   Bed, Chair, Wheelchair Transfer Contact Guard Assist  (stand pivot using right leg only w/FWW)   Toilet Transfers Contact Guard Assist  (stand turn with grab bar. Pt offloading weight completely from left leg and using right leg only to transfer)         Assessment    Pt improved with  toilet transfer from morning to afternoon session, possibly d/t better pain management. Pt is highly motivated to improve mobility and independence.        Plan    Consult with wound RN regarding showering  ADL, IADL, DME, strengthening, balance     DME  OT DME Recommendations  Bathroom Equipment:  (TBD)    Passport items to be completed:  Perform bathroom transfers, complete dressing, complete feeding, get ready for the day, prepare a simple meal, participate in household tasks, adapt home for safety needs, demonstrate home exercise program, complete caregiver training     Occupational Therapy Goals (Active)       Problem: Dressing       Dates: Start:  01/24/25         Goal: STG-Within one week, patient will dress LB with min A       Dates: Start:  01/24/25               Problem: OT Long Term Goals       Dates: Start:  01/24/25         Goal: LTG-By discharge, patient will complete basic self care tasks mod I       Dates: Start:  01/24/25            Goal: LTG-By discharge, patient will perform bathroom transfers mod I       Dates: Start:  01/24/25               Problem: Toileting       Dates: Start:  01/24/25         Goal: STG-Within one week, patient will complete toileting tasks with min A        Dates: Start:  01/24/25

## 2025-01-28 NOTE — THERAPY
Physical Therapy   Daily Treatment     Patient Name: Edy Bennett  Age:  79 y.o., Sex:  male  Medical Record #: [id number]  Today's Date: 1/27/2025     Precautions  Precautions: Fall Risk  Comments: LLE wound vac    Subjective    Patient is feeling tired today and having some increased leg pain      Objective       01/27/25 1301 01/27/25 1531 01/27/25 1601   Therapy Missed   Missed Therapy (Minutes)  --   --  30   Reason For Missed Therapy  --   --  Medical - Patient has Nausea / Vomiting   PT Charge Group   PT Gait Training (Units) 1 1  --    PT Therapeutic Exercise (Units) 1  --   --    PT Therapeutic Activities (Units)  --  1  --    PT Total Time Spent   PT Individual Total Time Spent (Mins) 30 30  --    Gait Functional Level of Assist    Gait Level Of Assist Minimal Assist Contact Guard Assist  --    Assistive Device Front Wheel Walker Parallel Bars  --    Distance (Feet) 4 20  --    # of Times Distance was Traveled 1 1  (with turn after 10 feet)  --    Deviation Step To;Decreased Base Of Support;Antalgic;Other (Comment)  (limited left LE weightbearing and keeps left knee flexed)  --   --    Wheelchair Functional Level of Assist   Wheelchair Assist  --  Stand by Assist  --    Transfer Functional Level of Assist   Bed, Chair, Wheelchair Transfer Minimal Assist Contact Guard Assist  --    Bed Chair Wheelchair Transfer Description Other (comment)  (stand pivot with FWW) Other (comment)  (stand pivot with FWW)  --    Supine Lower Body Exercise   Short Arc Quad 2 sets of 10;Left  (with AAROM)  --   --    Ankle Pumps Left;1 set of 10  --   --    Other Exercises belt assisted left calf stretch 5 x 20 seconds  --   --    Sitting Lower Body Exercises   Long Arc Quad 2 sets of 10;Left  --   --    Other Exercises seated heel raises 2 x 10, wheelchair pulling/pushing with LE propulsion  --   --    Bed Mobility    Supine to Sit Moderate Assist  --   --    Sit to Supine  --  Standby Assist  --    Sit to Stand Minimal  "Assist Contact Guard Assist  --    Scooting Standby Assist Standby Assist  --    Interdisciplinary Plan of Care Collaboration   IDT Collaboration with   --  Nursing;Physician  --    Patient Position at End of Therapy Seated;Family / Friend in Room;Self Releasing Lap Belt Applied;Chair Alarm On In Bed;Call Light within Reach;Family / Friend in Room  --    Collaboration Comments  --  emesis  --    PT DME Recommendations   Wheelchair 16\" Width;Elevating Leg Rests  --   --    Cushion Standard  --   --      30 minutes into 2nd session - patient felt nauseated and had large amounts of emesis into wastebasket - returned to bed. RN and Dr Klein notified    Assessment    Overall, patient is making steady gains with ability to perform bed mobility, transfers and short distance gait. Primarily limited by left LE pains that worsen with knee and ankle movements as the bypass sites go onto tension. Patient has increased left calf swelling today and physician has been notified     Strengths: Able to follow instructions, Alert and oriented, Independent prior level of function, Supportive family, Pleasant and cooperative, Motivated for self care and independence  Barriers: Pain, Pain poorly managed, Limited mobility    Plan    LE mobility exercises in supine and sitting to prep soft tissues for functional movements, progressive gait in parallel bars and walker    DME  PT DME Recommendations  Wheelchair: 16\" Width, Elevating Leg Rests  Cushion: Standard    Passport items to be completed:  Get in/out of bed safely, in/out of a vehicle, safely use mobility device, walk or wheel around home/community, navigate up and down stairs, show how to get up/down from the ground, ensure home is accessible, demonstrate HEP, complete caregiver training    Physical Therapy Problems (Active)       Problem: Mobility       Dates: Start:  01/24/25         Goal: STG-Within one week, patient will ambulate 10 feet with FWW CGA       Dates: Start:  01/24/25 "               Problem: Mobility Transfers       Dates: Start:  01/24/25         Goal: STG-Within one week, patient will perform bed mobility with minimal assist to manage legs        Dates: Start:  01/24/25            Goal: STG-Within one week, patient will sit to stand with minimal assist and FWW       Dates: Start:  01/24/25               Problem: PT-Long Term Goals       Dates: Start:  01/24/25         Goal: LTG-By discharge, patient will ambulate with  feet SBA       Dates: Start:  01/24/25            Goal: LTG-By discharge, patient will transfer one surface to another with FWW supervised       Dates: Start:  01/24/25            Goal: LTG-By discharge, patient will perform home exercise program       Dates: Start:  01/24/25            Goal: LTG-By discharge, patient will ambulate up/down 4-6 stairs with bilateral rails supervised        Dates: Start:  01/24/25            Goal: LTG-By discharge, patient will transfer in/out of a car with FWW supervised        Dates: Start:  01/24/25

## 2025-01-28 NOTE — PROGRESS NOTES
NURSING DAILY NOTE    Name: Edy Bennett   Date of Admission: 1/23/2025   Admitting Diagnosis: PAD (peripheral artery disease) ([facility])  Attending Physician: ROSANNA PATEL D.O.  Allergies: Patient has no known allergies.    Safety  Patient Assist  max1  Patient Precautions  Fall Risk  Precaution Comments  LLE wound vac  Bed Transfer Status  Contact Guard Assist  Toilet Transfer Status   Contact Guard Assist (stand turn with grab bar. Pt offloading weight completely from left leg and using right leg only to transfer)  Assistive Devices  Rails, Wheelchair  Oxygen  Silicone Nasal Cannula  Diet/Therapeutic Dining  Current Diet Order   Procedures    Diet Order Diet: Renal; Second Modifier: (optional): Cardiac     Pill Administration  whole  Agitated Behavioral Scale     ABS Level of Severity       Fall Risk  Has the patient had a fall this admission?   No  Trinh Spivey Fall Risk Scoring  22, HIGH RISK  Fall Risk Safety Measures  bed alarm, chair alarm, poor balance, and low vision/ hearing    Vitals  Temperature: 36.8 °C (98.3 °F)  Temp src: Oral  Pulse: 85  Respiration: 16  Blood Pressure : 103/63  Blood Pressure MAP (Calculated): 76 MM HG  BP Location: Right, Upper Arm  Patient BP Position: Supine     Oxygen  Pulse Oximetry: 100 %  O2 (LPM): 2  O2 Delivery Device: Silicone Nasal Cannula    Bowel and Bladder  Last Bowel Movement  01/27/25  Stool Type  Type 4: Like a sausage or snake, smooth and soft  Bowel Device     Continent  Bladder: Did not void   Bowel: No movement  Bladder Function  Urine Void (mL): 150 ml  Number of Times Voided: 1  Urine Color: Yellow  Genitourinary Assessment   Bladder Assessment (WDL):  WDL Except  Smith Catheter: Not Applicable  Urinary Symptoms: Anuric/ Dialysis Patient (occasional output)  Urine Color: Yellow  Bladder Device: Urinal  Bladder Scan: Post Void  $ Bladder Scan Results (mL): 189    Skin  Jalen Score   16  Sensory  Interventions   Bed Types: Low Airloss  Skin Preventative Measures: Pillows in Use for Support / Positioning  Moisture Interventions  Moisturizers/Barriers: Barrier Wipes      Pain  Pain Rating Scale  4 - Distracts me, can do usual activities  Pain Location  Other (Comments), Leg (premedicated)  Pain Location Orientation  Lower  Pain Interventions   Medication (see MAR)    ADLs    Bathing   Patient Refused Bathing (Had shower with OT yesterday)  Linen Change      Personal Hygiene     Chlorhexidine Bath   Not Completed - Off Floor  Oral Care     Teeth/Dentures     Shave     Nutrition Percentage Eaten  Free Water  Environmental Precautions  Personal Belongings, Wastebasket, Call Bell etc. in Easy Reach, Treaded Slipper Socks on Patient, Bed in Low Position  Patient Turns/Positioning  Patient turns self independently side to side without assistance, to offload sacral area  Patient Turns Assistance/Tolerance     Bed Positions  Bed Controls On, Bed Locked  Head of Bed Elevated  Self regulated      Psychosocial/Neurologic Assessment  Psychosocial Assessment  Psychosocial (WDL):  Within Defined Limits  Patient Behaviors: Anxious  Family Behaviors: Family Present  Neurologic Assessment  Neuro (WDL): Within Defined Limits  Level of Consciousness: Alert  Orientation Level: Oriented X4  Cognition: Follows commands, Appropriate attention/concentration  Speech: Clear  Pupil Assesment: No  Motor Function/Sensation Assessment: Sensation  RUE Sensation: Full sensation  LUE Sensation: Full sensation  RLE Sensation: Full sensation  LLE Sensation: Full sensation, Pain  EENT (WDL):  WDL Except    Cardio/Pulmonary Assessment  Edema   LLE Edema: 1+, Generalized  Respiratory Breath Sounds  RUL Breath Sounds: Clear  RML Breath Sounds: Clear  RLL Breath Sounds: Diminished  ANAYELI Breath Sounds: Clear  LLL Breath Sounds: Diminished  Cardiac Assessment   Cardiac (WDL):  Within Defined Limits

## 2025-01-28 NOTE — THERAPY
Recreational Therapy   Initial Evaluation     Patient Name: Edy Bennett  Age:  79 y.o., Sex:  male  Medical Record #: [id number]  Today's Date: 1/28/2025     Subjective    Patient was asleep when this writer arrived but was willing to wake up and participate in the assessment interview.    Objective       01/28/25 1301   Procedural Tracking   Procedural Tracking Community Re-Integration;Leisure Skills Awareness   Treatment Time   Total Time Spent (mins) 30   Total Time Missed 0   Leisure History   Leisure Interests Reading;Sports  (golf, reading, sports, friends)   Pre-Morbid Leisure Lifestyle Individual;Occasionally Active   Prior Living Arrangements   Lives with - Patient's Self Care Capacity Spouse   Steps Into Home 0   Steps In Home 0   Ambulation Independent   Assistive Devices Used Front-Wheel Walker   Functional Ability Status - Cognitive   Attention Span Remains on Task   Comprehension Follows Three Step Commands   Judgment Able to Make Independent Decisions   Functional Ability Status - Emotional    Affect Appropriate   Mood Appropriate   Behavior Appropriate   Leisure Competence Measure   Leisure Awareness Independent   Leisure Attitude Independent   Leisure Skills Independent   Cultural / Social Behaviors Independent   Interpersonal Skills Independent   Community Integration Skills Independent   Social Contact Independent   Community Participation Independent   Current Discharge Plan   Current Discharge Plan Return to Prior Living Situation   Benefit    Benefit   (Patietn is not in need of skilled Recreation Therapy)   Interdisciplinary Plan of Care Collaboration   IDT Collaboration with  Family / Caregiver   Patient Position at End of Therapy In Bed   Collaboration Comments wife in session   Strengths & Barriers   Strengths Able to follow instructions;Alert and oriented;Independent prior level of function;Pleasant and cooperative;Supportive family;Willingly participates in therapeutic activities  "  Barriers Pain;Pain poorly managed         Assessment  Patient is 79 y.o. male with a diagnosis of Debility (Non-Cardiac, Non-Pulmonary), due to PAD (peripheral artery disease). According to the H&P, patient has a past medical history of moderate aortic stenosis, hypertension, CAD with stents on eliquis, ESRD on PD, BPH and severe PAD who presented on 1/20/2025 10:52 AM for elective left bypass graft.  01/20/2025, the patient was taken to the OR for left popliteal to posterior tibial artery bypass by Marco Antonio Miller MD. Current labs remarkable for WBC 8.6, Hgb 9.7, platelets 157, creatinine 7.2, GFR 7. Vitals remarkable for 0 to 2 L supplemental O2.  Patient seen by PT with recommendation for post-acute placement. PM&R consulted to evaluate for possible inpatient rehab admission.\"     Patient admitted to acute rehab 1/23/2025.  On admission patient reports he is concerned about the pain.  He has a lot of pain whenever he moves.  Discussed scheduled medications which he is okay with.  Also has trouble with peritoneal dialysis if he is to constipated.    Patient reported that he used to enjoy working in his yard but they moved and he no longer has a yard. Patient mostly golfs for enjoyment but also enjoys pickle ball, reading about sports and socializing. He reports lost of friends. He is reporting a lot of pain so we agreed having him to more standing in Recreation Therapy probably was not the bes choice for him. He plans on returning to golf when he heals and we did discuss adaptive golf carts.     Plan  Certified Therapeutic Recreation Specialist has screened this patient and determined that no skilled Recreation Therapy services are indicated at this time due to patient not in need of skilled recreation therapy.  Thank you for your referral. If a change in patient's function should occur, Recreation Therapy can reevaluate for appropriateness at that time.        "

## 2025-01-28 NOTE — CARE PLAN
Problem: Skin Integrity  Goal: Skin integrity is maintained or improved  Outcome: Progressing     Problem: Fall Risk - Rehab  Goal: Patient will remain free from falls  Outcome: Progressing     Problem: Dialysis  Goal: Patient will maintain stable vital signs and fluid balance  Outcome: Progressing     The patient is Stable - Low risk of patient condition declining or worsening    Shift Goals  Clinical Goals: safety  Patient Goals: rest ; pain control

## 2025-01-28 NOTE — CARE PLAN
The patient is Watcher - Medium risk of patient condition declining or worsening    Shift Goals  Clinical Goals: safety, pain control  Problem: Pain - Standard  Goal: Alleviation of pain or a reduction in pain to the patient’s comfort goal  Note: Scheduled oxycodone administered for pain control as ordered.     Problem: Fall Risk - Rehab  Goal: Patient will remain free from falls  Note: Call light in reach, bed locked and in lowest position, belongings in reach, bed alarm on, non-slip socks on, 3 side rails up, and reminded the patient to call for assistance.

## 2025-01-28 NOTE — PROGRESS NOTES
"  Physical Medicine & Rehabilitation Progress Note    Encounter Date: 1/28/2025    Chief Complaint: PVD s/p bypass    Interval Events (Subjective):  VS - Lower BP  Voiding some  BM 1/27    Seen and examined in his room. Sleeping comfortably. Doing well today. Discussed meds.       ROS: 14 point ROS negative unless otherwise specified in the HPI    Objective:  VITAL SIGNS: BP 94/59   Pulse 95   Temp 36.4 °C (97.5 °F) (Oral)   Resp 16   Ht 1.707 m (5' 7.2\")   Wt 73.1 kg (161 lb 3.2 oz)   SpO2 95%   BMI 25.10 kg/m²     GEN: No apparent distress  HEENT: Head normocephalic, atraumatic.  Sclera nonicteric bilaterally, no ocular discharge appreciated bilaterally.  CV: Extremities warm and well-perfused, + LLE edema, improving. RLE without edema.   PULMONARY: Breathing nonlabored on room air, no respiratory accessory muscle use.  Not requiring supplemental oxygen.  SKIN: Vac with continued sanguinous output.   PSYCH: Mood and affect within normal limits.  NEURO: Awake alert.  Conversational.  Logical thought content.      Laboratory Values:  Recent Results (from the past 72 hours)   Renal Function Panel    Collection Time: 01/26/25  5:46 AM   Result Value Ref Range    Sodium 135 135 - 145 mmol/L    Potassium 4.1 3.6 - 5.5 mmol/L    Chloride 94 (L) 96 - 112 mmol/L    Co2 25 20 - 33 mmol/L    Glucose 109 (H) 65 - 99 mg/dL    Creatinine 7.66 (HH) 0.50 - 1.40 mg/dL    Bun 66 (H) 8 - 22 mg/dL    Calcium 8.3 (L) 8.5 - 10.5 mg/dL    Correct Calcium 9.3 8.5 - 10.5 mg/dL    Phosphorus 6.6 (H) 2.5 - 4.5 mg/dL    Albumin 2.8 (L) 3.2 - 4.9 g/dL   HEMOGLOBIN AND HEMATOCRIT    Collection Time: 01/26/25  5:46 AM   Result Value Ref Range    Hemoglobin 8.8 (L) 14.0 - 18.0 g/dL    Hematocrit 27.3 (L) 42.0 - 52.0 %   ESTIMATED GFR    Collection Time: 01/26/25  5:46 AM   Result Value Ref Range    GFR (CKD-EPI) 7 (A) >60 mL/min/1.73 m 2   IRON/TOTAL IRON BIND    Collection Time: 01/26/25  8:00 AM   Result Value Ref Range    Iron 33 (L) " 50 - 180 ug/dL    Total Iron Binding 260 250 - 450 ug/dL    Unsat Iron Binding 227 110 - 370 ug/dL    % Saturation 13 (L) 15 - 55 %   FERRITIN    Collection Time: 01/26/25  8:00 AM   Result Value Ref Range    Ferritin 631.0 (H) 22.0 - 322.0 ng/mL       Medications:  Scheduled Medications   Medication Dose Frequency    epoetin  2,000 Units TUE+THU+SAT    ferrous gluconate  324 mg Q48HRS    Pharmacy Consult Request  1 Each PHARMACY TO DOSE    omeprazole  20 mg DAILY    [Held by provider] amLODIPine  2.5 mg QAM    apixaban  5 mg BID    clopidogrel  75 mg DAILY    furosemide  40 mg BID    gabapentin  100 mg BID    guaiFENesin ER  600 mg Q12HRS    methocarbamol  500 mg 4X/DAY    metoprolol SR  100 mg QHS    montelukast  10 mg QHS    rosuvastatin  20 mg QHS    sevelamer carbonate  1,600 mg TID WITH MEALS    tamsulosin  0.4 mg QAM    terazosin  5 mg QHS    oxyCODONE immediate-release  5 mg 5X/DAY    senna-docusate  2 Tablet BID    gentamicin   DAILY AT 1800     PRN medications: lidocaine, hydrALAZINE, lactulose, docusate sodium, bisacodyl EC, magnesium hydroxide, carboxymethylcellulose, benzocaine-menthol, mag hydrox-al hydrox-simeth, ondansetron **OR** ondansetron, traZODone, sodium chloride, acetaminophen, oxyCODONE immediate-release **OR** oxyCODONE immediate-release, senna-docusate **AND** polyethylene glycol/lytes    Diet:  Current Diet Order   Procedures    Diet Order Diet: Renal; Second Modifier: (optional): Cardiac       Medical Decision Making and Plan:  S/p left popliteal to posterior tibial artery bypass Dr. Miller 1/20/2025  PT and OT for mobility and ADLs. Per guidelines, 15 hours per week between PT, OT and/or SLP.  Follow-up vascular surgery  Continue Eliquis and Plavix  Continue statin    1/24 Prevena saturation. Wound consult. Vasc surg to eval as well. Prevena now working.      Type 2 diabetes mellitus with hyperglycemia -outpatient medication had been Januvia 1 tablet twice daily     Pain -Tylenol and  oxycodone as needed.  Scheduled Robaxin, gabapentin.  Schedule oxycodone 5x daily, morphine contraindicated based on renal function.      Anemia -  Improving.  Drop to 8's, had a lot of OP from Prevena last week.     Thrombocytopenia - Mild stable monitor      ESRD on PD -nephrology following.  On sevelamer    Hypermagnesemia - Nephro managing    Hyperphosphatemia - Nephro managing      History of moderate aortic stenosis -stable     Hypertension -on Norvasc 2.5 mg every morning, Lasix 40 mg twice daily.  Amlodipine being held several days now. Lasix has been given only 3x past six days. D/w Nephro. Hold bladder meds. Monitor UOP though minimal.      CAD s/p SYDNEE 3x -on aspirin and statin.  Had been on Imdur 30 mg daily     Gout - No meds at home.      Hyperlipidemia -continue statin     Recent left inguinal hernia repair -2024 Dr. Miller     Bowel - Patient on Senna-docusate for constipation prophylaxis.  Constipation.  Large BM     BPH- TV/PVR/BS PRN.  Continue Flomax and Hytrin.  Hold due to low BP. Favor diuresis.      Insomnia -continue trazodone at night        Upcoming Labs/imagin/29     DVT PROPHYLAXIS: On Eliquis     HOSPITALIST FOLLOWING: No    Consultants following: Nephrology     CODE STATUS: Full code     DISPO: Home with spouse     SHAE: 27     MEDS SENT TO: [facility]     DISCHARGE SPECIALIST FOLLOW UP: Vascular surgery and other specialists as previously scheduled     Patient to scheduled follow up with their PCP within 2 weeks from discharge from the [facility].   ____________________________________    Dr. Mere Klein DO, MS  [facility] - Physical Medicine & Rehabilitation   ____________________________________

## 2025-01-28 NOTE — PROGRESS NOTES
NURSING DAILY NOTE    Name: Edy Bennett   Date of Admission: 1/23/2025   Admitting Diagnosis: PAD (peripheral artery disease) ([facility])  Attending Physician: ROSANNA PATEL D.O.  Allergies: Patient has no known allergies.    Safety  Patient Assist  max1  Patient Precautions  Fall Risk  Precaution Comments  LLE wound vac  Bed Transfer Status  Contact Guard Assist  Toilet Transfer Status   Contact Guard Assist (stand turn with grab bar. Pt offloading weight completely from left leg and using right leg only to transfer)  Assistive Devices  Rails, Wheelchair  Oxygen  None - Room Air  Diet/Therapeutic Dining  Current Diet Order   Procedures    Diet Order Diet: Renal; Second Modifier: (optional): Cardiac     Pill Administration  whole  Agitated Behavioral Scale     ABS Level of Severity       Fall Risk  Has the patient had a fall this admission?   No  Trinh Spivey Fall Risk Scoring  22, HIGH RISK  Fall Risk Safety Measures  bed alarm and chair alarm    Vitals  Temperature: 36 °C (96.8 °F)  Temp src: Oral  Pulse: 80  Respiration: 16  Blood Pressure : 110/60  Blood Pressure MAP (Calculated): 77 MM HG  BP Location: Right, Upper Arm  Patient BP Position: Cota's Position     Oxygen  Pulse Oximetry: 97 %  O2 (LPM): 0  O2 Delivery Device: None - Room Air    Bowel and Bladder  Last Bowel Movement  01/27/25  Stool Type  Type 4: Like a sausage or snake, smooth and soft  Bowel Device     Continent  Bladder: Did not void   Bowel: No movement  Bladder Function  Urine Void (mL): 100 ml  Number of Times Voided: (P) 1  Urine Color: (P) Yellow  Genitourinary Assessment   Bladder Assessment (WDL):  WDL Except  Smith Catheter: Not Applicable  Urinary Symptoms: Anuric/ Dialysis Patient (Occassional output)  Urine Color: (P) Yellow  Bladder Device: Urinal  Bladder Scan: Post Void  $ Bladder Scan Results (mL): (P) 1    Skin  Jalen Score   16  Sensory Interventions   Bed Types:  Low Airloss  Skin Preventative Measures: Pillows in Use for Support / Positioning  Moisture Interventions  Moisturizers/Barriers: Barrier Wipes      Pain  Pain Rating Scale  6 - Hard to ignore, avoid usual activities  Pain Location  Leg  Pain Location Orientation  Left  Pain Interventions   Medication (see MAR)    ADLs    Bathing   Patient Refused Bathing (Had shower with OT yesterday)  Linen Change      Personal Hygiene     Chlorhexidine Bath   Not Completed - Off Floor  Oral Care     Teeth/Dentures     Shave     Nutrition Percentage Eaten  Free Water  Environmental Precautions  Treaded Slipper Socks on Patient  Patient Turns/Positioning  Sitting up in wheelchair  Patient Turns Assistance/Tolerance     Bed Positions  Bed Controls On  Head of Bed Elevated  Self regulated      Psychosocial/Neurologic Assessment  Psychosocial Assessment  Psychosocial (WDL):  Within Defined Limits  Patient Behaviors: Anxious  Family Behaviors: Family Present  Neurologic Assessment  Neuro (WDL): Within Defined Limits  Level of Consciousness: Alert  Orientation Level: Oriented X4  Cognition: Follows commands, Appropriate judgement, Appropriate safety awareness  Speech: Clear  Pupil Assesment: No  Motor Function/Sensation Assessment: Sensation  RUE Sensation: Full sensation  LUE Sensation: Full sensation  RLE Sensation: Full sensation  LLE Sensation: Full sensation, Pain  EENT (WDL):  WDL Except    Cardio/Pulmonary Assessment  Edema   LLE Edema: 1+, Generalized  Respiratory Breath Sounds  RUL Breath Sounds: Clear  RML Breath Sounds: Clear  RLL Breath Sounds: Diminished  ANAYELI Breath Sounds: Clear  LLL Breath Sounds: Diminished  Cardiac Assessment   Cardiac (WDL):  Within Defined Limits

## 2025-01-28 NOTE — PROGRESS NOTES
[facility] -  PROGRESS NOTE               Author: IDA Marte Date & Time: 1/28/2025  10:00 AM     HPI:  79 y.o. male with HTN, ESRD on PD, PVD/ gangrenous left foot, BPH admitted for monitoring s/p  left leg bypass surgery on 1/20 with Dr. Miller. He recently underwent hernial repair and was temporarily transferred to in center HD via [facility]. He has returned to  PD  4 days ago, doing low fill.volumes and has been tolerating at home.   Patient was connected to cycler last night. Total UF reported to be 276ml. Reports of low drain UF alarm last night but no major issues with PD overnight. Pt reports no complaints today morning  No F/C/N/V/CP/SOB.  No melena, hematochezia, hematemesis.  No HA, visual changes,or abdominal pain.     DAILY NEPHROLOGY SUMMARY:    1/21: consult done   1/22: No issues with PD overnight, UF 1137cc, VSS, , feels good, wife at bed side   1/23: No issues with PD overnight 653 cc of UF , not had any BM yesterday and today  --------------------[facility]--------------------  1/24: CCPD 1.5L UF. VSS. PO4 elevated. Pt seen during PD, wife present. C/O pain.  1/25: No issues with PD overnight, net UF 1415ml. No sob, had BM yesterday, states he use O2 when he sleeps to help with nose bleed  1/26:No issues with PD overnight , net  cc, no c/o, still not able to bear weight   1/27: 780 mL Net UF removed with CCPD overnight. Coming out of BR with OT and wife. C/o no protein with meals, only had english muffin this am. BM yesterday. Denies abd pain or tenderness. Denies fever or chills.   1/28: 444 mL Net UF removed over night. Sitting in WC in room, wife BS. Reports feeling better every day. Reports sleeping great. Bps 90-100s.     REVIEW OF SYSTEMS:    10 point ROS reviewed and is as per HPI/daily summary or otherwise negative    PMH/PSH/SH/FH: Reviewed and unchanged since admission note  CURRENT MEDICATIONS: Reviewed from  "admission to present day    VS:  BP 94/59   Pulse 95   Temp 36.4 °C (97.5 °F) (Oral)   Resp 16   Ht 1.707 m (5' 7.2\")   Wt 73.1 kg (161 lb 3.2 oz)   SpO2 95%   BMI 25.10 kg/m²   Physical Exam  Vitals and nursing note reviewed.   Constitutional:       Appearance: Normal appearance.   HENT:      Head: Normocephalic and atraumatic.      Nose: Nose normal.      Mouth/Throat:      Mouth: Mucous membranes are moist.   Eyes:      General: No scleral icterus.     Conjunctiva/sclera: Conjunctivae normal.   Cardiovascular:      Rate and Rhythm: Normal rate.   Pulmonary:      Effort: Pulmonary effort is normal.      Breath sounds: No wheezing or rales.   Abdominal:      General: There is no distension.      Palpations: Abdomen is soft.      Comments: PD cath in Q    Musculoskeletal:         General: No deformity.      Cervical back: Neck supple.      Right lower leg: No edema.      Left lower leg: Edema present.      Comments: Mild edema in LLE wound vac in place     Skin:     General: Skin is warm and dry.      Findings: No rash.   Neurological:      General: No focal deficit present.      Mental Status: He is alert and oriented to person, place, and time. Mental status is at baseline.   Psychiatric:         Mood and Affect: Mood normal.         Behavior: Behavior normal. Behavior is cooperative.         FLUID BALANCE:  In: -   Out: 1345     LABS:  Recent Labs     01/26/25  0546   SODIUM 135   POTASSIUM 4.1   CHLORIDE 94*   CO2 25   GLUCOSE 109*   BUN 66*   CREATININE 7.66*   CALCIUM 8.3*     Recent Labs     01/26/25  0546   HEMOGLOBIN 8.8*   HEMATOCRIT 27.3*       IMPRESSION:  # ESRD on CCPD  - Pt was transitioned to  iHD during masoud-op  s/p inguinal hernial repair,   - Still has [facility]  - Returned to PD 1/17/25  - Home PD prescription CCPD 2.5% alternating with 1.5% 7 exchanges 8 hrs 30 min,   total FV 7700cc, no last fill  # HTN  - Goal BP < 140/90  - At goal  # Anemia of CKD  - Goal Hgb 10-11  - Below goal   - " low iron stores   # CKD-MBD  - Ca 8.6   - PO4 7.4 -> 6.6   # Chronic hypoxic respiratory failure   - On home oxygen 2-2.5L at night         SUGGESTIONS:  - Continue CCPD during hospital stay home prescription   - PD exit site care per protocol   - Bowel regimen to avoid constipation with PD  - No dietary protein restrictions  - Dose all meds per ESRD  - ROSALIND  TIW if Hb < 10   - PO iron   - Continue Renvela 1600 mg tid ac   - Stop Amlodipine   - Furosemide to Torsemide 10 mg PO qday with holding parameters if systolic <120  - Renal diet  - PD treatments going well, back on PD since 1/17      Has [facility] PC since May 2024. Pt does not want the CVC removed at this times and want to wait for an additional      week or 2 to make sure that he does encounter any issues with PD treatments     He understands potential risks with keeping the  CVC  long term, may consider to get it removed later this week      L arm AVF not fully matured,  will need intervention   - Avoid Fleet enema in dialysis patients, discontinued     - Discharge planning underway, plan for 2/7 home with family    Please call or page Nephrology with questions or concerns  Thank you,

## 2025-01-28 NOTE — THERAPY
Occupational Therapy  Daily Treatment     Patient Name: Edy Bennett  Age:  79 y.o., Sex:  male  Medical Record #: [id number]  Today's Date: 1/28/2025     Precautions  Precautions: (P) Fall Risk  Comments: (P) LLE wound vac         Subjective    Pt was in oconnell position upon arrival and agreeable for OT session.      Objective       01/28/25 1401   OT Charge Group   OT Therapy Activity (Units) 1   OT Therapeutic Exercise (Units) 3   Precautions   Precautions Fall Risk   Comments LLE wound vac   Functional Level of Assist   Bed, Chair, Wheelchair Transfer Contact Guard Assist   Bed Chair Wheelchair Transfer Description Increased time;Set-up of equipment;Squat pivot transfer to wheelchair   Sitting Upper Body Exercises   Lat Pull 3 sets of 10;Bilateral;Weight (See Comments for lbs)  (15lbs on equalizer)   Internal Shoulder Rotation 3 sets of 10;Bilateral;Weight (See Comments for lbs)  (#4)   External Shoulder Rotation 3 sets of 10;Bilateral;Weight (See Comments for lbs)  (#4)   Bicep Curls 3 sets of 10;Bilateral;Weight (See Comments for lbs)  (#4)   Elbow Extension 3 sets of 10;Bilateral;Weight (See Comments for lbs)  (#4)   Upper Extremity Bike Level 2 Resistance  (14min w/ no rest breaks)   Comments rest breaks in between   Bed Mobility    Supine to Sit Contact Guard Assist  (assist in guiding LLE off bed)   Scooting Standby Assist   Interdisciplinary Plan of Care Collaboration   IDT Collaboration with  Family / Caregiver   Patient Position at End of Therapy Seated;Chair Alarm On;Call Light within Reach;Tray Table within Reach;Phone within Reach;Family / Friend in Room   Collaboration Comments wife present throughout session.     Increase time needed d/t emeses during thera ex activity, however pt requested to continue after episode. RN was notified pt threw up approx 10ml    Assessment    Pt tolerated session well w/ focus on strengthening/endurance. Pt needed rest breaks in between d/t low activity tolerance,  however was motivated to continue getting his strength up. Pt needed increase time for functional transfer.       Plan    Continue OT POC     DME  OT DME Recommendations  Bathroom Equipment:  (TBD)    Passport items to be completed:  Perform bathroom transfers, complete dressing, complete feeding, get ready for the day, prepare a simple meal, participate in household tasks, adapt home for safety needs, demonstrate home exercise program, complete caregiver training     Occupational Therapy Goals (Active)       Problem: Dressing       Dates: Start:  01/24/25         Goal: STG-Within one week, patient will dress LB with min A       Dates: Start:  01/24/25               Problem: OT Long Term Goals       Dates: Start:  01/24/25         Goal: LTG-By discharge, patient will complete basic self care tasks mod I       Dates: Start:  01/24/25            Goal: LTG-By discharge, patient will perform bathroom transfers mod I       Dates: Start:  01/24/25               Problem: Toileting       Dates: Start:  01/24/25         Goal: STG-Within one week, patient will complete toileting tasks with min A        Dates: Start:  01/24/25

## 2025-01-28 NOTE — THERAPY
Occupational Therapy  Daily Treatment     Patient Name: Edy Bennett  Age:  79 y.o., Sex:  male  Medical Record #: [id number]  Today's Date: 1/28/2025     Precautions  Precautions: (P) Fall Risk  Comments: (P) LLE wound vac         Subjective    Pt seated in w/c in room, agreeable to OT session, spouse present throughout. Per pt, his B UE was sore from therapy.     Objective       01/28/25 1001   OT Charge Group   OT Therapy Activity (Units) 2   OT Total Time Spent   OT Individual Total Time Spent (Mins) 30   Precautions   Precautions Fall Risk   Comments LLE wound vac   Pain   Intervention Repositioned;Rest   Pain 0 - 10 Group   Location Leg   Location Orientation Left   Pain Rating Scale (NPRS) 5   Description Aching   Comfort Goal Comfort with Movement   Therapist Pain Assessment During Activity   Functional Level of Assist   Bed, Chair, Wheelchair Transfer Contact Guard Assist   Bed Chair Wheelchair Transfer Description Squat pivot transfer to wheelchair;Supervision for safety;Set-up of equipment   Balance   Skilled Intervention Facilitation;Other (See Comments)  (See below)   Bed Mobility    Sit to Supine Minimal Assist  (LLE assist)   Interdisciplinary Plan of Care Collaboration   IDT Collaboration with  Certified Nursing Assistant;Family / Caregiver   Patient Position at End of Therapy In Bed;Call Light within Reach;Tray Table within Reach;Family / Friend in Room   Collaboration Comments Spouse present, hand off to CNA     Funct Mob  Pt participated in funct mob in //, CGA. Pt able to complete 20' (w/ turn at end of bars)' with no RB.    Weight shifting in standing  Pt participated in weight shifting in //, CGA-SBA to L and R LE to increase standing tolerance, static balance and strength for standing ADLs/IADLs and functional mobility. No LOB, limited by pain throughout.    Assessment    Pt tolerated session well, still limited by pain in L LE throughout standing portion of therapy. Pt is highly motivated  to return to PLOF. During all standing activities, pt reported he was able to put 50% weight on LLE with 50% support on // in B UE.      Plan    Continue primary OTP POC    DME  OT DME Recommendations  Bathroom Equipment:  (TBD)    Passport items to be completed:  Perform bathroom transfers, complete dressing,  get ready for the day, prepare a simple meal, participate in household tasks, adapt home for safety needs, demonstrate home exercise program, complete caregiver training     Occupational Therapy Goals (Active)       Problem: Dressing       Dates: Start:  01/24/25         Goal: STG-Within one week, patient will dress LB with min A       Dates: Start:  01/24/25               Problem: OT Long Term Goals       Dates: Start:  01/24/25         Goal: LTG-By discharge, patient will complete basic self care tasks mod I       Dates: Start:  01/24/25            Goal: LTG-By discharge, patient will perform bathroom transfers mod I       Dates: Start:  01/24/25               Problem: Toileting       Dates: Start:  01/24/25         Goal: STG-Within one week, patient will complete toileting tasks with min A        Dates: Start:  01/24/25

## 2025-01-28 NOTE — PROGRESS NOTES
[facility] Progress Notes       Disconnected aseptically from PD Cycler at 0710.     Pt stable, VSS, alert and oriented.  Effluent clear and yellow, no signs of bleeding/fibrin clots.  No alarms on machine was noted or reported before disconnection.     24 hour UF= 455 ML (I.Drain= 11 ML + Total UF= 444 ML - Last fill= 0)     Report given to Melany ARANDA

## 2025-01-29 ENCOUNTER — APPOINTMENT (OUTPATIENT)
Dept: OCCUPATIONAL THERAPY | Facility: REHABILITATION | Age: 80
DRG: 299 | End: 2025-01-29
Attending: PHYSICAL MEDICINE & REHABILITATION
Payer: MEDICARE

## 2025-01-29 ENCOUNTER — APPOINTMENT (OUTPATIENT)
Dept: PHYSICAL THERAPY | Facility: REHABILITATION | Age: 80
DRG: 299 | End: 2025-01-29
Attending: PHYSICAL MEDICINE & REHABILITATION
Payer: MEDICARE

## 2025-01-29 ENCOUNTER — APPOINTMENT (OUTPATIENT)
Dept: RADIOLOGY | Facility: REHABILITATION | Age: 80
DRG: 299 | End: 2025-01-29
Attending: PHYSICAL MEDICINE & REHABILITATION
Payer: MEDICARE

## 2025-01-29 LAB
ANION GAP SERPL CALC-SCNC: 22 MMOL/L (ref 7–16)
BASOPHILS # BLD AUTO: 0.4 % (ref 0–1.8)
BASOPHILS # BLD: 0.04 K/UL (ref 0–0.12)
BUN SERPL-MCNC: 72 MG/DL (ref 8–22)
CALCIUM SERPL-MCNC: 8.4 MG/DL (ref 8.5–10.5)
CHLORIDE SERPL-SCNC: 85 MMOL/L (ref 96–112)
CO2 SERPL-SCNC: 21 MMOL/L (ref 20–33)
CREAT SERPL-MCNC: 8.55 MG/DL (ref 0.5–1.4)
EOSINOPHIL # BLD AUTO: 0.1 K/UL (ref 0–0.51)
EOSINOPHIL NFR BLD: 1 % (ref 0–6.9)
ERYTHROCYTE [DISTWIDTH] IN BLOOD BY AUTOMATED COUNT: 58.4 FL (ref 35.9–50)
GFR SERPLBLD CREATININE-BSD FMLA CKD-EPI: 6 ML/MIN/1.73 M 2
GLUCOSE SERPL-MCNC: 150 MG/DL (ref 65–99)
HCT VFR BLD AUTO: 24.2 % (ref 42–52)
HGB BLD-MCNC: 7.8 G/DL (ref 14–18)
IMM GRANULOCYTES # BLD AUTO: 0.07 K/UL (ref 0–0.11)
IMM GRANULOCYTES NFR BLD AUTO: 0.7 % (ref 0–0.9)
LYMPHOCYTES # BLD AUTO: 0.54 K/UL (ref 1–4.8)
LYMPHOCYTES NFR BLD: 5.5 % (ref 22–41)
MCH RBC QN AUTO: 32.4 PG (ref 27–33)
MCHC RBC AUTO-ENTMCNC: 32.2 G/DL (ref 32.3–36.5)
MCV RBC AUTO: 100.4 FL (ref 81.4–97.8)
MONOCYTES # BLD AUTO: 0.6 K/UL (ref 0–0.85)
MONOCYTES NFR BLD AUTO: 6.1 % (ref 0–13.4)
NEUTROPHILS # BLD AUTO: 8.41 K/UL (ref 1.82–7.42)
NEUTROPHILS NFR BLD: 86.3 % (ref 44–72)
NRBC # BLD AUTO: 0 K/UL
NRBC BLD-RTO: 0 /100 WBC (ref 0–0.2)
PLATELET # BLD AUTO: 202 K/UL (ref 164–446)
PMV BLD AUTO: 10.2 FL (ref 9–12.9)
POTASSIUM SERPL-SCNC: 3.8 MMOL/L (ref 3.6–5.5)
RBC # BLD AUTO: 2.41 M/UL (ref 4.7–6.1)
SODIUM SERPL-SCNC: 128 MMOL/L (ref 135–145)
WBC # BLD AUTO: 9.8 K/UL (ref 4.8–10.8)

## 2025-01-29 PROCEDURE — 770010 HCHG ROOM/CARE - REHAB SEMI PRIVAT*

## 2025-01-29 PROCEDURE — 97116 GAIT TRAINING THERAPY: CPT

## 2025-01-29 PROCEDURE — 85025 COMPLETE CBC W/AUTO DIFF WBC: CPT

## 2025-01-29 PROCEDURE — 700111 HCHG RX REV CODE 636 W/ 250 OVERRIDE (IP): Performed by: PHYSICAL MEDICINE & REHABILITATION

## 2025-01-29 PROCEDURE — 90945 DIALYSIS ONE EVALUATION: CPT

## 2025-01-29 PROCEDURE — 700102 HCHG RX REV CODE 250 W/ 637 OVERRIDE(OP): Performed by: PHYSICAL MEDICINE & REHABILITATION

## 2025-01-29 PROCEDURE — 99232 SBSQ HOSP IP/OBS MODERATE 35: CPT | Performed by: PHYSICAL MEDICINE & REHABILITATION

## 2025-01-29 PROCEDURE — A9270 NON-COVERED ITEM OR SERVICE: HCPCS | Performed by: PHYSICAL MEDICINE & REHABILITATION

## 2025-01-29 PROCEDURE — 74018 RADEX ABDOMEN 1 VIEW: CPT

## 2025-01-29 PROCEDURE — 97110 THERAPEUTIC EXERCISES: CPT

## 2025-01-29 PROCEDURE — 80048 BASIC METABOLIC PNL TOTAL CA: CPT

## 2025-01-29 PROCEDURE — 97530 THERAPEUTIC ACTIVITIES: CPT

## 2025-01-29 PROCEDURE — 97110 THERAPEUTIC EXERCISES: CPT | Mod: CO

## 2025-01-29 RX ORDER — OXYCODONE HYDROCHLORIDE 5 MG/1
5 TABLET ORAL 4 TIMES DAILY
Status: DISCONTINUED | OUTPATIENT
Start: 2025-01-29 | End: 2025-01-30

## 2025-01-29 RX ORDER — ONDANSETRON 4 MG/1
4 TABLET, ORALLY DISINTEGRATING ORAL
Status: DISCONTINUED | OUTPATIENT
Start: 2025-01-30 | End: 2025-01-30

## 2025-01-29 RX ADMIN — APIXABAN 5 MG: 5 TABLET, FILM COATED ORAL at 22:19

## 2025-01-29 RX ADMIN — SEVELAMER CARBONATE 1600 MG: 800 TABLET, FILM COATED ORAL at 10:56

## 2025-01-29 RX ADMIN — SENNOSIDES AND DOCUSATE SODIUM 2 TABLET: 50; 8.6 TABLET ORAL at 08:08

## 2025-01-29 RX ADMIN — SEVELAMER CARBONATE 1600 MG: 800 TABLET, FILM COATED ORAL at 08:08

## 2025-01-29 RX ADMIN — APIXABAN 5 MG: 5 TABLET, FILM COATED ORAL at 08:08

## 2025-01-29 RX ADMIN — SEVELAMER CARBONATE 1600 MG: 800 TABLET, FILM COATED ORAL at 17:34

## 2025-01-29 RX ADMIN — OXYCODONE 5 MG: 5 TABLET ORAL at 22:19

## 2025-01-29 RX ADMIN — METHOCARBAMOL 500 MG: 500 TABLET ORAL at 08:09

## 2025-01-29 RX ADMIN — CLOPIDOGREL BISULFATE 75 MG: 75 TABLET ORAL at 08:09

## 2025-01-29 RX ADMIN — OXYCODONE 5 MG: 5 TABLET ORAL at 14:08

## 2025-01-29 RX ADMIN — METHOCARBAMOL 500 MG: 500 TABLET ORAL at 17:34

## 2025-01-29 RX ADMIN — GUAIFENESIN 600 MG: 600 TABLET ORAL at 08:08

## 2025-01-29 RX ADMIN — MONTELUKAST 10 MG: 10 TABLET, FILM COATED ORAL at 22:19

## 2025-01-29 RX ADMIN — ROSUVASTATIN CALCIUM 20 MG: 10 TABLET, FILM COATED ORAL at 22:19

## 2025-01-29 RX ADMIN — GUAIFENESIN 600 MG: 600 TABLET ORAL at 22:19

## 2025-01-29 RX ADMIN — GABAPENTIN 100 MG: 100 CAPSULE ORAL at 08:08

## 2025-01-29 RX ADMIN — METHOCARBAMOL 500 MG: 500 TABLET ORAL at 14:07

## 2025-01-29 RX ADMIN — METHOCARBAMOL 500 MG: 500 TABLET ORAL at 22:19

## 2025-01-29 RX ADMIN — OMEPRAZOLE 20 MG: 20 CAPSULE, DELAYED RELEASE ORAL at 08:08

## 2025-01-29 RX ADMIN — ONDANSETRON 4 MG: 4 TABLET, ORALLY DISINTEGRATING ORAL at 13:06

## 2025-01-29 RX ADMIN — OXYCODONE 5 MG: 5 TABLET ORAL at 05:35

## 2025-01-29 RX ADMIN — GABAPENTIN 100 MG: 100 CAPSULE ORAL at 22:19

## 2025-01-29 RX ADMIN — OXYCODONE 5 MG: 5 TABLET ORAL at 10:56

## 2025-01-29 RX ADMIN — SENNOSIDES AND DOCUSATE SODIUM 2 TABLET: 50; 8.6 TABLET ORAL at 22:19

## 2025-01-29 ASSESSMENT — CHA2DS2 SCORE
DIABETES: NO
HYPERTENSION: YES
CHF OR LEFT VENTRICULAR DYSFUNCTION: YES
AGE 65 TO 74: NO
CHA2DS2 VASC SCORE: 7
SEX: MALE
AGE 75 OR GREATER: YES
PRIOR STROKE OR TIA OR THROMBOEMBOLISM: YES
VASCULAR DISEASE: YES

## 2025-01-29 ASSESSMENT — GAIT ASSESSMENTS
DISTANCE (FEET): 25
ASSISTIVE DEVICE: FRONT WHEEL WALKER
GAIT LEVEL OF ASSIST: CONTACT GUARD ASSIST

## 2025-01-29 ASSESSMENT — PAIN DESCRIPTION - PAIN TYPE
TYPE: ACUTE PAIN

## 2025-01-29 ASSESSMENT — ACTIVITIES OF DAILY LIVING (ADL)
TOILETING_LEVEL_OF_ASSIST_DESCRIPTION: INCREASED TIME
BED_CHAIR_WHEELCHAIR_TRANSFER_DESCRIPTION: INCREASED TIME;SET-UP OF EQUIPMENT;SQUAT PIVOT TRANSFER TO WHEELCHAIR
BED_CHAIR_WHEELCHAIR_TRANSFER_DESCRIPTION: ADAPTIVE EQUIPMENT
BED_CHAIR_WHEELCHAIR_TRANSFER_DESCRIPTION: OTHER (COMMENT)

## 2025-01-29 NOTE — PROGRESS NOTES
[facility] -  PROGRESS NOTE               Author: IDA Cline Date & Time: 1/29/2025  9:55 AM     HPI:  79 y.o. male with HTN, ESRD on PD, PVD/ gangrenous left foot, BPH admitted for monitoring s/p  left leg bypass surgery on 1/20 with Dr. Miller. He recently underwent hernial repair and was temporarily transferred to in center HD via [facility]. He has returned to  PD  4 days ago, doing low fill.volumes and has been tolerating at home.   Patient was connected to cycler last night. Total UF reported to be 276ml. Reports of low drain UF alarm last night but no major issues with PD overnight. Pt reports no complaints today morning  No F/C/N/V/CP/SOB.  No melena, hematochezia, hematemesis.  No HA, visual changes,or abdominal pain.     DAILY NEPHROLOGY SUMMARY:    1/21: consult done   1/22: No issues with PD overnight, UF 1137cc, VSS, , feels good, wife at bed side   1/23: No issues with PD overnight 653 cc of UF , not had any BM yesterday and today  [facility]--------------------  1/24: CCPD 1.5L UF. VSS. PO4 elevated. Pt seen during PD, wife present. C/O pain.  1/25: No issues with PD overnight, net UF 1415ml. No sob, had BM yesterday, states he use O2 when he sleeps to help with nose bleed  1/26:No issues with PD overnight , net  cc, no c/o, still not able to bear weight   1/27: 780 mL Net UF removed with CCPD overnight. Coming out of BR with OT and wife. C/o no protein with meals, only had english muffin this am. BM yesterday. Denies abd pain or tenderness. Denies fever or chills.   1/28: 444 mL Net UF removed over night. Sitting in WC in room, wife BS. Reports feeling better every day. Reports sleeping great. Bps 90-100s.   1/29: VSS BP soft, RA.  No new labs.  No CP SOB, or worsenieng edema.  PD overnight 636ml.  UOP 300ml. Feeling good.     REVIEW OF SYSTEMS:    10 point ROS reviewed and is as per HPI/daily  "summary or otherwise negative    PMH/PSH/SH/FH: Reviewed and unchanged since admission note  CURRENT MEDICATIONS: Reviewed from admission to present day    VS:  /65   Pulse 72   Temp 36.7 °C (98 °F) (Oral)   Resp 16   Ht 1.707 m (5' 7.2\")   Wt 73.1 kg (161 lb 3.2 oz)   SpO2 99%   BMI 25.10 kg/m²   Physical Exam  Vitals and nursing note reviewed.   Constitutional:       Appearance: Normal appearance.   HENT:      Head: Normocephalic and atraumatic.      Nose: Nose normal.      Mouth/Throat:      Mouth: Mucous membranes are moist.   Eyes:      General: No scleral icterus.     Conjunctiva/sclera: Conjunctivae normal.   Cardiovascular:      Rate and Rhythm: Normal rate.   Pulmonary:      Effort: Pulmonary effort is normal.   Abdominal:      General: There is no distension.      Palpations: Abdomen is soft.      Comments: PD cath in RLQ    Musculoskeletal:         General: No deformity.      Cervical back: Neck supple.      Right lower leg: No edema.      Left lower leg: Edema present.      Comments: Mild edema in LLE wound vac in place     Skin:     General: Skin is warm and dry.      Findings: No rash.   Neurological:      General: No focal deficit present.      Mental Status: He is alert and oriented to person, place, and time. Mental status is at baseline.   Psychiatric:         Mood and Affect: Mood normal.         Behavior: Behavior normal. Behavior is cooperative.         FLUID BALANCE:  In: 200 [P.O.:200]  Out: 755     LABS:                IMPRESSION:  # ESRD on CCPD  - Pt was transitioned to  iHD during masoud-op  s/p inguinal hernial repair,   - Still has [facility]  - Returned to PD 1/17/25   - Home PD prescription CCPD 2.5% alternating with 1.5% 7 exchanges 8 hrs 30 min,   total FV 7700cc, no last fill  # HTN  - Goal BP < 140/90  - At goal  # Anemia of CKD  - Goal Hgb 10-11  - Below goal   - On PO iron  # CKD-MBD  - Ca 8.6   - PO4 7.4 -> 6.6   # Chronic hypoxic respiratory failure   - On home oxygen " "2-2.5L at night         SUGGESTIONS:  - Continue CCPD during hospital stay home prescription   - PD exit site care per protocol   - Bowel regimen to avoid constipation with PD  - No dietary protein restrictions  - Dose all meds per ESRD  - ROSALIND  TIW if Hb < 10   - PO iron   - Continue Renvela 1600 mg tid ac   - Stopped Amlodipine   - Furosemide to Torsemide 10 mg PO qday with holding parameters if systolic <120  -Reduce BBL, flomax, terazosin as able given mild hypotension (diuretics held for this). Will monitor volume status and need for diuretics.  - Renal diet  - Has [facility] PC since May 2024. Pt does not want the CVC removed at this times and want to wait for an additional week or 2 to make sure that he does encounter any issues with PD treatments  He understands potential risks with keeping the  CVC  long term, discussed agaon 1/29, he will have it removed next week \"when he is feeling more stable\".   - L arm AVF not fully matured,  will need intervention   - Avoid Fleet enema in dialysis patients   - Labs tomorrow  - Discharge planning underway, plan for 2/7 home with family    Please call or page Nephrology with questions or concerns  Thank you,  "

## 2025-01-29 NOTE — THERAPY
Physical Therapy   Daily Treatment     Patient Name: Edy Bennett  Age:  79 y.o., Sex:  male  Medical Record #: [id number]  Today's Date: 1/29/2025     Precautions  Precautions: Fall Risk  Comments: LLE wound Vac    Subjective    Patient had more vomiting yesterday afternoon     Objective       01/29/25 0931   PT Charge Group   PT Gait Training (Units) 1   PT Therapeutic Exercise (Units) 1   PT Total Time Spent   PT Individual Total Time Spent (Mins) 30   Gait Functional Level of Assist    Gait Level Of Assist Contact Guard Assist   Assistive Device Front Wheel Walker   Distance (Feet) 25   # of Times Distance was Traveled 2   Deviation Antalgic;Decreased Base Of Support;Step To;Decreased Heel Strike  (requires extra time for weight shift to LLE, he self corrects to heel-toe gait pattern with WBAT LLE and heavy reliance on BUEs, maintains left knee flexion in stance)   Transfer Functional Level of Assist   Bed, Chair, Wheelchair Transfer Contact Guard Assist   Bed Chair Wheelchair Transfer Description Other (comment)  (stand pivot with FWW)   Sitting Lower Body Exercises   Long Arc Quad 2 sets of 10;Left   Hamstring Curl 2 sets of 10;Left  (slight manual resistance)   Other Exercises seated heel raises 2 x 10,   Bed Mobility    Sit to Stand Contact Guard Assist   Scooting Standby Assist   Interdisciplinary Plan of Care Collaboration   Patient Position at End of Therapy Seated;Family / Friend in Room;Self Releasing Lap Belt Applied           Assessment    Patient is doing much better in terms of left LE pains and tolerance to movement/gait     Strengths: Able to follow instructions, Alert and oriented, Independent prior level of function, Supportive family, Pleasant and cooperative, Motivated for self care and independence  Barriers: Pain, Pain poorly managed, Limited mobility    Plan      LE mobility exercises in supine and sitting to prep soft tissues for functional movements, progressive gait in parallel bars  "and walker    DME  PT DME Recommendations  Wheelchair: 16\" Width, Elevating Leg Rests  Cushion: Standard    Passport items to be completed:  Get in/out of bed safely, in/out of a vehicle, safely use mobility device, walk or wheel around home/community, navigate up and down stairs, show how to get up/down from the ground, ensure home is accessible, demonstrate HEP, complete caregiver training    Physical Therapy Problems (Active)       Problem: Mobility       Dates: Start:  01/24/25         Goal: STG-Within one week, patient will ambulate 10 feet with FWW CGA       Dates: Start:  01/24/25               Problem: Mobility Transfers       Dates: Start:  01/24/25         Goal: STG-Within one week, patient will perform bed mobility with minimal assist to manage legs        Dates: Start:  01/24/25            Goal: STG-Within one week, patient will sit to stand with minimal assist and FWW       Dates: Start:  01/24/25               Problem: PT-Long Term Goals       Dates: Start:  01/24/25         Goal: LTG-By discharge, patient will ambulate with  feet SBA       Dates: Start:  01/24/25            Goal: LTG-By discharge, patient will transfer one surface to another with FWW supervised       Dates: Start:  01/24/25            Goal: LTG-By discharge, patient will perform home exercise program       Dates: Start:  01/24/25            Goal: LTG-By discharge, patient will ambulate up/down 4-6 stairs with bilateral rails supervised        Dates: Start:  01/24/25            Goal: LTG-By discharge, patient will transfer in/out of a car with FWW supervised        Dates: Start:  01/24/25              "

## 2025-01-29 NOTE — THERAPY
"Physical Therapy   Daily Treatment     Patient Name: Edy Bennett  Age:  79 y.o., Sex:  male  Medical Record #: [id number]  Today's Date: 1/29/2025     Precautions  Precautions: (P) Fall Risk  Comments: (P) LLE wound Vac    Subjective    Pt reported, \"I'm going to throw up\" while standing in the // bars.      Objective       01/29/25 1301   PT Charge Group   PT Therapeutic Activities (Units) 4   PT Total Time Spent   PT Individual Total Time Spent (Mins) 60   Precautions   Precautions Fall Risk   Comments LLE wound Vac   Wheelchair Functional Level of Assist   Wheelchair Assist Supervised   Distance Wheelchair (Feet or Distance) 200   Transfer Functional Level of Assist   Bed, Chair, Wheelchair Transfer Minimal Assist   Bed Chair Wheelchair Transfer Description Adaptive equipment  (SPT FWW)   Bed Mobility    Supine to Sit Supervised  (leg  device)   Sit to Stand Minimal Assist  (FWW CGA/min A)   Scooting Supervised   Neuro-Muscular Treatments   Neuro-Muscular Treatments Weight Shift Left;Weight Shift Right;Verbal Cuing;Sequencing   Comments Standing in // bars >3 min with lateral wt shifting, with emphasis on LLE wt acceptance   Interdisciplinary Plan of Care Collaboration   IDT Collaboration with  Nursing;Physician;Family / Caregiver   Patient Position at End of Therapy Seated;Family / Friend in Room   Collaboration Comments Pt had recurrent nose bleeding during session. Became nauseated in standing- vomitted >1000 cc's. RN provided Zofran, pt provided with ginger ale.         Assessment    Pt was limited by recurrent bleeding from his nose, and profuse vomiting after standing >3 min. Spouse reported that patient has vomited in the afternoon 3 days in a row now. Pt was able to self propel back to his room after RN provided him with Zofran. Pt remains limited by impaired weight acceptance through LLE, preferring to remain NWB in standing. Pt was able to bear partial weight EOB with FWW, during SPT with " "FWW, and during lateral wt shifting in // bars. Pt was provided with ongoing postural cuing in // bars for upright standing, as he tends to hinge fwd.       Strengths: Able to follow instructions, Alert and oriented, Independent prior level of function, Supportive family, Pleasant and cooperative, Motivated for self care and independence  Barriers: Pain, Pain poorly managed, Limited mobility    Plan    LE mobility exercises in supine and sitting to prep soft tissues for functional movements, progressive gait in parallel bars and walker. Continue promoting weight acceptance through LLE.      DME  PT DME Recommendations  Wheelchair: 16\" Width, Elevating Leg Rests  Cushion: Standard    Passport items to be completed:  Get in/out of bed safely, in/out of a vehicle, safely use mobility device, walk or wheel around home/community, navigate up and down stairs, show how to get up/down from the ground, ensure home is accessible, demonstrate HEP, complete caregiver training    Physical Therapy Problems (Active)       Problem: Mobility       Dates: Start:  01/24/25         Goal: STG-Within one week, patient will ambulate 10 feet with FWW CGA       Dates: Start:  01/24/25               Problem: Mobility Transfers       Dates: Start:  01/24/25         Goal: STG-Within one week, patient will perform bed mobility with minimal assist to manage legs        Dates: Start:  01/24/25            Goal: STG-Within one week, patient will sit to stand with minimal assist and FWW       Dates: Start:  01/24/25               Problem: PT-Long Term Goals       Dates: Start:  01/24/25         Goal: LTG-By discharge, patient will ambulate with  feet SBA       Dates: Start:  01/24/25            Goal: LTG-By discharge, patient will transfer one surface to another with FWW supervised       Dates: Start:  01/24/25            Goal: LTG-By discharge, patient will perform home exercise program       Dates: Start:  01/24/25            Goal: LTG-By " discharge, patient will ambulate up/down 4-6 stairs with bilateral rails supervised        Dates: Start:  01/24/25            Goal: LTG-By discharge, patient will transfer in/out of a car with FWW supervised        Dates: Start:  01/24/25

## 2025-01-29 NOTE — THERAPY
Physical Therapy   Daily Treatment     Patient Name: Edy Bennett  Age:  79 y.o., Sex:  male  Medical Record #: [id number]  Today's Date: 1/28/2025     Precautions  Precautions: Fall Risk  Comments: LLE wound vac    Subjective    Patient is found seated up in chair, he's brushing his teeth due to recent nausea/ vomiting. He did not want to ask for anti nausea meds. He reports fatigue in BUEs from OT, states he's been up in the chair for a while. Wife present throughout, concerned about some redness she hadn't noticed before on the back of his leg, RN aware.      Objective       01/28/25 1531   PT Charge Group   PT Gait Training (Units) 2   PT Total Time Spent   PT Individual Total Time Spent (Mins) 30   Gait Functional Level of Assist    Gait Level Of Assist Contact Guard Assist   Assistive Device Parallel Bars   Distance (Feet) 10   # of Times Distance was Traveled 2  (2 sets forward, 2 sets backwackwards)   Deviation Antalgic;Step To;Decreased Base Of Support;Decreased Heel Strike;Other (Comment)  (requires extra time for weight shift to LLE, he self corrects to heel-toe gait pattern with WBAT LLE and heavy reliance on BUEs)   Bed Mobility    Sit to Supine Contact Guard Assist   Sit to Stand Contact Guard Assist   Interdisciplinary Plan of Care Collaboration   IDT Collaboration with  Family / Caregiver;Nursing   Patient Position at End of Therapy In Bed;Call Light within Reach;Tray Table within Reach;Phone within Reach;Family / Friend in Room   Collaboration Comments wife present throughout, RN aware of wife's concerns about redness on back of leg         Assessment    Patient is limited by pain in LLE with heel strike, stance phase. Relies heavily on BUEs during gait. He self corrects to emphasize heel strike with gait due to previous education. He is also limited this afternoon by fatigue.     Strengths: Able to follow instructions, Alert and oriented, Independent prior level of function, Supportive family,  "Pleasant and cooperative, Motivated for self care and independence  Barriers: Pain, Pain poorly managed, Limited mobility    Plan    LE mobility exercises in supine and sitting to prep soft tissues for functional movements, progressive gait in parallel bars and walker     DME  PT DME Recommendations  Wheelchair: 16\" Width, Elevating Leg Rests  Cushion: Standard    Passport items to be completed:  Get in/out of bed safely, in/out of a vehicle, safely use mobility device, walk or wheel around home/community, navigate up and down stairs, show how to get up/down from the ground, ensure home is accessible, demonstrate HEP, complete caregiver training    Physical Therapy Problems (Active)       Problem: Mobility       Dates: Start:  01/24/25         Goal: STG-Within one week, patient will ambulate 10 feet with FWW CGA       Dates: Start:  01/24/25               Problem: Mobility Transfers       Dates: Start:  01/24/25         Goal: STG-Within one week, patient will perform bed mobility with minimal assist to manage legs        Dates: Start:  01/24/25            Goal: STG-Within one week, patient will sit to stand with minimal assist and FWW       Dates: Start:  01/24/25               Problem: PT-Long Term Goals       Dates: Start:  01/24/25         Goal: LTG-By discharge, patient will ambulate with  feet SBA       Dates: Start:  01/24/25            Goal: LTG-By discharge, patient will transfer one surface to another with FWW supervised       Dates: Start:  01/24/25            Goal: LTG-By discharge, patient will perform home exercise program       Dates: Start:  01/24/25            Goal: LTG-By discharge, patient will ambulate up/down 4-6 stairs with bilateral rails supervised        Dates: Start:  01/24/25            Goal: LTG-By discharge, patient will transfer in/out of a car with FWW supervised        Dates: Start:  01/24/25              "

## 2025-01-29 NOTE — PROGRESS NOTES
[facility] Progress Notes     Disconnected aseptically from [employer] at 0645.     Pt stable, VSS, alert and oriented.  Effluent clear and yellow, no signs of bleeding/fibrin clots.  No alarms on machine was noted or reported before disconnection.     24 hour UF= 636 ML (I.Drain= 68 ML + Total UF= 568 ML - Last fill= 0)     Report given to Melany ARANDA

## 2025-01-29 NOTE — PROGRESS NOTES
[facility] Progress Note     [employer] ordered by Dr. Villalobos/ MARGARET Medina APRN. Connected aseptically to [employer] at 2035 for 8.5 hours using 1.5% and 2.5% PD solution. (1.5% on the heater)     Pt  A/O x 4, not in distress, no bleeding, denies chest pain, on room air, but with minimal edema.   PD exit site CDI, No s/sx of infection, dressing changed. Gentamicin cream applied as ordered.     Education provided to primary RN regarding troubleshooting.      Report given to PANFILO López RN.     Call [facility]/On Call at [phone] for further assistance.

## 2025-01-29 NOTE — THERAPY
Occupational Therapy  Daily Treatment     Patient Name: Edy Bennett  Age:  79 y.o., Sex:  male  Medical Record #: [id number]  Today's Date: 1/29/2025     Precautions  Precautions: (P) Fall Risk  Comments: (P) LLE wound Vac         Subjective    Pt was sleeping upon arrival and needed increase time to awaken, however was agreeable for OT session. Pt was still connected to Peritoneal dialysis machine upon arrival and thera ex was completed in bed.      Objective       01/29/25 0701   Therapy Missed   Missed Therapy (Minutes) 15   Reason For Missed Therapy Medical - Other (Please Comment)  (peritoneal dialysis disconnection)   OT Charge Group   OT Therapeutic Exercise (Units) 3   OT Total Time Spent   OT Individual Total Time Spent (Mins) 45   Precautions   Precautions Fall Risk   Comments LLE wound Vac   Functional Level of Assist   Toileting Modified Independent   Toileting Description Increased time  (urination while in oconnell position using a urinal)   Supine Upper Body Exercises   Chest Fly 3 sets of 15;Bilateral;Light Resistance Theraband   Bicep Curl 3 sets of 15;Bilateral;Light Resistance Theraband   Elbow Extension 3 sets of 15;Bilateral;Light Resistance Theraband   Comments rest breaks in between   Interdisciplinary Plan of Care Collaboration   IDT Collaboration with  Nursing   Patient Position at End of Therapy In Bed;Bed Alarm On;Call Light within Reach;Tray Table within Reach;Phone within Reach   Collaboration Comments Pt in oconnell position for breakfast.         Assessment    Pt tolerated session well w/ focus on bed thera ex. Time was cut sort d/t disconnection on dialysis        Plan    Consult with wound RN regarding showering  ADL, IADL, DME, strengthening, balance     DME  OT DME Recommendations  Bathroom Equipment:  (TBD)    Passport items to be completed:  Perform bathroom transfers, complete dressing, complete feeding, get ready for the day, prepare a simple meal, participate in household  tasks, adapt home for safety needs, demonstrate home exercise program, complete caregiver training     Occupational Therapy Goals (Active)       Problem: Dressing       Dates: Start:  01/24/25         Goal: STG-Within one week, patient will dress LB with min A       Dates: Start:  01/24/25               Problem: OT Long Term Goals       Dates: Start:  01/24/25         Goal: LTG-By discharge, patient will complete basic self care tasks mod I       Dates: Start:  01/24/25            Goal: LTG-By discharge, patient will perform bathroom transfers mod I       Dates: Start:  01/24/25               Problem: Toileting       Dates: Start:  01/24/25         Goal: STG-Within one week, patient will complete toileting tasks with min A        Dates: Start:  01/24/25

## 2025-01-29 NOTE — CARE PLAN
Problem: Pain - Standard  Goal: Alleviation of pain or a reduction in pain to the patient’s comfort goal  Outcome: Progressing     Problem: Skin Integrity  Goal: Skin integrity is maintained or improved  Outcome: Progressing     Problem: Fall Risk - Rehab  Goal: Patient will remain free from falls  Outcome: Progressing     Problem: Dialysis  Goal: Patient will maintain stable vital signs and fluid balance  Outcome: Progressing     The patient is Stable - Low risk of patient condition declining or worsening    Shift Goals  Clinical Goals: safety  Patient Goals: pain control  Family Goals: safety

## 2025-01-29 NOTE — CARE PLAN
The patient is Watcher - Medium risk of patient condition declining or worsening    Shift Goals  Clinical Goals: safety  Problem: Fall Risk - Rehab  Goal: Patient will remain free from falls  Note: Call light in reach, bed locked and in lowest position, bed alarm on, belongings in reach, non-slip socks on, 3 side rails up, and reminded the patient to call for assistance.     Problem: Risk for Aspiration  Goal: Patient's risk for aspiration will be absent or decrease  Note: Patient raises head of bed themselves to take medication, eat, and drink to help prevent aspiration.

## 2025-01-29 NOTE — PROGRESS NOTES
NURSING DAILY NOTE    Name: Edy Bennett   Date of Admission: 1/23/2025   Admitting Diagnosis: PAD (peripheral artery disease) ([facility])  Attending Physician: ROSANNA PATEL D.O.  Allergies: Patient has no known allergies.    Safety  Patient Assist  max1  Patient Precautions  Fall Risk  Precaution Comments  LLE wound vac  Bed Transfer Status  Contact Guard Assist  Toilet Transfer Status   Contact Guard Assist (stand turn with grab bar. Pt offloading weight completely from left leg and using right leg only to transfer)  Assistive Devices  Rails, Wheelchair  Oxygen  None - Room Air  Diet/Therapeutic Dining  Current Diet Order   Procedures    Diet Order Diet: Renal; Second Modifier: (optional): Cardiac     Pill Administration  whole  Agitated Behavioral Scale     ABS Level of Severity       Fall Risk  Has the patient had a fall this admission?   No  Trinh Spivey Fall Risk Scoring  22, HIGH RISK  Fall Risk Safety Measures  bed alarm and chair alarm    Vitals  Temperature: 36 °C (96.8 °F)  Temp src: Oral  Pulse: 90  Respiration: 18  Blood Pressure : 102/56  Blood Pressure MAP (Calculated): 71 MM HG  BP Location: Right, Upper Arm  Patient BP Position: Cota's Position     Oxygen  Pulse Oximetry: 95 %  O2 (LPM): 2  O2 Delivery Device: None - Room Air    Bowel and Bladder  Last Bowel Movement  01/27/25  Stool Type  Type 4: Like a sausage or snake, smooth and soft  Bowel Device     Continent  Bladder: Did not void   Bowel: No movement  Bladder Function  Urine Void (mL): 150 ml  Number of Times Voided: 1  Urine Color: Yellow  Genitourinary Assessment   Bladder Assessment (WDL):  WDL Except  [place] Catheter: Not Applicable  Urinary Symptoms: Anuric/ Dialysis Patient (occasional output)  Urine Color: Yellow  Bladder Device: Urinal  Bladder Scan: Post Void  $ Bladder Scan Results (mL): 189    Skin  Jalen Score   16  Sensory Interventions   Bed Types: Low  Airloss  Skin Preventative Measures: Pillows in Use for Support / Positioning  Moisture Interventions  Moisturizers/Barriers: Barrier Wipes      Pain  Pain Rating Scale  4 - Distracts me, can do usual activities  Pain Location  Leg  Pain Location Orientation  Left  Pain Interventions   Medication (see MAR), Rest    ADLs    Bathing   Patient Refused Bathing (Had shower with OT yesterday)  Linen Change      Personal Hygiene     Chlorhexidine Bath   Not Completed - Off Floor  Oral Care     Teeth/Dentures     Shave     Nutrition Percentage Eaten  Breakfast, Between 50-75% Consumed  Environmental Precautions  Treaded Slipper Socks on Patient  Patient Turns/Positioning  Patient turns self independently side to side without assistance, to offload sacral area  Patient Turns Assistance/Tolerance  Assistance of One  Bed Positions  Bed Controls On  Head of Bed Elevated  Self regulated      Psychosocial/Neurologic Assessment  Psychosocial Assessment  Psychosocial (WDL):  Within Defined Limits  Patient Behaviors: Anxious  Family Behaviors: Family Present  Neurologic Assessment  Neuro (WDL): Within Defined Limits  Level of Consciousness: Alert  Orientation Level: Oriented X4  Cognition: Follows commands, Appropriate attention/concentration  Speech: Clear  Pupil Assesment: No  Motor Function/Sensation Assessment: Sensation  RUE Sensation: Full sensation  LUE Sensation: Full sensation  RLE Sensation: Full sensation  LLE Sensation: Full sensation, Pain  EENT (WDL):  WDL Except    Cardio/Pulmonary Assessment  Edema   LLE Edema: 1+, Generalized  Respiratory Breath Sounds  RUL Breath Sounds: Clear  RML Breath Sounds: Clear  RLL Breath Sounds: Diminished  ANAYELI Breath Sounds: Clear  LLL Breath Sounds: Diminished  Cardiac Assessment   Cardiac (WDL):  Within Defined Limits

## 2025-01-29 NOTE — PROGRESS NOTES
"  Physical Medicine & Rehabilitation Progress Note    Encounter Date: 1/29/2025    Chief Complaint: PVD s/p bypass    Interval Events (Subjective):  VS - Lower BP - Torsemide held this AM  Voiding some - not retaining   BM 1/29    Seen and examined in his room with wife. Pain is improving he would like to reduce his pain medications and see what happens. D/w him plan per Nephro and us.       ROS: 14 point ROS negative unless otherwise specified in the HPI    Objective:  VITAL SIGNS: /65   Pulse 72   Temp 36.7 °C (98 °F) (Oral)   Resp 16   Ht 1.707 m (5' 7.2\")   Wt 73.1 kg (161 lb 3.2 oz)   SpO2 99%   BMI 25.10 kg/m²     GEN: No apparent distress  HEENT: Head normocephalic, atraumatic.  Sclera nonicteric bilaterally, no ocular discharge appreciated bilaterally.  CV: Extremities warm and well-perfused, + LLE edema, improving. RLE without edema.   PULMONARY: Breathing nonlabored on room air, no respiratory accessory muscle use.  Not requiring supplemental oxygen.  SKIN: Vac with continued sanguinous output.   PSYCH: Mood and affect within normal limits.  NEURO: Awake alert.  Conversational.  Logical thought content.      Laboratory Values:  No results found for this or any previous visit (from the past 72 hours).    Medications:  Scheduled Medications   Medication Dose Frequency    torsemide  10 mg Q DAY    epoetin  2,000 Units TUE+THU+SAT    ferrous gluconate  324 mg Q48HRS    Pharmacy Consult Request  1 Each PHARMACY TO DOSE    omeprazole  20 mg DAILY    apixaban  5 mg BID    clopidogrel  75 mg DAILY    gabapentin  100 mg BID    guaiFENesin ER  600 mg Q12HRS    methocarbamol  500 mg 4X/DAY    metoprolol SR  100 mg QHS    montelukast  10 mg QHS    rosuvastatin  20 mg QHS    sevelamer carbonate  1,600 mg TID WITH MEALS    [Held by provider] tamsulosin  0.4 mg QAM    [Held by provider] terazosin  5 mg QHS    oxyCODONE immediate-release  5 mg 5X/DAY    senna-docusate  2 Tablet BID    gentamicin   DAILY AT " 1800     PRN medications: lidocaine, hydrALAZINE, lactulose, docusate sodium, bisacodyl EC, magnesium hydroxide, carboxymethylcellulose, benzocaine-menthol, mag hydrox-al hydrox-simeth, ondansetron **OR** ondansetron, traZODone, sodium chloride, acetaminophen, oxyCODONE immediate-release **OR** oxyCODONE immediate-release, senna-docusate **AND** polyethylene glycol/lytes    Diet:  Current Diet Order   Procedures    Diet Order Diet: Renal; Second Modifier: (optional): Cardiac       Medical Decision Making and Plan:  S/p left popliteal to posterior tibial artery bypass Dr. Miller 1/20/2025  PT and OT for mobility and ADLs. Per guidelines, 15 hours per week between PT, OT and/or SLP.  Follow-up vascular surgery  Continue Eliquis and Plavix  Continue statin    1/24 Prevena saturation. Wound consult. Vasc surg to eval as well. Prevena now working.      Type 2 diabetes mellitus with hyperglycemia -outpatient medication had been Januvia 1 tablet twice daily     Pain -Tylenol and oxycodone as needed.  Scheduled Robaxin, gabapentin.  Schedule oxycodone 5x daily, morphine contraindicated based on renal function. 1/29 Reduce Oxycodone to QID.      Anemia - 1/24 Improving. 1/27 Drop to 8's, had a lot of OP from Prevena last week. 1/29 7.7 - expected given prevena OP. Monitor need for transfusion.     Thrombocytopenia - Mild stable monitor 1/24     ESRD on PD -nephrology following.  On sevelamer    Hypermagnesemia - Nephro managing    Hyperphosphatemia - Nephro managing      History of moderate aortic stenosis -stable     Hypertension -on Norvasc 2.5 mg every morning (OFF), Lasix 40 mg twice daily (Now torsemide), Metoprolol XL 100mg. 1/28 Amlodipine being held several days now. Lasix has been given only 3x past six days. D/w Nephro. Hold bladder meds. Monitor UOP though minimal. 1/29 BP stable low, on torsemide.      CAD s/p SYDNEE 3x -on aspirin and statin.  Had been on Imdur 30 mg daily     Gout - No meds at home.       Hyperlipidemia -continue statin     Recent left inguinal hernia repair -2024 Dr. Miller     Bowel - Patient on Senna-docusate for constipation prophylaxis.  Constipation.  BM.     Nausea & Vomiting - Only after lunch. Get KUB - WNL. Schedule Zofran after lunch.      BPH - TV/PVR/BS PRN.  Continue Flomax and Hytrin.  Hold due to low BP. Favor diuresis.  Low PVRs.      Insomnia -continue trazodone at night        Upcoming Labs/imagin/29     DVT PROPHYLAXIS: On Eliquis     HOSPITALIST FOLLOWING: No    Consultants following: Nephrology     CODE STATUS: Full code     DISPO: Home with spouse     SHAE: 25     MEDS SENT TO: [facility]     DISCHARGE SPECIALIST FOLLOW UP: Vascular surgery and other specialists as previously scheduled     Patient to scheduled follow up with their PCP within 2 weeks from discharge from the [facility].   ____________________________________    Dr. Mere Klein DO, MS  [facility] - Physical Medicine & Rehabilitation   ____________________________________

## 2025-01-29 NOTE — THERAPY
Occupational Therapy  Daily Treatment     Patient Name: Edy Bennett  Age:  79 y.o., Sex:  male  Medical Record #: [id number]  Today's Date: 1/29/2025     Precautions  Precautions: (P) Fall Risk  Comments: (P) LLE wound Vac         Subjective    Pt was in oconnell position in bed awake and agreeable for OT session.      Objective       01/29/25 0831   OT Charge Group   OT Therapeutic Exercise (Units) 2   OT Total Time Spent   OT Individual Total Time Spent (Mins) 30   Precautions   Precautions Fall Risk   Comments LLE wound Vac   Functional Level of Assist   Bed, Chair, Wheelchair Transfer Contact Guard Assist   Bed Chair Wheelchair Transfer Description Increased time;Set-up of equipment;Squat pivot transfer to wheelchair   Sitting Lower Body Exercises   Nustep Resistance Level 1  (10 min w/ 1 rest break for water.)   Bed Mobility    Supine to Sit Contact Guard Assist   Scooting Standby Assist   Interdisciplinary Plan of Care Collaboration   IDT Collaboration with  Family / Caregiver   Patient Position at End of Therapy Seated;Chair Alarm On;Call Light within Reach;Tray Table within Reach;Phone within Reach;Family / Friend in Room   Collaboration Comments wife present at end of session.      CGA for squat piviot transfer from w/c<>nunstep.     Assessment    Pt tolerated nunstep well w/ initially only using RLE and BUE to assist in ROM for LLE, however after 5 min pt was able to put a little weight on LLE w/out c/o increase pain or discomfort.        Plan    Consult with wound RN regarding showering  ADL, IADL, DME, strengthening, balance     DME  OT DME Recommendations  Bathroom Equipment:  (TBD)    Passport items to be completed:  Perform bathroom transfers, complete dressing, complete feeding, get ready for the day, prepare a simple meal, participate in household tasks, adapt home for safety needs, demonstrate home exercise program, complete caregiver training     Occupational Therapy Goals (Active)        Problem: Dressing       Dates: Start:  01/24/25         Goal: STG-Within one week, patient will dress LB with min A       Dates: Start:  01/24/25               Problem: OT Long Term Goals       Dates: Start:  01/24/25         Goal: LTG-By discharge, patient will complete basic self care tasks mod I       Dates: Start:  01/24/25            Goal: LTG-By discharge, patient will perform bathroom transfers mod I       Dates: Start:  01/24/25               Problem: Toileting       Dates: Start:  01/24/25         Goal: STG-Within one week, patient will complete toileting tasks with min A        Dates: Start:  01/24/25

## 2025-01-29 NOTE — PROGRESS NOTES
NURSING DAILY NOTE    Name: Edy Bennett   Date of Admission: 1/23/2025   Admitting Diagnosis: PAD (peripheral artery disease) ([facility])  Attending Physician: ROSANNA PATEL D.O.  Allergies: Patient has no known allergies.    Safety  Patient Assist  max1  Patient Precautions  Fall Risk  Precaution Comments  LLE wound vac  Bed Transfer Status  Contact Guard Assist  Toilet Transfer Status   Contact Guard Assist (stand turn with grab bar. Pt offloading weight completely from left leg and using right leg only to transfer)  Assistive Devices  Rails, Wheelchair  Oxygen  Nasal Cannula  Diet/Therapeutic Dining  Current Diet Order   Procedures    Diet Order Diet: Renal; Second Modifier: (optional): Cardiac     Pill Administration  whole  Agitated Behavioral Scale     ABS Level of Severity       Fall Risk  Has the patient had a fall this admission?   No  Trinh Spivey Fall Risk Scoring  22, HIGH RISK  Fall Risk Safety Measures  bed alarm, chair alarm, poor balance, and low vision/ hearing    Vitals  Temperature: 37 °C (98.6 °F)  Temp src: Oral  Pulse: 74  Respiration: 19  Blood Pressure : 115/56  Blood Pressure MAP (Calculated): 76 MM HG  BP Location: Right, Thigh  Patient BP Position: Supine     Oxygen  Pulse Oximetry: 98 %  O2 (LPM): 1  O2 Delivery Device: Nasal Cannula    Bowel and Bladder  Last Bowel Movement  01/27/25  Stool Type  Type 4: Like a sausage or snake, smooth and soft  Bowel Device     Continent  Bladder: Did not void   Bowel: No movement  Bladder Function  Urine Void (mL): 100 ml  Number of Times Voided: 1  Urine Color: Yellow  Genitourinary Assessment   Bladder Assessment (WDL):  WDL Except  Smith Catheter: Not Applicable  Urinary Symptoms: Anuric/ Dialysis Patient (occasional output)  Urine Color: Yellow  Bladder Device: Urinal  Bladder Scan: Post Void  $ Bladder Scan Results (mL): 143    Skin  Jalen Score   16  Sensory Interventions   Bed  Types: Low Airloss  Skin Preventative Measures: Pillows in Use for Support / Positioning  Moisture Interventions  Moisturizers/Barriers: Barrier Wipes      Pain  Pain Rating Scale  4 - Distracts me, can do usual activities  Pain Location  Leg  Pain Location Orientation  Left  Pain Interventions   Medication (see MAR)    ADLs    Bathing   Full Bed Bath  Linen Change   Complete  Personal Hygiene     Chlorhexidine Bath   Not Completed - Off Floor  Oral Care     Teeth/Dentures     Shave     Nutrition Percentage Eaten  Breakfast, Between 50-75% Consumed  Environmental Precautions  Treaded Slipper Socks on Patient, Personal Belongings, Wastebasket, Call Bell etc. in Easy Reach, Bed in Low Position  Patient Turns/Positioning  Patient turns self independently side to side without assistance, to offload sacral area  Patient Turns Assistance/Tolerance  Assistance of One  Bed Positions  Bed Controls On, Bed Locked  Head of Bed Elevated  Self regulated      Psychosocial/Neurologic Assessment  Psychosocial Assessment  Psychosocial (WDL):  Within Defined Limits  Patient Behaviors: Anxious  Family Behaviors: Family Present  Neurologic Assessment  Neuro (WDL): Within Defined Limits  Level of Consciousness: Alert  Orientation Level: Oriented X4  Cognition: Follows commands, Appropriate attention/concentration  Speech: Clear  Pupil Assesment: No  Motor Function/Sensation Assessment: Sensation  RUE Sensation: Full sensation  LUE Sensation: Full sensation  RLE Sensation: Full sensation  LLE Sensation: Full sensation, Pain  EENT (WDL):  WDL Except    Cardio/Pulmonary Assessment  Edema   LLE Edema: 1+, Generalized  Respiratory Breath Sounds  RUL Breath Sounds: Clear  RML Breath Sounds: Clear  RLL Breath Sounds: Diminished  ANAYELI Breath Sounds: Clear  LLL Breath Sounds: Diminished  Cardiac Assessment   Cardiac (WDL):  Within Defined Limits

## 2025-01-30 ENCOUNTER — APPOINTMENT (OUTPATIENT)
Dept: PHYSICAL THERAPY | Facility: REHABILITATION | Age: 80
DRG: 299 | End: 2025-01-30
Attending: PHYSICAL MEDICINE & REHABILITATION
Payer: MEDICARE

## 2025-01-30 ENCOUNTER — APPOINTMENT (OUTPATIENT)
Dept: OCCUPATIONAL THERAPY | Facility: REHABILITATION | Age: 80
DRG: 299 | End: 2025-01-30
Attending: PHYSICAL MEDICINE & REHABILITATION
Payer: MEDICARE

## 2025-01-30 LAB
ALBUMIN SERPL BCP-MCNC: 2.8 G/DL (ref 3.2–4.9)
ALBUMIN/GLOB SERPL: 1 G/DL
ALP SERPL-CCNC: 95 U/L (ref 30–99)
ALT SERPL-CCNC: 26 U/L (ref 2–50)
ANION GAP SERPL CALC-SCNC: 18 MMOL/L (ref 7–16)
AST SERPL-CCNC: 49 U/L (ref 12–45)
BASOPHILS # BLD AUTO: 0.5 % (ref 0–1.8)
BASOPHILS # BLD: 0.03 K/UL (ref 0–0.12)
BILIRUB SERPL-MCNC: 0.5 MG/DL (ref 0.1–1.5)
BUN SERPL-MCNC: 77 MG/DL (ref 8–22)
CALCIUM ALBUM COR SERPL-MCNC: 9.6 MG/DL (ref 8.5–10.5)
CALCIUM SERPL-MCNC: 8.6 MG/DL (ref 8.5–10.5)
CHLORIDE SERPL-SCNC: 89 MMOL/L (ref 96–112)
CO2 SERPL-SCNC: 24 MMOL/L (ref 20–33)
CREAT SERPL-MCNC: 8.47 MG/DL (ref 0.5–1.4)
EOSINOPHIL # BLD AUTO: 0.15 K/UL (ref 0–0.51)
EOSINOPHIL NFR BLD: 2.7 % (ref 0–6.9)
ERYTHROCYTE [DISTWIDTH] IN BLOOD BY AUTOMATED COUNT: 56.4 FL (ref 35.9–50)
GFR SERPLBLD CREATININE-BSD FMLA CKD-EPI: 6 ML/MIN/1.73 M 2
GLOBULIN SER CALC-MCNC: 2.8 G/DL (ref 1.9–3.5)
GLUCOSE SERPL-MCNC: 131 MG/DL (ref 65–99)
HCT VFR BLD AUTO: 23 % (ref 42–52)
HGB BLD-MCNC: 7.3 G/DL (ref 14–18)
IMM GRANULOCYTES # BLD AUTO: 0.04 K/UL (ref 0–0.11)
IMM GRANULOCYTES NFR BLD AUTO: 0.7 % (ref 0–0.9)
LYMPHOCYTES # BLD AUTO: 0.82 K/UL (ref 1–4.8)
LYMPHOCYTES NFR BLD: 14.7 % (ref 22–41)
MCH RBC QN AUTO: 31.5 PG (ref 27–33)
MCHC RBC AUTO-ENTMCNC: 31.7 G/DL (ref 32.3–36.5)
MCV RBC AUTO: 99.1 FL (ref 81.4–97.8)
MONOCYTES # BLD AUTO: 0.48 K/UL (ref 0–0.85)
MONOCYTES NFR BLD AUTO: 8.6 % (ref 0–13.4)
NEUTROPHILS # BLD AUTO: 4.07 K/UL (ref 1.82–7.42)
NEUTROPHILS NFR BLD: 72.8 % (ref 44–72)
NRBC # BLD AUTO: 0 K/UL
NRBC BLD-RTO: 0 /100 WBC (ref 0–0.2)
PHOSPHATE SERPL-MCNC: 7.2 MG/DL (ref 2.5–4.5)
PLATELET # BLD AUTO: 198 K/UL (ref 164–446)
PMV BLD AUTO: 10.7 FL (ref 9–12.9)
POTASSIUM SERPL-SCNC: 4 MMOL/L (ref 3.6–5.5)
PROT SERPL-MCNC: 5.6 G/DL (ref 6–8.2)
RBC # BLD AUTO: 2.32 M/UL (ref 4.7–6.1)
SODIUM SERPL-SCNC: 131 MMOL/L (ref 135–145)
WBC # BLD AUTO: 5.6 K/UL (ref 4.8–10.8)

## 2025-01-30 PROCEDURE — 700102 HCHG RX REV CODE 250 W/ 637 OVERRIDE(OP): Performed by: NURSE PRACTITIONER

## 2025-01-30 PROCEDURE — 700102 HCHG RX REV CODE 250 W/ 637 OVERRIDE(OP): Performed by: INTERNAL MEDICINE

## 2025-01-30 PROCEDURE — 84100 ASSAY OF PHOSPHORUS: CPT

## 2025-01-30 PROCEDURE — 97116 GAIT TRAINING THERAPY: CPT

## 2025-01-30 PROCEDURE — 700102 HCHG RX REV CODE 250 W/ 637 OVERRIDE(OP): Performed by: PHYSICAL MEDICINE & REHABILITATION

## 2025-01-30 PROCEDURE — 80053 COMPREHEN METABOLIC PANEL: CPT

## 2025-01-30 PROCEDURE — A9270 NON-COVERED ITEM OR SERVICE: HCPCS | Performed by: INTERNAL MEDICINE

## 2025-01-30 PROCEDURE — 99232 SBSQ HOSP IP/OBS MODERATE 35: CPT | Performed by: PHYSICAL MEDICINE & REHABILITATION

## 2025-01-30 PROCEDURE — 770010 HCHG ROOM/CARE - REHAB SEMI PRIVAT*

## 2025-01-30 PROCEDURE — A9270 NON-COVERED ITEM OR SERVICE: HCPCS | Performed by: NURSE PRACTITIONER

## 2025-01-30 PROCEDURE — 97110 THERAPEUTIC EXERCISES: CPT

## 2025-01-30 PROCEDURE — 90945 DIALYSIS ONE EVALUATION: CPT

## 2025-01-30 PROCEDURE — A9270 NON-COVERED ITEM OR SERVICE: HCPCS | Performed by: PHYSICAL MEDICINE & REHABILITATION

## 2025-01-30 PROCEDURE — 94760 N-INVAS EAR/PLS OXIMETRY 1: CPT

## 2025-01-30 PROCEDURE — 97535 SELF CARE MNGMENT TRAINING: CPT

## 2025-01-30 PROCEDURE — 36415 COLL VENOUS BLD VENIPUNCTURE: CPT

## 2025-01-30 PROCEDURE — 700111 HCHG RX REV CODE 636 W/ 250 OVERRIDE (IP): Mod: JZ | Performed by: PHYSICAL MEDICINE & REHABILITATION

## 2025-01-30 PROCEDURE — 85025 COMPLETE CBC W/AUTO DIFF WBC: CPT

## 2025-01-30 RX ORDER — ONDANSETRON 4 MG/1
4 TABLET, ORALLY DISINTEGRATING ORAL
Status: DISCONTINUED | OUTPATIENT
Start: 2025-01-30 | End: 2025-02-03

## 2025-01-30 RX ORDER — SEVELAMER CARBONATE 800 MG/1
800 TABLET, FILM COATED ORAL PRN
Status: DISCONTINUED | OUTPATIENT
Start: 2025-01-30 | End: 2025-02-14 | Stop reason: HOSPADM

## 2025-01-30 RX ORDER — METHOCARBAMOL 500 MG/1
250 TABLET, FILM COATED ORAL 4 TIMES DAILY
Status: DISCONTINUED | OUTPATIENT
Start: 2025-01-30 | End: 2025-02-03

## 2025-01-30 RX ORDER — SEVELAMER CARBONATE 800 MG/1
1600 TABLET, FILM COATED ORAL
Status: DISCONTINUED | OUTPATIENT
Start: 2025-01-30 | End: 2025-02-03

## 2025-01-30 RX ORDER — SEVELAMER CARBONATE 800 MG/1
2400 TABLET, FILM COATED ORAL
Status: DISCONTINUED | OUTPATIENT
Start: 2025-01-30 | End: 2025-01-30

## 2025-01-30 RX ORDER — OXYCODONE HYDROCHLORIDE 5 MG/1
5 TABLET ORAL 3 TIMES DAILY
Status: DISCONTINUED | OUTPATIENT
Start: 2025-01-30 | End: 2025-02-03

## 2025-01-30 RX ADMIN — EPOETIN ALFA-EPBX 4000 UNITS: 4000 INJECTION, SOLUTION INTRAVENOUS; SUBCUTANEOUS at 17:17

## 2025-01-30 RX ADMIN — ONDANSETRON 4 MG: 4 TABLET, ORALLY DISINTEGRATING ORAL at 11:44

## 2025-01-30 RX ADMIN — Medication 324 MG: at 15:07

## 2025-01-30 RX ADMIN — OXYCODONE 5 MG: 5 TABLET ORAL at 05:43

## 2025-01-30 RX ADMIN — GUAIFENESIN 600 MG: 600 TABLET ORAL at 22:08

## 2025-01-30 RX ADMIN — GENTAMICIN SULFATE: 1 CREAM TOPICAL at 21:20

## 2025-01-30 RX ADMIN — SENNOSIDES AND DOCUSATE SODIUM 2 TABLET: 50; 8.6 TABLET ORAL at 22:08

## 2025-01-30 RX ADMIN — OXYCODONE 5 MG: 5 TABLET ORAL at 15:07

## 2025-01-30 RX ADMIN — SEVELAMER CARBONATE 1600 MG: 800 TABLET, FILM COATED ORAL at 11:44

## 2025-01-30 RX ADMIN — ROSUVASTATIN CALCIUM 20 MG: 10 TABLET, FILM COATED ORAL at 22:08

## 2025-01-30 RX ADMIN — OMEPRAZOLE 20 MG: 20 CAPSULE, DELAYED RELEASE ORAL at 07:54

## 2025-01-30 RX ADMIN — GABAPENTIN 100 MG: 100 CAPSULE ORAL at 07:53

## 2025-01-30 RX ADMIN — APIXABAN 5 MG: 5 TABLET, FILM COATED ORAL at 22:07

## 2025-01-30 RX ADMIN — ONDANSETRON 4 MG: 4 TABLET, ORALLY DISINTEGRATING ORAL at 17:18

## 2025-01-30 RX ADMIN — METHOCARBAMOL 250 MG: 500 TABLET ORAL at 17:17

## 2025-01-30 RX ADMIN — METHOCARBAMOL 500 MG: 500 TABLET ORAL at 12:58

## 2025-01-30 RX ADMIN — OXYCODONE 5 MG: 5 TABLET ORAL at 11:44

## 2025-01-30 RX ADMIN — GABAPENTIN 100 MG: 100 CAPSULE ORAL at 22:07

## 2025-01-30 RX ADMIN — SEVELAMER CARBONATE 1600 MG: 800 TABLET, FILM COATED ORAL at 07:53

## 2025-01-30 RX ADMIN — SEVELAMER CARBONATE 1600 MG: 800 TABLET, FILM COATED ORAL at 17:17

## 2025-01-30 RX ADMIN — SENNOSIDES AND DOCUSATE SODIUM 2 TABLET: 50; 8.6 TABLET ORAL at 08:18

## 2025-01-30 RX ADMIN — CLOPIDOGREL BISULFATE 75 MG: 75 TABLET ORAL at 07:53

## 2025-01-30 RX ADMIN — METHOCARBAMOL 500 MG: 500 TABLET ORAL at 07:53

## 2025-01-30 RX ADMIN — APIXABAN 5 MG: 5 TABLET, FILM COATED ORAL at 07:53

## 2025-01-30 RX ADMIN — METHOCARBAMOL 250 MG: 500 TABLET ORAL at 22:07

## 2025-01-30 RX ADMIN — MONTELUKAST 10 MG: 10 TABLET, FILM COATED ORAL at 22:08

## 2025-01-30 RX ADMIN — GUAIFENESIN 600 MG: 600 TABLET ORAL at 07:54

## 2025-01-30 ASSESSMENT — GAIT ASSESSMENTS
GAIT LEVEL OF ASSIST: STANDBY ASSIST
GAIT LEVEL OF ASSIST: CONTACT GUARD ASSIST
DISTANCE (FEET): 50
ASSISTIVE DEVICE: FRONT WHEEL WALKER
DISTANCE (FEET): 35
DEVIATION: ANTALGIC;BRADYKINETIC;STEP TO
ASSISTIVE DEVICE: FRONT WHEEL WALKER

## 2025-01-30 ASSESSMENT — PAIN DESCRIPTION - PAIN TYPE
TYPE: ACUTE PAIN

## 2025-01-30 ASSESSMENT — CHA2DS2 SCORE
PRIOR STROKE OR TIA OR THROMBOEMBOLISM: YES
DIABETES: NO
HYPERTENSION: YES
AGE 75 OR GREATER: YES
AGE 65 TO 74: NO
SEX: MALE
CHF OR LEFT VENTRICULAR DYSFUNCTION: YES
CHA2DS2 VASC SCORE: 7
VASCULAR DISEASE: YES

## 2025-01-30 NOTE — PROGRESS NOTES
"  Physical Medicine & Rehabilitation Progress Note    Encounter Date: 1/30/2025    Chief Complaint: PVD s/p bypass    Interval Events (Subjective):  Vital signs stable, blood pressure on the lower side but within normal limits  Voiding without retention  Bowel movement 1/29    Seen and examined in his room with wife present.  We discussed reducing oxycodone to scheduled 3 times a day he will still have it as needed.  He still has some swelling in the left lower extremity wife says vascular surgery was supposed to come over today but understands that they get busy.  He is still getting nauseous she is not sure if it is related to the dialysis.  Discussed that his x-ray looked good.  He is also concerned about hand tremors.      ROS: 14 point ROS negative unless otherwise specified in the HPI    Objective:  VITAL SIGNS: /63   Pulse 86   Temp 37.1 °C (98.7 °F) (Oral)   Resp 16   Ht 1.707 m (5' 7.2\")   Wt 73.1 kg (161 lb 3.2 oz)   SpO2 91%   BMI 25.10 kg/m²     GEN: No apparent distress  HEENT: Head normocephalic, atraumatic.  Sclera nonicteric bilaterally, no ocular discharge appreciated bilaterally.  CV: Extremities warm and well-perfused, + LLE edema. RLE without edema.  Erythema left lower extremity improving.  Petechiae present.  PULMONARY: Breathing nonlabored on room air, no respiratory accessory muscle use.  Not requiring supplemental oxygen.  SKIN: Vac with continued sanguinous output.   PSYCH: Mood and affect within normal limits.  NEURO: Awake alert.  Conversational.  Logical thought content.  flapping hand tremors      Laboratory Values:  Recent Results (from the past 72 hours)   CBC WITH DIFFERENTIAL    Collection Time: 01/29/25 10:46 AM   Result Value Ref Range    WBC 9.8 4.8 - 10.8 K/uL    RBC 2.41 (L) 4.70 - 6.10 M/uL    Hemoglobin 7.8 (L) 14.0 - 18.0 g/dL    Hematocrit 24.2 (L) 42.0 - 52.0 %    .4 (H) 81.4 - 97.8 fL    MCH 32.4 27.0 - 33.0 pg    MCHC 32.2 (L) 32.3 - 36.5 g/dL    RDW " 58.4 (H) 35.9 - 50.0 fL    Platelet Count 202 164 - 446 K/uL    MPV 10.2 9.0 - 12.9 fL    Neutrophils-Polys 86.30 (H) 44.00 - 72.00 %    Lymphocytes 5.50 (L) 22.00 - 41.00 %    Monocytes 6.10 0.00 - 13.40 %    Eosinophils 1.00 0.00 - 6.90 %    Basophils 0.40 0.00 - 1.80 %    Immature Granulocytes 0.70 0.00 - 0.90 %    Nucleated RBC 0.00 0.00 - 0.20 /100 WBC    Neutrophils (Absolute) 8.41 (H) 1.82 - 7.42 K/uL    Lymphs (Absolute) 0.54 (L) 1.00 - 4.80 K/uL    Monos (Absolute) 0.60 0.00 - 0.85 K/uL    Eos (Absolute) 0.10 0.00 - 0.51 K/uL    Baso (Absolute) 0.04 0.00 - 0.12 K/uL    Immature Granulocytes (abs) 0.07 0.00 - 0.11 K/uL    NRBC (Absolute) 0.00 K/uL   Basic Metabolic Panel    Collection Time: 01/29/25 10:46 AM   Result Value Ref Range    Sodium 128 (L) 135 - 145 mmol/L    Potassium 3.8 3.6 - 5.5 mmol/L    Chloride 85 (L) 96 - 112 mmol/L    Co2 21 20 - 33 mmol/L    Glucose 150 (H) 65 - 99 mg/dL    Bun 72 (H) 8 - 22 mg/dL    Creatinine 8.55 (HH) 0.50 - 1.40 mg/dL    Calcium 8.4 (L) 8.5 - 10.5 mg/dL    Anion Gap 22.0 (H) 7.0 - 16.0   ESTIMATED GFR    Collection Time: 01/29/25 10:46 AM   Result Value Ref Range    GFR (CKD-EPI) 6 (A) >60 mL/min/1.73 m 2   Comp Metabolic Panel    Collection Time: 01/30/25  5:24 AM   Result Value Ref Range    Sodium 131 (L) 135 - 145 mmol/L    Potassium 4.0 3.6 - 5.5 mmol/L    Chloride 89 (L) 96 - 112 mmol/L    Co2 24 20 - 33 mmol/L    Anion Gap 18.0 (H) 7.0 - 16.0    Glucose 131 (H) 65 - 99 mg/dL    Bun 77 (H) 8 - 22 mg/dL    Creatinine 8.47 (HH) 0.50 - 1.40 mg/dL    Calcium 8.6 8.5 - 10.5 mg/dL    Correct Calcium 9.6 8.5 - 10.5 mg/dL    AST(SGOT) 49 (H) 12 - 45 U/L    ALT(SGPT) 26 2 - 50 U/L    Alkaline Phosphatase 95 30 - 99 U/L    Total Bilirubin 0.5 0.1 - 1.5 mg/dL    Albumin 2.8 (L) 3.2 - 4.9 g/dL    Total Protein 5.6 (L) 6.0 - 8.2 g/dL    Globulin 2.8 1.9 - 3.5 g/dL    A-G Ratio 1.0 g/dL   CBC WITH DIFFERENTIAL    Collection Time: 01/30/25  5:24 AM   Result Value Ref Range     WBC 5.6 4.8 - 10.8 K/uL    RBC 2.32 (L) 4.70 - 6.10 M/uL    Hemoglobin 7.3 (L) 14.0 - 18.0 g/dL    Hematocrit 23.0 (L) 42.0 - 52.0 %    MCV 99.1 (H) 81.4 - 97.8 fL    MCH 31.5 27.0 - 33.0 pg    MCHC 31.7 (L) 32.3 - 36.5 g/dL    RDW 56.4 (H) 35.9 - 50.0 fL    Platelet Count 198 164 - 446 K/uL    MPV 10.7 9.0 - 12.9 fL    Neutrophils-Polys 72.80 (H) 44.00 - 72.00 %    Lymphocytes 14.70 (L) 22.00 - 41.00 %    Monocytes 8.60 0.00 - 13.40 %    Eosinophils 2.70 0.00 - 6.90 %    Basophils 0.50 0.00 - 1.80 %    Immature Granulocytes 0.70 0.00 - 0.90 %    Nucleated RBC 0.00 0.00 - 0.20 /100 WBC    Neutrophils (Absolute) 4.07 1.82 - 7.42 K/uL    Lymphs (Absolute) 0.82 (L) 1.00 - 4.80 K/uL    Monos (Absolute) 0.48 0.00 - 0.85 K/uL    Eos (Absolute) 0.15 0.00 - 0.51 K/uL    Baso (Absolute) 0.03 0.00 - 0.12 K/uL    Immature Granulocytes (abs) 0.04 0.00 - 0.11 K/uL    NRBC (Absolute) 0.00 K/uL   PHOSPHORUS    Collection Time: 01/30/25  5:24 AM   Result Value Ref Range    Phosphorus 7.2 (H) 2.5 - 4.5 mg/dL   ESTIMATED GFR    Collection Time: 01/30/25  5:24 AM   Result Value Ref Range    GFR (CKD-EPI) 6 (A) >60 mL/min/1.73 m 2       Medications:  Scheduled Medications   Medication Dose Frequency    epoetin  4,000 Units TUE+THU+SAT    sevelamer carbonate  1,600 mg TID WITH MEALS    ondansetron  4 mg AFTER LUNCH    oxyCODONE immediate-release  5 mg 4X/DAY    torsemide  10 mg Q DAY    ferrous gluconate  324 mg Q48HRS    Pharmacy Consult Request  1 Each PHARMACY TO DOSE    omeprazole  20 mg DAILY    apixaban  5 mg BID    clopidogrel  75 mg DAILY    gabapentin  100 mg BID    guaiFENesin ER  600 mg Q12HRS    methocarbamol  500 mg 4X/DAY    metoprolol SR  100 mg QHS    montelukast  10 mg QHS    rosuvastatin  20 mg QHS    [Held by provider] tamsulosin  0.4 mg QAM    [Held by provider] terazosin  5 mg QHS    senna-docusate  2 Tablet BID    gentamicin   DAILY AT 1800     PRN medications: sevelamer carbonate, lidocaine, hydrALAZINE,  lactulose, docusate sodium, bisacodyl EC, magnesium hydroxide, carboxymethylcellulose, benzocaine-menthol, mag hydrox-al hydrox-simeth, ondansetron **OR** ondansetron, traZODone, sodium chloride, acetaminophen, oxyCODONE immediate-release **OR** oxyCODONE immediate-release, senna-docusate **AND** polyethylene glycol/lytes    Diet:  Current Diet Order   Procedures    Diet Order Diet: Renal; Second Modifier: (optional): Cardiac       Medical Decision Making and Plan:  S/p left popliteal to posterior tibial artery bypass Dr. Miller 1/20/2025  PT and OT for mobility and ADLs. Per guidelines, 15 hours per week between PT, OT and/or SLP.  Follow-up vascular surgery  Continue Eliquis and Plavix  Continue statin    1/24 Prevena saturation. Wound consult. Vasc surg to eval as well. Prevena now working.   1/30 vascular to change dressing at some point per patient's wife     Type 2 diabetes mellitus with hyperglycemia -outpatient medication had been Januvia 1 tablet twice daily     Pain -Tylenol and oxycodone as needed.  Scheduled Robaxin, gabapentin.  Schedule oxycodone 5x daily, morphine contraindicated based on renal function. 1/29 Reduce Oxycodone to QID. 1/30 reduce oxycodone to scheduled 3 times daily.  Decrease methocarbamol to 250 mg 4 times daily.    Last as needed oxycodone use was 1/24     Anemia - 1/24 Improving. 1/27 Drop to 8's, had a lot of OP from Prevena last week. 1/29 7.7 - expected given prevena OP. Monitor need for transfusion.  1/30 hemoglobin dropped to 7.3.  Discussed with nephrology, they deferred to us to transfuse.  Transfuse less than 7.  Prevena putting out less.    Thrombocytopenia - M resolved 1/30     ESRD on PD -nephrology following.  On sevelamer.  Nephrology increased fill 1/30.    Hypermagnesemia - Nephro managing    Hyperphosphatemia - Nephro managing      History of moderate aortic stenosis -stable     Hypertension -on Norvasc 2.5 mg every morning (OFF), Lasix 40 mg twice daily (Now  torsemide), Metoprolol XL 100mg. 1/28 Amlodipine being held several days now. Lasix has been given only 3x past six days. D/w Nephro. Hold bladder meds. Monitor UOP though minimal. 1/29 BP stable low, on torsemide.  1/30 torsemide still not on to be held due to low blood pressure parameters per nephrology hold if less than 120.     CAD s/p SYDNEE 3x -on aspirin and statin.  Had been on Imdur 30 mg daily     Gout - No meds at home.      Hyperlipidemia -continue statin     Recent left inguinal hernia repair -12/18/2024 Dr. Miller     Bowel - Patient on Senna-docusate for constipation prophylaxis. 1/23 Constipation. 1/29 BM.     Nausea & Vomiting - Only after lunch. Get KUB - WNL. Schedule Zofran after lunch.  Scheduled Zofran after each meal.    Hand tremors -LFTs nonconcerning for etiology.  Possibly med induced.     BPH - TV/PVR/BS PRN.  Continue Flomax and Hytrin. 1/28 Hold due to low BP. Favor diuresis. 1/29 Low PVRs.      Insomnia -continue trazodone at night as needed        Upcoming Labs/imaging: Per nephrology     DVT PROPHYLAXIS: On EliRUST     HOSPITALIST FOLLOWING: No    Consultants following: Nephrology     CODE STATUS: Full code     DISPO: Home with spouse     SHAE: 2/7/25     MEDS SENT TO: [facility]     DISCHARGE SPECIALIST FOLLOW UP: Vascular surgery and other specialists as previously scheduled     Patient to scheduled follow up with their PCP within 2 weeks from discharge from the [facility].   ____________________________________    Dr. Mere Klein DO, MS  [facility] - Physical Medicine & Rehabilitation   ____________________________________

## 2025-01-30 NOTE — PROGRESS NOTES
[facility] Progress Notes    CCPD ordered by Dr. Villalobos. Disconnected aseptically from PD Cycler at 0635.    Pt stable, VSS, alert and oriented.  Effluent clear and yellow, no signs of bleeding/fibrin clots.  No alarms on machine was noted or reported before disconnection.    24 hour UF= 339mL (I.Drain= 16mL + Total UF= 323mL - Last fill= 0)    Report given to Sherrell ARANDA

## 2025-01-30 NOTE — PROGRESS NOTES
[facility] Progress Note     CCPD x 8.5 hours  1100 mL fills x 7 cycles   using 1.5 % PD solution on heater  2.5% solution for secondary bag   ordered per NIHARIKA Cline     Orders reviewed, verified and updated, CCPD cycler therapy settings verified.     CCPD treatment initiated at 2040 without any issues.    Patient and PD access assessed prior to therapy.    Patient Aox 4, resting calmly, interactive, (-) abd distention, denies pain/abd discomfort, stable VS.    Patient denies pain. 0 out of 10    Patient with intact and patent RLQ PD catheter aseptically connected per policy.    RLQ PD catheter and exit site in perfect condition, no signs of infection noted, cleansed with antiseptic solution and dressings changed per policy. Gentamicin applied to exit site.     Initial drain: 16 mL   Clear, yellow, no fibrin.     Report/Inservice given  Mary Ellen Rojas, RN,    nocturnal nurse.     Patient on dwell 1/7 prior to departure from bedside.     Please contact on call dialysis RN for any issues with persistent alarms/assistance with troubleshooting or patient not tolerating therapy.      For on-call Dialysis RN, pls contact [facility] at [phone].

## 2025-01-30 NOTE — THERAPY
Occupational Therapy  Daily Treatment     Patient Name: Edy Bennett  Age:  79 y.o., Sex:  male  Medical Record #: [id number]  Today's Date: 1/30/2025     Precautions  Precautions: Fall Risk  Comments: LLE wound vac    Safety   ADL Safety : (P) Requires Physical Assist for Safety  Bathroom Safety: (P) Requires Physical Assist for Safety    Subjective    Pt willing to try a shower this afternoon. Pt's wife states that the vascular surgeon will be in later this afternoon to change dressing.   Objective       01/30/25 1401   OT Charge Group   OT Self Care / ADL (Units) 4   OT Total Time Spent   OT Individual Total Time Spent (Mins) 60   Precautions   Precautions Fall Risk   Comments LLE wound vac   Safety    ADL Safety  Requires Physical Assist for Safety   Bathroom Safety Requires Physical Assist for Safety   Functional Level of Assist   Grooming Modified Independent;Seated  (seated in shower to brush hair)   Bathing Minimal Assist   Bathing Description Assit with back;Set-up of equipment;Increased time;Initial preparation for task;Set up for wound protection;Supervision for safety;Hand held shower;Tub bench;Grab bar  (OT held shower head for patient)   Upper Body Dressing Modified Independent   Lower Body Dressing Moderate Assist   Lower Body Dressing Description Assist with threading into pant leg  (assist with pants over hips. assist with feeding wound vac throug pant leg)   Tub / Shower Transfers Contact Guard Assist  (WC to shower bench. Pt had a loss of balance during transfer but was able to catch himself)         Assessment    Pt seen for shower session. First time showering since surgery. Pt needed assist for back and setup in shower, but able to do most of shower himself. Needing assist with lower body dressing due to decreased mobility of L leg and difficulty with L ankle movement (due to swelling).        Plan    ADL, AE, IADL, strength, balance, transfers    DME  OT DME Recommendations  Bathroom  Equipment:  (TBD)    Passport items to be completed:  Perform bathroom transfers, complete dressing, complete feeding, get ready for the day, prepare a simple meal, participate in household tasks, adapt home for safety needs, demonstrate home exercise program, complete caregiver training     Occupational Therapy Goals (Active)       Problem: Dressing       Dates: Start:  01/24/25         Goal: STG-Within one week, patient will dress LB with min A       Dates: Start:  01/24/25               Problem: OT Long Term Goals       Dates: Start:  01/24/25         Goal: LTG-By discharge, patient will complete basic self care tasks mod I       Dates: Start:  01/24/25            Goal: LTG-By discharge, patient will perform bathroom transfers mod I       Dates: Start:  01/24/25               Problem: Toileting       Dates: Start:  01/24/25         Goal: STG-Within one week, patient will complete toileting tasks with min A        Dates: Start:  01/24/25

## 2025-01-30 NOTE — FLOWSHEET NOTE
01/30/25 0724   Events/Summary/Plan   Events/Summary/Plan RA pulse ox check   Vital Signs   Pulse 87   Respiration 18   Pulse Oximetry 96 %   $ Pulse Oximetry (Spot Check) Yes   Respiratory Assessment   Level of Consciousness Alert   Chest Exam   Work Of Breathing / Effort Within Normal Limits   Oxygen   O2 Delivery Device Room air w/o2 available

## 2025-01-30 NOTE — CARE PLAN
Problem: Pain - Standard  Goal: Alleviation of pain or a reduction in pain to the patient’s comfort goal  Outcome: Progressing     Problem: Skin Integrity  Goal: Skin integrity is maintained or improved  Outcome: Progressing     Problem: Fall Risk - Rehab  Goal: Patient will remain free from falls  Outcome: Progressing     The patient is Stable - Low risk of patient condition declining or worsening    Shift Goals  Clinical Goals: Safety  Patient Goals: rest ; pain control  Family Goals:update w/ POC

## 2025-01-30 NOTE — THERAPY
Physical Therapy   Daily Treatment     Patient Name: Edy Bennett  Age:  79 y.o., Sex:  male  Medical Record #: [id number]  Today's Date: 1/30/2025     Precautions  Precautions: Fall Risk  Comments: LLE wound Vac    Subjective    Patient has no new complaints, wife spoke with old dialysis center and they are thinking the bouts of nausea and vomiting are stemming from not having enough dialysis time     Objective       01/30/25 0931   PT Charge Group   PT Gait Training (Units) 2   PT Therapeutic Exercise (Units) 2   PT Total Time Spent   PT Individual Total Time Spent (Mins) 60   Gait Functional Level of Assist    Gait Level Of Assist Standby Assist   Assistive Device Front Wheel Walker   Distance (Feet) 50   # of Times Distance was Traveled 2   Deviation Antalgic;Bradykinetic;Step To;Other (Comment)  (tends to keep left knee in flexion - did pre gait work of lateral weight shifting and this helped with quality of gait)   Transfer Functional Level of Assist   Bed, Chair, Wheelchair Transfer Contact Guard Assist   Bed Chair Wheelchair Transfer Description Other (comment)  (stand pivot with FWW)   Supine Lower Body Exercise   Straight Leg Raises 1 set of 10;Left   Heel Slide 1 set of 10;Left   Ankle Pumps 2 sets of 10;Left   Other Exercises belt assisted left calf stretch 5 x 20 seconds   Sitting Lower Body Exercises   Long Arc Quad 2 sets of 10;Left   Marching 2 sets of 10   Standing Lower Body Exercises   Other Exercises in bars, front lunge onto 7 inch block with left LE 2 x 10   Bed Mobility    Supine to Sit Supervised   Sit to Supine Supervised   Sit to Stand Standby Assist   Scooting Supervised   Interdisciplinary Plan of Care Collaboration   Patient Position at End of Therapy Family / Friend in Room;Seated;Chair Alarm On         Assessment    Patient was starting to feel nauseated at very end of session, no vomiting, did his best by far today in terms of quality and distance of walking, far less pain with  "left LE movements    Strengths: Able to follow instructions, Alert and oriented, Independent prior level of function, Supportive family, Pleasant and cooperative, Motivated for self care and independence  Barriers: Pain, Pain poorly managed, Limited mobility    Plan    LE mobility exercises in supine and sitting to prep soft tissues for functional movements, progressive gait in parallel bars and walker    DME  PT DME Recommendations  Wheelchair: 16\" Width, Elevating Leg Rests  Cushion: Standard    Passport items to be completed:  Get in/out of bed safely, in/out of a vehicle, safely use mobility device, walk or wheel around home/community, navigate up and down stairs, show how to get up/down from the ground, ensure home is accessible, demonstrate HEP, complete caregiver training    Physical Therapy Problems (Active)       Problem: Mobility       Dates: Start:  01/24/25         Goal: STG-Within one week, patient will ambulate 10 feet with FWW CGA       Dates: Start:  01/24/25               Problem: Mobility Transfers       Dates: Start:  01/24/25         Goal: STG-Within one week, patient will perform bed mobility with minimal assist to manage legs        Dates: Start:  01/24/25            Goal: STG-Within one week, patient will sit to stand with minimal assist and FWW       Dates: Start:  01/24/25               Problem: PT-Long Term Goals       Dates: Start:  01/24/25         Goal: LTG-By discharge, patient will ambulate with  feet SBA       Dates: Start:  01/24/25            Goal: LTG-By discharge, patient will transfer one surface to another with FWW supervised       Dates: Start:  01/24/25            Goal: LTG-By discharge, patient will perform home exercise program       Dates: Start:  01/24/25            Goal: LTG-By discharge, patient will ambulate up/down 4-6 stairs with bilateral rails supervised        Dates: Start:  01/24/25            Goal: LTG-By discharge, patient will transfer in/out of a car " with FWW supervised        Dates: Start:  01/24/25

## 2025-01-30 NOTE — CARE PLAN
The patient is Stable - Low risk of patient condition declining or worsening    Shift Goals  Clinical Goals: safety, monitor skin integrity, dialysis, pain management  Patient Goals: rest and sleep  Family Goals: n/a      Problem: Knowledge Deficit - Standard  Goal: Patient and family/care givers will demonstrate understanding of plan of care, disease process/condition, diagnostic tests and medications  Outcome: Progressing     Problem: Pain - Standard  Goal: Alleviation of pain or a reduction in pain to the patient’s comfort goal  Outcome: Progressing     Problem: Skin Integrity  Goal: Skin integrity is maintained or improved  Outcome: Progressing     Problem: Fall Risk - Rehab  Goal: Patient will remain free from falls  Outcome: Progressing     Problem: Dialysis  Goal: Patient will maintain stable vital signs and fluid balance  Outcome: Progressing

## 2025-01-30 NOTE — THERAPY
Physical Therapy   Daily Treatment     Patient Name: Edy Bennett  Age:  79 y.o., Sex:  male  Medical Record #: [id number]  Today's Date: 1/30/2025     Precautions  Precautions: Fall Risk  Comments: LLE wound Vac    Subjective    Patient reports that vascular surgeon will be in later this afternoon to change the Prevena dressing      Objective       01/30/25 1301   PT Charge Group   PT Gait Training (Units) 1   PT Therapeutic Exercise (Units) 1   PT Total Time Spent   PT Individual Total Time Spent (Mins) 30   Gait Functional Level of Assist    Gait Level Of Assist Contact Guard Assist   Assistive Device Front Wheel Walker   Distance (Feet) 35   # of Times Distance was Traveled 1   Deviation Antalgic;Bradykinetic;Step To  (tends to keep left knee in flexion - did pre gait work of lateral weight shifting and this helped with quality of gait. Had 1 self corrected LOB on 180 degree turn)   Transfer Functional Level of Assist   Bed, Chair, Wheelchair Transfer Contact Guard Assist   Bed Chair Wheelchair Transfer Description Other (comment)  (stand pivot with FWW)   Sitting Lower Body Exercises   Long Arc Quad 2 sets of 10;Left   Other Exercises seated heel raises 2 x 10, belt calf stretch 5 x 20 seconds   Bed Mobility    Supine to Sit Supervised   Sit to Supine Supervised   Sit to Stand Standby Assist   Scooting Supervised   Interdisciplinary Plan of Care Collaboration   Patient Position at End of Therapy Family / Friend in Room;Seated;Self Releasing Lap Belt Applied     Performed standing lateral weight shifting with emphasis on achieving full left knee extension in standing     Assessment    Patient has some persistent limitations in tolerance to terminal knee extension on the left in weightbearing due to surgical site pain and limited posterior chain/heel cord length. Very bradykinetic, Limited by fatigue. Fortunately no emesis following this PM session for the first time in 3 days    Strengths: Able to follow  "instructions, Alert and oriented, Independent prior level of function, Supportive family, Pleasant and cooperative, Motivated for self care and independence  Barriers: Pain, Pain poorly managed, Limited mobility    Plan    LE mobility exercises in supine and sitting to prep soft tissues for functional movements, progressive gait in parallel bars and walker, practice car transfers     DME  PT DME Recommendations  Wheelchair: 16\" Width, Elevating Leg Rests  Cushion: Standard    Passport items to be completed:  Get in/out of bed safely, in/out of a vehicle, safely use mobility device, walk or wheel around home/community, navigate up and down stairs, show how to get up/down from the ground, ensure home is accessible, demonstrate HEP, complete caregiver training    Physical Therapy Problems (Active)       Problem: Mobility       Dates: Start:  01/24/25         Goal: STG-Within one week, patient will ambulate 10 feet with FWW CGA       Dates: Start:  01/24/25               Problem: Mobility Transfers       Dates: Start:  01/24/25         Goal: STG-Within one week, patient will perform bed mobility with minimal assist to manage legs        Dates: Start:  01/24/25            Goal: STG-Within one week, patient will sit to stand with minimal assist and FWW       Dates: Start:  01/24/25               Problem: PT-Long Term Goals       Dates: Start:  01/24/25         Goal: LTG-By discharge, patient will ambulate with  feet SBA       Dates: Start:  01/24/25            Goal: LTG-By discharge, patient will transfer one surface to another with FWW supervised       Dates: Start:  01/24/25            Goal: LTG-By discharge, patient will perform home exercise program       Dates: Start:  01/24/25            Goal: LTG-By discharge, patient will ambulate up/down 4-6 stairs with bilateral rails supervised        Dates: Start:  01/24/25            Goal: LTG-By discharge, patient will transfer in/out of a car with FWW supervised   "      Dates: Start:  01/24/25

## 2025-01-30 NOTE — PROGRESS NOTES
[facility] -  PROGRESS NOTE               Author: IDA Cline Date & Time: 1/30/2025  11:45 AM     HPI:  79 y.o. male with HTN, ESRD on PD, PVD/ gangrenous left foot, BPH admitted for monitoring s/p  left leg bypass surgery on 1/20 with Dr. Miller. He recently underwent hernial repair and was temporarily transferred to in center HD via [facility]. He has returned to  PD  4 days ago, doing low fill.volumes and has been tolerating at home.   Patient was connected to cycler last night. Total UF reported to be 276ml. Reports of low drain UF alarm last night but no major issues with PD overnight. Pt reports no complaints today morning  No F/C/N/V/CP/SOB.  No melena, hematochezia, hematemesis.  No HA, visual changes,or abdominal pain.     DAILY NEPHROLOGY SUMMARY:    1/21: consult done   1/22: No issues with PD overnight, UF 1137cc, VSS, , feels good, wife at bed side   1/23: No issues with PD overnight 653 cc of UF , not had any BM yesterday and today  --------------------[facility]--------------------  1/24: CCPD 1.5L UF. VSS. PO4 elevated. Pt seen during PD, wife present. C/O pain.  1/25: No issues with PD overnight, net UF 1415ml. No sob, had BM yesterday, states he use O2 when he sleeps to help with nose bleed  1/26:No issues with PD overnight , net  cc, no c/o, still not able to bear weight   1/27: 780 mL Net UF removed with CCPD overnight. Coming out of BR with OT and wife. C/o no protein with meals, only had english muffin this am. BM yesterday. Denies abd pain or tenderness. Denies fever or chills.   1/28: 444 mL Net UF removed over night. Sitting in WC in room, wife BS. Reports feeling better every day. Reports sleeping great. Bps 90-100s.   1/29: VSS BP soft, RA.  No new labs.  No CP SOB, or worsenieng edema.  PD overnight 636ml.  UOP 300ml. Feeling good.   1/30: VSS BP 100s/60s.  SSNa 128->131  Phos 7.2, does have some  "phosphorous containing treats in the late evenings.  Dicussed.  No CP SOB or edema besides in wound vac leg.   PD  , wife at bedside and shares that the current fill is a low fill and was only supposed to be temporary after hernia repair.  Usual fill is 2200ml, all green bags.     REVIEW OF SYSTEMS:    10 point ROS reviewed and is as per HPI/daily summary or otherwise negative    PMH/PSH/SH/FH: Reviewed and unchanged since admission note  CURRENT MEDICATIONS: Reviewed from admission to present day    VS:  /63   Pulse 86   Temp 37.1 °C (98.7 °F) (Oral)   Resp 16   Ht 1.707 m (5' 7.2\")   Wt 73.1 kg (161 lb 3.2 oz)   SpO2 91%   BMI 25.10 kg/m²   Physical Exam  Vitals and nursing note reviewed.   Constitutional:       Appearance: Normal appearance.   HENT:      Head: Normocephalic and atraumatic.      Nose: Nose normal.      Mouth/Throat:      Mouth: Mucous membranes are moist.   Eyes:      General: No scleral icterus.     Conjunctiva/sclera: Conjunctivae normal.   Cardiovascular:      Rate and Rhythm: Normal rate.   Pulmonary:      Effort: Pulmonary effort is normal.   Abdominal:      General: There is no distension.      Palpations: Abdomen is soft.      Comments: PD cath in RLQ    Musculoskeletal:         General: No deformity.      Cervical back: Neck supple.      Right lower leg: No edema.      Left lower leg: Edema present.      Comments: Mild edema in LLE wound vac in place     Skin:     General: Skin is warm and dry.      Findings: No rash.   Neurological:      General: No focal deficit present.      Mental Status: He is alert and oriented to person, place, and time. Mental status is at baseline.   Psychiatric:         Mood and Affect: Mood normal.         Behavior: Behavior normal. Behavior is cooperative.         FLUID BALANCE:  In: 200 [P.O.:200]  Out: 886     LABS:  Recent Labs     01/29/25  1046 01/30/25  0524   SODIUM 128* 131*   POTASSIUM 3.8 4.0   CHLORIDE 85* 89*   CO2 21 24 " "  GLUCOSE 150* 131*   BUN 72* 77*   CREATININE 8.55* 8.47*   CALCIUM 8.4* 8.6       Recent Labs     01/29/25  1046 01/30/25  0524   WBC 9.8 5.6   RBC 2.41* 2.32*   HEMOGLOBIN 7.8* 7.3*   HEMATOCRIT 24.2* 23.0*   .4* 99.1*   MCH 32.4 31.5   MCHC 32.2* 31.7*   RDW 58.4* 56.4*   PLATELETCT 202 198   MPV 10.2 10.7         IMPRESSION:  # ESRD on CCPD  - Pt was transitioned to  iHD during masoud-op  s/p inguinal hernial repair,   - Still has [facility] PC  - Returned to PD 1/17/25   - Home PD prescription CCPD 2.5% alternating with 1.5% 7 exchanges 8 hrs 30 min,   total FV 7700cc, no last fill  # HTN  - Goal BP < 140/90  - At goal  # Anemia of CKD  - Goal Hgb 10-11  - Below goal   - On PO iron  # CKD-MBD  - Ca 8.6   - PO4 7.4 -> 6.6 -> 7.2  # Chronic hypoxic respiratory failure   - On home oxygen 2-2.5L at night         SUGGESTIONS:  - Continue CCPD during hospital stay, will increase fill to 1800ml, advance as tolerated   - PD exit site care per protocol   - Bowel regimen to avoid constipation with PD (having comfortable BM EOD)  - No dietary protein restrictions  - Dose all meds per ESRD  - ROSALIND  TIW if Hb < 10   - PO iron   - Continue Renvela, added PRN dose for snack   - Furosemide to Torsemide 10 mg PO qday with holding parameters if systolic <120  -Reduce BBL, flomax, terazosin as able given mild hypotension (diuretics held for this). Will monitor volume status and need for diuretics.  - Renal diet, low phos   - Has [facility] PC since May 2024. Pt does not want the CVC removed at this times and want to wait for an additional week or 2 to make sure that he does encounter any issues with PD treatments  He understands potential risks with keeping the  CVC  long term, discussed agaon 1/29, he will have it removed next week \"when he is feeling more stable\".   - L arm AVF not fully matured,  will need intervention   - Avoid Fleet enema in dialysis patients   - Labs tomorrow  - Discharge planning underway, plan for 2/7 home with " family    Please call or page Nephrology with questions or concerns  Thank you,

## 2025-01-30 NOTE — PROGRESS NOTES
NURSING DAILY NOTE    Name: Edy Bennett   Date of Admission: 1/23/2025   Admitting Diagnosis: PAD (peripheral artery disease) ([facility])  Attending Physician: ROSANNA PATEL D.O.  Allergies: Patient has no known allergies.    Safety  Patient Assist  max1  Patient Precautions  Fall Risk  Precaution Comments  LLE wound Vac  Bed Transfer Status  Minimal Assist  Toilet Transfer Status   Contact Guard Assist (stand turn with grab bar. Pt offloading weight completely from left leg and using right leg only to transfer)  Assistive Devices  Rails, Wheelchair  Oxygen  Nasal Cannula  Diet/Therapeutic Dining  Current Diet Order   Procedures    Diet Order Diet: Renal; Second Modifier: (optional): Cardiac     Pill Administration  whole  Agitated Behavioral Scale     ABS Level of Severity       Fall Risk  Has the patient had a fall this admission?   No  Trinh Spivey Fall Risk Scoring  22, HIGH RISK  Fall Risk Safety Measures  bed alarm and chair alarm    Vitals  Temperature: 36 °C (96.8 °F)  Temp src: Oral  Pulse: 68  Respiration: 18  Blood Pressure : 114/60  Blood Pressure MAP (Calculated): 78 MM HG  BP Location: Upper Arm, Right  Patient BP Position: Cota's Position     Oxygen  Pulse Oximetry: 98 %  O2 (LPM): 1  O2 Delivery Device: Nasal Cannula    Bowel and Bladder  Last Bowel Movement  01/29/25  Stool Type  Type 4: Like a sausage or snake, smooth and soft  Bowel Device     Continent  Bladder: Did not void   Bowel: No movement  Bladder Function  Urine Void (mL): 100 ml  Number of Times Voided: 1  Urine Color: Yellow  Genitourinary Assessment   Bladder Assessment (WDL):  WDL Except  Smith Catheter: Not Applicable  Urinary Symptoms: Anuric/ Dialysis Patient (occasional output)  Urine Color: Yellow  Bladder Device: Urinal  Bladder Scan: Post Void  $ Bladder Scan Results (mL): 143    Skin  Jalen Score   16  Sensory Interventions   Bed Types: Low Airloss  Skin  Preventative Measures: Pillows in Use for Support / Positioning  Moisture Interventions  Moisturizers/Barriers: Barrier Wipes      Pain  Pain Rating Scale  5 - Interrupts some activities  Pain Location  Leg  Pain Location Orientation  Left  Pain Interventions   Medication (see MAR)    ADLs    Bathing   Full Bed Bath  Linen Change   Complete  Personal Hygiene     Chlorhexidine Bath   Not Completed - Off Floor  Oral Care     Teeth/Dentures     Shave     Nutrition Percentage Eaten  Breakfast, Between 50-75% Consumed  Environmental Precautions  Treaded Slipper Socks on Patient  Patient Turns/Positioning  Patient turns self independently side to side without assistance, to offload sacral area  Patient Turns Assistance/Tolerance  Assistance of One  Bed Positions  Bed Controls On  Head of Bed Elevated  Self regulated      Psychosocial/Neurologic Assessment  Psychosocial Assessment  Psychosocial (WDL):  Within Defined Limits  Patient Behaviors: Anxious  Family Behaviors: Family Present  Neurologic Assessment  Neuro (WDL): Within Defined Limits  Level of Consciousness: Alert  Orientation Level: Oriented X4  Cognition: Follows commands, Appropriate attention/concentration  Speech: Clear  Pupil Assesment: No  Motor Function/Sensation Assessment: Sensation  RUE Sensation: Full sensation  LUE Sensation: Full sensation  RLE Sensation: Full sensation  LLE Sensation: Full sensation, Pain  EENT (WDL):  WDL Except    Cardio/Pulmonary Assessment  Edema   LLE Edema: 1+, Generalized  Respiratory Breath Sounds  RUL Breath Sounds: Clear  RML Breath Sounds: Clear  RLL Breath Sounds: Diminished  ANAYELI Breath Sounds: Clear  LLL Breath Sounds: Diminished  Cardiac Assessment   Cardiac (WDL):  Within Defined Limits

## 2025-01-31 ENCOUNTER — APPOINTMENT (OUTPATIENT)
Dept: OCCUPATIONAL THERAPY | Facility: REHABILITATION | Age: 80
DRG: 299 | End: 2025-01-31
Attending: PHYSICAL MEDICINE & REHABILITATION
Payer: MEDICARE

## 2025-01-31 ENCOUNTER — APPOINTMENT (OUTPATIENT)
Dept: PHYSICAL THERAPY | Facility: REHABILITATION | Age: 80
DRG: 299 | End: 2025-01-31
Attending: PHYSICAL MEDICINE & REHABILITATION
Payer: MEDICARE

## 2025-01-31 VITALS
HEART RATE: 91 BPM | WEIGHT: 161.2 LBS | OXYGEN SATURATION: 95 % | HEIGHT: 67 IN | TEMPERATURE: 98.2 F | SYSTOLIC BLOOD PRESSURE: 108 MMHG | DIASTOLIC BLOOD PRESSURE: 70 MMHG | RESPIRATION RATE: 16 BRPM | BODY MASS INDEX: 25.3 KG/M2

## 2025-01-31 LAB
ALBUMIN SERPL BCP-MCNC: 2.9 G/DL (ref 3.2–4.9)
ALBUMIN/GLOB SERPL: 1 G/DL
ALP SERPL-CCNC: 104 U/L (ref 30–99)
ALT SERPL-CCNC: 23 U/L (ref 2–50)
ANION GAP SERPL CALC-SCNC: 22 MMOL/L (ref 7–16)
AST SERPL-CCNC: 43 U/L (ref 12–45)
BASOPHILS # BLD AUTO: 0.5 % (ref 0–1.8)
BASOPHILS # BLD: 0.03 K/UL (ref 0–0.12)
BILIRUB SERPL-MCNC: 0.6 MG/DL (ref 0.1–1.5)
BUN SERPL-MCNC: 70 MG/DL (ref 8–22)
CALCIUM ALBUM COR SERPL-MCNC: 9.7 MG/DL (ref 8.5–10.5)
CALCIUM SERPL-MCNC: 8.8 MG/DL (ref 8.5–10.5)
CHLORIDE SERPL-SCNC: 87 MMOL/L (ref 96–112)
CO2 SERPL-SCNC: 24 MMOL/L (ref 20–33)
CREAT SERPL-MCNC: 8.94 MG/DL (ref 0.5–1.4)
EOSINOPHIL # BLD AUTO: 0.13 K/UL (ref 0–0.51)
EOSINOPHIL NFR BLD: 2 % (ref 0–6.9)
ERYTHROCYTE [DISTWIDTH] IN BLOOD BY AUTOMATED COUNT: 56.3 FL (ref 35.9–50)
GFR SERPLBLD CREATININE-BSD FMLA CKD-EPI: 6 ML/MIN/1.73 M 2
GLOBULIN SER CALC-MCNC: 3 G/DL (ref 1.9–3.5)
GLUCOSE SERPL-MCNC: 139 MG/DL (ref 65–99)
HCT VFR BLD AUTO: 22.8 % (ref 42–52)
HGB BLD-MCNC: 7.3 G/DL (ref 14–18)
IMM GRANULOCYTES # BLD AUTO: 0.04 K/UL (ref 0–0.11)
IMM GRANULOCYTES NFR BLD AUTO: 0.6 % (ref 0–0.9)
LYMPHOCYTES # BLD AUTO: 0.55 K/UL (ref 1–4.8)
LYMPHOCYTES NFR BLD: 8.4 % (ref 22–41)
MCH RBC QN AUTO: 31.5 PG (ref 27–33)
MCHC RBC AUTO-ENTMCNC: 32 G/DL (ref 32.3–36.5)
MCV RBC AUTO: 98.3 FL (ref 81.4–97.8)
MONOCYTES # BLD AUTO: 0.43 K/UL (ref 0–0.85)
MONOCYTES NFR BLD AUTO: 6.5 % (ref 0–13.4)
NEUTROPHILS # BLD AUTO: 5.4 K/UL (ref 1.82–7.42)
NEUTROPHILS NFR BLD: 82 % (ref 44–72)
NRBC # BLD AUTO: 0 K/UL
NRBC BLD-RTO: 0 /100 WBC (ref 0–0.2)
PHOSPHATE SERPL-MCNC: 7 MG/DL (ref 2.5–4.5)
PLATELET # BLD AUTO: 211 K/UL (ref 164–446)
PMV BLD AUTO: 10.5 FL (ref 9–12.9)
POTASSIUM SERPL-SCNC: 4 MMOL/L (ref 3.6–5.5)
PROT SERPL-MCNC: 5.9 G/DL (ref 6–8.2)
RBC # BLD AUTO: 2.32 M/UL (ref 4.7–6.1)
SODIUM SERPL-SCNC: 133 MMOL/L (ref 135–145)
WBC # BLD AUTO: 6.6 K/UL (ref 4.8–10.8)

## 2025-01-31 PROCEDURE — 770010 HCHG ROOM/CARE - REHAB SEMI PRIVAT*

## 2025-01-31 PROCEDURE — 99232 SBSQ HOSP IP/OBS MODERATE 35: CPT | Performed by: STUDENT IN AN ORGANIZED HEALTH CARE EDUCATION/TRAINING PROGRAM

## 2025-01-31 PROCEDURE — A9270 NON-COVERED ITEM OR SERVICE: HCPCS | Performed by: PHYSICAL MEDICINE & REHABILITATION

## 2025-01-31 PROCEDURE — 84100 ASSAY OF PHOSPHORUS: CPT

## 2025-01-31 PROCEDURE — 700102 HCHG RX REV CODE 250 W/ 637 OVERRIDE(OP): Performed by: PHYSICAL MEDICINE & REHABILITATION

## 2025-01-31 PROCEDURE — A9270 NON-COVERED ITEM OR SERVICE: HCPCS | Performed by: NURSE PRACTITIONER

## 2025-01-31 PROCEDURE — 85025 COMPLETE CBC W/AUTO DIFF WBC: CPT

## 2025-01-31 PROCEDURE — 36415 COLL VENOUS BLD VENIPUNCTURE: CPT

## 2025-01-31 PROCEDURE — 700102 HCHG RX REV CODE 250 W/ 637 OVERRIDE(OP): Performed by: NURSE PRACTITIONER

## 2025-01-31 PROCEDURE — 80053 COMPREHEN METABOLIC PANEL: CPT

## 2025-01-31 PROCEDURE — 97535 SELF CARE MNGMENT TRAINING: CPT

## 2025-01-31 PROCEDURE — 94760 N-INVAS EAR/PLS OXIMETRY 1: CPT

## 2025-01-31 PROCEDURE — 97110 THERAPEUTIC EXERCISES: CPT

## 2025-01-31 PROCEDURE — 700111 HCHG RX REV CODE 636 W/ 250 OVERRIDE (IP): Performed by: PHYSICAL MEDICINE & REHABILITATION

## 2025-01-31 PROCEDURE — 90945 DIALYSIS ONE EVALUATION: CPT

## 2025-01-31 RX ORDER — SEVELAMER CARBONATE 800 MG/1
1600 TABLET, FILM COATED ORAL
Status: DISCONTINUED | OUTPATIENT
Start: 2025-01-31 | End: 2025-01-31

## 2025-01-31 RX ADMIN — OXYCODONE 5 MG: 5 TABLET ORAL at 05:56

## 2025-01-31 RX ADMIN — GUAIFENESIN 600 MG: 600 TABLET ORAL at 20:12

## 2025-01-31 RX ADMIN — ONDANSETRON 4 MG: 4 TABLET, ORALLY DISINTEGRATING ORAL at 17:52

## 2025-01-31 RX ADMIN — OMEPRAZOLE 20 MG: 20 CAPSULE, DELAYED RELEASE ORAL at 08:35

## 2025-01-31 RX ADMIN — GUAIFENESIN 600 MG: 600 TABLET ORAL at 08:36

## 2025-01-31 RX ADMIN — METHOCARBAMOL 250 MG: 500 TABLET ORAL at 20:12

## 2025-01-31 RX ADMIN — OXYCODONE 5 MG: 5 TABLET ORAL at 12:21

## 2025-01-31 RX ADMIN — GABAPENTIN 100 MG: 100 CAPSULE ORAL at 08:35

## 2025-01-31 RX ADMIN — GENTAMICIN SULFATE: 1 CREAM TOPICAL at 19:17

## 2025-01-31 RX ADMIN — APIXABAN 5 MG: 5 TABLET, FILM COATED ORAL at 08:35

## 2025-01-31 RX ADMIN — GABAPENTIN 100 MG: 100 CAPSULE ORAL at 20:12

## 2025-01-31 RX ADMIN — CLOPIDOGREL BISULFATE 75 MG: 75 TABLET ORAL at 08:00

## 2025-01-31 RX ADMIN — SENNOSIDES AND DOCUSATE SODIUM 2 TABLET: 50; 8.6 TABLET ORAL at 20:12

## 2025-01-31 RX ADMIN — ONDANSETRON 4 MG: 4 TABLET, ORALLY DISINTEGRATING ORAL at 12:26

## 2025-01-31 RX ADMIN — SEVELAMER CARBONATE 1600 MG: 800 TABLET, FILM COATED ORAL at 17:51

## 2025-01-31 RX ADMIN — SEVELAMER CARBONATE 1600 MG: 800 TABLET, FILM COATED ORAL at 08:35

## 2025-01-31 RX ADMIN — ONDANSETRON 4 MG: 4 TABLET, ORALLY DISINTEGRATING ORAL at 08:36

## 2025-01-31 RX ADMIN — SEVELAMER CARBONATE 800 MG: 800 TABLET, FILM COATED ORAL at 20:40

## 2025-01-31 RX ADMIN — SENNOSIDES AND DOCUSATE SODIUM 2 TABLET: 50; 8.6 TABLET ORAL at 08:35

## 2025-01-31 RX ADMIN — METHOCARBAMOL 250 MG: 500 TABLET ORAL at 17:51

## 2025-01-31 RX ADMIN — METHOCARBAMOL 250 MG: 500 TABLET ORAL at 08:35

## 2025-01-31 RX ADMIN — APIXABAN 5 MG: 5 TABLET, FILM COATED ORAL at 20:12

## 2025-01-31 RX ADMIN — ROSUVASTATIN CALCIUM 20 MG: 10 TABLET, FILM COATED ORAL at 20:12

## 2025-01-31 RX ADMIN — SEVELAMER CARBONATE 1600 MG: 800 TABLET, FILM COATED ORAL at 12:21

## 2025-01-31 RX ADMIN — METHOCARBAMOL 250 MG: 500 TABLET ORAL at 12:29

## 2025-01-31 RX ADMIN — MONTELUKAST 10 MG: 10 TABLET, FILM COATED ORAL at 20:12

## 2025-01-31 ASSESSMENT — PATIENT HEALTH QUESTIONNAIRE - PHQ9
2. FEELING DOWN, DEPRESSED, IRRITABLE, OR HOPELESS: NOT AT ALL
1. LITTLE INTEREST OR PLEASURE IN DOING THINGS: NOT AT ALL
SUM OF ALL RESPONSES TO PHQ9 QUESTIONS 1 AND 2: 0

## 2025-01-31 ASSESSMENT — ACTIVITIES OF DAILY LIVING (ADL)
BED_CHAIR_WHEELCHAIR_TRANSFER_DESCRIPTION: ADAPTIVE EQUIPMENT;SET-UP OF EQUIPMENT;SQUAT PIVOT TRANSFER TO WHEELCHAIR;SUPERVISION FOR SAFETY
TUB_SHOWER_TRANSFER_DESCRIPTION: GRAB BAR;INCREASED TIME;SET-UP OF EQUIPMENT;SUPERVISION FOR SAFETY

## 2025-01-31 ASSESSMENT — PAIN DESCRIPTION - PAIN TYPE
TYPE: ACUTE PAIN
TYPE: ACUTE PAIN;SURGICAL PAIN

## 2025-01-31 NOTE — PROGRESS NOTES
[facility] Services Progress Note     CCPD initiated at 2130 per APRN K. Efstratis x 8.5 hours using 2 bags 1.5% and 1 bag 2.5 % PD solution; with fills increased to 1.8 L; 7 cycles; no last fill.      Pt A and o x 4; on room air; denies sob ; denies pain  Aseptically connected PD cycler to PD catheter.   Exit site cleansed, dressing changed CDI; gentamicin cream applied on PD exit site; no s/s infection noted.      Report given to Primary RN Acacia Wolff including troubleshooting, and on-call Dialysis RN contact information ([phone]).      Please call for any issues with hemodynamic instability/persistent alarms/patient not tolerating therapy.

## 2025-01-31 NOTE — FLOWSHEET NOTE
01/31/25 0655   Events/Summary/Plan   Events/Summary/Plan RA pulse ox check   Vital Signs   Pulse 89   Respiration 16   Pulse Oximetry 95 %   $ Pulse Oximetry (Spot Check) Yes   Respiratory Assessment   Level of Consciousness Alert   Chest Exam   Work Of Breathing / Effort Within Normal Limits   Oxygen   O2 Delivery Device Room air w/o2 available

## 2025-01-31 NOTE — CARE PLAN
"Peritoneal dialysis in progress, cont monitored. Metoprolol held at hs, sbp less than 120.   Problem: Fall Risk - Rehab  Goal: Patient will remain free from falls  Outcome: Progressing  Note: Trinh Spivey Fall risk Assessment Score: 22    High fall risk Interventions   - Alarming seatbelt  - Bed and strip alarm   - Yellow sign by the door   - Yellow wrist band \"Fall risk\"  - Room near to the nurse station  - Do not leave patient unattended in the bathroom  - Fall risk education provided      Problem: Pain - Standard  Goal: Alleviation of pain or a reduction in pain to the patient’s comfort goal  Outcome: Progressing  Note: Assessed for pain and discomfort ,, pain under control.    The patient is Stable - Low risk of patient condition declining or worsening    Shift Goals  Clinical Goals: Safety  Patient Goals: safety ; pain control  Family Goals: update w/ POC    Progress made toward(s) clinical / shift goals:  Pt free from fall and injury.      "

## 2025-01-31 NOTE — PROGRESS NOTES
"  Physical Medicine & Rehabilitation Progress Note    Encounter Date: 1/31/2025    Chief Complaint: PVD s/p bypass    Interval Events (Subjective):  VS with 0-2 L O2.   Last BM 1/29. Voiding without retention  Labs with stable hgb 7.3, Na 133, Cr 8.94, phos 7.0.     Patient seen at bedside today. PD with increased fill. Still with intermittent nausea and vomiting. Patient requesting to get phosphorus binder at night if he eats a snack.     Objective:  VITAL SIGNS: /60 Comment: hels torsemide sbp less than 120  Pulse 89   Temp 36.9 °C (98.5 °F) (Oral)   Resp 16   Ht 1.707 m (5' 7.2\")   Wt 73.1 kg (161 lb 3.2 oz)   SpO2 95%   BMI 25.10 kg/m²   GEN: No apparent distress  HEENT: Head normocephalic, atraumatic.  Sclera nonicteric bilaterally, no ocular discharge appreciated bilaterally.  CV: Extremities warm and well-perfused, + LLE edema. RLE without edema.  Erythema left lower extremity improving.  Petechiae present.  PULMONARY: Breathing nonlabored on room air, no respiratory accessory muscle use.  Not requiring supplemental oxygen.  SKIN: Vac with continued sanguinous output.   PSYCH: Mood and affect within normal limits.  NEURO: Awake alert.  Conversational.  Logical thought content.        Laboratory Values:  Recent Results (from the past 72 hours)   CBC WITH DIFFERENTIAL    Collection Time: 01/29/25 10:46 AM   Result Value Ref Range    WBC 9.8 4.8 - 10.8 K/uL    RBC 2.41 (L) 4.70 - 6.10 M/uL    Hemoglobin 7.8 (L) 14.0 - 18.0 g/dL    Hematocrit 24.2 (L) 42.0 - 52.0 %    .4 (H) 81.4 - 97.8 fL    MCH 32.4 27.0 - 33.0 pg    MCHC 32.2 (L) 32.3 - 36.5 g/dL    RDW 58.4 (H) 35.9 - 50.0 fL    Platelet Count 202 164 - 446 K/uL    MPV 10.2 9.0 - 12.9 fL    Neutrophils-Polys 86.30 (H) 44.00 - 72.00 %    Lymphocytes 5.50 (L) 22.00 - 41.00 %    Monocytes 6.10 0.00 - 13.40 %    Eosinophils 1.00 0.00 - 6.90 %    Basophils 0.40 0.00 - 1.80 %    Immature Granulocytes 0.70 0.00 - 0.90 %    Nucleated RBC 0.00 0.00 " - 0.20 /100 WBC    Neutrophils (Absolute) 8.41 (H) 1.82 - 7.42 K/uL    Lymphs (Absolute) 0.54 (L) 1.00 - 4.80 K/uL    Monos (Absolute) 0.60 0.00 - 0.85 K/uL    Eos (Absolute) 0.10 0.00 - 0.51 K/uL    Baso (Absolute) 0.04 0.00 - 0.12 K/uL    Immature Granulocytes (abs) 0.07 0.00 - 0.11 K/uL    NRBC (Absolute) 0.00 K/uL   Basic Metabolic Panel    Collection Time: 01/29/25 10:46 AM   Result Value Ref Range    Sodium 128 (L) 135 - 145 mmol/L    Potassium 3.8 3.6 - 5.5 mmol/L    Chloride 85 (L) 96 - 112 mmol/L    Co2 21 20 - 33 mmol/L    Glucose 150 (H) 65 - 99 mg/dL    Bun 72 (H) 8 - 22 mg/dL    Creatinine 8.55 (HH) 0.50 - 1.40 mg/dL    Calcium 8.4 (L) 8.5 - 10.5 mg/dL    Anion Gap 22.0 (H) 7.0 - 16.0   ESTIMATED GFR    Collection Time: 01/29/25 10:46 AM   Result Value Ref Range    GFR (CKD-EPI) 6 (A) >60 mL/min/1.73 m 2   Comp Metabolic Panel    Collection Time: 01/30/25  5:24 AM   Result Value Ref Range    Sodium 131 (L) 135 - 145 mmol/L    Potassium 4.0 3.6 - 5.5 mmol/L    Chloride 89 (L) 96 - 112 mmol/L    Co2 24 20 - 33 mmol/L    Anion Gap 18.0 (H) 7.0 - 16.0    Glucose 131 (H) 65 - 99 mg/dL    Bun 77 (H) 8 - 22 mg/dL    Creatinine 8.47 (HH) 0.50 - 1.40 mg/dL    Calcium 8.6 8.5 - 10.5 mg/dL    Correct Calcium 9.6 8.5 - 10.5 mg/dL    AST(SGOT) 49 (H) 12 - 45 U/L    ALT(SGPT) 26 2 - 50 U/L    Alkaline Phosphatase 95 30 - 99 U/L    Total Bilirubin 0.5 0.1 - 1.5 mg/dL    Albumin 2.8 (L) 3.2 - 4.9 g/dL    Total Protein 5.6 (L) 6.0 - 8.2 g/dL    Globulin 2.8 1.9 - 3.5 g/dL    A-G Ratio 1.0 g/dL   CBC WITH DIFFERENTIAL    Collection Time: 01/30/25  5:24 AM   Result Value Ref Range    WBC 5.6 4.8 - 10.8 K/uL    RBC 2.32 (L) 4.70 - 6.10 M/uL    Hemoglobin 7.3 (L) 14.0 - 18.0 g/dL    Hematocrit 23.0 (L) 42.0 - 52.0 %    MCV 99.1 (H) 81.4 - 97.8 fL    MCH 31.5 27.0 - 33.0 pg    MCHC 31.7 (L) 32.3 - 36.5 g/dL    RDW 56.4 (H) 35.9 - 50.0 fL    Platelet Count 198 164 - 446 K/uL    MPV 10.7 9.0 - 12.9 fL    Neutrophils-Polys  72.80 (H) 44.00 - 72.00 %    Lymphocytes 14.70 (L) 22.00 - 41.00 %    Monocytes 8.60 0.00 - 13.40 %    Eosinophils 2.70 0.00 - 6.90 %    Basophils 0.50 0.00 - 1.80 %    Immature Granulocytes 0.70 0.00 - 0.90 %    Nucleated RBC 0.00 0.00 - 0.20 /100 WBC    Neutrophils (Absolute) 4.07 1.82 - 7.42 K/uL    Lymphs (Absolute) 0.82 (L) 1.00 - 4.80 K/uL    Monos (Absolute) 0.48 0.00 - 0.85 K/uL    Eos (Absolute) 0.15 0.00 - 0.51 K/uL    Baso (Absolute) 0.03 0.00 - 0.12 K/uL    Immature Granulocytes (abs) 0.04 0.00 - 0.11 K/uL    NRBC (Absolute) 0.00 K/uL   PHOSPHORUS    Collection Time: 01/30/25  5:24 AM   Result Value Ref Range    Phosphorus 7.2 (H) 2.5 - 4.5 mg/dL   ESTIMATED GFR    Collection Time: 01/30/25  5:24 AM   Result Value Ref Range    GFR (CKD-EPI) 6 (A) >60 mL/min/1.73 m 2   Comp Metabolic Panel    Collection Time: 01/31/25  5:27 AM   Result Value Ref Range    Sodium 133 (L) 135 - 145 mmol/L    Potassium 4.0 3.6 - 5.5 mmol/L    Chloride 87 (L) 96 - 112 mmol/L    Co2 24 20 - 33 mmol/L    Anion Gap 22.0 (H) 7.0 - 16.0    Glucose 139 (H) 65 - 99 mg/dL    Bun 70 (H) 8 - 22 mg/dL    Creatinine 8.94 (HH) 0.50 - 1.40 mg/dL    Calcium 8.8 8.5 - 10.5 mg/dL    Correct Calcium 9.7 8.5 - 10.5 mg/dL    AST(SGOT) 43 12 - 45 U/L    ALT(SGPT) 23 2 - 50 U/L    Alkaline Phosphatase 104 (H) 30 - 99 U/L    Total Bilirubin 0.6 0.1 - 1.5 mg/dL    Albumin 2.9 (L) 3.2 - 4.9 g/dL    Total Protein 5.9 (L) 6.0 - 8.2 g/dL    Globulin 3.0 1.9 - 3.5 g/dL    A-G Ratio 1.0 g/dL   PHOSPHORUS    Collection Time: 01/31/25  5:27 AM   Result Value Ref Range    Phosphorus 7.0 (H) 2.5 - 4.5 mg/dL   CBC WITH DIFFERENTIAL    Collection Time: 01/31/25  5:27 AM   Result Value Ref Range    WBC 6.6 4.8 - 10.8 K/uL    RBC 2.32 (L) 4.70 - 6.10 M/uL    Hemoglobin 7.3 (L) 14.0 - 18.0 g/dL    Hematocrit 22.8 (L) 42.0 - 52.0 %    MCV 98.3 (H) 81.4 - 97.8 fL    MCH 31.5 27.0 - 33.0 pg    MCHC 32.0 (L) 32.3 - 36.5 g/dL    RDW 56.3 (H) 35.9 - 50.0 fL    Platelet  Count 211 164 - 446 K/uL    MPV 10.5 9.0 - 12.9 fL    Neutrophils-Polys 82.00 (H) 44.00 - 72.00 %    Lymphocytes 8.40 (L) 22.00 - 41.00 %    Monocytes 6.50 0.00 - 13.40 %    Eosinophils 2.00 0.00 - 6.90 %    Basophils 0.50 0.00 - 1.80 %    Immature Granulocytes 0.60 0.00 - 0.90 %    Nucleated RBC 0.00 0.00 - 0.20 /100 WBC    Neutrophils (Absolute) 5.40 1.82 - 7.42 K/uL    Lymphs (Absolute) 0.55 (L) 1.00 - 4.80 K/uL    Monos (Absolute) 0.43 0.00 - 0.85 K/uL    Eos (Absolute) 0.13 0.00 - 0.51 K/uL    Baso (Absolute) 0.03 0.00 - 0.12 K/uL    Immature Granulocytes (abs) 0.04 0.00 - 0.11 K/uL    NRBC (Absolute) 0.00 K/uL   ESTIMATED GFR    Collection Time: 01/31/25  5:27 AM   Result Value Ref Range    GFR (CKD-EPI) 6 (A) >60 mL/min/1.73 m 2       Medications:  Scheduled Medications   Medication Dose Frequency    epoetin  4,000 Units TUE+THU+SAT    sevelamer carbonate  1,600 mg TID WITH MEALS    oxyCODONE immediate-release  5 mg TID    ondansetron  4 mg 10MIN AFTER START MEAL    methocarbamol  250 mg 4X/DAY    torsemide  10 mg Q DAY    ferrous gluconate  324 mg Q48HRS    Pharmacy Consult Request  1 Each PHARMACY TO DOSE    omeprazole  20 mg DAILY    apixaban  5 mg BID    clopidogrel  75 mg DAILY    gabapentin  100 mg BID    guaiFENesin ER  600 mg Q12HRS    metoprolol SR  100 mg QHS    montelukast  10 mg QHS    rosuvastatin  20 mg QHS    senna-docusate  2 Tablet BID    gentamicin   DAILY AT 1800     PRN medications: sevelamer carbonate, lidocaine, hydrALAZINE, lactulose, docusate sodium, bisacodyl EC, magnesium hydroxide, carboxymethylcellulose, benzocaine-menthol, mag hydrox-al hydrox-simeth, ondansetron **OR** ondansetron, traZODone, sodium chloride, acetaminophen, oxyCODONE immediate-release **OR** oxyCODONE immediate-release, senna-docusate **AND** polyethylene glycol/lytes    Diet:  Current Diet Order   Procedures    Diet Order Diet: Renal; Second Modifier: (optional): Cardiac       Medical Decision Making and  Plan:  S/p left popliteal to posterior tibial artery bypass Dr. Miller 1/20/2025  PT and OT for mobility and ADLs. Per guidelines, 15 hours per week between PT, OT and/or SLP.  Follow-up vascular surgery  Continue Eliquis and Plavix  Continue statin    1/24 Prevena saturation. Wound consult. Vasc surg to eval as well. Prevena now working.   1/31 vascular surgeon evaluated. Removed prevena. Daily dressing changes. He plans to come back in a few days to evaluate.      Type 2 diabetes mellitus with hyperglycemia -outpatient medication had been Januvia 1 tablet twice daily     Pain -Tylenol and oxycodone as needed.  Scheduled Robaxin, gabapentin.  Schedule oxycodone 5x daily, morphine contraindicated based on renal function. 1/29 Reduce Oxycodone to QID. 1/30 reduce oxycodone to scheduled 3 times daily.  Decrease methocarbamol to 250 mg 4 times daily.    Last as needed oxycodone use was 1/24     Anemia - 1/24 Improving. 1/27 Drop to 8's, had a lot of OP from [facility] last week. 1/29 7.7 - expected given prevena OP. Monitor need for transfusion.  1/30 hemoglobin dropped to 7.3.  Discussed with nephrology, they deferred to us to transfuse.  Transfuse less than 7.  Prevena putting out less. 1/31 hgb 7.3.     Thrombocytopenia - M resolved 1/30     ESRD on PD -nephrology following.  On sevelamer.  Nephrology increased fill 1/30.    Hypermagnesemia - Nephro managing    Hyperphosphatemia - Nephro managing      History of moderate aortic stenosis -stable     Hypertension -on Norvasc 2.5 mg every morning (OFF), Lasix 40 mg twice daily (Now torsemide), Metoprolol XL 100mg. 1/28 Amlodipine being held several days now. Lasix has been given only 3x past six days. D/w Nephro. Hold bladder meds. Monitor UOP though minimal. 1/29 BP stable low, on torsemide.  1/30 torsemide still not on to be held due to low blood pressure parameters per nephrology hold if less than 120.     CAD s/p SYDNEE 3x -on aspirin and statin.  Had been on Imdur 30  mg daily     Gout - No meds at home.      Hyperlipidemia -continue statin     Recent left inguinal hernia repair -12/18/2024 Dr. Miller     Bowel - Patient on Senna-docusate for constipation prophylaxis. 1/23 Constipation. 1/29 BM.     Nausea & Vomiting - Only after lunch. Get KUB - WNL. Schedule Zofran after lunch.  Scheduled Zofran after each meal.    Hand tremors -LFTs nonconcerning for etiology.  Possibly med induced.     BPH - TV/PVR/BS PRN.  Continue Flomax and Hytrin. 1/28 Hold due to low BP. Favor diuresis. 1/29 Low PVRs.      Insomnia -continue trazodone at night as needed        Upcoming Labs/imaging: Per nephrology     DVT PROPHYLAXIS: On Eliquis     HOSPITALIST FOLLOWING: No    Consultants following: Nephrology     CODE STATUS: Full code     DISPO: Home with spouse     SHAE: 2/7/25     MEDS SENT TO: [facility]     DISCHARGE SPECIALIST FOLLOW UP: Vascular surgery and other specialists as previously scheduled     Patient to scheduled follow up with their PCP within 2 weeks from discharge from the [facility].     ____________________________________    Livan Meade D.O.  Physical Medicine & Rehabilitation   ____________________________________    Total time:  42 minutes.

## 2025-01-31 NOTE — CARE PLAN
Problem: Pain - Standard  Goal: Alleviation of pain or a reduction in pain to the patient’s comfort goal  Outcome: Progressing     Problem: Fall Risk - Rehab  Goal: Patient will remain free from falls  Outcome: Progressing     Problem: Skin Integrity  Goal: Patient's skin integrity will be maintained or improve  Outcome: Progressing     Problem: Dialysis  Goal: Patient will maintain stable vital signs and fluid balance  Outcome: Progressing     The patient is Stable - Low risk of patient condition declining or worsening    Shift Goals  Clinical Goals: Safety  Patient Goals: safety ; pain control  Family Goals: update w/ POC  :

## 2025-01-31 NOTE — PROGRESS NOTES
NURSING DAILY NOTE    Name: Edy Bennett   Date of Admission: 1/23/2025   Admitting Diagnosis: PAD (peripheral artery disease) (Formerly [facility])  Attending Physician: ROSANNA PATEL D.O.  Allergies: Patient has no known allergies.    Safety  Patient Assist  max1  Patient Precautions  Fall Risk  Precaution Comments  LLE wound vac  Bed Transfer Status  Contact Guard Assist  Toilet Transfer Status   Contact Guard Assist (stand turn with grab bar. Pt offloading weight completely from left leg and using right leg only to transfer)  Assistive Devices  Rails, Wheelchair  Oxygen  Room air w/o2 available  Diet/Therapeutic Dining  Current Diet Order   Procedures    Diet Order Diet: Renal; Second Modifier: (optional): Cardiac     Pill Administration  whole  Agitated Behavioral Scale     ABS Level of Severity       Fall Risk  Has the patient had a fall this admission?   No  Trinh Spivey Fall Risk Scoring  22, HIGH RISK  Fall Risk Safety Measures  bed alarm and chair alarm    Vitals  Temperature: 36.7 °C (98 °F)  Temp src: Oral  Pulse: 78  Respiration: 16  Blood Pressure : 100/60  Blood Pressure MAP (Calculated): 73 MM HG  BP Location: Right, Upper Arm  Patient BP Position: Supine     Oxygen  Pulse Oximetry: 92 %  O2 (LPM): 0  O2 Delivery Device: Room air w/o2 available    Bowel and Bladder  Last Bowel Movement  01/29/25  Stool Type  Type 4: Like a sausage or snake, smooth and soft  Bowel Device     Continent  Bladder: Did not void   Bowel: No movement  Bladder Function  Urine Void (mL): 200 ml  Number of Times Voided: 1  Urine Color: Yellow  Genitourinary Assessment   Bladder Assessment (WDL):  WDL Except  Smith Catheter: Not Applicable  Urinary Symptoms: Anuric/ Dialysis Patient (occasional output)  Urine Color: Yellow  Bladder Device: Urinal  Bladder Scan: Post Void  $ Bladder Scan Results (mL): 110    Skin  Jalen Score   16  Sensory Interventions   Bed Types: Low  [place]  Skin Preventative Measures: Pillows in Use for Support / Positioning  Moisture Interventions  Moisturizers/Barriers: Barrier Wipes, Barrier Paste      Pain  Pain Rating Scale  4 - Distracts me, can do usual activities  Pain Location  Leg  Pain Location Orientation  Left  Pain Interventions   Medication (see MAR)    ADLs    Bathing   Full Bed Bath  Linen Change   Complete  Personal Hygiene     Chlorhexidine Bath   Not Completed - Off Floor  Oral Care     Teeth/Dentures     Shave     Nutrition Percentage Eaten  Breakfast, Between % Consumed  Environmental Precautions  Treaded Slipper Socks on Patient, Personal Belongings, Wastebasket, Call [place] etc. in Easy Reach, Transferred to Stronger Side, Report Given to Other Health Care Providers Regarding Fall Risk, Bed in Low Position, Communication Sign for Patients & Families, Mobility Assessed & Appropriate Sign Placed  Patient Turns/Positioning  Patient turns self independently side to side without assistance, to offload sacral area  Patient Turns Assistance/Tolerance  Tolerates Well  Bed Positions  Bed Controls On, Bed Locked  Head of Bed Elevated  Self regulated      Psychosocial/Neurologic Assessment  Psychosocial Assessment  Psychosocial (WDL):  WDL Except  Patient Behaviors: Anxious  Family Behaviors: Family Present  Neurologic Assessment  Neuro (WDL): Exceptions to WDL  Level of Consciousness: Alert  Orientation Level: Oriented X4  Cognition: Follows commands, Appropriate attention/concentration  Speech: Clear  Facial Symmetry: Other (Comment) (no facial droop noted)  Pupil Assesment: No  Motor Function/Sensation Assessment: Sensation  RUE Sensation: Full sensation  Muscle Strength Right Arm: Good Strength Against Gravity and Moderate Resistance  LUE Sensation: Full sensation  Muscle Strength Left Arm: Good Strength Against Gravity and Moderate Resistance  RLE Sensation: Full sensation  Muscle Strength Right Leg: Good Strength Against Gravity and  Moderate Resistance  LLE Sensation: Full sensation, Pain  Muscle Strength Left Leg: Weak Movement but Not Against Gravity or Resistance  EENT (WDL):  WDL Except    Cardio/Pulmonary Assessment  Edema   LLE Edema: Generalized  Respiratory Breath Sounds  RUL Breath Sounds: Clear  RML Breath Sounds: Clear  RLL Breath Sounds: Diminished  ANAYELI Breath Sounds: Clear  LLL Breath Sounds: Diminished  Cardiac Assessment   Cardiac (WDL):  Within Defined Limits

## 2025-01-31 NOTE — WOUND TEAM
[facility]  Inpatient Services  Wound and Skin Care Follow-up    Admission Date: 1/23/2025     Last order of IP CONSULT TO WOUND CARE was found on 1/24/2025 from Hospital Encounter on 1/23/2025     HPI, PMH, SH: Reviewed    Past Surgical History:   Procedure Laterality Date    CA VEIN BYPASS GRAFT,FEM-TIBIAL Left 1/20/2025    Procedure: LEFT LOWER EXTREMITY BYPASS, POPLITIAL TO TIBIAL BYPASS;  Surgeon: Marco Antonio Miller M.D.;  Location: SURGERY [facility];  Service: Vascular    AORTOGRAM WITH RUNOFF  1/14/2025    Procedure: AORTOGRAM WITH RUNOFF, right peroneal artery intravascular lithotripsy and stent placement;  Surgeon: Marco Antonio Miller M.D.;  Location: [facility];  Service: Vascular    INGUINAL HERNIA LAPAROSCOPIC N/A 12/18/2024    Procedure: LAPAROSCOPIC LEFT INGUINAL HERNIA REPAIR WITH MESH, LAPAROSCOPIC REPOSITIONING OF PERITONEAL DIALYSIS CATHETER;  Surgeon: Marco Antonio Miller M.D.;  Location: SURGERY [facility];  Service: Vascular    CATH PLACEMENT CAPD N/A 12/18/2024    Procedure: REPOSITION, CATHETER, CAPD;  Surgeon: Marco Antonio Miller M.D.;  Location: SURGERY [facility];  Service: Vascular    CATH PLACEMENT CAPD N/A 09/16/2024    Procedure: LAPAROSCOPIC INSERTION OF PERITONEAL DIALYSIS CATHETER PLACEMENT;  Surgeon: Marco Antonio Miller M.D.;  Location: SURGERY SAME DAY [facility];  Service: Vascular    AV FISTULA CREATION Left 07/01/2024    Procedure: CREATION OF LEFT UPPER EXTREMITY DIALYSIS FISTULA;  Surgeon: Shilo Mercedes M.D.;  Location: SURGERY [facility];  Service: General    OTHER ORTHOPEDIC SURGERY  2004    bunion r foot    OTHER      tonsils adenoids    OTHER      dialysis catheter in upper right chest    OTHER      shon iol    OTHER CARDIAC SURGERY      cardiac stents     Social History     Tobacco Use    Smoking status: Never    Smokeless tobacco: Never    Tobacco comments:     Used chewing tobacco many years ago.   Substance Use Topics    Alcohol use: Not  Currently     No chief complaint on file.    Diagnosis: PAD (peripheral artery disease) (Formerly [facility]) [I73.9]    Unit where seen by Wound Team: RH04/02     WOUND FOLLOW UP RELATED TO:  left leg prevena dressing change by the surgeon, coccyx and toes       WOUND TEAM PLAN OF CARE - Frequency of Follow-up:   Nursing to follow dressing orders written for wound care. Contact wound team if area fails to progress, deteriorates or with any questions/concerns if something comes up before next scheduled follow up (See below as to whether wound is following and frequency of wound follow up)  Dressing changes by wound team:                   Weekly - coccyx, toes and left leg  Note: today only the left leg dressing change was completed by the surgeon with Wound RN assisting. The coccyx photos were reviewed and were assessed by Claudine wound RN earlier this week. It shows improvement    WOUND HISTORY:       Previously very active 79 year old male. PMH of CAD, PVD, ESRD on dialysis (recently started peritoneal dialysis 1 week ago). Also had a stent placed in right leg 1 week ago. Elective left popliteal to posterior tibial artery bypass by Marco Antonio Miller MD on 1/20/25.   Wound team consulted for a leaking and saturated Prevena dressing on his left lower leg. 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       WOUND ASSESSMENT/LDA  Wound 01/20/25 Incision Leg Left Incision (Active)   Date First Assessed/Time First Assessed: 01/20/25 1648   Present on Original Admission: Yes  Primary Wound Type: Incision  Location: Leg  Laterality: Left  Wound Description (Comments): Incision      Assessments 1/31/2025 10:00 AM   Site Assessment Clean;Dry;Intact;Light Purple   Periwound Assessment Clean;Dry;Intact   Margins Attached edges   Closure Approximated;Staples   Drainage Amount Small   Drainage Description Serosanguineous   Treatments Cleansed;Nonselective debridement;Site care    Wound Cleansing Approved Wound Cleanser   Dressing Status Clean;Dry;Intact   Dressing Changed Changed   Dressing Options Dry Gauze;Absorbent Abdominal Pad;Dry Roll Gauze;Ace Wrap   Dressing Change/Treatment Frequency Daily, and As Needed   NEXT Dressing Change/Treatment Date 02/01/25   NEXT Weekly Photo (Inpatient Only) 02/02/25   Wound Team Following Bi-Weekly   Pulses Doppler;DP   WOUND NURSE ONLY - Time Spent with Patient (mins) 60       Wound 01/20/25 Pressure Injury Coccyx 1/24/2025- Stage 3 (Active)   Date First Assessed/Time First Assessed: 01/20/25 2230   Present on Original Admission: Yes  Hand Hygiene Completed: Yes  Primary Wound Type: Pressure Injury  Location: Coccyx  Wound Description (Comments): 1/24/2025- Stage 3      Assessments 1/31/2025 10:00 AM   Wound Image     Site Assessment Clean;Pink   Periwound Assessment Clean;Dry;Intact   Drainage Amount None   Periwound Protectant Barrier Paste   Dressing Changed Observed   Wound Length (cm) 0.2 cm   Wound Width (cm) 0.2 cm   Wound Depth (cm) 0.1 cm   Wound Surface Area (cm^2) 0.04 cm^2   Wound Volume (cm^3) 0.004 cm^3   Wound Healing % 84   WOUND NURSE ONLY - Time Spent with Patient (mins) 60       Wound 01/23/25 Toe, 2nd;Toe, 4th;Toe, Hallux Left Gangrene (Active)   Date First Assessed/Time First Assessed: 01/23/25 1600   Location: Toe, 2nd;Toe, 4th;Toe, Hallux  Laterality: Left  Wound Description (Comments): Gangrene      Assessments 1/31/2025 10:00 AM   Site Assessment Clean;Dry;Intact;Black   Periwound Assessment Clean;Dry;Intact   Margins Defined edges   Drainage Amount None   Dressing Status Open to Air   Dressing Changed Observed       Wound 01/23/25 Toe, 2nd;Toe, 4th;Toe, Hallux Right Gangrene (Active)   Date First Assessed/Time First Assessed: 01/23/25 1600   Location: Toe, 2nd;Toe, 4th;Toe, Hallux  Laterality: Right  Wound Description (Comments): Gangrene      Assessments 1/31/2025 10:00 AM   Site Assessment Dry;Clean;Intact;Black    Periwound Assessment Clean;Dry;Intact   Margins Defined edges   Drainage Amount None        Vascular:    GLENYS:   No results found.    Lab Values:    Lab Results   Component Value Date/Time    WBC 6.6 01/31/2025 05:27 AM    RBC 2.32 (L) 01/31/2025 05:27 AM    HEMOGLOBIN 7.3 (L) 01/31/2025 05:27 AM    HEMATOCRIT 22.8 (L) 01/31/2025 05:27 AM    HBA1C 5.8 (H) 01/24/2025 05:19 AM         Culture Results show:  No results found for this or any previous visit (from the past 720 hours).    Pain Level/Medicated:  None, Tolerated without pain medication       INTERVENTIONS BY WOUND TEAM:  Chart and images reviewed. Discussed with bedside RN. All areas of concern (based on picture review, LDA review and discussion with bedside RN) have been thoroughly assessed. Documentation of areas based on significant findings. This RN in to assess patient. Performed standard wound care which includes appropriate positioning, dressing removal and non-selective debridement. Pictures and measurements obtained weekly if/when required.    Wound:  Left leg-incision  Preparation for Dressing removal: Removed without difficulty and Dressing soaked with adhesive remover wipes  Cleansed/Non-selectively Debrided with:  Wound cleanser and Gauze  Tory wound: Cleansed with Wound cleanser and Gauze, Prepped with N/A  Primary Dressing:  dry 4x4's  Secondary (Outer) Dressing: kerlix, ACE wrap from base of the toes to mid-thigh    Advanced Wound Care Discharge Planning  Number of Clinicians necessary to complete wound care: 2  Is patient requiring IV pain medications for dressing changes:  No   Length of time for dressing change 15 min. (This does not include chart review, pre-medication time, set up, clean up or time spent charting.)    Interdisciplinary consultation: Patient, Bedside RN (Socorro MCCURDY), Provider Dr. Meade, Provider Dr. Miller at the bedside .  Pressure injury and staging reviewed with N/A.    EVALUATION / RATIONALE FOR TREATMENT:     Date:   01/31/25  Wound Status:  Wound improving    Coccyx-improving. Continue barrier cream and bordered foam    Left leg incision-still oozing a tiny bit. Doppler heard at Dors[facility] Pedis. Prevena removed and dry dressing applied by Dr. Miller. He might reapply a prevena sometime next week. Wound team will follow.     Toes-dry eschar. Color slightly better in feet. Continue betadine for now. Follow up with wound clinic after d/c.      Date:  01/24/25  Wound Status:  Initial evaluation    Left leg-prevena-continue the prevena at 125mmHg and follow surgeon's orders. Prevena will prevent infection and keep incisions approximated, will decrease time to heal and prevent complications    Coccyx-The wound is very tiny so barrier cream will get into the wound base easily and keep it moist. The silicone foam protects the skin and keeps the moist barrier cream in place. Offloading.            Goals: Steady decrease in wound area and depth weekly.    NURSING PLAN OF CARE ORDERS:  Dressing changes: See Dressing Care orders    NUTRITION RECOMMENDATIONS   Wound Team Recommendations:  N/A     DIET ORDERS (From admission to next 24h)       Start     Ordered    01/31/25 1044  Supplements  ONCE DAILY        Comments: In between meals   Question Answer Comment   Which Supplement Nepro    Nepro: Carton        01/31/25 1043    01/23/25 1440  Diet Order Diet: Renal; Second Modifier: (optional): Cardiac  ALL MEALS        Question Answer Comment   Diet: Renal    Second Modifier: (optional) Cardiac        01/23/25 1439                    PREVENTATIVE INTERVENTIONS:   Q shift Jalen - performed per nursing policy  Q shift pressure point assessments - performed per nursing policy    Surface/Positioning  Low Airloss - Currently in Place  Reposition q 2 hours - Currently in Place  TAPs Turning system - Currently in Place    Offloading/Redistribution  Float Heels off Bed with Pillows - Currently in Place           Containment/Moisture Prevention     Dri-germaine pad - Currently in Place  Brief with mobilization - Currently in Place  Barrier paste - Currently in Place    Mobilization      Working with therapies      Anticipated discharge plans:  Self/Family Care and Outpatient Wound Center    -recommend follow back up at wound clinic. Patient is a patient of [facility].     Vac Discharge Needs:  Vac Discharge plan is purely a recommendation from wound team and not a requirement for discharge unless otherwise stated by physician.  Not Applicable Pt not on a wound vac

## 2025-01-31 NOTE — THERAPY
Occupational Therapy  Daily Treatment     Patient Name: Edy Bennett  Age:  79 y.o., Sex:  male  Medical Record #: [id number]  Today's Date: 1/31/2025     Precautions  Precautions: (P) Fall Risk  Comments: (P) LLE wound vac    Safety   ADL Safety : Requires Physical Assist for Safety  Bathroom Safety: Requires Physical Assist for Safety    Subjective    Pt encountered for OT in [facility] with RN at bedside passing morning meds. Pleasant and agreeable to participate. SO arrived 30 min into session.         Objective       01/31/25 0831   OT Charge Group   OT Self Care / ADL (Units) 2   OT Therapeutic Exercise (Units) 2   OT Total Time Spent   OT Individual Total Time Spent (Mins) 60   Precautions   Precautions Fall Risk   Comments LLE wound vac   Functional Level of Assist   Grooming Standby Assist;Standing   Grooming Description Adaptive equipment;Increased time;Standing at sink;Supervision for safety  (standing at EOS for oral hygiene with one hand supported on counter for support.)   Bed, Chair, Wheelchair Transfer Contact Guard Assist   Bed Chair Wheelchair Transfer Description Adaptive equipment;Set-up of equipment;Squat pivot transfer to wheelchair;Supervision for safety  (reach pivot from bed <> WC)   Tub / Shower Transfers Contact Guard Assist   Tub Shower Transfer Description Grab bar;Increased time;Set-up of equipment;Supervision for safety  (Dry WIS transfer. Pt became nausea and vomited (small amount) following transfer. Participated in transfer back w/o occurance of vomitting.)   Sitting Lower Body Exercises   Ankle Pumps Left  (ABCs while on elevated table)   Long Arc Quad 2 sets of 10;Left   Interdisciplinary Plan of Care Collaboration   IDT Collaboration with  Family / Caregiver;Occupational Therapist   Patient Position at End of Therapy Seated;Chair Alarm On;Self Releasing Lap Belt Applied;Call Light within Reach;Tray Table within Reach;Phone within Reach;Family / Friend in Room  (SO present at  bedside)   Collaboration Comments DC planning with SO other; CLOF/POC w/ primary OT       Assessment    Pt tolerated session well despite LLE pain and nausea. He was able to perform at WIS transfer w/ threshold at CGA to SBA and was able to tolerate standing during oral hygiene at CGA for safety as his LLE is unable to fully extend d/t limited mobility from wound vac/swelling.      Plan    Consult with wound RN regarding showering  ADL, IADL, DME, strengthening, balance     DME  OT DME Recommendations  Bathroom Equipment:  (TBD)    Passport items to be completed:  Perform bathroom transfers, complete dressing, complete feeding, get ready for the day, prepare a simple meal, participate in household tasks, adapt home for safety needs, demonstrate home exercise program, complete caregiver training     Occupational Therapy Goals (Active)       Problem: Dressing       Dates: Start:  01/24/25         Goal: STG-Within one week, patient will dress LB with min A       Dates: Start:  01/24/25               Problem: OT Long Term Goals       Dates: Start:  01/24/25         Goal: LTG-By discharge, patient will complete basic self care tasks mod I       Dates: Start:  01/24/25            Goal: LTG-By discharge, patient will perform bathroom transfers mod I       Dates: Start:  01/24/25               Problem: Toileting       Dates: Start:  01/24/25         Goal: STG-Within one week, patient will complete toileting tasks with min A        Dates: Start:  01/24/25

## 2025-01-31 NOTE — THERAPY
Missed Therapy    Patient Name: Edy Bennett  Age:  79 y.o., Sex:  male  Medical Record #: [id number]  Today's Date: 1/31/2025    Discussed missed therapy with MD, RN, and . 60 minute hold placed d/t pt scheduled for wound care.        01/31/25 1031   Therapy Missed   Missed Therapy (Minutes) 60   Reason For Missed Therapy Medical - Patient on Hold from Therapy  (Pt receiving wound care and expecting a visit from surgeon)

## 2025-01-31 NOTE — PROGRESS NOTES
[facility] -  PROGRESS NOTE               Author: IDA Cline Date & Time: 1/31/2025  10:18 AM     HPI:  79 y.o. male with HTN, ESRD on PD, PVD/ gangrenous left foot, BPH admitted for monitoring s/p  left leg bypass surgery on 1/20 with Dr. Miller. He recently underwent hernial repair and was temporarily transferred to in center HD via [facility]. He has returned to  PD  4 days ago, doing low fill.volumes and has been tolerating at home.   Patient was connected to cycler last night. Total UF reported to be 276ml. Reports of low drain UF alarm last night but no major issues with PD overnight. Pt reports no complaints today morning  No F/C/N/V/CP/SOB.  No melena, hematochezia, hematemesis.  No HA, visual changes,or abdominal pain.     DAILY NEPHROLOGY SUMMARY:    1/21: consult done   1/22: No issues with PD overnight, UF 1137cc, VSS, , feels good, wife at bed side   1/23: No issues with PD overnight 653 cc of UF , not had any BM yesterday and today  --------------------[facility]  1/24: CCPD 1.5L UF. VSS. PO4 elevated. Pt seen during PD, wife present. C/O pain.  1/25: No issues with PD overnight, net UF 1415ml. No sob, had BM yesterday, states he use O2 when he sleeps to help with nose bleed  1/26:No issues with PD overnight , net  cc, no c/o, still not able to bear weight   1/27: 780 mL Net UF removed with CCPD overnight. Coming out of BR with OT and wife. C/o no protein with meals, only had english muffin this am. BM yesterday. Denies abd pain or tenderness. Denies fever or chills.   1/28: 444 mL Net UF removed over night. Sitting in WC in room, wife BS. Reports feeling better every day. Reports sleeping great. Bps 90-100s.   1/29: VSS BP soft, RA.  No new labs.  No CP SOB, or worsenieng edema.  PD overnight 636ml.  UOP 300ml. Feeling good.   1/30: VSS BP 100s/60s.  SSNa 128->131  Phos 7.2, does have some  "phosphorous containing treats in the late evenings.  Dicussed.  No CP SOB or edema besides in wound vac leg.   PD  , wife at bedside and shares that the current fill is a low fill and was only supposed to be temporary after hernia repair.  Usual fill is 2200ml, all green bags.   1/31: VSS BP 100s/60s  PD , felt well with increase fill.   SNa 133 Phos 7.0 No CP SOB, edema localized to woundvac leg and thigh.     REVIEW OF SYSTEMS:    10 point ROS reviewed and is as per HPI/daily summary or otherwise negative    PMH/PSH/SH/FH: Reviewed and unchanged since admission note  CURRENT MEDICATIONS: Reviewed from admission to present day    VS:  /60 Comment: hels torsemide sbp less than 120  Pulse 89   Temp 36.9 °C (98.5 °F) (Oral)   Resp 16   Ht 1.707 m (5' 7.2\")   Wt 73.1 kg (161 lb 3.2 oz)   SpO2 95%   BMI 25.10 kg/m²   Physical Exam  Vitals and nursing note reviewed.   Constitutional:       Appearance: Normal appearance.   HENT:      Head: Normocephalic and atraumatic.      Nose: Nose normal.      Mouth/Throat:      Mouth: Mucous membranes are moist.   Eyes:      General: No scleral icterus.     Conjunctiva/sclera: Conjunctivae normal.   Cardiovascular:      Rate and Rhythm: Normal rate.   Pulmonary:      Effort: Pulmonary effort is normal.   Abdominal:      General: There is no distension.      Palpations: Abdomen is soft.      Comments: PD cath in RLQ    Musculoskeletal:         General: No deformity.      Cervical back: Neck supple.      Right lower leg: No edema.      Left lower leg: Edema present.      Comments: Mild edema in LLE wound vac in place     Skin:     General: Skin is warm and dry.      Findings: No rash.   Neurological:      General: No focal deficit present.      Mental Status: He is alert and oriented to person, place, and time. Mental status is at baseline.   Psychiatric:         Mood and Affect: Mood normal.         Behavior: Behavior normal. Behavior is cooperative. "         FLUID BALANCE:  In: 540 [P.O.:540]  Out: 639     LABS:  Recent Labs     01/29/25  1046 01/30/25  0524 01/31/25 0527   SODIUM 128* 131* 133*   POTASSIUM 3.8 4.0 4.0   CHLORIDE 85* 89* 87*   CO2 21 24 24   GLUCOSE 150* 131* 139*   BUN 72* 77* 70*   CREATININE 8.55* 8.47* 8.94*   CALCIUM 8.4* 8.6 8.8       Recent Labs     01/29/25  1046 01/30/25  0524 01/31/25 0527   WBC 9.8 5.6 6.6   RBC 2.41* 2.32* 2.32*   HEMOGLOBIN 7.8* 7.3* 7.3*   HEMATOCRIT 24.2* 23.0* 22.8*   .4* 99.1* 98.3*   MCH 32.4 31.5 31.5   MCHC 32.2* 31.7* 32.0*   RDW 58.4* 56.4* 56.3*   PLATELETCT 202 198 211   MPV 10.2 10.7 10.5         IMPRESSION:  # ESRD on CCPD  - Pt was transitioned to  iHD during masoud-op  s/p inguinal hernial repair,   - Still has [facility] PC  - Returned to PD 1/17/25   - Home PD prescription CCPD 2.5% alternating with 1.5% 7 exchanges 8 hrs 30 min,   total FV 7700cc, no last fill  # HTN  - Goal BP < 140/90  - At goal  # Anemia of CKD  - Goal Hgb 10-11  - Below goal   - On PO iron  # CKD-MBD  - Ca 8.6   - PO4 7.4 -> 6.6 -> 7.0  # Chronic hypoxic respiratory failure   - On home oxygen 2-2.5L at night         SUGGESTIONS:  - Continue CCPD during hospital stay, will increase fill to 2100ml  - PD exit site care per protocol   - Bowel regimen to avoid constipation with PD (having comfortable BM EOD)  - No dietary protein restrictions  - Dose all meds per ESRD  - ROSALIND  TIW if Hb < 10   - PO iron   - Continue Renvela, added PRN dose for snack, may need to titrate up next week  - Furosemide to Torsemide 10 mg PO qday with holding parameters if systolic <120  -Reduce BBL, flomax, terazosin as able given mild hypotension (diuretics held for this). Will monitor volume status and need for diuretics.  - Renal diet, low phos   - Has [facility] PC since May 2024. Pt does not want the CVC removed at this times and want to wait for an additional week or 2 to make sure that he does encounter any issues with PD treatments  He understands  "potential risks with keeping the  CVC  long term, discussed molly 1/29, he will have it removed next week \"when he is feeling more stable\".   - L arm AVF not fully matured,  will need intervention   - Avoid Fleet enema in dialysis patients   - Labs tomorrow  - Discharge planning underway, plan for 2/7 home with family    Please call or page Nephrology with questions or concerns  Thank you,  "

## 2025-01-31 NOTE — PROGRESS NOTES
NURSING DAILY NOTE    Name: Edy Bennett   Date of Admission: 1/23/2025   Admitting Diagnosis: PAD (peripheral artery disease) ([facility])  Attending Physician: ROSANNA PATEL D.O.  Allergies: Patient has no known allergies.    Safety  Patient Assist  max1  Patient Precautions  Fall Risk  Precaution Comments  LLE wound vac  Bed Transfer Status  Contact Guard Assist  Toilet Transfer Status   Contact Guard Assist (stand turn with grab bar. Pt offloading weight completely from left leg and using right leg only to transfer)  Assistive Devices  Rails, Wheelchair  Oxygen  Room air w/o2 available  Diet/Therapeutic Dining  Current Diet Order   Procedures    Diet Order Diet: Renal; Second Modifier: (optional): Cardiac     Pill Administration  whole  Agitated Behavioral Scale     ABS Level of Severity       Fall Risk  Has the patient had a fall this admission?   No  Trinh Spivey Fall Risk Scoring  22, HIGH RISK  Fall Risk Safety Measures  bed alarm, chair alarm, and poor balance, hard of hearing    Vitals  Temperature: 36.4 °C (97.5 °F)  Temp src: Oral  Pulse: 66  Respiration: 16  Blood Pressure : 101/61  Blood Pressure MAP (Calculated): 74 MM HG  BP Location: Right, Upper Arm  Patient BP Position: Cota's Position     Oxygen  Pulse Oximetry: 97 %  O2 (LPM): 0  O2 Delivery Device: Room air w/o2 available    Bowel and Bladder  Last Bowel Movement  01/29/25  Stool Type  Type 4: Like a sausage or snake, smooth and soft  Bowel Device     Continent  Bladder: Did not void   Bowel: No movement  Bladder Function  Urine Void (mL): 100 ml (urinal)  Number of Times Voided: 1  Urine Color: Yellow  Genitourinary Assessment   Bladder Assessment (WDL):  WDL Except  Smith Catheter: Not Applicable  Urinary Symptoms: Anuric/ Dialysis Patient (occasional output)  Urine Color: Yellow  Bladder Device: Urinal  Bladder Scan: Post Void  $ Bladder Scan Results (mL): 32    Skin  Jalen  Score   16  Sensory Interventions   Bed Types: Low Airloss  Skin Preventative Measures: Pillows in Use for Support / Positioning  Moisture Interventions  Moisturizers/Barriers: Barrier Wipes      Pain  Pain Rating Scale  1 - Hardly Notice Pain  Pain Location  Leg  Pain Location Orientation  Left  Pain Interventions   Declines    ADLs    Bathing   Full Bed Bath  Linen Change   Complete  Personal Hygiene     Chlorhexidine Bath   Not Completed - Off Floor  Oral Care     Teeth/Dentures     Shave     Nutrition Percentage Eaten  *  * Meal *  *, Dinner, Between 50-75% Consumed  Environmental Precautions  Treaded Slipper Socks on Patient, Personal Belongings, Wastebasket, Call Bell etc. in Easy Reach, Bed in Low Position  Patient Turns/Positioning  Patient turns self independently side to side without assistance, to offload sacral area  Patient Turns Assistance/Tolerance  Assistance of One  Bed Positions  Bed Controls On, Bed Locked  Head of Bed Elevated  Self regulated      Psychosocial/Neurologic Assessment  Psychosocial Assessment  Psychosocial (WDL):  WDL Except  Patient Behaviors: Anxious  Family Behaviors: Family Present  Neurologic Assessment  Neuro (WDL): Exceptions to WDL  Level of Consciousness: Alert  Orientation Level: Oriented X4  Cognition: Follows commands, Appropriate attention/concentration  Speech: Clear  Facial Symmetry: Other (Comment) (no facial droop noted)  Pupil Assesment: No  Motor Function/Sensation Assessment: Sensation  RUE Sensation: Full sensation  Muscle Strength Right Arm: Good Strength Against Gravity and Moderate Resistance  LUE Sensation: Full sensation  Muscle Strength Left Arm: Good Strength Against Gravity and Moderate Resistance  RLE Sensation: Full sensation  Muscle Strength Right Leg: Good Strength Against Gravity and Moderate Resistance  LLE Sensation: Full sensation, Pain  Muscle Strength Left Leg: Weak Movement but Not Against Gravity or Resistance  EENT (WDL):  WDL  Except    Cardio/Pulmonary Assessment  Edema   LLE Edema: Generalized  Respiratory Breath Sounds  RUL Breath Sounds: Clear  RML Breath Sounds: Clear  RLL Breath Sounds: Diminished  ANAYELI Breath Sounds: Clear  LLL Breath Sounds: Diminished  Cardiac Assessment   Cardiac (WDL):  Within Defined Limits

## 2025-01-31 NOTE — PROGRESS NOTES
[facility] Progress Notes    CCPD ordered by Dr. Villalobos. Disconnected aseptically from PD Cycler at 0730.    Pt stable, VSS, alert and oriented.  Effluent clear and yellow, no signs of bleeding/fibrin clots.  No alarms on machine was noted or reported before disconnection.    24 hour UF= 388mL (I.Drain= 22mL + Total UF= 366mL - Last fill= 0)    Report given to Lino ARANDA

## 2025-02-01 ENCOUNTER — APPOINTMENT (OUTPATIENT)
Dept: OCCUPATIONAL THERAPY | Facility: REHABILITATION | Age: 80
DRG: 299 | End: 2025-02-01
Attending: PHYSICAL MEDICINE & REHABILITATION
Payer: MEDICARE

## 2025-02-01 ENCOUNTER — APPOINTMENT (OUTPATIENT)
Dept: PHYSICAL THERAPY | Facility: REHABILITATION | Age: 80
DRG: 299 | End: 2025-02-01
Attending: PHYSICAL MEDICINE & REHABILITATION
Payer: MEDICARE

## 2025-02-01 LAB
ANION GAP SERPL CALC-SCNC: 20 MMOL/L (ref 7–16)
BUN SERPL-MCNC: 81 MG/DL (ref 8–22)
CALCIUM SERPL-MCNC: 8.9 MG/DL (ref 8.5–10.5)
CHLORIDE SERPL-SCNC: 89 MMOL/L (ref 96–112)
CO2 SERPL-SCNC: 24 MMOL/L (ref 20–33)
CREAT SERPL-MCNC: 8.42 MG/DL (ref 0.5–1.4)
GFR SERPLBLD CREATININE-BSD FMLA CKD-EPI: 6 ML/MIN/1.73 M 2
GLUCOSE SERPL-MCNC: 113 MG/DL (ref 65–99)
POTASSIUM SERPL-SCNC: 4 MMOL/L (ref 3.6–5.5)
SODIUM SERPL-SCNC: 133 MMOL/L (ref 135–145)

## 2025-02-01 PROCEDURE — 97116 GAIT TRAINING THERAPY: CPT

## 2025-02-01 PROCEDURE — 97110 THERAPEUTIC EXERCISES: CPT

## 2025-02-01 PROCEDURE — 97112 NEUROMUSCULAR REEDUCATION: CPT

## 2025-02-01 PROCEDURE — A9270 NON-COVERED ITEM OR SERVICE: HCPCS | Performed by: PHYSICAL MEDICINE & REHABILITATION

## 2025-02-01 PROCEDURE — 700102 HCHG RX REV CODE 250 W/ 637 OVERRIDE(OP): Performed by: NURSE PRACTITIONER

## 2025-02-01 PROCEDURE — A9270 NON-COVERED ITEM OR SERVICE: HCPCS | Performed by: NURSE PRACTITIONER

## 2025-02-01 PROCEDURE — 97530 THERAPEUTIC ACTIVITIES: CPT

## 2025-02-01 PROCEDURE — 770010 HCHG ROOM/CARE - REHAB SEMI PRIVAT*

## 2025-02-01 PROCEDURE — 700102 HCHG RX REV CODE 250 W/ 637 OVERRIDE(OP): Performed by: INTERNAL MEDICINE

## 2025-02-01 PROCEDURE — 700111 HCHG RX REV CODE 636 W/ 250 OVERRIDE (IP): Performed by: PHYSICAL MEDICINE & REHABILITATION

## 2025-02-01 PROCEDURE — 80048 BASIC METABOLIC PNL TOTAL CA: CPT

## 2025-02-01 PROCEDURE — 700102 HCHG RX REV CODE 250 W/ 637 OVERRIDE(OP): Performed by: PHYSICAL MEDICINE & REHABILITATION

## 2025-02-01 PROCEDURE — 36415 COLL VENOUS BLD VENIPUNCTURE: CPT

## 2025-02-01 PROCEDURE — A9270 NON-COVERED ITEM OR SERVICE: HCPCS | Performed by: INTERNAL MEDICINE

## 2025-02-01 PROCEDURE — 90945 DIALYSIS ONE EVALUATION: CPT

## 2025-02-01 RX ADMIN — GUAIFENESIN 600 MG: 600 TABLET ORAL at 07:59

## 2025-02-01 RX ADMIN — APIXABAN 5 MG: 5 TABLET, FILM COATED ORAL at 07:33

## 2025-02-01 RX ADMIN — GABAPENTIN 100 MG: 100 CAPSULE ORAL at 21:08

## 2025-02-01 RX ADMIN — SEVELAMER CARBONATE 1600 MG: 800 TABLET, FILM COATED ORAL at 17:48

## 2025-02-01 RX ADMIN — ONDANSETRON 4 MG: 4 TABLET, ORALLY DISINTEGRATING ORAL at 07:33

## 2025-02-01 RX ADMIN — METHOCARBAMOL 250 MG: 500 TABLET ORAL at 07:59

## 2025-02-01 RX ADMIN — APIXABAN 5 MG: 5 TABLET, FILM COATED ORAL at 21:08

## 2025-02-01 RX ADMIN — METHOCARBAMOL 250 MG: 500 TABLET ORAL at 21:08

## 2025-02-01 RX ADMIN — SEVELAMER CARBONATE 800 MG: 800 TABLET, FILM COATED ORAL at 21:22

## 2025-02-01 RX ADMIN — OXYCODONE 5 MG: 5 TABLET ORAL at 15:59

## 2025-02-01 RX ADMIN — ONDANSETRON 4 MG: 4 TABLET, ORALLY DISINTEGRATING ORAL at 17:48

## 2025-02-01 RX ADMIN — METHOCARBAMOL 250 MG: 500 TABLET ORAL at 13:06

## 2025-02-01 RX ADMIN — CLOPIDOGREL BISULFATE 75 MG: 75 TABLET ORAL at 07:33

## 2025-02-01 RX ADMIN — EPOETIN ALFA-EPBX 4000 UNITS: 4000 INJECTION, SOLUTION INTRAVENOUS; SUBCUTANEOUS at 16:00

## 2025-02-01 RX ADMIN — TORSEMIDE 10 MG: 5 TABLET ORAL at 05:11

## 2025-02-01 RX ADMIN — SEVELAMER CARBONATE 1600 MG: 800 TABLET, FILM COATED ORAL at 07:33

## 2025-02-01 RX ADMIN — OXYCODONE 5 MG: 5 TABLET ORAL at 05:11

## 2025-02-01 RX ADMIN — OMEPRAZOLE 20 MG: 20 CAPSULE, DELAYED RELEASE ORAL at 07:59

## 2025-02-01 RX ADMIN — MONTELUKAST 10 MG: 10 TABLET, FILM COATED ORAL at 21:08

## 2025-02-01 RX ADMIN — GUAIFENESIN 600 MG: 600 TABLET ORAL at 21:08

## 2025-02-01 RX ADMIN — MAGNESIUM HYDROXIDE 30 ML: 1200 LIQUID ORAL at 07:59

## 2025-02-01 RX ADMIN — METHOCARBAMOL 250 MG: 500 TABLET ORAL at 17:47

## 2025-02-01 RX ADMIN — SENNOSIDES AND DOCUSATE SODIUM 2 TABLET: 50; 8.6 TABLET ORAL at 07:59

## 2025-02-01 RX ADMIN — Medication 324 MG: at 16:00

## 2025-02-01 RX ADMIN — POLYETHYLENE GLYCOL 3350 1 PACKET: 17 POWDER, FOR SOLUTION ORAL at 05:38

## 2025-02-01 RX ADMIN — ROSUVASTATIN CALCIUM 20 MG: 10 TABLET, FILM COATED ORAL at 21:08

## 2025-02-01 RX ADMIN — SEVELAMER CARBONATE 1600 MG: 800 TABLET, FILM COATED ORAL at 11:52

## 2025-02-01 RX ADMIN — SENNOSIDES AND DOCUSATE SODIUM 2 TABLET: 50; 8.6 TABLET ORAL at 21:08

## 2025-02-01 RX ADMIN — ONDANSETRON 4 MG: 4 TABLET, ORALLY DISINTEGRATING ORAL at 11:52

## 2025-02-01 RX ADMIN — OXYCODONE 5 MG: 5 TABLET ORAL at 11:52

## 2025-02-01 RX ADMIN — GABAPENTIN 100 MG: 100 CAPSULE ORAL at 07:59

## 2025-02-01 ASSESSMENT — GAIT ASSESSMENTS
ASSISTIVE DEVICE: FRONT WHEEL WALKER
DEVIATION: STEP TO;ANTALGIC;BRADYKINETIC
DISTANCE (FEET): 73
GAIT LEVEL OF ASSIST: CONTACT GUARD ASSIST

## 2025-02-01 ASSESSMENT — PAIN DESCRIPTION - PAIN TYPE
TYPE: ACUTE PAIN

## 2025-02-01 ASSESSMENT — ACTIVITIES OF DAILY LIVING (ADL)
BED_CHAIR_WHEELCHAIR_TRANSFER_DESCRIPTION: ADAPTIVE EQUIPMENT;INCREASED TIME;INITIAL PREPARATION FOR TASK;SET-UP OF EQUIPMENT;SUPERVISION FOR SAFETY;VERBAL CUEING

## 2025-02-01 NOTE — PROGRESS NOTES
NURSING DAILY NOTE    Name: Edy Bennett   Date of Admission: 1/23/2025   Admitting Diagnosis: PAD (peripheral artery disease) ([facility])  Attending Physician: ROSANNA PATEL D.O.  Allergies: Patient has no known allergies.    Safety  Patient Assist  max1  Patient Precautions  Fall Risk  Precaution Comments  LLE wound vac  Bed Transfer Status  Contact Guard Assist  Toilet Transfer Status   Contact Guard Assist (stand turn with grab bar. Pt offloading weight completely from left leg and using right leg only to transfer)  Assistive Devices  Rails, Wheelchair  Oxygen  None - Room Air  Diet/Therapeutic Dining  Current Diet Order   Procedures    Diet Order Diet: Renal; Second Modifier: (optional): Cardiac     Pill Administration  whole  Agitated Behavioral Scale     ABS Level of Severity       Fall Risk  Has the patient had a fall this admission?   No  Trinh Spivey Fall Risk Scoring  22, HIGH RISK  Fall Risk Safety Measures  bed alarm and chair alarm    Vitals  Temperature: 36.9 °C (98.4 °F)  Temp src: Oral  Pulse: 91  Respiration: 12  Blood Pressure : 103/66  Blood Pressure MAP (Calculated): 78 MM HG  BP Location: Right, Upper Arm  Patient BP Position: Supine     Oxygen  Pulse Oximetry: 91 %  O2 (LPM): 2  O2 Delivery Device: None - Room Air    Bowel and Bladder  Last Bowel Movement  01/29/25  Stool Type  Type 4: Like a sausage or snake, smooth and soft  Bowel Device     Continent  Bladder: Did not void   Bowel: No movement  Bladder Function  Urine Void (mL): 100 ml (urinal)  Number of Times Voided: 1  Urine Color: Yellow  Genitourinary Assessment   Bladder Assessment (WDL):  WDL Except  Smith Catheter: Not Applicable  Urinary Symptoms: Anuric/ Dialysis Patient (occasional output)  Urine Color: Yellow  Bladder Device: Urinal  Bladder Scan: Post Void  $ Bladder Scan Results (mL): 32    Skin  Jalen Score   16  Sensory Interventions   Bed Types: Low  Airloss  Skin Preventative Measures: Pillows in Use for Support / Positioning  Moisture Interventions  Moisturizers/Barriers: Barrier Wipes      Pain  Pain Rating Scale  5 - Interrupts some activities  Pain Location  Leg  Pain Location Orientation  Left  Pain Interventions   Medication (see MAR), Rest, Repositioned    ADLs    Bathing   Full Bed Bath  Linen Change   Complete  Personal Hygiene     Chlorhexidine Bath   Not Completed - Off Floor  Oral Care     Teeth/Dentures     Shave     Nutrition Percentage Eaten  *  * Meal *  *, Dinner, Between 50-75% Consumed  Environmental Precautions  Treaded Slipper Socks on Patient, Personal Belongings, Wastebasket, Call Bell etc. in Easy Reach, Bed in Low Position  Patient Turns/Positioning  Patient turns self independently side to side without assistance, to offload sacral area  Patient Turns Assistance/Tolerance  Assistance of One  Bed Positions  Bed Controls On, Bed Locked  Head of Bed Elevated  Self regulated      Psychosocial/Neurologic Assessment  Psychosocial Assessment  Psychosocial (WDL):  WDL Except  Patient Behaviors: Anxious  Family Behaviors: Family Present  Neurologic Assessment  Neuro (WDL): Exceptions to WDL  Level of Consciousness: Alert  Orientation Level: Oriented X4  Cognition: Follows commands, Appropriate attention/concentration  Speech: Clear  Facial Symmetry: Other (Comment) (no facial droop noted)  Pupil Assesment: No  Motor Function/Sensation Assessment: Sensation  RUE Sensation: Full sensation  Muscle Strength Right Arm: Good Strength Against Gravity and Moderate Resistance  LUE Sensation: Full sensation  Muscle Strength Left Arm: Good Strength Against Gravity and Moderate Resistance  RLE Sensation: Full sensation  Muscle Strength Right Leg: Good Strength Against Gravity and Moderate Resistance  LLE Sensation: Full sensation, Pain  Muscle Strength Left Leg: Weak Movement but Not Against Gravity or Resistance  EENT (WDL):  WDL  Except    Cardio/Pulmonary Assessment  Edema   LLE Edema: Generalized  Respiratory Breath Sounds  RUL Breath Sounds: Clear  RML Breath Sounds: Clear  RLL Breath Sounds: Diminished  ANAYELI Breath Sounds: Clear  LLL Breath Sounds: Diminished  Cardiac Assessment   Cardiac (WDL):  Within Defined Limits

## 2025-02-01 NOTE — THERAPY
Occupational Therapy  Daily Treatment     Patient Name: Edy Bennett  Age:  79 y.o., Sex:  male  Medical Record #: [id number]  Today's Date: 2/1/2025     Precautions  Precautions: Fall Risk  Comments: LLE wound vac    Safety   ADL Safety : Requires Physical Assist for Safety  Bathroom Safety: Requires Physical Assist for Safety    Subjective    Pt pleasant and motivated to participate in OT     Objective     02/01/25 1031   OT Charge Group   Charges Yes   OT Neuromuscular Re-education / Balance (Units) 3   OT Therapeutic Exercise (Units) 1   OT Total Time Spent   OT Individual Total Time Spent (Mins) 60   Precautions   Precautions Fall Risk   Comments LLE wound vac   Vitals   O2 Delivery Device None - Room Air   Pain   Intervention Declines   ABS (Agitated Behavior Scale)   Agitated Behavior Scale Performed No   Sleep/Wake Cycle   Sleep & Rest Awake;Out of bed   Sitting Upper Body Exercises   Upper Extremity Bike Level 5 Resistance  (Pt tolerated 10 minutes w/o rest break)   Balance   Comments Pt tolerated x5 rounds of balloon badminton in parallel bars w/  SBA. Pt utilized one bar for UE support. Provided seated rest breaks between rounds d/t fatigue. Pt stood approx 2-3 minutes per round. Pt also tolerated functional ambulation in parallel bars w/ CGA. Pt ambulated the length of the bars and back to chair x3 w/o rest break.   Interdisciplinary Plan of Care Collaboration   IDT Collaboration with  Certified Nursing Assistant;Family / Caregiver   Patient Position at End of Therapy Seated;Chair Alarm On;Self Releasing Lap Belt Applied;Family / Friend in Room  (Pt left in dining dorsey for lunch w/ staff supervision)   Collaboration Comments CNA took vitals at beginning of session; pt's wife present for tx     Assessment    Pt seen for tx, addressing standing balance/tolerance, and UE strengthening. Pt tolerated activity well. Pt progressing towards goals. Continue OT POC.    Plan    Consult with wound RN regarding  showering  ADL, IADL, DME, strengthening, balance     DME  OT DME Recommendations  Bathroom Equipment:  (TBD)    Passport items to be completed:  Perform bathroom transfers, complete dressing, complete feeding, get ready for the day, prepare a simple meal, participate in household tasks, adapt home for safety needs, demonstrate home exercise program, complete caregiver training     Occupational Therapy Goals (Active)       Problem: Dressing       Dates: Start:  01/24/25         Goal: STG-Within one week, patient will dress LB with min A       Dates: Start:  01/24/25               Problem: OT Long Term Goals       Dates: Start:  01/24/25         Goal: LTG-By discharge, patient will complete basic self care tasks mod I       Dates: Start:  01/24/25            Goal: LTG-By discharge, patient will perform bathroom transfers mod I       Dates: Start:  01/24/25               Problem: Toileting       Dates: Start:  01/24/25         Goal: STG-Within one week, patient will complete toileting tasks with min A        Dates: Start:  01/24/25

## 2025-02-01 NOTE — CARE PLAN
The patient is Stable - Low risk of patient condition declining or worsening      Problem: Fall Risk - Rehab  Goal: Patient will remain free from falls  Outcome: Progressing     Problem: Skin Integrity  Goal: Skin integrity is maintained or improved  Outcome: Progressing

## 2025-02-01 NOTE — PROGRESS NOTES
[facility] Services Progress Notes    CCPD ordered by Dr. Reddy. Disconnected aseptically from PD Cycler at 0630.    Pt stable, VSS, alert and oriented.  Effluent clear and yellow, no signs of bleeding/fibrin clots.  No alarms on machine was noted or reported before disconnection.    24 hour UF= 678mL (I.Drain= 35mL + Total UF= 643mL - Last fill= 0)    Report given to James ARANDA

## 2025-02-01 NOTE — THERAPY
"Physical Therapy   Daily Treatment     Patient Name: Edy Bennett  Age:  79 y.o., Sex:  male  Medical Record #: [id number]  Today's Date: 2/1/2025     Precautions  Precautions: Fall Risk  Comments: LLE wound vac    Subjective    \"I'm going to sleep good tonight\"     Objective       02/01/25 1301   PT Charge Group   PT Gait Training (Units) 1   PT Therapeutic Exercise (Units) 2   PT Therapeutic Activities (Units) 1   PT Total Time Spent   PT Individual Total Time Spent (Mins) 60   Pain 0 - 10 Group   Location Leg   Location Orientation Left   Pain Rating Scale (NPRS) 4   Therapist Pain Assessment During Activity  (during ambulation, no pain reported with PROM or supine therex)   Gait Functional Level of Assist    Gait Level Of Assist Contact Guard Assist   Assistive Device Front Wheel Walker   Distance (Feet) 73   # of Times Distance was Traveled 1  (as well as 10-15ftx2)   Deviation Step To;Antalgic;Bradykinetic  (intermittently progressing to step through, kyphotic, heavy use of UE support)   Transfer Functional Level of Assist   Bed, Chair, Wheelchair Transfer Contact Guard Assist   Bed Chair Wheelchair Transfer Description Adaptive equipment;Increased time;Initial preparation for task;Set-up of equipment;Supervision for safety;Verbal cueing  (stand pivot)   Supine Lower Body Exercise   Bridges Two Legged;2 sets of 10   Heel Slide 1 set of 10;Left  (with strap ROM prior to mobilizing OOB)   Quadriceps Isometrics 1 set of 10;Left  (with heel propped on towel)   Other Exercises supine L heel prop on small towel roll (approx 2inch height) x5 min to tolerance   Sitting Lower Body Exercises   Long Arc Quad 1 set of 10;Left   Nustep Resistance Level 3  (10 min BUE/LE no rest breaks 0.32mi)   Bed Mobility    Supine to Sit Supervised   Sit to Supine Supervised   Sit to Stand Contact Guard Assist   Neuro-Muscular Treatments   Neuro-Muscular Treatments Compensatory Strategies   Comments gait training FWW for step " "through pattern and improved fluidity of gait   Interdisciplinary Plan of Care Collaboration   IDT Collaboration with  Family / Caregiver   Patient Position at End of Therapy Seated;Edge of Bed;Bed Alarm On;Call Light within Reach;Tray Table within Reach;Phone within Reach;Family / Friend in Room   Collaboration Comments Spouse present and engaged during tx     Noted absence of wound vac, pt reports discontinued yesterday (1/31)  ROM activities performed to tolerance to respect tissue healing    Assessment    Keven continues to present with left knee flexion in stance phase of gait and decreased eccentric control with standing to sitting. He was able to progress to intermittent step through pattern with ambulation and increased distance in one bout to 72ft. Reports pain is improving overall.   Strengths: Able to follow instructions, Alert and oriented, Independent prior level of function, Supportive family, Pleasant and cooperative, Motivated for self care and independence  Barriers: Pain, Pain poorly managed, Limited mobility    Plan    LE mobility exercises in supine and sitting to prep soft tissues for functional movements, progressive gait in parallel bars and walker, practice car transfers     DME  PT DME Recommendations  Wheelchair: 16\" Width, Elevating Leg Rests  Cushion: Standard    Passport items to be completed:  Get in/out of bed safely, in/out of a vehicle, safely use mobility device, walk or wheel around home/community, navigate up and down stairs, show how to get up/down from the ground, ensure home is accessible, demonstrate HEP, complete caregiver training    Physical Therapy Problems (Active)       Problem: Mobility       Dates: Start:  01/24/25         Goal: STG-Within one week, patient will ambulate 10 feet with FWW CGA       Dates: Start:  01/24/25               Problem: Mobility Transfers       Dates: Start:  01/24/25         Goal: STG-Within one week, patient will perform bed mobility with " minimal assist to manage legs        Dates: Start:  01/24/25            Goal: STG-Within one week, patient will sit to stand with minimal assist and FWW       Dates: Start:  01/24/25               Problem: PT-Long Term Goals       Dates: Start:  01/24/25         Goal: LTG-By discharge, patient will ambulate with  feet SBA       Dates: Start:  01/24/25            Goal: LTG-By discharge, patient will transfer one surface to another with FWW supervised       Dates: Start:  01/24/25            Goal: LTG-By discharge, patient will perform home exercise program       Dates: Start:  01/24/25            Goal: LTG-By discharge, patient will ambulate up/down 4-6 stairs with bilateral rails supervised        Dates: Start:  01/24/25            Goal: LTG-By discharge, patient will transfer in/out of a car with FWW supervised        Dates: Start:  01/24/25

## 2025-02-01 NOTE — PROGRESS NOTES
[facility] Services Progress Note     CCPD initiated at 1931 as ordered per Edelmira Cruz x 8.5 hours using 1.5% and 2.5% PD solution.      Pt having nausea but states it's not new. Pt otherwise without any distress. Pt denies any new complaints. Vss.      Aseptically connected PD cycler to PD catheter.   Exit site cleansed, no s/sx of infection, gentamicin cream applied as ordered, PD dressing changed CDI.        In-service given to Primary [place] with on-call Dialysis RN contact information ([phone]) and troubleshooting of machine alarms. Instructed Primary RN to please give in-service to NOC RN.      Initial drain : 35 ml     Please call for any issues with hemodynamic instability/persistent alarms/patient not tolerating therapy.

## 2025-02-01 NOTE — PROGRESS NOTES
[facility] -  PROGRESS NOTE               Author: Ramírez Matta M.D. Date & Time: 2/1/2025  11:30 AM     HPI:  79 y.o. male with HTN, ESRD on PD, PVD/ gangrenous left foot, BPH admitted for monitoring s/p  left leg bypass surgery on 1/20 with Dr. Miller. He recently underwent hernial repair and was temporarily transferred to in center HD via [facility]. He has returned to  PD  4 days ago, doing low fill.volumes and has been tolerating at home.   Patient was connected to cycler last night. Total UF reported to be 276ml. Reports of low drain UF alarm last night but no major issues with PD overnight. Pt reports no complaints today morning  No F/C/N/V/CP/SOB.  No melena, hematochezia, hematemesis.  No HA, visual changes,or abdominal pain.     DAILY NEPHROLOGY SUMMARY:    1/21: consult done   1/22: No issues with PD overnight, UF 1137cc, VSS, , feels good, wife at bed side   1/23: No issues with PD overnight 653 cc of UF , not had any BM yesterday and today  --------------------Transferred to [facility] from [facility]--------------------  1/24: CCPD 1.5L UF. VSS. PO4 elevated. Pt seen during PD, wife present. C/O pain.  1/25: No issues with PD overnight, net UF 1415ml. No sob, had BM yesterday, states he use O2 when he sleeps to help with nose bleed  1/26:No issues with PD overnight , net  cc, no c/o, still not able to bear weight   1/27: 780 mL Net UF removed with CCPD overnight. Coming out of BR with OT and wife. C/o no protein with meals, only had english muffin this am. BM yesterday. Denies abd pain or tenderness. Denies fever or chills.   1/28: 444 mL Net UF removed over night. Sitting in WC in room, wife BS. Reports feeling better every day. Reports sleeping great. Bps 90-100s.   1/29: VSS BP soft, RA.  No new labs.  No CP SOB, or worsenieng edema.  PD overnight 636ml.  UOP 300ml. Feeling good.   1/30: VSS BP 100s/60s.  SSNa 128->131  Phos 7.2, does have some phosphorous  "containing treats in the late evenings.  Dicussed.  No CP SOB or edema besides in wound vac leg.   PD  , wife at bedside and shares that the current fill is a low fill and was only supposed to be temporary after hernia repair.  Usual fill is 2200ml, all green bags.   1/31: VSS BP 100s/60s  PD , felt well with increase fill.   SNa 133 Phos 7.0 No CP SOB, edema localized to woundvac leg and thigh.   2/01:   Disconnected aseptically from PD CyclerEffluent clear and yellow, no signs of bleeding/fibrin clots. 24 hour UF= 678mL. Tolerated well.     REVIEW OF SYSTEMS:    10 point ROS reviewed and is as per HPI/daily summary or otherwise negative    PMH/PSH/SH/FH: Reviewed and unchanged since admission note  CURRENT MEDICATIONS: Reviewed from admission to present day    VS:  /64   Pulse 88   Temp 37 °C (98.6 °F) (Oral)   Resp 12   Ht 1.707 m (5' 7.2\")   Wt 73.1 kg (161 lb 3.2 oz)   SpO2 98%   BMI 25.10 kg/m²   Physical Exam  Vitals and nursing note reviewed.   Constitutional:       Appearance: Normal appearance.   HENT:      Head: Normocephalic and atraumatic.      Nose: Nose normal.      Mouth/Throat:      Mouth: Mucous membranes are moist.   Eyes:      General: No scleral icterus.     Conjunctiva/sclera: Conjunctivae normal.   Cardiovascular:      Rate and Rhythm: Normal rate.   Pulmonary:      Effort: Pulmonary effort is normal.   Abdominal:      General: There is no distension.      Palpations: Abdomen is soft.      Comments: PD cath in RLQ    Musculoskeletal:         General: No deformity.      Cervical back: Neck supple.      Right lower leg: No edema.      Left lower leg: Edema present.      Comments: Mild edema in LLE wound vac in place     Skin:     General: Skin is warm and dry.      Findings: No rash.   Neurological:      General: No focal deficit present.      Mental Status: He is alert and oriented to person, place, and time. Mental status is at baseline.   Psychiatric:         " "Mood and Affect: Mood normal.         Behavior: Behavior normal. Behavior is cooperative.         FLUID BALANCE:  In: 120 [P.O.:120]  Out: 488     LABS:  Recent Labs     01/30/25 0524 01/31/25 0527 02/01/25 0524   SODIUM 131* 133* 133*   POTASSIUM 4.0 4.0 4.0   CHLORIDE 89* 87* 89*   CO2 24 24 24   GLUCOSE 131* 139* 113*   BUN 77* 70* 81*   CREATININE 8.47* 8.94* 8.42*   CALCIUM 8.6 8.8 8.9       Recent Labs     01/30/25 0524 01/31/25 0527   WBC 5.6 6.6   RBC 2.32* 2.32*   HEMOGLOBIN 7.3* 7.3*   HEMATOCRIT 23.0* 22.8*   MCV 99.1* 98.3*   MCH 31.5 31.5   MCHC 31.7* 32.0*   RDW 56.4* 56.3*   PLATELETCT 198 211   MPV 10.7 10.5         IMPRESSION:  # ESRD on CCPD  - Pt was transitioned to  iHD during masoud-op  s/p inguinal hernial repair,   - Still has [facility]  - Returned to PD 1/17/25   - Home PD prescription CCPD 2.5% alternating with 1.5% 7 exchanges 8 hrs 30 min,   total FV 7700cc, no last fill  # HTN  - Goal BP < 140/90  - At goal  # Anemia of CKD  - Goal Hgb 10-11  - Below goal   - On PO iron  # CKD-MBD  - Ca 8.6   - PO4 7.4 -> 6.6 -> 7.0  # Chronic hypoxic respiratory failure   - On home oxygen 2-2.5L at night         SUGGESTIONS:  - Continue CCPD during hospital stay  - PD exit site care per protocol   - Bowel regimen to avoid constipation with PD (having comfortable BM EOD)  - No dietary protein restrictions  - Dose all meds per ESRD  - ROSALIND  TIW if Hb < 10   - PO iron   - Continue Renvela, added PRN dose for snack, may need to titrate up next week  - Renal diet, low phos   - Has [facility] PC since May 2024. Pt does not want the CVC removed at this times and want to wait for an additional week or 2 to make sure that he does encounter any issues with PD treatments  He understands potential risks with keeping the  CVC  long term, discussed again 1/29, he will have it removed next week \"when he is feeling more stable\".   - L arm AVF not fully matured,  will need intervention   - Avoid Fleet enema in dialysis patients "   - Labs daily  - Discharge planning underway, plan for 2/7 home with family    Please call or page Nephrology with questions or concerns  Thank you,

## 2025-02-01 NOTE — PROGRESS NOTES
NURSING DAILY NOTE    Name: Edy Bennett   Date of Admission: 1/23/2025   Admitting Diagnosis: PAD (peripheral artery disease) ([facility])  Attending Physician: ROSANNA PATEL D.O.  Allergies: Patient has no known allergies.    Safety  Patient Assist  max1  Patient Precautions  Fall Risk  Precaution Comments  LLE wound vac  Bed Transfer Status  Contact Guard Assist  Toilet Transfer Status   Contact Guard Assist (stand turn with grab bar. Pt offloading weight completely from left leg and using right leg only to transfer)  Assistive Devices  Rails, Wheelchair  Oxygen  Nasal Cannula  Diet/Therapeutic Dining  Current Diet Order   Procedures    Diet Order Diet: Renal; Second Modifier: (optional): Cardiac     Pill Administration  whole  Agitated Behavioral Scale     ABS Level of Severity       Fall Risk  Has the patient had a fall this admission?   No  Trinh Spivey Fall Risk Scoring  22, HIGH RISK  Fall Risk Safety Measures  bed alarm and chair alarm    Vitals  Temperature: 36.4 °C (97.5 °F)  Temp src: Temporal  Pulse: 95  Respiration: 18  Blood Pressure : 123/70  Blood Pressure MAP (Calculated): 88 MM HG  BP Location: Right, Upper Arm  Patient BP Position: Supine     Oxygen  Pulse Oximetry: 99 %  O2 (LPM): 2  O2 Delivery Device: Nasal Cannula    Bowel and Bladder  Last Bowel Movement  01/29/25  Stool Type  Type 4: Like a sausage or snake, smooth and soft  Bowel Device     Continent  Bladder: Did not void   Bowel: No movement  Bladder Function  Urine Void (mL): 100 ml  Number of Times Voided: 1  Urine Color: Yellow  Genitourinary Assessment   Bladder Assessment (WDL):  WDL Except  Smith Catheter: Not Applicable  Urinary Symptoms: Anuric/ Dialysis Patient  Urine Color: Yellow  Bladder Device: Urinal  Bladder Scan: Post Void  $ Bladder Scan Results (mL): 32    Skin  Jalen Score   16  Sensory Interventions   Bed Types: Low Airloss  Skin Preventative Measures:  Pillows in Use for Support / Positioning  Moisture Interventions  Moisturizers/Barriers: Barrier Wipes      Pain  Pain Rating Scale  0 - No Pain  Pain Location  Unable to Evaluate  Pain Location Orientation  Right, Left  Pain Interventions   Medication (see MAR)    ADLs    Bathing   Full Bed Bath  Linen Change   Complete  Personal Hygiene     Chlorhexidine Bath   Completed  Oral Care     Teeth/Dentures     Shave     Nutrition Percentage Eaten  *  * Meal *  *, Dinner, Between 50-75% Consumed  Environmental Precautions  Treaded Slipper Socks on Patient, Personal Belongings, Wastebasket, Call Bell etc. in Easy Reach, Bed in Low Position  Patient Turns/Positioning  Patient turns self independently side to side without assistance, to offload sacral area  Patient Turns Assistance/Tolerance  Assistance of One  Bed Positions  Bed Controls On, Bed Locked  Head of Bed Elevated  Self regulated      Psychosocial/Neurologic Assessment  Psychosocial Assessment  Psychosocial (WDL):  WDL Except  Patient Behaviors: Anxious  Family Behaviors: Family Present  Neurologic Assessment  Neuro (WDL): Exceptions to WDL  Level of Consciousness: Alert  Orientation Level: Oriented X4  Cognition: Follows commands, Appropriate attention/concentration  Speech: Clear  Facial Symmetry: Other (Comment) (no facial droop noted)  Pupil Assesment: No  Motor Function/Sensation Assessment: Sensation  RUE Sensation: Full sensation  Muscle Strength Right Arm: Good Strength Against Gravity and Moderate Resistance  LUE Sensation: Full sensation  Muscle Strength Left Arm: Good Strength Against Gravity and Moderate Resistance  RLE Sensation: Full sensation  Muscle Strength Right Leg: Good Strength Against Gravity and Moderate Resistance  LLE Sensation: Full sensation, Pain  Muscle Strength Left Leg: Weak Movement but Not Against Gravity or Resistance  EENT (WDL):  WDL Except    Cardio/Pulmonary Assessment  Edema   LLE Edema: Generalized  Respiratory Breath  Sounds  RUL Breath Sounds: Clear  RML Breath Sounds: Clear  RLL Breath Sounds: Diminished  ANAYELI Breath Sounds: Clear  LLL Breath Sounds: Diminished  Cardiac Assessment   Cardiac (WDL):  Within Defined Limits

## 2025-02-02 ENCOUNTER — APPOINTMENT (OUTPATIENT)
Dept: PHYSICAL THERAPY | Facility: REHABILITATION | Age: 80
DRG: 299 | End: 2025-02-02
Attending: PHYSICAL MEDICINE & REHABILITATION
Payer: MEDICARE

## 2025-02-02 ENCOUNTER — APPOINTMENT (OUTPATIENT)
Dept: OCCUPATIONAL THERAPY | Facility: REHABILITATION | Age: 80
DRG: 299 | End: 2025-02-02
Attending: PHYSICAL MEDICINE & REHABILITATION
Payer: MEDICARE

## 2025-02-02 PROCEDURE — A9270 NON-COVERED ITEM OR SERVICE: HCPCS | Performed by: NURSE PRACTITIONER

## 2025-02-02 PROCEDURE — 90945 DIALYSIS ONE EVALUATION: CPT

## 2025-02-02 PROCEDURE — 97110 THERAPEUTIC EXERCISES: CPT

## 2025-02-02 PROCEDURE — 770010 HCHG ROOM/CARE - REHAB SEMI PRIVAT*

## 2025-02-02 PROCEDURE — 94760 N-INVAS EAR/PLS OXIMETRY 1: CPT

## 2025-02-02 PROCEDURE — 700102 HCHG RX REV CODE 250 W/ 637 OVERRIDE(OP): Performed by: PHYSICAL MEDICINE & REHABILITATION

## 2025-02-02 PROCEDURE — 97535 SELF CARE MNGMENT TRAINING: CPT

## 2025-02-02 PROCEDURE — 700102 HCHG RX REV CODE 250 W/ 637 OVERRIDE(OP): Performed by: NURSE PRACTITIONER

## 2025-02-02 PROCEDURE — 97530 THERAPEUTIC ACTIVITIES: CPT

## 2025-02-02 PROCEDURE — A9270 NON-COVERED ITEM OR SERVICE: HCPCS | Performed by: PHYSICAL MEDICINE & REHABILITATION

## 2025-02-02 PROCEDURE — 700111 HCHG RX REV CODE 636 W/ 250 OVERRIDE (IP): Performed by: PHYSICAL MEDICINE & REHABILITATION

## 2025-02-02 RX ADMIN — SEVELAMER CARBONATE 1600 MG: 800 TABLET, FILM COATED ORAL at 08:03

## 2025-02-02 RX ADMIN — METHOCARBAMOL 250 MG: 500 TABLET ORAL at 15:08

## 2025-02-02 RX ADMIN — METHOCARBAMOL 250 MG: 500 TABLET ORAL at 21:24

## 2025-02-02 RX ADMIN — APIXABAN 5 MG: 5 TABLET, FILM COATED ORAL at 08:03

## 2025-02-02 RX ADMIN — METHOCARBAMOL 250 MG: 500 TABLET ORAL at 09:25

## 2025-02-02 RX ADMIN — METHOCARBAMOL 250 MG: 500 TABLET ORAL at 17:54

## 2025-02-02 RX ADMIN — MONTELUKAST 10 MG: 10 TABLET, FILM COATED ORAL at 21:24

## 2025-02-02 RX ADMIN — GABAPENTIN 100 MG: 100 CAPSULE ORAL at 21:24

## 2025-02-02 RX ADMIN — GUAIFENESIN 600 MG: 600 TABLET ORAL at 09:25

## 2025-02-02 RX ADMIN — OMEPRAZOLE 20 MG: 20 CAPSULE, DELAYED RELEASE ORAL at 09:25

## 2025-02-02 RX ADMIN — OXYCODONE 5 MG: 5 TABLET ORAL at 05:13

## 2025-02-02 RX ADMIN — SEVELAMER CARBONATE 1600 MG: 800 TABLET, FILM COATED ORAL at 17:54

## 2025-02-02 RX ADMIN — SENNOSIDES AND DOCUSATE SODIUM 2 TABLET: 50; 8.6 TABLET ORAL at 21:24

## 2025-02-02 RX ADMIN — ROSUVASTATIN CALCIUM 20 MG: 10 TABLET, FILM COATED ORAL at 21:24

## 2025-02-02 RX ADMIN — APIXABAN 5 MG: 5 TABLET, FILM COATED ORAL at 21:24

## 2025-02-02 RX ADMIN — SENNOSIDES AND DOCUSATE SODIUM 2 TABLET: 50; 8.6 TABLET ORAL at 09:25

## 2025-02-02 RX ADMIN — GUAIFENESIN 600 MG: 600 TABLET ORAL at 21:24

## 2025-02-02 RX ADMIN — ONDANSETRON 4 MG: 4 TABLET, ORALLY DISINTEGRATING ORAL at 08:03

## 2025-02-02 RX ADMIN — GENTAMICIN SULFATE: 1 CREAM TOPICAL at 18:00

## 2025-02-02 RX ADMIN — CLOPIDOGREL BISULFATE 75 MG: 75 TABLET ORAL at 08:03

## 2025-02-02 RX ADMIN — ONDANSETRON 4 MG: 4 TABLET, ORALLY DISINTEGRATING ORAL at 18:59

## 2025-02-02 RX ADMIN — GABAPENTIN 100 MG: 100 CAPSULE ORAL at 09:25

## 2025-02-02 RX ADMIN — SEVELAMER CARBONATE 1600 MG: 800 TABLET, FILM COATED ORAL at 11:32

## 2025-02-02 ASSESSMENT — GAIT ASSESSMENTS
ASSISTIVE DEVICE: FRONT WHEEL WALKER
GAIT LEVEL OF ASSIST: CONTACT GUARD ASSIST
ASSISTIVE DEVICE: FRONT WHEEL WALKER
DEVIATION: STEP TO;ANTALGIC
GAIT LEVEL OF ASSIST: CONTACT GUARD ASSIST
DISTANCE (FEET): 40
DISTANCE (FEET): 35

## 2025-02-02 ASSESSMENT — PAIN DESCRIPTION - PAIN TYPE: TYPE: ACUTE PAIN

## 2025-02-02 ASSESSMENT — ACTIVITIES OF DAILY LIVING (ADL)
BED_CHAIR_WHEELCHAIR_TRANSFER_DESCRIPTION: SET-UP OF EQUIPMENT;SUPERVISION FOR SAFETY;VERBAL CUEING
BED_CHAIR_WHEELCHAIR_TRANSFER_DESCRIPTION: INCREASED TIME;INITIAL PREPARATION FOR TASK;SET-UP OF EQUIPMENT;SQUAT PIVOT TRANSFER TO WHEELCHAIR;SUPERVISION FOR SAFETY;VERBAL CUEING
TOILET_TRANSFER_DESCRIPTION: GRAB BAR;INCREASED TIME;SET-UP OF EQUIPMENT;SUPERVISION FOR SAFETY;VERBAL CUEING
TOILETING_LEVEL_OF_ASSIST_DESCRIPTION: GRAB BAR;INCREASED TIME;INITIAL PREPARATION FOR TASK;SET-UP OF EQUIPMENT;SUPERVISION FOR SAFETY;VERBAL CUEING
BED_CHAIR_WHEELCHAIR_TRANSFER_DESCRIPTION: ADAPTIVE EQUIPMENT;INCREASED TIME;SET-UP OF EQUIPMENT;SUPERVISION FOR SAFETY

## 2025-02-02 NOTE — THERAPY
Physical Therapy   Daily Treatment     Patient Name: Edy Bennett  Age:  79 y.o., Sex:  male  Medical Record #: [id number]  Today's Date: 2/2/2025     Precautions  Precautions: Fall Risk  Comments: LLE wound vac    Subjective    Patient pleasant and agreeable to participate, reports he is very tired and nauseous today but still willing to get up with therapy. Patient's spouse present and very supportive throughout session.      Objective       02/02/25 1031   PT Charge Group   PT Therapeutic Exercise (Units) 1   PT Therapeutic Activities (Units) 1   PT Total Time Spent   PT Individual Total Time Spent (Mins) 30   Gait Functional Level of Assist    Gait Level Of Assist Contact Guard Assist   Assistive Device Front Wheel Walker   Distance (Feet) 35   # of Times Distance was Traveled 1   Deviation Step To;Antalgic  (heavy UE support on FWW)   Transfer Functional Level of Assist   Bed, Chair, Wheelchair Transfer Contact Guard Assist   Bed Chair Wheelchair Transfer Description Adaptive equipment;Increased time;Set-up of equipment;Supervision for safety  (Stand step with FWW)   Sitting Lower Body Exercises   Long Arc Quad 3 sets of 10;Bilateral   Bed Mobility    Supine to Sit Supervised   Sit to Supine Supervised   Sit to Stand Contact Guard Assist   Interdisciplinary Plan of Care Collaboration   IDT Collaboration with  Family / Caregiver;Nursing   Patient Position at End of Therapy In Bed;Bed Alarm On;Call Light within Reach;Tray Table within Reach;Phone within Reach;Family / Friend in Room   Collaboration Comments Patient's spouse present and very supportive         Assessment    Patient with increased fatigue, requiring significant time to complete all tasks. He continues to c/o nausea/vomiting and new onset of twitching in his hands (RN and MD aware). Despite increased fatigue he was able to ambulate with FWW and CGA within his room, but with increased time required.     Strengths: Able to follow instructions,  "Alert and oriented, Independent prior level of function, Supportive family, Pleasant and cooperative, Motivated for self care and independence  Barriers: Pain, Pain poorly managed, Limited mobility    Plan    LE mobility exercises in supine and sitting to prep soft tissues for functional movements, progressive gait in parallel bars and walker, practice car transfers     DME  PT DME Recommendations  Wheelchair: 16\" Width, Elevating Leg Rests  Cushion: Standard    Passport items to be completed:  Get in/out of bed safely, in/out of a vehicle, safely use mobility device, walk or wheel around home/community, navigate up and down stairs, show how to get up/down from the ground, ensure home is accessible, demonstrate HEP, complete caregiver training    Physical Therapy Problems (Active)       Problem: Mobility       Dates: Start:  01/24/25         Goal: STG-Within one week, patient will ambulate 10 feet with FWW CGA       Dates: Start:  01/24/25               Problem: Mobility Transfers       Dates: Start:  01/24/25         Goal: STG-Within one week, patient will perform bed mobility with minimal assist to manage legs        Dates: Start:  01/24/25            Goal: STG-Within one week, patient will sit to stand with minimal assist and FWW       Dates: Start:  01/24/25               Problem: PT-Long Term Goals       Dates: Start:  01/24/25         Goal: LTG-By discharge, patient will ambulate with  feet SBA       Dates: Start:  01/24/25            Goal: LTG-By discharge, patient will transfer one surface to another with FWW supervised       Dates: Start:  01/24/25            Goal: LTG-By discharge, patient will perform home exercise program       Dates: Start:  01/24/25            Goal: LTG-By discharge, patient will ambulate up/down 4-6 stairs with bilateral rails supervised        Dates: Start:  01/24/25            Goal: LTG-By discharge, patient will transfer in/out of a car with FWW supervised        Dates: " Start:  01/24/25

## 2025-02-02 NOTE — PROGRESS NOTES
NURSING DAILY NOTE    Name: Edy Bennett   Date of Admission: 1/23/2025   Admitting Diagnosis: PAD (peripheral artery disease) ([facility])  Attending Physician: ROSANNA PATEL D.O.  Allergies: Patient has no known allergies.    Safety  Patient Assist  max1  Patient Precautions  Fall Risk  Precaution Comments  LLE wound vac  Bed Transfer Status  Contact Guard Assist  Toilet Transfer Status   Contact Guard Assist (stand turn with grab bar. Pt offloading weight completely from left leg and using right leg only to transfer)  Assistive Devices  Rails, Wheelchair  Oxygen  Silicone Nasal Cannula  Diet/Therapeutic Dining  Current Diet Order   Procedures    Diet Order Diet: Renal; Second Modifier: (optional): Cardiac     Pill Administration  whole  Agitated Behavioral Scale     ABS Level of Severity       Fall Risk  Has the patient had a fall this admission?   No  Trinh Spivey Fall Risk Scoring  22, HIGH RISK  Fall Risk Safety Measures  bed alarm and chair alarm    Vitals  Temperature: 36.5 °C (97.7 °F)  Temp src: Oral  Pulse: 86  Respiration: 12  Blood Pressure : 112/69  Blood Pressure MAP (Calculated): 83 MM HG  BP Location: Right, Upper Arm  Patient BP Position: Cota's Position     Oxygen  Pulse Oximetry: 98 %  O2 (LPM): 2.5  O2 Delivery Device: Silicone Nasal Cannula    Bowel and Bladder  Last Bowel Movement  02/01/25 (per patient)  Stool Type  Type 4: Like a sausage or snake, smooth and soft  Bowel Device     Continent  Bladder: Did not void   Bowel: No movement  Bladder Function  Urine Void (mL): 100 ml  Number of Times Voided: 1  Urine Color: Yellow  Genitourinary Assessment   Bladder Assessment (WDL):  Within Defined Limits  Smith Catheter: Not Applicable  Urinary Symptoms: Anuric/ Dialysis Patient  Urine Color: Yellow  Bladder Device: Urinal  Bladder Scan: Post Void  $ Bladder Scan Results (mL): 32    Skin  Jalen Score   16  Sensory Interventions   Bed  Types: Low Airloss  Skin Preventative Measures: Pillows in Use for Support / Positioning  Moisture Interventions  Moisturizers/Barriers: Barrier Wipes      Pain  Pain Rating Scale  0 - No Pain  Pain Location  Leg  Pain Location Orientation  Left  Pain Interventions   Declines    ADLs    Bathing   Full Bed Bath  Linen Change   Complete  Personal Hygiene     Chlorhexidine Bath   Completed  Oral Care     Teeth/Dentures     Shave     Nutrition Percentage Eaten  Lunch, Refused  Environmental Precautions  Treaded Slipper Socks on Patient, Communication Sign for Patients & Families, Mobility Assessed & Appropriate Sign Placed  Patient Turns/Positioning  Patient turns self independently side to side without assistance, to offload sacral area  Patient Turns Assistance/Tolerance  Assistance of One  Bed Positions  Bed Controls On, Bed Locked  Head of Bed Elevated  Self regulated      Psychosocial/Neurologic Assessment  Psychosocial Assessment  Psychosocial (WDL):  WDL Except  Patient Behaviors: Anxious  Family Behaviors: Family Present  Neurologic Assessment  Neuro (WDL): Within Defined Limits  Level of Consciousness: Alert  Orientation Level: Oriented X4  Cognition: Follows commands, Appropriate attention/concentration  Speech: Clear  Facial Symmetry: Other (Comment) (no facial droop noted)  Pupil Assesment: No  Motor Function/Sensation Assessment: Motor strength  RUE Sensation: Full sensation  Muscle Strength Right Arm: Good Strength Against Gravity and Moderate Resistance  LUE Sensation: Full sensation  Muscle Strength Left Arm: Good Strength Against Gravity and Moderate Resistance  RLE Sensation: Full sensation  Muscle Strength Right Leg: Good Strength Against Gravity and Moderate Resistance  LLE Sensation: Full sensation, Pain  Muscle Strength Left Leg: Fair Strength against Gravity but No Resistance  EENT (WDL):  WDL Except    Cardio/Pulmonary Assessment  Edema   LLE Edema: Generalized  Respiratory Breath Sounds  RUL  Breath Sounds: Clear  RML Breath Sounds: Clear  RLL Breath Sounds: Diminished  ANAYELI Breath Sounds: Clear  LLL Breath Sounds: Diminished  Cardiac Assessment   Cardiac (WDL):  Within Defined Limits

## 2025-02-02 NOTE — PROGRESS NOTES
[facility] Progress Notes     Disconnected aseptically from PD Cycler at 0745.     Pt stable, VSS, alert and oriented.  Patient noted to have twitching on his hands, unable to hold his phone firmly, per patient his PCN is aware ever since he had it for the last 5 days   Effluent clear and yellow, no signs of bleeding/fibrin clots.  No alarms on machine was noted or reported before disconnection.     24 hour UF= 1374 ML  (I.Drain= 7 ML + Total UF= 1367 ML - Last fill= 0)     Report given to Ifeanyi ARANDA

## 2025-02-02 NOTE — PROGRESS NOTES
[facility] -  PROGRESS NOTE               Author: Niki Reddy M.D. Date & Time: 2/2/2025  3:42 PM     HPI:  79 y.o. male with HTN, ESRD on PD, PVD/ gangrenous left foot, BPH admitted for monitoring s/p  left leg bypass surgery on 1/20 with Dr. Miller. He recently underwent hernial repair and was temporarily transferred to in center HD via [facility]. He has returned to  PD  4 days ago, doing low fill.volumes and has been tolerating at home.   Patient was connected to cycler last night. Total UF reported to be 276ml. Reports of low drain UF alarm last night but no major issues with PD overnight. Pt reports no complaints today morning  No F/C/N/V/CP/SOB.  No melena, hematochezia, hematemesis.  No HA, visual changes,or abdominal pain.     DAILY NEPHROLOGY SUMMARY:    1/21: consult done   1/22: No issues with PD overnight, UF 1137cc, VSS, , feels good, wife at bed side   1/23: No issues with PD overnight 653 cc of UF , not had any BM yesterday and today  --------------------[facility]--------------------  1/24: CCPD 1.5L UF. VSS. PO4 elevated. Pt seen during PD, wife present. C/O pain.  1/25: No issues with PD overnight, net UF 1415ml. No sob, had BM yesterday, states he use O2 when he sleeps to help with nose bleed  1/26:No issues with PD overnight , net  cc, no c/o, still not able to bear weight   1/27: 780 mL Net UF removed with CCPD overnight. Coming out of BR with OT and wife. C/o no protein with meals, only had english muffin this am. BM yesterday. Denies abd pain or tenderness. Denies fever or chills.   1/28: 444 mL Net UF removed over night. Sitting in WC in room, wife BS. Reports feeling better every day. Reports sleeping great. Bps 90-100s.   1/29: VSS BP soft, RA.  No new labs.  No CP SOB, or worsenieng edema.  PD overnight 636ml.  UOP 300ml. Feeling good.   1/30: VSS BP 100s/60s.  SSNa 128->131  Phos 7.2, does have some phosphorous  "containing treats in the late evenings.  Dicussed.  No CP SOB or edema besides in wound vac leg.   PD  , wife at bedside and shares that the current fill is a low fill and was only supposed to be temporary after hernia repair.  Usual fill is 2200ml, all green bags.   1/31: VSS BP 100s/60s  PD , felt well with increase fill.   SNa 133 Phos 7.0 No CP SOB, edema localized to woundvac leg and thigh.   2/01:   Disconnected aseptically from PD CyclerEffluent clear and yellow, no signs of bleeding/fibrin clots. 24 hour UF= 678mL. Tolerated well.   2/2: Patient and wife are doing well. Wife states she gave wrong # of cycles to provider yesterday and wanted then reduced back from 7 to 5. Overall doing well without complaints. Looking forward to going home around Feb 7th per patient. Also stated they were told that RT IJ TDC would be removed sometime this week. On-coming provider to ask the Hospitalist to help arrange.     REVIEW OF SYSTEMS:    10 point ROS reviewed and is as per HPI/daily summary or otherwise negative    PMH/PSH/SH/FH: Reviewed and unchanged since admission note  CURRENT MEDICATIONS: Reviewed from admission to present day    VS:  /62   Pulse 93   Temp 37.2 °C (98.9 °F) (Oral)   Resp 14   Ht 1.707 m (5' 7.2\")   Wt 73.1 kg (161 lb 3.2 oz)   SpO2 95%   BMI 25.10 kg/m²   Physical Exam  Vitals and nursing note reviewed.   Constitutional:       Appearance: Normal appearance.   HENT:      Head: Normocephalic and atraumatic.      Nose: Nose normal.      Mouth/Throat:      Mouth: Mucous membranes are moist.   Eyes:      General: No scleral icterus.     Conjunctiva/sclera: Conjunctivae normal.   Cardiovascular:      Rate and Rhythm: Normal rate.   Pulmonary:      Effort: Pulmonary effort is normal.   Abdominal:      General: There is no distension.      Palpations: Abdomen is soft.      Comments: PD cath in RLQ    Musculoskeletal:         General: No deformity.      Cervical back: Neck " supple.      Right lower leg: No edema.      Left lower leg: Edema present.      Comments: Mild edema in LLE wound vac in place     Skin:     General: Skin is warm and dry.      Findings: No rash.   Neurological:      General: No focal deficit present.      Mental Status: He is alert and oriented to person, place, and time. Mental status is at baseline.   Psychiatric:         Mood and Affect: Mood normal.         Behavior: Behavior normal. Behavior is cooperative.         FLUID BALANCE:  In: 720 [P.O.:720]  Out: 685     LABS:  Recent Labs     01/31/25 0527 02/01/25  0524   SODIUM 133* 133*   POTASSIUM 4.0 4.0   CHLORIDE 87* 89*   CO2 24 24   GLUCOSE 139* 113*   BUN 70* 81*   CREATININE 8.94* 8.42*   CALCIUM 8.8 8.9       Recent Labs     01/31/25 0527   WBC 6.6   RBC 2.32*   HEMOGLOBIN 7.3*   HEMATOCRIT 22.8*   MCV 98.3*   MCH 31.5   MCHC 32.0*   RDW 56.3*   PLATELETCT 211   MPV 10.5         IMPRESSION:  # ESRD on CCPD  - Pt was transitioned to  iHD during masoud-op  s/p inguinal hernial repair,   - Still has RIJ PC  - Returned to PD 1/17/25   - Home PD prescription CCPD 2.5% alternating with 1.5% 5  no last fill. Patient wife stated she initially told last Nephrologist wrong script. Now all green bags and 5 cycles, not 7.   # HTN  - Goal BP < 140/90  - At goal  # Anemia of CKD  - Goal Hgb 10-11  - Below goal   - On PO iron  # CKD-MBD  - Ca 8.6   - PO4 7.4 -> 6.6 -> 7.0  # Chronic hypoxic respiratory failure   - On home oxygen 2-2.5L at night         SUGGESTIONS:  - Continue CCPD during hospital stay  - PD exit site care per protocol   - Bowel regimen to avoid constipation with PD (having comfortable BM EOD)  - No dietary protein restrictions  - Dose all meds per ESRD  - ROSALIND  TIW if Hb < 10   - PO iron   - Continue Renvela, added PRN dose for snack, may need to titrate up next week  - Renal diet, low phos   - Has RIJ PC since May 2024. Pt does not want the CVC removed at this times and want to wait for an additional  "week to make sure that he does encounter any issues with PD treatments  He understands potential risks with keeping the  CVC  long term, discussed again 1/29, he will have it removed next week \"when he is feeling more stable\". On-coming provider can ask Hospitalist to help arrange removal at [facility].   - L arm AVF not fully matured,  will need intervention   - Avoid Fleet enema in dialysis patients   - Labs daily  - Discharge planning underway, plan for 2/7 home with family    Please call or page Nephrology with questions or concerns  Thank you,  "

## 2025-02-02 NOTE — THERAPY
"Occupational Therapy  Daily Treatment     Patient Name: Edy Bennett  Age:  79 y.o., Sex:  male  Medical Record #: [id number]  Today's Date: 2/2/2025     Precautions  Precautions: Fall Risk  Comments: LLE wound vac    Safety   ADL Safety : Requires Physical Assist for Safety  Bathroom Safety: Requires Physical Assist for Safety    Subjective    \"I feel a little better than I did earlier.\" Pt reported at end of session      Objective       02/02/25 1301   OT Charge Group   OT Self Care / ADL (Units) 3   OT Therapeutic Exercise (Units) 1   OT Total Time Spent   OT Individual Total Time Spent (Mins) 60   Functional Level of Assist   Grooming Supervision;Seated   Grooming Description Increased time;Initial preparation for task;Seated in wheelchair at sink;Set-up of equipment   Toileting Contact Guard Assist   Toileting Description Grab bar;Increased time;Initial preparation for task;Set-up of equipment;Supervision for safety;Verbal cueing   Bed, Chair, Wheelchair Transfer Contact Guard Assist   Bed Chair Wheelchair Transfer Description Increased time;Initial preparation for task;Set-up of equipment;Squat pivot transfer to wheelchair;Supervision for safety;Verbal cueing  (cues to lock brakes prior to xfer)   Toilet Transfers Contact Guard Assist   Toilet Transfer Description Grab bar;Increased time;Set-up of equipment;Supervision for safety;Verbal cueing  (cues to lock w/c brakes prior to standing)   Sitting Upper Body Exercises   Chest Fly 2 sets of 10;Bilateral;Light Resistance Theraband   Bilateral Row 2 sets of 10;Bilateral;Light Resistance Theraband   Bicep Curls 2 sets of 10;Bilateral;Medium Resistance Theraband   Tricep Press 2 sets of 10;Bilateral;Medium Resistance Theraband   Supine Lower Body Exercise   Straight Leg Raises 1 set of 10;Bilateral   Short Arc Quad 1 set of 10;Bilateral;Light Resistance Theraband   Quadriceps Isometrics 1 set of 10;Left   Interdisciplinary Plan of Care Collaboration   IDT " Collaboration with  Family / Caregiver   Patient Position at End of Therapy In Bed;Call Light within Reach;Phone within Reach;Tray Table within Reach;Family / Friend in Room   Collaboration Comments wife present throughout session         Assessment    Pt tolerated session well. Pt pleasant and agreeable to OT session. Pt motivated to do things on his own- therapist provided close SBA-CGA for safety and required cues for locking w/c brakes prior to standing multiple times. Pt able to complete w/c mobility using BUE/BLE from bed <> bathroom with increased time. Pt engaged in various UB/LB strengthening exercises using pink and green thera-band while in fowlers position in bed. Pt required short rest breaks between sets. Pt feeling nauseous when he first got out of bed but resolved with time- noted improvement in pt's affect from start to end of session aeb pt short and had flat affect at start of session, by end of session smiling and joking around some. Wife reported that she's noticed some cognitive changes in pt more recently, especially when pt trying to sequence gait pattern with the walker. Wife reported she does not notice cognitive changes with pt seated and working on his [employer] or other tasks. Discussed with wife that we could always have speech do an evaluation on pt if needed.      Plan    Consult with wound RN regarding showering  ADL, IADL, DME, strengthening, balance       Occupational Therapy Goals (Active)       Problem: Dressing       Dates: Start:  01/24/25         Goal: STG-Within one week, patient will dress LB with min A       Dates: Start:  01/24/25               Problem: OT Long Term Goals       Dates: Start:  01/24/25         Goal: LTG-By discharge, patient will complete basic self care tasks mod I       Dates: Start:  01/24/25            Goal: LTG-By discharge, patient will perform bathroom transfers mod I       Dates: Start:  01/24/25               Problem: Toileting        Dates: Start:  01/24/25         Goal: STG-Within one week, patient will complete toileting tasks with min A        Dates: Start:  01/24/25

## 2025-02-02 NOTE — PROGRESS NOTES
[facility] Progress Note     CCPD x 8.5 hours, 2100 mL fills x 7 cycles using all 2.5 % PD solution ordered per Dr. Matta    Orders reviewed,verified and updated, CCPD cycler therapy settings verified.    CCPD treatment initiated at 1815 without any issues  Patient and PD access assessed prior to therapy    Patient AOx4, resting calmly, interactive, (+) abd distention, denies pain/abd discomfort, stable VS.    Patient with intact and patent RLQ PD catheter aseptically connected per policy.    RLQ PD catheter and exit site in good condition, no signs of infection noted, cleansed with antiseptic and dressings changed per policy. Dressing changed today     Initial drain: 7 mL     Report given to primary care nurse Ifeanyi Roy RN     Patient on dwell 1/5 prior to departure from bedside.     Please contact on call dialysis RN for any issues with persistent alarms/assistance with troubleshooting or patient not tolerating therapy.      For on-call Dialysis RN, pls contact [facility] at [phone] or call [facility] PD service support hotline at [phone]

## 2025-02-02 NOTE — CARE PLAN
The patient is Stable - Low risk of patient condition declining or worsening      Problem: Fall Risk - Rehab  Goal: Patient will remain free from falls  Outcome: Progressing     Problem: Dialysis  Goal: Patient will maintain stable vital signs and fluid balance  Outcome: Progressing

## 2025-02-02 NOTE — PROGRESS NOTES
NURSING DAILY NOTE    Name: Edy Bennett   Date of Admission: 1/23/2025   Admitting Diagnosis: PAD (peripheral artery disease) ([facility])  Attending Physician: ROSANNA PATEL D.O.  Allergies: Patient has no known allergies.    Safety  Patient Assist  max1  Patient Precautions  Fall Risk  Precaution Comments  LLE wound vac  Bed Transfer Status  Contact Guard Assist  Toilet Transfer Status   Contact Guard Assist (stand turn with grab bar. Pt offloading weight completely from left leg and using right leg only to transfer)  Assistive Devices  Rails, Wheelchair  Oxygen  None - Room Air  Diet/Therapeutic Dining  Current Diet Order   Procedures    Diet Order Diet: Renal; Second Modifier: (optional): Cardiac     Pill Administration  whole  Agitated Behavioral Scale     ABS Level of Severity       Fall Risk  Has the patient had a fall this admission?   No  Trinh Spivey Fall Risk Scoring  22, HIGH RISK  Fall Risk Safety Measures  bed alarm, chair alarm, and poor balance    Vitals  Temperature: 36.5 °C (97.7 °F)  Temp src: Oral  Pulse: 89  Respiration: 12  Blood Pressure : 120/60  Blood Pressure MAP (Calculated): 80 MM HG  BP Location: Right, Upper Arm  Patient BP Position: Cota's Position     Oxygen  Pulse Oximetry: 98 %  O2 (LPM): 2  O2 Delivery Device: None - Room Air    Bowel and Bladder  Last Bowel Movement  01/29/25  Stool Type  Type 4: Like a sausage or snake, smooth and soft  Bowel Device     Continent  Bladder: Did not void   Bowel: No movement  Bladder Function  Urine Void (mL): 100 ml  Number of Times Voided: 1  Urine Color: Yellow  Genitourinary Assessment   Bladder Assessment (WDL):  Within Defined Limits  Smith Catheter: Not Applicable  Urinary Symptoms: Anuric/ Dialysis Patient  Urine Color: Yellow  Bladder Device: Urinal  Bladder Scan: Post Void  $ Bladder Scan Results (mL): 32    Skin  Jalen Score   16  Sensory Interventions   Bed Types: Low  Airloss  Skin Preventative Measures: Pillows in Use for Support / Positioning  Moisture Interventions  Moisturizers/Barriers: Barrier Wipes      Pain  Pain Rating Scale  5 - Interrupts some activities  Pain Location  Leg  Pain Location Orientation  Left  Pain Interventions   Medication (see MAR)    ADLs    Bathing   Full Bed Bath  Linen Change   Complete  Personal Hygiene     Chlorhexidine Bath   Completed  Oral Care     Teeth/Dentures     Shave     Nutrition Percentage Eaten  Lunch, Refused  Environmental Precautions  Treaded Slipper Socks on Patient, Communication Sign for Patients & Families, Mobility Assessed & Appropriate Sign Placed  Patient Turns/Positioning  Patient turns self independently side to side without assistance, to offload sacral area  Patient Turns Assistance/Tolerance  Assistance of One  Bed Positions  Bed Controls On, Bed Locked  Head of Bed Elevated  Self regulated      Psychosocial/Neurologic Assessment  Psychosocial Assessment  Psychosocial (WDL):  WDL Except  Patient Behaviors: Anxious  Family Behaviors: Family Present  Neurologic Assessment  Neuro (WDL): Within Defined Limits  Level of Consciousness: Alert  Orientation Level: Oriented X4  Cognition: Follows commands, Appropriate attention/concentration  Speech: Clear  Facial Symmetry: Other (Comment) (no facial droop noted)  Pupil Assesment: No  Motor Function/Sensation Assessment: Motor strength  RUE Sensation: Full sensation  Muscle Strength Right Arm: Good Strength Against Gravity and Moderate Resistance  LUE Sensation: Full sensation  Muscle Strength Left Arm: Good Strength Against Gravity and Moderate Resistance  RLE Sensation: Full sensation  Muscle Strength Right Leg: Good Strength Against Gravity and Moderate Resistance  LLE Sensation: Full sensation, Pain  Muscle Strength Left Leg: Fair Strength against Gravity but No Resistance  EENT (WDL):  WDL Except    Cardio/Pulmonary Assessment  Edema   LLE Edema: Generalized  Respiratory  Breath Sounds  RUL Breath Sounds: Clear  RML Breath Sounds: Clear  RLL Breath Sounds: Diminished  ANAYELI Breath Sounds: Clear  LLL Breath Sounds: Diminished  Cardiac Assessment   Cardiac (WDL):  Within Defined Limits

## 2025-02-02 NOTE — FLOWSHEET NOTE
02/02/25 0815   Events/Summary/Plan   Events/Summary/Plan spot check done pt on room air doing well   Vital Signs   Pulse 98   Respiration 18   Pulse Oximetry 98 %   $ Pulse Oximetry (Spot Check) Yes   Respiratory Assessment   Respiratory Pattern Within Normal Limits   Chest Exam   Work Of Breathing / Effort Within Normal Limits   Oxygen   O2 (LPM) 0  (uses o2 for sleep)   FiO2% 21 %   O2 Delivery Device Room air w/o2 available

## 2025-02-02 NOTE — CARE PLAN
"The patient is Stable - Low risk of patient condition declining or worsening    Shift Goals  Clinical Goals: Comfort and safety  Patient Goals: Comfort  Family Goals: update w/ POC    Progress made toward(s) clinical / shift goals:    Problem: Knowledge Deficit - Standard  Goal: Patient and family/care givers will demonstrate understanding of plan of care, disease process/condition, diagnostic tests and medications  Outcome: Progressing     Problem: Pain - Standard  Goal: Alleviation of pain or a reduction in pain to the patient’s comfort goal  Outcome: Progressing     Problem: Skin Integrity  Goal: Skin integrity is maintained or improved  Outcome: Progressing     Problem: Fall Risk - Rehab  Goal: Patient will remain free from falls  Outcome: Progressing  Note: Trinh Spivey Fall risk Assessment Score: 22    High fall risk Interventions   - Bed and strip alarm   - Yellow sign by the door   - Yellow wrist band \"Fall risk\"  - Room near to the nurse station  - Do not leave patient unattended in the bathroom  - Fall risk education provided     Problem: Respiratory  Goal: Patient will understand use and administration of respiratory medications to improve respiratory function  Outcome: Progressing       Patient is not progressing towards the following goals:      "

## 2025-02-03 ENCOUNTER — APPOINTMENT (OUTPATIENT)
Dept: PHYSICAL THERAPY | Facility: REHABILITATION | Age: 80
DRG: 299 | End: 2025-02-03
Attending: PHYSICAL MEDICINE & REHABILITATION
Payer: MEDICARE

## 2025-02-03 ENCOUNTER — APPOINTMENT (OUTPATIENT)
Dept: OCCUPATIONAL THERAPY | Facility: REHABILITATION | Age: 80
DRG: 299 | End: 2025-02-03
Attending: PHYSICAL MEDICINE & REHABILITATION
Payer: MEDICARE

## 2025-02-03 PROCEDURE — 97530 THERAPEUTIC ACTIVITIES: CPT

## 2025-02-03 PROCEDURE — 700102 HCHG RX REV CODE 250 W/ 637 OVERRIDE(OP): Performed by: NURSE PRACTITIONER

## 2025-02-03 PROCEDURE — A9270 NON-COVERED ITEM OR SERVICE: HCPCS | Performed by: PHYSICAL MEDICINE & REHABILITATION

## 2025-02-03 PROCEDURE — 99232 SBSQ HOSP IP/OBS MODERATE 35: CPT | Performed by: PHYSICAL MEDICINE & REHABILITATION

## 2025-02-03 PROCEDURE — A9270 NON-COVERED ITEM OR SERVICE: HCPCS | Performed by: NURSE PRACTITIONER

## 2025-02-03 PROCEDURE — 97535 SELF CARE MNGMENT TRAINING: CPT

## 2025-02-03 PROCEDURE — 97116 GAIT TRAINING THERAPY: CPT

## 2025-02-03 PROCEDURE — 90945 DIALYSIS ONE EVALUATION: CPT

## 2025-02-03 PROCEDURE — 97110 THERAPEUTIC EXERCISES: CPT

## 2025-02-03 PROCEDURE — 770010 HCHG ROOM/CARE - REHAB SEMI PRIVAT*

## 2025-02-03 PROCEDURE — A9270 NON-COVERED ITEM OR SERVICE: HCPCS | Performed by: INTERNAL MEDICINE

## 2025-02-03 PROCEDURE — 700102 HCHG RX REV CODE 250 W/ 637 OVERRIDE(OP): Performed by: PHYSICAL MEDICINE & REHABILITATION

## 2025-02-03 PROCEDURE — 97597 DBRDMT OPN WND 1ST 20 CM/<: CPT

## 2025-02-03 PROCEDURE — 700102 HCHG RX REV CODE 250 W/ 637 OVERRIDE(OP): Performed by: INTERNAL MEDICINE

## 2025-02-03 RX ORDER — METHOCARBAMOL 500 MG/1
250 TABLET, FILM COATED ORAL 4 TIMES DAILY PRN
Status: DISCONTINUED | OUTPATIENT
Start: 2025-02-03 | End: 2025-02-14 | Stop reason: HOSPADM

## 2025-02-03 RX ORDER — POLYETHYLENE GLYCOL 3350 17 G/17G
1 POWDER, FOR SOLUTION ORAL 2 TIMES DAILY
Status: DISCONTINUED | OUTPATIENT
Start: 2025-02-03 | End: 2025-02-08

## 2025-02-03 RX ORDER — LANTHANUM CARBONATE 500 MG/1
500 TABLET, CHEWABLE ORAL
Status: DISCONTINUED | OUTPATIENT
Start: 2025-02-03 | End: 2025-02-14 | Stop reason: HOSPADM

## 2025-02-03 RX ORDER — GABAPENTIN 100 MG/1
100 CAPSULE ORAL NIGHTLY
Status: DISCONTINUED | OUTPATIENT
Start: 2025-02-03 | End: 2025-02-04

## 2025-02-03 RX ORDER — OMEPRAZOLE 20 MG/1
40 CAPSULE, DELAYED RELEASE ORAL DAILY
Status: DISCONTINUED | OUTPATIENT
Start: 2025-02-04 | End: 2025-02-14 | Stop reason: HOSPADM

## 2025-02-03 RX ADMIN — GABAPENTIN 100 MG: 100 CAPSULE ORAL at 08:15

## 2025-02-03 RX ADMIN — GABAPENTIN 100 MG: 100 CAPSULE ORAL at 20:43

## 2025-02-03 RX ADMIN — SEVELAMER CARBONATE 1600 MG: 800 TABLET, FILM COATED ORAL at 11:44

## 2025-02-03 RX ADMIN — MONTELUKAST 10 MG: 10 TABLET, FILM COATED ORAL at 20:44

## 2025-02-03 RX ADMIN — SEVELAMER CARBONATE 1600 MG: 800 TABLET, FILM COATED ORAL at 08:14

## 2025-02-03 RX ADMIN — METHOCARBAMOL 250 MG: 500 TABLET ORAL at 08:14

## 2025-02-03 RX ADMIN — ROSUVASTATIN CALCIUM 20 MG: 10 TABLET, FILM COATED ORAL at 20:44

## 2025-02-03 RX ADMIN — CLOPIDOGREL BISULFATE 75 MG: 75 TABLET ORAL at 08:14

## 2025-02-03 RX ADMIN — APIXABAN 5 MG: 5 TABLET, FILM COATED ORAL at 20:44

## 2025-02-03 RX ADMIN — LANTHANUM CARBONATE 500 MG: 500 TABLET, CHEWABLE ORAL at 17:45

## 2025-02-03 RX ADMIN — GUAIFENESIN 600 MG: 600 TABLET ORAL at 20:43

## 2025-02-03 RX ADMIN — OMEPRAZOLE 20 MG: 20 CAPSULE, DELAYED RELEASE ORAL at 08:14

## 2025-02-03 RX ADMIN — GUAIFENESIN 600 MG: 600 TABLET ORAL at 08:15

## 2025-02-03 RX ADMIN — GENTAMICIN SULFATE: 1 CREAM TOPICAL at 19:40

## 2025-02-03 RX ADMIN — POLYETHYLENE GLYCOL 3350 1 PACKET: 17 POWDER, FOR SOLUTION ORAL at 20:47

## 2025-02-03 RX ADMIN — SENNOSIDES AND DOCUSATE SODIUM 2 TABLET: 50; 8.6 TABLET ORAL at 20:43

## 2025-02-03 RX ADMIN — Medication 324 MG: at 16:28

## 2025-02-03 RX ADMIN — APIXABAN 5 MG: 5 TABLET, FILM COATED ORAL at 08:14

## 2025-02-03 RX ADMIN — SENNOSIDES AND DOCUSATE SODIUM 2 TABLET: 50; 8.6 TABLET ORAL at 08:14

## 2025-02-03 RX ADMIN — POLYETHYLENE GLYCOL 3350 1 PACKET: 17 POWDER, FOR SOLUTION ORAL at 12:25

## 2025-02-03 ASSESSMENT — GAIT ASSESSMENTS
DEVIATION: ANTALGIC;STEP TO;BRADYKINETIC;DECREASED HEEL STRIKE;DECREASED TOE OFF
ASSISTIVE DEVICE: FRONT WHEEL WALKER
DISTANCE (FEET): 30
GAIT LEVEL OF ASSIST: MINIMAL ASSIST

## 2025-02-03 ASSESSMENT — ACTIVITIES OF DAILY LIVING (ADL)
BED_CHAIR_WHEELCHAIR_TRANSFER_DESCRIPTION: ADAPTIVE EQUIPMENT;INCREASED TIME;SET-UP OF EQUIPMENT;VERBAL CUEING
TOILETING_LEVEL_OF_ASSIST_DESCRIPTION: GRAB BAR;SUPERVISION FOR SAFETY

## 2025-02-03 NOTE — THERAPY
"Physical Therapy   Daily Treatment     Patient Name: Edy Bennett  Age:  79 y.o., Sex:  male  Medical Record #: [id number]  Today's Date: 2/2/2025     Precautions  Precautions: (P) Fall Risk  Comments: LLE wound vac    Subjective    Patient pleasant and agreeable to participate. Reports he is feeling better than this morning, requests to \"do some leg exercises\" in his room. Patient's spouse present and very supportive.      Objective       02/02/25 1531   PT Charge Group   PT Therapeutic Exercise (Units) 2   PT Total Time Spent   PT Individual Total Time Spent (Mins) 30   Precautions   Precautions Fall Risk   Gait Functional Level of Assist    Gait Level Of Assist Contact Guard Assist   Assistive Device Front Wheel Walker   Distance (Feet) 40   # of Times Distance was Traveled 1   Deviation Step To;Antalgic;Decreased Toe Off   Transfer Functional Level of Assist   Bed, Chair, Wheelchair Transfer Contact Guard Assist   Bed Chair Wheelchair Transfer Description Set-up of equipment;Supervision for safety;Verbal cueing   Sitting Lower Body Exercises   Hip Abduction 2 sets of 10;Bilateral;Medium Resistance Theraband   Hip Adduction 2 sets of 10;Bilateral  (isometric pillow squeeze)   Long Arc Quad 2 sets of 15;Bilateral   Marching 2 sets of 15;Reciprocal   Bed Mobility    Supine to Sit Supervised   Sit to Supine Supervised   Sit to Stand Contact Guard Assist  (cuing for hand placement)   Interdisciplinary Plan of Care Collaboration   IDT Collaboration with  Family / Caregiver   Patient Position at End of Therapy In Bed;Bed Alarm On;Call Light within Reach;Tray Table within Reach;Phone within Reach;Family / Friend in Room   Collaboration Comments Patient's spouse present and supportive         Assessment    Patient with improvement in mobility this afternoon, requiring fewer rest breaks than this morning. He required some cuing for sequencing sit to stand transition as well as while ambulating with FWW. Patient " "continues to be limited by nausea/episodes of vomiting and fatigue, but with much improved pain management this afternoon (per patient's spouse).       Strengths: Able to follow instructions, Alert and oriented, Independent prior level of function, Supportive family, Pleasant and cooperative, Motivated for self care and independence  Barriers: Pain, Pain poorly managed, Limited mobility    Plan    LE mobility exercises in supine and sitting to prep soft tissues for functional movements, progressive gait in parallel bars and walker, practice car transfers        DME  PT DME Recommendations  Wheelchair: 16\" Width, Elevating Leg Rests  Cushion: Standard    Passport items to be completed:  Get in/out of bed safely, in/out of a vehicle, safely use mobility device, walk or wheel around home/community, navigate up and down stairs, show how to get up/down from the ground, ensure home is accessible, demonstrate HEP, complete caregiver training    Physical Therapy Problems (Active)       Problem: Mobility       Dates: Start:  01/24/25         Goal: STG-Within one week, patient will ambulate 10 feet with FWW CGA       Dates: Start:  01/24/25               Problem: Mobility Transfers       Dates: Start:  01/24/25         Goal: STG-Within one week, patient will perform bed mobility with minimal assist to manage legs        Dates: Start:  01/24/25            Goal: STG-Within one week, patient will sit to stand with minimal assist and FWW       Dates: Start:  01/24/25               Problem: PT-Long Term Goals       Dates: Start:  01/24/25         Goal: LTG-By discharge, patient will ambulate with  feet SBA       Dates: Start:  01/24/25            Goal: LTG-By discharge, patient will transfer one surface to another with FWW supervised       Dates: Start:  01/24/25            Goal: LTG-By discharge, patient will perform home exercise program       Dates: Start:  01/24/25            Goal: LTG-By discharge, patient will " ambulate up/down 4-6 stairs with bilateral rails supervised        Dates: Start:  01/24/25            Goal: LTG-By discharge, patient will transfer in/out of a car with FWW supervised        Dates: Start:  01/24/25

## 2025-02-03 NOTE — WOUND TEAM
[facility]  Inpatient Services  Wound and Skin Care Follow-up    Admission Date: 1/23/2025     Last order of IP CONSULT TO WOUND CARE was found on 1/24/2025 from Hospital Encounter on 1/23/2025     HPI, PMH, SH: Reviewed    Past Surgical History:   Procedure Laterality Date    [place] VEIN BYPASS GRAFT,FEM-TIBIAL Left 1/20/2025    Procedure: LEFT LOWER EXTREMITY BYPASS, POPLITIAL TO TIBIAL BYPASS;  Surgeon: Marco Antonio Miller M.D.;  Location: SURGERY [facility];  Service: Vascular    AORTOGRAM WITH RUNOFF  1/14/2025    Procedure: AORTOGRAM WITH RUNOFF, right peroneal artery intravascular lithotripsy and stent placement;  Surgeon: Marco Antonio Miller M.D.;  Location: [facility];  Service: Vascular    INGUINAL HERNIA LAPAROSCOPIC N/A 12/18/2024    Procedure: LAPAROSCOPIC LEFT INGUINAL HERNIA REPAIR WITH MESH, LAPAROSCOPIC REPOSITIONING OF PERITONEAL DIALYSIS CATHETER;  Surgeon: Marco Antonio Miller M.D.;  Location: SURGERY [facility];  Service: Vascular    CATH PLACEMENT CAPD N/A 12/18/2024    Procedure: REPOSITION, CATHETER, CAPD;  Surgeon: Marco Antonio Miller M.D.;  Location: SURGERY [facility];  Service: Vascular    CATH PLACEMENT CAPD N/A 09/16/2024    Procedure: LAPAROSCOPIC INSERTION OF PERITONEAL DIALYSIS CATHETER PLACEMENT;  Surgeon: Marco Antonio Miller M.D.;  Location: SURGERY SAME DAY [facility];  Service: Vascular    AV FISTULA CREATION Left 07/01/2024    Procedure: CREATION OF LEFT UPPER EXTREMITY DIALYSIS FISTULA;  Surgeon: Shilo Mercedes M.D.;  Location: SURGERY [facility];  Service: General    OTHER ORTHOPEDIC SURGERY  2004    bunion r foot    OTHER      tonsils adenoids    OTHER      dialysis catheter in upper right chest    OTHER      shon iol    OTHER CARDIAC SURGERY      cardiac stents     Social History     Tobacco Use    Smoking status: Never    Smokeless tobacco: Never    Tobacco comments:     Used chewing tobacco many years ago.   Substance Use Topics    Alcohol use: Not  Currently     No chief complaint on file.    Diagnosis: PAD (peripheral artery disease) ([facility]) [I73.9]    Unit where seen by Wound Team: RH04/02     WOUND FOLLOW UP RELATED TO:  RLE       WOUND TEAM PLAN OF CARE - Frequency of Follow-up:   Nursing to follow dressing orders written for wound care. Contact wound team if area fails to progress, deteriorates or with any questions/concerns if something comes up before next scheduled follow up (See below as to whether wound is following and frequency of wound follow up)  Dressing changes by wound team:                   Weekly - Mondays    WOUND HISTORY:       Previously very active 79 year old male. PMH of CAD, PVD, ESRD on dialysis (recently started peritoneal dialysis 1 week ago). Also had a stent placed in right leg 1 week ago. Elective left popliteal to posterior tibial artery bypass by Marco Antonio Miller MD on 1/20/25.   Wound team consulted for a leaking and saturated Prevena dressing on his left lower leg. 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       WOUND ASSESSMENT/LDA  Wound 01/20/25 Incision Leg Left Incision (Active)   Date First Assessed/Time First Assessed: 01/20/25 1648   Present on Original Admission: Yes  Primary Wound Type: Incision  Location: Leg  Laterality: Left  Wound Description (Comments): Incision      Assessments 2/3/2025  1:00 PM   Site Assessment Clean;Intact;Dry   Periwound Assessment Red;Edema   Margins Attached edges   Closure Staples   Drainage Amount Scant   Drainage Description Serosanguineous   Treatments Cleansed;Site care   Wound Cleansing Approved Wound Cleanser   Dressing Status Removed   Dressing Changed Changed   Dressing Options Dry Gauze;Absorbent Abdominal Pad;Ace Wrap   Dressing Change/Treatment Frequency Daily, and As Needed   NEXT Dressing Change/Treatment Date 02/04/25   Wound Team Following Weekly   WOUND NURSE ONLY - Time Spent with Patient (mins) 20         Vascular:    GLENYS:   No results found.    Lab Values:    Lab Results   Component Value Date/Time    WBC 6.6 01/31/2025 05:27 AM    RBC 2.32 (L) 01/31/2025 05:27 AM    HEMOGLOBIN 7.3 (L) 01/31/2025 05:27 AM    HEMATOCRIT 22.8 (L) 01/31/2025 05:27 AM    HBA1C 5.8 (H) 01/24/2025 05:19 AM         Culture Results show:  No results found for this or any previous visit (from the past 720 hours).    Pain Level/Medicated:  None, Tolerated without pain medication       INTERVENTIONS BY WOUND TEAM:  Chart and images reviewed. Discussed with bedside RN. All areas of concern (based on picture review, LDA review and discussion with bedside RN) have been thoroughly assessed. Documentation of areas based on significant findings. This RN in to assess patient. Performed standard wound care which includes appropriate positioning, dressing removal and non-selective debridement. Pictures and measurements obtained weekly if/when required.    Wound:  RLE   Cleansed/Non-selectively Debrided with:  Wound cleanser and Gauze  Primary Dressing:  Kerlex and abd pads  Secondary (Outer) Dressing: ace wrap  Toes painted with betadine this am by bedside RN    Advanced Wound Care Discharge Planning  Number of Clinicians necessary to complete wound care: 1  Is patient requiring IV pain medications for dressing changes:  No   Length of time for dressing change 30 min. (This does not include chart review, pre-medication time, set up, clean up or time spent charting.)    Interdisciplinary consultation: Patient, Bedside RN (Claudine Rothman .  Pressure injury and staging reviewed with N/A.    EVALUATION / RATIONALE FOR TREATMENT:     Date:  02/03/25  Wound Status:  Wound progressing as expected    Per surgeons orders incision cleansed with wound cleanser and gauze, abd pads placed, wrapped with kerlex from toes to thigh, secured with ace bandages to provide some compression for edema. Minimal amount of drainage present with dressing changes today.    Date:  01/31/25  Wound Status:  Wound improving    Coccyx-improving. Continue barrier cream and bordered foam    Left leg incision-still oozing a tiny bit. Doppler heard at Dorsalis Pedis. Prevena removed and dry dressing applied by Dr. Miller. He might reapply a prevena sometime next week. Wound team will follow.     Toes-dry eschar. Color slightly better in feet. Continue betadine for now. Follow up with wound clinic after d/c.      Date:  01/24/25  Wound Status:  Initial evaluation    Left leg-prevena-continue the prevena at 125mmHg and follow surgeon's orders. Prevena will prevent infection and keep incisions approximated, will decrease time to heal and prevent complications    Coccyx-The wound is very tiny so barrier cream will get into the wound base easily and keep it moist. The silicone foam protects the skin and keeps the moist barrier cream in place. Offloading.            Goals: Steady decrease in wound area and depth weekly.    NURSING PLAN OF CARE ORDERS:  No new orders this visit    NUTRITION RECOMMENDATIONS   Wound Team Recommendations:  N/A     DIET ORDERS (From admission to next 24h)       Start     Ordered    01/31/25 1044  Supplements  ONCE DAILY        Comments: In between meals   Question Answer Comment   Which Supplement Nepro    Nepro: Carton        01/31/25 1043    01/23/25 1440  Diet Order Diet: Renal; Second Modifier: (optional): Cardiac  ALL MEALS        Question Answer Comment   Diet: Renal    Second Modifier: (optional) Cardiac        01/23/25 1439                    PREVENTATIVE INTERVENTIONS:   Q shift Jalen - performed per nursing policy  Q shift pressure point assessments - performed per nursing policy    Surface/Positioning  Waffle overlay  - Currently in Place    Offloading/Redistribution  Heels floated with pillow - Currently in Place      Mobilization      PT in room for therapy up to chair      Anticipated discharge plans:  Self/Family Care        Vac Discharge Needs:  Vac  Discharge plan is purely a recommendation from wound team and not a requirement for discharge unless otherwise stated by physician.  Not Applicable Pt not on a wound vac

## 2025-02-03 NOTE — PROGRESS NOTES
NURSING DAILY NOTE    Name: Edy Bennett   Date of Admission: 1/23/2025   Admitting Diagnosis: PAD (peripheral artery disease) ([facility])  Attending Physician: ROSANNA PATEL D.O.  Allergies: Patient has no known allergies.    Safety  Patient Assist  max1  Patient Precautions  Fall Risk  Precaution Comments  LLE wound vac  Bed Transfer Status  Contact Guard Assist  Toilet Transfer Status   Contact Guard Assist  Assistive Devices  Rails, Wheelchair  Oxygen  Room air w/o2 available  Diet/Therapeutic Dining  Current Diet Order   Procedures    Diet Order Diet: Renal; Second Modifier: (optional): Cardiac     Pill Administration  whole  Agitated Behavioral Scale     ABS Level of Severity       Fall Risk  Has the patient had a fall this admission?   No  Trinh Spivey Fall Risk Scoring  22, HIGH RISK  Fall Risk Safety Measures  bed alarm, chair alarm, and poor balance    Vitals  Temperature: 37.2 °C (99 °F)  Temp src: Oral  Pulse: 89  Respiration: 12  Blood Pressure : 109/57  Blood Pressure MAP (Calculated): 74 MM HG  BP Location: Right, Upper Arm  Patient BP Position: Supine     Oxygen  Pulse Oximetry: 94 %  O2 (LPM): 0 (uses o2 for sleep)  FiO2%: 21 %  O2 Delivery Device: Room air w/o2 available    Bowel and Bladder  Last Bowel Movement  02/01/25 (per patient)  Stool Type  Type 4: Like a sausage or snake, smooth and soft  Bowel Device     Continent  Bladder: Did not void   Bowel: No movement  Bladder Function  Urine Void (mL): 50 ml  Number of Times Voided: 1  Urine Color: Yellow  Genitourinary Assessment   Bladder Assessment (WDL):  Within Defined Limits  Smith Catheter: Not Applicable  Urinary Symptoms: Anuric/ Dialysis Patient  Urine Color: Yellow  Bladder Device: Urinal  Bladder Scan: Post Void  $ Bladder Scan Results (mL): 32    Skin  Jalen Score   16  Sensory Interventions   Bed Types: Standard/Trauma Mattress, Low Airloss  Skin Preventative Measures:  Pillows in Use for Support / Positioning  Moisture Interventions  Moisturizers/Barriers: Barrier Wipes      Pain  Pain Rating Scale  3 - Sometimes distracts me  Pain Location  Leg  Pain Location Orientation  Left  Pain Interventions   Medication (see MAR), Repositioned    ADLs    Bathing   Full Bed Bath  Linen Change   Complete  Personal Hygiene     Chlorhexidine Bath   Completed  Oral Care     Teeth/Dentures     Shave     Nutrition Percentage Eaten  Lunch, Refused  Environmental Precautions  Treaded Slipper Socks on Patient, Communication Sign for Patients & Families, Mobility Assessed & Appropriate Sign Placed  Patient Turns/Positioning  Sitting up in wheelchair  Patient Turns Assistance/Tolerance  Assistance of One  Bed Positions  Bed Controls On, Bed Locked  Head of Bed Elevated  Self regulated      Psychosocial/Neurologic Assessment  Psychosocial Assessment  Psychosocial (WDL):  Within Defined Limits  Patient Behaviors: Anxious  Family Behaviors: Family Present  Neurologic Assessment  Neuro (WDL): Within Defined Limits  Level of Consciousness: Alert  Orientation Level: Oriented X4  Cognition: Follows commands, Appropriate attention/concentration  Speech: Clear  Facial Symmetry: Other (Comment) (no facial droop noted)  Pupil Assesment: No  Motor Function/Sensation Assessment: Motor strength  RUE Sensation: Full sensation  Muscle Strength Right Arm: Good Strength Against Gravity and Moderate Resistance  LUE Sensation: Full sensation  Muscle Strength Left Arm: Good Strength Against Gravity and Moderate Resistance  RLE Sensation: Full sensation  Muscle Strength Right Leg: Good Strength Against Gravity and Moderate Resistance  LLE Sensation: Full sensation, Pain  Muscle Strength Left Leg: Fair Strength against Gravity but No Resistance  EENT (WDL):  WDL Except    Cardio/Pulmonary Assessment  Edema   LLE Edema: Generalized  Respiratory Breath Sounds  RUL Breath Sounds: Clear  RML Breath Sounds: Clear  RLL Breath  Sounds: Diminished  ANAYELI Breath Sounds: Clear  LLL Breath Sounds: Diminished  Cardiac Assessment   Cardiac (WDL):  Within Defined Limits

## 2025-02-03 NOTE — PROGRESS NOTES
[facility] -  PROGRESS NOTE               Author: IDA Powell Date & Time: 2/3/2025  11:56 AM     HPI:  79 y.o. male with HTN, ESRD on PD, PVD/ gangrenous left foot, BPH admitted for monitoring s/p  left leg bypass surgery on 1/20 with Dr. Miller. He recently underwent hernial repair and was temporarily transferred to in center HD via [facility]. He has returned to  PD  4 days ago, doing low fill.volumes and has been tolerating at home.   Patient was connected to cycler last night. Total UF reported to be 276ml. Reports of low drain UF alarm last night but no major issues with PD overnight. Pt reports no complaints today morning  No F/C/N/V/CP/SOB.  No melena, hematochezia, hematemesis.  No HA, visual changes,or abdominal pain.     DAILY NEPHROLOGY SUMMARY:    1/21: consult done   1/22: No issues with PD overnight, UF 1137cc, VSS, , feels good, wife at bed side   1/23: No issues with PD overnight 653 cc of UF , not had any BM yesterday and today  --------------------[facility]--------------------  1/24: CCPD 1.5L UF. VSS. PO4 elevated. Pt seen during PD, wife present. C/O pain.  1/25: No issues with PD overnight, net UF 1415ml. No sob, had BM yesterday, states he use O2 when he sleeps to help with nose bleed  1/26:No issues with PD overnight , net  cc, no c/o, still not able to bear weight   1/27: 780 mL Net UF removed with CCPD overnight. Coming out of BR with OT and wife. C/o no protein with meals, only had english muffin this am. BM yesterday. Denies abd pain or tenderness. Denies fever or chills.   1/28: 444 mL Net UF removed over night. Sitting in WC in room, wife BS. Reports feeling better every day. Reports sleeping great. Bps 90-100s.   1/29: VSS BP soft, RA.  No new labs.  No CP SOB, or worsenieng edema.  PD overnight 636ml.  UOP 300ml. Feeling good.   1/30: VSS BP 100s/60s.  SSNa 128->131  Phos 7.2, does have some phosphorous  "containing treats in the late evenings.  Dicussed.  No CP SOB or edema besides in wound vac leg.   PD  , wife at bedside and shares that the current fill is a low fill and was only supposed to be temporary after hernia repair.  Usual fill is 2200ml, all green bags.   1/31: VSS BP 100s/60s  PD , felt well with increase fill.   SNa 133 Phos 7.0 No CP SOB, edema localized to woundvac leg and thigh.   2/01:   Disconnected aseptically from PD CyclerEffluent clear and yellow, no signs of bleeding/fibrin clots. 24 hour UF= 678mL. Tolerated well.   2/2: Patient and wife are doing well. Wife states she gave wrong # of cycles to provider yesterday and wanted then reduced back from 7 to 5. Overall doing well without complaints. Looking forward to going home around Feb 7th per patient. Also stated they were told that RT IJ TDC would be removed sometime this week. On-coming provider to ask the Hospitalist to help arrange.   2/3: pt sitting up in bed, spouse at bedside, PD improved with change in rx (reduced to 5 exchanges from 7) great UF: 1.1L, edema has improved, biggest complaint NV, vomits after each meal and fatigue, requesting a reduction in gabapentin, also reports constipation, had to dig out stool yesterday with finger    REVIEW OF SYSTEMS:    10 point ROS reviewed and is as per HPI/daily summary or otherwise negative    PMH/PSH/SH/FH: Reviewed and unchanged since admission note  CURRENT MEDICATIONS: Reviewed from admission to present day    VS:  /58   Pulse 66   Temp 37.1 °C (98.8 °F) (Oral)   Resp 16   Ht 1.707 m (5' 7.2\")   Wt 73.1 kg (161 lb 3.2 oz)   SpO2 95%   BMI 25.10 kg/m²   Physical Exam  Vitals and nursing note reviewed.   Constitutional:       Appearance: Normal appearance.   HENT:      Head: Normocephalic and atraumatic.      Nose: Nose normal.      Mouth/Throat:      Mouth: Mucous membranes are moist.   Eyes:      General: No scleral icterus.     Conjunctiva/sclera: " Conjunctivae normal.   Cardiovascular:      Rate and Rhythm: Normal rate.   Pulmonary:      Effort: Pulmonary effort is normal.   Abdominal:      General: There is no distension.      Palpations: Abdomen is soft.      Comments: PD cath in RLQ    Musculoskeletal:         General: No deformity.      Cervical back: Neck supple.      Right lower leg: No edema.      Left lower leg: Edema present.      Comments: Mild edema in LLE wound vac in place     Skin:     General: Skin is warm and dry.      Findings: No rash.   Neurological:      General: No focal deficit present.      Mental Status: He is alert and oriented to person, place, and time. Mental status is at baseline.   Psychiatric:         Mood and Affect: Mood normal.         Behavior: Behavior normal. Behavior is cooperative.         FLUID BALANCE:  In: 240 [P.O.:240]  Out: 1574     LABS:  Recent Labs     02/01/25  0524   SODIUM 133*   POTASSIUM 4.0   CHLORIDE 89*   CO2 24   GLUCOSE 113*   BUN 81*   CREATININE 8.42*   CALCIUM 8.9                 IMPRESSION:  # ESRD on CCPD  - Pt was transitioned to  iHD during masoud-op  s/p inguinal hernial repair,   - Still has RIJ PC  - Returned to PD 1/17/25   - Home PD prescription CCPD 2.5% alternating with 1.5% 5  no last fill. Patient wife stated she initially told last Nephrologist wrong script. Now all green bags and 5 cycles, not 7.   # HTN, controlled   - Goal BP < 140/90  - torsemide 10mg daily   # Anemia of CKD  - Goal Hgb 10-11  - Below goal   - On PO iron  - ROSALIND weekly   # CKD-MBD  - Ca 8.6   - PO4 7.4 -> 6.6 -> 7.0  - will DC sevelamer to assess GI tolerance, start lanthanum 500mg TID, titrate up prn  # Chronic hypoxic respiratory failure   - On home oxygen 2-2.5L at night   # Nausea/Vomiting   - ddx: binder intolerance, switched to lanthanum   - constipation, started miralax BID   - pill burden, increase omeprazole to 40mg daily   - reduced gabapentin to 100mg daily   # Hyponatremia, mild   - torsemide daily   - UF  with PD, on all 2.5%           SUGGESTIONS:  - Continue CCPD during hospital stay ( now  on oupt rx and tolerating well)  - DC sevelamer and started on Lanthanum  - Increased omeprazole to 40mg daily  - Started miralax BID  - Reduced gapapentin to 100mg daily, ween off as tolerated   - PD exit site care per protocol   - Changed ROSALIND to SQ weekly as now on PD consistently   - No dietary protein restrictions  - Dose all meds per ESRD  - PO iron, consider DC if constipation continues    - Renal diet, low phos   - Has RIJ PC since May 2024. Pt does not want the CVC removed at this times and want to wait for several more days prior to DC to make sure PD is working well,  He understands potential risks with keeping the  CVC  long term, he is agreeable to have removed prior to DC end of this week if all goes well with PD  - L arm AVF not fully matured,  will need intervention outpt   - Avoid Fleet enema in dialysis patients   - Labs daily  - Discharge planning underway, plan for 2/7 home with family    Please call or page Nephrology with questions or concerns  Thank you,

## 2025-02-03 NOTE — THERAPY
Physical Therapy   Daily Treatment     Patient Name: Edy Bennett  Age:  79 y.o., Sex:  male  Medical Record #: [id number]  Today's Date: 2/3/2025     Precautions  Precautions: Fall Risk  Comments: LLE wound vac    Subjective    Pt resting in bed, finishing with wound care and willing to participate.     Objective       02/03/25 1301   PT Charge Group   PT Gait Training (Units) 1   PT Therapeutic Exercise (Units) 2   PT Therapeutic Activities (Units) 1   PT Total Time Spent   PT Individual Total Time Spent (Mins) 60   Gait Functional Level of Assist    Gait Level Of Assist Minimal Assist   Assistive Device Front Wheel Walker   Distance (Feet) 30   # of Times Distance was Traveled 2   Deviation Antalgic;Step To;Bradykinetic;Decreased Heel Strike;Decreased Toe Off   Transfer Functional Level of Assist   Bed, Chair, Wheelchair Transfer Contact Guard Assist   Bed Chair Wheelchair Transfer Description Adaptive equipment;Increased time;Set-up of equipment;Verbal cueing   Supine Lower Body Exercise   Bridges Two Legged;3 sets of 10  (LE's propped on large bolster wedge for pain control LLE)   Short Arc Quad 3 sets of 10   Gluteal Isometrics 3 sets of 10   Quadriceps Isometrics 3 sets of 10  (alternating quad sets)   Sitting Lower Body Exercises   Long Arc Quad 1 set of 10   Bed Mobility    Supine to Sit Supervised   Sit to Supine Supervised   Sit to Stand Contact Guard Assist   Neuro-Muscular Treatments   Neuro-Muscular Treatments Anterior weight shift;Co-Contraction;Facilitation;Sequencing;Tactile Cuing;Verbal Cuing;Weight Shift Right;Weight Shift Left   Comments pre-gait standing/ wt shifting and posture re-ed.     Pre-gait standing with // bars and manual cues for terminal hip / knee ext as tolerated LLE. Pt completed anterior/posterior wt shifting with both LLE/ and RLE lead in stagger stance x 10, lateral wt shifting 2 x 10.    Standing posture re-ed with UE support at FWW, manual cues for upright posture/  "terminal hip ext/ L knee ext as tolerated, pt completed alternating arm raises 2 x 10.     Assessment    Pt requires frequent rests 2* fatigue but completed all exercises/ strength and standing posture training as noted. Remains with limited weight acceptance LLE 2* pain and weakness but improving slowly.     Strengths: Able to follow instructions, Alert and oriented, Independent prior level of function, Supportive family, Pleasant and cooperative, Motivated for self care and independence  Barriers: Pain, Pain poorly managed, Limited mobility    Plan    LE mobility exercises in supine and sitting to prep soft tissues for functional movements, progressive gait in parallel bars and walker, practice car transfers     DME  PT DME Recommendations  Wheelchair: 16\" Width, Elevating Leg Rests  Cushion: Standard    Passport items to be completed:  Get in/out of bed safely, in/out of a vehicle, safely use mobility device, walk or wheel around home/community, navigate up and down stairs, show how to get up/down from the ground, ensure home is accessible, demonstrate HEP, complete caregiver training    Physical Therapy Problems (Active)       Problem: Mobility       Dates: Start:  02/03/25         Goal: STG-Within one week, patient will ambulate household distance >50 ft SBA FWW indoors        Dates: Start:  02/03/25               Problem: Mobility Transfers       Dates: Start:  02/03/25         Goal: STG-Within one week, patient will transfer bed to chair SBA consistently SPT FWW       Dates: Start:  02/03/25               Problem: PT-Long Term Goals       Dates: Start:  01/24/25         Goal: LTG-By discharge, patient will ambulate with  feet SBA       Dates: Start:  01/24/25            Goal: LTG-By discharge, patient will transfer one surface to another with FWW supervised       Dates: Start:  01/24/25            Goal: LTG-By discharge, patient will perform home exercise program       Dates: Start:  01/24/25       "      Goal: LTG-By discharge, patient will ambulate up/down 4-6 stairs with bilateral rails supervised        Dates: Start:  01/24/25            Goal: LTG-By discharge, patient will transfer in/out of a car with FWW supervised        Dates: Start:  01/24/25

## 2025-02-03 NOTE — PROGRESS NOTES
[facility] Services Progress Note     CCPD initiated at 1815 per Dr. SHELDON Reddy x 8.5 hours using all 2.5 % PD solution; with 2.1L fills; 5 cycles; no last fill.      Pt A and o x 4; on room air; denies sob ; denies chest pain; wife at bedside; + nausea and vomiting; meds by PCN; discussed new ordered PD prescription.   Aseptically connected PD cycler to PD catheter.   Exit site cleansed, dressing changed CDI; gentamicin cream applied on PD exit site; no s/s infection noted.     Initial Drain: 22 mL  Waited dwell 1/5 without issues        Report given to Primary RN Aurora Khalil  including troubleshooting, and on-call Dialysis RN contact information ([phone]).      Please call for any issues with hemodynamic instability/persistent alarms/patient not tolerating therapy.

## 2025-02-03 NOTE — THERAPY
Missed Therapy    Patient Name: Edy Bennett  Age:  79 y.o., Sex:  male  Medical Record #: [id number]  Today's Date: 2/3/2025    Discussed missed therapy with Dr. Klein, therapy scheduling, wound care RN       02/03/25 5603   Therapy Missed   Missed Therapy (Minutes) 30   Reason For Missed Therapy Medical - Patient on Hold from Therapy   Interdisciplinary Plan of Care Collaboration   IDT Collaboration with  Physician   Patient Position at End of Therapy In Bed   Collaboration Comments Dr. Miller/ surgeon in with the patient during duration of scheduled PT; wound examined, wound vac back on, per report from would care RN.

## 2025-02-03 NOTE — CARE PLAN
Problem: Knowledge Deficit - Standard  Goal: Patient and family/care givers will demonstrate understanding of plan of care, disease process/condition, diagnostic tests and medications  Outcome: Progressing     Problem: Fall Risk - Rehab  Goal: Patient will remain free from falls  Outcome: Progressing     Problem: Mobility  Goal: Patient's capacity to carry out activities will improve  Outcome: Progressing       The patient is Stable - Low risk of patient condition declining or worsening    Shift Goals  Clinical Goals: Comfort and safety  Patient Goals: Comfort  Family Goals: update w/ POC

## 2025-02-03 NOTE — DISCHARGE PLANNING
CM//Discharge :    The following has been ordered:   Home health:  [facility]                    Disciplines ordered: RN, PT, OT, Aid, and wound care  Status: Sent

## 2025-02-03 NOTE — PROGRESS NOTES
[facility] Services Progress Notes     Disconnected aseptically from PD Cycler at 0730.     Pt stable, VSS, alert and oriented.  Effluent clear and yellow, no signs of bleeding/fibrin clots.  No alarms on machine was noted or reported before disconnection.     24 hour UF= 1182  ML (I.Drain= 22 ML + Total UF= 1160 ML - Last fill= 0)     Report given to Lucy ARANDA

## 2025-02-03 NOTE — PROGRESS NOTES
"  Physical Medicine & Rehabilitation Progress Note    Encounter Date: 2/3/2025    Chief Complaint: PVD s/p bypass    Interval Events (Subjective):  Vital signs stable  BM 2/1   Voids some, oliguric with PD    Seen and examined in his room with wife. D/w them all medical changes that are being made.       ROS: 14 point ROS negative unless otherwise specified in the HPI    Objective:  VITAL SIGNS: /65   Pulse 86   Temp 36.7 °C (98.1 °F) (Temporal)   Resp 16   Ht 1.707 m (5' 7.2\")   Wt 73.1 kg (161 lb 3.2 oz)   SpO2 96%   BMI 25.10 kg/m²     GEN: No apparent distress  HEENT: Head normocephalic, atraumatic.  Sclera nonicteric bilaterally, no ocular discharge appreciated bilaterally.  CV: Extremities warm and well-perfused, + LLE edema. RLE without edema.  Wound vac off   PULMONARY: Breathing nonlabored on room air, no respiratory accessory muscle use.  Not requiring supplemental oxygen.  SKIN:   2/2 Clinical Picture    PSYCH: Mood and affect within normal limits.  NEURO: Awake alert.  Conversational.  Logical thought content.  flapping hand tremors      Laboratory Values:  Recent Results (from the past 72 hours)   Basic Metabolic Panel    Collection Time: 02/01/25  5:24 AM   Result Value Ref Range    Sodium 133 (L) 135 - 145 mmol/L    Potassium 4.0 3.6 - 5.5 mmol/L    Chloride 89 (L) 96 - 112 mmol/L    Co2 24 20 - 33 mmol/L    Glucose 113 (H) 65 - 99 mg/dL    Bun 81 (H) 8 - 22 mg/dL    Creatinine 8.42 (HH) 0.50 - 1.40 mg/dL    Calcium 8.9 8.5 - 10.5 mg/dL    Anion Gap 20.0 (H) 7.0 - 16.0   ESTIMATED GFR    Collection Time: 02/01/25  5:24 AM   Result Value Ref Range    GFR (CKD-EPI) 6 (A) >60 mL/min/1.73 m 2       Medications:  Scheduled Medications   Medication Dose Frequency    epoetin  4,000 Units TUE+THU+SAT    sevelamer carbonate  1,600 mg TID WITH MEALS    oxyCODONE immediate-release  5 mg TID    ondansetron  4 mg 10MIN AFTER START MEAL    methocarbamol  250 mg 4X/DAY    torsemide  10 mg Q DAY    " ferrous gluconate  324 mg Q48HRS    Pharmacy Consult Request  1 Each PHARMACY TO DOSE    omeprazole  20 mg DAILY    apixaban  5 mg BID    clopidogrel  75 mg DAILY    gabapentin  100 mg BID    guaiFENesin ER  600 mg Q12HRS    metoprolol SR  100 mg QHS    montelukast  10 mg QHS    rosuvastatin  20 mg QHS    senna-docusate  2 Tablet BID    gentamicin   DAILY AT 1800     PRN medications: sevelamer carbonate, lidocaine, hydrALAZINE, lactulose, docusate sodium, bisacodyl EC, magnesium hydroxide, carboxymethylcellulose, benzocaine-menthol, mag hydrox-al hydrox-simeth, ondansetron **OR** ondansetron, traZODone, sodium chloride, acetaminophen, oxyCODONE immediate-release **OR** oxyCODONE immediate-release, senna-docusate **AND** polyethylene glycol/lytes    Diet:  Current Diet Order   Procedures    Diet Order Diet: Renal; Second Modifier: (optional): Cardiac       Medical Decision Making and Plan:  S/p left popliteal to posterior tibial artery bypass Dr. Miller 1/20/2025  PT and OT for mobility and ADLs. Per guidelines, 15 hours per week between PT, OT and/or SLP.  Follow-up vascular surgery  Continue Eliquis and Plavix  Continue statin    [facility]. Vasc surg to eval as well. Prevena now working.   1/30 vascular to change dressing at some point per patient's wife  2/3 Swelling improving.      Type 2 diabetes mellitus with hyperglycemia -outpatient medication had been Januvia 1 tablet twice daily     Pain -Tylenol and oxycodone as needed.  Scheduled Robaxin, gabapentin.  Schedule oxycodone 5x daily, morphine contraindicated based on renal function. 1/29 Reduce Oxycodone to QID. 1/30 reduce oxycodone to scheduled 3 times daily.  Decrease methocarbamol to 250 mg 4 times daily. 2/3 Patient refusing scheduled Oxy. Continue only as PRN and d/c scheduled. Make Robaxin PRN.     Last as needed oxycodone use was 1/24     Anemia - 1/24 Improving. 1/27 Drop to 8's, had a lot of OP from [facility] last week.   7.7 - expected given prevena OP. Monitor need for transfusion.   hemoglobin dropped to 7.3.  Discussed with nephrology, they deferred to us to transfuse.  Transfuse less than 7.  Prevena putting out less. 2/3 Labs tomorrow    Thrombocytopenia - Resolved      ESRD on PD - Nnephrology following.  On sevelamer - changed due to naussea.  Nephrology increased fill .    Hypermagnesemia - Nephro managing    Hyperphosphatemia - Nephro managing      History of moderate aortic stenosis -stable     Hypertension -on Norvasc 2.5 mg every morning (OFF), Lasix 40 mg twice daily (Now torsemide), Metoprolol XL 100mg.  Amlodipine being held several days now. Lasix has been given only 3x past six days. D/w Nephro. Hold bladder meds. Monitor UOP though minimal.  BP stable low, on torsemide.   torsemide still not on to be held due to low blood pressure parameters per nephrology hold if less than 120. 2/3 BP stable, still missing some torsemide doses.      CAD s/p SYDNEE 3x -on aspirin and statin.  Had been on Imdur 30 mg daily     Gout - No meds at home.      Hyperlipidemia -continue statin     Recent left inguinal hernia repair -2024 Dr. Miller     Bowel - Patient on Senna-docusate for constipation prophylaxis.  Constipation.  BM.  BM. 2/3 Bowel meds increased.     Nausea & Vomiting - Only after lunch. Get KUB - WNL. Schedule Zofran after lunch.  Scheduled Zofran after each meal. 2/3 Declining scheduled Zofran. Make PRN only.     Hand tremors -LFTs nonconcerning for etiology.  Possibly med induced.     BPH - TV/PVR/BS PRN.  Continue Flomax and Hytrin.  Hold due to low BP. Favor diuresis. 2/3 Low PVRs.      Insomnia -continue trazodone at night as needed        Upcoming Labs/imagin/4     DVT PROPHYLAXIS: On Eliquis     HOSPITALIST FOLLOWING: No    Consultants following: Nephrology. D/w nephrology 2/3/2025      CODE STATUS: Full code     DISPO: Home with spouse     SHAE: 27     MEDS  SENT TO: [facility]     DISCHARGE SPECIALIST FOLLOW UP: Vascular surgery and other specialists as previously scheduled     Patient to scheduled follow up with their PCP within 2 weeks from discharge from the [facility].   ____________________________________    Dr. Mere Klein DO, MS  ABP - Physical Medicine & Rehabilitation   ____________________________________    _____________________________________  Interdisciplinary Team Conference   Most recent IDT on 2/3/2025    I, Dr. Mere Klein DO, MS, was present and led the interdisciplinary team conference on 2/3/2025.  I led the IDT conference and agree with the IDT conference documentation and plan of care as noted below.     Nursing:  Diet Current Diet Order   Procedures    Diet Order Diet: Renal; Second Modifier: (optional): Cardiac       Eating ADL        % of Last Meal  Oral Nutrition: *  * Meal *  *, Lunch, Between 25-50% Consumed   Sleep    Bowel Last BM: 02/01/25 (per patient)   Bladder        Physical Therapy:  Bed Mobility    Transfers Contact Guard Assist  Set-up of equipment, Supervision for safety, Verbal cueing   Mobility Contact Guard Assist   Stairs        Occupational Therapy:  Grooming Supervision, Seated   Bathing Minimal Assist   UB Dressing Modified Independent   LB Dressing Moderate Assist   Toileting Contact Guard Assist   Shower & Transfer        Respiratory Therapy:  O2 (LPM): 0 (uses o2 for sleep)  O2 Delivery Device: None - Room Air    Case Management:  Continues to work on disposition and DME needs.     BARRIERS TO DISCHARGE HOME:  Family training  Equipment  OP vs HH services   Medical Stability     Discharge Date/Disposition:  2/7/25  _____________________________________

## 2025-02-03 NOTE — PROGRESS NOTES
NURSING DAILY NOTE    Name: Edy Bennett   Date of Admission: 1/23/2025   Admitting Diagnosis: PAD (peripheral artery disease) ([facility])  Attending Physician: ROSANNA PATEL D.O.  Allergies: Patient has no known allergies.    Safety  Patient Assist  max1  Patient Precautions  Fall Risk  Precaution Comments  LLE wound vac  Bed Transfer Status  Contact Guard Assist  Toilet Transfer Status   Contact Guard Assist  Assistive Devices  Rails, Wheelchair  Oxygen  Room air w/o2 available  Diet/Therapeutic Dining  Current Diet Order   Procedures    Diet Order Diet: Renal; Second Modifier: (optional): Cardiac     Pill Administration  whole  Agitated Behavioral Scale     ABS Level of Severity       Fall Risk  Has the patient had a fall this admission?   No  Trinh Spivey Fall Risk Scoring  22, HIGH RISK  Fall Risk Safety Measures  bed alarm and chair alarm    Vitals  Temperature: 36.9 °C (98.5 °F)  Temp src: Oral  Pulse: 87  Respiration: 18  Blood Pressure : 105/59  Blood Pressure MAP (Calculated): 74 MM HG  BP Location: Left, Upper Arm  Patient BP Position: Supine     Oxygen  Pulse Oximetry: 96 %  O2 (LPM): 0 (uses o2 for sleep)  FiO2%: 21 %  O2 Delivery Device: Room air w/o2 available    Bowel and Bladder  Last Bowel Movement  02/01/25 (per patient)  Stool Type  Type 4: Like a sausage or snake, smooth and soft  Bowel Device     Continent  Bladder: Did not void   Bowel: No movement  Bladder Function  Urine Void (mL): 50 ml  Number of Times Voided: 1  Urine Color: Yellow  Genitourinary Assessment   Bladder Assessment (WDL):  Within Defined Limits  Smith Catheter: Not Applicable  Urinary Symptoms: Anuric/ Dialysis Patient  Urine Color: Yellow  Bladder Device: Urinal  Bladder Scan: Post Void  $ Bladder Scan Results (mL): 32    Skin  Jalen Score   16  Sensory Interventions   Bed Types: Low Airloss  Skin Preventative Measures: Pillows in Use for Support /  Positioning  Moisture Interventions  Moisturizers/Barriers: Barrier Wipes      Pain  Pain Rating Scale  0 - No Pain  Pain Location  Leg  Pain Location Orientation  Left  Pain Interventions   Declines    ADLs    Bathing   Full Bed Bath  Linen Change   Complete  Personal Hygiene     Chlorhexidine Bath   Completed  Oral Care     Teeth/Dentures     Shave     Nutrition Percentage Eaten  Lunch, Refused  Environmental Precautions  Treaded Slipper Socks on Patient, Communication Sign for Patients & Families, Mobility Assessed & Appropriate Sign Placed  Patient Turns/Positioning  Patient turns self independently side to side without assistance, to offload sacral area  Patient Turns Assistance/Tolerance  Assistance of One  Bed Positions  Bed Controls On, Bed Locked  Head of Bed Elevated  Self regulated      Psychosocial/Neurologic Assessment  Psychosocial Assessment  Psychosocial (WDL):  Within Defined Limits  Patient Behaviors: Anxious  Family Behaviors: Family Present  Neurologic Assessment  Neuro (WDL): Within Defined Limits  Level of Consciousness: Alert  Orientation Level: Oriented X4  Cognition: Follows commands, Appropriate attention/concentration  Speech: Clear  Facial Symmetry: Other (Comment) (no facial droop noted)  Pupil Assesment: No  Motor Function/Sensation Assessment: Motor strength  RUE Sensation: Full sensation  Muscle Strength Right Arm: Good Strength Against Gravity and Moderate Resistance  LUE Sensation: Full sensation  Muscle Strength Left Arm: Good Strength Against Gravity and Moderate Resistance  RLE Sensation: Full sensation  Muscle Strength Right Leg: Good Strength Against Gravity and Moderate Resistance  LLE Sensation: Full sensation, Pain  Muscle Strength Left Leg: Fair Strength against Gravity but No Resistance  EENT (WDL):  WDL Except    Cardio/Pulmonary Assessment  Edema   LLE Edema: Generalized  Respiratory Breath Sounds  RUL Breath Sounds: Clear  RML Breath Sounds: Clear  RLL Breath Sounds:  Diminished  ANAYELI Breath Sounds: Clear  LLL Breath Sounds: Diminished  Cardiac Assessment   Cardiac (WDL):  Within Defined Limits

## 2025-02-03 NOTE — CARE PLAN
"The patient is Stable - Low risk of patient condition declining or worsening    Shift Goals  Clinical Goals: Comfort and safety  Patient Goals: Comfort  Family Goals: update w/ POC    Progress made toward(s) clinical / shift goals:    Problem: Pain - Standard  Goal: Alleviation of pain or a reduction in pain to the patient’s comfort goal  Outcome: Progressing     Problem: Skin Integrity  Goal: Skin integrity is maintained or improved  Outcome: Progressing     Problem: Fall Risk - Rehab  Goal: Patient will remain free from falls  Outcome: Progressing  Note: Trinh Spivey Fall risk Assessment Score: 22    High fall risk Interventions   - Bed and strip alarm   - Yellow sign by the door   - Yellow wrist band \"Fall risk\"  - Room near to the nurse station  - Do not leave patient unattended in the bathroom  - Fall risk education provided     Problem: Respiratory  Goal: Patient will understand use and administration of respiratory medications to improve respiratory function  Outcome: Progressing     Problem: Risk for Aspiration  Goal: Patient's risk for aspiration will be absent or decrease  Outcome: Progressing       Patient is not progressing towards the following goals:      "

## 2025-02-03 NOTE — DISCHARGE PLANNING
CM//Discharge :    DME: A plus                          Following equipment has been ordered: w/c and cushion   Status:Sent

## 2025-02-03 NOTE — CARE PLAN
Problem: Mobility  Goal: STG-Within one week, patient will ambulate 10 feet with FWW CGA  Outcome: Met     Problem: Mobility Transfers  Goal: STG-Within one week, patient will perform bed mobility with minimal assist to manage legs   Outcome: Met  Goal: STG-Within one week, patient will sit to stand with minimal assist and FWW  Outcome: Met

## 2025-02-03 NOTE — CARE PLAN
Problem: Dressing  Goal: STG-Within one week, patient will dress LB with min A  Outcome: Not Met     Problem: Toileting  Goal: STG-Within one week, patient will complete toileting tasks with min A   Outcome: Met     Problem: OT Long Term Goals  Goal: LTG-By discharge, patient will complete basic self care tasks mod I  Outcome: Progressing  Goal: LTG-By discharge, patient will perform bathroom transfers mod I  Outcome: Progressing

## 2025-02-04 ENCOUNTER — APPOINTMENT (OUTPATIENT)
Dept: OCCUPATIONAL THERAPY | Facility: REHABILITATION | Age: 80
DRG: 299 | End: 2025-02-04
Attending: PHYSICAL MEDICINE & REHABILITATION
Payer: MEDICARE

## 2025-02-04 ENCOUNTER — APPOINTMENT (OUTPATIENT)
Dept: PHYSICAL THERAPY | Facility: REHABILITATION | Age: 80
DRG: 299 | End: 2025-02-04
Attending: PHYSICAL MEDICINE & REHABILITATION
Payer: MEDICARE

## 2025-02-04 LAB
ANION GAP SERPL CALC-SCNC: 21 MMOL/L (ref 7–16)
BUN SERPL-MCNC: 76 MG/DL (ref 8–22)
CALCIUM SERPL-MCNC: 8.8 MG/DL (ref 8.5–10.5)
CHLORIDE SERPL-SCNC: 90 MMOL/L (ref 96–112)
CO2 SERPL-SCNC: 22 MMOL/L (ref 20–33)
CREAT SERPL-MCNC: 8.65 MG/DL (ref 0.5–1.4)
ERYTHROCYTE [DISTWIDTH] IN BLOOD BY AUTOMATED COUNT: 56.5 FL (ref 35.9–50)
GFR SERPLBLD CREATININE-BSD FMLA CKD-EPI: 6 ML/MIN/1.73 M 2
GLUCOSE SERPL-MCNC: 125 MG/DL (ref 65–99)
HCT VFR BLD AUTO: 24.4 % (ref 42–52)
HGB BLD-MCNC: 7.8 G/DL (ref 14–18)
MCH RBC QN AUTO: 31.8 PG (ref 27–33)
MCHC RBC AUTO-ENTMCNC: 32 G/DL (ref 32.3–36.5)
MCV RBC AUTO: 99.6 FL (ref 81.4–97.8)
PLATELET # BLD AUTO: 252 K/UL (ref 164–446)
PMV BLD AUTO: 9.8 FL (ref 9–12.9)
POTASSIUM SERPL-SCNC: 3.3 MMOL/L (ref 3.6–5.5)
RBC # BLD AUTO: 2.45 M/UL (ref 4.7–6.1)
SODIUM SERPL-SCNC: 133 MMOL/L (ref 135–145)
WBC # BLD AUTO: 7.6 K/UL (ref 4.8–10.8)

## 2025-02-04 PROCEDURE — 770010 HCHG ROOM/CARE - REHAB SEMI PRIVAT*

## 2025-02-04 PROCEDURE — 36415 COLL VENOUS BLD VENIPUNCTURE: CPT

## 2025-02-04 PROCEDURE — 700102 HCHG RX REV CODE 250 W/ 637 OVERRIDE(OP): Performed by: NURSE PRACTITIONER

## 2025-02-04 PROCEDURE — A9270 NON-COVERED ITEM OR SERVICE: HCPCS | Performed by: NURSE PRACTITIONER

## 2025-02-04 PROCEDURE — 700102 HCHG RX REV CODE 250 W/ 637 OVERRIDE(OP): Mod: JZ | Performed by: NURSE PRACTITIONER

## 2025-02-04 PROCEDURE — 97116 GAIT TRAINING THERAPY: CPT

## 2025-02-04 PROCEDURE — 700102 HCHG RX REV CODE 250 W/ 637 OVERRIDE(OP): Performed by: PHYSICAL MEDICINE & REHABILITATION

## 2025-02-04 PROCEDURE — 99232 SBSQ HOSP IP/OBS MODERATE 35: CPT | Performed by: PHYSICAL MEDICINE & REHABILITATION

## 2025-02-04 PROCEDURE — 94760 N-INVAS EAR/PLS OXIMETRY 1: CPT

## 2025-02-04 PROCEDURE — 97110 THERAPEUTIC EXERCISES: CPT

## 2025-02-04 PROCEDURE — 80048 BASIC METABOLIC PNL TOTAL CA: CPT

## 2025-02-04 PROCEDURE — 700111 HCHG RX REV CODE 636 W/ 250 OVERRIDE (IP): Performed by: PHYSICAL MEDICINE & REHABILITATION

## 2025-02-04 PROCEDURE — 97530 THERAPEUTIC ACTIVITIES: CPT

## 2025-02-04 PROCEDURE — 97535 SELF CARE MNGMENT TRAINING: CPT

## 2025-02-04 PROCEDURE — A9270 NON-COVERED ITEM OR SERVICE: HCPCS | Performed by: PHYSICAL MEDICINE & REHABILITATION

## 2025-02-04 PROCEDURE — A9270 NON-COVERED ITEM OR SERVICE: HCPCS | Mod: JZ | Performed by: NURSE PRACTITIONER

## 2025-02-04 PROCEDURE — 85027 COMPLETE CBC AUTOMATED: CPT

## 2025-02-04 RX ORDER — OXYCODONE HYDROCHLORIDE 5 MG/1
5 TABLET ORAL EVERY 4 HOURS PRN
Status: DISCONTINUED | OUTPATIENT
Start: 2025-02-04 | End: 2025-02-14 | Stop reason: HOSPADM

## 2025-02-04 RX ORDER — OXYCODONE HYDROCHLORIDE 10 MG/1
10 TABLET ORAL EVERY 4 HOURS PRN
Status: DISCONTINUED | OUTPATIENT
Start: 2025-02-04 | End: 2025-02-14 | Stop reason: HOSPADM

## 2025-02-04 RX ORDER — POTASSIUM CHLORIDE 1500 MG/1
20 TABLET, EXTENDED RELEASE ORAL ONCE
Status: COMPLETED | OUTPATIENT
Start: 2025-02-04 | End: 2025-02-04

## 2025-02-04 RX ORDER — BISACODYL 10 MG
10 SUPPOSITORY, RECTAL RECTAL
Status: DISCONTINUED | OUTPATIENT
Start: 2025-02-04 | End: 2025-02-14 | Stop reason: HOSPADM

## 2025-02-04 RX ADMIN — LANTHANUM CARBONATE 500 MG: 500 TABLET, CHEWABLE ORAL at 17:29

## 2025-02-04 RX ADMIN — POLYETHYLENE GLYCOL 3350 1 PACKET: 17 POWDER, FOR SOLUTION ORAL at 08:32

## 2025-02-04 RX ADMIN — POLYETHYLENE GLYCOL 3350 1 PACKET: 17 POWDER, FOR SOLUTION ORAL at 21:14

## 2025-02-04 RX ADMIN — APIXABAN 5 MG: 5 TABLET, FILM COATED ORAL at 21:14

## 2025-02-04 RX ADMIN — ONDANSETRON 4 MG: 4 TABLET, ORALLY DISINTEGRATING ORAL at 16:37

## 2025-02-04 RX ADMIN — GENTAMICIN SULFATE: 1 CREAM TOPICAL at 20:45

## 2025-02-04 RX ADMIN — GUAIFENESIN 600 MG: 600 TABLET ORAL at 21:14

## 2025-02-04 RX ADMIN — LANTHANUM CARBONATE 500 MG: 500 TABLET, CHEWABLE ORAL at 11:58

## 2025-02-04 RX ADMIN — SENNOSIDES AND DOCUSATE SODIUM 2 TABLET: 50; 8.6 TABLET ORAL at 08:32

## 2025-02-04 RX ADMIN — SENNOSIDES AND DOCUSATE SODIUM 2 TABLET: 50; 8.6 TABLET ORAL at 21:14

## 2025-02-04 RX ADMIN — LANTHANUM CARBONATE 500 MG: 500 TABLET, CHEWABLE ORAL at 08:32

## 2025-02-04 RX ADMIN — OMEPRAZOLE 40 MG: 20 CAPSULE, DELAYED RELEASE ORAL at 08:32

## 2025-02-04 RX ADMIN — APIXABAN 5 MG: 5 TABLET, FILM COATED ORAL at 08:32

## 2025-02-04 RX ADMIN — POTASSIUM CHLORIDE 20 MEQ: 1500 TABLET, EXTENDED RELEASE ORAL at 11:57

## 2025-02-04 RX ADMIN — GUAIFENESIN 600 MG: 600 TABLET ORAL at 08:32

## 2025-02-04 RX ADMIN — CLOPIDOGREL BISULFATE 75 MG: 75 TABLET ORAL at 08:32

## 2025-02-04 RX ADMIN — MONTELUKAST 10 MG: 10 TABLET, FILM COATED ORAL at 21:16

## 2025-02-04 RX ADMIN — ROSUVASTATIN CALCIUM 20 MG: 10 TABLET, FILM COATED ORAL at 21:14

## 2025-02-04 ASSESSMENT — ACTIVITIES OF DAILY LIVING (ADL)
TOILET_TRANSFER_DESCRIPTION: GRAB BAR;SUPERVISION FOR SAFETY
BED_CHAIR_WHEELCHAIR_TRANSFER_DESCRIPTION: ADAPTIVE EQUIPMENT;INCREASED TIME;SET-UP OF EQUIPMENT;VERBAL CUEING
TOILETING_LEVEL_OF_ASSIST_DESCRIPTION: GRAB BAR;SUPERVISION FOR SAFETY
BED_CHAIR_WHEELCHAIR_TRANSFER_DESCRIPTION: ADAPTIVE EQUIPMENT

## 2025-02-04 ASSESSMENT — GAIT ASSESSMENTS
ASSISTIVE DEVICE: PARALLEL BARS
ASSISTIVE DEVICE: FRONT WHEEL WALKER
GAIT LEVEL OF ASSIST: CONTACT GUARD ASSIST
DISTANCE (FEET): 10
DEVIATION: ANTALGIC;STEP TO;DECREASED BASE OF SUPPORT;DECREASED HEEL STRIKE;DECREASED TOE OFF;OTHER (COMMENT)
DEVIATION: ANTALGIC;STEP TO
GAIT LEVEL OF ASSIST: CONTACT GUARD ASSIST
DISTANCE (FEET): 100

## 2025-02-04 NOTE — DISCHARGE PLANNING
Case Management/IDT follow up.   Projected dc date:  IDT continues to recommend IRF level of care as patient continue to make progress with all therapies.     DC needs  Home health:  PT/OT//RN/CNA  DME:  Family training:    Follow up:   PCP:  Other:     I met with patient and family providing update from IDT and discussed plan of care.  They are in agreement w/ plan.     Plan:  Continue to follow

## 2025-02-04 NOTE — PROGRESS NOTES
[facility]. PD cath. intact. Dressing changed, no s/s of infection noted, gentamicin applied to cath. entry site. Report given to MANOJ Carcamo. See PATH flow sheet for details

## 2025-02-04 NOTE — PROGRESS NOTES
[facility] Services Progress Notes     Disconnected aseptically from PD Cycler at 0645.     Pt stable, VSS, alert and oriented.  Effluent clear and yellow, no signs of bleeding/fibrin clots.  Per patient, machine had alarm to check patient line, [facility], otherwise, end treatment was seen on the screen     24 hour UF= 1516 ML (I.Drain= 60 ML + Total UF= 1456 ML - Last fill= 0)     Report given to Lucy ARANDA

## 2025-02-04 NOTE — PROGRESS NOTES
NURSING DAILY NOTE    Name: Edy Bennett   Date of Admission: 1/23/2025   Admitting Diagnosis: PAD (peripheral artery disease) ([facility])  Attending Physician: ROSANNA PATEL D.O.  Allergies: Patient has no known allergies.    Safety  Patient Assist  max1  Patient Precautions  Fall Risk  Precaution Comments  LLE wound vac  Bed Transfer Status  Contact Guard Assist  Toilet Transfer Status   Contact Guard Assist  Assistive Devices  Rails, Wheelchair  Oxygen  None - Room Air  Diet/Therapeutic Dining  Current Diet Order   Procedures    Diet Order Diet: Renal; Second Modifier: (optional): Cardiac     Pill Administration  whole  Agitated Behavioral Scale     ABS Level of Severity       Fall Risk  Has the patient had a fall this admission?   No  Trinh Spivey Fall Risk Scoring  22, HIGH RISK  Fall Risk Safety Measures  bed alarm and chair alarm    Vitals  Temperature: 36.6 °C (97.9 °F)  Temp src: Oral  Pulse: 80  Respiration: 16  Blood Pressure : 100/58  Blood Pressure MAP (Calculated): 72 MM HG  BP Location: Right, Upper Arm  Patient BP Position: Supine     Oxygen  Pulse Oximetry: 94 %  O2 (LPM): 0 (uses o2 for sleep)  FiO2%: 21 %  O2 Delivery Device: None - Room Air    Bowel and Bladder  Last Bowel Movement  02/01/25 (per patient)  Stool Type  Type 4: Like a sausage or snake, smooth and soft  Bowel Device     Continent  Bladder: Did not void   Bowel: No movement  Bladder Function  Urine Void (mL): 100 ml  Number of Times Voided: 1  Urine Color: Yellow  Genitourinary Assessment   Bladder Assessment (WDL):  Within Defined Limits  Smith Catheter: Not Applicable  Urinary Symptoms: Anuric/ Dialysis Patient  Urine Color: Yellow  Bladder Device: Urinal  Bladder Scan: Post Void  $ Bladder Scan Results (mL): 32    Skin  Jalen Score   16  Sensory Interventions   Bed Types: Low Airloss  Skin Preventative Measures: Pillows in Use for Support / Positioning  Moisture  Interventions  Moisturizers/Barriers: Barrier Wipes      Pain  Pain Rating Scale  0 - No Pain  Pain Location  Leg  Pain Location Orientation  Left  Pain Interventions   Declines    ADLs    Bathing   Full Bed Bath  Linen Change   Complete  Personal Hygiene     Chlorhexidine Bath   Completed  Oral Care     Teeth/Dentures     Shave     Nutrition Percentage Eaten  *  * Meal *  *, Dinner, Between 25-50% Consumed  Environmental Precautions  Treaded Slipper Socks on Patient  Patient Turns/Positioning  Patient turns self independently side to side without assistance, to offload sacral area  Patient Turns Assistance/Tolerance  Assistance of One  Bed Positions  Bed Controls On, Bed Locked  Head of Bed Elevated  Self regulated      Psychosocial/Neurologic Assessment  Psychosocial Assessment  Psychosocial (WDL):  Within Defined Limits  Patient Behaviors: Anxious  Family Behaviors: Family Present  Neurologic Assessment  Neuro (WDL): Within Defined Limits  Level of Consciousness: Alert  Orientation Level: Oriented X4  Cognition: Follows commands, Appropriate attention/concentration  Speech: Clear  Facial Symmetry: Other (Comment) (no facial droop noted)  Pupil Assesment: No  Motor Function/Sensation Assessment: Motor strength  RUE Sensation: Full sensation  Muscle Strength Right Arm: Good Strength Against Gravity and Moderate Resistance  LUE Sensation: Full sensation  Muscle Strength Left Arm: Good Strength Against Gravity and Moderate Resistance  RLE Sensation: Full sensation  Muscle Strength Right Leg: Good Strength Against Gravity and Moderate Resistance  LLE Sensation: Full sensation, Pain  Muscle Strength Left Leg: Fair Strength against Gravity but No Resistance  EENT (WDL):  WDL Except    Cardio/Pulmonary Assessment  Edema   LLE Edema: Generalized  Respiratory Breath Sounds  RUL Breath Sounds: Clear  RML Breath Sounds: Clear  RLL Breath Sounds: Diminished  ANAYELI Breath Sounds: Clear  LLL Breath Sounds: Diminished  Cardiac  Assessment   Cardiac (WDL):  Within Defined Limits

## 2025-02-04 NOTE — THERAPY
Occupational Therapy  Daily Treatment     Patient Name: Edy Bennett  Age:  79 y.o., Sex:  male  Medical Record #: [id number]  Today's Date: 2/3/2025     Precautions  Precautions: (P) Fall Risk  Comments: LLE wound vac    Safety   ADL Safety : Requires Physical Assist for Safety  Bathroom Safety: Requires Physical Assist for Safety    Subjective    Pt asleep in bed upon arrival. Easily awakened and willing to work with OT. Pt's wife states that she is concerned about his food. Pt states he ate most of his breakfast.      Objective       02/03/25 0931   OT Charge Group   OT Self Care / ADL (Units) 2   OT Therapy Activity (Units) 2   OT Total Time Spent   OT Individual Total Time Spent (Mins) 60   Precautions   Precautions Fall Risk   Vitals   Patient BP Position Supine   Blood Pressure  107/57  (102/52 in sitting)   Functional Level of Assist   Grooming Modified Independent   Grooming Description Seated in wheelchair at sink   Toileting Supervision   Toileting Description Grab bar;Supervision for safety   Sitting Upper Body Exercises   Upper Extremity Bike Level 5 Resistance  (motomed)   Interdisciplinary Plan of Care Collaboration   IDT Collaboration with  Family / Caregiver   Patient Position at End of Therapy Seated   Collaboration Comments Pt's wife present throughout. States that pt had a significant emesis episode yesterday and that she is concerned about him. Referred her to speak to Dr. Klein.         Assessment    Pt seen for OT tx session. Able to do seated ADL at sink independently. Pt will benefit from having WC at home to complete ADL until he can tolerate putting weight through the left leg. Pt's wife has ordered a shower chair for him. Pt tolerated motomed for 6 mins.       Plan    ADL, IADL, strengthening, balance, endurance    DME  OT DME Recommendations  Bathroom Equipment:  (TBD)    Passport items to be completed:  Perform bathroom transfers, complete dressing, complete feeding, get ready for  the day, prepare a simple meal, participate in household tasks, adapt home for safety needs, demonstrate home exercise program, complete caregiver training     Occupational Therapy Goals (Active)       Problem: Dressing       Dates: Start:  01/24/25         Goal: STG-Within one week, patient will dress LB with min A       Dates: Start:  01/24/25               Problem: OT Long Term Goals       Dates: Start:  01/24/25         Goal: LTG-By discharge, patient will complete basic self care tasks mod I       Dates: Start:  01/24/25            Goal: LTG-By discharge, patient will perform bathroom transfers mod I       Dates: Start:  01/24/25

## 2025-02-04 NOTE — THERAPY
Occupational Therapy  Daily Treatment     Patient Name: Edy Bennett  Age:  79 y.o., Sex:  male  Medical Record #: [id number]  Today's Date: 2/4/2025     Precautions  Precautions: (P) Fall Risk  Comments: (P) LLE wound vac    Safety   ADL Safety : Requires Physical Assist for Safety  Bathroom Safety: Requires Physical Assist for Safety    Subjective    Pt seated in w/c upon arrival. Pt pleasant and cooperative, agreeable to therapy. SO present for session.      Objective       02/04/25 1301   OT Charge Group   Charges Yes   OT Therapy Activity (Units) 2   OT Total Time Spent   OT Individual Total Time Spent (Mins) 30   Precautions   Precautions Fall Risk   Comments LLE wound vac   Vitals   O2 (LPM) 0   O2 Delivery Device None - Room Air   Pain 0 - 10 Group   Location Leg   Location Orientation Left   Description Aching   Comfort Goal Comfort with Movement   Therapist Pain Assessment During Activity;Prior to Activity   Interdisciplinary Plan of Care Collaboration   IDT Collaboration with  Family / Caregiver   Patient Position at End of Therapy Seated;Chair Alarm On;Self Releasing Lap Belt Applied;Tray Table within Reach;Family / Friend in Room   Collaboration Comments SO present for session     Therapeutic Activities  Purpose: to improve performance and function of daily activities  Interventions:   Other:   standing tolerance activity in between parallel bars.  Description of Intervention(s): Pt completed 3 Rounds of corn hole standing in between parallel bars with CGA to increase standing tolerance, general strength, and endurance to maximize independence with ADLs/IADLs. Pt completed sit to stand from w/c to parallel bars, threw four beanbags with RUE and LUE supporting self on bars. Pt attempted reciprocal stepping with RLE when throwing bean bags onto corn hole board, unable to tolerate d/t LLE pain. Pt then walked from w/c <> cornhole board. Pt took a seated rest break in w/c in between rounds.      Assessment    Pt tolerated session well with no LOB noted. Pt using significant BUE pull for STS and BUE support for functional mobility in between parallel bars. Limited by LLE pain and limited tolerance to weightbearing but enjoyed activity.       Plan    Continue primary OT POC.    DME  OT DME Recommendations  Bathroom Equipment:  (TBD)    Passport items to be completed:  Perform bathroom transfers, complete dressing, complete feeding, get ready for the day, prepare a simple meal, participate in household tasks, adapt home for safety needs, demonstrate home exercise program, complete caregiver training     Occupational Therapy Goals (Active)       Problem: Dressing       Dates: Start:  01/24/25         Goal: STG-Within one week, patient will dress LB with min A       Dates: Start:  01/24/25               Problem: OT Long Term Goals       Dates: Start:  01/24/25         Goal: LTG-By discharge, patient will complete basic self care tasks mod I       Dates: Start:  01/24/25            Goal: LTG-By discharge, patient will perform bathroom transfers mod I       Dates: Start:  01/24/25

## 2025-02-04 NOTE — DISCHARGE PLANNING
Outpatient Dialysis Note     Confirmed patient is active at:     [facility] [place] [facility]  [address], [place] [address]     Schedule: PD at home    Patient is followed by Dr. Milian  Forwarded records for review.     Xitlaly Reyes   Dialysis Coordinator / [facility]  Ph: [phone]

## 2025-02-04 NOTE — CARE PLAN
"  Problem: Fall Risk - Rehab  Goal: Patient will remain free from falls  Note: Trinh Spivey Fall risk Assessment Score: 22    High fall risk Interventions   - Alarming seatbelt  - Bed and strip alarm   - Yellow sign by the door   - Yellow wrist band \"Fall risk\"  - Room near to the nurse station  - Do not leave patient unattended in the bathroom  - Fall risk education provided      Problem: Bowel Elimination  Goal: Patient will participate in bowel management program  Note: Scheduled senna and miralax given at hs.LBM 2/1.Will continue to monitor.         "

## 2025-02-04 NOTE — PROGRESS NOTES
NURSING DAILY NOTE    Name: Edy Bennett   Date of Admission: 1/23/2025   Admitting Diagnosis: PAD (peripheral artery disease) (Formerly [facility])  Attending Physician: ROSANNA PATEL D.O.  Allergies: Patient has no known allergies.    Safety  Patient Assist  max1  Patient Precautions  Fall Risk  Precaution Comments  LLE wound vac  Bed Transfer Status  Contact Guard Assist  Toilet Transfer Status   Contact Guard Assist  Assistive Devices  Rails, Wheelchair  Oxygen  None - Room Air  Diet/Therapeutic Dining  Current Diet Order   Procedures    Diet Order Diet: Renal; Second Modifier: (optional): Cardiac     Pill Administration  whole  Agitated Behavioral Scale     ABS Level of Severity       Fall Risk  Has the patient had a fall this admission?   No  Trinh Spivey Fall Risk Scoring  22, HIGH RISK  Fall Risk Safety Measures  bed alarm and chair alarm    Vitals  Temperature: 36.6 °C (97.9 °F)  Temp src: Oral  Pulse: 78  Respiration: 16  Blood Pressure : 105/60  Blood Pressure MAP (Calculated): 75 MM HG  BP Location: Right, Upper Arm  Patient BP Position: Supine     Oxygen  Pulse Oximetry: 94 %  O2 (LPM): 0 (uses o2 for sleep)  FiO2%: 21 %  O2 Delivery Device: None - Room Air    Bowel and Bladder  Last Bowel Movement  02/01/25 (per patient)  Stool Type  Type 4: Like a sausage or snake, smooth and soft  Bowel Device  Bathroom  Continent  Bladder: Did not void   Bowel: No movement  Bladder Function  Urine Void (mL): 100 ml  Number of Times Voided: 1  Urine Color: Yellow  Genitourinary Assessment   Bladder Assessment (WDL):  WDL Except  Smith Catheter: Not Applicable  Urinary Symptoms: Anuric/ Dialysis Patient  Urine Color: Yellow  Bladder Device: Urinal  Bladder Scan: Post Void  $ Bladder Scan Results (mL): 32    Skin  Jalen Score   16  Sensory Interventions   Bed Types: Low Airloss  Skin Preventative Measures: Pillows in Use for Support / Positioning  Moisture  Interventions  Moisturizers/Barriers: Barrier Wipes      Pain  Pain Rating Scale  0 - No Pain  Pain Location  Leg  Pain Location Orientation  Left  Pain Interventions   Repositioned, Rest    ADLs    Bathing   Full Bed Bath  Linen Change   Complete  Personal Hygiene     Chlorhexidine Bath   Completed  Oral Care     Teeth/Dentures     Shave     Nutrition Percentage Eaten  *  * Meal *  *, Dinner, Between 25-50% Consumed  Environmental Precautions  Treaded Slipper Socks on Patient  Patient Turns/Positioning  Patient turns self independently side to side without assistance, to offload sacral area  Patient Turns Assistance/Tolerance  Assistance of One  Bed Positions  Bed Controls On, Bed Locked  Head of Bed Elevated  Self regulated      Psychosocial/Neurologic Assessment  Psychosocial Assessment  Psychosocial (WDL):  Within Defined Limits  Patient Behaviors: Anxious  Family Behaviors: Family Present  Neurologic Assessment  Neuro (WDL): Within Defined Limits  Level of Consciousness: Alert  Orientation Level: Oriented X4  Cognition: Follows commands, Appropriate attention/concentration  Speech: Clear  Facial Symmetry: Other (Comment) (no facial droop noted)  Pupil Assesment: No  Motor Function/Sensation Assessment: Motor strength  RUE Sensation: Full sensation  Muscle Strength Right Arm: Good Strength Against Gravity and Moderate Resistance  LUE Sensation: Full sensation  Muscle Strength Left Arm: Good Strength Against Gravity and Moderate Resistance  RLE Sensation: Full sensation  Muscle Strength Right Leg: Good Strength Against Gravity and Moderate Resistance  LLE Sensation: Full sensation, Pain  Muscle Strength Left Leg: Fair Strength against Gravity but No Resistance  EENT (WDL):  WDL Except    Cardio/Pulmonary Assessment  Edema   LLE Edema: Generalized  Respiratory Breath Sounds  RUL Breath Sounds: Clear  RML Breath Sounds: Clear  RLL Breath Sounds: Diminished  ANAYELI Breath Sounds: Clear  LLL Breath Sounds:  Diminished  Cardiac Assessment   Cardiac (WDL):  Within Defined Limits

## 2025-02-04 NOTE — PROGRESS NOTES
"  Physical Medicine & Rehabilitation Progress Note    Encounter Date: 2/4/2025    Chief Complaint: PVD s/p bypass    Interval Events (Subjective):  Vital signs stable  BM 2/1   Voids some, oliguric with PD    Seen and examined in his room. Sleeping on entry.       ROS: 14 point ROS negative unless otherwise specified in the HPI    Objective:  VITAL SIGNS: /56   Pulse 79   Temp 36.7 °C (98.1 °F) (Temporal)   Resp 16   Ht 1.707 m (5' 7.2\")   Wt 73.1 kg (161 lb 3.2 oz)   SpO2 98%   BMI 25.10 kg/m²     GEN: No apparent distress  HEENT: Head normocephalic, atraumatic.  Sclera nonicteric bilaterally, no ocular discharge appreciated bilaterally.  CV: Extremities warm and well-perfused, + LLE edema. RLE without edema.  Wound vac off   PULMONARY: Breathing nonlabored on room air, no respiratory accessory muscle use.  Not requiring supplemental oxygen.  SKIN:   2/2 Clinical Picture    PSYCH: Mood and affect within normal limits.  NEURO: Awake alert.  Conversational.  Logical thought content.  flapping hand tremors      Laboratory Values:  Recent Results (from the past 72 hours)   CBC WITHOUT DIFFERENTIAL    Collection Time: 02/04/25  6:37 AM   Result Value Ref Range    WBC 7.6 4.8 - 10.8 K/uL    RBC 2.45 (L) 4.70 - 6.10 M/uL    Hemoglobin 7.8 (L) 14.0 - 18.0 g/dL    Hematocrit 24.4 (L) 42.0 - 52.0 %    MCV 99.6 (H) 81.4 - 97.8 fL    MCH 31.8 27.0 - 33.0 pg    MCHC 32.0 (L) 32.3 - 36.5 g/dL    RDW 56.5 (H) 35.9 - 50.0 fL    Platelet Count 252 164 - 446 K/uL    MPV 9.8 9.0 - 12.9 fL   Basic Metabolic Panel    Collection Time: 02/04/25  6:37 AM   Result Value Ref Range    Sodium 133 (L) 135 - 145 mmol/L    Potassium 3.3 (L) 3.6 - 5.5 mmol/L    Chloride 90 (L) 96 - 112 mmol/L    Co2 22 20 - 33 mmol/L    Glucose 125 (H) 65 - 99 mg/dL    Bun 76 (H) 8 - 22 mg/dL    Creatinine 8.65 (HH) 0.50 - 1.40 mg/dL    Calcium 8.8 8.5 - 10.5 mg/dL    Anion Gap 21.0 (H) 7.0 - 16.0   ESTIMATED GFR    Collection Time: 02/04/25  6:37 AM "   Result Value Ref Range    GFR (CKD-EPI) 6 (A) >60 mL/min/1.73 m 2       Medications:  Scheduled Medications   Medication Dose Frequency    polyethylene glycol/lytes  1 Packet BID    omeprazole  40 mg DAILY    lanthanum carbonate  500 mg TID WITH MEALS    [START ON 2/8/2025] epoetin  4,000 Units Q7 DAYS    gabapentin  100 mg Nightly    torsemide  10 mg Q DAY    ferrous gluconate  324 mg Q48HRS    Pharmacy Consult Request  1 Each PHARMACY TO DOSE    apixaban  5 mg BID    clopidogrel  75 mg DAILY    guaiFENesin ER  600 mg Q12HRS    metoprolol SR  100 mg QHS    montelukast  10 mg QHS    rosuvastatin  20 mg QHS    senna-docusate  2 Tablet BID    gentamicin   DAILY AT 1800     PRN medications: methocarbamol, sevelamer carbonate, lidocaine, hydrALAZINE, lactulose, docusate sodium, bisacodyl EC, magnesium hydroxide, carboxymethylcellulose, benzocaine-menthol, mag hydrox-al hydrox-simeth, ondansetron **OR** ondansetron, traZODone, sodium chloride, acetaminophen, oxyCODONE immediate-release **OR** oxyCODONE immediate-release, senna-docusate **AND** polyethylene glycol/lytes    Diet:  Current Diet Order   Procedures    Diet Order Diet: Renal; Second Modifier: (optional): Cardiac       Medical Decision Making and Plan:  S/p left popliteal to posterior tibial artery bypass Dr. Miller 1/20/2025  PT and OT for mobility and ADLs. Per guidelines, 15 hours per week between PT, OT and/or SLP.  Follow-up vascular surgery  Continue Eliquis and Plavix  Continue statin    1/24 Prevena saturation. Wound consult. Vasc surg to eval as well. Prevena now working.   1/30 vascular to change dressing at some point per patient's wife  2/3 Swelling improving.      Type 2 diabetes mellitus with hyperglycemia -outpatient medication had been Januvia 1 tablet twice daily     Pain -Tylenol and oxycodone as needed.  Scheduled Robaxin, gabapentin.  Schedule oxycodone 5x daily, morphine contraindicated based on renal function. 1/29 Reduce Oxycodone to  QID. 1/30 reduce oxycodone to scheduled 3 times daily.  Decrease methocarbamol to 250 mg 4 times daily. 2/3 Patient refusing scheduled Oxy. Continue only as PRN and d/c scheduled. Make Robaxin PRN.     Last as needed oxycodone use was 1/24  2/4 d/c Gabapentin, not using Oxy reduce frequency to q4 hrs PRN     Anemia - 1/24 Improving. 1/27 Drop to 8's, had a lot of OP from Prevena last week. 1/29 7.7 - expected given prevena OP. Monitor need for transfusion.  1/30 hemoglobin dropped to 7.3.  Discussed with nephrology, they deferred to us to transfuse.  Transfuse less than 7.  Prevena putting out less. 2/4 7.8     Thrombocytopenia - Resolved 1/30     ESRD on PD - Nnephrology following.  On sevelamer - changed due to naussea.  Nephrology increased fill 1/30.    Hypermagnesemia - Nephro managing    Hyperphosphatemia - Nephro managing      History of moderate aortic stenosis -stable     Hypertension -on Norvasc 2.5 mg every morning (OFF), Lasix 40 mg twice daily (Now torsemide), Metoprolol XL 100mg. 1/28 Amlodipine being held several days now. Lasix has been given only 3x past six days. D/w Nephro. Hold bladder meds. Monitor UOP though minimal. 1/29 BP stable low, on torsemide.  1/30 torsemide still not on to be held due to low blood pressure parameters per nephrology hold if less than 120. 2/4 BP stable low, still missing some torsemide doses. D/w nephrology will still plan to d/c with torsemide per their instruction and will continue to manage outpatient.      CAD s/p SYDNEE 3x -on aspirin and statin.  Had been on Imdur 30 mg daily     Gout - No meds at home.      Hyperlipidemia -continue statin     Recent left inguinal hernia repair -12/18/2024 Dr. Miller     Bowel - Patient on Senna-docusate for constipation prophylaxis. 1/23 Constipation. 1/29 BM. 2/1 BM. 2/3 Bowel meds increased.     Nausea & Vomiting - Only after lunch. Get KUB - WNL. Schedule Zofran after lunch.  Scheduled Zofran after each meal. 2/3 Declining  scheduled Zofran. Make PRN only.  reduced to only afternoon.    Hand tremors -LFTs nonconcerning for etiology.  Possibly med induced.     BPH - TV/PVR/BS PRN.  Continue Flomax and Hytrin.  Hold due to low BP. Favor diuresis. 2/3 Low PVRs.      Insomnia -continue trazodone at night as needed        Upcoming Labs/imagin/5 per Nephro     DVT PROPHYLAXIS: On Eliquis     HOSPITALIST FOLLOWING: No    Consultants following: Nephrology. D/w nephrology 2/3/2025      CODE STATUS: Full code     DISPO: Home with spouse     SHAE: 27     MEDS SENT TO: [facility]     DISCHARGE SPECIALIST FOLLOW UP: Vascular surgery and other specialists as previously scheduled     Patient to scheduled follow up with their PCP within 2 weeks from discharge from the [facility].   ____________________________________    Dr. Mere Klein DO, MS  [facility] - Physical Medicine & Rehabilitation   ____________________________________

## 2025-02-04 NOTE — THERAPY
Physical Therapy   Daily Treatment     Patient Name: Edy Bennett  Age:  79 y.o., Sex:  male  Medical Record #: [id number]  Today's Date: 2/4/2025     Precautions  Precautions: Fall Risk  Comments: LLE wound vac    Subjective    Pt in bed, finishing breakfast and willing to participate.     Objective       02/04/25 0831   PT Charge Group   PT Gait Training (Units) 2   PT Therapeutic Exercise (Units) 1   PT Therapeutic Activities (Units) 1   PT Total Time Spent   PT Individual Total Time Spent (Mins) 60   Gait Functional Level of Assist    Gait Level Of Assist Contact Guard Assist   Assistive Device Parallel Bars  (mirror for visual feedback/ posture re-ed)   Distance (Feet) 10   # of Times Distance was Traveled 9   Deviation Antalgic;Step To   Wheelchair Functional Level of Assist   Wheelchair Assist Supervised   Distance Wheelchair (Feet or Distance) 100   Wheelchair Description Extra time;Limited by fatigue   Transfer Functional Level of Assist   Bed, Chair, Wheelchair Transfer Contact Guard Assist   Bed Chair Wheelchair Transfer Description Adaptive equipment;Increased time;Set-up of equipment;Verbal cueing  (FWW)   Sitting Lower Body Exercises   Ankle Pumps 3 sets of 10   Long Arc Quad 3 sets of 10   Comments gentle manual overpressure to L knee ext   Bed Mobility    Supine to Sit Supervised   Sit to Stand Contact Guard Assist     Disposable hot pack to L gastrocs post-tx.  Pt remains in wc with assist from spouse.    Assessment    Pt tolerated therapy well but continues to require frequent rests 2* debility/ low activity tolerance and remains with limited WB to LLE 2* pain and stiffness. Remains motivated.    Strengths: Able to follow instructions, Alert and oriented, Independent prior level of function, Supportive family, Pleasant and cooperative, Motivated for self care and independence  Barriers: Pain, Pain poorly managed, Limited mobility    Plan    LE mobility exercises in supine and sitting to prep  "soft tissues for functional movements, progressive gait in parallel bars and walker, practice car transfers     DME  PT DME Recommendations  Wheelchair: 16\" Width, Elevating Leg Rests  Cushion: Standard    Passport items to be completed:  Get in/out of bed safely, in/out of a vehicle, safely use mobility device, walk or wheel around home/community, navigate up and down stairs, show how to get up/down from the ground, ensure home is accessible, demonstrate HEP, complete caregiver training    Physical Therapy Problems (Active)       Problem: Mobility       Dates: Start:  02/03/25         Goal: STG-Within one week, patient will ambulate household distance >50 ft SBA FWW indoors        Dates: Start:  02/03/25               Problem: Mobility Transfers       Dates: Start:  02/03/25         Goal: STG-Within one week, patient will transfer bed to chair SBA consistently SPT FWW       Dates: Start:  02/03/25               Problem: PT-Long Term Goals       Dates: Start:  01/24/25         Goal: LTG-By discharge, patient will ambulate with  feet SBA       Dates: Start:  01/24/25            Goal: LTG-By discharge, patient will transfer one surface to another with FWW supervised       Dates: Start:  01/24/25            Goal: LTG-By discharge, patient will perform home exercise program       Dates: Start:  01/24/25            Goal: LTG-By discharge, patient will ambulate up/down 4-6 stairs with bilateral rails supervised        Dates: Start:  01/24/25            Goal: LTG-By discharge, patient will transfer in/out of a car with FWW supervised        Dates: Start:  01/24/25              "

## 2025-02-04 NOTE — PROGRESS NOTES
"                 VASCULAR SURGERY               Inpatient Progress Note  _________________________________    Vitals (2/3/2025)  /58   Pulse 80   Temp 36.6 °C (97.9 °F) (Oral)   Resp 16   Ht 1.707 m (5' 7.2\")   Wt 73.1 kg (161 lb 3.2 oz)   SpO2 94%   BMI 25.10 kg/m²   _________________________________    1/20/25 Left pop-PT bypass with irGSV (Angela/ROSELYN)    2/3/2025  Doing well, no specific complaints.  LLE edema much improved compared to 3 days ago  Staples removed today.    New prevena applied today.  Toe ulcerations are stable or slowly improving, no evidence of infection      - no specific surgical concerns   - Prevena can be removed on/around 2/11/24, if he is discharged before then he can come to my office on 2/11 to have the prevena removed (I advised them to call to schedule)  - Sutures from left ankle will stay in until at least 2/10 but probably 2/17 would be better (that will be 4 weeks out from surgery)      Appreciate PM&R support    Vascular surgery following intermittently but available anytime, if questions or concerns arise please notify vascular surgeon on call        Marco Antonio Miller MD  [facility]  [place] preferred or call my office [phone]  __________________________________________________________________  Patient:Edy Bennett   MRN:[id number]   CSN:[id number]    "

## 2025-02-04 NOTE — PROGRESS NOTES
[facility] -  PROGRESS NOTE               Author: IDA Durham Date & Time: 2/4/2025  10:33 AM     HPI:  79 y.o. male with HTN, ESRD on PD, PVD/ gangrenous left foot, BPH admitted for monitoring s/p  left leg bypass surgery on 1/20 with Dr. Miller. He recently underwent hernial repair and was temporarily transferred to in center HD via [facility]. He has returned to  PD  4 days ago, doing low fill.volumes and has been tolerating at home.   Patient was connected to cycler last night. Total UF reported to be 276ml. Reports of low drain UF alarm last night but no major issues with PD overnight. Pt reports no complaints today morning  No F/C/N/V/CP/SOB.  No melena, hematochezia, hematemesis.  No HA, visual changes,or abdominal pain.     DAILY NEPHROLOGY SUMMARY:    1/21: consult done   1/22: No issues with PD overnight, UF 1137cc, VSS, , feels good, wife at bed side   1/23: No issues with PD overnight 653 cc of UF , not had any BM yesterday and today  --------------------[facility]--------------------  1/24: CCPD 1.5L UF. VSS. PO4 elevated. Pt seen during PD, wife present. C/O pain.  1/25: No issues with PD overnight, net UF 1415ml. No sob, had BM yesterday, states he use O2 when he sleeps to help with nose bleed  1/26:No issues with PD overnight , net  cc, no c/o, still not able to bear weight   1/27: 780 mL Net UF removed with CCPD overnight. Coming out of BR with OT and wife. C/o no protein with meals, only had english muffin this am. BM yesterday. Denies abd pain or tenderness. Denies fever or chills.   1/28: 444 mL Net UF removed over night. Sitting in WC in room, wife BS. Reports feeling better every day. Reports sleeping great. Bps 90-100s.   1/29: VSS BP soft, RA.  No new labs.  No CP SOB, or worsenieng edema.  PD overnight 636ml.  UOP 300ml. Feeling good.   1/30: VSS BP 100s/60s.  SSNa 128->131  Phos 7.2, does have some phosphorous  "containing treats in the late evenings.  Dicussed.  No CP SOB or edema besides in wound vac leg.   PD  , wife at bedside and shares that the current fill is a low fill and was only supposed to be temporary after hernia repair.  Usual fill is 2200ml, all green bags.   1/31: VSS BP 100s/60s  PD , felt well with increase fill.   SNa 133 Phos 7.0 No CP SOB, edema localized to woundvac leg and thigh.   2/01:   Disconnected aseptically from PD CyclerEffluent clear and yellow, no signs of bleeding/fibrin clots. 24 hour UF= 678mL. Tolerated well.   2/2: Patient and wife are doing well. Wife states she gave wrong # of cycles to provider yesterday and wanted then reduced back from 7 to 5. Overall doing well without complaints. Looking forward to going home around Feb 7th per patient. Also stated they were told that RT IJ TDC would be removed sometime this week. On-coming provider to ask the Hospitalist to help arrange.   2/3: pt sitting up in bed, spouse at bedside, PD improved with change in rx (reduced to 5 exchanges from 7) great UF: 1.1L, edema has improved, biggest complaint NV, vomits after each meal and fatigue, requesting a reduction in gabapentin, also reports constipation, had to dig out stool yesterday with finger  2/4 net UF 1500 ml overnight. Potassium is 3.3 I am replacing. Patient reports doing better today, wife at bedside. Patient feels much better now since Erica made med changes yesterday    REVIEW OF SYSTEMS:    10 point ROS reviewed and is as per HPI/daily summary or otherwise negative    PMH/PSH/SH/FH: Reviewed and unchanged since admission note  CURRENT MEDICATIONS: Reviewed from admission to present day    VS:  /56   Pulse 79   Temp 36.7 °C (98.1 °F) (Temporal)   Resp 16   Ht 1.707 m (5' 7.2\")   Wt 73.1 kg (161 lb 3.2 oz)   SpO2 98%   BMI 25.10 kg/m²   Physical Exam  Vitals and nursing note reviewed.   Constitutional:       Appearance: Normal appearance.   HENT:      " Head: Normocephalic and atraumatic.      Nose: Nose normal.      Mouth/Throat:      Mouth: Mucous membranes are moist.   Eyes:      General: No scleral icterus.     Conjunctiva/sclera: Conjunctivae normal.   Cardiovascular:      Rate and Rhythm: Normal rate.   Pulmonary:      Effort: Pulmonary effort is normal.   Abdominal:      General: There is no distension.      Palpations: Abdomen is soft.      Comments: PD cath in RLQ    Musculoskeletal:         General: No deformity.      Cervical back: Neck supple.      Right lower leg: No edema.      Left lower leg: Edema present.      Comments: Mild edema in LLE wound vac in place     Skin:     General: Skin is warm and dry.      Findings: No rash.   Neurological:      General: No focal deficit present.      Mental Status: He is alert and oriented to person, place, and time. Mental status is at baseline.   Psychiatric:         Mood and Affect: Mood normal.         Behavior: Behavior normal. Behavior is cooperative.         FLUID BALANCE:  In: 580 [P.O.:580]  Out: 2788     LABS:  Recent Labs     02/04/25  0637   SODIUM 133*   POTASSIUM 3.3*   CHLORIDE 90*   CO2 22   GLUCOSE 125*   BUN 76*   CREATININE 8.65*   CALCIUM 8.8       Recent Labs     02/04/25  0637   WBC 7.6   RBC 2.45*   HEMOGLOBIN 7.8*   HEMATOCRIT 24.4*   MCV 99.6*   MCH 31.8   MCHC 32.0*   RDW 56.5*   PLATELETCT 252   MPV 9.8           IMPRESSION:  # ESRD on CCPD  - Pt was transitioned to  iHD during masoud-op  s/p inguinal hernial repair,   - Still has RIJ PC  - Returned to PD 1/17/25   - Home PD prescription CCPD 2.5% alternating with 1.5% 5  no last fill. Patient wife stated she initially told last Nephrologist wrong script. Now all green bags and 5 cycles, not 7.   # HTN, controlled   - Goal BP < 140/90  - torsemide 10mg daily   # Anemia of CKD  - Goal Hgb 10-11  - Below goal   - On PO iron  - ROSALIND weekly   # CKD-MBD  - Ca 8.6   - PO4 7.4 -> 6.6 -> 7.0  - will DC sevelamer to assess GI tolerance, start  lanthanum 500mg TID, titrate up prn  # Chronic hypoxic respiratory failure   - On home oxygen 2-2.5L at night   # Nausea/Vomiting   - ddx: binder intolerance, switched to lanthanum   - constipation, started miralax BID   - pill burden, increase omeprazole to 40mg daily   - reduced gabapentin to 100mg daily   # Hyponatremia, mild   - torsemide daily   - UF with PD, on all 2.5%           SUGGESTIONS:  - Continue CCPD during hospital stay ( now  on oupt rx and tolerating well)  - I ordered potassium replacement today  - Reduced gapapentin to 100mg daily, ween off as tolerated (2/03)  - PD exit site care per protocol   - Changed ROSALIND to SQ weekly as now on PD consistently   - No dietary protein restrictions  - Dose all meds per ESRD  - PO iron, consider DC if constipation continues    - Renal diet, low phos   - Has RIJ PC since May 2024. Pt does not want the CVC removed at this times and want to wait for several more days prior to DC to make sure PD is working well,  He understands potential risks with keeping the  CVC  long term, he is agreeable to have removed prior to DC end of this week if all goes well with PD  - L arm AVF not fully matured,  will need intervention outpt   - Avoid Fleet enema in dialysis patients   - Labs daily  - Discharge planning underway, plan for 2/7 home with family    Please call or page Nephrology with questions or concerns  Thank you,

## 2025-02-05 ENCOUNTER — APPOINTMENT (OUTPATIENT)
Dept: PHYSICAL THERAPY | Facility: REHABILITATION | Age: 80
DRG: 299 | End: 2025-02-05
Attending: PHYSICAL MEDICINE & REHABILITATION
Payer: MEDICARE

## 2025-02-05 ENCOUNTER — APPOINTMENT (OUTPATIENT)
Dept: OCCUPATIONAL THERAPY | Facility: REHABILITATION | Age: 80
DRG: 299 | End: 2025-02-05
Attending: PHYSICAL MEDICINE & REHABILITATION
Payer: MEDICARE

## 2025-02-05 LAB
ANION GAP SERPL CALC-SCNC: 19 MMOL/L (ref 7–16)
BUN SERPL-MCNC: 81 MG/DL (ref 8–22)
CALCIUM SERPL-MCNC: 8.7 MG/DL (ref 8.5–10.5)
CHLORIDE SERPL-SCNC: 89 MMOL/L (ref 96–112)
CO2 SERPL-SCNC: 26 MMOL/L (ref 20–33)
CREAT SERPL-MCNC: 8.75 MG/DL (ref 0.5–1.4)
ERYTHROCYTE [DISTWIDTH] IN BLOOD BY AUTOMATED COUNT: 58.9 FL (ref 35.9–50)
GFR SERPLBLD CREATININE-BSD FMLA CKD-EPI: 6 ML/MIN/1.73 M 2
GLUCOSE SERPL-MCNC: 197 MG/DL (ref 65–99)
HCT VFR BLD AUTO: 24.3 % (ref 42–52)
HGB BLD-MCNC: 7.6 G/DL (ref 14–18)
MCH RBC QN AUTO: 31.7 PG (ref 27–33)
MCHC RBC AUTO-ENTMCNC: 31.3 G/DL (ref 32.3–36.5)
MCV RBC AUTO: 101.3 FL (ref 81.4–97.8)
PHOSPHATE SERPL-MCNC: 4.8 MG/DL (ref 2.5–4.5)
PLATELET # BLD AUTO: 292 K/UL (ref 164–446)
PMV BLD AUTO: 10.2 FL (ref 9–12.9)
POTASSIUM SERPL-SCNC: 3.4 MMOL/L (ref 3.6–5.5)
RBC # BLD AUTO: 2.4 M/UL (ref 4.7–6.1)
SODIUM SERPL-SCNC: 134 MMOL/L (ref 135–145)
WBC # BLD AUTO: 7.7 K/UL (ref 4.8–10.8)

## 2025-02-05 PROCEDURE — 90945 DIALYSIS ONE EVALUATION: CPT

## 2025-02-05 PROCEDURE — A9270 NON-COVERED ITEM OR SERVICE: HCPCS | Performed by: NURSE PRACTITIONER

## 2025-02-05 PROCEDURE — A9270 NON-COVERED ITEM OR SERVICE: HCPCS | Performed by: INTERNAL MEDICINE

## 2025-02-05 PROCEDURE — 97110 THERAPEUTIC EXERCISES: CPT

## 2025-02-05 PROCEDURE — 770010 HCHG ROOM/CARE - REHAB SEMI PRIVAT*

## 2025-02-05 PROCEDURE — 80048 BASIC METABOLIC PNL TOTAL CA: CPT

## 2025-02-05 PROCEDURE — A9270 NON-COVERED ITEM OR SERVICE: HCPCS | Performed by: PHYSICAL MEDICINE & REHABILITATION

## 2025-02-05 PROCEDURE — 36415 COLL VENOUS BLD VENIPUNCTURE: CPT

## 2025-02-05 PROCEDURE — A9270 NON-COVERED ITEM OR SERVICE: HCPCS | Mod: JZ | Performed by: NURSE PRACTITIONER

## 2025-02-05 PROCEDURE — 99232 SBSQ HOSP IP/OBS MODERATE 35: CPT | Performed by: PHYSICAL MEDICINE & REHABILITATION

## 2025-02-05 PROCEDURE — 700102 HCHG RX REV CODE 250 W/ 637 OVERRIDE(OP): Mod: JZ | Performed by: NURSE PRACTITIONER

## 2025-02-05 PROCEDURE — 84100 ASSAY OF PHOSPHORUS: CPT

## 2025-02-05 PROCEDURE — 700102 HCHG RX REV CODE 250 W/ 637 OVERRIDE(OP): Performed by: PHYSICAL MEDICINE & REHABILITATION

## 2025-02-05 PROCEDURE — 97530 THERAPEUTIC ACTIVITIES: CPT

## 2025-02-05 PROCEDURE — 700102 HCHG RX REV CODE 250 W/ 637 OVERRIDE(OP): Performed by: INTERNAL MEDICINE

## 2025-02-05 PROCEDURE — 700102 HCHG RX REV CODE 250 W/ 637 OVERRIDE(OP): Performed by: NURSE PRACTITIONER

## 2025-02-05 PROCEDURE — 85027 COMPLETE CBC AUTOMATED: CPT

## 2025-02-05 PROCEDURE — 97116 GAIT TRAINING THERAPY: CPT

## 2025-02-05 RX ORDER — METOPROLOL SUCCINATE 25 MG/1
50 TABLET, EXTENDED RELEASE ORAL
Status: DISCONTINUED | OUTPATIENT
Start: 2025-02-05 | End: 2025-02-07

## 2025-02-05 RX ORDER — TORSEMIDE 5 MG/1
10 TABLET ORAL
Status: DISCONTINUED | OUTPATIENT
Start: 2025-02-06 | End: 2025-02-14 | Stop reason: HOSPADM

## 2025-02-05 RX ORDER — POTASSIUM CHLORIDE 1500 MG/1
20 TABLET, EXTENDED RELEASE ORAL DAILY
Status: DISCONTINUED | OUTPATIENT
Start: 2025-02-05 | End: 2025-02-14 | Stop reason: HOSPADM

## 2025-02-05 RX ORDER — MIDODRINE HYDROCHLORIDE 2.5 MG/1
5 TABLET ORAL
Status: DISCONTINUED | OUTPATIENT
Start: 2025-02-05 | End: 2025-02-07

## 2025-02-05 RX ADMIN — APIXABAN 5 MG: 5 TABLET, FILM COATED ORAL at 07:40

## 2025-02-05 RX ADMIN — SENNOSIDES AND DOCUSATE SODIUM 2 TABLET: 50; 8.6 TABLET ORAL at 07:39

## 2025-02-05 RX ADMIN — OMEPRAZOLE 40 MG: 20 CAPSULE, DELAYED RELEASE ORAL at 07:40

## 2025-02-05 RX ADMIN — ROSUVASTATIN CALCIUM 20 MG: 10 TABLET, FILM COATED ORAL at 20:47

## 2025-02-05 RX ADMIN — GUAIFENESIN 600 MG: 600 TABLET ORAL at 07:39

## 2025-02-05 RX ADMIN — BISACODYL 10 MG: 10 SUPPOSITORY RECTAL at 10:21

## 2025-02-05 RX ADMIN — SENNOSIDES AND DOCUSATE SODIUM 2 TABLET: 50; 8.6 TABLET ORAL at 20:50

## 2025-02-05 RX ADMIN — LANTHANUM CARBONATE 500 MG: 500 TABLET, CHEWABLE ORAL at 11:50

## 2025-02-05 RX ADMIN — GUAIFENESIN 600 MG: 600 TABLET ORAL at 20:47

## 2025-02-05 RX ADMIN — CLOPIDOGREL BISULFATE 75 MG: 75 TABLET ORAL at 07:39

## 2025-02-05 RX ADMIN — Medication 324 MG: at 14:34

## 2025-02-05 RX ADMIN — MIDODRINE HYDROCHLORIDE 5 MG: 2.5 TABLET ORAL at 11:50

## 2025-02-05 RX ADMIN — MONTELUKAST 10 MG: 10 TABLET, FILM COATED ORAL at 20:50

## 2025-02-05 RX ADMIN — LANTHANUM CARBONATE 500 MG: 500 TABLET, CHEWABLE ORAL at 07:39

## 2025-02-05 RX ADMIN — POLYETHYLENE GLYCOL 3350 1 PACKET: 17 POWDER, FOR SOLUTION ORAL at 07:39

## 2025-02-05 RX ADMIN — APIXABAN 5 MG: 5 TABLET, FILM COATED ORAL at 20:50

## 2025-02-05 RX ADMIN — MIDODRINE HYDROCHLORIDE 5 MG: 2.5 TABLET ORAL at 17:33

## 2025-02-05 RX ADMIN — POTASSIUM CHLORIDE 20 MEQ: 1500 TABLET, EXTENDED RELEASE ORAL at 12:14

## 2025-02-05 RX ADMIN — LANTHANUM CARBONATE 500 MG: 500 TABLET, CHEWABLE ORAL at 17:33

## 2025-02-05 RX ADMIN — GENTAMICIN SULFATE: 1 CREAM TOPICAL at 19:00

## 2025-02-05 ASSESSMENT — GAIT ASSESSMENTS
DISTANCE (FEET): 75
GAIT LEVEL OF ASSIST: CONTACT GUARD ASSIST
DEVIATION: ANTALGIC;STEP TO;DECREASED BASE OF SUPPORT;BRADYKINETIC;DECREASED HEEL STRIKE;DECREASED TOE OFF
ASSISTIVE DEVICE: FRONT WHEEL WALKER

## 2025-02-05 ASSESSMENT — ACTIVITIES OF DAILY LIVING (ADL): BED_CHAIR_WHEELCHAIR_TRANSFER_DESCRIPTION: ADAPTIVE EQUIPMENT;INCREASED TIME;SET-UP OF EQUIPMENT;VERBAL CUEING

## 2025-02-05 ASSESSMENT — PAIN DESCRIPTION - PAIN TYPE: TYPE: ACUTE PAIN

## 2025-02-05 NOTE — PROGRESS NOTES
NURSING DAILY NOTE    Name: Edy Bennett   Date of Admission: 1/23/2025   Admitting Diagnosis: PAD (peripheral artery disease) ([facility])  Attending Physician: ROSANNA PATEL D.O.  Allergies: Patient has no known allergies.    Safety  Patient Assist  max1  Patient Precautions  Fall Risk  Precaution Comments  LLE wound vac  Bed Transfer Status  Minimal Assist  Toilet Transfer Status   Supervised  Assistive Devices  Rails, Wheelchair  Oxygen  None - Room Air  Diet/Therapeutic Dining  Current Diet Order   Procedures    Diet Order Diet: Renal; Second Modifier: (optional): Cardiac     Pill Administration  whole  Agitated Behavioral Scale     ABS Level of Severity       Fall Risk  Has the patient had a fall this admission?   No  Trinh Spivey Fall Risk Scoring  22, HIGH RISK  Fall Risk Safety Measures  bed alarm and chair alarm    Vitals  Temperature: 37.1 °C (98.7 °F)  Temp src: Oral  Pulse: 88  Respiration: 18  Blood Pressure : 109/64  Blood Pressure MAP (Calculated): 79 MM HG  BP Location: Right, Upper Arm  Patient BP Position: Supine     Oxygen  Pulse Oximetry: 96 %  O2 (LPM): 0  FiO2%: 21 %  O2 Delivery Device: None - Room Air    Bowel and Bladder  Last Bowel Movement  02/01/25 (per patient)  Stool Type  Type 4: Like a sausage or snake, smooth and soft  Bowel Device  Bathroom  Continent  Bladder: Did not void   Bowel: No movement  Bladder Function  Urine Void (mL): 200 ml  Number of Times Voided: 1  Urine Color: Melissa  Genitourinary Assessment   Bladder Assessment (WDL):  Within Defined Limits  Smith Catheter: Not Applicable  Urinary Symptoms: Anuric/ Dialysis Patient  Urine Color: Melissa  Bladder Device: Urinal  Bladder Scan: Post Void  $ Bladder Scan Results (mL): 32    Skin  Jalen Score   16  Sensory Interventions   Bed Types: Low Airloss  Skin Preventative Measures: Pillows in Use for Support / Positioning  Moisture  Interventions  Moisturizers/Barriers: Barrier Wipes      Pain  Pain Rating Scale  0 - No Pain  Pain Location  Leg  Pain Location Orientation  Left  Pain Interventions   Repositioned, Rest    ADLs    Bathing   Patient Refused Bathing  Linen Change    (pt refused no feeling well pt was vomiting Rn was informed)  Personal Hygiene     Chlorhexidine Bath   Completed  Oral Care     Teeth/Dentures     Shave     Nutrition Percentage Eaten  *  * Meal *  *, Lunch, Between 50-75% Consumed  Environmental Precautions  Treaded Slipper Socks on Patient  Patient Turns/Positioning  Patient turns self independently side to side without assistance, to offload sacral area  Patient Turns Assistance/Tolerance  Assistance of One  Bed Positions  Bed Controls On, Bed Locked  Head of Bed Elevated  Self regulated      Psychosocial/Neurologic Assessment  Psychosocial Assessment  Psychosocial (WDL):  Within Defined Limits  Patient Behaviors: Anxious  Family Behaviors: Family Present  Neurologic Assessment  Neuro (WDL): Within Defined Limits  Level of Consciousness: Alert  Orientation Level: Oriented X4  Cognition: Follows commands, Appropriate attention/concentration  Speech: Clear  Facial Symmetry: Other (Comment) (no facial droop noted)  Pupil Assesment: No  Motor Function/Sensation Assessment: Motor strength  RUE Sensation: Full sensation  Muscle Strength Right Arm: Good Strength Against Gravity and Moderate Resistance  LUE Sensation: Full sensation  Muscle Strength Left Arm: Good Strength Against Gravity and Moderate Resistance  RLE Sensation: Full sensation  Muscle Strength Right Leg: Good Strength Against Gravity and Moderate Resistance  LLE Sensation: Full sensation, Pain  Muscle Strength Left Leg: Fair Strength against Gravity but No Resistance  EENT (WDL):  WDL Except    Cardio/Pulmonary Assessment  Edema   LLE Edema: Generalized  Respiratory Breath Sounds  RUL Breath Sounds: Clear  RML Breath Sounds: Clear  RLL Breath Sounds:  Diminished  ANAYELI Breath Sounds: Clear  LLL Breath Sounds: Diminished  Cardiac Assessment   Cardiac (WDL):  Within Defined Limits

## 2025-02-05 NOTE — PROGRESS NOTES
[facility]    Initial Drain: 33 mL    Patient is alert and oriented x 4, no reported  pain and discomfort. BP and HR within normal limits, no SOB and afebrile.  PD catheter on Right lower abdominal quadrant, dressing is clean dry and intact. PD catheter dressing changed aseptically per protocol and no sign and symptoms of infection. Gentamicin cream administered as ordered.      Patient aseptically connected to PD cycler at 2100. PD treatment troubleshooting and emergency disconnect in-services provided to PCN. All questions answered.    Report given to PCN AGNIESZKA lin RN     [facility] Answering service number: [phone]    See dialysis treatment flow sheet for details.

## 2025-02-05 NOTE — THERAPY
"Occupational Therapy  Daily Treatment     Patient Name: Edy Bennett  Age:  79 y.o., Sex:  male  Medical Record #: [id number]  Today's Date: 2/4/2025     Precautions  Precautions: Fall Risk  Comments: LLE wound vac    Safety   ADL Safety : Requires Physical Assist for Safety  Bathroom Safety: Requires Physical Assist for Safety    Subjective    \"Can you come back later?\"     Objective       02/04/25 1045   Therapy Missed   Missed Therapy (Minutes) 15   Reason For Missed Therapy Non-Medical - Other (Please Comment)  (patient with visitor in the room requesting OT to start later; attempted to make up session with pt later in the day but unable due to pt on toilet for prolonged period. Communicated this with .)   OT Charge Group   OT Self Care / ADL (Units) 2   OT Therapy Activity (Units) 1   OT Total Time Spent   OT Individual Total Time Spent (Mins) 45   Functional Level of Assist   Grooming Modified Independent   Grooming Description Seated in wheelchair at sink   Upper Body Dressing Independent   Lower Body Dressing Minimal Assist  (assist with threading wound vac through shorts. Pt able to don shorts over L foot in figure 4 position. CGA for standing balance with FWW to pull pants up over hips)   Toileting Supervision   Toileting Description Grab bar;Supervision for safety   Toilet Transfers Supervised   Toilet Transfer Description Grab bar;Supervision for safety   Sitting Lower Body Exercises   Sit to Stand 1 set of 10  (verbal cues for eccentric control down and hand placement)         Assessment    Pt progressed with LB dressing (min A for managing wound vac). Anticipate that pt's wife will be able to assist him with this task at home. He is a CGA for LB dressing w/o wound vac management. Pt seated at sink for ADL to alleviate pain in LLE; likely could do in standing. He will go home with a WC and can use it for this task.        Plan    ADL, IADL, balance, strengthening, endurance, transfer " safety    DME  OT DME Recommendations  Bathroom Equipment:  (TBD)    Passport items to be completed:  Perform bathroom transfers, complete dressing, complete feeding, get ready for the day, prepare a simple meal, participate in household tasks, adapt home for safety needs, demonstrate home exercise program, complete caregiver training     Occupational Therapy Goals (Active)       Problem: Dressing       Dates: Start:  01/24/25         Goal: STG-Within one week, patient will dress LB with min A       Dates: Start:  01/24/25               Problem: OT Long Term Goals       Dates: Start:  01/24/25         Goal: LTG-By discharge, patient will complete basic self care tasks mod I       Dates: Start:  01/24/25            Goal: LTG-By discharge, patient will perform bathroom transfers mod I       Dates: Start:  01/24/25

## 2025-02-05 NOTE — CARE PLAN
Problem: Bowel Elimination  Goal: Patient will participate in bowel management program  Note: Scheduled senna and miralax given at hs.Offered prn bowel meds, pt refused.Education given on the importance of bowel meds.LBM 2/1.Will continue to monitor.

## 2025-02-05 NOTE — CARE PLAN
Problem: Fall Risk - Rehab  Goal: Patient will remain free from falls  Outcome: Progressing     Problem: Self Care  Goal: Patient will have the ability to perform ADLs independently or with assistance  Outcome: Progressing     Problem: Mobility  Goal: Patient's capacity to carry out activities will improve  Outcome: Progressing       The patient is Stable - Low risk of patient condition declining or worsening    Shift Goals  Clinical Goals: Comfort and safety  Patient Goals: Comfort  Family Goals: update w/ POC

## 2025-02-05 NOTE — THERAPY
Physical Therapy   Daily Treatment     Patient Name: Edy Bennett  Age:  79 y.o., Sex:  male  Medical Record #: [id number]  Today's Date: 2/4/2025     Precautions  Precautions: (P) Fall Risk  Comments: (P) LLE wound vac    Subjective    Pt was agreeable to POC, no complaints.      Objective       02/04/25 1531   PT Charge Group   PT Therapeutic Activities (Units) 2   PT Total Time Spent   PT Individual Total Time Spent (Mins) 30   Precautions   Precautions Fall Risk   Comments LLE wound vac   Gait Functional Level of Assist    Gait Level Of Assist Contact Guard Assist   Assistive Device Front Wheel Walker   Distance (Feet) 100   # of Times Distance was Traveled 1   Deviation Antalgic;Step To;Decreased Base Of Support;Decreased Heel Strike;Decreased Toe Off;Other (Comment)  (dec WB LLE, but improving)   Transfer Functional Level of Assist   Bed, Chair, Wheelchair Transfer Minimal Assist   Bed Chair Wheelchair Transfer Description Adaptive equipment  (SPT FWW)   Sitting Lower Body Exercises   Sit to Stand 2 sets of 10  (Repetition for hand placement recall/ sequencing consistency,  eccentric control)   Bed Mobility    Supine to Sit Supervised  (wound vac set up)   Sit to Stand Contact Guard Assist  (cues for hand placement)   Scooting Supervised   Neuro-Muscular Treatments   Neuro-Muscular Treatments Weight Shift Left;Weight Shift Right;Verbal Cuing;Tactile Cuing;Sequencing;Postural Changes   Interdisciplinary Plan of Care Collaboration   IDT Collaboration with  Family / Caregiver;Physical Therapist;Occupational Therapist   Patient Position at End of Therapy Seated;Family / Friend in Room;Chair Alarm On;Self Releasing Lap Belt Applied   Collaboration Comments POC, CLOF         Assessment    Pt did not experience nausea this session, and progressed in gait efficiency to 100ft. Pt demo'ed improved motor planning and LLE weight acceptance. Pt requires cueing for consistency with transfer sequencing for hand placement  "and eccentric control.     Strengths: Able to follow instructions, Alert and oriented, Independent prior level of function, Supportive family, Pleasant and cooperative, Motivated for self care and independence  Barriers: Pain, Pain poorly managed, Limited mobility    Plan    LE mobility exercises in supine and sitting to prep soft tissues for functional movements, progressive gait in parallel bars and walker, practice car transfers     DME  PT DME Recommendations  Wheelchair: 16\" Width, Elevating Leg Rests  Cushion: Standard    Passport items to be completed:  Get in/out of bed safely, in/out of a vehicle, safely use mobility device, walk or wheel around home/community, navigate up and down stairs, show how to get up/down from the ground, ensure home is accessible, demonstrate HEP, complete caregiver training    Physical Therapy Problems (Active)       Problem: Mobility       Dates: Start:  02/03/25         Goal: STG-Within one week, patient will ambulate household distance >50 ft SBA FWW indoors        Dates: Start:  02/03/25               Problem: Mobility Transfers       Dates: Start:  02/03/25         Goal: STG-Within one week, patient will transfer bed to chair SBA consistently SPT FWW       Dates: Start:  02/03/25               Problem: PT-Long Term Goals       Dates: Start:  01/24/25         Goal: LTG-By discharge, patient will ambulate with  feet SBA       Dates: Start:  01/24/25            Goal: LTG-By discharge, patient will transfer one surface to another with FWW supervised       Dates: Start:  01/24/25            Goal: LTG-By discharge, patient will perform home exercise program       Dates: Start:  01/24/25            Goal: LTG-By discharge, patient will ambulate up/down 4-6 stairs with bilateral rails supervised        Dates: Start:  01/24/25            Goal: LTG-By discharge, patient will transfer in/out of a car with FWW supervised        Dates: Start:  01/24/25              "

## 2025-02-05 NOTE — PROGRESS NOTES
[facility] Progress Notes    CCPD ordered by Dr. Goodson. Disconnected aseptically from PD Cycler at 0645.    Pt stable, VSS, alert and oriented.  Effluent clear and yellow, no signs of bleeding/fibrin clots.  No alarms on machine was noted or reported before disconnection.    24 hour UF= 1437mL (I.Drain= 33mL + Total UF= 1404mL - Last fill= 0)    Report given to Brandon ARANDA

## 2025-02-05 NOTE — PROGRESS NOTES
[facility] -  PROGRESS NOTE               Author: IDA Marte Date & Time: 2/5/2025  11:47 AM     HPI:  79 y.o. male with HTN, ESRD on PD, PVD/ gangrenous left foot, BPH admitted for monitoring s/p  left leg bypass surgery on 1/20 with Dr. Miller. He recently underwent hernial repair and was temporarily transferred to in center HD via [facility]. He has returned to  PD  4 days ago, doing low fill.volumes and has been tolerating at home.   Patient was connected to cycler last night. Total UF reported to be 276ml. Reports of low drain UF alarm last night but no major issues with PD overnight. Pt reports no complaints today morning  No F/C/N/V/CP/SOB.  No melena, hematochezia, hematemesis.  No HA, visual changes,or abdominal pain.     DAILY NEPHROLOGY SUMMARY:    1/21: consult done   1/22: No issues with PD overnight, UF 1137cc, VSS, , feels good, wife at bed side   1/23: No issues with PD overnight 653 cc of UF , not had any BM yesterday and today  --------------------[facility]--------------------  1/24: CCPD 1.5L UF. VSS. PO4 elevated. Pt seen during PD, wife present. C/O pain.  1/25: No issues with PD overnight, net UF 1415ml. No sob, had BM yesterday, states he use O2 when he sleeps to help with nose bleed  1/26:No issues with PD overnight , net  cc, no c/o, still not able to bear weight   1/27: 780 mL Net UF removed with CCPD overnight. Coming out of BR with OT and wife. C/o no protein with meals, only had english muffin this am. BM yesterday. Denies abd pain or tenderness. Denies fever or chills.   1/28: 444 mL Net UF removed over night. Sitting in WC in room, wife BS. Reports feeling better every day. Reports sleeping great. Bps 90-100s.   1/29: VSS BP soft, RA.  No new labs.  No CP SOB, or worsenieng edema.  PD overnight 636ml.  UOP 300ml. Feeling good.   1/30: VSS BP 100s/60s.  SSNa 128->131  Phos 7.2, does have some phosphorous  "containing treats in the late evenings.  Dicussed.  No CP SOB or edema besides in wound vac leg.   PD  , wife at bedside and shares that the current fill is a low fill and was only supposed to be temporary after hernia repair.  Usual fill is 2200ml, all green bags.   1/31: VSS BP 100s/60s  PD , felt well with increase fill.   SNa 133 Phos 7.0 No CP SOB, edema localized to woundvac leg and thigh.   2/01:   Disconnected aseptically from PD CyclerEffluent clear and yellow, no signs of bleeding/fibrin clots. 24 hour UF= 678mL. Tolerated well.   2/2: Patient and wife are doing well. Wife states she gave wrong # of cycles to provider yesterday and wanted then reduced back from 7 to 5. Overall doing well without complaints. Looking forward to going home around Feb 7th per patient. Also stated they were told that RT IJ TDC would be removed sometime this week. On-coming provider to ask the Hospitalist to help arrange.   2/3: pt sitting up in bed, spouse at bedside, PD improved with change in rx (reduced to 5 exchanges from 7) great UF: 1.1L, edema has improved, biggest complaint NV, vomits after each meal and fatigue, requesting a reduction in gabapentin, also reports constipation, had to dig out stool yesterday with finger  2/4 net UF 1500 ml overnight. Potassium is 3.3 I am replacing. Patient reports doing better today, wife at bedside. Patient feels much better now since Erica made med changes yesterday  2/5: 1437 mL Net UF removed overnight. Wife BS. Patient oob in BR performing adls independently. Reports improvement every day.     REVIEW OF SYSTEMS:    10 point ROS reviewed and is as per HPI/daily summary or otherwise negative    PMH/PSH/SH/FH: Reviewed and unchanged since admission note  CURRENT MEDICATIONS: Reviewed from admission to present day    VS:  /58   Pulse 68   Temp 36.2 °C (97.2 °F) (Oral)   Resp 18   Ht 1.707 m (5' 7.2\")   Wt 73.1 kg (161 lb 3.2 oz)   SpO2 95%   BMI 25.10 " kg/m²   Physical Exam  Vitals and nursing note reviewed.   Constitutional:       Appearance: Normal appearance.   HENT:      Head: Normocephalic and atraumatic.      Nose: Nose normal.      Mouth/Throat:      Mouth: Mucous membranes are moist.   Eyes:      General: No scleral icterus.     Conjunctiva/sclera: Conjunctivae normal.   Cardiovascular:      Rate and Rhythm: Normal rate.      Comments: R TDC   Pulmonary:      Effort: Pulmonary effort is normal.   Abdominal:      General: There is no distension.      Palpations: Abdomen is soft.      Comments: PD cath in RLQ    Musculoskeletal:         General: No deformity.      Cervical back: Neck supple.      Right lower leg: No edema.      Left lower leg: Edema present.      Comments: Mild edema in LLE wound vac in place     Skin:     General: Skin is warm and dry.      Findings: No rash.   Neurological:      General: No focal deficit present.      Mental Status: He is alert and oriented to person, place, and time. Mental status is at baseline.   Psychiatric:         Mood and Affect: Mood normal.         Behavior: Behavior normal. Behavior is cooperative.         FLUID BALANCE:  In: 780 [P.O.:780]  Out: 350     LABS:  Recent Labs     02/04/25  0637 02/05/25  0510   SODIUM 133* 134*   POTASSIUM 3.3* 3.4*   CHLORIDE 90* 89*   CO2 22 26   GLUCOSE 125* 197*   BUN 76* 81*   CREATININE 8.65* 8.75*   CALCIUM 8.8 8.7       Recent Labs     02/04/25  0637 02/05/25  0510   WBC 7.6 7.7   RBC 2.45* 2.40*   HEMOGLOBIN 7.8* 7.6*   HEMATOCRIT 24.4* 24.3*   MCV 99.6* 101.3*   MCH 31.8 31.7   MCHC 32.0* 31.3*   RDW 56.5* 58.9*   PLATELETCT 252 292   MPV 9.8 10.2           IMPRESSION:  # ESRD on CCPD  - Pt was transitioned to  iHD during masoud-op  s/p inguinal hernial repair,   - Still has RIJ PC  - Returned to PD 1/17/25   - Home PD prescription CCPD 2.5% alternating with 1.5% 5  no last fill. Patient wife stated she initially told last Nephrologist wrong script. Now all green bags and 5  cycles, not 7.   # HTN, controlled   - Goal BP < 140/90  - torsemide 10mg daily   # Anemia of CKD  - Goal Hgb 10-11  - Below goal   - On PO iron  - ROSALIND weekly   # CKD-MBD  - Ca 8.6   - PO4 7.4 -> 6.6 -> 7.0  - will DC sevelamer to assess GI tolerance, start lanthanum 500mg TID, titrate up prn  # Chronic hypoxic respiratory failure   - On home oxygen 2-2.5L at night   # Nausea/Vomiting   - ddx: binder intolerance, switched to lanthanum   - constipation, started miralax BID   - pill burden, increase omeprazole to 40mg daily   - reduced gabapentin to 100mg daily   # Hyponatremia, mild   - torsemide daily   - UF with PD, on all 2.5%           SUGGESTIONS:  - Continue CCPD during hospital stay (now on oupt rx and tolerating well)  - KCL 20 mEQ Qday   - Reduced gapapentin to 100mg daily, ween off as tolerated (2/03)  - PD exit site care per protocol   - Changed ROSALIND to SQ weekly as now on PD consistently   - No dietary protein restrictions  - Dose all meds per ESRD  - PO iron, consider DC if constipation continues    - Renal diet, low phos   - Has RIJ PC since May 2024.  Agreeable to have removed prior to R TDC removed will schedule for FRI. Discussed with FINA Alonzo to arrange   - L arm AVF not fully matured,  will need intervention outpt   - Avoid Fleet enema in dialysis patients   - Labs daily  - Discharge planning underway, plan for 2/7 home with family now 2/11    Please call or page Nephrology with questions or concerns  Thank you,

## 2025-02-05 NOTE — PROGRESS NOTES
NURSING DAILY NOTE    Name: Edy Bennett   Date of Admission: 1/23/2025   Admitting Diagnosis: PAD (peripheral artery disease) ([facility])  Attending Physician: ROSANNA PATEL D.O.  Allergies: Patient has no known allergies.    Safety  Patient Assist  max1  Patient Precautions  Fall Risk  Precaution Comments  LLE wound vac  Bed Transfer Status  Minimal Assist  Toilet Transfer Status   Supervised  Assistive Devices  Rails, Wheelchair  Oxygen  None - Room Air  Diet/Therapeutic Dining  Current Diet Order   Procedures    Diet Order Diet: Renal; Second Modifier: (optional): Cardiac     Pill Administration  whole  Agitated Behavioral Scale     ABS Level of Severity       Fall Risk  Has the patient had a fall this admission?   No  Trinh Spivey Fall Risk Scoring  22, HIGH RISK  Fall Risk Safety Measures  bed alarm and chair alarm    Vitals  Temperature: 37.1 °C (98.7 °F)  Temp src: Oral  Pulse: 68  Respiration: 18  Blood Pressure : 115/65  Blood Pressure MAP (Calculated): 82 MM HG  BP Location: Right, Upper Arm  Patient BP Position: Supine     Oxygen  Pulse Oximetry: 96 %  O2 (LPM): 0  FiO2%: 21 %  O2 Delivery Device: None - Room Air    Bowel and Bladder  Last Bowel Movement  02/01/25 (per patient)  Stool Type  Type 4: Like a sausage or snake, smooth and soft  Bowel Device  Bathroom  Continent  Bladder: Did not void   Bowel: No movement  Bladder Function  Urine Void (mL): 150 ml  Number of Times Voided: 1  Urine Color: Melissa  Genitourinary Assessment   Bladder Assessment (WDL):  Within Defined Limits  Smith Catheter: Not Applicable  Urinary Symptoms: Anuric/ Dialysis Patient  Urine Color: Melissa  Bladder Device: Urinal  Bladder Scan: Post Void  $ Bladder Scan Results (mL): 32    Skin  Jalen Score   16  Sensory Interventions   Bed Types: Low Airloss  Skin Preventative Measures: Pillows in Use for Support / Positioning  Moisture  Interventions  Moisturizers/Barriers: Barrier Wipes      Pain  Pain Rating Scale  0 - No Pain  Pain Location  Leg  Pain Location Orientation  Left  Pain Interventions   Repositioned, Rest    ADLs    Bathing   Patient Refused Bathing  Linen Change    (pt refused no feeling well pt was vomiting Rn was informed)  Personal Hygiene     Chlorhexidine Bath   Completed  Oral Care     Teeth/Dentures     Shave     Nutrition Percentage Eaten  *  * Meal *  *, Lunch, Between 50-75% Consumed  Environmental Precautions  Treaded Slipper Socks on Patient  Patient Turns/Positioning  Patient turns self independently side to side without assistance, to offload sacral area  Patient Turns Assistance/Tolerance  Assistance of One  Bed Positions  Bed Controls On, Bed Locked  Head of Bed Elevated  Self regulated      Psychosocial/Neurologic Assessment  Psychosocial Assessment  Psychosocial (WDL):  Within Defined Limits  Patient Behaviors: Anxious  Family Behaviors: Family Present  Neurologic Assessment  Neuro (WDL): Within Defined Limits  Level of Consciousness: Alert  Orientation Level: Oriented X4  Cognition: Follows commands, Appropriate attention/concentration  Speech: Clear  Facial Symmetry: Other (Comment) (no facial droop noted)  Pupil Assesment: No  Motor Function/Sensation Assessment: Motor strength  RUE Sensation: Full sensation  Muscle Strength Right Arm: Good Strength Against Gravity and Moderate Resistance  LUE Sensation: Full sensation  Muscle Strength Left Arm: Good Strength Against Gravity and Moderate Resistance  RLE Sensation: Full sensation  Muscle Strength Right Leg: Good Strength Against Gravity and Moderate Resistance  LLE Sensation: Full sensation, Pain  Muscle Strength Left Leg: Fair Strength against Gravity but No Resistance  EENT (WDL):  WDL Except    Cardio/Pulmonary Assessment  Edema   LLE Edema: Generalized  Respiratory Breath Sounds  RUL Breath Sounds: Clear  RML Breath Sounds: Clear  RLL Breath Sounds:  Diminished  ANAYELI Breath Sounds: Clear  LLL Breath Sounds: Diminished  Cardiac Assessment   Cardiac (WDL):  Within Defined Limits

## 2025-02-05 NOTE — PROGRESS NOTES
"  Physical Medicine & Rehabilitation Progress Note    Encounter Date: 2/5/2025    Chief Complaint: PVD s/p bypass    Interval Events (Subjective):  BP stable, hasn't been getting Torsemide nor metoprolol   BM 2/1 feels like he needs to go now.   Voids some, oliguric    Seen and examined in his room with wife. He is doing well. Still feels confused sometimes and tremors still happening. Thinking may be catching up and being under-dialyzed. Overall vastly improving to family. Feels like he needs to have a bowel movement now.       ROS: 14 point ROS negative unless otherwise specified in the HPI    Objective:  VITAL SIGNS: /58   Pulse 68   Temp 36.2 °C (97.2 °F) (Oral)   Resp 18   Ht 1.707 m (5' 7.2\")   Wt 73.1 kg (161 lb 3.2 oz)   SpO2 95%   BMI 25.10 kg/m²     GEN: No apparent distress  HEENT: Head normocephalic, atraumatic.  Sclera nonicteric bilaterally, no ocular discharge appreciated bilaterally.  CV: Extremities warm and well-perfused, + LLE edema. RLE without edema.  Wound vac off   PULMONARY: Breathing nonlabored on room air, no respiratory accessory muscle use.  Not requiring supplemental oxygen.  SKIN:   2/2 Clinical Picture    PSYCH: Mood and affect within normal limits.  NEURO: Awake alert.  Conversational.  Logical thought content.  flapping hand tremors      Laboratory Values:  Recent Results (from the past 72 hours)   CBC WITHOUT DIFFERENTIAL    Collection Time: 02/04/25  6:37 AM   Result Value Ref Range    WBC 7.6 4.8 - 10.8 K/uL    RBC 2.45 (L) 4.70 - 6.10 M/uL    Hemoglobin 7.8 (L) 14.0 - 18.0 g/dL    Hematocrit 24.4 (L) 42.0 - 52.0 %    MCV 99.6 (H) 81.4 - 97.8 fL    MCH 31.8 27.0 - 33.0 pg    MCHC 32.0 (L) 32.3 - 36.5 g/dL    RDW 56.5 (H) 35.9 - 50.0 fL    Platelet Count 252 164 - 446 K/uL    MPV 9.8 9.0 - 12.9 fL   Basic Metabolic Panel    Collection Time: 02/04/25  6:37 AM   Result Value Ref Range    Sodium 133 (L) 135 - 145 mmol/L    Potassium 3.3 (L) 3.6 - 5.5 mmol/L    Chloride 90 " (L) 96 - 112 mmol/L    Co2 22 20 - 33 mmol/L    Glucose 125 (H) 65 - 99 mg/dL    Bun 76 (H) 8 - 22 mg/dL    Creatinine 8.65 (HH) 0.50 - 1.40 mg/dL    Calcium 8.8 8.5 - 10.5 mg/dL    Anion Gap 21.0 (H) 7.0 - 16.0   ESTIMATED GFR    Collection Time: 02/04/25  6:37 AM   Result Value Ref Range    GFR (CKD-EPI) 6 (A) >60 mL/min/1.73 m 2   Basic Metabolic Panel    Collection Time: 02/05/25  5:10 AM   Result Value Ref Range    Sodium 134 (L) 135 - 145 mmol/L    Potassium 3.4 (L) 3.6 - 5.5 mmol/L    Chloride 89 (L) 96 - 112 mmol/L    Co2 26 20 - 33 mmol/L    Glucose 197 (H) 65 - 99 mg/dL    Bun 81 (H) 8 - 22 mg/dL    Creatinine 8.75 (HH) 0.50 - 1.40 mg/dL    Calcium 8.7 8.5 - 10.5 mg/dL    Anion Gap 19.0 (H) 7.0 - 16.0   CBC WITHOUT DIFFERENTIAL    Collection Time: 02/05/25  5:10 AM   Result Value Ref Range    WBC 7.7 4.8 - 10.8 K/uL    RBC 2.40 (L) 4.70 - 6.10 M/uL    Hemoglobin 7.6 (L) 14.0 - 18.0 g/dL    Hematocrit 24.3 (L) 42.0 - 52.0 %    .3 (H) 81.4 - 97.8 fL    MCH 31.7 27.0 - 33.0 pg    MCHC 31.3 (L) 32.3 - 36.5 g/dL    RDW 58.9 (H) 35.9 - 50.0 fL    Platelet Count 292 164 - 446 K/uL    MPV 10.2 9.0 - 12.9 fL   PHOSPHORUS    Collection Time: 02/05/25  5:10 AM   Result Value Ref Range    Phosphorus 4.8 (H) 2.5 - 4.5 mg/dL   ESTIMATED GFR    Collection Time: 02/05/25  5:10 AM   Result Value Ref Range    GFR (CKD-EPI) 6 (A) >60 mL/min/1.73 m 2       Medications:  Scheduled Medications   Medication Dose Frequency    metoprolol SR  50 mg QHS    polyethylene glycol/lytes  1 Packet BID    omeprazole  40 mg DAILY    lanthanum carbonate  500 mg TID WITH MEALS    [START ON 2/8/2025] epoetin  4,000 Units Q7 DAYS    torsemide  10 mg Q DAY    ferrous gluconate  324 mg Q48HRS    Pharmacy Consult Request  1 Each PHARMACY TO DOSE    apixaban  5 mg BID    clopidogrel  75 mg DAILY    guaiFENesin ER  600 mg Q12HRS    montelukast  10 mg QHS    rosuvastatin  20 mg QHS    senna-docusate  2 Tablet BID    gentamicin   DAILY AT 1800      PRN medications: bisacodyl, oxyCODONE immediate-release **OR** oxyCODONE immediate-release, methocarbamol, sevelamer carbonate, lidocaine, hydrALAZINE, lactulose, bisacodyl EC, magnesium hydroxide, carboxymethylcellulose, benzocaine-menthol, mag hydrox-al hydrox-simeth, ondansetron **OR** ondansetron, traZODone, sodium chloride, acetaminophen, senna-docusate **AND** polyethylene glycol/lytes    Diet:  Current Diet Order   Procedures    Diet Order Diet: Renal; Second Modifier: (optional): Cardiac       Medical Decision Making and Plan:  S/p left popliteal to posterior tibial artery bypass Dr. Miller 1/20/2025  PT and OT for mobility and ADLs. Per guidelines, 15 hours per week between PT, OT and/or SLP.  Follow-up vascular surgery  Continue Eliquis and Plavix  Continue statin    1/24 Prevena saturation. Wound consult. Vasc surg to eval as well. Prevena now working.   1/30 vascular to change dressing at some point per patient's wife  2/5 swelling stable. Not getting torsemide nor BB     Type 2 diabetes mellitus with hyperglycemia -outpatient medication had been Januvia 1 tablet twice daily     Pain -Tylenol and oxycodone as needed.  Scheduled Robaxin, gabapentin.  Schedule oxycodone 5x daily, morphine contraindicated based on renal function. 1/29 Reduce Oxycodone to QID. 1/30 reduce oxycodone to scheduled 3 times daily.  Decrease methocarbamol to 250 mg 4 times daily. 2/3 Patient refusing scheduled Oxy. Continue only as PRN and d/c scheduled. Make Robaxin PRN.     Last as needed oxycodone use was 1/24  2/4 d/c Gabapentin, not using Oxy reduce frequency to q4 hrs PRN     Anemia - 1/24 Improving. 1/27 Drop to 8's, had a lot of OP from Prevena last week. 1/29 7.7 - expected given prevena OP. Monitor need for transfusion.  1/30 hemoglobin dropped to 7.3.  Discussed with nephrology, they deferred to us to transfuse.  Transfuse less than 7.  Prevena putting out less. 2/5 Stable 7's     Thrombocytopenia - Resolved 1/30      ESRD on PD - Nnephrology following.  On sevelamer - changed due to naussea.  Nephrology increased fill 1/30.    Hypermagnesemia - Nephro managing    Hyperphosphatemia - Nephro managing      History of moderate aortic stenosis -stable     Hypertension - On admit: Norvasc 2.5 mg every morning, Lasix 40 mg twice daily, Metoprolol XL 100mg. Norvasc stopped.      2/5 Has not been getting metoprolol nor torsemide for many days (>6) will reduce midodrine, change torsemide to 1400 and add midodrine.      CAD s/p SYDNEE 3x -on aspirin and statin.  Had been on Imdur 30 mg daily     Gout - No meds at home.      Hyperlipidemia -continue statin     Recent left inguinal hernia repair -12/18/2024 Dr. Miller     Bowel - Patient on Senna-docusate for constipation prophylaxis. 1/23 Constipation. 1/29 BM. 2/1 BM. 2/3 Bowel meds increased.     Nausea & Vomiting - Only after lunch. Get KUB - WNL. Schedule Zofran after lunch.  Scheduled Zofran after each meal. 2/3 Declining scheduled Zofran. Make PRN only. 2/4 reduced to only afternoon. 2/5 No vomiting yesterday. Monitor.     Hand tremors -LFTs nonconcerning for etiology.  Possibly med induced. 2/5 Improved but still continues. Off meds that may contribute.      BPH - TV/PVR/BS PRN.  Continue Flomax and Hytrin. 1/28 Hold due to low BP. Favor diuresis. 2/3 Low PVRs.      Insomnia -continue trazodone at night as needed        Upcoming Labs/imaging: Per Nephro     DVT PROPHYLAXIS: On Eliquis     HOSPITALIST FOLLOWING: No    Consultants following: Nephrology. D/w nephrology 2/3/2025      CODE STATUS: Full code     DISPO: Home with spouse     SHAE: 2/7/25     MEDS SENT TO: [facility]     DISCHARGE SPECIALIST FOLLOW UP: Vascular surgery and other specialists as previously scheduled     Patient to scheduled follow up with their PCP within 2 weeks from discharge from the [facility].   ____________________________________    Dr. Mere Klein DO, MS  [facility] - Physical Medicine  & Rehabilitation   ____________________________________

## 2025-02-05 NOTE — THERAPY
Missed Therapy    Patient Name: Edy Bennett  Age:  79 y.o., Sex:  male  Medical Record #: [id number]  Today's Date: 2/5/2025    Discussed missed therapy with therapy scheduling/ physician : Pt just received suppository 2* bowel issues, physcian approved medical hold, will attempt to re-schedule this pm if time allows.       02/05/25 1031   Therapy Missed   Missed Therapy (Minutes) 60   Reason For Missed Therapy Medical - Patient on Hold from Therapy;Medical - Patient has Bowel Issues  (Pt just received suppository 2* bowel issues, physcian approved medical hold)

## 2025-02-06 ENCOUNTER — HOSPITAL ENCOUNTER (OUTPATIENT)
Dept: RADIOLOGY | Facility: MEDICAL CENTER | Age: 80
End: 2025-02-06
Attending: NURSE PRACTITIONER
Payer: COMMERCIAL

## 2025-02-06 ENCOUNTER — APPOINTMENT (OUTPATIENT)
Dept: OCCUPATIONAL THERAPY | Facility: REHABILITATION | Age: 80
DRG: 299 | End: 2025-02-06
Attending: PHYSICAL MEDICINE & REHABILITATION
Payer: MEDICARE

## 2025-02-06 ENCOUNTER — APPOINTMENT (OUTPATIENT)
Dept: PHYSICAL THERAPY | Facility: REHABILITATION | Age: 80
DRG: 299 | End: 2025-02-06
Attending: PHYSICAL MEDICINE & REHABILITATION
Payer: MEDICARE

## 2025-02-06 PROCEDURE — 97110 THERAPEUTIC EXERCISES: CPT

## 2025-02-06 PROCEDURE — 99232 SBSQ HOSP IP/OBS MODERATE 35: CPT | Performed by: PHYSICAL MEDICINE & REHABILITATION

## 2025-02-06 PROCEDURE — 97530 THERAPEUTIC ACTIVITIES: CPT

## 2025-02-06 PROCEDURE — A9270 NON-COVERED ITEM OR SERVICE: HCPCS | Performed by: NURSE PRACTITIONER

## 2025-02-06 PROCEDURE — 770010 HCHG ROOM/CARE - REHAB SEMI PRIVAT*

## 2025-02-06 PROCEDURE — 700111 HCHG RX REV CODE 636 W/ 250 OVERRIDE (IP): Performed by: PHYSICAL MEDICINE & REHABILITATION

## 2025-02-06 PROCEDURE — 36589 REMOVAL TUNNELED CV CATH: CPT

## 2025-02-06 PROCEDURE — 700102 HCHG RX REV CODE 250 W/ 637 OVERRIDE(OP): Performed by: PHYSICAL MEDICINE & REHABILITATION

## 2025-02-06 PROCEDURE — 97116 GAIT TRAINING THERAPY: CPT

## 2025-02-06 PROCEDURE — 94760 N-INVAS EAR/PLS OXIMETRY 1: CPT

## 2025-02-06 PROCEDURE — 90945 DIALYSIS ONE EVALUATION: CPT

## 2025-02-06 PROCEDURE — 700102 HCHG RX REV CODE 250 W/ 637 OVERRIDE(OP): Performed by: NURSE PRACTITIONER

## 2025-02-06 PROCEDURE — A9270 NON-COVERED ITEM OR SERVICE: HCPCS | Performed by: PHYSICAL MEDICINE & REHABILITATION

## 2025-02-06 RX ORDER — ONDANSETRON 4 MG/1
4 TABLET, ORALLY DISINTEGRATING ORAL 2 TIMES DAILY WITH MEALS
Status: DISCONTINUED | OUTPATIENT
Start: 2025-02-06 | End: 2025-02-14 | Stop reason: HOSPADM

## 2025-02-06 RX ADMIN — LANTHANUM CARBONATE 500 MG: 500 TABLET, CHEWABLE ORAL at 17:41

## 2025-02-06 RX ADMIN — ROSUVASTATIN CALCIUM 20 MG: 10 TABLET, FILM COATED ORAL at 21:18

## 2025-02-06 RX ADMIN — LANTHANUM CARBONATE 500 MG: 500 TABLET, CHEWABLE ORAL at 12:03

## 2025-02-06 RX ADMIN — ONDANSETRON 4 MG: 4 TABLET, ORALLY DISINTEGRATING ORAL at 17:43

## 2025-02-06 RX ADMIN — MIDODRINE HYDROCHLORIDE 5 MG: 2.5 TABLET ORAL at 07:24

## 2025-02-06 RX ADMIN — SENNOSIDES AND DOCUSATE SODIUM 2 TABLET: 50; 8.6 TABLET ORAL at 21:18

## 2025-02-06 RX ADMIN — GENTAMICIN SULFATE: 1 CREAM TOPICAL at 19:20

## 2025-02-06 RX ADMIN — GUAIFENESIN 600 MG: 600 TABLET ORAL at 07:24

## 2025-02-06 RX ADMIN — MIDODRINE HYDROCHLORIDE 5 MG: 2.5 TABLET ORAL at 17:41

## 2025-02-06 RX ADMIN — POTASSIUM CHLORIDE 20 MEQ: 1500 TABLET, EXTENDED RELEASE ORAL at 07:24

## 2025-02-06 RX ADMIN — OMEPRAZOLE 40 MG: 20 CAPSULE, DELAYED RELEASE ORAL at 07:25

## 2025-02-06 RX ADMIN — MIDODRINE HYDROCHLORIDE 5 MG: 2.5 TABLET ORAL at 12:03

## 2025-02-06 RX ADMIN — LANTHANUM CARBONATE 500 MG: 500 TABLET, CHEWABLE ORAL at 07:24

## 2025-02-06 RX ADMIN — APIXABAN 5 MG: 5 TABLET, FILM COATED ORAL at 21:18

## 2025-02-06 RX ADMIN — MONTELUKAST 10 MG: 10 TABLET, FILM COATED ORAL at 21:18

## 2025-02-06 RX ADMIN — SENNOSIDES AND DOCUSATE SODIUM 2 TABLET: 50; 8.6 TABLET ORAL at 07:24

## 2025-02-06 RX ADMIN — ONDANSETRON 4 MG: 4 TABLET, ORALLY DISINTEGRATING ORAL at 15:53

## 2025-02-06 RX ADMIN — GUAIFENESIN 600 MG: 600 TABLET ORAL at 21:18

## 2025-02-06 RX ADMIN — APIXABAN 5 MG: 5 TABLET, FILM COATED ORAL at 07:24

## 2025-02-06 RX ADMIN — CLOPIDOGREL BISULFATE 75 MG: 75 TABLET ORAL at 07:24

## 2025-02-06 ASSESSMENT — ACTIVITIES OF DAILY LIVING (ADL)
BED_CHAIR_WHEELCHAIR_TRANSFER_DESCRIPTION: ADAPTIVE EQUIPMENT;INCREASED TIME;SET-UP OF EQUIPMENT;SUPERVISION FOR SAFETY;VERBAL CUEING

## 2025-02-06 ASSESSMENT — PAIN DESCRIPTION - PAIN TYPE
TYPE: ACUTE PAIN
TYPE: ACUTE PAIN

## 2025-02-06 NOTE — PROGRESS NOTES
[facility] Progress Note     CCPD x 8.5hrs hours, 2100 mL fills x 5 cycles using all 2.5% PD solution ordered per      CCPD treatment initiated at 1910 without any issues.  Patient AOx4, VSS. C/o of nausea and vomiting prior tx.                  PD catheter and exit site in good condition, no signs of infection noted, cleansed with antiseptic solution and                  dressing changed. Gentamicin applied at exit site.     Initial drain: 55 mL      Report/Inservice given PCMASOOD Granda RN     Patient on dwell 1/5 prior to departure from bedside.     Please contact on call dialysis RN for any issues with persistent alarms/assistance with troubleshooting or patient not tolerating therapy.      For on-call Dialysis RN, pls contact [facility] at [phone].

## 2025-02-06 NOTE — THERAPY
Physical Therapy   Daily Treatment     Patient Name: Edy Bennett  Age:  79 y.o., Sex:  male  Medical Record #: [id number]  Today's Date: 2/6/2025     Precautions  Precautions: Fall Risk  Comments: LLE wound vac    Subjective    Pt resting in bed, spouse present for on-going training      Objective       02/06/25 1031   PT Charge Group   PT Gait Training (Units) 2   PT Therapeutic Exercise (Units) 1   PT Therapeutic Activities (Units) 1   PT Total Time Spent   PT Individual Total Time Spent (Mins) 60   Supine Lower Body Exercise   Bridges One Legged;3 sets of 10  (LLE on pillow)   Ankle Pumps 3 sets of 10;Left   Quadriceps Isometrics 3 sets of 10;Left   Comments pt with disposable hot pack to L calf throughout supine strength/ flexibility exercises   Bed Mobility    Supine to Sit Modified Independent   Sit to Supine Modified Independent   Sit to Stand Contact Guard Assist   Interdisciplinary Plan of Care Collaboration   IDT Collaboration with  Family / Caregiver   Collaboration Comments on-going family training     Therapeutic Activities  Purpose: to improve performance and function of daily activities and to provide patient and family education  Interventions:   Bed/chair transfer training  Car transfer  Patient or family education  Description of Intervention(s): Pt's spouse able to place gait belt and provide CGA during sit<>stand-step transfer with FWW from bed>wc with verbal instruction from PT. PT set-up wc and assisted with CGA for stand-pivot transfer to [facility], spouse able to repeat activity without difficulty.    Gait Training  Purpose: to address gait deviations and to improve walking endurance  Deviations   Antalgic  Decreased Pawel  Decreased toe off  Decreased heel strike  Comments: Gait training with // bars and mirror for UE support/ cues for upright posture, terminal hip/knee ext to LLE and step through patterning forward/backward walking <> x 3 trials. Progressed to ambulation with  "FWW and mirror for visual feedback/ CGA 50 ft x 2    Wheelchair Management, and Parts Training    Interventions:   Wheelchair management  Description of Intervention(s): Pt's spouse able to remove and re-apply leg rests to wc, PT demonstrated parts management and breakdown for transport during car transfer training, spouse able to effectively fold/ lift and unfold wc in preparation for d/c home next week with cues for set-up.       Assessment    Pt continues to improve overall strength/ endurance and gait quality. Spouse continues to be highly involved in pt care and becoming more confident with hands-on training.    Strengths: Able to follow instructions, Alert and oriented, Independent prior level of function, Supportive family, Pleasant and cooperative, Motivated for self care and independence  Barriers: Pain, Pain poorly managed, Limited mobility    Plan    LE mobility exercises in supine and sitting to prep soft tissues for functional movements, progressive gait in parallel bars and walker, practice car transfers     DME  PT DME Recommendations  Wheelchair: 16\" Width, Elevating Leg Rests  Cushion: Standard    Passport items to be completed:  Get in/out of bed safely, in/out of a vehicle, safely use mobility device, walk or wheel around home/community, navigate up and down stairs, show how to get up/down from the ground, ensure home is accessible, demonstrate HEP, complete caregiver training    Physical Therapy Problems (Active)       Problem: Mobility       Dates: Start:  02/03/25         Goal: STG-Within one week, patient will ambulate household distance >50 ft SBA FWW indoors        Dates: Start:  02/03/25               Problem: Mobility Transfers       Dates: Start:  02/03/25         Goal: STG-Within one week, patient will transfer bed to chair SBA consistently SPT FWW       Dates: Start:  02/03/25               Problem: PT-Long Term Goals       Dates: Start:  01/24/25         Goal: LTG-By discharge, patient " will ambulate with  feet SBA       Dates: Start:  01/24/25            Goal: LTG-By discharge, patient will transfer one surface to another with FWW supervised       Dates: Start:  01/24/25            Goal: LTG-By discharge, patient will perform home exercise program       Dates: Start:  01/24/25            Goal: LTG-By discharge, patient will ambulate up/down 4-6 stairs with bilateral rails supervised        Dates: Start:  01/24/25            Goal: LTG-By discharge, patient will transfer in/out of a car with FWW supervised        Dates: Start:  01/24/25

## 2025-02-06 NOTE — THERAPY
Physical Therapy   Daily Treatment     Patient Name: Edy Bennett  Age:  79 y.o., Sex:  male  Medical Record #: [id number]  Today's Date: 2/5/2025     Precautions  Precautions: Fall Risk  Comments: LLE wound vac    Subjective    Pt resting in bed, reports fatigue from earlier bowel movement but willing to participate.     Objective       02/05/25 1431   PT Charge Group   PT Gait Training (Units) 1   PT Therapeutic Exercise (Units) 2   PT Therapeutic Activities (Units) 1   PT Total Time Spent   PT Individual Total Time Spent (Mins) 60   Gait Functional Level of Assist    Gait Level Of Assist Contact Guard Assist   Assistive Device Front Wheel Walker   Distance (Feet) 75  (in addition to 20 ft x 1 with mirror for visual feedback/ posture re-ed)   # of Times Distance was Traveled 1   Deviation Antalgic;Step To;Decreased Base Of Support;Bradykinetic;Decreased Heel Strike;Decreased Toe Off  (limited WB and terminal hip/ knee ext LLE)   Transfer Functional Level of Assist   Bed, Chair, Wheelchair Transfer Minimal Assist   Bed Chair Wheelchair Transfer Description Adaptive equipment;Increased time;Set-up of equipment;Verbal cueing   Supine Lower Body Exercise   Bridges Two Legged;3 sets of 10  (LE's propped on large bolster wedge for pain control LLE)   Straight Leg Raises 3 sets of 10  (AAROM for pain control/ weakness and proper form)   Short Arc Quad 3 sets of 10   Bed Mobility    Supine to Sit Standby Assist   Sit to Supine Standby Assist   Sit to Stand Contact Guard Assist   Scooting Standby Assist   Interdisciplinary Plan of Care Collaboration   IDT Collaboration with  Family / Caregiver   Collaboration Comments initiated wc parts instruction and management     Therapeutic Activities  Purpose: to provide patient and family education  Interventions:   Patient or family education  Description of Intervention(s): Instructed spouse with wheel chair leg rest removal/ replacement and elevation mechanism. Spouse able to  "remove and replace B leg rests with verbal instruction after demonstration. Will continue to integrate wc parts management into therapy session when pt resting between exercises.    Gait Training  Purpose: to improve functional ambulation and to address gait deviations  Deviations (laterality in comments):  Antalgic  Decreased Pawel  Decreased toe off  Decreased heel strike  Comments: Pt ambulated x 75 ft with FWW and CGA as noted, second bout x 20 ft with mirror for visual feedback and cues for improved posture/ WB and gait symmetry.        Assessment    Pt tolerated session well but remains with low activity tolerance/ significant weakness and LLE pain/swelling. Demonstrated improved gait quality with mirror / visual feedback.    Strengths: Able to follow instructions, Alert and oriented, Independent prior level of function, Supportive family, Pleasant and cooperative, Motivated for self care and independence  Barriers: Pain, Pain poorly managed, Limited mobility    Plan    LE mobility exercises in supine and sitting to prep soft tissues for functional movements, progressive gait in parallel bars and walker, practice car transfers     DME  PT DME Recommendations  Wheelchair: 16\" Width, Elevating Leg Rests  Cushion: Standard    Passport items to be completed:  Get in/out of bed safely, in/out of a vehicle, safely use mobility device, walk or wheel around home/community, navigate up and down stairs, show how to get up/down from the ground, ensure home is accessible, demonstrate HEP, complete caregiver training    Physical Therapy Problems (Active)       Problem: Mobility       Dates: Start:  02/03/25         Goal: STG-Within one week, patient will ambulate household distance >50 ft SBA FWW indoors        Dates: Start:  02/03/25               Problem: Mobility Transfers       Dates: Start:  02/03/25         Goal: STG-Within one week, patient will transfer bed to chair SBA consistently SPT FWW       Dates: Start:  " 02/03/25               Problem: PT-Long Term Goals       Dates: Start:  01/24/25         Goal: LTG-By discharge, patient will ambulate with  feet SBA       Dates: Start:  01/24/25            Goal: LTG-By discharge, patient will transfer one surface to another with FWW supervised       Dates: Start:  01/24/25            Goal: LTG-By discharge, patient will perform home exercise program       Dates: Start:  01/24/25            Goal: LTG-By discharge, patient will ambulate up/down 4-6 stairs with bilateral rails supervised        Dates: Start:  01/24/25            Goal: LTG-By discharge, patient will transfer in/out of a car with FWW supervised        Dates: Start:  01/24/25

## 2025-02-06 NOTE — PROGRESS NOTES
NURSING DAILY NOTE    Name: Edy Bennett   Date of Admission: 1/23/2025   Admitting Diagnosis: PAD (peripheral artery disease) ([facility])  Attending Physician: ROSANNA PATEL D.O.  Allergies: Patient has no known allergies.    Safety  Patient Assist  max1  Patient Precautions  Fall Risk  Precaution Comments  LLE wound vac  Bed Transfer Status  Minimal Assist  Toilet Transfer Status   Supervised  Assistive Devices  Rails, Wheelchair  Oxygen  None - Room Air  Diet/Therapeutic Dining  Current Diet Order   Procedures    Diet Order Diet: Renal; Second Modifier: (optional): Cardiac     Pill Administration  whole  Agitated Behavioral Scale     ABS Level of Severity       Fall Risk  Has the patient had a fall this admission?   No  Trinh Spivey Fall Risk Scoring  22, HIGH RISK  Fall Risk Safety Measures  bed alarm and chair alarm    Vitals  Temperature: 36.9 °C (98.4 °F)  Temp src: Oral  Pulse: 76  Respiration: 18  Blood Pressure : 118/68  Blood Pressure MAP (Calculated): 85 MM HG  BP Location: Right, Upper Arm  Patient BP Position: Sitting     Oxygen  Pulse Oximetry: 95 %  O2 (LPM): 0  FiO2%: 21 %  O2 Delivery Device: None - Room Air    Bowel and Bladder  Last Bowel Movement  02/05/25  Stool Type  Type 5: Soft blob with clear cut edges (passed easily)  Bowel Device  Bathroom  Continent  Bladder: Did not void   Bowel: No movement  Bladder Function  Urine Void (mL): 50 ml  Number of Times Voided: 1  Urine Color: Yellow  Genitourinary Assessment   Bladder Assessment (WDL):  Within Defined Limits  Smith Catheter: Not Applicable  Urinary Symptoms: Anuric/ Dialysis Patient  Urine Color: Yellow  Bladder Device: Urinal  Bladder Scan: Post Void  $ Bladder Scan Results (mL): 32    Skin  Jalen Score   16  Sensory Interventions   Bed Types: Low Airloss  Skin Preventative Measures: Pillows in Use for Support / Positioning  Moisture Interventions  Moisturizers/Barriers:  Barrier Wipes      Pain  Pain Rating Scale  0 - No Pain  Pain Location  Leg  Pain Location Orientation  Left  Pain Interventions   Repositioned, Rest    ADLs    Bathing   Patient Refused Bathing  Linen Change    (pt refused no feeling well pt was vomiting Rn was informed)  Personal Hygiene     Chlorhexidine Bath   Completed  Oral Care     Teeth/Dentures     Shave     Nutrition Percentage Eaten  Breakfast, Between 50-75% Consumed  Environmental Precautions  Treaded Slipper Socks on Patient  Patient Turns/Positioning  Patient turns self independently side to side without assistance, to offload sacral area  Patient Turns Assistance/Tolerance  Assistance of One  Bed Positions  Bed Controls On, Bed Locked  Head of Bed Elevated  Self regulated      Psychosocial/Neurologic Assessment  Psychosocial Assessment  Psychosocial (WDL):  Within Defined Limits  Patient Behaviors: Anxious  Family Behaviors: Family Present  Neurologic Assessment  Neuro (WDL): Within Defined Limits  Level of Consciousness: Alert  Orientation Level: Oriented X4  Cognition: Follows commands, Appropriate attention/concentration  Speech: Clear  Facial Symmetry: Other (Comment) (no facial droop noted)  Pupil Assesment: No  Motor Function/Sensation Assessment: Motor strength  RUE Sensation: Full sensation  Muscle Strength Right Arm: Good Strength Against Gravity and Moderate Resistance  LUE Sensation: Full sensation  Muscle Strength Left Arm: Good Strength Against Gravity and Moderate Resistance  RLE Sensation: Full sensation  Muscle Strength Right Leg: Good Strength Against Gravity and Moderate Resistance  LLE Sensation: Full sensation, Pain  Muscle Strength Left Leg: Fair Strength against Gravity but No Resistance  EENT (WDL):  WDL Except    Cardio/Pulmonary Assessment  Edema   LLE Edema: Generalized  Respiratory Breath Sounds  RUL Breath Sounds: Clear  RML Breath Sounds: Clear  RLL Breath Sounds: Diminished  ANAYELI Breath Sounds: Clear  LLL Breath Sounds:  Diminished  Cardiac Assessment   Cardiac (WDL):  Within Defined Limits

## 2025-02-06 NOTE — PROGRESS NOTES
[facility] Progress Notes    CCPD ordered by Dr. Granados. Disconnected aseptically from PD Cycler at 0645.    Pt stable, VSS, alert and oriented.  Effluent clear and yellow, no signs of bleeding/fibrin clots.  No alarms on machine was noted or reported before disconnection.    24 hour UF= 1273mL (I.Drain= 55ml + Total UF= 1218mL - Last fill= 0)    Report given to Lino ARANDA

## 2025-02-06 NOTE — PROGRESS NOTES
[facility] -  PROGRESS NOTE               Author: IDA Durham Date & Time: 2/6/2025  11:55 AM     HPI:  79 y.o. male with HTN, ESRD on PD, PVD/ gangrenous left foot, BPH admitted for monitoring s/p  left leg bypass surgery on 1/20 with Dr. Miller. He recently underwent hernial repair and was temporarily transferred to in center HD via [facility]. He has returned to  PD  4 days ago, doing low fill.volumes and has been tolerating at home.   Patient was connected to cycler last night. Total UF reported to be 276ml. Reports of low drain UF alarm last night but no major issues with PD overnight. Pt reports no complaints today morning  No F/C/N/V/CP/SOB.  No melena, hematochezia, hematemesis.  No HA, visual changes,or abdominal pain.     DAILY NEPHROLOGY SUMMARY:    1/21: consult done   1/22: No issues with PD overnight, UF 1137cc, VSS, , feels good, wife at bed side   1/23: No issues with PD overnight 653 cc of UF , not had any BM yesterday and today  [facility]  1/24: CCPD 1.5L UF. VSS. PO4 elevated. Pt seen during PD, wife present. C/O pain.  1/25: No issues with PD overnight, net UF 1415ml. No sob, had BM yesterday, states he use O2 when he sleeps to help with nose bleed  1/26:No issues with PD overnight , net  cc, no c/o, still not able to bear weight   1/27: 780 mL Net UF removed with CCPD overnight. Coming out of BR with OT and wife. C/o no protein with meals, only had english muffin this am. BM yesterday. Denies abd pain or tenderness. Denies fever or chills.   1/28: 444 mL Net UF removed over night. Sitting in WC in room, wife BS. Reports feeling better every day. Reports sleeping great. Bps 90-100s.   1/29: VSS BP soft, RA.  No new labs.  No CP SOB, or worsenieng edema.  PD overnight 636ml.  UOP 300ml. Feeling good.   1/30: VSS BP 100s/60s.  SSNa 128->131  Phos 7.2, does have some phosphorous  "containing treats in the late evenings.  Dicussed.  No CP SOB or edema besides in wound vac leg.   PD  , wife at bedside and shares that the current fill is a low fill and was only supposed to be temporary after hernia repair.  Usual fill is 2200ml, all green bags.   1/31: VSS BP 100s/60s  PD , felt well with increase fill.   SNa 133 Phos 7.0 No CP SOB, edema localized to woundvac leg and thigh.   2/01:   Disconnected aseptically from PD CyclerEffluent clear and yellow, no signs of bleeding/fibrin clots. 24 hour UF= 678mL. Tolerated well.   2/2: Patient and wife are doing well. Wife states she gave wrong # of cycles to provider yesterday and wanted then reduced back from 7 to 5. Overall doing well without complaints. Looking forward to going home around Feb 7th per patient. Also stated they were told that RT IJ TDC would be removed sometime this week. On-coming provider to ask the Hospitalist to help arrange.   2/3: pt sitting up in bed, spouse at bedside, PD improved with change in rx (reduced to 5 exchanges from 7) great UF: 1.1L, edema has improved, biggest complaint NV, vomits after each meal and fatigue, requesting a reduction in gabapentin, also reports constipation, had to dig out stool yesterday with finger  2/4 net UF 1500 ml overnight. Potassium is 3.3 I am replacing. Patient reports doing better today, wife at bedside. Patient feels much better now since Erica made med changes yesterday  2/5: 1437 mL Net UF removed overnight. Wife BS. Patient oob in BR performing adls independently. Reports improvement every day.   2/6: doing well, eating lunch with wife, net UF 1273 ml    REVIEW OF SYSTEMS:    10 point ROS reviewed and is as per HPI/daily summary or otherwise negative    PMH/PSH/SH/FH: Reviewed and unchanged since admission note  CURRENT MEDICATIONS: Reviewed from admission to present day    VS:  /62   Pulse 79   Temp 36.4 °C (97.6 °F) (Oral)   Resp 16   Ht 1.707 m (5' 7.2\")  "  Wt 73.1 kg (161 lb 3.2 oz)   SpO2 95%   BMI 25.10 kg/m²   Physical Exam  Vitals and nursing note reviewed.   Constitutional:       Appearance: Normal appearance.   HENT:      Head: Normocephalic and atraumatic.      Nose: Nose normal.      Mouth/Throat:      Mouth: Mucous membranes are moist.   Eyes:      General: No scleral icterus.     Conjunctiva/sclera: Conjunctivae normal.   Cardiovascular:      Rate and Rhythm: Normal rate.      Comments: R TDC   Pulmonary:      Effort: Pulmonary effort is normal.   Abdominal:      General: There is no distension.      Palpations: Abdomen is soft.      Comments: PD cath in RLQ    Musculoskeletal:         General: No deformity.      Cervical back: Neck supple.      Right lower leg: No edema.      Left lower leg: Edema present.      Comments: Mild edema in LLE wound vac in place     Skin:     General: Skin is warm and dry.      Findings: No rash.   Neurological:      General: No focal deficit present.      Mental Status: He is alert and oriented to person, place, and time. Mental status is at baseline.   Psychiatric:         Mood and Affect: Mood normal.         Behavior: Behavior normal. Behavior is cooperative.         FLUID BALANCE:  In: 280 [P.O.:200; Other:80]  Out: 1487     LABS:  Recent Labs     02/04/25  0637 02/05/25  0510   SODIUM 133* 134*   POTASSIUM 3.3* 3.4*   CHLORIDE 90* 89*   CO2 22 26   GLUCOSE 125* 197*   BUN 76* 81*   CREATININE 8.65* 8.75*   CALCIUM 8.8 8.7       Recent Labs     02/04/25  0637 02/05/25  0510   WBC 7.6 7.7   RBC 2.45* 2.40*   HEMOGLOBIN 7.8* 7.6*   HEMATOCRIT 24.4* 24.3*   MCV 99.6* 101.3*   MCH 31.8 31.7   MCHC 32.0* 31.3*   RDW 56.5* 58.9*   PLATELETCT 252 292   MPV 9.8 10.2           IMPRESSION:  # ESRD on CCPD  - Pt was transitioned to  iHD during masoud-op  s/p inguinal hernial repair,   - Still has RIJ PC  - Returned to PD 1/17/25   - Home PD prescription CCPD 2.5% alternating with 1.5% 5  no last fill. Patient wife stated she  initially told last Nephrologist wrong script. Now all green bags and 5 cycles, not 7.   # HTN, controlled   - Goal BP < 140/90  - torsemide 10mg daily   # Anemia of CKD  - Goal Hgb 10-11  - Below goal   - On PO iron  - ROSALIND weekly   # CKD-MBD  - Ca 8.6   - PO4 7.4 -> 6.6 -> 7.0  - will DC sevelamer to assess GI tolerance, start lanthanum 500mg TID, titrate up prn  # Chronic hypoxic respiratory failure   - On home oxygen 2-2.5L at night   # Nausea/Vomiting   - ddx: binder intolerance, switched to lanthanum   - constipation, started miralax BID   - pill burden, increase omeprazole to 40mg daily   - reduced gabapentin to 100mg daily   # Hyponatremia, mild   - torsemide daily   - UF with PD, on all 2.5%           SUGGESTIONS:  - Continue CCPD during hospital stay (now on oupt rx and tolerating well)  - KCL 20 mEQ Qday   - Reduced gapapentin to 100mg daily, ween off as tolerated (2/03)  - PD exit site care per protocol   - Changed ROSALIND to SQ weekly as now on PD consistently   - No dietary protein restrictions  - Dose all meds per ESRD  - PO iron, consider DC if constipation continues    - Renal diet, low phos   - Has RIJ PC since May 2024.  Agreeable to have removed prior to R TDC removed will schedule for FRI. Discussed with [facility] Alonzo to arrange   - L arm AVF not fully matured,  will need intervention outpt   - Avoid Fleet enema in dialysis patients   - Labs daily  - Discharge planning underway, plan for 2/7 home with family now 2/11    Please call or page Nephrology with questions or concerns  Thank you,

## 2025-02-06 NOTE — THERAPY
Occupational Therapy  Daily Treatment     Patient Name: Edy Bennett  Age:  79 y.o., Sex:  male  Medical Record #: [id number]  Today's Date: 2/6/2025     Precautions  Precautions: Fall Risk  Comments: LLE wound vac    Safety   ADL Safety : Requires Physical Assist for Safety  Bathroom Safety: Requires Physical Assist for Safety    Subjective    Pt willing to work with OT this afternoon.     Objective       02/05/25 1231   OT Charge Group   OT Therapy Activity (Units) 3   OT Therapeutic Exercise (Units) 1   OT Total Time Spent   OT Individual Total Time Spent (Mins) 60   Precautions   Precautions Fall Risk   Comments LLE wound vac   Sitting Lower Body Exercises   Other Exercises pt warmed up his leg by doing ROM on L knee (flexion/extension)   Comments LE motomed level 4, decreased to level 2, 13 mins   Balance   Comments stood 3x to toss bean bags into bucket 1 min at a time, with a few mins rest breaks in between. Pt able to slightly weight shift to the left but kept most weight on the right, even when he needed to lean left to grab bean bags.   Interdisciplinary Plan of Care Collaboration   IDT Collaboration with  Family / Caregiver   Collaboration Comments discussed new DC date of 2/11 after pt gets wound vac out           Assessment    Discussed plan for DC next week. Pt and wife feel that he will be better prepared for DC home at that time. Pt still having nausea and vomiting daily, no episodes during tx.        Plan    ADL, IADL, transfers, strengthening, endurance    DME  OT DME Recommendations  Bathroom Equipment:  (TBD)    Passport items to be completed:  Perform bathroom transfers, complete dressing, complete feeding, get ready for the day, prepare a simple meal, participate in household tasks, adapt home for safety needs, demonstrate home exercise program, complete caregiver training     Occupational Therapy Goals (Active)       Problem: Dressing       Dates: Start:  01/24/25         Goal: STG-Within  one week, patient will dress LB with min A       Dates: Start:  01/24/25               Problem: OT Long Term Goals       Dates: Start:  01/24/25         Goal: LTG-By discharge, patient will complete basic self care tasks mod I       Dates: Start:  01/24/25            Goal: LTG-By discharge, patient will perform bathroom transfers mod I       Dates: Start:  01/24/25

## 2025-02-06 NOTE — PROGRESS NOTES
"  Physical Medicine & Rehabilitation Progress Note    Encounter Date: 2/6/2025    Chief Complaint: PVD s/p bypass    Interval Events (Subjective):  BP stable   BM 2/5  Voids some, oliguric    Seen and examined in his room with wife.  Today was a good day though he still having some nausea.  Has small nosebleed but he always has small nosebleed due to dry naris.      ROS: 14 point ROS negative unless otherwise specified in the HPI    Objective:  VITAL SIGNS: /62   Pulse 79   Temp 36.4 °C (97.6 °F) (Oral)   Resp 16   Ht 1.707 m (5' 7.2\")   Wt 73.1 kg (161 lb 3.2 oz)   SpO2 95%   BMI 25.10 kg/m²     GEN: No apparent distress  HEENT: Head normocephalic, atraumatic.  Sclera nonicteric bilaterally, no ocular discharge appreciated bilaterally.  CV: Left lower extremity with some edema but is improved and the erythema has improved significantly as well.  PULMONARY: Breathing nonlabored on room air, no respiratory accessory muscle use.  Not requiring supplemental oxygen.  SKIN:   2/2 Clinical Picture    PSYCH: Mood and affect within normal limits.  NEURO: Awake alert.  Conversational.  Logical thought content.  flapping hand tremors      Laboratory Values:  Recent Results (from the past 72 hours)   CBC WITHOUT DIFFERENTIAL    Collection Time: 02/04/25  6:37 AM   Result Value Ref Range    WBC 7.6 4.8 - 10.8 K/uL    RBC 2.45 (L) 4.70 - 6.10 M/uL    Hemoglobin 7.8 (L) 14.0 - 18.0 g/dL    Hematocrit 24.4 (L) 42.0 - 52.0 %    MCV 99.6 (H) 81.4 - 97.8 fL    MCH 31.8 27.0 - 33.0 pg    MCHC 32.0 (L) 32.3 - 36.5 g/dL    RDW 56.5 (H) 35.9 - 50.0 fL    Platelet Count 252 164 - 446 K/uL    MPV 9.8 9.0 - 12.9 fL   Basic Metabolic Panel    Collection Time: 02/04/25  6:37 AM   Result Value Ref Range    Sodium 133 (L) 135 - 145 mmol/L    Potassium 3.3 (L) 3.6 - 5.5 mmol/L    Chloride 90 (L) 96 - 112 mmol/L    Co2 22 20 - 33 mmol/L    Glucose 125 (H) 65 - 99 mg/dL    Bun 76 (H) 8 - 22 mg/dL    Creatinine 8.65 (HH) 0.50 - 1.40 " mg/dL    Calcium 8.8 8.5 - 10.5 mg/dL    Anion Gap 21.0 (H) 7.0 - 16.0   ESTIMATED GFR    Collection Time: 02/04/25  6:37 AM   Result Value Ref Range    GFR (CKD-EPI) 6 (A) >60 mL/min/1.73 m 2   Basic Metabolic Panel    Collection Time: 02/05/25  5:10 AM   Result Value Ref Range    Sodium 134 (L) 135 - 145 mmol/L    Potassium 3.4 (L) 3.6 - 5.5 mmol/L    Chloride 89 (L) 96 - 112 mmol/L    Co2 26 20 - 33 mmol/L    Glucose 197 (H) 65 - 99 mg/dL    Bun 81 (H) 8 - 22 mg/dL    Creatinine 8.75 (HH) 0.50 - 1.40 mg/dL    Calcium 8.7 8.5 - 10.5 mg/dL    Anion Gap 19.0 (H) 7.0 - 16.0   CBC WITHOUT DIFFERENTIAL    Collection Time: 02/05/25  5:10 AM   Result Value Ref Range    WBC 7.7 4.8 - 10.8 K/uL    RBC 2.40 (L) 4.70 - 6.10 M/uL    Hemoglobin 7.6 (L) 14.0 - 18.0 g/dL    Hematocrit 24.3 (L) 42.0 - 52.0 %    .3 (H) 81.4 - 97.8 fL    MCH 31.7 27.0 - 33.0 pg    MCHC 31.3 (L) 32.3 - 36.5 g/dL    RDW 58.9 (H) 35.9 - 50.0 fL    Platelet Count 292 164 - 446 K/uL    MPV 10.2 9.0 - 12.9 fL   PHOSPHORUS    Collection Time: 02/05/25  5:10 AM   Result Value Ref Range    Phosphorus 4.8 (H) 2.5 - 4.5 mg/dL   ESTIMATED GFR    Collection Time: 02/05/25  5:10 AM   Result Value Ref Range    GFR (CKD-EPI) 6 (A) >60 mL/min/1.73 m 2       Medications:  Scheduled Medications   Medication Dose Frequency    metoprolol SR  50 mg QHS    midodrine  5 mg TID WITH MEALS    torsemide  10 mg Q DAY    potassium chloride SA  20 mEq DAILY    polyethylene glycol/lytes  1 Packet BID    omeprazole  40 mg DAILY    lanthanum carbonate  500 mg TID WITH MEALS    [START ON 2/8/2025] epoetin  4,000 Units Q7 DAYS    ferrous gluconate  324 mg Q48HRS    Pharmacy Consult Request  1 Each PHARMACY TO DOSE    apixaban  5 mg BID    clopidogrel  75 mg DAILY    guaiFENesin ER  600 mg Q12HRS    montelukast  10 mg QHS    rosuvastatin  20 mg QHS    senna-docusate  2 Tablet BID    gentamicin   DAILY AT 1800     PRN medications: formulation r, bisacodyl, oxyCODONE  immediate-release **OR** oxyCODONE immediate-release, methocarbamol, sevelamer carbonate, lidocaine, hydrALAZINE, lactulose, bisacodyl EC, magnesium hydroxide, carboxymethylcellulose, benzocaine-menthol, mag hydrox-al hydrox-simeth, ondansetron **OR** ondansetron, traZODone, sodium chloride, acetaminophen, senna-docusate **AND** polyethylene glycol/lytes    Diet:  Current Diet Order   Procedures    Diet Order Diet: Renal; Second Modifier: (optional): Cardiac       Medical Decision Making and Plan:  S/p left popliteal to posterior tibial artery bypass Dr. Miller 1/20/2025  PT and OT for mobility and ADLs. Per guidelines, 15 hours per week between PT, OT and/or SLP.  Follow-up vascular surgery  Continue Eliquis and Plavix  Continue statin    1/24 Prevena saturation. Wound consult. Vasc surg to eval as well. Prevena now working.   1/30 vascular to change dressing at some point per patient's wife  2/5 swelling stable. Not getting torsemide nor BB  2/6 pending torsemide administration.     Type 2 diabetes mellitus with hyperglycemia -outpatient medication had been Januvia 1 tablet twice daily     Pain -Tylenol and oxycodone as needed.  Scheduled Robaxin, gabapentin.  Schedule oxycodone 5x daily, morphine contraindicated based on renal function. 1/29 Reduce Oxycodone to QID. 1/30 reduce oxycodone to scheduled 3 times daily.  Decrease methocarbamol to 250 mg 4 times daily. 2/3 Patient refusing scheduled Oxy. Continue only as PRN and d/c scheduled. Make Robaxin PRN.     Last as needed oxycodone use was 1/24  2/4 d/c Gabapentin, not using Oxy reduce frequency to q4 hrs PRN     Anemia - 1/24 Improving. 1/27 Drop to 8's, had a lot of OP from Prevena last week. 1/29 7.7 - expected given prevena OP. Monitor need for transfusion.  1/30 hemoglobin dropped to 7.3.  Discussed with nephrology, they deferred to us to transfuse.  Transfuse less than 7.  Prevena putting out less. 2/5 Stable 7's     Thrombocytopenia - Resolved 1/30      ESRD on PD - Nnephrology following.  On sevelamer - changed due to naussea.  Nephrology increased fill 1/30.  Tunneled cath removal 2/6 per nephrology    Hypermagnesemia - Nephro managing    Hyperphosphatemia - Nephro managing      History of moderate aortic stenosis -stable     Hypertension - On admit: Norvasc 2.5 mg every morning, Lasix 40 mg twice daily, Metoprolol XL 100mg. Norvasc stopped.      2/5 Has not been getting metoprolol nor torsemide for many days (>6) will reduce midodrine, change torsemide to 1400 and add midodrine.   2/6 continue midodrine.     CAD s/p SYDNEE 3x -on aspirin and statin.  Had been on Imdur 30 mg daily     Gout - No meds at home.      Hyperlipidemia -continue statin     Recent left inguinal hernia repair -12/18/2024 Dr. Miller     Bowel - Patient on Senna-docusate for constipation prophylaxis. 1/23 Constipation. 1/29 BM. 2/1 BM. 2/3 Bowel meds increased.     Nausea & Vomiting - Only after lunch. Get KUB - WNL. Schedule Zofran after lunch.  Scheduled Zofran after each meal. 2/3 Declining scheduled Zofran. Make PRN only. 2/4 reduced to only afternoon. 2/5 No vomiting yesterday. Monitor.  2/6 having some emesis using as needed Zofran.  2/6 schedule Zofran again in the afternoon and evening.  Will try without Nepro as seems to correlate with when he gets Nepro.    Hand tremors -LFTs nonconcerning for etiology.  Possibly med induced. 2/5 Improved but still continues. Off meds that may contribute.      BPH - TV/PVR/BS PRN.  Continue Flomax and Hytrin. 1/28 Hold due to low BP. Favor diuresis. 2/3 Low PVRs.      Insomnia -continue trazodone at night as needed        Upcoming Labs/imaging: Per Nephro     DVT PROPHYLAXIS: On Eliquis     HOSPITALIST FOLLOWING: No    Consultants following: Nephrology. D/w nephrology 2/6/2025      CODE STATUS: Full code     DISPO: Home with spouse     SHAE: 2/7/25     MEDS SENT TO: [facility]     DISCHARGE SPECIALIST FOLLOW UP: Vascular surgery and other specialists as  previously scheduled     Patient to scheduled follow up with their PCP within 2 weeks from discharge from the [facility].   ____________________________________    Dr. Mere Klein DO, MS  [facility] - Physical Medicine & Rehabilitation   ____________________________________

## 2025-02-06 NOTE — PROGRESS NOTES
NURSING DAILY NOTE    Name: Edy Bennett   Date of Admission: 1/23/2025   Admitting Diagnosis: PAD (peripheral artery disease) ([facility])  Attending Physician: ROSANNA PATEL D.O.  Allergies: Patient has no known allergies.    Safety  Patient Assist  min-mod  Patient Precautions  Fall Risk  Precaution Comments  LLE wound vac  Bed Transfer Status  Minimal Assist  Toilet Transfer Status   Supervised  Assistive Devices  Rails, Wheelchair  Oxygen  Silicone Nasal Cannula  Diet/Therapeutic Dining  Current Diet Order   Procedures    Diet Order Diet: Renal; Second Modifier: (optional): Cardiac     Pill Administration  whole  Agitated Behavioral Scale     ABS Level of Severity       Fall Risk  Has the patient had a fall this admission?   No  Trinh Spivey Fall Risk Scoring  22, HIGH RISK  Fall Risk Safety Measures  bed alarm, chair alarm, poor balance, and low vision/ hearing    Vitals  Temperature: 36.6 °C (97.8 °F)  Temp src: Oral  Pulse: 79  Respiration: 16  Blood Pressure : 96/57  Blood Pressure MAP (Calculated): 70 MM HG  BP Location: Right, Upper Arm  Patient BP Position: Lying Left Side     Oxygen  Pulse Oximetry: 100 %  O2 (LPM): 2  FiO2%: 21 %  O2 Delivery Device: Silicone Nasal Cannula    Bowel and Bladder  Last Bowel Movement  02/05/25  Stool Type  Type 4: Like a sausage or snake, smooth and soft  Bowel Device  Bathroom  Continent  Bladder: Did not void   Bowel: No movement  Bladder Function  Urine Void (mL): 50 ml  Number of Times Voided: 1  Urine Color: Yellow  Genitourinary Assessment   Bladder Assessment (WDL):  Within Defined Limits  Smith Catheter: Not Applicable  Urinary Symptoms: Anuric/ Dialysis Patient  Urine Color: Yellow  Bladder Device: Urinal  Bladder Scan: Post Void  $ Bladder Scan Results (mL): 32    Skin  Jalen Score   16  Sensory Interventions   Bed Types: Low Airloss  Skin Preventative Measures: Pillows in Use for Support /  Positioning, Seat Cushion in Use on Chair when Out of Bed  Moisture Interventions  Moisturizers/Barriers: Barrier Wipes      Pain  Pain Rating Scale  0 - No Pain  Pain Location  Leg  Pain Location Orientation  Left  Pain Interventions   Rest    ADLs    Bathing   Patient Refused Bathing  Linen Change    (pt refused no feeling well pt was vomiting Rn was informed)  Personal Hygiene  Moist Tory Wipes, Perineal Care  Chlorhexidine Bath   Completed  Oral Care  Brushed Teeth  Teeth/Dentures     Shave     Nutrition Percentage Eaten  Breakfast, Between 50-75% Consumed  Environmental Precautions  Treaded Slipper Socks on Patient, Personal Belongings, Wastebasket, Call Bell etc. in Easy Reach, Bed in Low Position, Communication Sign for Patients & Families  Patient Turns/Positioning  Patient turns self independently side to side without assistance, to offload sacral area  Patient Turns Assistance/Tolerance  Assistance of One  Bed Positions  Bed Controls On, Bed Locked  Head of Bed Elevated  Self regulated      Psychosocial/Neurologic Assessment  Psychosocial Assessment  Psychosocial (WDL):  Within Defined Limits  Patient Behaviors: Anxious  Family Behaviors: Family Present  Neurologic Assessment  Neuro (WDL): Within Defined Limits  Level of Consciousness: Alert  Orientation Level: Oriented X4  Cognition: Follows commands, Appropriate attention/concentration  Speech: Clear  Facial Symmetry: Other (Comment) (no facial droop noted)  Pupil Assesment: No  Motor Function/Sensation Assessment: Motor strength  RUE Sensation: Full sensation  Muscle Strength Right Arm: Good Strength Against Gravity and Moderate Resistance  LUE Sensation: Full sensation  Muscle Strength Left Arm: Good Strength Against Gravity and Moderate Resistance  RLE Sensation: Full sensation  Muscle Strength Right Leg: Good Strength Against Gravity and Moderate Resistance  LLE Sensation: Full sensation, Pain  Muscle Strength Left Leg: Fair Strength against Gravity  but No Resistance  EENT (WDL):  WDL Except    Cardio/Pulmonary Assessment  Edema   LLE Edema: Generalized  Respiratory Breath Sounds  RUL Breath Sounds: Clear  RML Breath Sounds: Clear  RLL Breath Sounds: Diminished  ANAYELI Breath Sounds: Clear  LLL Breath Sounds: Diminished  Cardiac Assessment   Cardiac (WDL):  WDL Except (Hx of Moderate aortic stenosis, HTN, CAD with stents, PAD)

## 2025-02-06 NOTE — CARE PLAN
The patient is Stable - Low risk of patient condition declining or worsening    Shift Goals  Clinical Goals: Safety, Pain control, Wound care  Patient Goals: Rest  Family Goals: VISHAL    Progress made toward(s) clinical / shift goals:      Problem: Knowledge Deficit - Standard  Goal: Patient and family/care givers will demonstrate understanding of plan of care, disease process/condition, diagnostic tests and medications  Outcome: Progressing     Problem: Pain - Standard  Goal: Alleviation of pain or a reduction in pain to the patient’s comfort goal  Outcome: Progressing     Problem: Skin Integrity  Goal: Skin integrity is maintained or improved  Outcome: Progressing   Jalen Scale: 16  Interventions:  -Barrier wipes in use  -Wheelchair cushion in place  -Pillows for positioning and floating heels  -Low air loss mattress in place     Problem: Fall Risk - Rehab  Goal: Patient will remain free from falls  Outcome: Progressing   Tsang Patric Score: 22  Interventions:  -Bed/Strip alarm in place  -Chair strip alarm in place  -Treaded socks on pt  -Fall risk band and communication signs in place  -Bed rails in place  -Call light and belongings within reach     Problem: Self Care  Goal: Patient will have the ability to perform ADLs independently or with assistance  Outcome: Progressing

## 2025-02-07 ENCOUNTER — APPOINTMENT (OUTPATIENT)
Dept: PHYSICAL THERAPY | Facility: REHABILITATION | Age: 80
DRG: 299 | End: 2025-02-07
Attending: PHYSICAL MEDICINE & REHABILITATION
Payer: MEDICARE

## 2025-02-07 ENCOUNTER — APPOINTMENT (OUTPATIENT)
Dept: OCCUPATIONAL THERAPY | Facility: REHABILITATION | Age: 80
DRG: 299 | End: 2025-02-07
Attending: PHYSICAL MEDICINE & REHABILITATION
Payer: MEDICARE

## 2025-02-07 PROBLEM — R60.0 LOWER EXTREMITY EDEMA: Status: ACTIVE | Noted: 2025-02-07

## 2025-02-07 PROBLEM — R11.2 NAUSEA & VOMITING: Status: ACTIVE | Noted: 2025-02-07

## 2025-02-07 LAB
ANION GAP SERPL CALC-SCNC: 18 MMOL/L (ref 7–16)
BUN SERPL-MCNC: 79 MG/DL (ref 8–22)
CALCIUM SERPL-MCNC: 8.6 MG/DL (ref 8.5–10.5)
CHLORIDE SERPL-SCNC: 90 MMOL/L (ref 96–112)
CO2 SERPL-SCNC: 25 MMOL/L (ref 20–33)
CREAT SERPL-MCNC: 8.93 MG/DL (ref 0.5–1.4)
ERYTHROCYTE [DISTWIDTH] IN BLOOD BY AUTOMATED COUNT: 58.9 FL (ref 35.9–50)
GFR SERPLBLD CREATININE-BSD FMLA CKD-EPI: 6 ML/MIN/1.73 M 2
GLUCOSE SERPL-MCNC: 135 MG/DL (ref 65–99)
HCT VFR BLD AUTO: 22.3 % (ref 42–52)
HGB BLD-MCNC: 7 G/DL (ref 14–18)
MCH RBC QN AUTO: 31.3 PG (ref 27–33)
MCHC RBC AUTO-ENTMCNC: 31.4 G/DL (ref 32.3–36.5)
MCV RBC AUTO: 99.6 FL (ref 81.4–97.8)
PLATELET # BLD AUTO: 261 K/UL (ref 164–446)
PMV BLD AUTO: 10.4 FL (ref 9–12.9)
POTASSIUM SERPL-SCNC: 3.1 MMOL/L (ref 3.6–5.5)
RBC # BLD AUTO: 2.24 M/UL (ref 4.7–6.1)
SODIUM SERPL-SCNC: 133 MMOL/L (ref 135–145)
WBC # BLD AUTO: 6.9 K/UL (ref 4.8–10.8)

## 2025-02-07 PROCEDURE — 99223 1ST HOSP IP/OBS HIGH 75: CPT | Performed by: HOSPITALIST

## 2025-02-07 PROCEDURE — A9270 NON-COVERED ITEM OR SERVICE: HCPCS | Performed by: NURSE PRACTITIONER

## 2025-02-07 PROCEDURE — A9270 NON-COVERED ITEM OR SERVICE: HCPCS | Performed by: PHYSICAL MEDICINE & REHABILITATION

## 2025-02-07 PROCEDURE — 700111 HCHG RX REV CODE 636 W/ 250 OVERRIDE (IP): Performed by: PHYSICAL MEDICINE & REHABILITATION

## 2025-02-07 PROCEDURE — 99232 SBSQ HOSP IP/OBS MODERATE 35: CPT | Performed by: PHYSICAL MEDICINE & REHABILITATION

## 2025-02-07 PROCEDURE — 700102 HCHG RX REV CODE 250 W/ 637 OVERRIDE(OP): Performed by: NURSE PRACTITIONER

## 2025-02-07 PROCEDURE — A9270 NON-COVERED ITEM OR SERVICE: HCPCS | Performed by: INTERNAL MEDICINE

## 2025-02-07 PROCEDURE — 770010 HCHG ROOM/CARE - REHAB SEMI PRIVAT*

## 2025-02-07 PROCEDURE — 85027 COMPLETE CBC AUTOMATED: CPT

## 2025-02-07 PROCEDURE — 97530 THERAPEUTIC ACTIVITIES: CPT

## 2025-02-07 PROCEDURE — 36415 COLL VENOUS BLD VENIPUNCTURE: CPT

## 2025-02-07 PROCEDURE — 80048 BASIC METABOLIC PNL TOTAL CA: CPT

## 2025-02-07 PROCEDURE — 700102 HCHG RX REV CODE 250 W/ 637 OVERRIDE(OP): Performed by: INTERNAL MEDICINE

## 2025-02-07 PROCEDURE — 700102 HCHG RX REV CODE 250 W/ 637 OVERRIDE(OP): Performed by: PHYSICAL MEDICINE & REHABILITATION

## 2025-02-07 PROCEDURE — 90945 DIALYSIS ONE EVALUATION: CPT

## 2025-02-07 PROCEDURE — 97110 THERAPEUTIC EXERCISES: CPT

## 2025-02-07 RX ORDER — POTASSIUM CHLORIDE 1500 MG/1
20 TABLET, EXTENDED RELEASE ORAL ONCE
Status: COMPLETED | OUTPATIENT
Start: 2025-02-07 | End: 2025-02-07

## 2025-02-07 RX ORDER — MIDODRINE HYDROCHLORIDE 10 MG/1
10 TABLET ORAL
Status: DISCONTINUED | OUTPATIENT
Start: 2025-02-07 | End: 2025-02-10

## 2025-02-07 RX ADMIN — ONDANSETRON 4 MG: 4 TABLET, ORALLY DISINTEGRATING ORAL at 11:15

## 2025-02-07 RX ADMIN — LANTHANUM CARBONATE 500 MG: 500 TABLET, CHEWABLE ORAL at 09:00

## 2025-02-07 RX ADMIN — ROSUVASTATIN CALCIUM 20 MG: 10 TABLET, FILM COATED ORAL at 20:58

## 2025-02-07 RX ADMIN — MIDODRINE HYDROCHLORIDE 5 MG: 2.5 TABLET ORAL at 11:14

## 2025-02-07 RX ADMIN — POTASSIUM CHLORIDE 20 MEQ: 1500 TABLET, EXTENDED RELEASE ORAL at 09:00

## 2025-02-07 RX ADMIN — Medication 324 MG: at 13:03

## 2025-02-07 RX ADMIN — MIDODRINE HYDROCHLORIDE 5 MG: 2.5 TABLET ORAL at 09:00

## 2025-02-07 RX ADMIN — SENNOSIDES AND DOCUSATE SODIUM 2 TABLET: 50; 8.6 TABLET ORAL at 09:00

## 2025-02-07 RX ADMIN — GUAIFENESIN 600 MG: 600 TABLET ORAL at 09:00

## 2025-02-07 RX ADMIN — CLOPIDOGREL BISULFATE 75 MG: 75 TABLET ORAL at 09:00

## 2025-02-07 RX ADMIN — ONDANSETRON 4 MG: 4 TABLET, ORALLY DISINTEGRATING ORAL at 17:26

## 2025-02-07 RX ADMIN — APIXABAN 5 MG: 5 TABLET, FILM COATED ORAL at 09:00

## 2025-02-07 RX ADMIN — OMEPRAZOLE 40 MG: 20 CAPSULE, DELAYED RELEASE ORAL at 09:00

## 2025-02-07 RX ADMIN — POTASSIUM CHLORIDE 20 MEQ: 1500 TABLET, EXTENDED RELEASE ORAL at 13:03

## 2025-02-07 RX ADMIN — MIDODRINE HYDROCHLORIDE 10 MG: 10 TABLET ORAL at 17:26

## 2025-02-07 RX ADMIN — MONTELUKAST 10 MG: 10 TABLET, FILM COATED ORAL at 20:58

## 2025-02-07 RX ADMIN — LANTHANUM CARBONATE 500 MG: 500 TABLET, CHEWABLE ORAL at 17:26

## 2025-02-07 RX ADMIN — LANTHANUM CARBONATE 500 MG: 500 TABLET, CHEWABLE ORAL at 11:15

## 2025-02-07 RX ADMIN — POLYETHYLENE GLYCOL 3350 1 PACKET: 17 POWDER, FOR SOLUTION ORAL at 09:00

## 2025-02-07 RX ADMIN — APIXABAN 5 MG: 5 TABLET, FILM COATED ORAL at 20:58

## 2025-02-07 RX ADMIN — GENTAMICIN SULFATE: 1 CREAM TOPICAL at 19:15

## 2025-02-07 ASSESSMENT — PAIN DESCRIPTION - PAIN TYPE: TYPE: ACUTE PAIN

## 2025-02-07 ASSESSMENT — ACTIVITIES OF DAILY LIVING (ADL)
TOILET_TRANSFER_DESCRIPTION: GRAB BAR;SUPERVISION FOR SAFETY
BED_CHAIR_WHEELCHAIR_TRANSFER_DESCRIPTION: INCREASED TIME;SET-UP OF EQUIPMENT;SUPERVISION FOR SAFETY;VERBAL CUEING
TOILET_TRANSFER_DESCRIPTION: GRAB BAR;SUPERVISION FOR SAFETY

## 2025-02-07 NOTE — THERAPY
Occupational Therapy  Daily Treatment     Patient Name: Edy Bennett  Age:  79 y.o., Sex:  male  Medical Record #: [id number]  Today's Date: 2/6/2025     Precautions  Precautions: (P) Fall Risk  Comments: (P) LLE wound vac    Safety   ADL Safety : Requires Physical Assist for Safety  Bathroom Safety: Requires Physical Assist for Safety    Subjective    Pt encountered for OT supine in bed with RN at bedside passing meds. SO arrived 5 min into session. Pt reports feeling tired as he just recently had port removed.      Objective       02/06/25 1431   OT Charge Group   OT Therapy Activity (Units) 1   OT Therapeutic Exercise (Units) 1   OT Total Time Spent   OT Individual Total Time Spent (Mins) 30   Precautions   Precautions Fall Risk   Comments LLE wound vac   Functional Level of Assist   Bed, Chair, Wheelchair Transfer Contact Guard Assist   Bed Chair Wheelchair Transfer Description Adaptive equipment;Increased time;Set-up of equipment;Supervision for safety;Verbal cueing  (stand step from bed <> WC using the FWW; SO providing CGA for FT)   Supine Lower Body Exercise   Supine Lower Body Exercises Yes   Hip Abduction 3 sets of 10;Bilateral   Heel Slide 3 sets of 10;Bilateral   Ankle Pumps 3 sets of 10;Bilateral   Interdisciplinary Plan of Care Collaboration   IDT Collaboration with  Family / Caregiver;Occupational Therapist   Patient Position at End of Therapy Seated;Chair Alarm On;Self Releasing Lap Belt Applied;Call Light within Reach;Tray Table within Reach;Phone within Reach;Family / Friend in Room  (SO at side)   Collaboration Comments hand off to OT in room       Assessment    Pt tolerated session fairly focused on bed level ADLs. Able to perform transfer from bed <> WC at the FWW with wife providing CGA. SO demo good safety awareness throughout transfer.        Plan    ADL, IADL, transfers, strengthening, endurance     DME  OT DME Recommendations  Bathroom Equipment:  (TBD)    Passport items to be  completed:  Perform bathroom transfers, complete dressing, complete feeding, get ready for the day, prepare a simple meal, participate in household tasks, adapt home for safety needs, demonstrate home exercise program, complete caregiver training     Occupational Therapy Goals (Active)       Problem: Dressing       Dates: Start:  01/24/25         Goal: STG-Within one week, patient will dress LB with min A       Dates: Start:  01/24/25               Problem: OT Long Term Goals       Dates: Start:  01/24/25         Goal: LTG-By discharge, patient will complete basic self care tasks mod I       Dates: Start:  01/24/25            Goal: LTG-By discharge, patient will perform bathroom transfers mod I       Dates: Start:  01/24/25

## 2025-02-07 NOTE — CARE PLAN
The patient is Stable - Low risk of patient condition declining or worsening    Shift Goals  Clinical Goals: Safety, Wound care, Pain control  Patient Goals: Pain control, Rest  Family Goals: Safety    Progress made toward(s) clinical / shift goals:      Problem: Knowledge Deficit - Standard  Goal: Patient and family/care givers will demonstrate understanding of plan of care, disease process/condition, diagnostic tests and medications  Outcome: Progressing   Provided pt education regarding POC. Pt demonstrates understanding of POC.   Problem: Skin Integrity  Goal: Skin integrity is maintained or improved  Outcome: Progressing   Jalen Scale: 16  Interventions:  -Barrier wipes in use  -Wheelchair cushion in place  -Pillows for positioning and floating heels  -Low air loss mattress in place     Problem: Fall Risk - Rehab  Goal: Patient will remain free from falls  Outcome: Progressing   Tsang Patric Score: 22  Interventions:  -Bed/Strip alarm in place  -Chair strip alarm in place  -Treaded socks on pt  -Fall risk band and communication signs in place  -Bed rails in place  -Call light and belongings within reach

## 2025-02-07 NOTE — CARE PLAN
Problem: Pain - Standard  Goal: Alleviation of pain or a reduction in pain to the patient’s comfort goal  Outcome: Progressing     Problem: Skin Integrity  Goal: Skin integrity is maintained or improved  Outcome: Progressing     Problem: Fall Risk - Rehab  Goal: Patient will remain free from falls  Outcome: Progressing     Problem: Dialysis  Goal: Patient will maintain stable vital signs and fluid balance  Outcome: Progressing     The patient is Stable - Low risk of patient condition declining or worsening    Shift Goals  Clinical Goals: safety  Patient Goals: rest ; pain control  Family Goals: safety

## 2025-02-07 NOTE — CARE PLAN
Problem: Pain - Standard  Goal: Alleviation of pain or a reduction in pain to the patient’s comfort goal  Outcome: Progressing     Problem: Skin Integrity  Goal: Skin integrity is maintained or improved  Outcome: Progressing     Problem: Fall Risk - Rehab  Goal: Patient will remain free from falls  Outcome: Progressing     Problem: Dialysis  Goal: Patient will maintain stable vital signs and fluid balance  Outcome: Progressing     The patient is Stable - Low risk of patient condition declining or worsening    Shift Goals  Clinical Goals: safety ; woundcare  Patient Goals: pain control ; rest  Family Goals: update w/ POC

## 2025-02-07 NOTE — THERAPY
"Occupational Therapy  Daily Treatment     Patient Name: Edy Bennett  Age:  79 y.o., Sex:  male  Medical Record #: [id number]  Today's Date: 2/6/2025     Precautions  Precautions: Fall Risk  Comments: LLE wound vac    Safety   ADL Safety : Requires Physical Assist for Safety  Bathroom Safety: Requires Physical Assist for Safety    Subjective    \"I don't think we're going to finish\"     Objective       02/06/25 1501   Therapy Missed   Missed Therapy (Minutes) 30   Reason For Missed Therapy Medical - Patient has Nausea / Vomiting  (Dr. Klein approved hold)   OT Charge Group   OT Therapy Activity (Units) 2   OT Total Time Spent   OT Individual Total Time Spent (Mins) 30   Neuro-Muscular Treatments   Comments stood in // bars to address balance by playing \"ladder ball\" game for 1 round. Pt walked across // bars 8ft with SPV to collected balls.    Interdisciplinary Plan of Care Collaboration   IDT Collaboration with  Family / Caregiver;Certified Nursing Assistant;Nursing;Physician   Patient Position at End of Therapy Seated  (with CNA in room)   Collaboration Comments Wife present during session; OT notified Dr. Klein and RN that pt was having N/V and nose bleed         Assessment    Session limited by pt having nausea/vomiting and nosebleed. Able to tolerate standing for a short period to play game.        Plan    ADL, IADL, transfers, strengthening, endurance    DME  OT DME Recommendations  Bathroom Equipment:  (TBD)    Passport items to be completed:  Perform bathroom transfers, complete dressing, complete feeding, get ready for the day, prepare a simple meal, participate in household tasks, adapt home for safety needs, demonstrate home exercise program, complete caregiver training     Occupational Therapy Goals (Active)       Problem: Dressing       Dates: Start:  01/24/25         Goal: STG-Within one week, patient will dress LB with min A       Dates: Start:  01/24/25               Problem: OT Long Term Goals  "      Dates: Start:  01/24/25         Goal: LTG-By discharge, patient will complete basic self care tasks mod I       Dates: Start:  01/24/25            Goal: LTG-By discharge, patient will perform bathroom transfers mod I       Dates: Start:  01/24/25

## 2025-02-07 NOTE — PROGRESS NOTES
[facility] -  PROGRESS NOTE               Author: IDA Durham Date & Time: 2/7/2025  12:53 PM     HPI:  79 y.o. male with HTN, ESRD on PD, PVD/ gangrenous left foot, BPH admitted for monitoring s/p  left leg bypass surgery on 1/20 with Dr. Miller. He recently underwent hernial repair and was temporarily transferred to in center HD via [facility]. He has returned to  PD  4 days ago, doing low fill.volumes and has been tolerating at home.   Patient was connected to cycler last night. Total UF reported to be 276ml. Reports of low drain UF alarm last night but no major issues with PD overnight. Pt reports no complaints today morning  No F/C/N/V/CP/SOB.  No melena, hematochezia, hematemesis.  No HA, visual changes,or abdominal pain.     DAILY NEPHROLOGY SUMMARY:    1/21: consult done   1/22: No issues with PD overnight, UF 1137cc, VSS, , feels good, wife at bed side   1/23: No issues with PD overnight 653 cc of UF , not had any BM yesterday and today  --------------------[facility]--------------------  1/24: CCPD 1.5L UF. VSS. PO4 elevated. Pt seen during PD, wife present. C/O pain.  1/25: No issues with PD overnight, net UF 1415ml. No sob, had BM yesterday, states he use O2 when he sleeps to help with nose bleed  1/26:No issues with PD overnight , net  cc, no c/o, still not able to bear weight   1/27: 780 mL Net UF removed with CCPD overnight. Coming out of BR with OT and wife. C/o no protein with meals, only had english muffin this am. BM yesterday. Denies abd pain or tenderness. Denies fever or chills.   1/28: 444 mL Net UF removed over night. Sitting in WC in room, wife BS. Reports feeling better every day. Reports sleeping great. Bps 90-100s.   1/29: VSS BP soft, RA.  No new labs.  No CP SOB, or worsenieng edema.  PD overnight 636ml.  UOP 300ml. Feeling good.   1/30: VSS BP 100s/60s.  SSNa 128->131  Phos 7.2, does have some phosphorous  containing treats in the late evenings.  Dicussed.  No CP SOB or edema besides in wound vac leg.   PD  , wife at bedside and shares that the current fill is a low fill and was only supposed to be temporary after hernia repair.  Usual fill is 2200ml, all green bags.   1/31: VSS BP 100s/60s  PD , felt well with increase fill.   SNa 133 Phos 7.0 No CP SOB, edema localized to woundvac leg and thigh.   2/01:   Disconnected aseptically from PD CyclerEffluent clear and yellow, no signs of bleeding/fibrin clots. 24 hour UF= 678mL. Tolerated well.   2/2: Patient and wife are doing well. Wife states she gave wrong # of cycles to provider yesterday and wanted then reduced back from 7 to 5. Overall doing well without complaints. Looking forward to going home around Feb 7th per patient. Also stated they were told that RT IJ TDC would be removed sometime this week. On-coming provider to ask the Hospitalist to help arrange.   2/3: pt sitting up in bed, spouse at bedside, PD improved with change in rx (reduced to 5 exchanges from 7) great UF: 1.1L, edema has improved, biggest complaint NV, vomits after each meal and fatigue, requesting a reduction in gabapentin, also reports constipation, had to dig out stool yesterday with finger  2/4 net UF 1500 ml overnight. Potassium is 3.3 I am replacing. Patient reports doing better today, wife at bedside. Patient feels much better now since Erica made med changes yesterday  2/5: 1437 mL Net UF removed overnight. Wife BS. Patient oob in BR performing adls independently. Reports improvement every day.   2/6: doing well, eating lunch with wife, net UF 1273 ml  2/7: patient doing well, low K I will order additional dose of potassium    REVIEW OF SYSTEMS:    10 point ROS reviewed and is as per HPI/daily summary or otherwise negative    PMH/PSH/SH/FH: Reviewed and unchanged since admission note  CURRENT MEDICATIONS: Reviewed from admission to present day    VS:  /59  "Comment: notified RN Smita  Pulse 85   Temp 36.8 °C (98.3 °F) (Oral)   Resp 18   Ht 1.707 m (5' 7.2\")   Wt 73.1 kg (161 lb 3.2 oz)   SpO2 95%   BMI 25.10 kg/m²   Physical Exam  Vitals and nursing note reviewed.   Constitutional:       Appearance: Normal appearance.   HENT:      Head: Normocephalic and atraumatic.      Nose: Nose normal.      Mouth/Throat:      Mouth: Mucous membranes are moist.   Eyes:      General: No scleral icterus.     Conjunctiva/sclera: Conjunctivae normal.   Cardiovascular:      Rate and Rhythm: Normal rate.      Comments: R TDC   Pulmonary:      Effort: Pulmonary effort is normal.   Abdominal:      General: There is no distension.      Palpations: Abdomen is soft.      Comments: PD cath in RLQ    Musculoskeletal:         General: No deformity.      Cervical back: Neck supple.      Right lower leg: No edema.      Left lower leg: Edema present.      Comments: Mild edema in LLE wound vac in place     Skin:     General: Skin is warm and dry.      Findings: No rash.   Neurological:      General: No focal deficit present.      Mental Status: He is alert and oriented to person, place, and time. Mental status is at baseline.   Psychiatric:         Mood and Affect: Mood normal.         Behavior: Behavior normal. Behavior is cooperative.         FLUID BALANCE:  In: 340 [P.O.:240; Other:100]  Out: 1373     LABS:  Recent Labs     02/05/25  0510 02/07/25  0521   SODIUM 134* 133*   POTASSIUM 3.4* 3.1*   CHLORIDE 89* 90*   CO2 26 25   GLUCOSE 197* 135*   BUN 81* 79*   CREATININE 8.75* 8.93*   CALCIUM 8.7 8.6       Recent Labs     02/05/25  0510 02/07/25  0521   WBC 7.7 6.9   RBC 2.40* 2.24*   HEMOGLOBIN 7.6* 7.0*   HEMATOCRIT 24.3* 22.3*   .3* 99.6*   MCH 31.7 31.3   MCHC 31.3* 31.4*   RDW 58.9* 58.9*   PLATELETCT 292 261   MPV 10.2 10.4           IMPRESSION:  # ESRD on CCPD  - Pt was transitioned to  iHD during masoud-op  s/p inguinal hernial repair,   - Still has RIJ PC  - Returned to PD " 1/17/25   - Home PD prescription CCPD 2.5% alternating with 1.5% 5  no last fill. Patient wife stated she initially told last Nephrologist wrong script. Now all green bags and 5 cycles, not 7.   # HTN, controlled   - Goal BP < 140/90  - torsemide 10mg daily   # Anemia of CKD  - Goal Hgb 10-11  - Below goal   - On PO iron  - ROSALIND weekly   # CKD-MBD  - Ca 8.6   - PO4 7.4 -> 6.6 -> 7.0  - will DC sevelamer to assess GI tolerance, start lanthanum 500mg TID, titrate up prn  # Chronic hypoxic respiratory failure   - On home oxygen 2-2.5L at night   # Nausea/Vomiting   - ddx: binder intolerance, switched to lanthanum   - constipation, started miralax BID   - pill burden, increase omeprazole to 40mg daily   - reduced gabapentin to 100mg daily   # Hyponatremia, mild   - torsemide daily   - UF with PD, on all 2.5%           SUGGESTIONS:  - Continue CCPD during hospital stay (now on oupt rx and tolerating well)  - KCL 20 mEQ Qday + additional dose today  - Reduced gapapentin to 100mg daily, ween off as tolerated (2/03)  - PD exit site care per protocol   - Changed ROSALIND to SQ weekly as now on PD consistently   - No dietary protein restrictions  - Dose all meds per ESRD  - PO iron, consider DC if constipation continues    - Renal diet, low phos   - TDC removed 2/06  - L arm AVF not fully matured,  will need intervention outpt   - Avoid Fleet enema in dialysis patients   - Labs daily  - Discharge planning underway, plan for 2/7 home with family now 2/11    Please call or page Nephrology with questions or concerns  Thank you,

## 2025-02-07 NOTE — PROGRESS NOTES
NURSING DAILY NOTE    Name: Edy Bennett   Date of Admission: 1/23/2025   Admitting Diagnosis: PAD (peripheral artery disease) ([facility])  Attending Physician: ROSANNA PATEL D.O.  Allergies: Patient has no known allergies.    Safety  Patient Assist  min-mod  Patient Precautions  Fall Risk  Precaution Comments  LLE wound vac  Bed Transfer Status  Contact Guard Assist  Toilet Transfer Status   Supervised  Assistive Devices  Rails, Wheelchair  Oxygen  Nasal Cannula  Diet/Therapeutic Dining  Current Diet Order   Procedures    Diet Order Diet: Renal; Second Modifier: (optional): Cardiac     Pill Administration  whole  Agitated Behavioral Scale     ABS Level of Severity       Fall Risk  Has the patient had a fall this admission?   No  Trinh Spivey Fall Risk Scoring  22, HIGH RISK  Fall Risk Safety Measures  bed alarm, chair alarm, poor balance, and low vision/ hearing    Vitals  Temperature: 36.8 °C (98.3 °F)  Temp src: Oral  Pulse: 85  Respiration: 18  Blood Pressure : 107/59 (notified RN Smita)  Blood Pressure MAP (Calculated): 75 MM HG  BP Location: Right, Upper Arm  Patient BP Position: Supine     Oxygen  Pulse Oximetry: 95 %  O2 (LPM): 2 (notified RN Smita)  FiO2%: 21 %  O2 Delivery Device: Nasal Cannula    Bowel and Bladder  Last Bowel Movement  02/06/25  Stool Type  Type 5: Soft blob with clear cut edges (passed easily)  Bowel Device  Bathroom  Continent  Bladder: Did not void   Bowel: No movement  Bladder Function  Urine Void (mL): 100 ml  Number of Times Voided: 1  Urine Color: Unable To Evaluate  Genitourinary Assessment   Bladder Assessment (WDL):  Within Defined Limits  Smith Catheter: Not Applicable  Urinary Symptoms: Anuric/ Dialysis Patient  Urine Color: Unable To Evaluate  Bladder Device: Urinal  Bladder Scan: Post Void  $ Bladder Scan Results (mL): 32    Skin  Jalen Score   16  Sensory Interventions   Bed Types: Low Airloss  Skin  Preventative Measures: Pillows in Use for Support / Positioning  Moisture Interventions  Moisturizers/Barriers: Barrier Wipes      Pain  Pain Rating Scale  0 - No Pain  Pain Location  Leg  Pain Location Orientation  Left  Pain Interventions   Declines    ADLs    Bathing   Patient Refused Bathing (notified RN Smita)  Linen Change   Partial  Personal Hygiene  Moist Tory Wipes  Chlorhexidine Bath   Completed  Oral Care  Brushed Teeth  Teeth/Dentures     Shave     Nutrition Percentage Eaten  *  * Meal *  *, Lunch, Between % Consumed  Environmental Precautions  Treaded Slipper Socks on Patient, Personal Belongings, Wastebasket, Call Bell etc. in Easy Reach, Bed in Low Position, Communication Sign for Patients & Families  Patient Turns/Positioning  Patient turns self independently side to side without assistance, to offload sacral area  Patient Turns Assistance/Tolerance  Assistance of One  Bed Positions  Bed Controls On, Bed Locked  Head of Bed Elevated  Self regulated      Psychosocial/Neurologic Assessment  Psychosocial Assessment  Psychosocial (WDL):  Within Defined Limits  Patient Behaviors: Anxious  Family Behaviors: Family Present  Neurologic Assessment  Neuro (WDL): Within Defined Limits  Level of Consciousness: Alert  Orientation Level: Oriented X4  Cognition: Follows commands, Appropriate attention/concentration  Speech: Clear  Facial Symmetry: Other (Comment) (no facial droop noted)  Pupil Assesment: No  Motor Function/Sensation Assessment: Motor strength  RUE Sensation: Full sensation  Muscle Strength Right Arm: Good Strength Against Gravity and Moderate Resistance  LUE Sensation: Full sensation  Muscle Strength Left Arm: Good Strength Against Gravity and Moderate Resistance  RLE Sensation: Full sensation  Muscle Strength Right Leg: Good Strength Against Gravity and Moderate Resistance  LLE Sensation: Full sensation  Muscle Strength Left Leg: Fair Strength against Gravity but No Resistance  EENT  (WDL):  WDL Except    Cardio/Pulmonary Assessment  Edema   RLE Edema: 1+  LLE Edema: Generalized, 2+  Respiratory Breath Sounds  RUL Breath Sounds: Clear  RML Breath Sounds: Clear  RLL Breath Sounds: Diminished  ANAYELI Breath Sounds: Clear  LLL Breath Sounds: Diminished  Cardiac Assessment   Cardiac (WDL):  WDL Except (Hx of Moderate aortic stenosis, HTN, CAD with stents, PAD)

## 2025-02-07 NOTE — ASSESSMENT & PLAN NOTE
Pt has had N/V since coming to the Rehab  Has had N/V almost every days (usually occurs once per day and usually in the afternoon)  Gets a little abd pain/cramps right before  Has been afebrile  No leukocytosis  Having some BM's  No difficulties eating or swallowing  O2 sats good on RA  Hep profile: showed Hep B surface Ab  H. Pylori: neg  US RUQ: borderline hepatomegaly; small amount of perihepatic fluid; no gallstones or biliary dilatation  AXR: Mild to moderate colonic stool burden; nonobstructive bowel gas pattern  CXR no acute process  Lipase wnl  NH4: wnl  LFT's wnl  TFT's: 3.91/0.77  U/A abn --> f/u C&S  F/U HIDA scan  ? 2nd to constipation  ? 2nd to Fosrenol (has a high SE)  Off Fe supplements  Off Mucinex  Off Toprol XL (has a < 1% SE ; was taking at home)  On Prilosec  On SImethicone  On Scopolamine patch  S/P Lactulose bid x 2 dose today (pt has had good response before)  Consider an EGD  Note: Midodrine (but was on at home) has  a < 1% SE of N/V  Note: pt doesn't tolerate Miralax

## 2025-02-07 NOTE — PROGRESS NOTES
NURSING DAILY NOTE    Name: Edy Bennett   Date of Admission: 1/23/2025   Admitting Diagnosis: PAD (peripheral artery disease) ([facility])  Attending Physician: ROSANNA PATEL D.O.  Allergies: Patient has no known allergies.    Safety  Patient Assist  min-mod  Patient Precautions  Fall Risk  Precaution Comments  LLE wound vac  Bed Transfer Status  Contact Guard Assist  Toilet Transfer Status   Supervised  Assistive Devices  Rails, Wheelchair  Oxygen  None - Room Air  Diet/Therapeutic Dining  Current Diet Order   Procedures    Diet Order Diet: Renal; Second Modifier: (optional): Cardiac     Pill Administration  whole  Agitated Behavioral Scale     ABS Level of Severity       Fall Risk  Has the patient had a fall this admission?   No  Trinh Spivey Fall Risk Scoring  22, HIGH RISK  Fall Risk Safety Measures  bed alarm and chair alarm    Vitals  Temperature: 36.7 °C (98 °F)  Temp src: Oral  Pulse: 70  Respiration: 16  Blood Pressure : 100/58  Blood Pressure MAP (Calculated): 72 MM HG  BP Location: Right, Upper Arm  Patient BP Position: Sitting     Oxygen  Pulse Oximetry: 94 %  O2 (LPM): 2  FiO2%: 21 %  O2 Delivery Device: None - Room Air    Bowel and Bladder  Last Bowel Movement  02/06/25  Stool Type  Type 5: Soft blob with clear cut edges (passed easily)  Bowel Device  Bathroom  Continent  Bladder: Did not void   Bowel: No movement  Bladder Function  Urine Void (mL): 100 ml  Number of Times Voided: 1  Urine Color: Unable To Evaluate  Genitourinary Assessment   Bladder Assessment (WDL):  Within Defined Limits  Smith Catheter: Not Applicable  Urinary Symptoms: Anuric/ Dialysis Patient  Urine Color: Unable To Evaluate  Bladder Device: Urinal  Bladder Scan: Post Void  $ Bladder Scan Results (mL): 32    Skin  Jalen Score   16  Sensory Interventions   Bed Types: Low Airloss  Skin Preventative Measures: Pillows in Use for Support / Positioning  Moisture  Interventions  Moisturizers/Barriers: Barrier Wipes      Pain  Pain Rating Scale  0 - No Pain  Pain Location  Leg  Pain Location Orientation  Left  Pain Interventions   Declines    ADLs    Bathing   Patient Refused Bathing  Linen Change    (pt refused no feeling well pt was vomiting Rn was informed)  Personal Hygiene  Moist Tory Wipes, Perineal Care  Chlorhexidine Bath   Completed  Oral Care  Brushed Teeth  Teeth/Dentures     Shave     Nutrition Percentage Eaten  *  * Meal *  *, Lunch, Between % Consumed  Environmental Precautions  Treaded Slipper Socks on Patient, Personal Belongings, Wastebasket, Call Bell etc. in Easy Reach, Bed in Low Position, Communication Sign for Patients & Families  Patient Turns/Positioning  Patient turns self independently side to side without assistance, to offload sacral area  Patient Turns Assistance/Tolerance  Assistance of One  Bed Positions  Bed Controls On, Bed Locked  Head of Bed Elevated  Self regulated      Psychosocial/Neurologic Assessment  Psychosocial Assessment  Psychosocial (WDL):  Within Defined Limits  Patient Behaviors: Anxious  Family Behaviors: Family Present  Neurologic Assessment  Neuro (WDL): Within Defined Limits  Level of Consciousness: Alert  Orientation Level: Oriented X4  Cognition: Follows commands, Appropriate attention/concentration  Speech: Clear  Facial Symmetry: Other (Comment) (no facial droop noted)  Pupil Assesment: No  Motor Function/Sensation Assessment: Motor strength  RUE Sensation: Full sensation  Muscle Strength Right Arm: Good Strength Against Gravity and Moderate Resistance  LUE Sensation: Full sensation  Muscle Strength Left Arm: Good Strength Against Gravity and Moderate Resistance  RLE Sensation: Full sensation  Muscle Strength Right Leg: Good Strength Against Gravity and Moderate Resistance  LLE Sensation: Full sensation, Pain  Muscle Strength Left Leg: Fair Strength against Gravity but No Resistance  EENT (WDL):  WDL  Except    Cardio/Pulmonary Assessment  Edema   LLE Edema: Generalized  Respiratory Breath Sounds  RUL Breath Sounds: Clear  RML Breath Sounds: Clear  RLL Breath Sounds: Diminished  ANAYELI Breath Sounds: Clear  LLL Breath Sounds: Diminished  Cardiac Assessment   Cardiac (WDL):  Within Defined Limits

## 2025-02-07 NOTE — PROGRESS NOTES
[facility] Services Progress Note     CCPD initiated at 1925 as ordered per YVETTE Curran x 8.5 hours using 2.5% PD solution.      Aseptically connected PD cycler to PD catheter.   Exit site cleansed, dressing changed CDI, gentamicin cream applied as ordered, no signs of infection.     Pt A/Ox4, VSS, denies SOB, CP or any abdominal discomfort.      Report given to Primary RN with on-call Dialysis RN contact information ([phone]).      Initial drain : 8 mL     Please call for any issues with hemodynamic instability/persistent alarms/patient not tolerating therapy.

## 2025-02-07 NOTE — PROGRESS NOTES
[facility] Services Progress Notes    CCPD ordered by Dr. Granados. Disconnected aseptically from PD Cycler at 0745.    Pt stable, VSS, alert and oriented.  Effluent clear and yellow, no signs of bleeding/fibrin clots.  No alarms on machine was noted or reported before disconnection.    24 hour UF= 597mL (I.Drain= 8mL + Total UF= 589mL - Last fill= 0)    Report given to Lino ARANDA

## 2025-02-07 NOTE — THERAPY
Occupational Therapy  Daily Treatment     Patient Name: Edy Bennett  Age:  79 y.o., Sex:  male  Medical Record #: [id number]  Today's Date: 2/7/2025     Precautions  Precautions: (P) Fall Risk  Comments: (P) LLE wound vac    Safety   ADL Safety : Requires Physical Assist for Safety  Bathroom Safety: Requires Physical Assist for Safety    Subjective    Pt seated in w/c upon arrival. Pt pleasant and cooperative, agreeable to therapy. SO present at end of session.     Objective       02/07/25 0931   OT Charge Group   Charges Yes   OT Therapeutic Exercise (Units) 2   OT Total Time Spent   OT Individual Total Time Spent (Mins) 30   Precautions   Precautions Fall Risk   Comments LLE wound vac   Vitals   O2 Delivery Device None - Room Air   Functional Level of Assist   Bed, Chair, Wheelchair Transfer Standby Assist   Bed Chair Wheelchair Transfer Description Increased time;Set-up of equipment;Supervision for safety;Verbal cueing  (stand pivot from w/c to EOB)   Sitting Upper Body Exercises   Sitting Upper Body Exercises Yes   Chest Press 3 sets of 10;Bilateral;Weight (See Comments for lbs)  (15 lbs on equalizer)   Lat Pull 3 sets of 10;Bilateral;Weight (See Comments for lbs)  (25 lbs on equalizer)   Bilateral Row 3 sets of 10;Bilateral;Weight (See Comments for lbs)  (35 lbs on equalizer)   Bed Mobility    Sit to Supine Modified Independent   Interdisciplinary Plan of Care Collaboration   IDT Collaboration with  Family / Caregiver   Patient Position at End of Therapy In Bed;Call Light within Reach;Tray Table within Reach;Phone within Reach;Family / Friend in Room   Collaboration Comments SO present at end of session     Therapeutic Exercise  Purpose: to improve strength and to improve functional endurance  Interventions:   Upper body exercises:   Seated program -   Chest press 3 sets of 10, Bilateral, and Weight 15 lbs on equalizer  Lat pull 3 sets of 10, Bilateral, and Weight 25 lbs on equalizer  Bilateral row 3 sets  of 10, Bilateral, and Weight 35 lbs on equalizer  Description of Intervention(s): completed seated in w/c    Fine Motor Skills  Purpose: to improve the function, safety and independence with tasks requiring fine motor skills and to improve precision, finger dexterity, and coordination of the muscles of the hand  Interventions:   Thera putty  Description of Intervention(s): provided pt with yellow theraputty for gentle strengthening of LUE.      Assessment    Pt completed BUE exercise to improve overall strength and cardiovascular endurance in order to maximize independence and safety with ADL/IADLs and functional mobility tasks. Pt completed to best of their abilities with no complaints of pain.        Plan    Continue primary OT POC.    DME  OT DME Recommendations  Bathroom Equipment:  (TBD)    Passport items to be completed:  Perform bathroom transfers, complete dressing, complete feeding, get ready for the day, prepare a simple meal, participate in household tasks, adapt home for safety needs, demonstrate home exercise program, complete caregiver training     Occupational Therapy Goals (Active)       Problem: Dressing       Dates: Start:  01/24/25         Goal: STG-Within one week, patient will dress LB with min A       Dates: Start:  01/24/25               Problem: OT Long Term Goals       Dates: Start:  01/24/25         Goal: LTG-By discharge, patient will complete basic self care tasks mod I       Dates: Start:  01/24/25            Goal: LTG-By discharge, patient will perform bathroom transfers mod I       Dates: Start:  01/24/25

## 2025-02-07 NOTE — PROGRESS NOTES
"  Physical Medicine & Rehabilitation Progress Note    Encounter Date: 2/7/2025    Chief Complaint: PVD s/p bypass    Interval Events (Subjective):  BP stable low, not getting diuretic nor BB  BM 2/7  Voids some, oliguric  MAR: Not getting Torsemide or Toprol XL    Seen and examined in his room with wife. More vomiting today and loose BM in sleep overnight. Still unclear what it could be. Wondering if iron could cause it, but isnt getting everyday. Has had N/V almost everyday he's been here.       ROS: 14 point ROS negative unless otherwise specified in the HPI    Objective:  VITAL SIGNS: /59 Comment: notified RN Smita  Pulse 85   Temp 36.8 °C (98.3 °F) (Oral)   Resp 18   Ht 1.707 m (5' 7.2\")   Wt 73.1 kg (161 lb 3.2 oz)   SpO2 95%   BMI 25.10 kg/m²     GEN: No apparent distress  HEENT: Head normocephalic, atraumatic.  Sclera nonicteric bilaterally, no ocular discharge appreciated bilaterally.  CV: Left lower extremity with + edema.   PULMONARY: Breathing nonlabored on room air, no respiratory accessory muscle use.  Not requiring supplemental oxygen.  SKIN:   2/2 Clinical Picture    PSYCH: Mood and affect within normal limits.  NEURO: Awake alert.  Conversational.  Logical thought content.  flapping hand tremors      Laboratory Values:  Recent Results (from the past 72 hours)   Basic Metabolic Panel    Collection Time: 02/05/25  5:10 AM   Result Value Ref Range    Sodium 134 (L) 135 - 145 mmol/L    Potassium 3.4 (L) 3.6 - 5.5 mmol/L    Chloride 89 (L) 96 - 112 mmol/L    Co2 26 20 - 33 mmol/L    Glucose 197 (H) 65 - 99 mg/dL    Bun 81 (H) 8 - 22 mg/dL    Creatinine 8.75 (HH) 0.50 - 1.40 mg/dL    Calcium 8.7 8.5 - 10.5 mg/dL    Anion Gap 19.0 (H) 7.0 - 16.0   CBC WITHOUT DIFFERENTIAL    Collection Time: 02/05/25  5:10 AM   Result Value Ref Range    WBC 7.7 4.8 - 10.8 K/uL    RBC 2.40 (L) 4.70 - 6.10 M/uL    Hemoglobin 7.6 (L) 14.0 - 18.0 g/dL    Hematocrit 24.3 (L) 42.0 - 52.0 %    .3 (H) 81.4 - " 97.8 fL    MCH 31.7 27.0 - 33.0 pg    MCHC 31.3 (L) 32.3 - 36.5 g/dL    RDW 58.9 (H) 35.9 - 50.0 fL    Platelet Count 292 164 - 446 K/uL    MPV 10.2 9.0 - 12.9 fL   PHOSPHORUS    Collection Time: 02/05/25  5:10 AM   Result Value Ref Range    Phosphorus 4.8 (H) 2.5 - 4.5 mg/dL   ESTIMATED GFR    Collection Time: 02/05/25  5:10 AM   Result Value Ref Range    GFR (CKD-EPI) 6 (A) >60 mL/min/1.73 m 2   Basic Metabolic Panel    Collection Time: 02/07/25  5:21 AM   Result Value Ref Range    Sodium 133 (L) 135 - 145 mmol/L    Potassium 3.1 (L) 3.6 - 5.5 mmol/L    Chloride 90 (L) 96 - 112 mmol/L    Co2 25 20 - 33 mmol/L    Glucose 135 (H) 65 - 99 mg/dL    Bun 79 (H) 8 - 22 mg/dL    Creatinine 8.93 (HH) 0.50 - 1.40 mg/dL    Calcium 8.6 8.5 - 10.5 mg/dL    Anion Gap 18.0 (H) 7.0 - 16.0   CBC WITHOUT DIFFERENTIAL    Collection Time: 02/07/25  5:21 AM   Result Value Ref Range    WBC 6.9 4.8 - 10.8 K/uL    RBC 2.24 (L) 4.70 - 6.10 M/uL    Hemoglobin 7.0 (L) 14.0 - 18.0 g/dL    Hematocrit 22.3 (L) 42.0 - 52.0 %    MCV 99.6 (H) 81.4 - 97.8 fL    MCH 31.3 27.0 - 33.0 pg    MCHC 31.4 (L) 32.3 - 36.5 g/dL    RDW 58.9 (H) 35.9 - 50.0 fL    Platelet Count 261 164 - 446 K/uL    MPV 10.4 9.0 - 12.9 fL   ESTIMATED GFR    Collection Time: 02/07/25  5:21 AM   Result Value Ref Range    GFR (CKD-EPI) 6 (A) >60 mL/min/1.73 m 2       Medications:  Scheduled Medications   Medication Dose Frequency    ondansetron  4 mg BID WITH MEALS    metoprolol SR  50 mg QHS    midodrine  5 mg TID WITH MEALS    torsemide  10 mg Q DAY    potassium chloride SA  20 mEq DAILY    polyethylene glycol/lytes  1 Packet BID    omeprazole  40 mg DAILY    lanthanum carbonate  500 mg TID WITH MEALS    [START ON 2/8/2025] epoetin  4,000 Units Q7 DAYS    ferrous gluconate  324 mg Q48HRS    Pharmacy Consult Request  1 Each PHARMACY TO DOSE    apixaban  5 mg BID    clopidogrel  75 mg DAILY    guaiFENesin ER  600 mg Q12HRS    montelukast  10 mg QHS    rosuvastatin  20 mg QHS     senna-docusate  2 Tablet BID    gentamicin   DAILY AT 1800     PRN medications: formulation r, bisacodyl, oxyCODONE immediate-release **OR** oxyCODONE immediate-release, methocarbamol, sevelamer carbonate, lidocaine, hydrALAZINE, lactulose, bisacodyl EC, magnesium hydroxide, carboxymethylcellulose, benzocaine-menthol, mag hydrox-al hydrox-simeth, ondansetron **OR** ondansetron, traZODone, sodium chloride, acetaminophen, senna-docusate **AND** polyethylene glycol/lytes    Diet:  Current Diet Order   Procedures    Diet Order Diet: Renal; Second Modifier: (optional): Cardiac       Medical Decision Making and Plan:  S/p left popliteal to posterior tibial artery bypass Dr. Miller 1/20/2025  PT and OT for mobility and ADLs. Per guidelines, 15 hours per week between PT, OT and/or SLP.  Follow-up vascular surgery  Continue Eliquis and Plavix  Continue statin    1/24 Prevena saturation. Wound consult. Vasc surg to eval as well. Prevena now working.   1/30 vascular to change dressing at some point per patient's wife  2/5 swelling stable. Not getting torsemide nor BB  2/6 pending torsemide administration.     Type 2 diabetes mellitus with hyperglycemia -outpatient medication had been Januvia 1 tablet twice daily     Pain -Tylenol and oxycodone as needed.  Scheduled Robaxin, gabapentin.  Schedule oxycodone 5x daily, morphine contraindicated based on renal function. 1/29 Reduce Oxycodone to QID. 1/30 reduce oxycodone to scheduled 3 times daily.  Decrease methocarbamol to 250 mg 4 times daily. 2/3 Patient refusing scheduled Oxy. Continue only as PRN and d/c scheduled. Make Robaxin PRN.     Last as needed oxycodone use was 1/24  2/4 d/c Gabapentin, not using Oxy reduce frequency to q4 hrs PRN     Anemia - 1/24 Improving. 1/27 Drop to 8's, had a lot of OP from Prevena last week. 1/29 7.7 - expected given prevena OP. Monitor need for transfusion.  1/30 hemoglobin dropped to 7.3.  Discussed with nephrology, they deferred to us to  transfuse.  Transfuse less than 7.  Prevena putting out less. 2/5 Stable 7's     Thrombocytopenia - Resolved 1/30     ESRD on PD - Nnephrology following.  On sevelamer - changed due to naussea.  Nephrology increased fill 1/30.  Tunneled cath removal 2/6 per nephrology    Hypermagnesemia - Nephro managing    Hyperphosphatemia - Nephro managing      History of moderate aortic stenosis -stable     Hypertension - On admit: Norvasc 2.5 mg every morning, Lasix 40 mg twice daily, Metoprolol XL 100mg. Norvasc stopped.      2/5 Has not been getting metoprolol nor torsemide for many days (>6) will reduce midodrine, change torsemide to 1400 and add midodrine.   2/6 continue midodrine.  2/7 Not getting beta blocker. Stop for now, hasn't been given in > 7 days. Increase Midodrine and continue Torsemide so can improve swelling.      CAD s/p SYDNEE 3x -on aspirin and statin.  Had been on Imdur 30 mg daily     Gout - No meds at home.      Hyperlipidemia -continue statin     Recent left inguinal hernia repair -12/18/2024 Dr. Miller     Bowel - Patient on Senna-docusate for constipation prophylaxis. 1/23 Constipation. 1/29 BM. 2/1 BM. 2/3 Bowel meds increased.     Nausea & Vomiting - Only after lunch. Get KUB - WNL. Schedule Zofran after lunch.  Scheduled Zofran after each meal. 2/3 Declining scheduled Zofran. Make PRN only. 2/4 reduced to only afternoon. 2/5 No vomiting yesterday. Monitor.  2/6 having some emesis using as needed Zofran.  2/6 schedule Zofran again in the afternoon and evening.  Will try without Nepro as seems to correlate with when he gets Nepro. 2/7 Down grade diet per patient preference, cannot tolerate regular textures. Consult hospitalist.     Hand tremors -LFTs nonconcerning for etiology.  Possibly med induced. 2/5 Improved but still continues. Off meds that may contribute.      BPH - TV/PVR/BS PRN.  Continue Flomax and Hytrin. 1/28 Hold due to low BP. Favor diuresis. 2/3 Low PVRs.      Insomnia -continue  trazodone at night as needed        Upcoming Labs/imaging: Per Nephro     DVT PROPHYLAXIS: On Eliquis     HOSPITALIST FOLLOWING: Yes    Consultants following: Nephrology. D/w nephrology 2/7/2025      CODE STATUS: Full code     DISPO: Home with spouse     SHAE: 2/11/25     MEDS SENT TO: [facility]     DISCHARGE SPECIALIST FOLLOW UP:   Vascular surgery - 2/13  Transplant team     Patient to scheduled follow up with their PCP within 2 weeks from discharge from the [facility].   ____________________________________    Dr. Mere Klein DO, MS  [facility] - Physical Medicine & Rehabilitation   ____________________________________

## 2025-02-07 NOTE — THERAPY
"Physical Therapy   Daily Treatment     Patient Name: Edy Bennett  Age:  79 y.o., Sex:  male  Medical Record #: [id number]  Today's Date: 2/7/2025     Precautions  Precautions: Fall Risk  Comments: LLE wound vac    Subjective    Pt was seated on toilet upon arrival and agreeable to treatment.  Pt's spouse present during session.      Objective       02/07/25 1431   Therapy Missed   Missed Therapy (Minutes) 15   Reason For Missed Therapy Medical - Patient on Hold from Therapy   PT Charge Group   PT Therapeutic Activities (Units) 1   PT Total Time Spent   PT Individual Total Time Spent (Mins) 15   Precautions   Precautions Fall Risk   Transfer Functional Level of Assist   Toilet Transfers Supervised   Toilet Transfer Description Grab bar;Supervision for safety   Interdisciplinary Plan of Care Collaboration   IDT Collaboration with  Nursing;Physician   Patient Position at End of Therapy In Bed;Call Light within Reach;Tray Table within Reach;Phone within Reach;Family / Friend in Room   Collaboration Comments Pt with nausea and vomiting     Pt assisted back to w/c after having a BM.  Pt then with increased nausea and vomiting.  Pt placed on hold from therapy after discussion with MD.     Assessment    Pt continues to be limited secondary to nausea and vomiting.   Strengths: Able to follow instructions, Alert and oriented, Independent prior level of function, Supportive family, Pleasant and cooperative, Motivated for self care and independence  Barriers: Pain, Pain poorly managed, Limited mobility    Plan  LE mobility exercises in supine and sitting to prep soft tissues for functional movements, progressive gait in parallel bars and walker, practice car transfers      DME  PT DME Recommendations  Wheelchair: 16\" Width, Elevating Leg Rests  Cushion: Standard     Passport items to be completed:  Get in/out of bed safely, in/out of a vehicle, safely use mobility device, walk or wheel around home/community, navigate up " and down stairs, show how to get up/down from the ground, ensure home is accessible, demonstrate HEP, complete caregiver training    Physical Therapy Problems (Active)       Problem: Mobility       Dates: Start:  02/03/25         Goal: STG-Within one week, patient will ambulate household distance >50 ft SBA FWW indoors        Dates: Start:  02/03/25               Problem: Mobility Transfers       Dates: Start:  02/03/25         Goal: STG-Within one week, patient will transfer bed to chair SBA consistently SPT FWW       Dates: Start:  02/03/25               Problem: PT-Long Term Goals       Dates: Start:  01/24/25         Goal: LTG-By discharge, patient will ambulate with  feet SBA       Dates: Start:  01/24/25            Goal: LTG-By discharge, patient will transfer one surface to another with FWW supervised       Dates: Start:  01/24/25            Goal: LTG-By discharge, patient will perform home exercise program       Dates: Start:  01/24/25            Goal: LTG-By discharge, patient will ambulate up/down 4-6 stairs with bilateral rails supervised        Dates: Start:  01/24/25            Goal: LTG-By discharge, patient will transfer in/out of a car with FWW supervised        Dates: Start:  01/24/25

## 2025-02-07 NOTE — CONSULTS
DATE OF SERVICE:  2/7/25    REQUESTING PHYSICIAN:  Mere Klein DO    CHIEF COMPLAINT / REASON FOR CONSULTATION:   Nausea & Vomiting    HISTORY OF PRESENT ILLNESS:  This is a 80 y/o male with a PMH significant for moderate aortic stenosis, hypertension, CAD with stents on eliquis, ESRD on PD, BPH and severe PAD who presented on 1/20/2025 for elective left bypass graft.  01/20/2025.  The patient was taken to the OR for left popliteal to posterior tibial artery bypass by Marco Antonio Miller MD. Patient seen by PT with recommendation for post-acute placement.       Because of the patient's weakness and debility, Rehab was consulted, evaluated the patient, and was deemed a good Rehab candidate.  The patient was transferred over to the Rehab facility on x1/23/25.      The patient denies fever, chills, nausea, vomiting, headaches, blurry vision, or chest pain.  Pt has complaints of vomiting daily.    REVIEW OF SYSTEMS: All review of systems are negative pre AMA and CMS criteria except for that stated in the HPI.    PAST MEDICAL HISTORY:  Past Medical History:   Diagnosis Date    A-V fistula ([facility])     Left Arm    Arrhythmia 03/13/2024    Due to kidney failure treatment    Breath shortness     with exertion    Cataract     shon iol    Congestive heart failure ([facility])     Diabetes ([facility])     not on any medication at this time, being monitored at dialysis    Dialysis patient ([facility])     monday wednesday friday at [facility]    Dialysis patient ([facility]) 01/17/2025    Starting Peritoneal Dialysis @ Home on 1/17/2025. Follows Nephrologist MD Milian.    Foot ulcer ([facility])     left foot. being seen by wound care    Hemorrhagic disorder ([facility])     plavix eliquis    High cholesterol     Hypertension 05/24/2024    states controlled    DARIEN (obstructive sleep apnea) 01/17/2025    O2 @ 2.5L Per Patient's Wife.    Pain     Peritoneal dialysis catheter in place ([facility]) 01/17/2025    Pneumonia     3/24    Renal disorder     Sleep apnea  01/10/2025    Home sleep study done 1/3/2025 - no results yet.    Stroke ([facility]) 2021    tia       PAST SURGICAL HISTORY:  Past Surgical History:   Procedure Laterality Date    DC VEIN BYPASS GRAFT,FEM-TIBIAL Left 1/20/2025    Procedure: LEFT LOWER EXTREMITY BYPASS, POPLITIAL TO TIBIAL BYPASS;  Surgeon: Marco Antonio Miller M.D.;  Location: SURGERY [facility];  Service: Vascular    AORTOGRAM WITH RUNOFF  1/14/2025    Procedure: AORTOGRAM WITH RUNOFF, right peroneal artery intravascular lithotripsy and stent placement;  Surgeon: Marco Antonio Miller M.D.;  Location: SURGERY [facility];  Service: Vascular    INGUINAL HERNIA LAPAROSCOPIC N/A 12/18/2024    Procedure: LAPAROSCOPIC LEFT INGUINAL HERNIA REPAIR WITH MESH, LAPAROSCOPIC REPOSITIONING OF PERITONEAL DIALYSIS CATHETER;  Surgeon: Marco Antonio Miller M.D.;  Location: SURGERY [facility];  Service: Vascular    CATH PLACEMENT CAPD N/A 12/18/2024    Procedure: REPOSITION, CATHETER, CAPD;  Surgeon: Marco Antonio Miller M.D.;  Location: SURGERY [facility];  Service: Vascular    CATH PLACEMENT CAPD N/A 09/16/2024    Procedure: LAPAROSCOPIC INSERTION OF PERITONEAL DIALYSIS CATHETER PLACEMENT;  Surgeon: Marco Antonio Miller M.D.;  Location: SURGERY SAME DAY [facility];  Service: Vascular    AV FISTULA CREATION Left 07/01/2024    Procedure: CREATION OF LEFT UPPER EXTREMITY DIALYSIS FISTULA;  Surgeon: Shilo Mercedes M.D.;  Location: SURGERY [facility];  Service: General    OTHER ORTHOPEDIC SURGERY  2004    bunion r foot    OTHER      tonsils adenoids    OTHER      dialysis catheter in upper right chest    OTHER      shon iol    OTHER CARDIAC SURGERY      cardiac stents       No Known Allergies    CURRENT MEDICATIONS:    Current Facility-Administered Medications:     midodrine    ondansetron    formulation r    torsemide    potassium chloride SA    bisacodyl    oxyCODONE immediate-release **OR** oxyCODONE immediate-release    methocarbamol    polyethylene glycol/lytes     omeprazole    lanthanum carbonate    [START ON 2/8/2025] epoetin    sevelamer carbonate    Pharmacy Consult Request    lidocaine    hydrALAZINE    lactulose    bisacodyl EC    magnesium hydroxide    carboxymethylcellulose    benzocaine-menthol    mag hydrox-al hydrox-simeth    ondansetron **OR** ondansetron    traZODone    sodium chloride    apixaban    clopidogrel    montelukast    rosuvastatin    acetaminophen    senna-docusate **AND** polyethylene glycol/lytes    gentamicin    Social History     Socioeconomic History    Marital status:     Highest education level: Bachelor's degree (e.g., BA, AB, BS)   Tobacco Use    Smoking status: Never    Smokeless tobacco: Never    Tobacco comments:     Used chewing tobacco many years ago.   Vaping Use    Vaping status: Never Used   Substance and Sexual Activity    Alcohol use: Not Currently    Drug use: Never     Social Drivers of Health     Financial Resource Strain: Low Risk  (1/25/2025)    Overall Financial Resource Strain (CARDIA)     Difficulty of Paying Living Expenses: Not hard at all   Food Insecurity: No Food Insecurity (1/25/2025)    Hunger Vital Sign     Worried About Running Out of Food in the Last Year: Never true     Ran Out of Food in the Last Year: Never true   Transportation Needs: No Transportation Needs (1/25/2025)    PRAPARE - Transportation     Lack of Transportation (Medical): No     Lack of Transportation (Non-Medical): No   Physical Activity: Inactive (1/25/2025)    Exercise Vital Sign     Days of Exercise per Week: 0 days     Minutes of Exercise per Session: 0 min   Stress: Stress Concern Present (1/25/2025)    [employer] - Occupational Stress Questionnaire     Feeling of Stress : To some extent   Social Connections: Unknown (1/25/2025)    Social Connection and Isolation Panel [NHANES]     Frequency of Communication with Friends and Family: More than three times a week     Frequency of Social Gatherings with  Friends and Family: Twice a week     Attends [employer] Services: Patient declined     Active Member of Clubs or Organizations: Yes     Attends Club or Organization Meetings: More than 4 times per year     Marital Status:    Intimate Partner Violence: Not At Risk (1/23/2025)    Humiliation, Afraid, Rape, and Kick questionnaire     Fear of Current or Ex-Partner: No     Emotionally Abused: No     Physically Abused: No     Sexually Abused: No   Housing Stability: Low Risk  (1/25/2025)    Housing Stability Vital Sign     Unable to Pay for Housing in the Last Year: No     Number of Times Moved in the Last Year: 0     Homeless in the Last Year: No       FAMILY HISTORY:  was reviewed and is not pertinent to this consultation.    PHYSICAL EXAMINATION:  VITAL SIGNS:  Temp is 98.3, blood pressure is 107/59, heart rate is 85, respiratory rate is 18.  GENERAL:  Patient was lying in bed in no distress.  HEENT:  Pupils were equal, round and reactive to light and accomodation.  Oral mucosa was pink and moist.  NECK:  Soft.  Supple.  No JVD.  HEART:  Regular rate and rhythm.  Normal S1 and S2.  No murmurs were appreciated.  LUNGS:  Are clear to auscultation bilaterally.  ABDOMEN:  Soft, non tender, non distended.  Bowels sound were positive in all four quadrants.  EXTREMITIES:  No clubbing, cyanosis.  There was noted LLE edema and RLE pedal swelling.  NEUROLOGIC:  Cranial nerves two through twelve were grossly intact.    LABS:  Lab Results   Component Value Date/Time    SODIUM 133 (L) 02/07/2025 05:21 AM    POTASSIUM 3.1 (L) 02/07/2025 05:21 AM    CHLORIDE 90 (L) 02/07/2025 05:21 AM    CO2 25 02/07/2025 05:21 AM    GLUCOSE 135 (H) 02/07/2025 05:21 AM    BUN 79 (H) 02/07/2025 05:21 AM    CREATININE 8.93 (HH) 02/07/2025 05:21 AM      Lab Results   Component Value Date/Time    WBC 6.9 02/07/2025 05:21 AM    RBC 2.24 (L) 02/07/2025 05:21 AM    HEMOGLOBIN 7.0 (L) 02/07/2025 05:21 AM    HEMATOCRIT 22.3 (L) 02/07/2025 05:21 AM     MCV 99.6 (H) 02/07/2025 05:21 AM    MCH 31.3 02/07/2025 05:21 AM    MCHC 31.4 (L) 02/07/2025 05:21 AM    MPV 10.4 02/07/2025 05:21 AM    NEUTSPOLYS 82.00 (H) 01/31/2025 05:27 AM    LYMPHOCYTES 8.40 (L) 01/31/2025 05:27 AM    MONOCYTES 6.50 01/31/2025 05:27 AM    EOSINOPHILS 2.00 01/31/2025 05:27 AM    BASOPHILS 0.50 01/31/2025 05:27 AM      Lab Results   Component Value Date/Time    PROTHROMBTM 16.3 (H) 01/20/2025 12:33 PM    INR 1.31 (H) 01/20/2025 12:33 PM          Anemia in chronic renal disease  Hb: 7.0 (2/7)  On Fe supplements  On Epoetin  Management per Nephrology    ESRD on peritoneal dialysis ([facility])  Continues on PD  Fluid and electrolyte management per Nephro    Essential hypertension  BP ok  Cont Toprol XL  On Midodrine  Cont to monitor    Nausea & vomiting  Pt has had N/V since coming to the Rehab  Has had N/V - 8 out of 10 days (usually occurs once per day)  Gets a little abd pain/cramps right before  Has been afebrile  No leukocytosis  O2 sats good on RA  Hep profile: showed Hep B surface Ab  Having some BM's  Will check AXR, U/A, CXR, Lipase, NH4, TFT's, LFT's  Will check for H. Pylori  Will get US RUQ  ? 2nd to Fe supplements (has SE) --> will d/c  ? 2nd to Mucinex (low SE) --> will d/c  Note: Midodrine & Toprol XL (but was on at home) has  a < 1% SE of N/V  On Prilosec    PAD (peripheral artery disease) ([facility])  S/P elective left bypass graft    Lower extremity edema  Has LLE edema  Has RLE pedal swelling  2nd to recent LLE surgery and ESRD  On Diuretic  Management per Nephro        This case has been discussed with the attending Physiatrist.    Thank you for the consultation.  Will follow the patient with you.

## 2025-02-07 NOTE — THERAPY
Occupational Therapy  Daily Treatment     Patient Name: Edy Bennett  Age:  79 y.o., Sex:  male  Medical Record #: [id number]  Today's Date: 2/7/2025     Precautions  Precautions: Fall Risk  Comments: LLE wound vac    Safety   ADL Safety : Requires Physical Assist for Safety  Bathroom Safety: Requires Physical Assist for Safety    Subjective    Pt states his hearing aid is not working correctly.      Objective       02/07/25 1301   Therapy Missed   Missed Therapy (Minutes) 45   Reason For Missed Therapy Medical - Patient has Nausea / Vomiting  (Dr. Klein approved med hold)   OT Charge Group   OT Therapy Activity (Units) 1   OT Total Time Spent   OT Individual Total Time Spent (Mins) 15   Functional Level of Assist   Toilet Transfers Supervised  (OT watched wife assist pt with transfer and cleared wife for bathroom transfers.)   Toilet Transfer Description Grab bar;Supervision for safety   Interdisciplinary Plan of Care Collaboration   IDT Collaboration with  Nursing;Family / Caregiver   Collaboration Comments SO demonstrated how to don/doff foot rests on WC. RN in giving meds at beginning of session.         Assessment    Limited this afternoon by nausea/vomiting. OT cleared pt's wife for bathroom transfers.        Plan    ADL, endurance, strength    DME  OT DME Recommendations  Bathroom Equipment:  (TBD)    Passport items to be completed:  Perform bathroom transfers, complete dressing, complete feeding, get ready for the day, prepare a simple meal, participate in household tasks, adapt home for safety needs, demonstrate home exercise program, complete caregiver training     Occupational Therapy Goals (Active)       Problem: Dressing       Dates: Start:  01/24/25         Goal: STG-Within one week, patient will dress LB with min A       Dates: Start:  01/24/25               Problem: OT Long Term Goals       Dates: Start:  01/24/25         Goal: LTG-By discharge, patient will complete basic self care tasks mod I        Dates: Start:  01/24/25            Goal: LTG-By discharge, patient will perform bathroom transfers mod I       Dates: Start:  01/24/25

## 2025-02-08 ENCOUNTER — APPOINTMENT (OUTPATIENT)
Dept: PHYSICAL THERAPY | Facility: REHABILITATION | Age: 80
DRG: 299 | End: 2025-02-08
Attending: PHYSICAL MEDICINE & REHABILITATION
Payer: MEDICARE

## 2025-02-08 ENCOUNTER — APPOINTMENT (OUTPATIENT)
Dept: RADIOLOGY | Facility: REHABILITATION | Age: 80
DRG: 299 | End: 2025-02-08
Attending: HOSPITALIST
Payer: MEDICARE

## 2025-02-08 ENCOUNTER — APPOINTMENT (OUTPATIENT)
Dept: OCCUPATIONAL THERAPY | Facility: REHABILITATION | Age: 80
DRG: 299 | End: 2025-02-08
Attending: PHYSICAL MEDICINE & REHABILITATION
Payer: MEDICARE

## 2025-02-08 ENCOUNTER — HOSPITAL ENCOUNTER (OUTPATIENT)
Dept: RADIOLOGY | Facility: MEDICAL CENTER | Age: 80
End: 2025-02-08
Attending: HOSPITALIST
Payer: MEDICARE

## 2025-02-08 LAB
ALBUMIN SERPL BCP-MCNC: 2.7 G/DL (ref 3.2–4.9)
ALBUMIN/GLOB SERPL: 1 G/DL
ALP SERPL-CCNC: 113 U/L (ref 30–99)
ALT SERPL-CCNC: 26 U/L (ref 2–50)
AMMONIA PLAS-SCNC: 16 UMOL/L (ref 11–45)
AMORPHOUS CRYSTALS 1764: PRESENT /HPF
ANION GAP SERPL CALC-SCNC: 18 MMOL/L (ref 7–16)
APPEARANCE UR: ABNORMAL
AST SERPL-CCNC: 31 U/L (ref 12–45)
BACTERIA #/AREA URNS HPF: ABNORMAL /HPF
BILIRUB SERPL-MCNC: 0.6 MG/DL (ref 0.1–1.5)
BILIRUB UR QL STRIP.AUTO: NEGATIVE
BUN SERPL-MCNC: 82 MG/DL (ref 8–22)
CA OXALATE CRYSTAL  1765: PRESENT /HPF
CALCIUM ALBUM COR SERPL-MCNC: 9.6 MG/DL (ref 8.5–10.5)
CALCIUM SERPL-MCNC: 8.6 MG/DL (ref 8.5–10.5)
CASTS URNS QL MICRO: ABNORMAL /LPF (ref 0–2)
CHLORIDE SERPL-SCNC: 90 MMOL/L (ref 96–112)
CO2 SERPL-SCNC: 26 MMOL/L (ref 20–33)
COLOR UR: YELLOW
CREAT SERPL-MCNC: 8.99 MG/DL (ref 0.5–1.4)
EPITHELIAL CELLS 1715: ABNORMAL /HPF (ref 0–5)
EPITHELIAL CELLS RENAL  1715R: PRESENT /HPF
ERYTHROCYTE [DISTWIDTH] IN BLOOD BY AUTOMATED COUNT: 60 FL (ref 35.9–50)
GFR SERPLBLD CREATININE-BSD FMLA CKD-EPI: 6 ML/MIN/1.73 M 2
GLOBULIN SER CALC-MCNC: 2.8 G/DL (ref 1.9–3.5)
GLUCOSE SERPL-MCNC: 131 MG/DL (ref 65–99)
GLUCOSE UR STRIP.AUTO-MCNC: NEGATIVE MG/DL
GRANULAR CASTS  1716G: PRESENT /LPF
H PYLORI AG STL QL IA: NOT DETECTED
HCT VFR BLD AUTO: 22.6 % (ref 42–52)
HGB BLD-MCNC: 7 G/DL (ref 14–18)
KETONES UR STRIP.AUTO-MCNC: NEGATIVE MG/DL
LEUKOCYTE ESTERASE UR QL STRIP.AUTO: ABNORMAL
LIPASE SERPL-CCNC: 15 U/L (ref 11–82)
MCH RBC QN AUTO: 30.8 PG (ref 27–33)
MCHC RBC AUTO-ENTMCNC: 31 G/DL (ref 32.3–36.5)
MCV RBC AUTO: 99.6 FL (ref 81.4–97.8)
MICRO URNS: ABNORMAL
NITRITE UR QL STRIP.AUTO: NEGATIVE
PH UR STRIP.AUTO: 5.5 [PH] (ref 5–8)
PLATELET # BLD AUTO: 275 K/UL (ref 164–446)
PMV BLD AUTO: 9.9 FL (ref 9–12.9)
POTASSIUM SERPL-SCNC: 3.5 MMOL/L (ref 3.6–5.5)
PROT SERPL-MCNC: 5.5 G/DL (ref 6–8.2)
PROT UR QL STRIP: 100 MG/DL
RBC # BLD AUTO: 2.27 M/UL (ref 4.7–6.1)
RBC # URNS HPF: ABNORMAL /HPF (ref 0–2)
RBC UR QL AUTO: NEGATIVE
SODIUM SERPL-SCNC: 134 MMOL/L (ref 135–145)
SP GR UR STRIP.AUTO: 1.02
T4 FREE SERPL-MCNC: 0.77 NG/DL (ref 0.93–1.7)
TSH SERPL-ACNC: 3.91 UIU/ML (ref 0.35–5.5)
UROBILINOGEN UR STRIP.AUTO-MCNC: 1 EU/DL
WBC # BLD AUTO: 7.5 K/UL (ref 4.8–10.8)
WBC #/AREA URNS HPF: ABNORMAL /HPF

## 2025-02-08 PROCEDURE — A9270 NON-COVERED ITEM OR SERVICE: HCPCS | Performed by: NURSE PRACTITIONER

## 2025-02-08 PROCEDURE — 90945 DIALYSIS ONE EVALUATION: CPT

## 2025-02-08 PROCEDURE — 74018 RADEX ABDOMEN 1 VIEW: CPT

## 2025-02-08 PROCEDURE — 71045 X-RAY EXAM CHEST 1 VIEW: CPT

## 2025-02-08 PROCEDURE — 84439 ASSAY OF FREE THYROXINE: CPT

## 2025-02-08 PROCEDURE — 97110 THERAPEUTIC EXERCISES: CPT

## 2025-02-08 PROCEDURE — 76705 ECHO EXAM OF ABDOMEN: CPT

## 2025-02-08 PROCEDURE — 83690 ASSAY OF LIPASE: CPT

## 2025-02-08 PROCEDURE — 87086 URINE CULTURE/COLONY COUNT: CPT

## 2025-02-08 PROCEDURE — 84443 ASSAY THYROID STIM HORMONE: CPT

## 2025-02-08 PROCEDURE — 700102 HCHG RX REV CODE 250 W/ 637 OVERRIDE(OP): Performed by: PHYSICAL MEDICINE & REHABILITATION

## 2025-02-08 PROCEDURE — 87338 HPYLORI STOOL AG IA: CPT

## 2025-02-08 PROCEDURE — 36415 COLL VENOUS BLD VENIPUNCTURE: CPT

## 2025-02-08 PROCEDURE — 97116 GAIT TRAINING THERAPY: CPT

## 2025-02-08 PROCEDURE — 700111 HCHG RX REV CODE 636 W/ 250 OVERRIDE (IP): Performed by: PHYSICAL MEDICINE & REHABILITATION

## 2025-02-08 PROCEDURE — 81001 URINALYSIS AUTO W/SCOPE: CPT

## 2025-02-08 PROCEDURE — 82140 ASSAY OF AMMONIA: CPT

## 2025-02-08 PROCEDURE — 700111 HCHG RX REV CODE 636 W/ 250 OVERRIDE (IP): Mod: JZ | Performed by: NURSE PRACTITIONER

## 2025-02-08 PROCEDURE — 97530 THERAPEUTIC ACTIVITIES: CPT

## 2025-02-08 PROCEDURE — 770010 HCHG ROOM/CARE - REHAB SEMI PRIVAT*

## 2025-02-08 PROCEDURE — A9270 NON-COVERED ITEM OR SERVICE: HCPCS | Performed by: PHYSICAL MEDICINE & REHABILITATION

## 2025-02-08 PROCEDURE — 85027 COMPLETE CBC AUTOMATED: CPT

## 2025-02-08 PROCEDURE — 700102 HCHG RX REV CODE 250 W/ 637 OVERRIDE(OP): Performed by: NURSE PRACTITIONER

## 2025-02-08 PROCEDURE — 97535 SELF CARE MNGMENT TRAINING: CPT

## 2025-02-08 PROCEDURE — 99232 SBSQ HOSP IP/OBS MODERATE 35: CPT | Performed by: HOSPITALIST

## 2025-02-08 PROCEDURE — 80053 COMPREHEN METABOLIC PANEL: CPT

## 2025-02-08 RX ORDER — SIMETHICONE 125 MG
125 TABLET,CHEWABLE ORAL 3 TIMES DAILY PRN
Status: DISCONTINUED | OUTPATIENT
Start: 2025-02-08 | End: 2025-02-14 | Stop reason: HOSPADM

## 2025-02-08 RX ORDER — LACTULOSE 10 G/15ML
30 SOLUTION ORAL 2 TIMES DAILY
Status: DISPENSED | OUTPATIENT
Start: 2025-02-09 | End: 2025-02-10

## 2025-02-08 RX ADMIN — MIDODRINE HYDROCHLORIDE 10 MG: 10 TABLET ORAL at 08:41

## 2025-02-08 RX ADMIN — LANTHANUM CARBONATE 500 MG: 500 TABLET, CHEWABLE ORAL at 17:30

## 2025-02-08 RX ADMIN — LANTHANUM CARBONATE 500 MG: 500 TABLET, CHEWABLE ORAL at 08:41

## 2025-02-08 RX ADMIN — ROSUVASTATIN CALCIUM 20 MG: 10 TABLET, FILM COATED ORAL at 21:11

## 2025-02-08 RX ADMIN — APIXABAN 5 MG: 5 TABLET, FILM COATED ORAL at 21:11

## 2025-02-08 RX ADMIN — SENNOSIDES AND DOCUSATE SODIUM 2 TABLET: 50; 8.6 TABLET ORAL at 21:11

## 2025-02-08 RX ADMIN — POTASSIUM CHLORIDE 20 MEQ: 1500 TABLET, EXTENDED RELEASE ORAL at 08:41

## 2025-02-08 RX ADMIN — MIDODRINE HYDROCHLORIDE 10 MG: 10 TABLET ORAL at 16:09

## 2025-02-08 RX ADMIN — ONDANSETRON 4 MG: 4 TABLET, ORALLY DISINTEGRATING ORAL at 16:09

## 2025-02-08 RX ADMIN — MIDODRINE HYDROCHLORIDE 10 MG: 10 TABLET ORAL at 12:15

## 2025-02-08 RX ADMIN — SENNOSIDES AND DOCUSATE SODIUM 2 TABLET: 50; 8.6 TABLET ORAL at 08:41

## 2025-02-08 RX ADMIN — OXYCODONE 5 MG: 5 TABLET ORAL at 17:12

## 2025-02-08 RX ADMIN — APIXABAN 5 MG: 5 TABLET, FILM COATED ORAL at 08:41

## 2025-02-08 RX ADMIN — LANTHANUM CARBONATE 500 MG: 500 TABLET, CHEWABLE ORAL at 12:15

## 2025-02-08 RX ADMIN — ONDANSETRON 4 MG: 4 TABLET, ORALLY DISINTEGRATING ORAL at 12:15

## 2025-02-08 RX ADMIN — GENTAMICIN SULFATE: 1 CREAM TOPICAL at 20:00

## 2025-02-08 RX ADMIN — CLOPIDOGREL BISULFATE 75 MG: 75 TABLET ORAL at 08:41

## 2025-02-08 RX ADMIN — EPOETIN ALFA-EPBX 4000 UNITS: 4000 INJECTION, SOLUTION INTRAVENOUS; SUBCUTANEOUS at 16:08

## 2025-02-08 RX ADMIN — OMEPRAZOLE 40 MG: 20 CAPSULE, DELAYED RELEASE ORAL at 08:41

## 2025-02-08 RX ADMIN — MONTELUKAST 10 MG: 10 TABLET, FILM COATED ORAL at 21:11

## 2025-02-08 ASSESSMENT — ENCOUNTER SYMPTOMS
HALLUCINATIONS: 0
FEVER: 0
VOMITING: 1
PALPITATIONS: 0
HEADACHES: 0
NAUSEA: 0
DIZZINESS: 0
SHORTNESS OF BREATH: 0
BLURRED VISION: 0

## 2025-02-08 ASSESSMENT — PAIN DESCRIPTION - PAIN TYPE
TYPE: ACUTE PAIN
TYPE: ACUTE PAIN

## 2025-02-08 ASSESSMENT — GAIT ASSESSMENTS
DISTANCE (FEET): 55
GAIT LEVEL OF ASSIST: CONTACT GUARD ASSIST
DEVIATION: ANTALGIC;STEP TO;DECREASED BASE OF SUPPORT;BRADYKINETIC;DECREASED HEEL STRIKE;DECREASED TOE OFF
ASSISTIVE DEVICE: FRONT WHEEL WALKER

## 2025-02-08 ASSESSMENT — ACTIVITIES OF DAILY LIVING (ADL)
TOILET_TRANSFER_DESCRIPTION: GRAB BAR;SUPERVISION FOR SAFETY
BED_CHAIR_WHEELCHAIR_TRANSFER_DESCRIPTION: INCREASED TIME;INITIAL PREPARATION FOR TASK;SET-UP OF EQUIPMENT;SQUAT PIVOT TRANSFER TO WHEELCHAIR;SUPERVISION FOR SAFETY

## 2025-02-08 NOTE — THERAPY
Physical Therapy   Daily Treatment     Patient Name: Edy Bennett  Age:  79 y.o., Sex:  male  Medical Record #: [id number]  Today's Date: 2/8/2025     Precautions  Precautions: (P) Fall Risk  Comments: (P) LLE wound vac    Subjective    Pt in room seated in w/c with wife present upon entry and agreeable to therapy session.     Objective       02/08/25 1401   PT Charge Group   PT Gait Training (Units) 1   PT Therapeutic Activities (Units) 1   PT Total Time Spent   PT Individual Total Time Spent (Mins) 30   Precautions   Precautions Fall Risk   Comments LLE wound vac   Pain 0 - 10 Group   Location Leg   Location Orientation Left   Pain Rating Scale (NPRS) 4   Description Aching   Comfort Goal Comfort with Movement;Sleep Comfortably   Therapist Pain Assessment During Activity;Prior to Activity   Gait Functional Level of Assist    Gait Level Of Assist Contact Guard Assist   Assistive Device Front Wheel Walker   Distance (Feet) 55   # of Times Distance was Traveled 3   Deviation Antalgic;Step To;Decreased Base Of Support;Bradykinetic;Decreased Heel Strike;Decreased Toe Off   Wheelchair Functional Level of Assist   Wheelchair Assist Supervised   Transfer Functional Level of Assist   Bed, Chair, Wheelchair Transfer Supervised   Bed Chair Wheelchair Transfer Description Squat pivot transfer to wheelchair;Supervision for safety;Set-up of equipment;Increased time;Initial preparation for task   Bed Mobility    Supine to Sit Modified Independent   Sit to Supine Modified Independent   Sit to Stand Contact Guard Assist   Scooting Modified Independent   Rolling Modified Independent   Interdisciplinary Plan of Care Collaboration   IDT Collaboration with  Family / Caregiver   Patient Position at End of Therapy In Bed;Bed Alarm On;Call Light within Reach;Tray Table within Reach;Phone within Reach;Family / Friend in Room   Collaboration Comments Pt wife present for tx         Assessment    Pt tolerated session fairly, making  "steady progress with strength, endurance, and increased tolerance with wb on LLE for better quality with gait and distance. Pt able to perform 3 bouts of ambulation with RW and took seated breaks each bout. Pt making improvements with gait, bed mobility and transfers during session.    Strengths: Able to follow instructions, Alert and oriented, Independent prior level of function, Supportive family, Pleasant and cooperative, Motivated for self care and independence  Barriers: Pain, Pain poorly managed, Limited mobility    Plan    LE mobility exercises in supine and sitting to prep soft tissues for functional movements, progressive gait in parallel bars and walker, practice car transfers         DME  PT DME Recommendations  Wheelchair: 16\" Width, Elevating Leg Rests  Cushion: Standard    Passport items to be completed:  Get in/out of bed safely, in/out of a vehicle, safely use mobility device, walk or wheel around home/community, navigate up and down stairs, show how to get up/down from the ground, ensure home is accessible, demonstrate HEP, complete caregiver training    Physical Therapy Problems (Active)       Problem: Mobility       Dates: Start:  02/03/25         Goal: STG-Within one week, patient will ambulate household distance >50 ft SBA FWW indoors        Dates: Start:  02/03/25               Problem: Mobility Transfers       Dates: Start:  02/03/25         Goal: STG-Within one week, patient will transfer bed to chair SBA consistently SPT FWW       Dates: Start:  02/03/25               Problem: PT-Long Term Goals       Dates: Start:  01/24/25         Goal: LTG-By discharge, patient will ambulate with  feet SBA       Dates: Start:  01/24/25            Goal: LTG-By discharge, patient will transfer one surface to another with FWW supervised       Dates: Start:  01/24/25            Goal: LTG-By discharge, patient will perform home exercise program       Dates: Start:  01/24/25            Goal: LTG-By " discharge, patient will ambulate up/down 4-6 stairs with bilateral rails supervised        Dates: Start:  01/24/25            Goal: LTG-By discharge, patient will transfer in/out of a car with FWW supervised        Dates: Start:  01/24/25

## 2025-02-08 NOTE — PROGRESS NOTES
[facility] -  PROGRESS NOTE               Author: Genny Granados M.D. Date & Time: 2/8/2025  12:06 PM     HPI:  79 y.o. male with HTN, ESRD on PD, PVD/ gangrenous left foot, BPH admitted for monitoring s/p  left leg bypass surgery on 1/20 with Dr. Miller. He recently underwent hernial repair and was temporarily transferred to in center HD via RIJ PC. He has returned to  PD  4 days ago, doing low fill.volumes and has been tolerating at home.   Patient was connected to cycler last night. Total UF reported to be 276ml. Reports of low drain UF alarm last night but no major issues with PD overnight. Pt reports no complaints today morning  No F/C/N/V/CP/SOB.  No melena, hematochezia, hematemesis.  No HA, visual changes,or abdominal pain.     DAILY NEPHROLOGY SUMMARY:  See note from 1/31 for prior summaries   2/01: Disconnected aseptically from PD CyclerEffluent clear and yellow, no signs of bleeding/fibrin clots. 24 hour UF= 678mL. Tolerated well.   2/2: Patient and wife are doing well. Wife states she gave wrong # of cycles to provider yesterday and wanted then reduced back from 7 to 5. Overall doing well without complaints. Looking forward to going home around Feb 7th per patient. Also stated they were told that RT IJ TDC would be removed sometime this week. On-coming provider to ask the Hospitalist to help arrange.   2/3: pt sitting up in bed, spouse at bedside, PD improved with change in rx (reduced to 5 exchanges from 7) great UF: 1.1L, edema has improved, biggest complaint NV, vomits after each meal and fatigue, requesting a reduction in gabapentin, also reports constipation, had to dig out stool yesterday with finger  2/4 net UF 1500 ml overnight. Potassium is 3.3 I am replacing. Patient reports doing better today, wife at bedside. Patient feels much better now since Erica made med changes yesterday  2/5: 1437 mL Net UF removed overnight. Wife BS. Patient oob in BR performing adls  "independently. Reports improvement every day.   2/6: doing well, eating lunch with wife, net UF 1273 ml  2/7: patient doing well, low K I will order additional dose of potassium  2/8: NAEO, no complaints, wife and friends at bedside; Feels good    REVIEW OF SYSTEMS:    10 point ROS reviewed and is as per HPI/daily summary or otherwise negative    PMH/PSH/SH/FH: Reviewed and unchanged since admission note  CURRENT MEDICATIONS: Reviewed from admission to present day    VS:  /59   Pulse 79   Temp 36.8 °C (98.2 °F) (Oral)   Resp 18   Ht 1.707 m (5' 7.2\")   Wt 73.1 kg (161 lb 3.2 oz)   SpO2 96%   BMI 25.10 kg/m²   Physical Exam  Nursing note reviewed.   Constitutional:       Appearance: Normal appearance.   Eyes:      General: No scleral icterus.  Cardiovascular:      Comments: No edema  Pulmonary:      Effort: Pulmonary effort is normal.   Abdominal:      General: There is no distension.   Musculoskeletal:         General: Deformity (LLE with wound vac) present.   Skin:     Findings: No rash.   Neurological:      Mental Status: He is alert.   Psychiatric:         Behavior: Behavior is cooperative.       FLUID BALANCE:  In: 350 [P.O.:270; Other:80]  Out: 647     LABS:  Recent Labs     02/07/25  0521 02/08/25  0627   SODIUM 133* 134*   POTASSIUM 3.1* 3.5*   CHLORIDE 90* 90*   CO2 25 26   GLUCOSE 135* 131*   BUN 79* 82*   CREATININE 8.93* 8.99*   CALCIUM 8.6 8.6       Recent Labs     02/07/25  0521 02/08/25  0627   WBC 6.9 7.5   RBC 2.24* 2.27*   HEMOGLOBIN 7.0* 7.0*   HEMATOCRIT 22.3* 22.6*   MCV 99.6* 99.6*   MCH 31.3 30.8   MCHC 31.4* 31.0*   RDW 58.9* 60.0*   PLATELETCT 261 275   MPV 10.4 9.9     IMPRESSION:  # ESRD on CCPD  - Pt was transitioned to  iHD during masoud-op  s/p inguinal hernial repair,   - Still has RIJ PC  - Returned to PD 1/17/25   - Home PD prescription CCPD 2.5% alternating with 1.5% 5  no last fill. Patient wife stated she initially told last Nephrologist wrong script. Now all green bags " and 5 cycles, not 7.   # HTN, controlled   - Goal BP < 140/90  - torsemide 10mg daily   # Anemia of CKD  - Goal Hgb 10-11  - Below goal   - On PO iron  - ROSALIND weekly   # CKD-MBD  - Ca 8.6   - PO4 7.4 -> 6.6 -> 7.0  - Lanthanum 500mg TID, titrate up prn  # Chronic hypoxic respiratory failure   - On home oxygen 2-2.5L at night   # Nausea/Vomiting   - ddx: binder intolerance, switched to lanthanum   - constipation, started miralax BID   - pill burden, increase omeprazole to 40mg daily   - reduced gabapentin to 100mg daily   # Hyponatremia, mild   - torsemide daily   - UF with PD, on all 2.5%      SUGGESTIONS:  - Continue CCPD during hospital stay (now on oupt rx and tolerating well)  - KCL 20 mEQ Qday  - Reduced gapapentin to 100mg daily  - PD exit site care per protocol   - Changed ROSALIND to SQ weekly as now on PD consistently   - No dietary protein restrictions  - Dose all meds per ESRD  - Renal diet, low phos   - L arm AVF not fully matured,  will need intervention outpt   - Avoid Fleet enema in dialysis patients   - Labs daily  - Discharge planning underway, plan for 2/7 home with family now 2/11

## 2025-02-08 NOTE — ASSESSMENT & PLAN NOTE
Has LLE edema  Has RLE pedal swelling  2nd to recent LLE surgery and ESRD  On Diuretic  Management per Nephro

## 2025-02-08 NOTE — CARE PLAN
The patient is Stable - Low risk of patient condition declining or worsening    Shift Goals  Clinical Goals: Safety, Wound care, Pain control  Patient Goals: Pain control, Rest  Family Goals: VISHAL    Progress made toward(s) clinical / shift goals:      Problem: Knowledge Deficit - Standard  Goal: Patient and family/care givers will demonstrate understanding of plan of care, disease process/condition, diagnostic tests and medications  Outcome: Progressing     Problem: Skin Integrity  Goal: Skin integrity is maintained or improved  Outcome: Progressing   Jalen Scale: 16  Interventions:  -Barrier wipes in use  -Wheelchair cushion in place  -Pillows for positioning and floating heels  -Low air loss mattress in place     Problem: Fall Risk - Rehab  Goal: Patient will remain free from falls  Outcome: Progressing   Tsang Patric Score: 22  Interventions:  -Bed/Strip alarm in place  -Chair strip alarm in place  -Treaded socks on pt  -Fall risk band and communication signs in place  -Bed rails in place  -Call light and belongings within reach

## 2025-02-08 NOTE — PROGRESS NOTES
[facility] Services Progress Notes     CCPD ordered by Dr.Jusmin Granados. Disconnected aseptically from PD Cycler at 0745     Pt stable, VSS, alert and oriented.  Effluent clear and yellow, no signs of bleeding/fibrin clots.  No alarms on machine was noted or reported before disconnection.     24 hour UF= 1056mL (I.Drain= 152mL + Total UF= 904mL - Last fill= 0mL)     Report given to MANOJ Quinn.

## 2025-02-08 NOTE — CARE PLAN
Problem: Fall Risk - Rehab  Goal: Patient will remain free from falls  Outcome: Progressing     Problem: Skin Integrity  Goal: Patient's skin integrity will be maintained or improve  Outcome: Progressing     Problem: Dialysis  Goal: Patient will maintain stable vital signs and fluid balance  Outcome: Progressing     The patient is Stable - Low risk of patient condition declining or worsening    Shift Goals  Clinical Goals: Safety ; manage Nausea and vomiting  Patient Goals: rest ; pain control  Family Goals: safety

## 2025-02-08 NOTE — PROGRESS NOTES
NURSING DAILY NOTE    Name: Edy Bennett   Date of Admission: 1/23/2025   Admitting Diagnosis: No Principal Problem: There is no principal problem currently on the Problem List. Please update the Problem List and refresh.  Attending Physician: ROSANNA PATEL D.O.  Allergies: Patient has no known allergies.    Safety  Patient Assist  min-mod  Patient Precautions  Fall Risk  Precaution Comments  LLE wound vac  Bed Transfer Status  Standby Assist  Toilet Transfer Status   Supervised  Assistive Devices  Rails, Wheelchair  Oxygen  None - Room Air, Nasal Cannula  Diet/Therapeutic Dining  Current Diet Order   Procedures    Diet Order Diet: Level 6 - Soft and Bite Sized; Liquid level: Level 0 - Thin     Pill Administration  whole  Agitated Behavioral Scale     ABS Level of Severity       Fall Risk  Has the patient had a fall this admission?   No  Trinh Spivey Fall Risk Scoring  22, HIGH RISK  Fall Risk Safety Measures  bed alarm, chair alarm, poor balance, and low vision/ hearing    Vitals  Temperature: 36.7 °C (98 °F)  Temp src: Oral  Pulse: 81  Respiration: 17  Blood Pressure : 118/58  Blood Pressure MAP (Calculated): 78 MM HG  BP Location: Right, Upper Arm  Patient BP Position: Supine     Oxygen  Pulse Oximetry: 98 %  O2 (LPM): 2  FiO2%: 21 %  O2 Delivery Device: None - Room Air, Nasal Cannula    Bowel and Bladder  Last Bowel Movement  02/07/25  Stool Type  Not observed  Bowel Device  Bathroom  Continent  Bladder: Did not void   Bowel: No movement  Bladder Function  Urine Void (mL): 50 ml  Number of Times Voided: 1  Urine Color: Unable To Evaluate  Genitourinary Assessment   Bladder Assessment (WDL):  Within Defined Limits  Smith Catheter: Not Applicable  Urinary Symptoms: Anuric/ Dialysis Patient  Urine Color: Unable To Evaluate  Bladder Device: Urinal  Bladder Scan: Post Void  $ Bladder Scan Results (mL): 32    Skin  Jalen Score   16  Sensory  Interventions   Bed Types: Low Airloss  Skin Preventative Measures: Pillows in Use for Support / Positioning  Moisture Interventions  Moisturizers/Barriers: Barrier Wipes      Pain  Pain Rating Scale  0 - No Pain  Pain Location  Leg  Pain Location Orientation  Left  Pain Interventions   Declines    ADLs    Bathing   Patient Refused Bathing (notified RN Smita)  Linen Change   Complete  Personal Hygiene  Moist Tory Wipes  Chlorhexidine Bath   Completed  Oral Care  Brushed Teeth (self)  Teeth/Dentures     Shave  Self  Nutrition Percentage Eaten  *  * Meal *  *, Dinner, Less than 25% Consumed  Environmental Precautions  Treaded Slipper Socks on Patient, Personal Belongings, Wastebasket, Call Bell etc. in Easy Reach, Transferred to Stronger Side, Bed in Low Position  Patient Turns/Positioning  Patient turns self independently side to side without assistance, to offload sacral area  Patient Turns Assistance/Tolerance  Assistance of One  Bed Positions  Bed Controls On, Bed Locked  Head of Bed Elevated  Self regulated      Psychosocial/Neurologic Assessment  Psychosocial Assessment  Psychosocial (WDL):  Within Defined Limits  Patient Behaviors: Anxious  Family Behaviors: Family Present  Neurologic Assessment  Neuro (WDL): Within Defined Limits  Level of Consciousness: Alert  Orientation Level: Oriented X4  Cognition: Follows commands, Appropriate attention/concentration  Speech: Clear  Facial Symmetry: Other (Comment) (no facial droop noted)  Pupil Assesment: No  Motor Function/Sensation Assessment: Motor strength  RUE Sensation: Full sensation  Muscle Strength Right Arm: Good Strength Against Gravity and Moderate Resistance  LUE Sensation: Full sensation  Muscle Strength Left Arm: Good Strength Against Gravity and Moderate Resistance  RLE Sensation: Full sensation  Muscle Strength Right Leg: Good Strength Against Gravity and Moderate Resistance  LLE Sensation: Full sensation  Muscle Strength Left Leg: Fair Strength  against Gravity but No Resistance  EENT (WDL):  WDL Except    Cardio/Pulmonary Assessment  Edema   RLE Edema: 1+  LLE Edema: Generalized, 2+  Respiratory Breath Sounds  RUL Breath Sounds: Clear  RML Breath Sounds: Clear  RLL Breath Sounds: Diminished  ANAYELI Breath Sounds: Clear  LLL Breath Sounds: Diminished  Cardiac Assessment   Cardiac (WDL):  WDL Except (Hx of Moderate aortic stenosis, HTN, CAD with stents, PAD)

## 2025-02-08 NOTE — PROGRESS NOTES
[facility] Services Progress Note     CCPD x 8.5 hours, 2100 mL fills x 5 cycles using all 2.5% PD solution ordered per Dr.Jusmin Granados     CCPD treatment initiated at 1920 without any issues.  Patient AOx4, VSS. Edema present on lower extremities.   No stomach issues. Patient denied pain..                   Patient with intact and patent RLQ. PD catheter aseptically connected per policy.  PD catheter and exit site in good condition, no signs of infection noted, cleansed with antiseptic solution and dressing changed per policy. Gentamicin cream applied at PD catheter site.     Initial drain:  152 mL   Effluent clear and yellow.     Report/Inservice given FELICIANO May RN   Patient on dwell 1/5 prior to departure from bedside.     Please contact on call dialysis RN for any issues with persistent alarms/assistance with troubleshooting or patient not tolerating therapy.      For on-call Dialysis RN, pls contact [facility] at [phone].

## 2025-02-08 NOTE — PROGRESS NOTES
[facility] Medicine Daily Progress Note    Date of Service  2/8/2025    Chief Complaint:  Nausea & Vomiting    Interval History:  Still has vomiting.  Workup in progress.    Review of Systems  Review of Systems   Constitutional:  Negative for fever.   Eyes:  Negative for blurred vision.   Respiratory:  Negative for shortness of breath.    Cardiovascular:  Negative for palpitations.   Gastrointestinal:  Positive for vomiting. Negative for nausea.   Neurological:  Negative for dizziness and headaches.   Psychiatric/Behavioral:  Negative for hallucinations.         Physical Exam  Temp:  [36.7 °C (98 °F)-37.1 °C (98.8 °F)] 36.8 °C (98.2 °F)  Pulse:  [79-86] 79  Resp:  [15-18] 18  BP: (100-118)/(49-69) 107/59  SpO2:  [96 %-100 %] 96 %    Physical Exam  Vitals and nursing note reviewed.   Constitutional:       General: He is not in acute distress.  HENT:      Mouth/Throat:      Mouth: Mucous membranes are moist.      Pharynx: Oropharynx is clear.   Eyes:      General: No scleral icterus.  Cardiovascular:      Rate and Rhythm: Normal rate and regular rhythm.   Pulmonary:      Effort: Pulmonary effort is normal.      Breath sounds: No wheezing or rales.   Abdominal:      General: Bowel sounds are normal.      Palpations: Abdomen is soft.   Musculoskeletal:      Cervical back: No rigidity.      Right lower leg: Edema present.      Left lower leg: Edema present.   Skin:     General: Skin is warm and dry.   Neurological:      Mental Status: He is alert and oriented to person, place, and time.   Psychiatric:         Mood and Affect: Mood normal.         Behavior: Behavior normal.         Fluids    Intake/Output Summary (Last 24 hours) at 2/8/2025 1220  Last data filed at 2/8/2025 0820  Gross per 24 hour   Intake 230 ml   Output 1106 ml   Net -876 ml        Laboratory  Recent Labs     02/07/25  0521 02/08/25  0627   WBC 6.9 7.5   RBC 2.24* 2.27*   HEMOGLOBIN 7.0* 7.0*   HEMATOCRIT 22.3* 22.6*   MCV 99.6* 99.6*   MCH 31.3 30.8    MCHC 31.4* 31.0*   RDW 58.9* 60.0*   PLATELETCT 261 275   MPV 10.4 9.9     Recent Labs     02/07/25  0521 02/08/25  0627   SODIUM 133* 134*   POTASSIUM 3.1* 3.5*   CHLORIDE 90* 90*   CO2 25 26   GLUCOSE 135* 131*   BUN 79* 82*   CREATININE 8.93* 8.99*   CALCIUM 8.6 8.6                   Imaging       Assessment/Plan  Lower extremity edema  Assessment & Plan  Has LLE edema  Has RLE pedal swelling  2nd to recent LLE surgery and ESRD  On Diuretic  Management per Nephro    Nausea & vomiting  Assessment & Plan  Pt has had N/V since coming to the Rehab  Has had N/V - 8 out of 10 days (usually occurs once per day)  Gets a little abd pain/cramps right before  Has been afebrile  No leukocytosis  Having some BM's  No difficulties eating or swallowing  O2 sats good on RA  Hep profile: showed Hep B surface Ab  F/U U/A  F/U H. Pylori  F/U US RUQ  AXR: Mild to moderate colonic stool burden; nonobstructive bowel gas pattern  CXR no acute process  Lipase wnl  NH4: wnl  LFT's wnl  TFT's: 3.91/0.77  ? 2nd to constipation  ? 2nd to Fosrenol (has a high SE)  ? 2nd to Fe supplements (has SE) --> now off  ? 2nd to Mucinex (low SE) --> now off  On Miralax bid  -- but med disagrees with pt and has not taken it -- Will d/c  Will start Lactulose -- had had good response before -- will schedule when not getting therapy for a couple hours  Will start Simethicone  Note: Midodrine & Toprol XL (but was on at home) has  a < 1% SE of N/V  On Prilosec    PAD (peripheral artery disease) (HCC)- (present on admission)  Assessment & Plan  S/P elective left bypass graft    Anemia in chronic renal disease- (present on admission)  Assessment & Plan  Hb: 7.0 (2/7)  Off Fe supplements -- 2nd to N/V  On Epoetin  Management per Nephrology    Essential hypertension- (present on admission)  Assessment & Plan  BP ok  Off Toprol XL  On Midodrine  Cont to monitor    ESRD on peritoneal dialysis (HCC)- (present on admission)  Assessment & Plan  Continues on  PD  Fluid and electrolyte management per Nephro

## 2025-02-08 NOTE — THERAPY
Occupational Therapy  Daily Treatment     Patient Name: Edy Bennett  Age:  79 y.o., Sex:  male  Medical Record #: [id number]  Today's Date: 2/8/2025     Precautions  Precautions: Fall Risk  Comments: LLE wound vac    Safety   ADL Safety : Requires Physical Assist for Safety  Bathroom Safety: Requires Physical Assist for Safety    Subjective    Pt pleasant and motivated to participate in OT     Objective     02/08/25 1301   OT Charge Group   Charges Yes   OT Self Care / ADL (Units) 1   OT Therapeutic Exercise (Units) 3   OT Total Time Spent   OT Individual Total Time Spent (Mins) 60   Precautions   Precautions Fall Risk   Comments LLE wound vac   Vitals   O2 Delivery Device None - Room Air   Pain   Intervention Declines   Cognition    Level of Consciousness Alert   ABS (Agitated Behavior Scale)   Agitated Behavior Scale Performed No   Sleep/Wake Cycle   Sleep & Rest Awake   Functional Level of Assist   Bed, Chair, Wheelchair Transfer Supervised   Bed Chair Wheelchair Transfer Description Increased time;Initial preparation for task;Set-up of equipment;Squat pivot transfer to wheelchair;Supervision for safety   Toilet Transfers Supervised   Toilet Transfer Description Grab bar;Supervision for safety  (Pt completed transfer; however, unable to void)   Sitting Upper Body Exercises   Lat Pull 3 sets of 10;Bilateral;Weight (See Comments for lbs)  (30#)   Tricep Press 3 sets of 10;Bilateral;Weight (See Comments for lbs)  (20#)   Upper Extremity Bike Level 1 Resistance  (Pt tolerated two and a half minutes on hydrocycle)   Comments Pt tolerated rickshaw facing forward, 3x10, bilat, 20#   Sitting Lower Body Exercises   Nustep Resistance Level 1  (Pt tolerated 10 minutes w/o rest break)   Bed Mobility    Supine to Sit Modified Independent   Scooting Modified Independent   Rolling Modified Independent   Interdisciplinary Plan of Care Collaboration   IDT Collaboration with  Family / Caregiver   Patient Position at End of  Therapy Seated;Chair Alarm On;Call Light within Reach;Tray Table within Reach;Phone within Reach;Family / Friend in Room   Collaboration Comments Pt's wife present for tx   Toilet Transfer   Assistance Needed Set-up / clean-up   Physical Assistance Level No physical assistance   CARE Score - Toilet Transfer 5     Assessment    Pt seen for tx, addressing toilet transfers and UE strengthening. Pt tolerated activity well. Pt progressing towards goals. Continue OT POC.    Plan    ADL, endurance, strength    DME  OT DME Recommendations  Bathroom Equipment:  (TBD)    Passport items to be completed:  Perform bathroom transfers, complete dressing, complete feeding, get ready for the day, prepare a simple meal, participate in household tasks, adapt home for safety needs, demonstrate home exercise program, complete caregiver training     Occupational Therapy Goals (Active)       Problem: Dressing       Dates: Start:  01/24/25         Goal: STG-Within one week, patient will dress LB with min A       Dates: Start:  01/24/25               Problem: OT Long Term Goals       Dates: Start:  01/24/25         Goal: LTG-By discharge, patient will complete basic self care tasks mod I       Dates: Start:  01/24/25            Goal: LTG-By discharge, patient will perform bathroom transfers mod I       Dates: Start:  01/24/25

## 2025-02-08 NOTE — PROGRESS NOTES
NURSING DAILY NOTE    Name: Edy Bennett   Date of Admission: 1/23/2025   Admitting Diagnosis: No Principal Problem: There is no principal problem currently on the Problem List. Please update the Problem List and refresh.  Attending Physician: ROSANNA PATEL D.O.  Allergies: Patient has no known allergies.    Safety  Patient Assist  min-mod  Patient Precautions  Fall Risk  Precaution Comments  LLE wound vac  Bed Transfer Status  Standby Assist  Toilet Transfer Status   Supervised  Assistive Devices  Rails, Wheelchair  Oxygen  None - Room Air  Diet/Therapeutic Dining  Current Diet Order   Procedures    Diet Order Diet: Level 6 - Soft and Bite Sized; Liquid level: Level 0 - Thin     Pill Administration  whole  Agitated Behavioral Scale     ABS Level of Severity       Fall Risk  Has the patient had a fall this admission?   No  Trinh Spivey Fall Risk Scoring  22, HIGH RISK  Fall Risk Safety Measures  bed alarm and chair alarm    Vitals  Temperature: 37.1 °C (98.8 °F)  Temp src: Oral  Pulse: 86  Respiration: 15  Blood Pressure : 118/69  Blood Pressure MAP (Calculated): 85 MM HG  BP Location: Right, Upper Arm  Patient BP Position: Supine     Oxygen  Pulse Oximetry: 98 %  O2 (LPM): 2 (notified RN Smita)  FiO2%: 21 %  O2 Delivery Device: None - Room Air    Bowel and Bladder  Last Bowel Movement  02/06/25  Stool Type  Type 6: Fluffy pieces with ragged edges, a mushy stool  Bowel Device  Bathroom  Continent  Bladder: Did not void   Bowel: No movement  Bladder Function  Urine Void (mL): 100 ml  Number of Times Voided: 1  Urine Color: Unable To Evaluate  Genitourinary Assessment   Bladder Assessment (WDL):  Within Defined Limits  Smith Catheter: Not Applicable  Urinary Symptoms: Anuric/ Dialysis Patient  Urine Color: Unable To Evaluate  Bladder Device: Urinal  Bladder Scan: Post Void  $ Bladder Scan Results (mL): 32    Skin  Jalen Score   16  Sensory  Interventions   Bed Types: Low Airloss  Skin Preventative Measures: Pillows in Use for Support / Positioning  Moisture Interventions  Moisturizers/Barriers: Barrier Wipes      Pain  Pain Rating Scale  0 - No Pain  Pain Location  Leg  Pain Location Orientation  Left  Pain Interventions   Declines    ADLs    Bathing   Patient Refused Bathing (notified RN Smita)  Linen Change   Complete  Personal Hygiene  Moist Tory Wipes  Chlorhexidine Bath   Completed  Oral Care  Brushed Teeth (self)  Teeth/Dentures     Shave  Self  Nutrition Percentage Eaten  *  * Meal *  *, Lunch, Between 50-75% Consumed  Environmental Precautions  Treaded Slipper Socks on Patient  Patient Turns/Positioning  Patient turns self independently side to side without assistance, to offload sacral area  Patient Turns Assistance/Tolerance  Assistance of One  Bed Positions  Bed Controls On, Bed Locked  Head of Bed Elevated  Self regulated      Psychosocial/Neurologic Assessment  Psychosocial Assessment  Psychosocial (WDL):  Within Defined Limits  Patient Behaviors: Anxious  Family Behaviors: Family Present  Neurologic Assessment  Neuro (WDL): Within Defined Limits  Level of Consciousness: Alert  Orientation Level: Oriented X4  Cognition: Follows commands, Appropriate attention/concentration  Speech: Clear  Facial Symmetry: Other (Comment) (no facial droop noted)  Pupil Assesment: No  Motor Function/Sensation Assessment: Motor strength  RUE Sensation: Full sensation  Muscle Strength Right Arm: Good Strength Against Gravity and Moderate Resistance  LUE Sensation: Full sensation  Muscle Strength Left Arm: Good Strength Against Gravity and Moderate Resistance  RLE Sensation: Full sensation  Muscle Strength Right Leg: Good Strength Against Gravity and Moderate Resistance  LLE Sensation: Full sensation  Muscle Strength Left Leg: Fair Strength against Gravity but No Resistance  EENT (WDL):  WDL Except    Cardio/Pulmonary Assessment  Edema   RLE Edema: 1+  LLE  Edema: Generalized, 2+  Respiratory Breath Sounds  RUL Breath Sounds: Clear  RML Breath Sounds: Clear  RLL Breath Sounds: Diminished  ANAYELI Breath Sounds: Clear  LLL Breath Sounds: Diminished  Cardiac Assessment   Cardiac (WDL):  WDL Except (Hx of Moderate aortic stenosis, HTN, CAD with stents, PAD)

## 2025-02-09 ENCOUNTER — APPOINTMENT (OUTPATIENT)
Dept: OCCUPATIONAL THERAPY | Facility: REHABILITATION | Age: 80
DRG: 299 | End: 2025-02-09
Attending: PHYSICAL MEDICINE & REHABILITATION
Payer: MEDICARE

## 2025-02-09 ENCOUNTER — APPOINTMENT (OUTPATIENT)
Dept: PHYSICAL THERAPY | Facility: REHABILITATION | Age: 80
DRG: 299 | End: 2025-02-09
Attending: PHYSICAL MEDICINE & REHABILITATION
Payer: MEDICARE

## 2025-02-09 PROCEDURE — 700102 HCHG RX REV CODE 250 W/ 637 OVERRIDE(OP): Performed by: NURSE PRACTITIONER

## 2025-02-09 PROCEDURE — 97530 THERAPEUTIC ACTIVITIES: CPT

## 2025-02-09 PROCEDURE — 770010 HCHG ROOM/CARE - REHAB SEMI PRIVAT*

## 2025-02-09 PROCEDURE — 700102 HCHG RX REV CODE 250 W/ 637 OVERRIDE(OP): Performed by: PHYSICAL MEDICINE & REHABILITATION

## 2025-02-09 PROCEDURE — 97110 THERAPEUTIC EXERCISES: CPT

## 2025-02-09 PROCEDURE — 90945 DIALYSIS ONE EVALUATION: CPT

## 2025-02-09 PROCEDURE — 700102 HCHG RX REV CODE 250 W/ 637 OVERRIDE(OP): Performed by: HOSPITALIST

## 2025-02-09 PROCEDURE — 99232 SBSQ HOSP IP/OBS MODERATE 35: CPT | Performed by: HOSPITALIST

## 2025-02-09 PROCEDURE — A9270 NON-COVERED ITEM OR SERVICE: HCPCS | Performed by: PHYSICAL MEDICINE & REHABILITATION

## 2025-02-09 PROCEDURE — 700111 HCHG RX REV CODE 636 W/ 250 OVERRIDE (IP): Performed by: PHYSICAL MEDICINE & REHABILITATION

## 2025-02-09 PROCEDURE — A9270 NON-COVERED ITEM OR SERVICE: HCPCS | Performed by: HOSPITALIST

## 2025-02-09 PROCEDURE — 99232 SBSQ HOSP IP/OBS MODERATE 35: CPT | Performed by: PHYSICAL MEDICINE & REHABILITATION

## 2025-02-09 PROCEDURE — A9270 NON-COVERED ITEM OR SERVICE: HCPCS | Performed by: NURSE PRACTITIONER

## 2025-02-09 PROCEDURE — 97535 SELF CARE MNGMENT TRAINING: CPT

## 2025-02-09 PROCEDURE — 97116 GAIT TRAINING THERAPY: CPT

## 2025-02-09 RX ORDER — SCOPOLAMINE 1 MG/3D
1 PATCH, EXTENDED RELEASE TRANSDERMAL
Status: DISCONTINUED | OUTPATIENT
Start: 2025-02-09 | End: 2025-02-14 | Stop reason: HOSPADM

## 2025-02-09 RX ADMIN — OMEPRAZOLE 40 MG: 20 CAPSULE, DELAYED RELEASE ORAL at 09:48

## 2025-02-09 RX ADMIN — POTASSIUM CHLORIDE 20 MEQ: 1500 TABLET, EXTENDED RELEASE ORAL at 09:48

## 2025-02-09 RX ADMIN — LACTULOSE 30 ML: 10 SOLUTION ORAL at 06:07

## 2025-02-09 RX ADMIN — SCOPOLAMINE 1 PATCH: 1.5 PATCH, EXTENDED RELEASE TRANSDERMAL at 17:28

## 2025-02-09 RX ADMIN — MIDODRINE HYDROCHLORIDE 10 MG: 10 TABLET ORAL at 11:40

## 2025-02-09 RX ADMIN — ONDANSETRON 4 MG: 4 TABLET, ORALLY DISINTEGRATING ORAL at 17:29

## 2025-02-09 RX ADMIN — GENTAMICIN SULFATE: 1 CREAM TOPICAL at 20:00

## 2025-02-09 RX ADMIN — ONDANSETRON 4 MG: 4 TABLET, ORALLY DISINTEGRATING ORAL at 11:40

## 2025-02-09 RX ADMIN — APIXABAN 5 MG: 5 TABLET, FILM COATED ORAL at 20:18

## 2025-02-09 RX ADMIN — APIXABAN 5 MG: 5 TABLET, FILM COATED ORAL at 09:48

## 2025-02-09 RX ADMIN — ONDANSETRON 4 MG: 4 TABLET, ORALLY DISINTEGRATING ORAL at 15:37

## 2025-02-09 RX ADMIN — MIDODRINE HYDROCHLORIDE 10 MG: 10 TABLET ORAL at 17:29

## 2025-02-09 RX ADMIN — LANTHANUM CARBONATE 500 MG: 500 TABLET, CHEWABLE ORAL at 17:28

## 2025-02-09 RX ADMIN — LANTHANUM CARBONATE 500 MG: 500 TABLET, CHEWABLE ORAL at 11:40

## 2025-02-09 RX ADMIN — MONTELUKAST 10 MG: 10 TABLET, FILM COATED ORAL at 20:17

## 2025-02-09 RX ADMIN — CLOPIDOGREL BISULFATE 75 MG: 75 TABLET ORAL at 09:48

## 2025-02-09 RX ADMIN — ROSUVASTATIN CALCIUM 20 MG: 10 TABLET, FILM COATED ORAL at 20:17

## 2025-02-09 RX ADMIN — MIDODRINE HYDROCHLORIDE 10 MG: 10 TABLET ORAL at 09:48

## 2025-02-09 ASSESSMENT — GAIT ASSESSMENTS
DISTANCE (FEET): 75
DEVIATION: ANTALGIC;STEP TO;DECREASED BASE OF SUPPORT;BRADYKINETIC;DECREASED HEEL STRIKE;DECREASED TOE OFF
ASSISTIVE DEVICE: FRONT WHEEL WALKER
GAIT LEVEL OF ASSIST: CONTACT GUARD ASSIST

## 2025-02-09 ASSESSMENT — FIBROSIS 4 INDEX: FIB4 SCORE: 1.7

## 2025-02-09 ASSESSMENT — ENCOUNTER SYMPTOMS
FEVER: 0
VOMITING: 1
DIARRHEA: 0
BLURRED VISION: 0
COUGH: 0
NERVOUS/ANXIOUS: 0
DIZZINESS: 0

## 2025-02-09 ASSESSMENT — ACTIVITIES OF DAILY LIVING (ADL)
TUB_SHOWER_TRANSFER_DESCRIPTION: GRAB BAR;SUPERVISION FOR SAFETY
TOILETING_LEVEL_OF_ASSIST_DESCRIPTION: SUPERVISION FOR SAFETY
BED_CHAIR_WHEELCHAIR_TRANSFER_DESCRIPTION: INCREASED TIME;SET-UP OF EQUIPMENT;SUPERVISION FOR SAFETY

## 2025-02-09 ASSESSMENT — PAIN DESCRIPTION - PAIN TYPE: TYPE: ACUTE PAIN

## 2025-02-09 NOTE — PROGRESS NOTES
"  Physical Medicine & Rehabilitation Progress Note    Encounter Date: 2/9/2025    Chief Complaint:  PVD s/p bypass     Interval Events (Subjective):  Vitals Reviewed : borderline hypotension   Labs reviewed: 2/8 hgb stable at 7.0     Patient seen and examined at bedside.  Patient reports he feels better today.  Patient reports he had multiple bowel movements after lactulose.  Reviewed with the patient that his ultrasound results are still pending.  Otherwise patient has no complaints.  Does not report vomiting today. Does not report HA, lightheadedness, SOB, CP, abdominal pain, or changes in numbness/tingling/weakness.       Objective:  Physical Exam:  Vitals: /60   Pulse 70   Temp 35.9 °C (96.6 °F) (Oral)   Resp 18   Ht 1.707 m (5' 7.2\")   Wt 73.1 kg (161 lb 3.2 oz)   SpO2 97%   Gen: NAD, seated comfortably in manual wheelchair  Head:  NC/AT  Eyes/ Nose/ Mouth: PERRLA, moist mucous membranes  Cardio: RRR, good distal perfusion, warm extremities  Pulm: normal respiratory effort, no cyanosis, on room air  Abd: Soft NTND,   Ext: No peripheral edema. No calf tenderness. No clubbing.    Mental status:  A&Ox4 (person, place, date, situation) answers questions appropriately follows commands  Speech: fluent, no aphasia or dysarthria  Coordination: intact fine motor with fingers bilaterally        Laboratory Values:  Recent Results (from the past 72 hours)   Basic Metabolic Panel    Collection Time: 02/07/25  5:21 AM   Result Value Ref Range    Sodium 133 (L) 135 - 145 mmol/L    Potassium 3.1 (L) 3.6 - 5.5 mmol/L    Chloride 90 (L) 96 - 112 mmol/L    Co2 25 20 - 33 mmol/L    Glucose 135 (H) 65 - 99 mg/dL    Bun 79 (H) 8 - 22 mg/dL    Creatinine 8.93 (HH) 0.50 - 1.40 mg/dL    Calcium 8.6 8.5 - 10.5 mg/dL    Anion Gap 18.0 (H) 7.0 - 16.0   CBC WITHOUT DIFFERENTIAL    Collection Time: 02/07/25  5:21 AM   Result Value Ref Range    WBC 6.9 4.8 - 10.8 K/uL    RBC 2.24 (L) 4.70 - 6.10 M/uL    Hemoglobin 7.0 (L) 14.0 - " 18.0 g/dL    Hematocrit 22.3 (L) 42.0 - 52.0 %    MCV 99.6 (H) 81.4 - 97.8 fL    MCH 31.3 27.0 - 33.0 pg    MCHC 31.4 (L) 32.3 - 36.5 g/dL    RDW 58.9 (H) 35.9 - 50.0 fL    Platelet Count 261 164 - 446 K/uL    MPV 10.4 9.0 - 12.9 fL   ESTIMATED GFR    Collection Time: 02/07/25  5:21 AM   Result Value Ref Range    GFR (CKD-EPI) 6 (A) >60 mL/min/1.73 m 2   CBC WITHOUT DIFFERENTIAL    Collection Time: 02/08/25  6:27 AM   Result Value Ref Range    WBC 7.5 4.8 - 10.8 K/uL    RBC 2.27 (L) 4.70 - 6.10 M/uL    Hemoglobin 7.0 (L) 14.0 - 18.0 g/dL    Hematocrit 22.6 (L) 42.0 - 52.0 %    MCV 99.6 (H) 81.4 - 97.8 fL    MCH 30.8 27.0 - 33.0 pg    MCHC 31.0 (L) 32.3 - 36.5 g/dL    RDW 60.0 (H) 35.9 - 50.0 fL    Platelet Count 275 164 - 446 K/uL    MPV 9.9 9.0 - 12.9 fL   Comp Metabolic Panel    Collection Time: 02/08/25  6:27 AM   Result Value Ref Range    Sodium 134 (L) 135 - 145 mmol/L    Potassium 3.5 (L) 3.6 - 5.5 mmol/L    Chloride 90 (L) 96 - 112 mmol/L    Co2 26 20 - 33 mmol/L    Anion Gap 18.0 (H) 7.0 - 16.0    Glucose 131 (H) 65 - 99 mg/dL    Bun 82 (H) 8 - 22 mg/dL    Creatinine 8.99 (HH) 0.50 - 1.40 mg/dL    Calcium 8.6 8.5 - 10.5 mg/dL    Correct Calcium 9.6 8.5 - 10.5 mg/dL    AST(SGOT) 31 12 - 45 U/L    ALT(SGPT) 26 2 - 50 U/L    Alkaline Phosphatase 113 (H) 30 - 99 U/L    Total Bilirubin 0.6 0.1 - 1.5 mg/dL    Albumin 2.7 (L) 3.2 - 4.9 g/dL    Total Protein 5.5 (L) 6.0 - 8.2 g/dL    Globulin 2.8 1.9 - 3.5 g/dL    A-G Ratio 1.0 g/dL   AMMONIA    Collection Time: 02/08/25  6:27 AM   Result Value Ref Range    Ammonia 16 11 - 45 umol/L   FREE THYROXINE    Collection Time: 02/08/25  6:27 AM   Result Value Ref Range    Free T-4 0.77 (L) 0.93 - 1.70 ng/dL   TSH    Collection Time: 02/08/25  6:27 AM   Result Value Ref Range    TSH 3.910 0.350 - 5.500 uIU/mL   LIPASE    Collection Time: 02/08/25  6:27 AM   Result Value Ref Range    Lipase 15 11 - 82 U/L   ESTIMATED GFR    Collection Time: 02/08/25  6:27 AM   Result Value  Ref Range    GFR (CKD-EPI) 6 (A) >60 mL/min/1.73 m 2   URINALYSIS    Collection Time: 02/08/25 11:49 AM    Specimen: Urine, Clean Catch   Result Value Ref Range    Color Yellow     Character Cloudy (A)     Specific Gravity 1.022 <1.035    Ph 5.5 5.0 - 8.0    Glucose Negative Negative mg/dL    Ketones Negative Negative mg/dL    Protein 100 (A) Negative mg/dL    Bilirubin Negative Negative    Urobilinogen, Urine 1.0 <=1.0 EU/dL    Nitrite Negative Negative    Leukocyte Esterase Trace (A) Negative    Occult Blood Negative Negative    Micro Urine Req Microscopic    H.PYLORI STOOL ANTIGEN    Collection Time: 02/08/25 11:49 AM   Result Value Ref Range    H Pylori Ag Stool E Not Detected Not Detected   URINE MICROSCOPIC (W/UA)    Collection Time: 02/08/25 11:49 AM   Result Value Ref Range    WBC 3-5 (A) /hpf    RBC 0-2 0 - 2 /hpf    Bacteria None Seen None /hpf    Epithelial Cells 3-5 0 - 5 /hpf    Epithelial Cells Renal Present (A) Absent /hpf    Amorphous Crystal Present (A) Absent /hpf    Ca Oxalate Crystal Present (A) Absent /hpf    Urine Casts 6-10 (A) 0 - 2 /lpf    Granular Casts Present (A) Absent /lpf       Medications:  Scheduled Medications   Medication Dose Frequency    lactulose  30 mL BID    midodrine  10 mg TID WITH MEALS    ondansetron  4 mg BID WITH MEALS    torsemide  10 mg Q DAY    potassium chloride SA  20 mEq DAILY    omeprazole  40 mg DAILY    lanthanum carbonate  500 mg TID WITH MEALS    epoetin  4,000 Units Q7 DAYS    Pharmacy Consult Request  1 Each PHARMACY TO DOSE    apixaban  5 mg BID    clopidogrel  75 mg DAILY    montelukast  10 mg QHS    rosuvastatin  20 mg QHS    senna-docusate  2 Tablet BID    gentamicin   DAILY AT 1800     PRN medications: simethicone, formulation r, bisacodyl, oxyCODONE immediate-release **OR** oxyCODONE immediate-release, methocarbamol, sevelamer carbonate, lidocaine, hydrALAZINE, lactulose, bisacodyl EC, magnesium hydroxide, carboxymethylcellulose, benzocaine-menthol,  mag hydrox-al hydrox-simeth, ondansetron **OR** ondansetron, traZODone, sodium chloride, acetaminophen, senna-docusate **AND** polyethylene glycol/lytes    Diet:  Current Diet Order   Procedures    Diet Order Diet: Level 6 - Soft and Bite Sized; Liquid level: Level 0 - Thin       Medical Decision Making and Plan:  Per Dr. Klein prior progress note:    S/p left popliteal to posterior tibial artery bypass Dr. Miller 1/20/2025  PT and OT for mobility and ADLs. Per guidelines, 15 hours per week between PT, OT and/or SLP.  Follow-up vascular surgery  Continue Eliquis and Plavix  Continue statin     1/24 Prevena saturation. Wound consult. Vasc surg to eval as well. Prevena now working.   1/30 vascular to change dressing at some point per patient's wife  2/5 swelling stable. Not getting torsemide nor BB  2/6 pending torsemide administration.     Type 2 diabetes mellitus with hyperglycemia -outpatient medication had been Januvia 1 tablet twice daily     Pain -Tylenol and oxycodone as needed.  Scheduled Robaxin, gabapentin.  Schedule oxycodone 5x daily, morphine contraindicated based on renal function. 1/29 Reduce Oxycodone to QID. 1/30 reduce oxycodone to scheduled 3 times daily.  Decrease methocarbamol to 250 mg 4 times daily. 2/3 Patient refusing scheduled Oxy. Continue only as PRN and d/c scheduled. Make Robaxin PRN.   - 2/9 has not used PRN pain meds in 24 hrs      Anemia - 1/24 Improving. 1/27 Drop to 8's, had a lot of OP from Prevena last week. 1/29 7.7 - expected given prevena OP. Monitor need for transfusion.  1/30 hemoglobin dropped to 7.3.  Discussed with nephrology, they deferred to us to transfuse.  Transfuse less than 7.  Prevena putting out less. 2/5 Stable 7's   -2/8 Hgb stable at 7.0 last 48 hrs      Thrombocytopenia - Resolved 1/30     ESRD on PD - Nnephrology following.  On sevelamer - changed due to naussea.  Nephrology increased fill 1/30.  Tunneled cath removal 2/6 per nephrology     Hypermagnesemia -  Nephro managing     Hyperphosphatemia - Nephro managing      History of moderate aortic stenosis -stable     Hypertension - On admit: Norvasc 2.5 mg every morning, Lasix 40 mg twice daily, Metoprolol XL 100mg. Norvasc stopped.    2/5 Has not been getting metoprolol nor torsemide for many days (>6) will reduce midodrine, change torsemide to 1400 and add midodrine.   2/6 continue midodrine.  2/7 Not getting beta blocker. Stop for now, hasn't been given in > 7 days. Increase Midodrine and continue Torsemide so can improve swelling.   2/9 BP borderline hypotensive, on midodrine, toreside  held yesterday      CAD s/p SYDNEE 3x -on aspirin and statin.  Had been on Imdur 30 mg daily     Gout - No meds at home.      Hyperlipidemia -continue statin     Recent left inguinal hernia repair -12/18/2024 Dr. Miller     Bowel - Patient on Senna-docusate for constipation prophylaxis.   - last BM 2/7      Nausea & Vomiting - Only after lunch. Get KUB - WNL. Schedule Zofran after lunch.  Scheduled Zofran after each meal. 2/3 Declining scheduled Zofran. Make PRN only. 2/4 reduced to only afternoon. 2/5 No vomiting yesterday. Monitor.  2/6 having some emesis using as needed Zofran.  2/6 schedule Zofran again in the afternoon and evening.  Will try without Nepro as seems to correlate with when he gets Nepro. 2/7 Down grade diet per patient preference, cannot tolerate regular textures. Consult hospitalist.   -2/8 KUB negative for obstruction, improved N/V after receiving lactulose, RUQ US results pending      Hand tremors -LFTs nonconcerning for etiology.  Possibly med induced. 2/5 Improved but still continues. Off meds that may contribute.      BPH - TV/PVR/BS PRN.  Continue Flomax and Hytrin. 1/28 Hold due to low BP. Favor diuresis. 2/3 Low PVRs.      Insomnia - has not been utilizing PRN trazadone         DVT PROPHYLAXIS: On Eliquis       Patient was seen for 36 minutes on unit/floor of which > 50% of time was spent on counseling and  coordination of care regarding the above, including prognosis, risk reduction, benefits of treatment, and options for next stage of care.    ____________________________________    Smita León D.O.    ____________________________________

## 2025-02-09 NOTE — CARE PLAN
Problem: Self Care  Goal: Patient will have the ability to perform ADLs independently or with assistance  Outcome: Progressing  Note: Patient able to perform regular activities this shift.  Pain controlled this shift.  Pain management includes PRN pain meds as well as non-pharmacological measures such as emotional support, rest, and repositioning.  Will continue to monitor.   The patient is Stable - Low risk of patient condition declining or worsening    Shift Goals  Clinical Goals: Safety ; manage Nausea and vomiting  Patient Goals: rest ; pain control  Family Goals: safety    Progress made toward(s) clinical / shift goals:  pt participates in therapy and is cooperative with nursing    Patient is not progressing towards the following goals:

## 2025-02-09 NOTE — CARE PLAN
"  Problem: Fall Risk - Rehab  Goal: Patient will remain free from falls  Note: Trinh Spivey Fall risk Assessment Score: 22    High fall risk Interventions   - Alarming seatbelt  - Bed and strip alarm   - Yellow sign by the door   - Yellow wrist band \"Fall risk\"  - Room near to the nurse station  - Do not leave patient unattended in the bathroom  - Fall risk education provided      Problem: Respiratory  Goal: Patient will understand use and administration of respiratory medications to improve respiratory function  Note: O2 at 2.5 lpm n/c. Nose bleeding noted. Pressure given, bleeding stops.     Problem: Dialysis  Goal: Patient will maintain stable vital signs and fluid balance  Note: PD tolerated. Able to make needs known. Assisted as needed. No N/V at this time. Will monitor.           The patient is Watcher - Medium risk of patient condition declining or worsening    Shift Goals  Clinical Goals: Safety ; manage Nausea and vomiting  Patient Goals: rest ; pain control  Family Goals: safety        "

## 2025-02-09 NOTE — PROGRESS NOTES
[facility] -  PROGRESS NOTE               Author: Genny Granados M.D. Date & Time: 2/9/2025  12:09 PM     HPI:  79 y.o. male with HTN, ESRD on PD, PVD/ gangrenous left foot, BPH admitted for monitoring s/p  left leg bypass surgery on 1/20 with Dr. Miller. He recently underwent hernial repair and was temporarily transferred to in center HD via RIJ PC. He has returned to  PD  4 days ago, doing low fill.volumes and has been tolerating at home.   Patient was connected to cycler last night. Total UF reported to be 276ml. Reports of low drain UF alarm last night but no major issues with PD overnight. Pt reports no complaints today morning  No F/C/N/V/CP/SOB.  No melena, hematochezia, hematemesis.  No HA, visual changes,or abdominal pain.     DAILY NEPHROLOGY SUMMARY:  See note from 1/31 for prior summaries   2/01: Disconnected aseptically from PD CyclerEffluent clear and yellow, no signs of bleeding/fibrin clots. 24 hour UF= 678mL. Tolerated well.   2/2: Patient and wife are doing well. Wife states she gave wrong # of cycles to provider yesterday and wanted then reduced back from 7 to 5. Overall doing well without complaints. Looking forward to going home around Feb 7th per patient. Also stated they were told that RT IJ TDC would be removed sometime this week. On-coming provider to ask the Hospitalist to help arrange.   2/3: pt sitting up in bed, spouse at bedside, PD improved with change in rx (reduced to 5 exchanges from 7) great UF: 1.1L, edema has improved, biggest complaint NV, vomits after each meal and fatigue, requesting a reduction in gabapentin, also reports constipation, had to dig out stool yesterday with finger  2/4 net UF 1500 ml overnight. Potassium is 3.3 I am replacing. Patient reports doing better today, wife at bedside. Patient feels much better now since Erica made med changes yesterday  2/5: 1437 mL Net UF removed overnight. Wife BS. Patient oob in BR performing adls  "independently. Reports improvement every day.   2/6: doing well, eating lunch with wife, net UF 1273 ml  2/7: patient doing well, low K I will order additional dose of potassium  2/8: NAEO, no complaints, wife and friends at bedside; Feels good  2/9: NAEO, wife at bedside, he is in wheelchair, having multiple BM's after lactulose    REVIEW OF SYSTEMS:    10 point ROS reviewed and is as per HPI/daily summary or otherwise negative    PMH/PSH/SH/FH: Reviewed and unchanged since admission note  CURRENT MEDICATIONS: Reviewed from admission to present day    VS:  /60   Pulse 84   Temp 36.4 °C (97.6 °F) (Oral)   Resp 16   Ht 1.707 m (5' 7.2\")   Wt 70.9 kg (156 lb 4 oz)   SpO2 98%   BMI 24.33 kg/m²   Physical Exam  Nursing note reviewed.   Constitutional:       Appearance: Normal appearance.   Eyes:      General: No scleral icterus.  Cardiovascular:      Comments: No edema  Pulmonary:      Effort: Pulmonary effort is normal.   Abdominal:      General: There is no distension.   Musculoskeletal:         General: Deformity (LLE with wound vac) present.   Skin:     Findings: No rash.   Neurological:      Mental Status: He is alert.   Psychiatric:         Behavior: Behavior is cooperative.       FLUID BALANCE:  In: 120 [P.O.:120]  Out: 1106     LABS:  Recent Labs     02/07/25  0521 02/08/25  0627   SODIUM 133* 134*   POTASSIUM 3.1* 3.5*   CHLORIDE 90* 90*   CO2 25 26   GLUCOSE 135* 131*   BUN 79* 82*   CREATININE 8.93* 8.99*   CALCIUM 8.6 8.6       Recent Labs     02/07/25  0521 02/08/25  0627   WBC 6.9 7.5   RBC 2.24* 2.27*   HEMOGLOBIN 7.0* 7.0*   HEMATOCRIT 22.3* 22.6*   MCV 99.6* 99.6*   MCH 31.3 30.8   MCHC 31.4* 31.0*   RDW 58.9* 60.0*   PLATELETCT 261 275   MPV 10.4 9.9     IMPRESSION:  # ESRD on CCPD  - Pt was transitioned to  iHD during masoud-op  s/p inguinal hernial repair,   - Still has RIJ PC  - Returned to PD 1/17/25   - Home PD prescription CCPD 2.5% alternating with 1.5% 5  no last fill. Patient wife " stated she initially told last Nephrologist wrong script. Now all green bags and 5 cycles, not 7.   # HTN, controlled   - Goal BP < 140/90  - torsemide 10mg daily   # Anemia of CKD  - Goal Hgb 10-11  - Below goal   - On PO iron  - ROSALIND weekly   # CKD-MBD  - Ca 8.6   - PO4 7.4 -> 6.6 -> 7.0  - Lanthanum 500mg TID, titrate up prn  # Chronic hypoxic respiratory failure   - On home oxygen 2-2.5L at night   # Nausea/Vomiting   - ddx: binder intolerance, switched to lanthanum   - constipation, started miralax BID and lactulose added recently   - pill burden, increase omeprazole to 40mg daily   - reduced gabapentin to 100mg daily   # Hyponatremia, mild   - torsemide daily   - UF with PD, on all 2.5%      SUGGESTIONS:  - Continue CCPD during hospital stay (now on oupt rx and tolerating well)  - Reduced gapapentin to 100mg daily  - PD exit site care per protocol   - Changed ROSALIND to SQ weekly as now on PD consistently   - No dietary protein restrictions  - Dose all meds per ESRD  - Renal diet, low phos   - L arm AVF not fully matured,  will need intervention outpt   - Avoid Fleet enema in dialysis patients   - Labs daily  - Discharge planning underway, plan for 2/7 home with family now 2/11

## 2025-02-09 NOTE — PROGRESS NOTES
NURSING DAILY NOTE    Name: Edy Bennett   Date of Admission: 1/23/2025   Admitting Diagnosis: No Principal Problem: There is no principal problem currently on the Problem List. Please update the Problem List and refresh.  Attending Physician: ROSANNA PATEL D.O.  Allergies: Patient has no known allergies.    Safety  Patient Assist  min-mod  Patient Precautions  Fall Risk  Precaution Comments  LLE wound vac  Bed Transfer Status  Supervised  Toilet Transfer Status   Supervised  Assistive Devices  Rails, Wheelchair  Oxygen  None - Room Air  Diet/Therapeutic Dining  Current Diet Order   Procedures    Diet Order Diet: Level 6 - Soft and Bite Sized; Liquid level: Level 0 - Thin     Pill Administration  whole  Agitated Behavioral Scale     ABS Level of Severity       Fall Risk  Has the patient had a fall this admission?   No  Trinh Spivey Fall Risk Scoring  22, HIGH RISK  Fall Risk Safety Measures  bed alarm and chair alarm    Vitals  Temperature: 36.9 °C (98.4 °F)  Temp src: Oral  Pulse: 78  Respiration: 18  Blood Pressure : 114/53  Blood Pressure MAP (Calculated): 73 MM HG  BP Location: Right, Upper Arm  Patient BP Position: Sitting     Oxygen  Pulse Oximetry: 97 %  O2 (LPM): 2  FiO2%: 21 %  O2 Delivery Device: None - Room Air    Bowel and Bladder  Last Bowel Movement  02/07/25  Stool Type  Not observed  Bowel Device  Bathroom  Continent  Bladder: Did not void   Bowel: No movement  Bladder Function  Urine Void (mL): 50 ml  Number of Times Voided: 1  Urine Color: Unable To Evaluate  Genitourinary Assessment   Bladder Assessment (WDL):  Within Defined Limits  Smith Catheter: Not Applicable  Urinary Symptoms: Anuric/ Dialysis Patient  Urine Color: Unable To Evaluate  Bladder Device: Urinal  Bladder Scan: Post Void  $ Bladder Scan Results (mL): 32    Skin  Jalen Score   16  Sensory Interventions   Bed Types: Low Airloss  Skin Preventative Measures: Pillows  in Use for Support / Positioning  Moisture Interventions  Moisturizers/Barriers: Barrier Wipes      Pain  Pain Rating Scale  4 - Distracts me, can do usual activities  Pain Location  Other (Comments)  Pain Location Orientation  Mid  Pain Interventions   Medication (see MAR), Repositioned, Rest    ADLs    Bathing   Patient Refused Bathing (notified RN Smita)  Linen Change   Complete  Personal Hygiene  Moist Tory Wipes  Chlorhexidine Bath   Completed  Oral Care  Brushed Teeth (self)  Teeth/Dentures     Shave  Self  Nutrition Percentage Eaten  *  * Meal *  *, Dinner, Less than 25% Consumed  Environmental Precautions  Treaded Slipper Socks on Patient, Personal Belongings, Wastebasket, Call Bell etc. in Easy Reach, Transferred to Stronger Side, Bed in Low Position  Patient Turns/Positioning  Patient turns self independently side to side without assistance, to offload sacral area  Patient Turns Assistance/Tolerance  Assistance of One  Bed Positions  Bed Controls On, Bed Locked  Head of Bed Elevated  Self regulated      Psychosocial/Neurologic Assessment  Psychosocial Assessment  Psychosocial (WDL):  Within Defined Limits  Patient Behaviors: Anxious  Family Behaviors: Family Present  Neurologic Assessment  Neuro (WDL): Within Defined Limits  Level of Consciousness: Alert  Orientation Level: Oriented X4  Cognition: Follows commands, Appropriate attention/concentration  Speech: Clear  Facial Symmetry: Other (Comment) (no facial droop noted)  Pupil Assesment: No  Motor Function/Sensation Assessment: Motor strength  RUE Sensation: Full sensation  Muscle Strength Right Arm: Good Strength Against Gravity and Moderate Resistance  LUE Sensation: Full sensation  Muscle Strength Left Arm: Good Strength Against Gravity and Moderate Resistance  RLE Sensation: Full sensation  Muscle Strength Right Leg: Good Strength Against Gravity and Moderate Resistance  LLE Sensation: Full sensation  Muscle Strength Left Leg: Fair Strength  against Gravity but No Resistance  EENT (WDL):  WDL Except    Cardio/Pulmonary Assessment  Edema   RLE Edema: 1+  LLE Edema: Generalized, 2+  Respiratory Breath Sounds  RUL Breath Sounds: Clear  RML Breath Sounds: Clear  RLL Breath Sounds: Diminished  ANAYELI Breath Sounds: Clear  LLL Breath Sounds: Diminished  Cardiac Assessment   Cardiac (WDL):  WDL Except (Hx of Moderate aortic stenosis, HTN, CAD with stents, PAD)

## 2025-02-09 NOTE — THERAPY
Physical Therapy   Daily Treatment     Patient Name: Edy Bennett  Age:  79 y.o., Sex:  male  Medical Record #: [id number]  Today's Date: 2/9/2025     Precautions  Precautions: Fall Risk  Comments: LLE wound vac    Subjective    Pt in room supine in bed with spouse present upon entry and agreeable to therapy session. Pt stated ''I have used the bathroom so many times today I've lost count''     Objective       02/09/25 1401   PT Charge Group   PT Gait Training (Units) 2   PT Therapeutic Exercise (Units) 1   PT Therapeutic Activities (Units) 1   PT Total Time Spent   PT Individual Total Time Spent (Mins) 60   Gait Functional Level of Assist    Gait Level Of Assist Contact Guard Assist   Assistive Device Front Wheel Walker   Distance (Feet) 75  (and 25')   # of Times Distance was Traveled 1   Deviation Antalgic;Step To;Decreased Base Of Support;Bradykinetic;Decreased Heel Strike;Decreased Toe Off   Wheelchair Functional Level of Assist   Wheelchair Assist Supervised   Distance Wheelchair (Feet or Distance) 150   Wheelchair Description Extra time;Limited by fatigue;Supervision for safety   Transfer Functional Level of Assist   Bed, Chair, Wheelchair Transfer Supervised   Bed Chair Wheelchair Transfer Description Increased time;Set-up of equipment;Supervision for safety   Sitting Lower Body Exercises   Ankle Pumps 3 sets of 10;Bilateral   Hip Abduction 1 set of 10;Bilateral   Hip Adduction 1 set of 10;Bilateral   Long Arc Quad 3 sets of 10;Bilateral   Marching 3 sets of 10   Hamstring Curl 3 sets of 10;Bilateral   Bed Mobility    Supine to Sit Modified Independent   Sit to Supine Modified Independent   Sit to Stand Contact Guard Assist   Scooting Independent   Rolling Modified Independent   Interdisciplinary Plan of Care Collaboration   IDT Collaboration with  Family / Caregiver;Nursing   Patient Position at End of Therapy In Bed;Bed Alarm On;Call Light within Reach;Tray Table within Reach;Phone within Reach;Family  "/ Friend in Room         Assessment    Pt tolerated session well, needed lots of rest breaks due to increased fatigue during session. Pt continues to make improvements with gait, bed mobility and transfers during session.      Strengths: Able to follow instructions, Alert and oriented, Independent prior level of function, Supportive family, Pleasant and cooperative, Motivated for self care and independence  Barriers: Pain, Pain poorly managed, Limited mobility    Plan    LE mobility exercises in supine and sitting to prep soft tissues for functional movements, progressive gait in parallel bars and walker, practice car transfers         DME  PT DME Recommendations  Wheelchair: 16\" Width, Elevating Leg Rests  Cushion: Standard    Passport items to be completed:  Get in/out of bed safely, in/out of a vehicle, safely use mobility device, walk or wheel around home/community, navigate up and down stairs, show how to get up/down from the ground, ensure home is accessible, demonstrate HEP, complete caregiver training    Physical Therapy Problems (Active)       Problem: Mobility       Dates: Start:  02/03/25         Goal: STG-Within one week, patient will ambulate household distance >50 ft SBA FWW indoors        Dates: Start:  02/03/25               Problem: Mobility Transfers       Dates: Start:  02/03/25         Goal: STG-Within one week, patient will transfer bed to chair SBA consistently SPT FWW       Dates: Start:  02/03/25               Problem: PT-Long Term Goals       Dates: Start:  01/24/25         Goal: LTG-By discharge, patient will ambulate with  feet SBA       Dates: Start:  01/24/25            Goal: LTG-By discharge, patient will transfer one surface to another with FWW supervised       Dates: Start:  01/24/25            Goal: LTG-By discharge, patient will perform home exercise program       Dates: Start:  01/24/25            Goal: LTG-By discharge, patient will ambulate up/down 4-6 stairs with " bilateral rails supervised        Dates: Start:  01/24/25            Goal: LTG-By discharge, patient will transfer in/out of a car with FWW supervised        Dates: Start:  01/24/25

## 2025-02-09 NOTE — THERAPY
"Occupational Therapy  Daily Treatment     Patient Name: Edy Bennett  Age:  79 y.o., Sex:  male  Medical Record #: [id number]  Today's Date: 2/9/2025     Precautions  Precautions: Fall Risk  Comments: LLE wound vac    Safety   ADL Safety : Requires Physical Assist for Safety  Bathroom Safety: Requires Physical Assist for Safety    Subjective    Pt found seated on the toilet and his wife present. He reports that he has \"pooped 11 times today\" and requesting to complete bathing/self-care routine.      Objective       02/09/25 1031   OT Charge Group   Charges Yes   OT Self Care / ADL (Units) 4   OT Total Time Spent   OT Individual Total Time Spent (Mins) 60   Pain   Intervention Declines   Cognition    Level of Consciousness Alert   Sleep/Wake Cycle   Sleep & Rest Awake   Functional Level of Assist   Grooming Modified Independent;Seated   Bathing Contact Guard Assist   Bathing Description Grab bar;Hand held shower;Long handled bath tool;Set up for wound protection;Supervision for safety   Upper Body Dressing Independent   Lower Body Dressing Minimal Assist   Lower Body Dressing Description Assist with threading into pant leg   Toileting Supervision   Toileting Description Supervision for safety   Bed, Chair, Wheelchair Transfer Supervised   Toilet Transfers Supervised   Tub / Shower Transfers Contact Guard Assist   Tub Shower Transfer Description Grab bar;Supervision for safety   Balance   Sitting Balance (Static) Good   Sitting Balance (Dynamic) Good   Bed Mobility    Scooting Independent   Interdisciplinary Plan of Care Collaboration   IDT Collaboration with  Family / Caregiver;Nursing   Patient Position at End of Therapy Seated;Chair Alarm On;Call Light within Reach;Tray Table within Reach;Phone within Reach;Family / Friend in Room   Collaboration Comments Spouse present at the beginning of the session and dtr present at the end of session; RN present at the end of session and notified of wife's concern re: " ankle oozing         Assessment    Pt tolerated therapy well this date focused on self-care routine. He requires supervision-CGA for transfers d/t decreased strength/balance. He demos good awareness of deficits. He was left happy with all needs met in his room with family present.        Plan    Continue current POC and discharge on 2/11/2025    DME  OT DME Recommendations  Bathroom Equipment:  (TBD)    Passport items to be completed:  Perform bathroom transfers, complete dressing, complete feeding, get ready for the day, prepare a simple meal, participate in household tasks, adapt home for safety needs, demonstrate home exercise program, complete caregiver training     Occupational Therapy Goals (Active)       Problem: Dressing       Dates: Start:  01/24/25         Goal: STG-Within one week, patient will dress LB with min A       Dates: Start:  01/24/25               Problem: OT Long Term Goals       Dates: Start:  01/24/25         Goal: LTG-By discharge, patient will complete basic self care tasks mod I       Dates: Start:  01/24/25            Goal: LTG-By discharge, patient will perform bathroom transfers mod I       Dates: Start:  01/24/25

## 2025-02-09 NOTE — PROGRESS NOTES
NURSING DAILY NOTE    Name: Edy Bennett   Date of Admission: 1/23/2025   Admitting Diagnosis: No Principal Problem: There is no principal problem currently on the Problem List. Please update the Problem List and refresh.  Attending Physician: ROSANNA PATEL D.O.  Allergies: Patient has no known allergies.    Safety  Patient Assist  min-mod  Patient Precautions  Fall Risk  Precaution Comments  LLE wound vac  Bed Transfer Status  Supervised  Toilet Transfer Status   Supervised  Assistive Devices  Rails  Oxygen  Silicone Nasal Cannula  Diet/Therapeutic Dining  Current Diet Order   Procedures    Diet Order Diet: Level 6 - Soft and Bite Sized; Liquid level: Level 0 - Thin     Pill Administration  whole  Agitated Behavioral Scale     ABS Level of Severity       Fall Risk  Has the patient had a fall this admission?   No  Trinh Spivey Fall Risk Scoring  22, HIGH RISK  Fall Risk Safety Measures  bed alarm and chair alarm    Vitals  Temperature: 36.8 °C (98.3 °F)  Temp src: Oral  Pulse: 70  Respiration: 16  Blood Pressure : 111/58  Blood Pressure MAP (Calculated): 76 MM HG  BP Location: Right, Upper Arm  Patient BP Position: Sitting     Oxygen  Pulse Oximetry: 100 %  O2 (LPM): 2.5  FiO2%: 21 %  O2 Delivery Device: Silicone Nasal Cannula    Bowel and Bladder  Last Bowel Movement  02/07/25  Stool Type  Not observed  Bowel Device  Bathroom  Continent  Bladder: Did not void   Bowel: No movement  Bladder Function  Urine Void (mL): 50 ml  Number of Times Voided: 1  Urine Color: Unable To Evaluate  Genitourinary Assessment   Bladder Assessment (WDL):  Within Defined Limits  Smith Catheter: Not Applicable  Urinary Symptoms: Anuric/ Dialysis Patient  Urine Color: Unable To Evaluate  Bladder Device: Urinal  Bladder Scan: Post Void  $ Bladder Scan Results (mL): 32    Skin  Jalen Score   16  Sensory Interventions   Bed Types: Low Airloss  Skin Preventative Measures:  Pillows in Use for Support / Positioning  Moisture Interventions  Moisturizers/Barriers: Barrier Wipes      Pain  Pain Rating Scale  0 - No Pain  Pain Location  Other (Comments)  Pain Location Orientation  Mid  Pain Interventions   Declines    ADLs    Bathing   Patient Refused Bathing (notified RN Smita)  Linen Change   Complete  Personal Hygiene  Moist Tory Wipes  Chlorhexidine Bath   Completed  Oral Care  Brushed Teeth (self)  Teeth/Dentures     Shave  Self  Nutrition Percentage Eaten  *  * Meal *  *, Dinner, Less than 25% Consumed  Environmental Precautions  Treaded Slipper Socks on Patient, Personal Belongings, Wastebasket, Call Bell etc. in Easy Reach, Transferred to Stronger Side, Bed in Low Position  Patient Turns/Positioning  Patient turns self independently side to side without assistance, to offload sacral area  Patient Turns Assistance/Tolerance  Assistance of One  Bed Positions  Bed Controls On, Bed Locked  Head of Bed Elevated  Self regulated      Psychosocial/Neurologic Assessment  Psychosocial Assessment  Psychosocial (WDL):  Within Defined Limits  Patient Behaviors: Anxious  Family Behaviors: Family Present  Neurologic Assessment  Neuro (WDL): Within Defined Limits  Level of Consciousness: Alert  Orientation Level: Oriented X4  Cognition: Follows commands, Appropriate attention/concentration  Speech: Clear  Facial Symmetry: Other (Comment) (no facial droop noted)  Pupil Assesment: No  Motor Function/Sensation Assessment: Motor strength  RUE Sensation: Full sensation  Muscle Strength Right Arm: Good Strength Against Gravity and Moderate Resistance  LUE Sensation: Full sensation  Muscle Strength Left Arm: Good Strength Against Gravity and Moderate Resistance  RLE Sensation: Full sensation  Muscle Strength Right Leg: Good Strength Against Gravity and Moderate Resistance  LLE Sensation: Full sensation  Muscle Strength Left Leg: Fair Strength against Gravity but No Resistance  EENT (WDL):  WDL  Except    Cardio/Pulmonary Assessment  Edema   RLE Edema: 1+  LLE Edema: Generalized, 2+  Respiratory Breath Sounds  RUL Breath Sounds: Clear  RML Breath Sounds: Clear  RLL Breath Sounds: Diminished  ANAYELI Breath Sounds: Clear  LLL Breath Sounds: Diminished  Cardiac Assessment   Cardiac (WDL):  WDL Except (Hx of Moderate aortic stenosis, HTN, CAD with stents, PAD)

## 2025-02-09 NOTE — PROGRESS NOTES
[facility] Progress Notes     CCPD ordered by Dr.Jusmin Granados. Disconnected aseptically from PD Cycler at 0745     Pt stable, VSS, alert and oriented.  Effluent clear and yellow, no signs of bleeding / fibrin clots.  No alarms on machine was noted or reported before disconnection.     24 hour UF = 1345 mL ( Total UF= 1139 mL + I.Drain= 206 mL +  Last fill= 0 mL)     Report given to MANOJ Dillard,      Cosigned by:

## 2025-02-09 NOTE — PROGRESS NOTES
[facility] Medicine Daily Progress Note    Date of Service  2/9/2025    Chief Complaint:  Nausea & Vomiting    Interval History:  Still has vomiting.  Workup neg so far.  Will get HIDA scan.    Review of Systems  Review of Systems   Constitutional:  Negative for fever.   Eyes:  Negative for blurred vision.   Respiratory:  Negative for cough.    Cardiovascular:  Negative for chest pain.   Gastrointestinal:  Positive for vomiting. Negative for diarrhea.   Musculoskeletal:  Negative for joint pain.   Neurological:  Negative for dizziness.   Psychiatric/Behavioral:  The patient is not nervous/anxious.         Physical Exam  Temp:  [35.9 °C (96.6 °F)-36.9 °C (98.4 °F)] 35.9 °C (96.6 °F)  Pulse:  [70-79] 70  Resp:  [16-18] 18  BP: (107-114)/(53-60) 109/60  SpO2:  [96 %-100 %] 97 %    Physical Exam  Vitals and nursing note reviewed.   Constitutional:       Appearance: He is not diaphoretic.   HENT:      Mouth/Throat:      Pharynx: No oropharyngeal exudate or posterior oropharyngeal erythema.   Eyes:      Extraocular Movements: Extraocular movements intact.   Neck:      Vascular: No carotid bruit.   Cardiovascular:      Rate and Rhythm: Normal rate and regular rhythm.   Pulmonary:      Effort: Pulmonary effort is normal.      Breath sounds: No wheezing or rales.   Abdominal:      General: There is no distension.      Palpations: Abdomen is soft.      Tenderness: There is no abdominal tenderness.   Musculoskeletal:      Right lower leg: Edema present.      Left lower leg: Edema present.   Skin:     General: Skin is warm and dry.   Neurological:      Mental Status: He is alert and oriented to person, place, and time.   Psychiatric:         Mood and Affect: Mood normal.         Behavior: Behavior normal.         Fluids    Intake/Output Summary (Last 24 hours) at 2/9/2025 0950  Last data filed at 2/9/2025 0745  Gross per 24 hour   Intake 120 ml   Output 1395 ml   Net -1275 ml        Laboratory  Recent Labs     02/07/25  8090  02/08/25  0627   WBC 6.9 7.5   RBC 2.24* 2.27*   HEMOGLOBIN 7.0* 7.0*   HEMATOCRIT 22.3* 22.6*   MCV 99.6* 99.6*   MCH 31.3 30.8   MCHC 31.4* 31.0*   RDW 58.9* 60.0*   PLATELETCT 261 275   MPV 10.4 9.9     Recent Labs     02/07/25  0521 02/08/25  0627   SODIUM 133* 134*   POTASSIUM 3.1* 3.5*   CHLORIDE 90* 90*   CO2 25 26   GLUCOSE 135* 131*   BUN 79* 82*   CREATININE 8.93* 8.99*   CALCIUM 8.6 8.6                   Imaging       Assessment/Plan  Lower extremity edema  Assessment & Plan  Has LLE edema  Has RLE pedal swelling  2nd to recent LLE surgery and ESRD  On Diuretic  Management per Nephro    Nausea & vomiting  Assessment & Plan  Pt has had N/V since coming to the Rehab  Has had N/V - 8 out of 10 days (usually occurs once per day)  Gets a little abd pain/cramps right before  Has been afebrile  No leukocytosis  Having some BM's  No difficulties eating or swallowing  O2 sats good on RA  Hep profile: showed Hep B surface Ab  U/A abn --> will get C&S  H. Pylori: neg  F/U US RUQ  AXR: Mild to moderate colonic stool burden; nonobstructive bowel gas pattern  CXR no acute process  Lipase wnl  NH4: wnl  LFT's wnl  TFT's: 3.91/0.77  ? 2nd to constipation  ? 2nd to Fosrenol (has a high SE)  ? 2nd to Fe supplements (has SE) --> now off  ? 2nd to Mucinex (low SE) --> now off  Off Miralax bid -- med disagrees with pt and has not taken it   On Lactulose bid x 2 dose today (pt has had good response before)  Cont Simethicone  Off Toprol XL (has a < 1% SE ; was taking at home)  Note: Midodrine (but was on at home) has  a < 1% SE of N/V  On Prilosec    PAD (peripheral artery disease) (HCC)- (present on admission)  Assessment & Plan  S/P elective left bypass graft    Anemia in chronic renal disease- (present on admission)  Assessment & Plan  Hb: 7.0 (2/7)  Off Fe supplements -- 2nd to N/V  On Epoetin  Management per Nephrology    Essential hypertension- (present on admission)  Assessment & Plan  BP ok  Off Toprol XL  On  Midodrine  Cont to monitor    ESRD on peritoneal dialysis (HCC)- (present on admission)  Assessment & Plan  Continues on PD  Fluid and electrolyte management per Nephro

## 2025-02-09 NOTE — PROGRESS NOTES
[facility] Services    Initial Drain: 206 mL    Patient is alert and oriented x 4, Reported pain on BLE, vomitting and bloody nose the whole afternoon. PCN aware, recently medicated. BP and HR stable and within normal limits, no SOB and afebrile.  PD catheter on Right lower quadrant, dressing is clean dry and intact. PD catheter dressing changed aseptically per protocol and no sign and symptoms of infection. Gentamicin cream administered as ordered.      Patient aseptically connected to PD cycler at 2015. PD treatment troubleshooting and emergency disconnect in-services provided to PCN. All questions answered.    Report given to PCN YVETTE Morin RN     [facility] Answering service number: [phone]    See dialysis treatment flow sheet for details.

## 2025-02-10 ENCOUNTER — HOSPITAL ENCOUNTER (OUTPATIENT)
Dept: RADIOLOGY | Facility: MEDICAL CENTER | Age: 80
DRG: 444 | End: 2025-02-10
Attending: HOSPITALIST
Payer: MEDICARE

## 2025-02-10 ENCOUNTER — APPOINTMENT (OUTPATIENT)
Dept: OCCUPATIONAL THERAPY | Facility: REHABILITATION | Age: 80
DRG: 299 | End: 2025-02-10
Attending: PHYSICAL MEDICINE & REHABILITATION
Payer: MEDICARE

## 2025-02-10 ENCOUNTER — APPOINTMENT (OUTPATIENT)
Dept: PHYSICAL THERAPY | Facility: REHABILITATION | Age: 80
DRG: 299 | End: 2025-02-10
Attending: PHYSICAL MEDICINE & REHABILITATION
Payer: MEDICARE

## 2025-02-10 LAB
ERYTHROCYTE [DISTWIDTH] IN BLOOD BY AUTOMATED COUNT: 59.2 FL (ref 35.9–50)
FLUAV RNA SPEC QL NAA+PROBE: NEGATIVE
FLUBV RNA SPEC QL NAA+PROBE: NEGATIVE
HCT VFR BLD AUTO: 23 % (ref 42–52)
HGB BLD-MCNC: 7.4 G/DL (ref 14–18)
MCH RBC QN AUTO: 31.5 PG (ref 27–33)
MCHC RBC AUTO-ENTMCNC: 32.2 G/DL (ref 32.3–36.5)
MCV RBC AUTO: 97.9 FL (ref 81.4–97.8)
PLATELET # BLD AUTO: 304 K/UL (ref 164–446)
PMV BLD AUTO: 10.2 FL (ref 9–12.9)
RBC # BLD AUTO: 2.35 M/UL (ref 4.7–6.1)
RSV RNA SPEC QL NAA+PROBE: NEGATIVE
SARS-COV-2 RNA RESP QL NAA+PROBE: NOTDETECTED
WBC # BLD AUTO: 7.6 K/UL (ref 4.8–10.8)

## 2025-02-10 PROCEDURE — 770010 HCHG ROOM/CARE - REHAB SEMI PRIVAT*

## 2025-02-10 PROCEDURE — 99232 SBSQ HOSP IP/OBS MODERATE 35: CPT | Performed by: PHYSICAL MEDICINE & REHABILITATION

## 2025-02-10 PROCEDURE — 78226 HEPATOBILIARY SYSTEM IMAGING: CPT

## 2025-02-10 PROCEDURE — 97530 THERAPEUTIC ACTIVITIES: CPT

## 2025-02-10 PROCEDURE — A9270 NON-COVERED ITEM OR SERVICE: HCPCS | Performed by: NURSE PRACTITIONER

## 2025-02-10 PROCEDURE — 700111 HCHG RX REV CODE 636 W/ 250 OVERRIDE (IP): Mod: JZ

## 2025-02-10 PROCEDURE — 90945 DIALYSIS ONE EVALUATION: CPT

## 2025-02-10 PROCEDURE — 97535 SELF CARE MNGMENT TRAINING: CPT

## 2025-02-10 PROCEDURE — 700102 HCHG RX REV CODE 250 W/ 637 OVERRIDE(OP): Performed by: NURSE PRACTITIONER

## 2025-02-10 PROCEDURE — 0241U HCHG SARS-COV-2 COVID-19 NFCT DS RESP RNA 4 TRGT ED POC: CPT

## 2025-02-10 PROCEDURE — 36415 COLL VENOUS BLD VENIPUNCTURE: CPT

## 2025-02-10 PROCEDURE — A9270 NON-COVERED ITEM OR SERVICE: HCPCS | Performed by: HOSPITALIST

## 2025-02-10 PROCEDURE — 700102 HCHG RX REV CODE 250 W/ 637 OVERRIDE(OP): Performed by: PHYSICAL MEDICINE & REHABILITATION

## 2025-02-10 PROCEDURE — 97110 THERAPEUTIC EXERCISES: CPT | Mod: CQ

## 2025-02-10 PROCEDURE — 700111 HCHG RX REV CODE 636 W/ 250 OVERRIDE (IP): Performed by: PHYSICAL MEDICINE & REHABILITATION

## 2025-02-10 PROCEDURE — 97530 THERAPEUTIC ACTIVITIES: CPT | Mod: CQ

## 2025-02-10 PROCEDURE — A9270 NON-COVERED ITEM OR SERVICE: HCPCS | Performed by: PHYSICAL MEDICINE & REHABILITATION

## 2025-02-10 PROCEDURE — 85027 COMPLETE CBC AUTOMATED: CPT

## 2025-02-10 PROCEDURE — 99232 SBSQ HOSP IP/OBS MODERATE 35: CPT | Performed by: HOSPITALIST

## 2025-02-10 PROCEDURE — 97110 THERAPEUTIC EXERCISES: CPT

## 2025-02-10 PROCEDURE — 700102 HCHG RX REV CODE 250 W/ 637 OVERRIDE(OP): Performed by: HOSPITALIST

## 2025-02-10 RX ORDER — SIMETHICONE 125 MG
125 TABLET,CHEWABLE ORAL 3 TIMES DAILY
Status: DISCONTINUED | OUTPATIENT
Start: 2025-02-10 | End: 2025-02-14 | Stop reason: HOSPADM

## 2025-02-10 RX ORDER — MORPHINE SULFATE 4 MG/ML
2 INJECTION INTRAVENOUS ONCE
Status: COMPLETED | OUTPATIENT
Start: 2025-02-10 | End: 2025-02-10

## 2025-02-10 RX ORDER — MORPHINE SULFATE 4 MG/ML
INJECTION INTRAVENOUS
Status: COMPLETED
Start: 2025-02-10 | End: 2025-02-10

## 2025-02-10 RX ADMIN — APIXABAN 5 MG: 5 TABLET, FILM COATED ORAL at 21:25

## 2025-02-10 RX ADMIN — ONDANSETRON 4 MG: 4 TABLET, ORALLY DISINTEGRATING ORAL at 16:49

## 2025-02-10 RX ADMIN — SENNOSIDES AND DOCUSATE SODIUM 2 TABLET: 50; 8.6 TABLET ORAL at 07:58

## 2025-02-10 RX ADMIN — SIMETHICONE 125 MG: 125 TABLET, CHEWABLE ORAL at 16:48

## 2025-02-10 RX ADMIN — LANTHANUM CARBONATE 500 MG: 500 TABLET, CHEWABLE ORAL at 16:49

## 2025-02-10 RX ADMIN — MORPHINE SULFATE 2 MG: 4 INJECTION, SOLUTION INTRAMUSCULAR; INTRAVENOUS at 15:51

## 2025-02-10 RX ADMIN — OMEPRAZOLE 40 MG: 20 CAPSULE, DELAYED RELEASE ORAL at 07:57

## 2025-02-10 RX ADMIN — ROSUVASTATIN CALCIUM 20 MG: 10 TABLET, FILM COATED ORAL at 21:26

## 2025-02-10 RX ADMIN — MORPHINE SULFATE 2 MG: 4 INJECTION INTRAVENOUS at 15:51

## 2025-02-10 RX ADMIN — GENTAMICIN SULFATE: 1 CREAM TOPICAL at 19:35

## 2025-02-10 RX ADMIN — MONTELUKAST 10 MG: 10 TABLET, FILM COATED ORAL at 21:26

## 2025-02-10 RX ADMIN — MIDODRINE HYDROCHLORIDE 10 MG: 10 TABLET ORAL at 07:58

## 2025-02-10 RX ADMIN — SIMETHICONE 125 MG: 125 TABLET, CHEWABLE ORAL at 21:25

## 2025-02-10 RX ADMIN — CLOPIDOGREL BISULFATE 75 MG: 75 TABLET ORAL at 07:58

## 2025-02-10 RX ADMIN — LANTHANUM CARBONATE 500 MG: 500 TABLET, CHEWABLE ORAL at 07:57

## 2025-02-10 RX ADMIN — MIDODRINE HYDROCHLORIDE 15 MG: 2.5 TABLET ORAL at 16:49

## 2025-02-10 RX ADMIN — POTASSIUM CHLORIDE 20 MEQ: 1500 TABLET, EXTENDED RELEASE ORAL at 07:57

## 2025-02-10 RX ADMIN — APIXABAN 5 MG: 5 TABLET, FILM COATED ORAL at 07:58

## 2025-02-10 ASSESSMENT — ENCOUNTER SYMPTOMS
SHORTNESS OF BREATH: 0
DIARRHEA: 0
CHILLS: 0
FEVER: 0
NERVOUS/ANXIOUS: 0
VOMITING: 1
NAUSEA: 0
ABDOMINAL PAIN: 0

## 2025-02-10 ASSESSMENT — PAIN DESCRIPTION - PAIN TYPE
TYPE: ACUTE PAIN
TYPE: ACUTE PAIN

## 2025-02-10 ASSESSMENT — ACTIVITIES OF DAILY LIVING (ADL)
BED_CHAIR_WHEELCHAIR_TRANSFER_DESCRIPTION: INCREASED TIME;SET-UP OF EQUIPMENT;SQUAT PIVOT TRANSFER TO WHEELCHAIR;SUPERVISION FOR SAFETY
BED_CHAIR_WHEELCHAIR_TRANSFER_DESCRIPTION: ADAPTIVE EQUIPMENT;SUPERVISION FOR SAFETY;VERBAL CUEING;SET-UP OF EQUIPMENT

## 2025-02-10 NOTE — DISCHARGE PLANNING
Case management  Reviewed signed copy of IMM and answered all questions.    Dc date /disposition: 2/11/25 Home with spouse and [facility].

## 2025-02-10 NOTE — PROGRESS NOTES
"  Physical Medicine & Rehabilitation Progress Note    Encounter Date: 2/10/2025    Chief Complaint: PVD s/p bypass    Interval Events (Subjective):  BP stable low   BM 2/9  Voids some, oliguric  MAR: Not getting Torsemide    Seen and examined in his room. Feeling a lot weaker today. He is having pec muscle pain. The vomiting is usually in the afternoon. Will try outside meal today after HIDA. Still having the swelling. Off Lactulose.        ROS: 14 point ROS negative unless otherwise specified in the HPI    Objective:  VITAL SIGNS: /52   Pulse 75   Temp 36.4 °C (97.5 °F) (Temporal)   Resp 16   Ht 1.707 m (5' 7.2\")   Wt 70.9 kg (156 lb 4 oz)   SpO2 98%   BMI 24.33 kg/m²     GEN: No apparent distress  HEENT: Head normocephalic, atraumatic.  Sclera nonicteric bilaterally, no ocular discharge appreciated bilaterally.  CV: Left lower extremity with + edema.   PULMONARY: Breathing nonlabored on room air, no respiratory accessory muscle use.  Not requiring supplemental oxygen.  SKIN:   2/2 Clinical Picture    PSYCH: Mood and affect within normal limits.  NEURO: Awake alert.  Conversational.  Logical thought content.  flapping hand tremors      Laboratory Values:  Recent Results (from the past 72 hours)   CBC WITHOUT DIFFERENTIAL    Collection Time: 02/08/25  6:27 AM   Result Value Ref Range    WBC 7.5 4.8 - 10.8 K/uL    RBC 2.27 (L) 4.70 - 6.10 M/uL    Hemoglobin 7.0 (L) 14.0 - 18.0 g/dL    Hematocrit 22.6 (L) 42.0 - 52.0 %    MCV 99.6 (H) 81.4 - 97.8 fL    MCH 30.8 27.0 - 33.0 pg    MCHC 31.0 (L) 32.3 - 36.5 g/dL    RDW 60.0 (H) 35.9 - 50.0 fL    Platelet Count 275 164 - 446 K/uL    MPV 9.9 9.0 - 12.9 fL   Comp Metabolic Panel    Collection Time: 02/08/25  6:27 AM   Result Value Ref Range    Sodium 134 (L) 135 - 145 mmol/L    Potassium 3.5 (L) 3.6 - 5.5 mmol/L    Chloride 90 (L) 96 - 112 mmol/L    Co2 26 20 - 33 mmol/L    Anion Gap 18.0 (H) 7.0 - 16.0    Glucose 131 (H) 65 - 99 mg/dL    Bun 82 (H) 8 - 22 " mg/dL    Creatinine 8.99 (HH) 0.50 - 1.40 mg/dL    Calcium 8.6 8.5 - 10.5 mg/dL    Correct Calcium 9.6 8.5 - 10.5 mg/dL    AST(SGOT) 31 12 - 45 U/L    ALT(SGPT) 26 2 - 50 U/L    Alkaline Phosphatase 113 (H) 30 - 99 U/L    Total Bilirubin 0.6 0.1 - 1.5 mg/dL    Albumin 2.7 (L) 3.2 - 4.9 g/dL    Total Protein 5.5 (L) 6.0 - 8.2 g/dL    Globulin 2.8 1.9 - 3.5 g/dL    A-G Ratio 1.0 g/dL   AMMONIA    Collection Time: 02/08/25  6:27 AM   Result Value Ref Range    Ammonia 16 11 - 45 umol/L   FREE THYROXINE    Collection Time: 02/08/25  6:27 AM   Result Value Ref Range    Free T-4 0.77 (L) 0.93 - 1.70 ng/dL   TSH    Collection Time: 02/08/25  6:27 AM   Result Value Ref Range    TSH 3.910 0.350 - 5.500 uIU/mL   LIPASE    Collection Time: 02/08/25  6:27 AM   Result Value Ref Range    Lipase 15 11 - 82 U/L   ESTIMATED GFR    Collection Time: 02/08/25  6:27 AM   Result Value Ref Range    GFR (CKD-EPI) 6 (A) >60 mL/min/1.73 m 2   URINALYSIS    Collection Time: 02/08/25 11:49 AM    Specimen: Urine, Clean Catch   Result Value Ref Range    Color Yellow     Character Cloudy (A)     Specific Gravity 1.022 <1.035    Ph 5.5 5.0 - 8.0    Glucose Negative Negative mg/dL    Ketones Negative Negative mg/dL    Protein 100 (A) Negative mg/dL    Bilirubin Negative Negative    Urobilinogen, Urine 1.0 <=1.0 EU/dL    Nitrite Negative Negative    Leukocyte Esterase Trace (A) Negative    Occult Blood Negative Negative    Micro Urine Req Microscopic    H.PYLORI STOOL ANTIGEN    Collection Time: 02/08/25 11:49 AM   Result Value Ref Range    H Pylori Ag Stool E Not Detected Not Detected   URINE MICROSCOPIC (W/UA)    Collection Time: 02/08/25 11:49 AM   Result Value Ref Range    WBC 3-5 (A) /hpf    RBC 0-2 0 - 2 /hpf    Bacteria None Seen None /hpf    Epithelial Cells 3-5 0 - 5 /hpf    Epithelial Cells Renal Present (A) Absent /hpf    Amorphous Crystal Present (A) Absent /hpf    Ca Oxalate Crystal Present (A) Absent /hpf    Urine Casts 6-10 (A) 0 - 2  /lpf    Granular Casts Present (A) Absent /lpf   CBC WITHOUT DIFFERENTIAL    Collection Time: 02/10/25  6:38 AM   Result Value Ref Range    WBC 7.6 4.8 - 10.8 K/uL    RBC 2.35 (L) 4.70 - 6.10 M/uL    Hemoglobin 7.4 (L) 14.0 - 18.0 g/dL    Hematocrit 23.0 (L) 42.0 - 52.0 %    MCV 97.9 (H) 81.4 - 97.8 fL    MCH 31.5 27.0 - 33.0 pg    MCHC 32.2 (L) 32.3 - 36.5 g/dL    RDW 59.2 (H) 35.9 - 50.0 fL    Platelet Count 304 164 - 446 K/uL    MPV 10.2 9.0 - 12.9 fL       Medications:  Scheduled Medications   Medication Dose Frequency    simethicone  125 mg TID    scopolamine  1 Patch Q72HRS    midodrine  10 mg TID WITH MEALS    ondansetron  4 mg BID WITH MEALS    torsemide  10 mg Q DAY    potassium chloride SA  20 mEq DAILY    omeprazole  40 mg DAILY    lanthanum carbonate  500 mg TID WITH MEALS    epoetin  4,000 Units Q7 DAYS    Pharmacy Consult Request  1 Each PHARMACY TO DOSE    apixaban  5 mg BID    clopidogrel  75 mg DAILY    montelukast  10 mg QHS    rosuvastatin  20 mg QHS    senna-docusate  2 Tablet BID    gentamicin   DAILY AT 1800     PRN medications: simethicone, formulation r, bisacodyl, oxyCODONE immediate-release **OR** oxyCODONE immediate-release, methocarbamol, sevelamer carbonate, lidocaine, hydrALAZINE, lactulose, bisacodyl EC, magnesium hydroxide, carboxymethylcellulose, benzocaine-menthol, mag hydrox-al hydrox-simeth, ondansetron **OR** ondansetron, traZODone, sodium chloride, acetaminophen, senna-docusate **AND** polyethylene glycol/lytes    Diet:  Current Diet Order   Procedures    Diet Order Diet: Level 6 - Soft and Bite Sized; Liquid level: Level 0 - Thin       Medical Decision Making and Plan:  S/p left popliteal to posterior tibial artery bypass Dr. Miller 1/20/2025  PT and OT for mobility and ADLs. Per guidelines, 15 hours per week between PT, OT and/or SLP.  Follow-up vascular surgery  Continue Eliquis and Plavix  Continue statin    1/24 Prevena saturation. Wound consult. Vasc surg to eval as well.  Prevena now working.   1/30 vascular to change dressing at some point per patient's wife  2/5 swelling stable. Not getting torsemide nor BB  2/10 Vac to come off tomorrow     Type 2 diabetes mellitus with hyperglycemia -outpatient medication had been Januvia 1 tablet twice daily     Pain -Tylenol and oxycodone as needed.  Scheduled Robaxin, gabapentin.  Schedule oxycodone 5x daily, morphine contraindicated based on renal function. 1/29 Reduce Oxycodone to QID. 1/30 reduce oxycodone to scheduled 3 times daily.  Decrease methocarbamol to 250 mg 4 times daily. 2/3 Patient refusing scheduled Oxy. Continue only as PRN and d/c scheduled. Make Robaxin PRN.     Last as needed oxycodone use was 1/24 2/4 d/c Gabapentin, not using Oxy reduce frequency to q4 hrs PRN     Anemia - 1/24 Improving. 1/27 Drop to 8's, had a lot of OP from Prevena last week. 1/29 7.7 - expected given prevena OP. Monitor need for transfusion.  1/30 hemoglobin dropped to 7.3.  Discussed with nephrology, they deferred to us to transfuse.  Transfuse less than 7.  Prevena putting out less. 2/5 Stable 7's     Thrombocytopenia - Resolved 1/30     ESRD on PD - Nnephrology following.  On sevelamer - changed due to naussea.  Nephrology increased fill 1/30.  Tunneled cath removal 2/6 per nephrology    Hypermagnesemia - Nephro managing    Hyperphosphatemia - Nephro managing      History of moderate aortic stenosis -stable     Hypertension - On admit: Norvasc 2.5 mg every morning, Lasix 40 mg twice daily, Metoprolol XL 100mg. Norvasc stopped.      2/5 Has not been getting metoprolol nor torsemide for many days (>6) will reduce midodrine, change torsemide to 1400 and add midodrine.   2/6 continue midodrine.  2/7 Not getting beta blocker. Stop for now, hasn't been given in > 7 days. Increase Midodrine and continue Torsemide so can improve swelling.   2/10 Has not received Torsemide. Reduce BP parameter to not administer if BP < 110. Increase Midodrine to 15mg  TID.     CAD s/p SYDNEE 3x -on aspirin and statin.  Had been on Imdur 30 mg daily     Gout - No meds at home.      Hyperlipidemia -continue statin     Recent left inguinal hernia repair -12/18/2024 Dr. Miller     Bowel - Patient on Senna-docusate for constipation prophylaxis. 1/23 Constipation. 1/29 BM. 2/1 BM. 2/3 Bowel meds increased.     Nausea & Vomiting - Only after lunch. Get KUB - WNL. Schedule Zofran after lunch.  Scheduled Zofran after each meal. 2/3 Declining scheduled Zofran. Make PRN only. 2/4 reduced to only afternoon. 2/5 No vomiting yesterday. Monitor.  2/6 having some emesis using as needed Zofran.  2/6 schedule Zofran again in the afternoon and evening.  Will try without Nepro as seems to correlate with when he gets Nepro. 2/7 Down grade diet per patient preference, cannot tolerate regular textures. Consult hospitalist.     2/10 HIDA pending today at 1400. No improvement in N/V. Off Miralax, on Lactulose. Viral panel pending.     Hand tremors -LFTs nonconcerning for etiology.  Possibly med induced. 2/5 Improved but still continues. Off meds that may contribute.      BPH - TV/PVR/BS PRN.  Continue Flomax and Hytrin. 1/28 Hold due to low BP. Favor diuresis. 2/3 Low PVRs.      Insomnia -continue trazodone at night as needed        Upcoming Labs/imaging: Per Nephro     DVT PROPHYLAXIS: On Eliquis     HOSPITALIST FOLLOWING: Yes    Consultants following: Nephrology     CODE STATUS: Full code     DISPO: Home with spouse     SHAE: 2/14/25     MEDS SENT TO: [facility]     DISCHARGE SPECIALIST FOLLOW UP:   Vascular surgery - 2/13  Transplant team     Patient to scheduled follow up with their PCP within 2 weeks from discharge from the [facility].   ____________________________________    Dr. Mere Klein DO, MS  [facility] - Physical Medicine & Rehabilitation   ____________________________________    _____________________________________  Interdisciplinary Team Conference   Most recent IDT on  2/10/2025    I, Dr. Mere Klein DO, MS, was present and led the interdisciplinary team conference on 2/10/2025.  I led the IDT conference and agree with the IDT conference documentation and plan of care as noted below.     Nursing:  Diet Current Diet Order   Procedures    Diet Order Diet: Level 6 - Soft and Bite Sized; Liquid level: Level 0 - Thin       Eating ADL        % of Last Meal  Oral Nutrition: Less than 25% Consumed   Sleep    Bowel Last BM: 02/09/25   Bladder        Physical Therapy:  Bed Mobility    Transfers Supervised  Increased time, Set-up of equipment, Supervision for safety   Mobility Contact Guard Assist   Stairs        Occupational Therapy:  Grooming Modified Independent, Seated   Bathing Contact Guard Assist   UB Dressing Independent   LB Dressing Minimal Assist   Toileting Supervision   Shower & Transfer        Respiratory Therapy:  O2 (LPM): 0  O2 Delivery Device: Silicone Nasal Cannula    Case Management:  Continues to work on disposition and DME needs.     BARRIERS TO DISCHARGE HOME:  Family training  Equipment  OP vs HH services   Medical Stability     Discharge Date/Disposition:  2/14/25  _____________________________________

## 2025-02-10 NOTE — PROGRESS NOTES
[facility] Medicine Daily Progress Note    Date of Service  2/10/2025    Chief Complaint:  Nausea & Vomiting    Interval History:  Pt for HIDA scan today.    Review of Systems  Review of Systems   Constitutional:  Negative for chills and fever.   Respiratory:  Negative for shortness of breath.    Cardiovascular:  Negative for chest pain.   Gastrointestinal:  Positive for vomiting. Negative for abdominal pain, diarrhea and nausea.   Psychiatric/Behavioral:  The patient is not nervous/anxious.         Physical Exam  Temp:  [36.2 °C (97.2 °F)-37 °C (98.6 °F)] 36.4 °C (97.5 °F)  Pulse:  [75-84] 75  Resp:  [16] 16  BP: ()/(52-62) 100/52  SpO2:  [98 %-100 %] 98 %    Physical Exam  Vitals and nursing note reviewed.   Constitutional:       Appearance: Normal appearance.   HENT:      Head: Atraumatic.   Eyes:      Conjunctiva/sclera: Conjunctivae normal.      Pupils: Pupils are equal, round, and reactive to light.   Cardiovascular:      Rate and Rhythm: Normal rate and regular rhythm.      Heart sounds: No murmur heard.  Pulmonary:      Effort: Pulmonary effort is normal.      Breath sounds: No stridor. No wheezing or rales.   Abdominal:      General: There is no distension.      Palpations: Abdomen is soft.      Tenderness: There is no abdominal tenderness.   Musculoskeletal:      Cervical back: Normal range of motion and neck supple.      Right lower leg: Edema present.      Left lower leg: Edema present.   Skin:     General: Skin is warm and dry.      Findings: No rash.   Neurological:      Mental Status: He is alert and oriented to person, place, and time.   Psychiatric:         Mood and Affect: Mood normal.         Behavior: Behavior normal.         Fluids    Intake/Output Summary (Last 24 hours) at 2/10/2025 1011  Last data filed at 2/10/2025 0730  Gross per 24 hour   Intake 80 ml   Output 1425 ml   Net -1345 ml        Laboratory  Recent Labs     02/08/25  0627 02/10/25  0638   WBC 7.5 7.6   RBC 2.27* 2.35*    HEMOGLOBIN 7.0* 7.4*   HEMATOCRIT 22.6* 23.0*   MCV 99.6* 97.9*   MCH 30.8 31.5   MCHC 31.0* 32.2*   RDW 60.0* 59.2*   PLATELETCT 275 304   MPV 9.9 10.2     Recent Labs     02/08/25  0627   SODIUM 134*   POTASSIUM 3.5*   CHLORIDE 90*   CO2 26   GLUCOSE 131*   BUN 82*   CREATININE 8.99*   CALCIUM 8.6                   Imaging       Assessment/Plan  Lower extremity edema  Assessment & Plan  Has LLE edema  Has RLE pedal swelling  2nd to recent LLE surgery and ESRD  On Diuretic  Management per Nephro    Nausea & vomiting  Assessment & Plan  Pt has had N/V since coming to the Rehab  Has had N/V almost every days (usually occurs once per day and usually in the afternoon)  Gets a little abd pain/cramps right before  Has been afebrile  No leukocytosis  Having some BM's  No difficulties eating or swallowing  O2 sats good on RA  Hep profile: showed Hep B surface Ab  H. Pylori: neg  US RUQ: borderline hepatomegaly; small amount of perihepatic fluid; no gallstones or biliary dilatation  AXR: Mild to moderate colonic stool burden; nonobstructive bowel gas pattern  CXR no acute process  Lipase wnl  NH4: wnl  LFT's wnl  TFT's: 3.91/0.77  U/A abn --> f/u C&S  F/U HIDA scan  ? 2nd to constipation  ? 2nd to Fosrenol (has a high SE)  Off Fe supplements  Off Mucinex  Off Toprol XL (has a < 1% SE ; was taking at home)  On Prilosec  On SImethicone  On Scopolamine patch  S/P Lactulose bid x 2 dose today (pt has had good response before)  Consider an EGD  Note: Midodrine (but was on at home) has  a < 1% SE of N/V  Note: pt doesn't tolerate Miralax    PAD (peripheral artery disease) (HCC)- (present on admission)  Assessment & Plan  S/P elective left bypass graft    Anemia in chronic renal disease- (present on admission)  Assessment & Plan  Hb: 7.0 --> 7.0 --> 7.4  Off Fe supplements -- 2nd to N/V  On Epoetin  Management per Nephrology    Essential hypertension- (present on admission)  Assessment & Plan  BP ok  Off Toprol XL  On  Midodrine  Cont to monitor    ESRD on peritoneal dialysis (HCC)- (present on admission)  Assessment & Plan  Continues on PD  Fluid and electrolyte management per Nephro

## 2025-02-10 NOTE — CARE PLAN
The patient is Watcher - Medium risk of patient condition declining or worsening    Shift Goals  Clinical Goals: Hydration, diuresis  Patient Goals: rest, no more vomiting  Family Goals: Safety, more energy for patient, stop n/v    Progress made toward(s) clinical / shift goals:  Patient demonstrates ability to turn self in bed to offload sacral area. Barrier paste applied to sacrum, patient kept clean and dry throughout shift. Prevena in place and draining, dressing to left leg is clean dry and intact. However, increasing redness and swelling of left leg.     Problem: Skin Integrity  Goal: Skin integrity is maintained or improved  Outcome: Progressing       Patient is not progressing towards the following goals: Patient had 3 episodes of emesis throughout day shift totaling around 1000cc for the day. Patient is unable to tolerate adequate PO intake, hemoglobin is at 7.0 and patient states he feels weak. Wife of patient says he has lost about 40 pounds since admission to rehab.      Problem: Hemodynamics  Goal: Patient's hemodynamics, fluid balance and neurologic status will be stable or improve  Outcome: Not Progressing     Problem: Nutrition  Goal: Patient's nutritional and fluid intake will be adequate or improve  Outcome: Not Progressing  Goal: Patient will display progressive weight gain toward goal have adequate food and fluid intake  Outcome: Not Progressing

## 2025-02-10 NOTE — PROGRESS NOTES
[facility] -  PROGRESS NOTE               Author: IDA Marte Date & Time: 2/10/2025  11:09 AM     HPI:  79 y.o. male with HTN, ESRD on PD, PVD/ gangrenous left foot, BPH admitted for monitoring s/p  left leg bypass surgery on 1/20 with Dr. Miller. He recently underwent hernial repair and was temporarily transferred to in center HD via RI PC. He has returned to  PD  4 days ago, doing low fill.volumes and has been tolerating at home.   Patient was connected to cycler last night. Total UF reported to be 276ml. Reports of low drain UF alarm last night but no major issues with PD overnight. Pt reports no complaints today morning  No F/C/N/V/CP/SOB.  No melena, hematochezia, hematemesis.  No HA, visual changes,or abdominal pain.     DAILY NEPHROLOGY SUMMARY:  See note from 1/31 for prior summaries   2/01: Disconnected aseptically from PD CyclerEffluent clear and yellow, no signs of bleeding/fibrin clots. 24 hour UF= 678mL. Tolerated well.   2/2: Patient and wife are doing well. Wife states she gave wrong # of cycles to provider yesterday and wanted then reduced back from 7 to 5. Overall doing well without complaints. Looking forward to going home around Feb 7th per patient. Also stated they were told that RT IJ TDC would be removed sometime this week. On-coming provider to ask the Hospitalist to help arrange.   2/3: pt sitting up in bed, spouse at bedside, PD improved with change in rx (reduced to 5 exchanges from 7) great UF: 1.1L, edema has improved, biggest complaint NV, vomits after each meal and fatigue, requesting a reduction in gabapentin, also reports constipation, had to dig out stool yesterday with finger  2/4 net UF 1500 ml overnight. Potassium is 3.3 I am replacing. Patient reports doing better today, wife at bedside. Patient feels much better now since Erica made med changes yesterday  2/5: 1437 mL Net UF removed overnight. Wife BS. Patient oob in BR performing adls  "independently. Reports improvement every day.   2/6: doing well, eating lunch with wife, net UF 1273 ml  2/7: patient doing well, low K I will order additional dose of potassium  2/8: NAEO, no complaints, wife and friends at bedside; Feels good  2/9: NAEO, wife at bedside, he is in wheelchair, having multiple BM's after lactulose  2/10: 725 mL Net UF removed last night via CCPD. Low drain UF alarmed overnight. Resting in bed, wife BS. Bps low 90s systolic. C/o n/v yesterday, Complaining of persistent nausea. Per wife, appears weaker. Multiple Bms yesterday. Denies fever or chills.     REVIEW OF SYSTEMS:    10 point ROS reviewed and is as per HPI/daily summary or otherwise negative    PMH/PSH/SH/FH: Reviewed and unchanged since admission note  CURRENT MEDICATIONS: Reviewed from admission to present day    VS:  /52   Pulse 75   Temp 36.4 °C (97.5 °F) (Temporal)   Resp 16   Ht 1.707 m (5' 7.2\")   Wt 70.9 kg (156 lb 4 oz)   SpO2 98%   BMI 24.33 kg/m²   Physical Exam  Nursing note reviewed.   Constitutional:       Appearance: Normal appearance.   Eyes:      General: No scleral icterus.  Cardiovascular:      Comments: No edema  Pulmonary:      Effort: Pulmonary effort is normal.   Abdominal:      General: There is no distension.   Musculoskeletal:         General: Deformity (LLE with wound vac) present.   Skin:     Findings: No rash.   Neurological:      Mental Status: He is alert.   Psychiatric:         Behavior: Behavior is cooperative.       FLUID BALANCE:  In: 80 [P.O.:80]  Out: 2045     LABS:  Recent Labs     02/08/25  0627   SODIUM 134*   POTASSIUM 3.5*   CHLORIDE 90*   CO2 26   GLUCOSE 131*   BUN 82*   CREATININE 8.99*   CALCIUM 8.6       Recent Labs     02/08/25  0627 02/10/25  0638   WBC 7.5 7.6   RBC 2.27* 2.35*   HEMOGLOBIN 7.0* 7.4*   HEMATOCRIT 22.6* 23.0*   MCV 99.6* 97.9*   MCH 30.8 31.5   MCHC 31.0* 32.2*   RDW 60.0* 59.2*   PLATELETCT 275 304   MPV 9.9 10.2     IMPRESSION:  # ESRD on CCPD  - " Pt was transitioned to  iHD during masoud-op  s/p inguinal hernial repair,   - Still has RIJ PC  - Returned to PD 1/17/25   - Home PD prescription CCPD 2.5% alternating with 1.5% 5  no last fill. Patient wife stated she initially told last Nephrologist wrong script. Now all green bags and 5 cycles, not 7.   # HTN, controlled   - Goal BP < 140/90  - torsemide 10mg daily   # Anemia of CKD  - Goal Hgb 10-11  - Below goal   - On PO iron  - ROSALIND weekly   # CKD-MBD  - Ca 8.6   - PO4 7.4 -> 6.6 -> 7.0  - Lanthanum 500mg TID, titrate up prn  # Chronic hypoxic respiratory failure   - On home oxygen 2-2.5L at night   # Nausea/Vomiting   - ddx: binder intolerance, switched to lanthanum   - constipation, started miralax BID and lactulose added recently   - pill burden, increase omeprazole to 40mg daily   - reduced gabapentin to 100mg daily    - H. Pylori (-)   # Hyponatremia, mild   - torsemide daily   - UF with PD, on all 2.5%      SUGGESTIONS:  - Continue CCPD during hospital stay (now on oupt rx and tolerating well)  - Reduced gapapentin to 100mg daily  - PD exit site care per protocol   - Changed ROSALIND to SQ weekly as now on PD consistently   - No dietary protein restrictions  - Dose all meds per ESRD  - Renal diet, low phos   - Check Viral panel  - L arm AVF not fully matured,  will need intervention outpt   - Avoid Fleet enema in dialysis patients   - Labs daily  - Discharge planning underway, plan for 2/7 home with family now 2/11 with Kali GUEVARA

## 2025-02-10 NOTE — PROGRESS NOTES
[facility] Services Progress Notes     Disconnected aseptically from PD Cycler at 0715.     Pt stable, VSS, alert and oriented.  Effluent clear and yellow, no signs of bleeding/fibrin clots.  Low drain UF and check patient line alarms overnight      24 hour UF= 725 ML (I.Drain= 1ML + Total UF= 724 ML - Last fill= 0)     Report given to Melany ARANDA

## 2025-02-10 NOTE — PROGRESS NOTES
[facility] Services Progress Note     CCPD initiated at 2030 as ordered per Dr. BRENDA Granados x 8.5 hours using 2.5% PD solution.       Pt A/Ox4, VSS, complained of being worn out, no N/V at this time     Aseptically connected PD cycler to PD catheter   PD Exit site cleansed, no signs of infection noted, gentamicin applied as ordered, PD dressing changed CDI.        Report given to Primary RN Sarahi with on-call Dialysis RN contact information ([phone]).      Initial drain : 1ML     Please call for any issues with hemodynamic instability/persistent alarms/patient not tolerating therapy.

## 2025-02-10 NOTE — PROGRESS NOTES
NURSING DAILY NOTE    Name: Edy Bennett   Date of Admission: 1/23/2025   Admitting Diagnosis: No Principal Problem: There is no principal problem currently on the Problem List. Please update the Problem List and refresh.  Attending Physician: Mere Klein D.o.  Allergies: Patient has no known allergies.    Safety  Patient Assist  mod assist  Patient Precautions  Fall Risk  Precaution Comments  LLE wound vac  Bed Transfer Status  Supervised  Toilet Transfer Status   Supervised  Assistive Devices  Rails, Wheelchair  Oxygen  Nasal Cannula  Diet/Therapeutic Dining  Current Diet Order   Procedures    Diet Order Diet: Level 6 - Soft and Bite Sized; Liquid level: Level 0 - Thin     Pill Administration  whole  Agitated Behavioral Scale     ABS Level of Severity       Fall Risk  Has the patient had a fall this admission?   No  Trinh Spivey Fall Risk Scoring  30, HIGH RISK  Fall Risk Safety Measures  bed alarm and chair alarm    Vitals  Temperature: 37 °C (98.6 °F)  Temp src: Temporal  Pulse: 82  Respiration: 16  Blood Pressure : 124/62  Blood Pressure MAP (Calculated): 83 MM HG  BP Location: Right, Upper Arm  Patient BP Position: Lying Left Side     Oxygen  Pulse Oximetry: 100 %  O2 (LPM): 2  FiO2%: 21 %  O2 Delivery Device: Nasal Cannula    Bowel and Bladder  Last Bowel Movement  02/09/25  Stool Type  Type 7: Watery, no solid pieces-entirely liquid  Bowel Device  Bathroom  Continent  Bladder: Did not void   Bowel: No movement  Bladder Function  Urine Void (mL): 50 ml  Number of Times Voided: 1  Urine Color: Yellow  Urine Clarity: Clear  Genitourinary Assessment   Bladder Assessment (WDL):  WDL Except  Smith Catheter: Not Applicable  Urinary Symptoms: Anuric/ Dialysis Patient  Urine Color: Yellow  Urine Clarity: Clear  Bladder Device: Urinal  Bladder Scan: Post Void  $ Bladder Scan Results (mL): 32    Skin  Jalen Score   13  Sensory Interventions   Bed  Types: Low Airloss  Skin Preventative Measures: Pillows in Use for Support / Positioning  Moisture Interventions  Moisturizers/Barriers: Barrier Paste, Barrier Wipes      Pain  Pain Rating Scale  2 - Notice Pain, does not interfere with activities  Pain Location  Penis (bladder)  Pain Location Orientation  Lower  Pain Interventions   Declines    ADLs    Bathing   Patient Refused Bathing (notified RN Smita)  Linen Change   Complete  Personal Hygiene  Moist Tory Wipes  Chlorhexidine Bath   Completed  Oral Care  Brushed Teeth (wife assist)  Teeth/Dentures     Shave  Self  Nutrition Percentage Eaten  Less than 25% Consumed  Environmental Precautions  Treaded Slipper Socks on Patient, Personal Belongings, Wastebasket, Call Bell etc. in Easy Reach, Transferred to Stronger Side, Bed in Low Position  Patient Turns/Positioning  Patient turns self independently side to side without assistance, to offload sacral area  Patient Turns Assistance/Tolerance  Assistance of One  Bed Positions  Bed Controls On, Bed Locked  Head of Bed Elevated  Self regulated      Psychosocial/Neurologic Assessment  Psychosocial Assessment  Psychosocial (WDL):  Within Defined Limits  Patient Behaviors: Anxious  Family Behaviors: No Family Present  Neurologic Assessment  Neuro (WDL): Exceptions to WDL  Level of Consciousness: Alert  Orientation Level: Oriented X4  Cognition: Follows commands, Appropriate attention/concentration  Speech: Clear  Facial Symmetry: Other (Comment) (no facial droop noted)  Pupil Assesment: No  Motor Function/Sensation Assessment: Motor response, Sensation  RUE Motor Response: Responds to commands, Normal extension, Normal flexion  RUE Sensation: Full sensation  Muscle Strength Right Arm: Good Strength Against Gravity and Moderate Resistance  LUE Motor Response: Responds to commands, Normal extension, Normal flexion  LUE Sensation: Full sensation  Muscle Strength Left Arm: Good Strength Against Gravity and Moderate  Resistance  RLE Motor Response: Responds to commands, Normal extension, Normal flexion  RLE Sensation: Full sensation  Muscle Strength Right Leg: Good Strength Against Gravity and Moderate Resistance  LLE Motor Response: Responds to commands, Normal extension, Normal flexion  LLE Sensation: Full sensation  Muscle Strength Left Leg: Fair Strength against Gravity but No Resistance  EENT (WDL):  WDL Except    Cardio/Pulmonary Assessment  Edema   RLE Edema: 1+  LLE Edema: 2+  Respiratory Breath Sounds  RUL Breath Sounds: Clear  RML Breath Sounds: Clear  RLL Breath Sounds: Diminished  ANAYELI Breath Sounds: Clear  LLL Breath Sounds: Diminished  Cardiac Assessment   Cardiac (WDL):  WDL Except

## 2025-02-10 NOTE — CARE PLAN
Problem: PT-Long Term Goals  Goal: LTG-By discharge, patient will ambulate with  feet SBA  Outcome: Not Met  Note: Amb 75ft FWW CGA  Goal: LTG-By discharge, patient will ambulate up/down 4-6 stairs with bilateral rails supervised   Outcome: Not Met  Note: Stairs- NT     Problem: PT-Long Term Goals  Goal: LTG-By discharge, patient will transfer one surface to another with FWW supervised  Outcome: Met  Goal: LTG-By discharge, patient will perform home exercise program  Outcome: Met  Goal: LTG-By discharge, patient will transfer in/out of a car with FWW supervised   Outcome: Met

## 2025-02-10 NOTE — THERAPY
"Occupational Therapy  Daily Treatment     Patient Name: Edy Bennett  Age:  79 y.o., Sex:  male  Medical Record #: [id number]  Today's Date: 2/10/2025     Precautions  Precautions: Fall Risk  Comments: LLE wound vac    Safety   ADL Safety : Requires Physical Assist for Safety  Bathroom Safety: Requires Physical Assist for Safety    Subjective    Pt and wife want to know why he has been vomiting so frequently.   \"I feel like I need to go to the main hospital\"      Objective       02/10/25 0931   OT Charge Group   OT Self Care / ADL (Units) 1   OT Therapy Activity (Units) 2   OT Therapeutic Exercise (Units) 1   OT Total Time Spent   OT Individual Total Time Spent (Mins) 60   Pain   Intervention Heat Applied  (during supine therex: pt positioned in supine with legs elevated on bolster and heat pack on L calf)   Functional Level of Assist   Toileting Supervision   Bed, Chair, Wheelchair Transfer Contact Guard Assist   Bed Chair Wheelchair Transfer Description Increased time;Set-up of equipment;Squat pivot transfer to wheelchair;Supervision for safety   Toilet Transfers Contact Guard Assist   Toilet Transfer Description Grab bar;Supervision for safety;Set-up of equipment;Increased time   Supine Upper Body Exercises   Chest Fly 3 sets of 10;Bilateral  (3lb)   Chest Press 3 sets of 10;Bilateral  (3lb)     Time was spent with transfers and discussing CLOF with pt's wife.      02/10/25 1231   OT Charge Group   OT Therapy Activity (Units) 2   OT Total Time Spent   OT Individual Total Time Spent (Mins) 30   Sitting Lower Body Exercises   Sit to Stand   (1x with min A)   Interdisciplinary Plan of Care Collaboration   IDT Collaboration with  Family / Caregiver;Physician   Patient Position at End of Therapy Seated;Family / Friend in Room   Collaboration Comments see comments     Comments: Most of the time was spent discussing DC plan with Dr. Klein, patient, and pt's wife Zaina. Pt and wife feel that he is not ready for DC " tomorrow and would like a few more days, especially because he is weaker today.     Assessment    Pt more limited today due to feeling sore and tired. Able to tolerate exercises and did not have any episodes of emesis.        Plan    If he doesn't DC tomorrow, continue working on endurance, strength, independence with ADL, balance    DME  OT DME Recommendations  Bathroom Equipment:  (TBD)    Passport items to be completed:  Perform bathroom transfers, complete dressing, complete feeding, get ready for the day, prepare a simple meal, participate in household tasks, adapt home for safety needs, demonstrate home exercise program, complete caregiver training     Occupational Therapy Goals (Active)       Problem: OT Long Term Goals       Dates: Start:  01/24/25         Goal: LTG-By discharge, patient will complete basic self care tasks mod I       Dates: Start:  01/24/25            Goal: LTG-By discharge, patient will perform bathroom transfers mod I       Dates: Start:  01/24/25

## 2025-02-10 NOTE — PROGRESS NOTES
NURSING DAILY NOTE    Name: Edy Bennett   Date of Admission: 1/23/2025   Admitting Diagnosis: No Principal Problem: There is no principal problem currently on the Problem List. Please update the Problem List and refresh.  Attending Physician: ROSANNA PATEL D.O.  Allergies: Patient has no known allergies.    Safety  Patient Assist  min-mod  Patient Precautions  Fall Risk  Precaution Comments  LLE wound vac  Bed Transfer Status  Supervised  Toilet Transfer Status   Supervised  Assistive Devices  Rails, Wheelchair  Oxygen  None - Room Air  Diet/Therapeutic Dining  Current Diet Order   Procedures    Diet Order Diet: Level 6 - Soft and Bite Sized; Liquid level: Level 0 - Thin     Pill Administration  whole  Agitated Behavioral Scale     ABS Level of Severity       Fall Risk  Has the patient had a fall this admission?   No  Trinh Spivey Fall Risk Scoring  22, HIGH RISK  Fall Risk Safety Measures  bed alarm    Vitals  Temperature: 36.2 °C (97.2 °F)  Temp src: Oral  Pulse: 84  Respiration: 16  Blood Pressure : 108/59  Blood Pressure MAP (Calculated): 75 MM HG  BP Location: Right, Upper Arm  Patient BP Position: Lying Left Side     Oxygen  Pulse Oximetry: 100 %  O2 (LPM): 0  FiO2%: 21 %  O2 Delivery Device: None - Room Air    Bowel and Bladder  Last Bowel Movement  02/09/25  Stool Type  Type 7: Watery, no solid pieces-entirely liquid  Bowel Device  Bathroom  Continent  Bladder: Did not void   Bowel: No movement  Bladder Function  Urine Void (mL): 50 ml  Number of Times Voided: 1  Urine Color: Unable To Evaluate  Genitourinary Assessment   Bladder Assessment (WDL):  WDL Except  Smith Catheter: Not Applicable  Urinary Symptoms: Anuric/ Dialysis Patient  Urine Color: Unable To Evaluate  Bladder Device: Urinal  Bladder Scan: Post Void  $ Bladder Scan Results (mL): 32    Skin  Jalen Score   16  Sensory Interventions   Bed Types: Low Airloss  Skin Preventative  Measures: Pillows in Use for Support / Positioning  Moisture Interventions  Moisturizers/Barriers: Barrier Wipes      Pain  Pain Rating Scale  0 - No Pain  Pain Location  Other (Comments)  Pain Location Orientation  Mid  Pain Interventions   Declines    ADLs    Bathing   Patient Refused Bathing (notified RN Smita)  Linen Change   Complete  Personal Hygiene  Moist Tory Wipes  Chlorhexidine Bath   Completed  Oral Care  Brushed Teeth (self)  Teeth/Dentures     Shave  Self  Nutrition Percentage Eaten  *  * Meal *  *, Dinner, Less than 25% Consumed  Environmental Precautions  Treaded Slipper Socks on Patient, Personal Belongings, Wastebasket, Call Bell etc. in Easy Reach, Transferred to Stronger Side, Report Given to Other Health Care Providers Regarding Fall Risk, Bed in Low Position  Patient Turns/Positioning  Sitting up in wheelchair  Patient Turns Assistance/Tolerance  Assistance of One  Bed Positions  Bed Controls On  Head of Bed Elevated  Self regulated      Psychosocial/Neurologic Assessment  Psychosocial Assessment  Psychosocial (WDL):  Within Defined Limits  Patient Behaviors: Anxious  Family Behaviors: Family Present  Neurologic Assessment  Neuro (WDL): Exceptions to WDL  Level of Consciousness: Alert  Orientation Level: Oriented X4  Cognition: Follows commands, Appropriate attention/concentration  Speech: Clear  Facial Symmetry: Other (Comment) (no facial droop noted)  Pupil Assesment: No  Motor Function/Sensation Assessment: Motor strength  RUE Sensation: Full sensation  Muscle Strength Right Arm: Good Strength Against Gravity and Moderate Resistance  LUE Sensation: Full sensation  Muscle Strength Left Arm: Good Strength Against Gravity and Moderate Resistance  RLE Sensation: Full sensation  Muscle Strength Right Leg: Good Strength Against Gravity and Moderate Resistance  LLE Sensation: Full sensation  Muscle Strength Left Leg: Fair Strength against Gravity but No Resistance  EENT (WDL):  WDL  Except    Cardio/Pulmonary Assessment  Edema   RLE Edema: 1+, 2+  LLE Edema: 1+, 2+  Respiratory Breath Sounds  RUL Breath Sounds: Clear  RML Breath Sounds: Clear  RLL Breath Sounds: Diminished  ANAYELI Breath Sounds: Clear  LLL Breath Sounds: Diminished  Cardiac Assessment   Cardiac (WDL):  WDL Except (hx aortic stenosis, htn, cad with stents, pad)

## 2025-02-11 ENCOUNTER — HOSPITAL ENCOUNTER (INPATIENT)
Facility: MEDICAL CENTER | Age: 80
LOS: 7 days | DRG: 444 | End: 2025-02-18
Attending: INTERNAL MEDICINE | Admitting: INTERNAL MEDICINE
Payer: MEDICARE

## 2025-02-11 ENCOUNTER — APPOINTMENT (OUTPATIENT)
Dept: RADIOLOGY | Facility: REHABILITATION | Age: 80
DRG: 299 | End: 2025-02-11
Attending: HOSPITALIST
Payer: MEDICARE

## 2025-02-11 VITALS
WEIGHT: 156.25 LBS | BODY MASS INDEX: 24.52 KG/M2 | RESPIRATION RATE: 18 BRPM | OXYGEN SATURATION: 96 % | DIASTOLIC BLOOD PRESSURE: 49 MMHG | HEART RATE: 76 BPM | HEIGHT: 67 IN | SYSTOLIC BLOOD PRESSURE: 96 MMHG | TEMPERATURE: 98.7 F

## 2025-02-11 DIAGNOSIS — Z99.2 ESRD ON PERITONEAL DIALYSIS (HCC): ICD-10-CM

## 2025-02-11 DIAGNOSIS — I50.42 CHRONIC COMBINED SYSTOLIC AND DIASTOLIC HEART FAILURE (HCC): ICD-10-CM

## 2025-02-11 DIAGNOSIS — I73.9 PAD (PERIPHERAL ARTERY DISEASE) (HCC): ICD-10-CM

## 2025-02-11 DIAGNOSIS — N18.6 ESRD ON PERITONEAL DIALYSIS (HCC): ICD-10-CM

## 2025-02-11 DIAGNOSIS — N40.0 BENIGN PROSTATIC HYPERPLASIA WITHOUT LOWER URINARY TRACT SYMPTOMS: ICD-10-CM

## 2025-02-11 DIAGNOSIS — K82.0 OBSTRUCTION OF CYSTIC DUCT: ICD-10-CM

## 2025-02-11 LAB
AMORPHOUS CRYSTALS 1764: PRESENT /HPF
APPEARANCE UR: ABNORMAL
BACTERIA #/AREA URNS HPF: ABNORMAL /HPF
BACTERIA UR CULT: NORMAL
BILIRUB UR QL STRIP.AUTO: NEGATIVE
CASTS URNS QL MICRO: ABNORMAL /LPF (ref 0–2)
COLOR UR: YELLOW
EPITHELIAL CELLS 1715: ABNORMAL /HPF (ref 0–5)
GLUCOSE UR STRIP.AUTO-MCNC: NEGATIVE MG/DL
KETONES UR STRIP.AUTO-MCNC: ABNORMAL MG/DL
LEUKOCYTE ESTERASE UR QL STRIP.AUTO: ABNORMAL
MICRO URNS: ABNORMAL
NITRITE UR QL STRIP.AUTO: NEGATIVE
PH UR STRIP.AUTO: 5 [PH] (ref 5–8)
PROT UR QL STRIP: 100 MG/DL
RBC # URNS HPF: ABNORMAL /HPF (ref 0–2)
RBC UR QL AUTO: ABNORMAL
SIGNIFICANT IND 70042: NORMAL
SITE SITE: NORMAL
SOURCE SOURCE: NORMAL
SP GR UR STRIP.AUTO: 1.02
UROBILINOGEN UR STRIP.AUTO-MCNC: 1 EU/DL
WBC #/AREA URNS HPF: ABNORMAL /HPF

## 2025-02-11 PROCEDURE — A9270 NON-COVERED ITEM OR SERVICE: HCPCS | Performed by: NURSE PRACTITIONER

## 2025-02-11 PROCEDURE — A9270 NON-COVERED ITEM OR SERVICE: HCPCS | Performed by: HOSPITALIST

## 2025-02-11 PROCEDURE — 700102 HCHG RX REV CODE 250 W/ 637 OVERRIDE(OP): Performed by: HOSPITALIST

## 2025-02-11 PROCEDURE — 700102 HCHG RX REV CODE 250 W/ 637 OVERRIDE(OP): Performed by: NURSE PRACTITIONER

## 2025-02-11 PROCEDURE — 770004 HCHG ROOM/CARE - ONCOLOGY PRIVATE *

## 2025-02-11 PROCEDURE — 90945 DIALYSIS ONE EVALUATION: CPT

## 2025-02-11 PROCEDURE — A9270 NON-COVERED ITEM OR SERVICE: HCPCS | Performed by: PHYSICAL MEDICINE & REHABILITATION

## 2025-02-11 PROCEDURE — 700102 HCHG RX REV CODE 250 W/ 637 OVERRIDE(OP): Performed by: INTERNAL MEDICINE

## 2025-02-11 PROCEDURE — 700105 HCHG RX REV CODE 258: Performed by: INTERNAL MEDICINE

## 2025-02-11 PROCEDURE — 99223 1ST HOSP IP/OBS HIGH 75: CPT | Mod: AI | Performed by: INTERNAL MEDICINE

## 2025-02-11 PROCEDURE — 99233 SBSQ HOSP IP/OBS HIGH 50: CPT | Performed by: PHYSICAL MEDICINE & REHABILITATION

## 2025-02-11 PROCEDURE — 71045 X-RAY EXAM CHEST 1 VIEW: CPT

## 2025-02-11 PROCEDURE — 700102 HCHG RX REV CODE 250 W/ 637 OVERRIDE(OP): Performed by: PHYSICAL MEDICINE & REHABILITATION

## 2025-02-11 PROCEDURE — A9270 NON-COVERED ITEM OR SERVICE: HCPCS | Performed by: INTERNAL MEDICINE

## 2025-02-11 PROCEDURE — 81001 URINALYSIS AUTO W/SCOPE: CPT

## 2025-02-11 PROCEDURE — 700111 HCHG RX REV CODE 636 W/ 250 OVERRIDE (IP): Performed by: INTERNAL MEDICINE

## 2025-02-11 PROCEDURE — 99999 PR NO CHARGE: CPT | Performed by: PHYSICAL MEDICINE & REHABILITATION

## 2025-02-11 RX ORDER — ONDANSETRON 4 MG/1
4 TABLET, ORALLY DISINTEGRATING ORAL 2 TIMES DAILY WITH MEALS
Status: ON HOLD | COMMUNITY
End: 2025-02-18

## 2025-02-11 RX ORDER — MIDODRINE HYDROCHLORIDE 10 MG/1
15 TABLET ORAL
Status: ON HOLD | COMMUNITY
End: 2025-02-18

## 2025-02-11 RX ORDER — AMOXICILLIN 250 MG
2 CAPSULE ORAL 2 TIMES DAILY
Status: ON HOLD | COMMUNITY
End: 2025-02-18

## 2025-02-11 RX ORDER — METHOCARBAMOL 500 MG/1
250 TABLET, FILM COATED ORAL 4 TIMES DAILY PRN
COMMUNITY
End: 2025-02-22

## 2025-02-11 RX ORDER — TORSEMIDE 10 MG/1
10 TABLET ORAL DAILY
COMMUNITY
End: 2025-02-25

## 2025-02-11 RX ORDER — FUROSEMIDE 40 MG/1
40 TABLET ORAL 2 TIMES DAILY
Status: DISCONTINUED | OUTPATIENT
Start: 2025-02-11 | End: 2025-02-11

## 2025-02-11 RX ORDER — METHOCARBAMOL 500 MG/1
500 TABLET, FILM COATED ORAL 4 TIMES DAILY PRN
Status: DISCONTINUED | OUTPATIENT
Start: 2025-02-11 | End: 2025-02-11

## 2025-02-11 RX ORDER — SIMETHICONE 125 MG
125 TABLET,CHEWABLE ORAL 3 TIMES DAILY
COMMUNITY
End: 2025-02-25

## 2025-02-11 RX ORDER — SEVELAMER CARBONATE 800 MG/1
800 TABLET, FILM COATED ORAL
Status: DISCONTINUED | OUTPATIENT
Start: 2025-02-11 | End: 2025-02-18 | Stop reason: HOSPADM

## 2025-02-11 RX ORDER — ONDANSETRON 2 MG/ML
4 INJECTION INTRAMUSCULAR; INTRAVENOUS EVERY 4 HOURS PRN
Status: DISCONTINUED | OUTPATIENT
Start: 2025-02-11 | End: 2025-02-18 | Stop reason: HOSPADM

## 2025-02-11 RX ORDER — ONDANSETRON 4 MG/1
4 TABLET, ORALLY DISINTEGRATING ORAL EVERY 4 HOURS PRN
Status: DISCONTINUED | OUTPATIENT
Start: 2025-02-11 | End: 2025-02-18 | Stop reason: HOSPADM

## 2025-02-11 RX ORDER — POTASSIUM CHLORIDE 1500 MG/1
20 TABLET, EXTENDED RELEASE ORAL DAILY
Status: ON HOLD | COMMUNITY
End: 2025-02-18

## 2025-02-11 RX ORDER — TERAZOSIN 5 MG/1
5 CAPSULE ORAL
Status: DISCONTINUED | OUTPATIENT
Start: 2025-02-11 | End: 2025-02-11

## 2025-02-11 RX ORDER — METHOCARBAMOL 500 MG/1
250 TABLET, FILM COATED ORAL 4 TIMES DAILY PRN
Status: DISCONTINUED | OUTPATIENT
Start: 2025-02-11 | End: 2025-02-18 | Stop reason: HOSPADM

## 2025-02-11 RX ORDER — LANTHANUM CARBONATE 500 MG/1
500 TABLET, CHEWABLE ORAL
Status: ON HOLD | COMMUNITY

## 2025-02-11 RX ORDER — SCOPOLAMINE 1 MG/3D
1 PATCH, EXTENDED RELEASE TRANSDERMAL
Status: ON HOLD | COMMUNITY
End: 2025-02-13 | Stop reason: SINTOL

## 2025-02-11 RX ORDER — METOPROLOL SUCCINATE 50 MG/1
100 TABLET, EXTENDED RELEASE ORAL
Status: DISCONTINUED | OUTPATIENT
Start: 2025-02-11 | End: 2025-02-11

## 2025-02-11 RX ORDER — METRONIDAZOLE 500 MG/100ML
500 INJECTION, SOLUTION INTRAVENOUS EVERY 12 HOURS
Status: DISCONTINUED | OUTPATIENT
Start: 2025-02-11 | End: 2025-02-15

## 2025-02-11 RX ORDER — ACETAMINOPHEN 325 MG/1
650 TABLET ORAL EVERY 6 HOURS PRN
Status: DISCONTINUED | OUTPATIENT
Start: 2025-02-11 | End: 2025-02-18 | Stop reason: HOSPADM

## 2025-02-11 RX ORDER — OMEPRAZOLE 40 MG/1
40 CAPSULE, DELAYED RELEASE ORAL DAILY
COMMUNITY
End: 2025-02-25

## 2025-02-11 RX ORDER — SEVELAMER CARBONATE 800 MG/1
800 TABLET, FILM COATED ORAL
Status: ON HOLD | COMMUNITY

## 2025-02-11 RX ORDER — AMLODIPINE BESYLATE 2.5 MG/1
2.5 TABLET ORAL EVERY MORNING
Status: DISCONTINUED | OUTPATIENT
Start: 2025-02-11 | End: 2025-02-11

## 2025-02-11 RX ORDER — OXYCODONE HYDROCHLORIDE 5 MG/1
2.5 TABLET ORAL
Status: DISCONTINUED | OUTPATIENT
Start: 2025-02-11 | End: 2025-02-17

## 2025-02-11 RX ORDER — OXYCODONE HYDROCHLORIDE 5 MG/1
5 TABLET ORAL
Status: DISCONTINUED | OUTPATIENT
Start: 2025-02-11 | End: 2025-02-17

## 2025-02-11 RX ORDER — MORPHINE SULFATE 4 MG/ML
1 INJECTION INTRAVENOUS EVERY 4 HOURS PRN
Status: DISCONTINUED | OUTPATIENT
Start: 2025-02-11 | End: 2025-02-17

## 2025-02-11 RX ORDER — BISACODYL 10 MG
10 SUPPOSITORY, RECTAL RECTAL
COMMUNITY
End: 2025-02-22

## 2025-02-11 RX ORDER — OXYCODONE HYDROCHLORIDE 5 MG/1
5 TABLET ORAL EVERY 4 HOURS PRN
Status: ON HOLD | COMMUNITY
End: 2025-02-18

## 2025-02-11 RX ORDER — GABAPENTIN 100 MG/1
100 CAPSULE ORAL 2 TIMES DAILY
Status: DISCONTINUED | OUTPATIENT
Start: 2025-02-11 | End: 2025-02-11

## 2025-02-11 RX ORDER — ROSUVASTATIN CALCIUM 20 MG/1
20 TABLET, COATED ORAL
Status: DISCONTINUED | OUTPATIENT
Start: 2025-02-11 | End: 2025-02-18 | Stop reason: HOSPADM

## 2025-02-11 RX ORDER — POLYETHYLENE GLYCOL 3350 17 G/17G
17 POWDER, FOR SOLUTION ORAL
Status: ON HOLD | COMMUNITY

## 2025-02-11 RX ORDER — TAMSULOSIN HYDROCHLORIDE 0.4 MG/1
0.4 CAPSULE ORAL EVERY MORNING
Status: DISCONTINUED | OUTPATIENT
Start: 2025-02-11 | End: 2025-02-11

## 2025-02-11 RX ORDER — HALOPERIDOL 5 MG/ML
1 INJECTION INTRAMUSCULAR
Status: DISCONTINUED | OUTPATIENT
Start: 2025-02-11 | End: 2025-02-18 | Stop reason: HOSPADM

## 2025-02-11 RX ORDER — ONDANSETRON 4 MG/1
4 TABLET, ORALLY DISINTEGRATING ORAL 4 TIMES DAILY PRN
Status: ON HOLD | COMMUNITY
End: 2025-02-18

## 2025-02-11 RX ORDER — LABETALOL HYDROCHLORIDE 5 MG/ML
10 INJECTION, SOLUTION INTRAVENOUS EVERY 4 HOURS PRN
Status: DISCONTINUED | OUTPATIENT
Start: 2025-02-11 | End: 2025-02-18 | Stop reason: HOSPADM

## 2025-02-11 RX ADMIN — LANTHANUM CARBONATE 500 MG: 500 TABLET, CHEWABLE ORAL at 08:49

## 2025-02-11 RX ADMIN — CEFTRIAXONE SODIUM 1000 MG: 10 INJECTION, POWDER, FOR SOLUTION INTRAVENOUS at 20:26

## 2025-02-11 RX ADMIN — OMEPRAZOLE 40 MG: 20 CAPSULE, DELAYED RELEASE ORAL at 08:48

## 2025-02-11 RX ADMIN — CLOPIDOGREL BISULFATE 75 MG: 75 TABLET ORAL at 08:49

## 2025-02-11 RX ADMIN — MIDODRINE HYDROCHLORIDE 15 MG: 2.5 TABLET ORAL at 08:48

## 2025-02-11 RX ADMIN — SIMETHICONE 125 MG: 125 TABLET, CHEWABLE ORAL at 08:49

## 2025-02-11 RX ADMIN — APIXABAN 5 MG: 5 TABLET, FILM COATED ORAL at 08:48

## 2025-02-11 RX ADMIN — SENNOSIDES AND DOCUSATE SODIUM 2 TABLET: 50; 8.6 TABLET ORAL at 08:48

## 2025-02-11 RX ADMIN — ROSUVASTATIN CALCIUM 20 MG: 20 TABLET, FILM COATED ORAL at 20:33

## 2025-02-11 RX ADMIN — METRONIDAZOLE 500 MG: 500 INJECTION, SOLUTION INTRAVENOUS at 20:33

## 2025-02-11 RX ADMIN — POTASSIUM CHLORIDE 20 MEQ: 1500 TABLET, EXTENDED RELEASE ORAL at 08:49

## 2025-02-11 ASSESSMENT — ENCOUNTER SYMPTOMS
NAUSEA: 1
ABDOMINAL PAIN: 1
SHORTNESS OF BREATH: 0
PALPITATIONS: 0
MYALGIAS: 0
DIZZINESS: 0
DIARRHEA: 0
LOSS OF CONSCIOUSNESS: 0
HEADACHES: 0
DEPRESSION: 0
CHILLS: 0
FEVER: 0
STRIDOR: 0
WEAKNESS: 0
COUGH: 0
FALLS: 0
TINGLING: 0
SPUTUM PRODUCTION: 0
VOMITING: 1
CONSTIPATION: 0

## 2025-02-11 ASSESSMENT — SOCIAL DETERMINANTS OF HEALTH (SDOH)
WITHIN THE LAST YEAR, HAVE YOU BEEN KICKED, HIT, SLAPPED, OR OTHERWISE PHYSICALLY HURT BY YOUR PARTNER OR EX-PARTNER?: NO
IN THE PAST 12 MONTHS, HAS THE ELECTRIC, GAS, OIL, OR WATER COMPANY THREATENED TO SHUT OFF SERVICE IN YOUR HOME?: NO
WITHIN THE LAST YEAR, HAVE TO BEEN RAPED OR FORCED TO HAVE ANY KIND OF SEXUAL ACTIVITY BY YOUR PARTNER OR EX-PARTNER?: NO
WITHIN THE LAST YEAR, HAVE YOU BEEN HUMILIATED OR EMOTIONALLY ABUSED IN OTHER WAYS BY YOUR PARTNER OR EX-PARTNER?: NO
WITHIN THE PAST 12 MONTHS, THE FOOD YOU BOUGHT JUST DIDN'T LAST AND YOU DIDN'T HAVE MONEY TO GET MORE: NEVER TRUE
WITHIN THE PAST 12 MONTHS, YOU WORRIED THAT YOUR FOOD WOULD RUN OUT BEFORE YOU GOT THE MONEY TO BUY MORE: NEVER TRUE
WITHIN THE LAST YEAR, HAVE YOU BEEN AFRAID OF YOUR PARTNER OR EX-PARTNER?: NO

## 2025-02-11 ASSESSMENT — PATIENT HEALTH QUESTIONNAIRE - PHQ9
2. FEELING DOWN, DEPRESSED, IRRITABLE, OR HOPELESS: NOT AT ALL
SUM OF ALL RESPONSES TO PHQ9 QUESTIONS 1 AND 2: 0
1. LITTLE INTEREST OR PLEASURE IN DOING THINGS: NOT AT ALL
1. LITTLE INTEREST OR PLEASURE IN DOING THINGS: NOT AT ALL
2. FEELING DOWN, DEPRESSED, IRRITABLE, OR HOPELESS: NOT AT ALL
SUM OF ALL RESPONSES TO PHQ9 QUESTIONS 1 AND 2: 0

## 2025-02-11 ASSESSMENT — CHA2DS2 SCORE
AGE 65 TO 74: NO
VASCULAR DISEASE: YES
DIABETES: NO
HYPERTENSION: YES
SEX: MALE
PRIOR STROKE OR TIA OR THROMBOEMBOLISM: YES
AGE 75 OR GREATER: YES
CHF OR LEFT VENTRICULAR DYSFUNCTION: YES
CHA2DS2 VASC SCORE: 7

## 2025-02-11 ASSESSMENT — PAIN DESCRIPTION - PAIN TYPE: TYPE: ACUTE PAIN

## 2025-02-11 ASSESSMENT — FIBROSIS 4 INDEX: FIB4 SCORE: 1.54

## 2025-02-11 NOTE — CARE PLAN
The patient is Watcher - Medium risk of patient condition declining or worsening         Progress made toward(s) clinical / shift goals:      Patient is not progressing towards the following goals:

## 2025-02-11 NOTE — PROGRESS NOTES
[facility] Services Progress Note     CCPD initiated at 1940 per NIHARIKA Regan x 8.5 hours using all 2.5 % PD solution; with 2.1L fills; 5 cycles; no last fill.      Pt A and o x 4; on room air; denies sob ; denies chest pain; wife at bedside; + nausea; meds by PCN  Aseptically connected PD cycler to PD catheter.   Exit site cleansed, dressing changed CDI; gentamicin cream applied on PD exit site; no s/s infection noted.      Initial Drain: 2 mL  Waited dwell 1/5 without issues        Report given to Primary RN  with on-call Dialysis RN contact information ([phone]).      Please call for any issues with hemodynamic instability/persistent alarms/patient not tolerating therapy.

## 2025-02-11 NOTE — CARE PLAN
"Peritoneal dialysis started at 1940 hrs by dialysis RN. Dialysis on going , no untoward s/s noted.  Problem: Fall Risk - Rehab  Goal: Patient will remain free from falls  Outcome: Progressing  Note: Trinh Spivey Fall risk Assessment Score: 30    High fall risk Interventions   - Alarming seatbelt  -- Bed and strip alarm   - Yellow sign by the door   - Yellow wrist band \"Fall risk\"  - Room near to the nurse station  - Do not leave patient unattended in the bathroom  - Fall risk education provided  - Bed in the lowest position       Problem: Pain - Standard  Goal: Alleviation of pain or a reduction in pain to the patient’s comfort goal  Outcome: Progressing  Note: Assessed for pain and discomfort , left leg incision line with prevena wound vac. Pain under control.   The patient is Stable - Low risk of patient condition declining or worsening    Shift Goals  Clinical Goals: safety ; manage  Patient Goals: rest, no more vomiting  Family Goals: Safety, more energy for patient, stop n/v    Progress made toward(s) clinical / shift goals:  Pt free from fall and injury.        "

## 2025-02-11 NOTE — PROGRESS NOTES
[facility] Services Progress Notes     Disconnected aseptically from PD Cycler at 0645.     Pt stable, VSS, alert and oriented.  Effluent noted with some blood, Informed Dr. Mehta  No alarms on machine was noted or reported before disconnection.     24 hour UF= 425ML (I.Drain= 2ML + Total UF= 423ML - Last fill= 0)     Report given to Raegan ARANDA

## 2025-02-11 NOTE — PROGRESS NOTES
[facility] -  PROGRESS NOTE               Author: IDA Marte Date & Time: 2/11/2025  11:53 AM     HPI:  79 y.o. male with HTN, ESRD on PD, PVD/ gangrenous left foot, BPH admitted for monitoring s/p  left leg bypass surgery on 1/20 with Dr. Miller. He recently underwent hernial repair and was temporarily transferred to in center HD via [facility]. He has returned to  PD  4 days ago, doing low fill.volumes and has been tolerating at home.   Patient was connected to cycler last night. Total UF reported to be 276ml. Reports of low drain UF alarm last night but no major issues with PD overnight. Pt reports no complaints today morning  No F/C/N/V/CP/SOB.  No melena, hematochezia, hematemesis.  No HA, visual changes,or abdominal pain.     DAILY NEPHROLOGY SUMMARY:  See note from 1/31 for prior summaries   2/01: Disconnected aseptically from PD CyclerEffluent clear and yellow, no signs of bleeding/fibrin clots. 24 hour UF= 678mL. Tolerated well.   2/2: Patient and wife are doing well. Wife states she gave wrong # of cycles to provider yesterday and wanted then reduced back from 7 to 5. Overall doing well without complaints. Looking forward to going home around Feb 7th per patient. Also stated they were told that RT IJ TDC would be removed sometime this week. On-coming provider to ask the Hospitalist to help arrange.   2/3: pt sitting up in bed, spouse at bedside, PD improved with change in rx (reduced to 5 exchanges from 7) great UF: 1.1L, edema has improved, biggest complaint NV, vomits after each meal and fatigue, requesting a reduction in gabapentin, also reports constipation, had to dig out stool yesterday with finger  2/4 net UF 1500 ml overnight. Potassium is 3.3 I am replacing. Patient reports doing better today, wife at bedside. Patient feels much better now since Erica made med changes yesterday  2/5: 1437 mL Net UF removed overnight. Wife BS. Patient oob in BR performing adls  "independently. Reports improvement every day.   2/6: doing well, eating lunch with wife, net UF 1273 ml  2/7: patient doing well, low K I will order additional dose of potassium  2/8: NAEO, no complaints, wife and friends at bedside; Feels good  2/9: NAEO, wife at bedside, he is in wheelchair, having multiple BM's after lactulose  2/10: 725 mL Net UF removed last night via CCPD. Low drain UF alarmed overnight. Resting in bed, wife BS. Bps low 90s systolic. C/o n/v yesterday, Complaining of persistent nausea. Per wife, appears weaker. Multiple Bms yesterday. Denies fever or chills.   2/11: Fluid + some blood, 425 mL Net UF removed via CCPD. C/o weakness and fatigue. Declining antimedic. HIDA + cystic duct obstruction.     REVIEW OF SYSTEMS:    10 point ROS reviewed and is as per HPI/daily summary or otherwise negative    PMH/PSH/SH/FH: Reviewed and unchanged since admission note  CURRENT MEDICATIONS: Reviewed from admission to present day    VS:  BP 96/49   Pulse 76   Temp 37.1 °C (98.7 °F) (Oral)   Resp 18   Ht 1.707 m (5' 7.2\")   Wt 70.9 kg (156 lb 4 oz)   SpO2 96%   BMI 24.33 kg/m²   Physical Exam  Nursing note reviewed.   Constitutional:       Appearance: Normal appearance.   Eyes:      General: No scleral icterus.  Cardiovascular:      Comments: No edema  Pulmonary:      Effort: Pulmonary effort is normal.   Abdominal:      General: There is no distension.   Musculoskeletal:         General: Deformity (LLE with wound vac) present.   Skin:     Findings: No rash.   Neurological:      Mental Status: He is alert.   Psychiatric:         Behavior: Behavior is cooperative.       FLUID BALANCE:  In: -   Out: 1150     LABS:          Recent Labs     02/10/25  0638   WBC 7.6   RBC 2.35*   HEMOGLOBIN 7.4*   HEMATOCRIT 23.0*   MCV 97.9*   MCH 31.5   MCHC 32.2*   RDW 59.2*   PLATELETCT 304   MPV 10.2     IMPRESSION:  # ESRD on CCPD  - Pt was transitioned to  iHD during masoud-op  s/p inguinal hernial repair,   - Still " has [facility]  - Returned to PD 1/17/25   - Home PD prescription CCPD 2.5% alternating with 1.5% 5  no last fill. Patient wife stated she initially told last Nephrologist wrong script. Now all green bags and 5 cycles, not 7.   - Bloody fluid with CCPD 2/10, Hgb stable on Eliquis/Plavix   # HTN, controlled   - Goal BP < 140/90  - torsemide 10mg daily   # Anemia of CKD  - Goal Hgb 10-11  - Below goal   - On PO iron  - ROSALIND weekly   # CKD-MBD  - Ca 8.6   - PO4 7.4 -> 6.6 -> 7.0  - Lanthanum 500mg TID, titrate up prn  # Chronic hypoxic respiratory failure   - On home oxygen 2-2.5L at night   # Nausea/Vomiting   - ddx: binder intolerance, switched to lanthanum   - constipation, started miralax BID and lactulose added recently   - pill burden, increase omeprazole to 40mg daily   - reduced gabapentin to 100mg daily    - H. Pylori (-)    - HIDA Scan 2/10 + Cystic Duct Obstruction, GI Consulted    - Viral Panel (-) 2/11  # Hyponatremia, mild   - Torsemide daily   - UF with PD, on all 2.5%      SUGGESTIONS:  - Continue CCPD during hospital stay (now on oupt rx and tolerating well)  - Reduced Gabapentin to 100mg daily  - PD exit site care per protocol   - Changed ROSALIND to SQ weekly as now on PD consistently   - No dietary protein restrictions  - Dose all meds per ESRD  - Renal diet, low phos   - L arm AVF not fully matured,  will need intervention outpt   - Avoid Fleet enema in dialysis patients   - Labs daily  - GI consulted   - Direct admit back to [facility] for irtractable N/V  - Discharge planning underway, plan for 2/7 home with family now 2/11 with Kali GUEVARA

## 2025-02-11 NOTE — THERAPY
"Physical Therapy   Daily Treatment     Patient Name: Edy Bennett  Age:  79 y.o., Sex:  male  Medical Record #: [id number]  Today's Date: 2/10/2025     Precautions  Precautions: Fall Risk  Comments: LLE wound vac    Subjective    Pt seated in wc, wife present both speaking with Dr Klein     Objective       02/10/25 1301   PT Charge Group   PT Therapeutic Exercise (Units) 1   PT Therapeutic Activities (Units) 1   Supervising Physical Therapist Miriam Malone   PT Total Time Spent   PT Individual Total Time Spent (Mins) 30   Transfer Functional Level of Assist   Bed, Chair, Wheelchair Transfer Contact Guard Assist   Bed Chair Wheelchair Transfer Description Adaptive equipment;Supervision for safety;Verbal cueing;Set-up of equipment   Sitting Lower Body Exercises   Nustep Resistance Level 4;Time (See Comments)  (B LE/UE x 10 minutes for ROM/CV endurance)   Interdisciplinary Plan of Care Collaboration   IDT Collaboration with  Family / Caregiver;Physician;Other (See Comments)   Patient Position at End of Therapy Seated;Family / Friend in Room;Other (Comments)  (handed off to transport for imaging)   Collaboration Comments MD rounded with pt, Wife present, PT CLOF/dc planning         Assessment    Pt tolerated there ex well, reported nustep activity felt ok, needed cues to pace himself with ROM  Strengths: Able to follow instructions, Alert and oriented, Independent prior level of function, Supportive family, Pleasant and cooperative, Motivated for self care and independence  Barriers: Pain, Pain poorly managed, Limited mobility    Plan    LE mobility exercises in supine and sitting to prep soft tissues for functional movements, progressive gait in parallel bars and walker, practice car transfers     DME  PT DME Recommendations  Wheelchair: 16\" Width, Elevating Leg Rests  Cushion: Standard    Passport items to be completed:  Get in/out of bed safely, in/out of a vehicle, safely use mobility device, walk or wheel " around home/community, navigate up and down stairs, show how to get up/down from the ground, ensure home is accessible, demonstrate HEP, complete caregiver training    Physical Therapy Problems (Active)       Problem: Mobility       Dates: Start:  02/03/25         Goal: STG-Within one week, patient will ambulate household distance >50 ft SBA FWW indoors        Dates: Start:  02/03/25               Problem: Mobility Transfers       Dates: Start:  02/03/25         Goal: STG-Within one week, patient will transfer bed to chair SBA consistently SPT FWW       Dates: Start:  02/03/25               Problem: PT-Long Term Goals       Dates: Start:  01/24/25         Goal: LTG-By discharge, patient will ambulate with  feet SBA       Dates: Start:  01/24/25         Goal Note filed on 02/10/25 0824 by SAVANNA Sims       Amb 75ft FWW CGA              Goal: LTG-By discharge, patient will ambulate up/down 4-6 stairs with bilateral rails supervised        Dates: Start:  01/24/25         Goal Note filed on 02/10/25 0824 by SAVANNA Sims       Stairs- NT

## 2025-02-11 NOTE — PROGRESS NOTES
[facility] DIRECT ADMISSION REPORT  Transferring facility: [facility]  Transferring physician: Dr. Klein    Chief complaint: Intractable vomiting  Pertinent history & patient course: Patient recently underwent left leg bypass surgery due to severe peripheral vascular disease.  He was having significant postoperative pain, was evaluated in the hospital, eventually transferred to rehab.  Patient was doing well until a few days ago when he began having nausea and vomiting, large-volume which has persisted.  Patient does have end-stage renal disease, is on peritoneal dialysis.  Patient has been tolerating dialysis.  Because of ongoing vomiting, workup was initiated, right upper quadrant ultrasound did not show anything acute, HIDA scan did show obstruction of cystic duct.  Because of this, Dr. Klein discussed the case with GI, she also has a call out to general surgery.  Pertinent imaging & lab results: HIDA scan showed cystic duct obstruction  Consultants called prior to transfer and pertinent input from consultants: Gastroenterology, general surgery  Code Status: Full per transferring provider, I personally verified with the transferring provider patient's code status and the transferring provider has confirmed this with the patient.  Reason for Transfer: Intractable nausea and vomiting, cystic duct obstruction  Further work up or recommendations requested prior to transfer: None    Patient accepted for transfer: Yes  Accepting [facility] Facility: [facility] - Nursing to notify the Triage Coordinator in the RTOC via Voalte or Phone ext. [phone] when patient arrives to the unit. The Triage Coordinator will assign the admitting provider.    Consultants to be called upon arrival: None  Admission status: Inpatient.   Floor requested: Surgical  If ICU transfer, name of intensivist case discussed with and pertinent input from critical care: N/A    The admitting provider is the point of contact for questions or  concerns regarding patient's care.

## 2025-02-11 NOTE — DISCHARGE PLANNING
Care Transition Team Assessment    Patient is a 79 year old male admitted for obstruction of cystic duct. Please see patient's H&P for prior medical history. Patient was recently admitted on 1/20-1/23 and was discharged to [facility]. RNCM met with patient at bedside to complete assessment. Patient is A/Ox4 and able to verify information on the face sheet. Patient lives with his spouse in a single story house with no steps to enter, Kailey Bennett ([phone]; emergency contact and NOK. No Advanced Directive on file; AD booklet given. Patient reports, prior to admission patient was at [facility] and required assistance with ADL's and IADL's. Patient has a walker, wheelchair and shower chair. Patient reports his spouse is good support for him. Patient receives monthly SSI deposits. Patient's PCP is Denise Carver. Patient's preferred pharmacy is [facility] on [address]. Patient denies a history of SNF nor HHC use in the past. [facility] and plans to be transferred back once medically cleared. Patient denies any SA or MH concerns. Patient confirmed medical coverage via Medicare and takokat. Patient has means to transportation and [facility] will provide transport once medically stable to discharge back to [facility].      Information Source  Orientation Level: Oriented X4  Information Given By: Patient  Who is responsible for making decisions for patient? : Patient    Readmission Evaluation  Is this a readmission?: Yes - unplanned readmission    Elopement Risk  Legal Hold: No    Interdisciplinary Discharge Planning  Lives with - Patient's Self Care Capacity: Unable To Determine At This Time  Support Systems: Family Member(s), Spouse / Significant Other  Housing / Facility: [address]    Discharge Preparedness  What is your plan after discharge?: Other (comment) (IPR)  What are your discharge supports?: Spouse  Prior Functional Level: Needs Assist with Activities of Daily Living,  Needs Assist with Medication Management, Uses Walker  Difficulity with ADLs: None  Difficulity with IADLs: None    Functional Assesment  Prior Functional Level: Needs Assist with Activities of Daily Living, Needs Assist with Medication Management, Uses Walker    Finances  Financial Barriers to Discharge: No  Prescription Coverage: Yes    Vision / Hearing Impairment  Vision Impairment : Yes  Hearing Impairment : Yes  Hearing Impairment: Both Ears, Hearing Device(s) Available         Advance Directive  Advance Directive?: None  Advance Directive offered?: AD Booklet given    Domestic Abuse  Possible Abuse/Neglect Reported to:: Not Applicable    Psychological Assessment  History of Substance Abuse: None  History of Psychiatric Problems: No  Non-compliant with Treatment: No  Newly Diagnosed Illness: No    Discharge Risks or Barriers  Discharge risks or barriers?: No  Patient risk factors: Complex medical needs    Anticipated Discharge Information  Discharge Disposition: Disch to IP rehab facility or distinct part unit (62)

## 2025-02-11 NOTE — THERAPY
Missed Therapy    Patient Name: Edy Bennett  Age:  79 y.o., Sex:  male  Medical Record #: [id number]  Today's Date: 2/11/2025    Discussed missed therapy with , MD and supervisor.        02/11/25 1031   Therapy Missed   Missed Therapy (Minutes) 60   Reason For Missed Therapy Medical - Patient on Hold from Therapy  (Pt anticipating txfr to acute due to medical status.)   OT Total Time Spent   OT Individual Total Time Spent (Mins) 60

## 2025-02-11 NOTE — CARE PLAN
Problem: Pain - Standard  Goal: Alleviation of pain or a reduction in pain to the patient’s comfort goal  Outcome: Progressing     Problem: Skin Integrity  Goal: Skin integrity is maintained or improved  Outcome: Progressing     Problem: Fall Risk - Rehab  Goal: Patient will remain free from falls  Outcome: Progressing     Problem: Hemodynamics  Goal: Patient's hemodynamics, fluid balance and neurologic status will be stable or improve  Outcome: Progressing     The patient is Stable - Low risk of patient condition declining or worsening    Shift Goals  Clinical Goals: safety ; manage n/v  Patient Goals: rest, no more vomiting  Family Goals: Safety, more energy for patient

## 2025-02-11 NOTE — PROGRESS NOTES
NURSING DAILY NOTE    Name: Edy Bennett   Date of Admission: 1/23/2025   Admitting Diagnosis: No Principal Problem: There is no principal problem currently on the Problem List. Please update the Problem List and refresh.  Attending Physician: ROSANNA PATEL D.O.  Allergies: Patient has no known allergies.    Safety  Patient Assist  min-mod  Patient Precautions  Fall Risk  Precaution Comments  LLE wound vac  Bed Transfer Status  Contact Guard Assist  Toilet Transfer Status   Contact Guard Assist  Assistive Devices  Rails, Wheelchair  Oxygen  None - Room Air  Diet/Therapeutic Dining  Current Diet Order   Procedures    Diet Order Diet: Level 6 - Soft and Bite Sized; Liquid level: Level 0 - Thin; Tray Modifications (optional): Small Meals     Pill Administration  whole  Agitated Behavioral Scale     ABS Level of Severity       Fall Risk  Has the patient had a fall this admission?   No  Trinh Spivey Fall Risk Scoring  30, HIGH RISK  Fall Risk Safety Measures  bed alarm, chair alarm, poor balance, and low vision/ hearing    Vitals  Temperature: 37.3 °C (99.1 °F)  Temp src: Oral  Pulse: 96  Respiration: 18  Blood Pressure : 109/57  Blood Pressure MAP (Calculated): 74 MM HG  BP Location: Right, Upper Arm  Patient BP Position: Sitting     Oxygen  Pulse Oximetry: 97 %  O2 (LPM): 0  FiO2%: 21 %  O2 Delivery Device: None - Room Air    Bowel and Bladder  Last Bowel Movement  02/10/25  Stool Type  Type 5: Soft blob with clear cut edges (passed easily)  Bowel Device  Bathroom  Continent  Bladder: Did not void   Bowel: No movement  Bladder Function  Urine Void (mL): 50 ml  Number of Times Voided: 1  Urine Color: Yellow  Urine Clarity: Clear  Genitourinary Assessment   Bladder Assessment (WDL):  WDL Except  Smith Catheter: Not Applicable  Urinary Symptoms: Anuric/ Dialysis Patient  Urine Color: Yellow  Urine Clarity: Clear  Bladder Device: Urinal  Bladder Scan:  Post Void  $ Bladder Scan Results (mL): 32    Skin  Jalen Score   13  Sensory Interventions   Bed Types: Low Airloss  Skin Preventative Measures: Pillows in Use for Support / Positioning  Moisture Interventions  Moisturizers/Barriers: Barrier Wipes, Barrier Paste      Pain  Pain Rating Scale  0 - No Pain  Pain Location  Penis (bladder)  Pain Location Orientation  Lower  Pain Interventions   Irwin    ADLs    Bathing   Patient Refused Bathing (notified RN Smita)  Linen Change   Partial  Personal Hygiene  Change Tory Pads, Moist Tory Wipes, Perineal Care  Chlorhexidine Bath   Completed  Oral Care  Brushed Teeth (wife assist)  Teeth/Dentures     Shave  Self  Nutrition Percentage Eaten  Less than 25% Consumed  Environmental Precautions  Treaded Slipper Socks on Patient  Patient Turns/Positioning  Patient turns self independently side to side without assistance, to offload sacral area  Patient Turns Assistance/Tolerance  Assistance of One  Bed Positions  Bed Controls On, Bed Locked  Head of Bed Elevated  Self regulated      Psychosocial/Neurologic Assessment  Psychosocial Assessment  Psychosocial (WDL):  Within Defined Limits  Patient Behaviors: Anxious  Family Behaviors: No Family Present  Neurologic Assessment  Neuro (WDL): Exceptions to WDL  Level of Consciousness: Alert  Orientation Level: Oriented X4  Cognition: Follows commands, Appropriate attention/concentration  Speech: Clear  Facial Symmetry: Other (Comment) (no facial droop noted)  Pupil Assesment: No  Motor Function/Sensation Assessment: Motor response, Sensation  RUE Motor Response: Responds to commands, Normal extension, Normal flexion  RUE Sensation: Full sensation  Muscle Strength Right Arm: Good Strength Against Gravity and Moderate Resistance  LUE Motor Response: Responds to commands, Normal extension, Normal flexion  LUE Sensation: Full sensation  Muscle Strength Left Arm: Good Strength Against Gravity and Moderate Resistance  RLE Motor Response:  Responds to commands, Normal extension, Normal flexion  RLE Sensation: Full sensation  Muscle Strength Right Leg: Good Strength Against Gravity and Moderate Resistance  LLE Motor Response: Responds to commands, Normal extension, Normal flexion  LLE Sensation: Full sensation  Muscle Strength Left Leg: Fair Strength against Gravity but No Resistance  EENT (WDL):  WDL Except    Cardio/Pulmonary Assessment  Edema   RLE Edema: 1+  LLE Edema: 2+  Respiratory Breath Sounds  RUL Breath Sounds: Clear  RML Breath Sounds: Clear  RLL Breath Sounds: Diminished  ANAYELI Breath Sounds: Clear  LLL Breath Sounds: Diminished  Cardiac Assessment   Cardiac (WDL):  WDL Except

## 2025-02-11 NOTE — PROGRESS NOTES
NURSING DAILY NOTE    Name: Edy Bennett   Date of Admission: 1/23/2025   Admitting Diagnosis: No Principal Problem: There is no principal problem currently on the Problem List. Please update the Problem List and refresh.  Attending Physician: ROSANNA PATEL D.O.  Allergies: Patient has no known allergies.    Safety  Patient Assist  mod assist  Patient Precautions  Fall Risk  Precaution Comments  LLE wound vac  Bed Transfer Status  Contact Guard Assist  Toilet Transfer Status   Contact Guard Assist  Assistive Devices  Rails, Wheelchair  Oxygen  None - Room Air  Diet/Therapeutic Dining  Current Diet Order   Procedures    Diet Order Diet: Level 6 - Soft and Bite Sized; Liquid level: Level 0 - Thin; Tray Modifications (optional): Small Meals     Pill Administration  whole  Agitated Behavioral Scale     ABS Level of Severity       Fall Risk  Has the patient had a fall this admission?   No  Trinh Spivey Fall Risk Scoring  30, HIGH RISK  Fall Risk Safety Measures  bed alarm and chair alarm    Vitals  Temperature: 36.7 °C (98 °F)  Temp src: Oral  Pulse: 88  Respiration: 17  Blood Pressure : 108/57  Blood Pressure MAP (Calculated): 74 MM HG  BP Location: Right, Upper Arm  Patient BP Position: Sitting     Oxygen  Pulse Oximetry: 97 %  O2 (LPM): 0  FiO2%: 21 %  O2 Delivery Device: None - Room Air    Bowel and Bladder  Last Bowel Movement  02/09/25  Stool Type  Type 7: Watery, no solid pieces-entirely liquid  Bowel Device  Bathroom  Continent  Bladder: Did not void   Bowel: No movement  Bladder Function  Urine Void (mL): 50 ml  Number of Times Voided: 1  Urine Color: Yellow  Urine Clarity: Clear  Genitourinary Assessment   Bladder Assessment (WDL):  WDL Except  Smith Catheter: Not Applicable  Urinary Symptoms: Anuric/ Dialysis Patient  Urine Color: Yellow  Urine Clarity: Clear  Bladder Device: Urinal  Bladder Scan: Post Void  $ Bladder Scan Results (mL):  32    Skin  Jalen Score   13  Sensory Interventions   Bed Types: Low Airloss  Skin Preventative Measures: Pillows in Use for Support / Positioning  Moisture Interventions  Moisturizers/Barriers: Barrier Paste, Barrier Wipes      Pain  Pain Rating Scale  0 - No Pain  Pain Location  Penis (bladder)  Pain Location Orientation  Lower  Pain Interventions   Heat Applied (during supine therex: pt positioned in supine with legs elevated on bolster and heat pack on L calf)    ADLs    Bathing   Patient Refused Bathing (notified RN Smita)  Linen Change   Complete  Personal Hygiene  Moist Tory Wipes  Chlorhexidine Bath   Completed  Oral Care  Brushed Teeth (wife assist)  Teeth/Dentures     Shave  Self  Nutrition Percentage Eaten  Less than 25% Consumed  Environmental Precautions  Treaded Slipper Socks on Patient  Patient Turns/Positioning  Patient turns self independently side to side without assistance, to offload sacral area  Patient Turns Assistance/Tolerance  Assistance of One  Bed Positions  Bed Controls On, Bed Locked  Head of Bed Elevated  Self regulated      Psychosocial/Neurologic Assessment  Psychosocial Assessment  Psychosocial (WDL):  Within Defined Limits  Patient Behaviors: Anxious  Family Behaviors: No Family Present  Neurologic Assessment  Neuro (WDL): Exceptions to WDL  Level of Consciousness: Alert  Orientation Level: Oriented X4  Cognition: Follows commands, Appropriate attention/concentration  Speech: Clear  Facial Symmetry: Other (Comment) (no facial droop noted)  Pupil Assesment: No  Motor Function/Sensation Assessment: Motor response, Sensation  RUE Motor Response: Responds to commands, Normal extension, Normal flexion  RUE Sensation: Full sensation  Muscle Strength Right Arm: Good Strength Against Gravity and Moderate Resistance  LUE Motor Response: Responds to commands, Normal extension, Normal flexion  LUE Sensation: Full sensation  Muscle Strength Left Arm: Good Strength Against Gravity and  Moderate Resistance  RLE Motor Response: Responds to commands, Normal extension, Normal flexion  RLE Sensation: Full sensation  Muscle Strength Right Leg: Good Strength Against Gravity and Moderate Resistance  LLE Motor Response: Responds to commands, Normal extension, Normal flexion  LLE Sensation: Full sensation  Muscle Strength Left Leg: Fair Strength against Gravity but No Resistance  EENT (WDL):  WDL Except    Cardio/Pulmonary Assessment  Edema   RLE Edema: 1+  LLE Edema: 2+  Respiratory Breath Sounds  RUL Breath Sounds: Clear  RML Breath Sounds: Clear  RLL Breath Sounds: Diminished  ANAYELI Breath Sounds: Clear  LLL Breath Sounds: Diminished  Cardiac Assessment   Cardiac (WDL):  WDL Except

## 2025-02-11 NOTE — H&P
Hospital Medicine History & Physical Note    Date of Service  2/11/2025    Primary Care Physician  Denise Carver M.D.    Consultants  general surgery, GI, Nephrology and vascular surgery    Specialist Names: Kate Zamarripa, Maurice, Tyson and Angela    Code Status  Full Code    Chief Complaint  No chief complaint on file.      History of Presenting Illness  Edy Bennett is a 79 y.o. male who presented 2/11/2025 with intractable nausea and vomiting.  Patient did receive a left lower extremity bypass, popliteal to tibial on 1/20, was being treated at [facility].  He began having nausea and vomiting as well as some right upper quadrant pain.  Workup eventually showed a obstruction of the cystic duct.  Because of this, patient was transferred here due to need for intervention.  I have discussed the case with GI, vascular surgery and general surgery as well as nephrology.  Patient is on chronic peritoneal dialysis.  Unfortunately, patient will need tunneled hemodialysis catheter placed, will need hemodialysis for 2 weeks postop, hopefully can leave the peritoneal dialysis catheter in.  Vascular surgery is okay with Plavix and Eliquis being held for surgical intervention and they will place a tunneled catheter.  No intervention needed or planned from GI.  I did discuss the case including labs and imaging with the transferring physician as well as the above physicians..    I discussed the plan of care with patient, family, and bedside RN.    Review of Systems  Review of Systems   Constitutional:  Positive for malaise/fatigue. Negative for chills and fever.   HENT:  Negative for congestion.    Respiratory:  Negative for cough, sputum production, shortness of breath and stridor.    Cardiovascular:  Negative for chest pain, palpitations and leg swelling.   Gastrointestinal:  Positive for abdominal pain, nausea and vomiting. Negative for constipation and diarrhea.   Genitourinary:  Negative for dysuria and urgency.    Musculoskeletal:  Negative for falls and myalgias.   Neurological:  Negative for dizziness, tingling, loss of consciousness, weakness and headaches.   Psychiatric/Behavioral:  Negative for depression and suicidal ideas.    All other systems reviewed and are negative.      Past Medical History   has a past medical history of A-V fistula ([facility]), Arrhythmia (03/13/2024), Breath shortness, Cataract, Congestive heart failure ([facility]), Diabetes ([facility]), Dialysis patient ([facility]), Dialysis patient ([facility]) (01/17/2025), Foot ulcer ([facility]), Hemorrhagic disorder ([facility]), High cholesterol, Hypertension (05/24/2024), DARIEN (obstructive sleep apnea) (01/17/2025), Pain, Peritoneal dialysis catheter in place ([facility]) (01/17/2025), Pneumonia, Renal disorder, Sleep apnea (01/10/2025), and Stroke ([facility]) (2021).    Surgical History   has a past surgical history that includes other; other orthopedic surgery (2004); other cardiac surgery; other; other; av fistula creation (Left, 07/01/2024); cath placement capd (N/A, 09/16/2024); inguinal hernia laparoscopic (N/A, 12/18/2024); cath placement capd (N/A, 12/18/2024); aortogram with runoff (1/14/2025); and pr vein bypass graft,fem-tibial (Left, 1/20/2025).     Family History  family history includes Cancer in his daughter and mother; Heart Disease in his brother and father.   Family history reviewed with patient. There is no family history that is pertinent to the chief complaint.     Social History   reports that he has never smoked. He has never used smokeless tobacco. He reports that he does not currently use alcohol. He reports that he does not use drugs.    Allergies  No Known Allergies    Medications  Prior to Admission Medications   Prescriptions Last Dose Informant Patient Reported? Taking?   acetaminophen (TYLENOL) 500 MG Tab  Significant Other, Patient Yes No   Sig: Take 500-1,000 mg by mouth every 6 hours as needed.   amLODIPine (NORVASC) 5 MG Tab  Significant Other, Patient Yes No   Sig: Take 2.5  mg by mouth every morning.      apixaban (ELIQUIS) 5mg Tab  Significant Other, Patient Yes No   Sig: Take 5 mg by mouth 2 times a day.   clopidogrel (PLAVIX) 75 MG Tab  Significant Other, Patient Yes No   Sig: Take 75 mg by mouth every day.   furosemide (LASIX) 40 MG Tab  Significant Other, Patient Yes No   Sig: Take 40 mg by mouth 2 times a day.   gabapentin (NEURONTIN) 100 MG Cap   No No   Sig: Take 1 Capsule by mouth 2 times a day.   gentamicin (GARAMYCIN) 0.1 % cream  Significant Other, Patient Yes No   Sig: Apply 1 Application topically 1 time a day as needed (port access).   guaiFENesin ER (MUCINEX) 600 MG TABLET SR 12 HR   No No   Sig: Take 1 Tablet by mouth every 12 hours.   methocarbamol (ROBAXIN) 500 MG Tab   No No   Sig: Take 1 Tablet by mouth 4 times a day.   metoprolol SR (TOPROL XL) 100 MG TABLET SR 24 HR  Significant Other, Patient Yes No   Sig: Take 100 mg by mouth at bedtime.      montelukast (SINGULAIR) 10 MG Tab  Significant Other, Patient Yes No   Sig: Take 10 mg by mouth at bedtime.   nitroglycerin (NITROSTAT) 0.4 MG SL Tab  Significant Other, Patient Yes No   Sig: Place 0.4 mg under the tongue as needed for Chest Pain.   rosuvastatin (CRESTOR) 20 MG Tab  Significant Other, Patient Yes No   Sig: Take 20 mg by mouth at bedtime.      sevelamer carbonate (RENVELA) 800 MG Tab tablet   No No   Sig: Take 2 Tablets by mouth 3 times a day with meals.   sodium bicarbonate 325 MG Tab   Yes No   Sig: Take 325 mg by mouth 2 times a day as needed. Indications: Heartburn   tamsulosin (FLOMAX) 0.4 MG capsule  Significant Other, Patient Yes No   Sig: Take 0.4 mg by mouth every morning.      terazosin (HYTRIN) 5 MG Cap  Significant Other, Patient Yes No   Sig: Take 1 Capsule by mouth at bedtime.         Facility-Administered Medications: None       Physical Exam  Temp:  [36.7 °C (98 °F)-37.3 °C (99.1 °F)] 37.2 °C (99 °F)  Pulse:  [76-96] 89  Resp:  [16-18] 18  BP: ()/(49-71) 105/49  SpO2:  [93 %-99 %] 93  %  Blood Pressure : 105/49   Temperature: 37.2 °C (99 °F)   Pulse: 89   Respiration: 18   Pulse Oximetry: 93 %       Physical Exam  Vitals and nursing note reviewed.   Constitutional:       General: He is not in acute distress.     Appearance: He is well-developed. He is ill-appearing. He is not toxic-appearing or diaphoretic.   HENT:      Head: Normocephalic and atraumatic.      Right Ear: External ear normal.      Left Ear: External ear normal.      Nose: Nose normal. No congestion or rhinorrhea.      Mouth/Throat:      Mouth: Mucous membranes are moist.      Pharynx: No oropharyngeal exudate.   Eyes:      General:         Right eye: No discharge.         Left eye: No discharge.   Neck:      Trachea: No tracheal deviation.   Cardiovascular:      Rate and Rhythm: Normal rate and regular rhythm.      Heart sounds: No murmur heard.     No friction rub. No gallop.   Pulmonary:      Effort: Pulmonary effort is normal. No respiratory distress.      Breath sounds: Normal breath sounds. No stridor. No wheezing or rales.   Chest:      Chest wall: No tenderness.   Abdominal:      General: Bowel sounds are normal. There is no distension.      Palpations: Abdomen is soft.      Tenderness: There is abdominal tenderness in the right upper quadrant.   Musculoskeletal:         General: Normal range of motion.      Cervical back: Normal range of motion and neck supple. No edema or erythema.      Right lower leg: Edema present.      Left lower leg: Edema present.      Comments: Multiple ischemic toes  Large medial left leg healing incision    Lymphadenopathy:      Cervical: No cervical adenopathy.   Skin:     General: Skin is warm and dry.      Findings: No erythema or rash.   Neurological:      Mental Status: He is alert and oriented to person, place, and time.      Cranial Nerves: No cranial nerve deficit.   Psychiatric:         Mood and Affect: Mood normal.         Behavior: Behavior normal.         Thought Content: Thought  "content normal.         Judgment: Judgment normal.         Laboratory:  Recent Labs     02/10/25  0638   WBC 7.6   RBC 2.35*   HEMOGLOBIN 7.4*   HEMATOCRIT 23.0*   MCV 97.9*   MCH 31.5   MCHC 32.2*   RDW 59.2*   PLATELETCT 304   MPV 10.2         No results for input(s): \"ALTSGPT\", \"ASTSGOT\", \"ALKPHOSPHAT\", \"TBILIRUBIN\", \"DBILIRUBIN\", \"GAMMAGT\", \"AMYLASE\", \"LIPASE\", \"ALB\", \"PREALBUMIN\", \"GLUCOSE\" in the last 72 hours.      No results for input(s): \"NTPROBNP\" in the last 72 hours.      No results for input(s): \"TROPONINT\" in the last 72 hours.    Imaging:  No orders to display       no X-Ray or EKG requiring interpretation    Assessment/Plan:  Justification for Admission Status  I anticipate this patient will require at least two midnights for appropriate medical management, necessitating inpatient admission because obstructed cystic duct    Patient will need a Med/Surg bed on MEDICAL service .  The need is secondary to obstruction of the cystic duct.    * Obstruction of cystic duct- (present on admission)  Assessment & Plan  [facility]  I have discussed the case with gastroenterology, no plans for intervention   I have also discussed the case with general surgery, plan for cholecystectomy  There is concern that the peritoneal dialysis catheter will need to be removed however general surgeon does believe that this can be done laparoscopically without much contamination and then hopefully will be able to leave the peritoneal dialysis catheter however he does state that patient will need hemodialysis with a tunneled catheter for 2 weeks post cholecystectomy  I have discussed case with vascular surgeon, they are okay with holding the Plavix and Eliquis for the amount of time surgery needs, they are also okay placing a tunneled catheter when the cholecystectomy is taking place    PAD (peripheral artery disease) (HCC)- (present on admission)  Assessment & Plan  Patient is status post left lower " extremity bypass, popliteal to tibial on 1/20  Was on Eliquis and Plavix  I have reached out to vascular surgery, they are okay with these medications being hold for surgery  They will also place a tunneled catheter    BPH (benign prostatic hyperplasia)- (present on admission)  Assessment & Plan  Continue home Flomax  Monitor urine output    Dyslipidemia- (present on admission)  Assessment & Plan  Continue home statin    Anemia in chronic renal disease- (present on admission)  Assessment & Plan  No sign of gross bleeding  Repeat CBC in the morning    Essential hypertension- (present on admission)  Assessment & Plan  Continue home amlodipine, metoprolol  Start as needed labetalol  Adjust as needed    ESRD on peritoneal dialysis (HCC)- (present on admission)  Assessment & Plan  I did discuss the case with Dr. Mehta who will set up peritoneal dialysis  I also discussed Dr. Zamarripa's plan of hemodialysis for 2 weeks post cholecystectomy, he is in agreement and is requesting tunneled catheter which will be placed by Dr. Miller  Obtain labs and repeat a BMP in the morning        VTE prophylaxis: SCDs/TEDs

## 2025-02-11 NOTE — PROGRESS NOTES
"  Physical Medicine & Rehabilitation Progress Note    Encounter Date: 2/11/2025    Chief Complaint: PVD s/p bypass    Interval Events (Subjective):  BP stable low   BM 2/10 - Black (has hemorrhoids)  Voids some, oliguric - Recent urinary frequency  MAR: Not getting Torsemide    Seen and examined in his room with wife. He is very fatigued. Still having large volume emesis. Boni is very fatigued he just wants to sleep. He had bloody PD last night for first time. He really feels like he is only getting weaker, his whole body hurts.       ROS: 14 point ROS negative unless otherwise specified in the HPI    Objective:  VITAL SIGNS: BP 96/49   Pulse 76   Temp 37.1 °C (98.7 °F) (Oral)   Resp 18   Ht 1.707 m (5' 7.2\")   Wt 70.9 kg (156 lb 4 oz)   SpO2 96%   BMI 24.33 kg/m²     GEN: No apparent distress, fatigued appearing  HEENT: Head normocephalic, atraumatic.  Sclera nonicteric bilaterally, no ocular discharge appreciated bilaterally.  CV: Left lower extremity with + edema. RLE + edema too but not as much.   PULMONARY: Breathing nonlabored on room air, no respiratory accessory muscle use.  Not requiring supplemental oxygen.  SKIN:   2/2 Clinical Picture    PSYCH: Mood and affect within normal limits.  NEURO: Awake alert.  Conversational.  Logical thought content.  Flapping hand tremors.      Laboratory Values:  Recent Results (from the past 72 hours)   URINALYSIS    Collection Time: 02/08/25 11:49 AM    Specimen: Urine, Clean Catch   Result Value Ref Range    Color Yellow     Character Cloudy (A)     Specific Gravity 1.022 <1.035    Ph 5.5 5.0 - 8.0    Glucose Negative Negative mg/dL    Ketones Negative Negative mg/dL    Protein 100 (A) Negative mg/dL    Bilirubin Negative Negative    Urobilinogen, Urine 1.0 <=1.0 EU/dL    Nitrite Negative Negative    Leukocyte Esterase Trace (A) Negative    Occult Blood Negative Negative    Micro Urine Req Microscopic    H.PYLORI STOOL ANTIGEN    Collection Time: 02/08/25 11:49 " AM   Result Value Ref Range    H Pylori Ag Stool E Not Detected Not Detected   URINE MICROSCOPIC (W/UA)    Collection Time: 02/08/25 11:49 AM   Result Value Ref Range    WBC 3-5 (A) /hpf    RBC 0-2 0 - 2 /hpf    Bacteria None Seen None /hpf    Epithelial Cells 3-5 0 - 5 /hpf    Epithelial Cells Renal Present (A) Absent /hpf    Amorphous Crystal Present (A) Absent /hpf    Ca Oxalate Crystal Present (A) Absent /hpf    Urine Casts 6-10 (A) 0 - 2 /lpf    Granular Casts Present (A) Absent /lpf   URINE CULTURE-EXISTING-LESS THAN 48 HOURS    Collection Time: 02/08/25 11:49 AM    Specimen: Urine, Clean Catch   Result Value Ref Range    Significant Indicator NEG     Source UR     Site URINE, CLEAN CATCH     Culture Result No growth at 24 hours.    CBC WITHOUT DIFFERENTIAL    Collection Time: 02/10/25  6:38 AM   Result Value Ref Range    WBC 7.6 4.8 - 10.8 K/uL    RBC 2.35 (L) 4.70 - 6.10 M/uL    Hemoglobin 7.4 (L) 14.0 - 18.0 g/dL    Hematocrit 23.0 (L) 42.0 - 52.0 %    MCV 97.9 (H) 81.4 - 97.8 fL    MCH 31.5 27.0 - 33.0 pg    MCHC 32.2 (L) 32.3 - 36.5 g/dL    RDW 59.2 (H) 35.9 - 50.0 fL    Platelet Count 304 164 - 446 K/uL    MPV 10.2 9.0 - 12.9 fL   POC CoV-2, FLU A/B, RSV by PCR    Collection Time: 02/10/25  6:12 PM   Result Value Ref Range    POC Influenza A RNA, PCR Negative Negative    POC Influenza B RNA, PCR Negative Negative    POC RSV, by PCR Negative Negative    POC SARS-CoV-2, PCR NotDetected NotDetected       Medications:  Scheduled Medications   Medication Dose Frequency    simethicone  125 mg TID    midodrine  15 mg TID WITH MEALS    scopolamine  1 Patch Q72HRS    ondansetron  4 mg BID WITH MEALS    torsemide  10 mg Q DAY    potassium chloride SA  20 mEq DAILY    omeprazole  40 mg DAILY    lanthanum carbonate  500 mg TID WITH MEALS    epoetin  4,000 Units Q7 DAYS    Pharmacy Consult Request  1 Each PHARMACY TO DOSE    apixaban  5 mg BID    clopidogrel  75 mg DAILY    montelukast  10 mg QHS    rosuvastatin  20  mg QHS    senna-docusate  2 Tablet BID    gentamicin   DAILY AT 1800     PRN medications: simethicone, formulation r, bisacodyl, oxyCODONE immediate-release **OR** oxyCODONE immediate-release, methocarbamol, sevelamer carbonate, lidocaine, hydrALAZINE, lactulose, bisacodyl EC, magnesium hydroxide, carboxymethylcellulose, benzocaine-menthol, mag hydrox-al hydrox-simeth, ondansetron **OR** ondansetron, traZODone, sodium chloride, acetaminophen, senna-docusate **AND** polyethylene glycol/lytes    Diet:  Current Diet Order   Procedures    Diet Order Diet: Level 6 - Soft and Bite Sized; Liquid level: Level 0 - Thin; Tray Modifications (optional): Small Meals       Medical Decision Making and Plan:  S/p left popliteal to posterior tibial artery bypass Dr. Miller 1/20/2025  PT and OT for mobility and ADLs. Per guidelines, 15 hours per week between PT, OT and/or SLP.  Follow-up vascular surgery  Continue Eliquis and Plavix  Continue statin    1/24 Prevena saturation. Wound consult. Vasc surg to eval as well. Prevena now working.   1/30 vascular to change dressing at some point per patient's wife  2/5 swelling stable. Not getting torsemide nor BB  2/11 Vac to come off      Type 2 diabetes mellitus with hyperglycemia -outpatient medication had been Januvia 1 tablet twice daily     Pain -Tylenol and oxycodone as needed.  Scheduled Robaxin, gabapentin.  Schedule oxycodone 5x daily, morphine contraindicated based on renal function. 1/29 Reduce Oxycodone to QID. 1/30 reduce oxycodone to scheduled 3 times daily.  Decrease methocarbamol to 250 mg 4 times daily. 2/3 Patient refusing scheduled Oxy. Continue only as PRN and d/c scheduled. Make Robaxin PRN.     Last as needed oxycodone use was 1/24  2/4 d/c Gabapentin, not using Oxy reduce frequency to q4 hrs PRN  2/11 Not requiring any PRN pain medications.      Anemia - 1/24 Improving. 1/27 Drop to 8's, had a lot of OP from Prevena last week. 1/29 7.7 - expected given prevena OP.  Monitor need for transfusion.  1/30 hemoglobin dropped to 7.3.  Discussed with nephrology, they deferred to us to transfuse.  Transfuse less than 7.  Prevena putting out less. 2/5 Stable 7's. 2/11 Stable 7's. Black stool get FOBT, known hemorrhoids likely etiology.     Thrombocytopenia - Resolved 1/30     ESRD on PD - Nnephrology following.  On sevelamer - changed due to naussea.  Nephrology increased fill 1/30.  Tunneled cath removal 2/6 per nephrology    2/11 Bloody output with PD last night.     Hypermagnesemia - Nephro managing    Hyperphosphatemia - Nephro managing      History of moderate aortic stenosis -stable     Hypertension - On admit: Norvasc 2.5 mg every morning, Lasix 40 mg twice daily, Metoprolol XL 100mg. Norvasc stopped.      2/5 Has not been getting metoprolol nor torsemide for many days (>6) will reduce midodrine, change torsemide to 1400 and add midodrine.   2/6 continue midodrine.  2/7 Not getting beta blocker. Stop for now, hasn't been given in > 7 days. Increase Midodrine and continue Torsemide so can improve swelling.   2/10 Has not received Torsemide. Reduce BP parameter to not administer if BP < 110. Increase Midodrine to 15mg TID.  2/11 BP stable but still not getting Torsemide.      CAD s/p SYDNEE 3x - On aspirin and statin.      Gout - No meds at home.      Hyperlipidemia -continue statin     Recent left inguinal hernia repair - 12/18/2024 Dr. Miller     Bowel - Patient on Senna-docusate for constipation prophylaxis. 1/23 Constipation. 1/29 BM. 2/1 BM. 2/3 Bowel meds increased.     Nausea & Vomiting - Only after lunch. Get KUB - WNL. Schedule Zofran after lunch.  Scheduled Zofran after each meal. 2/3 Declining scheduled Zofran. Make PRN only. 2/4 reduced to only afternoon. 2/5 No vomiting yesterday. Monitor.  2/6 having some emesis using as needed Zofran.  2/6 schedule Zofran again in the afternoon and evening.  Will try without Nepro as seems to correlate with when he gets Nepro. 2/7 Down  grade diet per patient preference, cannot tolerate regular textures. Consult hospitalist.     2/10 HIDA pending today at 1400. No improvement in N/V. Off Miralax, on Lactulose. Viral panel pending.   2/11 HIDA with cystic duct obstruction. D/w GI, voalte to gen surg on call, d/w hospitalist. Direct admit for intractable N/V. Viral panel neg.     Hand tremors -LFTs nonconcerning for etiology.  Possibly med induced. 2/5 Improved but still continues. Off meds that may contribute.      BPH - TV/PVR/BS PRN.  Continue Flomax and Hytrin. 1/28 Hold due to low BP. Favor diuresis. 2/3 Low PVRs.      Insomnia -continue trazodone at night as needed        Upcoming Labs/imaging: Per Nephro     DVT PROPHYLAXIS: On Eliqu     HOSPITALIST FOLLOWING: Yes    Consultants following: Nephrology     CODE STATUS: Full code     DISPO: Home with spouse     SHAE: 2/14/25     MEDS SENT TO: [facility]     DISCHARGE SPECIALIST FOLLOW UP:   Vascular surgery - 2/13  Transplant team     Patient to scheduled follow up with their PCP within 2 weeks from discharge from the [facility].   ____________________________________    Dr. Mere Klein DO, MS  [facility] - Physical Medicine & Rehabilitation   ____________________________________    Total time:  50 minutes. Time spent included pre-rounding review of vitals and tests, unit/floor time, face-to-face time with the patient including physical examination, care coordination, counseling of patient and/or family, ordering medications/procedures/tests, discussion with CM, PT, OT, SLP and/or other healthcare providers, and documentation in the electronic medical record. Topics discussed included d/w wife, consultants, transfer center, admitting hospitalist regarding w/u for intractable N/V.

## 2025-02-11 NOTE — DISCHARGE PLANNING
Case Management/IDT follow up.   Projected dc date: 2/14/25  IDT continues to recommend IRF level of care as patient continue to make progress with all therapies.     DC needs  Home health:  PT/OT/RN/CNA  DME: [facility]  Family training:    Follow up:   PCP:  Other:     I met with patient and family providing update from IDT and discussed plan of care.  They are in agreement w/ plan.     Plan:  Continue to follow

## 2025-02-11 NOTE — PROGRESS NOTES
Order received to send pt to [facility] as Direct admit due to intractable nausea and vomiting.  Patient left facility via gurney assisted by GMT at around 1310. Report given to Charge Nurse Ailyn.

## 2025-02-12 LAB
ABO GROUP BLD: NORMAL
ALBUMIN SERPL BCP-MCNC: 2.7 G/DL (ref 3.2–4.9)
ALBUMIN/GLOB SERPL: 0.9 G/DL
ALP SERPL-CCNC: 91 U/L (ref 30–99)
ALT SERPL-CCNC: 22 U/L (ref 2–50)
ANION GAP SERPL CALC-SCNC: 20 MMOL/L (ref 7–16)
AST SERPL-CCNC: 30 U/L (ref 12–45)
BARCODED ABORH UBTYP: 5100
BARCODED ABORH UBTYP: 5100
BARCODED PRD CODE UBPRD: NORMAL
BARCODED PRD CODE UBPRD: NORMAL
BARCODED UNIT NUM UBUNT: NORMAL
BARCODED UNIT NUM UBUNT: NORMAL
BILIRUB SERPL-MCNC: 0.5 MG/DL (ref 0.1–1.5)
BLD GP AB SCN SERPL QL: NORMAL
BUN SERPL-MCNC: 99 MG/DL (ref 8–22)
CALCIUM ALBUM COR SERPL-MCNC: 10 MG/DL (ref 8.5–10.5)
CALCIUM SERPL-MCNC: 9 MG/DL (ref 8.5–10.5)
CFT BLD TEG: 7.7 MIN (ref 4.6–9.1)
CFT P HPASE BLD TEG: 7.1 MIN (ref 4.3–8.3)
CHLORIDE SERPL-SCNC: 91 MMOL/L (ref 96–112)
CLOT ANGLE BLD TEG: 77.9 DEGREES (ref 63–78)
CLOT LYSIS 30M P MA LENFR BLD TEG: 0.1 % (ref 0–2.6)
CO2 SERPL-SCNC: 22 MMOL/L (ref 20–33)
COMPONENT R 8504R: NORMAL
COMPONENT R 8504R: NORMAL
CREAT SERPL-MCNC: 8.98 MG/DL (ref 0.5–1.4)
CT.EXTRINSIC BLD ROTEM: 1 MIN (ref 0.8–2.1)
ERYTHROCYTE [DISTWIDTH] IN BLOOD BY AUTOMATED COUNT: 59.3 FL (ref 35.9–50)
GFR SERPLBLD CREATININE-BSD FMLA CKD-EPI: 6 ML/MIN/1.73 M 2
GLOBULIN SER CALC-MCNC: 2.9 G/DL (ref 1.9–3.5)
GLUCOSE SERPL-MCNC: 170 MG/DL (ref 65–99)
HCT VFR BLD AUTO: 20.6 % (ref 42–52)
HGB BLD-MCNC: 6.6 G/DL (ref 14–18)
MCF BLD TEG: 72 MM (ref 52–69)
MCF.PLATELET INHIB BLD ROTEM: >52 MM (ref 15–32)
MCH RBC QN AUTO: 31.7 PG (ref 27–33)
MCHC RBC AUTO-ENTMCNC: 32 G/DL (ref 32.3–36.5)
MCV RBC AUTO: 99 FL (ref 81.4–97.8)
PA AA BLD-ACNC: ABNORMAL % (ref 0–11)
PA ADP BLD-ACNC: ABNORMAL % (ref 0–17)
PLATELET # BLD AUTO: 335 K/UL (ref 164–446)
PMV BLD AUTO: 10.7 FL (ref 9–12.9)
POTASSIUM SERPL-SCNC: 4 MMOL/L (ref 3.6–5.5)
PRODUCT TYPE UPROD: NORMAL
PRODUCT TYPE UPROD: NORMAL
PROT SERPL-MCNC: 5.6 G/DL (ref 6–8.2)
RBC # BLD AUTO: 2.08 M/UL (ref 4.7–6.1)
RH BLD: NORMAL
SODIUM SERPL-SCNC: 133 MMOL/L (ref 135–145)
TEG ALGORITHM TGALG: ABNORMAL
UNIT STATUS USTAT: NORMAL
UNIT STATUS USTAT: NORMAL
WBC # BLD AUTO: 8.4 K/UL (ref 4.8–10.8)

## 2025-02-12 PROCEDURE — 86901 BLOOD TYPING SEROLOGIC RH(D): CPT

## 2025-02-12 PROCEDURE — 770004 HCHG ROOM/CARE - ONCOLOGY PRIVATE *

## 2025-02-12 PROCEDURE — 36415 COLL VENOUS BLD VENIPUNCTURE: CPT

## 2025-02-12 PROCEDURE — 86923 COMPATIBILITY TEST ELECTRIC: CPT

## 2025-02-12 PROCEDURE — 86850 RBC ANTIBODY SCREEN: CPT

## 2025-02-12 PROCEDURE — A9270 NON-COVERED ITEM OR SERVICE: HCPCS | Performed by: INTERNAL MEDICINE

## 2025-02-12 PROCEDURE — P9016 RBC LEUKOCYTES REDUCED: HCPCS

## 2025-02-12 PROCEDURE — 85384 FIBRINOGEN ACTIVITY: CPT | Mod: 91

## 2025-02-12 PROCEDURE — 3E1M39Z IRRIGATION OF PERITONEAL CAVITY USING DIALYSATE, PERCUTANEOUS APPROACH: ICD-10-PCS | Performed by: INTERNAL MEDICINE

## 2025-02-12 PROCEDURE — 36430 TRANSFUSION BLD/BLD COMPNT: CPT

## 2025-02-12 PROCEDURE — 85576 BLOOD PLATELET AGGREGATION: CPT | Mod: 91

## 2025-02-12 PROCEDURE — 85027 COMPLETE CBC AUTOMATED: CPT

## 2025-02-12 PROCEDURE — 97602 WOUND(S) CARE NON-SELECTIVE: CPT

## 2025-02-12 PROCEDURE — 99222 1ST HOSP IP/OBS MODERATE 55: CPT | Performed by: SURGERY

## 2025-02-12 PROCEDURE — 90945 DIALYSIS ONE EVALUATION: CPT

## 2025-02-12 PROCEDURE — 700102 HCHG RX REV CODE 250 W/ 637 OVERRIDE(OP): Performed by: INTERNAL MEDICINE

## 2025-02-12 PROCEDURE — 700105 HCHG RX REV CODE 258: Performed by: INTERNAL MEDICINE

## 2025-02-12 PROCEDURE — 99233 SBSQ HOSP IP/OBS HIGH 50: CPT | Performed by: INTERNAL MEDICINE

## 2025-02-12 PROCEDURE — 86900 BLOOD TYPING SEROLOGIC ABO: CPT

## 2025-02-12 PROCEDURE — 80053 COMPREHEN METABOLIC PANEL: CPT

## 2025-02-12 PROCEDURE — 85347 COAGULATION TIME ACTIVATED: CPT

## 2025-02-12 PROCEDURE — 700111 HCHG RX REV CODE 636 W/ 250 OVERRIDE (IP): Mod: JZ | Performed by: INTERNAL MEDICINE

## 2025-02-12 RX ORDER — GENTAMICIN SULFATE 1 MG/G
CREAM TOPICAL PRN
Status: DISCONTINUED | OUTPATIENT
Start: 2025-02-12 | End: 2025-02-18 | Stop reason: HOSPADM

## 2025-02-12 RX ORDER — SODIUM CHLORIDE 9 MG/ML
INJECTION, SOLUTION INTRAVENOUS CONTINUOUS
Status: ACTIVE | OUTPATIENT
Start: 2025-02-12 | End: 2025-02-12

## 2025-02-12 RX ADMIN — METRONIDAZOLE 500 MG: 500 INJECTION, SOLUTION INTRAVENOUS at 06:08

## 2025-02-12 RX ADMIN — ROSUVASTATIN CALCIUM 20 MG: 20 TABLET, FILM COATED ORAL at 20:49

## 2025-02-12 RX ADMIN — CEFTRIAXONE SODIUM 1000 MG: 10 INJECTION, POWDER, FOR SOLUTION INTRAVENOUS at 18:31

## 2025-02-12 RX ADMIN — METRONIDAZOLE 500 MG: 500 INJECTION, SOLUTION INTRAVENOUS at 18:37

## 2025-02-12 RX ADMIN — ONDANSETRON 4 MG: 2 INJECTION INTRAMUSCULAR; INTRAVENOUS at 20:40

## 2025-02-12 ASSESSMENT — ENCOUNTER SYMPTOMS
VOMITING: 0
DIZZINESS: 0
SHORTNESS OF BREATH: 0
MYALGIAS: 0
CHILLS: 0
DEPRESSION: 0
HEADACHES: 0
WEAKNESS: 1
ABDOMINAL PAIN: 0
NAUSEA: 0
FEVER: 0

## 2025-02-12 ASSESSMENT — PAIN DESCRIPTION - PAIN TYPE
TYPE: ACUTE PAIN
TYPE: ACUTE PAIN

## 2025-02-12 NOTE — CONSULTS
Surgical History and Physical    Date of Service: 2/12/2025    Requesting Physician: Zach Knight MD - [facility]    Reason for Consultation: Acute Cholecystitis    HPI: This is a 79 y.o. male who is presenting on transfer from a local Rehab with 10-12 days of nausea and vomiting.  A HIDA was obtained and showed an obstructed cystic duct.    The patient was seen at bedside with his wife.  Denies abdominal pain.  Had just eaten a full breakfast.    PAST MEDICAL HISTORY:   Past Medical History:   Diagnosis Date    A-V fistula ([facility])     Left Arm    Arrhythmia 03/13/2024    Due to kidney failure treatment    Breath shortness     with exertion    Cataract     shon iol    Congestive heart failure ([facility])     Diabetes ([facility])     not on any medication at this time, being monitored at dialysis    Dialysis patient ([facility])     monday wednesday friday at [facility]    Dialysis patient ([facility]) 01/17/2025    Starting Peritoneal Dialysis @ Home on 1/17/2025. Follows Nephrologist MD Milian.    Foot ulcer ([facility])     left foot. being seen by wound care    Hemorrhagic disorder ([facility])     plavix eliquis    High cholesterol     Hypertension 05/24/2024    states controlled    DARIEN (obstructive sleep apnea) 01/17/2025    O2 @ 2.5L Per Patient's Wife.    Pain     Peritoneal dialysis catheter in place ([facility]) 01/17/2025    Pneumonia     3/24    Renal disorder     Sleep apnea 01/10/2025    Home sleep study done 1/3/2025 - no results yet.    Stroke ([facility]) 2021    tia       PAST SURGICAL HISTORY:   Past Surgical History:   Procedure Laterality Date    DE VEIN BYPASS GRAFT,FEM-TIBIAL Left 1/20/2025    Procedure: LEFT LOWER EXTREMITY BYPASS, POPLITIAL TO TIBIAL BYPASS;  Surgeon: Marco Antonio Miller M.D.;  Location: SURGERY [facility];  Service: Vascular    AORTOGRAM WITH RUNOFF  1/14/2025    Procedure: AORTOGRAM WITH RUNOFF, right peroneal artery intravascular lithotripsy and stent placement;  Surgeon: Marco Antonio Miller M.D.;  Location: SURGERY  [facility];  Service: Vascular    INGUINAL HERNIA LAPAROSCOPIC N/A 12/18/2024    Procedure: LAPAROSCOPIC LEFT INGUINAL HERNIA REPAIR WITH MESH, LAPAROSCOPIC REPOSITIONING OF PERITONEAL DIALYSIS CATHETER;  Surgeon: Marco Antonio Miller M.D.;  Location: SURGERY [facility];  Service: Vascular    CATH PLACEMENT CAPD N/A 12/18/2024    Procedure: REPOSITION, CATHETER, CAPD;  Surgeon: Marco Antonio Miller M.D.;  Location: SURGERY [facility];  Service: Vascular    CATH PLACEMENT CAPD N/A 09/16/2024    Procedure: LAPAROSCOPIC INSERTION OF PERITONEAL DIALYSIS CATHETER PLACEMENT;  Surgeon: Marco Antonio Miller M.D.;  Location: SURGERY SAME DAY [facility];  Service: Vascular    AV FISTULA CREATION Left 07/01/2024    Procedure: CREATION OF LEFT UPPER EXTREMITY DIALYSIS FISTULA;  Surgeon: Shilo Mercedes M.D.;  Location: SURGERY [facility];  Service: General    OTHER ORTHOPEDIC SURGERY  2004    bunion r foot    OTHER      tonsils adenoids    OTHER      dialysis catheter in upper right chest    OTHER      shon iol    OTHER CARDIAC SURGERY      cardiac stents       ALLERGIES: Patient has no known allergies.       CURRENT MEDICATIONS:   Home Medications       Reviewed by Columba Caraballo (Pharmacy Tech) on 02/11/25 at 1638  Med List Status: Complete     Medication Last Dose Status   apixaban (ELIQUIS) 5mg Tab 2/11/2025 Active   bisacodyl (DULCOLAX) 10 MG Suppos 2/5/2025 Active   clopidogrel (PLAVIX) 75 MG Tab 2/11/2025 Active   epoetin jayna (EPOGEN/PROCRIT) 4000 UNIT/ML Solution 2/8/2025 Active   gentamicin (GARAMYCIN) 0.1 % cream 2/10/2025 Active   lanthanum carbonate (FOSRENOL) 500 MG Chew Tab 2/11/2025 Active   magnesium hydroxide (MILK OF MAGNESIA) 400 MG/5ML Suspension 2/1/2025 Active   methocarbamol (ROBAXIN) 500 MG Tab 2/3/2025 Active   midodrine (PROAMATINE) 10 MG tablet 2/11/2025 Active   montelukast (SINGULAIR) 10 MG Tab 2/10/2025 Active   omeprazole (PRILOSEC) 40 MG delayed-release capsule 2/11/2025 Active   ondansetron  (ZOFRAN ODT) 4 MG TABLET DISPERSIBLE 2/10/2025 Active   ondansetron (ZOFRAN ODT) 4 MG TABLET DISPERSIBLE 2/9/2025 Active   oxyCODONE immediate-release (ROXICODONE) 5 MG Tab 2/8/2025 Active   polyethylene glycol/lytes (MIRALAX) Pack 2/1/2025 Active   potassium chloride SA (KDUR) 20 MEQ Tab CR 2/11/2025 Active   rosuvastatin (CRESTOR) 20 MG Tab 2/10/2025 Active   scopolamine (TRANSDERM-SCOP) 1 mg/72hr PATCH 72 HR 2/9/2025 Active   senna-docusate (PERICOLACE OR SENOKOT S) 8.6-50 MG Tab 2/11/2025 Active   sevelamer carbonate (RENVELA) 800 MG Tab tablet 2/1/2025 Active   simethicone (MYLICON) 125 MG chewable tablet 2/11/2025 Active   torsemide (DEMADEX) 10 MG tablet 2/1/2025 Active                     FAMILY HISTORY: family history includes Cancer in his daughter and mother; Heart Disease in his brother and father.      SOCIAL HISTORY:  reports that he has never smoked. He has never used smokeless tobacco. He reports that he does not currently use alcohol. He reports that he does not use drugs.      Review of Systems:  Constitutional: Negative for fever, chills, weight loss, malaise/fatigue and diaphoresis.   HENT: Negative for hearing loss, ear pain, nosebleeds, congestion, sore throat, neck pain, tinnitus and ear discharge.    Eyes: Negative for blurred vision, double vision, photophobia, pain, discharge and redness.   Respiratory: Negative for cough, hemoptysis, sputum production, shortness of breath, wheezing and stridor.    Cardiovascular: Negative for chest pain, palpitations, orthopnea, claudication, leg swelling and PND.   Gastrointestinal: Negative for heartburn, nausea, vomiting, abdominal pain, diarrhea, constipation, blood in stool and melena.   Genitourinary: Negative for dysuria, urgency, frequency, hematuria and flank pain.   Musculoskeletal: Negative for myalgias, back pain, joint pain and falls.   Skin: Negative for itching and rash.  Neurological: Negative for dizziness, tingling, tremors, sensory  "change, speech change, focal weakness, seizures, loss of consciousness, weakness and headaches.   Endo/Heme/Allergies: Negative for environmental allergies and polydipsia. Does not bruise/bleed easily.   Psychiatric/Behavioral: Negative for depression, suicidal ideas, hallucinations, memory loss and substance abuse. The patient is not nervous/anxious and does not have insomnia.    Physical Exam:  /61   Pulse 81   Temp 36.7 °C (98.1 °F) (Temporal)   Resp 18   Ht 1.707 m (5' 7.21\")   Wt 70.9 kg (156 lb 4.9 oz)   SpO2 99%   Vitals:    02/12/25 0807   BP: 101/61   Pulse: 81   Resp: 18   Temp: 36.7 °C (98.1 °F)   SpO2: 99%     GENERAL:  Elderly and frail appearing and in no acute distress  CHEST:  Lungs are clear to auscultation bilaterally.  No masses, lesions, or signs of trauma were noted.     CARDIOVASCULAR:  Regular rate and rhythm.  No murmurs appreciated.  No JVD.  Palpable pulses present in all four extremities.    ABDOMEN:  Soft, non-tender, non-distended.    MUSCULOSKELETAL: Normal range of motion x4 extremities.    SKIN:  Warm and well perfused. No rashes.  NEUROLOGIC:  Alert and oriented. Cranial nerves II through XII are grossly intact. Motor and sensory exams are normal in all four extremities. Motor and sensory reflexes are 2+ and symmetric with bilateral plantar responses.  PSYCHIATRIC: Affect and mood is appropriate for age and condition.    Labs:  Recent Labs     02/10/25  0638 02/12/25  0731   WBC 7.6 8.4   RBC 2.35* 2.08*   HEMOGLOBIN 7.4* 6.6*   HEMATOCRIT 23.0* 20.6*   MCV 97.9* 99.0*   MCH 31.5 31.7   MCHC 32.2* 32.0*   RDW 59.2* 59.3*   PLATELETCT 304 335   MPV 10.2 10.7     Recent Labs     02/12/25  0731   SODIUM 133*   POTASSIUM 4.0   CHLORIDE 91*   CO2 22   GLUCOSE 170*   BUN 99*   CREATININE 8.98*   CALCIUM 9.0         Recent Labs     02/12/25  0731   ASTSGOT 30   ALTSGPT 22   TBILIRUBIN 0.5   ALKPHOSPHAT 91   GLOBULIN 2.9       Radiology:  No orders to display "       Assessment/Plan:   1) Biliary Obstruction:    RUQ ultrasound was unremarkable from 2/8.  Having no symptoms at the moment.  Discussed surgery.  The patient is frustrated and has not consented to this yet.  All questions and concerns answered.  Continue to hold Eliquis and Plavix.      Aggregated care time spent evaluating, reassessing, reviewing documentation, providing care, and managing this patient exclusive of procedures: 45 minutes  ____________________________________   Juan Zamarripa MD, FACS   JRU / NTS     DD: 2/12/2025   DT: 10:40 AM

## 2025-02-12 NOTE — ASSESSMENT & PLAN NOTE
Was on midodrine at rehab which has been Dc'd  -Continue Flomax, metoprolol 12.5 mg BID   As needed antihypertensives

## 2025-02-12 NOTE — DISCHARGE PLANNING
Edy was sent acute from [facility].  Will need a PMR consult referral as well as [place] liana to cogitate a return once medically  cleared if he does not return within 3 midnights.

## 2025-02-12 NOTE — DIETARY
Nutrition Services: Brief Update    Nutrition Representative visited pt this morning for meal preferences.   Pt requested Ensure Plus oral nutrition supplement to bolster PO intake.  RD will add supplement order and adjust menu accordingly.    RD to follow per dept guidelines.

## 2025-02-12 NOTE — ASSESSMENT & PLAN NOTE
EGD showed no bleeding  On Eliquis and Plavix  -1 unit PRBC transfusion today  Discussed with nephrology, restart EPO  Daily CBC

## 2025-02-12 NOTE — ASSESSMENT & PLAN NOTE
Patient is status post left lower extremity bypass, popliteal to tibial on 1/20  Eliquis and Plavix held for possible surgery and possible GI bleed.  EGD showed no bleeding and patient does not want surgery at this time will restart  Continue rosuvastatin  Vascular surgery consulted, Prevena wound VAC replaced on 2/13 by wound care

## 2025-02-12 NOTE — PROGRESS NOTES
[facility] -  PROGRESS NOTE               Author: Zach Mehta M.D. Date & Time: 2/12/2025  11:53 AM     HPI:  79 y.o. male with HTN, ESRD on PD, PVD/ gangrenous left foot, BPH admitted for monitoring s/p  left leg bypass surgery on 1/20 with Dr. Miller. He recently underwent hernial repair and was temporarily transferred to in center HD via RIJ PC. He has returned to  PD  4 days ago, doing low fill.volumes and has been tolerating at home.   Patient was connected to cycler last night. Total UF reported to be 276ml. Reports of low drain UF alarm last night but no major issues with PD overnight. Pt reports no complaints today morning  No F/C/N/V/CP/SOB.  No melena, hematochezia, hematemesis.  No HA, visual changes,or abdominal pain.     DAILY NEPHROLOGY SUMMARY:  See note from 1/31 for prior summaries   2/01: Disconnected aseptically from PD CyclerEffluent clear and yellow, no signs of bleeding/fibrin clots. 24 hour UF= 678mL. Tolerated well.   2/2: Patient and wife are doing well. Wife states she gave wrong # of cycles to provider yesterday and wanted then reduced back from 7 to 5. Overall doing well without complaints. Looking forward to going home around Feb 7th per patient. Also stated they were told that RT IJ TDC would be removed sometime this week. On-coming provider to ask the Hospitalist to help arrange.   2/3: pt sitting up in bed, spouse at bedside, PD improved with change in rx (reduced to 5 exchanges from 7) great UF: 1.1L, edema has improved, biggest complaint NV, vomits after each meal and fatigue, requesting a reduction in gabapentin, also reports constipation, had to dig out stool yesterday with finger  2/4 net UF 1500 ml overnight. Potassium is 3.3 I am replacing. Patient reports doing better today, wife at bedside. Patient feels much better now since Erica made med changes yesterday  2/5: 1437 mL Net UF removed overnight. Wife BS. Patient oob in BR performing adls  "independently. Reports improvement every day.   2/6: doing well, eating lunch with wife, net UF 1273 ml  2/7: patient doing well, low K I will order additional dose of potassium  2/8: NAEO, no complaints, wife and friends at bedside; Feels good  2/9: NAEO, wife at bedside, he is in wheelchair, having multiple BM's after lactulose  2/10: 725 mL Net UF removed last night via CCPD. Low drain UF alarmed overnight. Resting in bed, wife BS. Bps low 90s systolic. C/o n/v yesterday, Complaining of persistent nausea. Per wife, appears weaker. Multiple Bms yesterday. Denies fever or chills.   2/11: Fluid + some blood, 425 mL Net UF removed via CCPD. C/o weakness and fatigue. Declining antimedic. HIDA + cystic duct obstruction.  2/12: Pt trasnferred to main for eval of N/V, cystic duct      REVIEW OF SYSTEMS:    10 point ROS reviewed and is as per HPI/daily summary or otherwise negative    PMH/PSH/SH/FH:   Reviewed and unchanged since admission note    CURRENT MEDICATIONS:   Reviewed from admission to present day    VS:  /61   Pulse 81   Temp 36.7 °C (98.1 °F) (Temporal)   Resp 18   Ht 1.707 m (5' 7.21\")   Wt 70.9 kg (156 lb 4.9 oz)   SpO2 99%   BMI 24.33 kg/m²     Physical Exam  General: well appearing NAD  HEENT: EOMI, no scleral icterus  Heart: RR no rubs  Pulm: non labored  Abdomen: Pd catheter  Extremites: Rmidial wound vac in aplce  Derm: no rashes  Neuro: grossly non focal    Fluids:  In: -   Out: 50     LABS:  Recent Labs     02/12/25  0731   SODIUM 133*   POTASSIUM 4.0   CHLORIDE 91*   CO2 22   GLUCOSE 170*   BUN 99*   CREATININE 8.98*   CALCIUM 9.0       IMAGING:   All imaging reviewed from admission to present day    IMPRESSION:  # ESRD on CCPD  - Returned to PD 1/17/25   - Home PD prescription CCPD 2.5% alternating with 1.5% 5  no last fill. Patient wife stated she initially told last Nephrologist wrong script. Now all green bags and 5 cycles, not 7.   - Bloody fluid with CCPD 2/10, Hgb stable on " Eliquis/Plavix   # HTN, controlled   - Goal BP < 140/90  - torsemide 10mg daily   # Anemia of CKD  - worsened overnight ? Unclear source of bleed  # CKD-MBD  - Ca 8.6   - PO4 7.4 -> 6.6 -> 7.0  - Lanthanum 500mg TID, titrate up prn  # Chronic hypoxic respiratory failure   - On home oxygen 2-2.5L at night   # Nausea/Vomiting              - ddx: binder intolerance, switched to lanthanum              - constipation, started miralax BID and lactulose added recently              - pill burden, increase omeprazole to 40mg daily              - reduced gabapentin to 100mg daily               - H. Pylori (-)               - HIDA Scan 2/10 + Cystic Duct Obstruction, GI Consulted               - Viral Panel (-) 2/11  # Hyponatremia, mild              - Torsemide daily              - UF with PD, on all 2.5%      SUGGESTIONS:  - Continue CCPD during hospital stay (now on oupt rx and tolerating well)  - trasnfuse 1 UPRBC  - if pt has surgery will need to transition back to HD temproraily  - Dose all meds per ESRD  - Renal diet, low phos   - L arm AVF not fully matured,  will need intervention outpt   - Avoid Fleet enema in dialysis patients   - Labs daily  - Discharge planning underway, plan for 2/7 home with family now 2/11 with Kali GUEVARA

## 2025-02-12 NOTE — PROGRESS NOTES
[facility] Progress Note     CCPD x 8.5 hours, 2100 mL fills x 5 cycles using all 2.5 % PD solution ordered per Dr. Mehta    Orders reviewed,verified and updated, CCPD cycler therapy settings verified.    CCPD treatment initiated at 2035 without any issues  Patient and PD access assessed prior to therapy    Patient AOx4, resting calmly, interactive, (-) abd distention, denies pain/abd discomfort, stable VS.  Patient with intact and patent RMQ PD catheter aseptically connected per policy.  RMQ PD catheter and exit site in good condition, no signs of infection noted, cleansed with antiseptic and dressings changed per policy     Initial drain: 359 mL     Report given to primary care nurse Claudine Costa RN     Patient on dwell 1/5 prior to departure from bedside.     Please contact on call dialysis RN for any issues with persistent alarms/assistance with troubleshooting or patient not tolerating therapy.      For on-call Dialysis RN, pls contact [facility] at [phone] or call [facility] PD service support hotline at [phone]

## 2025-02-12 NOTE — WOUND TEAM
[facility]  Inpatient Services  Initial Wound and Skin Care Evaluation    Admission Date: 2/11/2025     Last order of IP CONSULT TO WOUND CARE was found on 2/11/2025 from Hospital Encounter on 2/11/2025     HPI, PMH, SH: Reviewed    Past Surgical History:   Procedure Laterality Date    OK VEIN BYPASS GRAFT,FEM-TIBIAL Left 1/20/2025    Procedure: LEFT LOWER EXTREMITY BYPASS, POPLITIAL TO TIBIAL BYPASS;  Surgeon: Marco Antonio Miller M.D.;  Location: SURGERY [facility];  Service: Vascular    AORTOGRAM WITH RUNOFF  1/14/2025    Procedure: AORTOGRAM WITH RUNOFF, right peroneal artery intravascular lithotripsy and stent placement;  Surgeon: Marco Antonio Miller M.D.;  Location: [facility];  Service: Vascular    INGUINAL HERNIA LAPAROSCOPIC N/A 12/18/2024    Procedure: LAPAROSCOPIC LEFT INGUINAL HERNIA REPAIR WITH MESH, LAPAROSCOPIC REPOSITIONING OF PERITONEAL DIALYSIS CATHETER;  Surgeon: Marco Antonio Miller M.D.;  Location: SURGERY [facility];  Service: Vascular    CATH PLACEMENT CAPD N/A 12/18/2024    Procedure: REPOSITION, CATHETER, CAPD;  Surgeon: Marco Antonio Miller M.D.;  Location: SURGERY [facility];  Service: Vascular    CATH PLACEMENT CAPD N/A 09/16/2024    Procedure: LAPAROSCOPIC INSERTION OF PERITONEAL DIALYSIS CATHETER PLACEMENT;  Surgeon: Marco Antonio Miller M.D.;  Location: SURGERY SAME DAY [facility];  Service: Vascular    AV FISTULA CREATION Left 07/01/2024    Procedure: CREATION OF LEFT UPPER EXTREMITY DIALYSIS FISTULA;  Surgeon: Shilo Mercedes M.D.;  Location: SURGERY [facility];  Service: General    OTHER ORTHOPEDIC SURGERY  2004    bunion r foot    OTHER      tonsils adenoids    OTHER      dialysis catheter in upper right chest    OTHER      shon iol    OTHER CARDIAC SURGERY      cardiac stents     Social History     Tobacco Use    Smoking status: Never    Smokeless tobacco: Never    Tobacco comments:     Used chewing tobacco many years ago.   Substance Use Topics    Alcohol  use: Not Currently     No chief complaint on file.    Diagnosis: Obstruction of cystic duct [K82.0]    Unit where seen by Wound Team: R304/00     WOUND CONSULT RELATED TO:  Sacrum, BL toes    WOUND TEAM PLAN OF CARE - Frequency of Follow-up:   Nursing to follow dressing orders written for wound care. Contact wound team if area fails to progress, deteriorates or with any questions/concerns if something comes up before next scheduled follow up (See below as to whether wound is following and frequency of wound follow up)   Not following, consult as needed  - Sacrum, BL Toes    WOUND HISTORY:   Patient transferred here from [facility] and previously admitted to [facility]. Existing wounds to BL toes r/t PAD and POA pressure injury to sacrum.       WOUND ASSESSMENT/LDA  Wound 01/20/25 Pressure Injury Coccyx 1/24/2025- Stage 3 POA (Active)   Date First Assessed/Time First Assessed: 01/20/25 2230   Present on Original Admission: Yes  Hand Hygiene Completed: Yes  Primary Wound Type: Pressure Injury  Location: Coccyx  Wound Description (Comments): 1/24/2025- Stage 3 POA      Assessments 2/12/2025  1:00 PM   Wound Image      Site Assessment Pink;Painful   Periwound Assessment Clean;Dry;Intact   Margins Attached edges;Defined edges   Closure Secondary intention   Drainage Amount Scant   Drainage Description Serosanguineous   Treatments Cleansed;Nonselective debridement;Site care;Offloading   Wound Cleansing Normal Saline Irrigation   Dressing Status Clean;Dry;Intact   Dressing Changed Changed   Dressing Cleansing/Solutions Not Applicable   Dressing Options Offloading Dressing - Sacral   Dressing Change/Treatment Frequency Every 72 hrs, and As Needed   NEXT Dressing Change/Treatment Date 02/15/25   NEXT Weekly Photo (Inpatient Only) 02/19/25   Wound Team Following Not following   Pressure Injury Stage Stage 3   Wound Length (cm) 0.4 cm   Wound Width (cm) 0.4 cm   Wound Depth (cm) 0.2 cm   Wound Surface Area (cm^2) 0.16 cm^2    Wound Volume (cm^3) 0.032 cm^3   Wound Healing % -28   Shape Circular   Wound Odor None   WOUND NURSE ONLY - Time Spent with Patient (mins) 45       Wound 01/23/25 Toe, 2nd;Toe, 4th;Toe, Hallux Left Gangrene (Active)   Date First Assessed/Time First Assessed: 01/23/25 1600   Location: Toe, 2nd;Toe, 4th;Toe, Hallux  Laterality: Left  Wound Description (Comments): Gangrene      Assessments 2/12/2025  1:00 PM   Wound Image      Site Assessment Black;Dry   Periwound Assessment Clean;Dry;Intact   Margins Attached edges   Drainage Amount None   Treatments Cleansed;Nonselective debridement;Site care   Wound Cleansing Normal Saline Irrigation   Dressing Status Open to Air   Dressing Cleansing/Solutions 3% Betadine   Dressing Change/Treatment Frequency Every Shift, and As Needed   NEXT Dressing Change/Treatment Date 02/12/25   NEXT Weekly Photo (Inpatient Only) 02/19/25       Wound 01/23/25 Toe, 2nd;Toe, 4th;Toe, Hallux Right Gangrene (Active)   Date First Assessed/Time First Assessed: 01/23/25 1600   Location: Toe, 2nd;Toe, 4th;Toe, Hallux  Laterality: Right  Wound Description (Comments): Gangrene      Assessments 2/12/2025  1:00 PM   Wound Image      Site Assessment Black;Dry   Periwound Assessment Clean;Dry;Intact   Margins Attached edges   Closure Open to air   Drainage Amount None   Treatments Site care   Wound Cleansing Normal Saline Irrigation   Dressing Cleansing/Solutions 3% Betadine   Dressing Change/Treatment Frequency Every Shift, and As Needed   NEXT Dressing Change/Treatment Date 02/13/25   NEXT Weekly Photo (Inpatient Only) 02/19/25   Wound Team Following Not following        Vascular:    GLENYS:   No results found.    Lab Values:    Lab Results   Component Value Date/Time    WBC 8.4 02/12/2025 07:31 AM    RBC 2.08 (L) 02/12/2025 07:31 AM    HEMOGLOBIN 6.6 (L) 02/12/2025 07:31 AM    HEMATOCRIT 20.6 (L) 02/12/2025 07:31 AM    HBA1C 5.8 (H) 01/24/2025 05:19 AM         Culture Results show:  No results found for  this or any previous visit (from the past 720 hours).    Pain Level/Medicated:  None, Tolerated without pain medication       INTERVENTIONS BY WOUND TEAM:  Chart and images reviewed. Discussed with bedside RN. All areas of concern (based on picture review, LDA review and discussion with bedside RN) have been thoroughly assessed. Documentation of areas based on significant findings. This RN in to assess patient. Performed standard wound care which includes appropriate positioning, dressing removal and non-selective debridement. Pictures and measurements obtained weekly if/when required.    Wound:  Toes  Cleansed/Non-selectively Debrided with:  Normal Saline and Gauze  Tory wound: Cleansed with Normal Saline and Gauze, Prepped with Open to Air  Primary Dressing:  betadine applied     Wound:  Sacrum  Preparation for Dressing removal: Removed without difficulty  Cleansed/Non-selectively Debrided with:  Normal Saline and Gauze  Tory wound: Cleansed with Normal Saline and Gauze, Prepped with No Sting  Primary Dressing:  thin hydrocolloid ordered and to be applied by bedside RN  Secondary (Outer) Dressing: sacral offloading dressing    Advanced Wound Care Discharge Planning  Number of Clinicians necessary to complete wound care: 1  Is patient requiring IV pain medications for dressing changes:  No   Length of time for dressing change 20 min. (This does not include chart review, pre-medication time, set up, clean up or time spent charting.)    Interdisciplinary consultation: Patient, Bedside RN (Nataliia),     EVALUATION / RATIONALE FOR TREATMENT:     Date:  02/12/25  Wound Status:  Initial evaluation    BL toes with areas of ischemic necrosis. Betadine applied to keep clean and dry. Patient sacrum with small POA stage 3 pressure injuries. Thin hydocolloid ordered to autolytically debride and provide moist occlusive wound healing environment. Continue with offloading measures. Patient able to turn self and educated on  turning and repositioning frequently.    BL heels pink and intact/         Goals: Steady decrease in wound area and depth weekly.    NURSING PLAN OF CARE ORDERS:  Dressing changes: See Dressing Care orders  Skin care: See Skin Care orders  RN Prevention Protocol    NUTRITION RECOMMENDATIONS   Wound Team Recommendations:  N/A    DIET ORDERS (From admission to next 24h)      Orders from this encounter       Start     Ordered    02/12/25 2359  Diet NPO Restrict to: Sips with Medications  AT MIDNIGHT      Question:  Diet NPO Restrict to:  Answer:  Sips with Medications    02/12/25 0951    02/12/25 1133  Supplements  2X A DAY        Comments: Chocolate please.   Question Answer Comment   Which Supplement Ensure    Ensure: Ensure Plus Carton        02/12/25 1133    02/11/25 1534  Diet Order Diet: 2 Gram Sodium  ALL MEALS        Question:  Diet:  Answer:  2 Gram Sodium    02/11/25 1541            Orders from suspended admission (INPATIENT REHAB - Mere Klein D.O.)       Start     Ordered    02/10/25 1439  [Order Hold - Suspended Admission]  Diet Order Diet: Level 6 - Soft and Bite Sized; Liquid level: Level 0 - Thin; Tray Modifications (optional): Small Meals  ALL MEALS   (On hold at INPATIENT REHAB since 02/11/25 1310)   Question Answer Comment   Diet: Level 6 - Soft and Bite Sized    Liquid level Level 0 - Thin    Tray Modifications (optional) Small Meals        02/10/25 1438    01/31/25 1044  [Order Hold - Suspended Admission]  Supplements  ONCE DAILY   (On hold at INPATIENT REHAB since 02/11/25 1310)   Comments: In between meals   Question Answer Comment   Which Supplement Nepro    Nepro: Carton        01/31/25 1043                    PREVENTATIVE INTERVENTIONS:    Q shift Jalen - performed per nursing policy  Q shift pressure point assessments - performed per nursing policy    Surface/Positioning  Standard/trauma mattress - Currently in Place  Reposition q 2 hours - self    Offloading/Redistribution  Sacral  offloading dressing (Silicone dressing) - Currently in Place    Anticipated discharge plans:  [facility]        Vac Discharge Needs:  Vac Discharge plan is purely a recommendation from wound team and not a requirement for discharge unless otherwise stated by physician.  Prevena in place to LLE at time of assessment

## 2025-02-12 NOTE — PROGRESS NOTES
4 Eyes Skin Assessment Completed by MANOJ Silva and MANOJ Gandhi.    Head WDL  Ears WDL  Nose WDL  Mouth WDL  Neck WDL  Breast/Chest WDL  Shoulder Blades WDL  Spine WDL  (R) Arm/Elbow/Hand Edema  (L) Arm/Elbow/Hand Edema  Abdomen WDL  Groin Scar  Scrotum/Coccyx/Buttocks Redness, Blanching, and Discoloration  (R) Leg Edema  (L) Leg Edema    (R) Heel/Foot/Toe Edema  (L) Heel/Foot/Toe Redness, Non-Blanching, and Ulcer(s)          Devices In Places Pulse Ox      Interventions In Place Sacral Mepilex, Pillows, Q2 Turns, Heels Loaded W/Pillows, and Pressure Redistribution Mattress    Possible Skin Injury Yes    Pictures Uploaded Into Epic No, needs to be completed  Wound Consult Placed Yes  RN Wound Prevention Protocol Ordered Yes

## 2025-02-12 NOTE — THERAPY
Physical Therapy Contact Note    Patient Name: Edy Bennett  Age:  79 y.o., Sex:  male  Medical Record #: [id number]  Today's Date: 2/12/2025 02/12/25 1415   Initial Contact Note    Initial Contact Note Order Received and Verified, Physical Therapy Evaluation in Progress with Full Report to Follow.   Interdisciplinary Plan of Care Collaboration   IDT Collaboration with  Nursing   Collaboration Comments PT order received.  Per chart, pt is pending a cholecystectomy for an obstructed cystic duct and change of dialysis access placement.  Per nurse, this will be either tomorrow or the next day.  Pt will follow up post surgery.   Session Information   Date / Session Number  2/12- pending surgery.  (EVAL)

## 2025-02-12 NOTE — CARE PLAN
The patient is Stable - Low risk of patient condition declining or worsening    Shift Goals  Clinical Goals: Peritoneal Dialysis, manage nausea, tolerate PO intake  Patient Goals: Sleep  Family Goals: Updates    Progress made toward(s) clinical / shift goals:    Problem: Skin Integrity  Goal: Skin integrity is maintained or improved  Outcome: Progressing  Note: Wound care completed, pt turns side to side independently, pressure ulcer healing on sacrum.      Problem: Safety  Goal: Will remain free from injury  Outcome: Progressing  Note: Pt educated on fall safety, fall precautions in place. Family also educated about preventing falls while in the hospital - including calling trained personnel to mobilize       Problem: Pain Management  Goal: Pain level will decrease to patient's comfort goal  Outcome: Progressing       Patient is not progressing towards the following goals:

## 2025-02-12 NOTE — CARE PLAN
The patient is Watcher - Medium risk of patient condition declining or worsening    Shift Goals  Clinical Goals: Tolerate PO intake, blood transfusion  Patient Goals: Rest, tolerate PO intake  Family Goals: Updates    Progress made toward(s) clinical / shift goals:       Problem: Knowledge Deficit - Standard  Goal: Patient and family/care givers will demonstrate understanding of plan of care, disease process/condition, diagnostic tests and medications  Outcome: Progressing  Note: A/Ox4, patient able to understand plan of care, all questions answered at this time. One unit PRBCs transfused today.      Problem: Skin Integrity  Goal: Skin integrity is maintained or improved  Outcome: Progressing  Note: Patient seen by wound team today, q2 turns initiated, sacral mepilex in place, pt continent of bowel and bladder.        Patient is not progressing towards the following goals:

## 2025-02-12 NOTE — PROGRESS NOTES
4 Eyes Skin Assessment Completed by MANOJ Chow and MANOJ Amador.    Head WDL  Ears Redness and Blanching  Nose WDL  Mouth WDL  Neck WDL  Breast/Chest WDL  Shoulder Blades WDL  Spine WDL  (R) Arm/Elbow/Hand Bruising  (L) Arm/Elbow/Hand Bruising  Abdomen Dialysis Port   Groin WDL  Scrotum/Coccyx/Buttocks Redness, Stage 2 DTI   (R) Leg Swelling, edema   (L) Leg Swelling and Edema, Prevena Wound vac   (R) Heel/Foot/Toe Ulcers on 3 toes, edema, swelling  (L) Heel/Foot/Toe Swelling, Scab, and Edema, ulcers on all 5 toes           Devices In Places Wound vac       Interventions In Place Sacral Mepilex, TAP System, Pillows, and Heels Loaded W/Pillows    Possible Skin Injury Yes    Pictures Uploaded Into Epic No, needs to be completed  Wound Consult Placed Yes  RN Wound Prevention Protocol Ordered Yes

## 2025-02-12 NOTE — ASSESSMENT & PLAN NOTE
Nephrology consulted for peritoneal dialysis  Patient refused surgical intervention and cardiac cath as he does not want to go back on hemodialysis at this time  Obtain labs and repeat a BMP in the morning

## 2025-02-12 NOTE — PROGRESS NOTES
[facility] Progress Notes     CCPD ordered by Dr.Robert Mehta. Disconnected aseptically from PD Cycler at 0800     Pt stable, VSS, alert and oriented.  Effluent clear, yellow and mild pinkish, no signs of fibrin clots.  No alarms on machine was noted or reported before disconnection.     24 hour UF= 1626mL (I.Drain= 359mL + Total UF= 1267mL - Last fill= 0mL)     Report given to MANOJ Conley.

## 2025-02-12 NOTE — ASSESSMENT & PLAN NOTE
Noted on imaging at [facility]  General surgery consulted, after multiple discussions patient refused surgery preferred

## 2025-02-12 NOTE — PROGRESS NOTES
[facility] Medicine Daily Progress Note    Date of Service  2/12/2025    Chief Complaint  Edy Bennett is a 79 y.o. male admitted 2/11/2025 with intractable nausea and vomiting     Hospital Course  79 y.o. male who presented 2/11/2025 with intractable nausea and vomiting.  Patient did receive a left lower extremity bypass, popliteal to tibial on 1/20, was being treated at [facility].  He began having nausea and vomiting as well as some right upper quadrant pain.  Workup eventually showed a obstruction of the cystic duct.  Because of this, patient was transferred here due to need for intervention.  Consulted GI, vascular surgery and general surgery as well as nephrology.  Patient is on chronic peritoneal dialysis.  Unfortunately, patient will need tunneled hemodialysis catheter placed if he goes to OR and will need hemodialysis for 2 weeks postop. Hopefully can leave the peritoneal dialysis catheter in.  Vascular surgery is okay with Plavix and Eliquis being held for surgical intervention and they will place a tunneled catheter.  No intervention needed or planned from GI.     Interval Problem Update  2/12 Vitals stable on room air   WBC 8.4, hemoglobin 6.6  -S/p 1 unit PRBC transfusion.  Patient tolerated transfusion well no signs of fluid overload or dyspnea, continue to monitor  -Discussed with nephrology, continue PD for now  -Discussed with general surgery, they are recommending cholecystectomy however patient is undecided.  Will keep patient n.p.o. at midnight for possible OR tomorrow if patient agreeable  -Discussed with vascular surgery, they will come and evaluate the patient as he still has Prevena wound VAC in place from surgery  -Long discussion with patient and his wife at bedside.  Patient reports since starting the antibiotics he feels significantly better no longer having the vomiting episodes was also having urinary urgency over at the rehab which has now resolved.  He is very hesitant to have  another surgery as he has had multiple recent surgeries and he is not enthused about going back on HD again as he just had his catheter taken out a week ago.  Discussed risk versus benefits of surgical intervention versus supportive care with antibiotic treatment, he would be at risk for having another acute event if his gallbladder is not removed as cystic duct is unable to be evacuated by GI.  Patient and his wife will talk about it some more and will let us know their decision tomorrow.    I have discussed this patient's plan of care and discharge plan at IDT rounds today with Case Management, Nursing, Nursing leadership, and other members of the IDT team.    Consultants/Specialty  general surgery, nephrology, and vascular surgery    Code Status  Full Code    Disposition  The patient is not medically cleared for discharge to home or a post-acute facility.  Anticipate discharge to: an inpatient rehabilitation hospital    I have placed the appropriate orders for post-discharge needs.    Review of Systems  Review of Systems   Constitutional:  Positive for malaise/fatigue. Negative for chills and fever.   Respiratory:  Negative for shortness of breath.    Cardiovascular:  Negative for chest pain.   Gastrointestinal:  Negative for abdominal pain, nausea and vomiting.   Genitourinary:  Negative for dysuria, frequency and urgency.   Musculoskeletal:  Negative for myalgias.   Skin:  Negative for rash.   Neurological:  Positive for weakness. Negative for dizziness and headaches.   Psychiatric/Behavioral:  Negative for depression.    All other systems reviewed and are negative.       Physical Exam  Temp:  [36.6 °C (97.8 °F)-37.2 °C (99 °F)] 36.6 °C (97.8 °F)  Pulse:  [78-88] 88  Resp:  [18-20] 18  BP: ()/(56-71) 121/67  SpO2:  [98 %-100 %] 98 %    Physical Exam  Vitals and nursing note reviewed. Exam conducted with a chaperone present (Wife at bedside).   Constitutional:       General: He is not in acute distress.      Appearance: He is ill-appearing (Chronically).   HENT:      Head: Normocephalic and atraumatic.      Mouth/Throat:      Pharynx: Oropharynx is clear.   Eyes:      Conjunctiva/sclera: Conjunctivae normal.   Cardiovascular:      Rate and Rhythm: Normal rate and regular rhythm.   Pulmonary:      Effort: Pulmonary effort is normal. No respiratory distress.      Breath sounds: Normal breath sounds. No wheezing.   Abdominal:      General: There is distension.      Palpations: Abdomen is soft.      Tenderness: There is no abdominal tenderness.      Comments: PD catheter in place   Musculoskeletal:         General: No tenderness.      Cervical back: Normal range of motion and neck supple.      Right lower leg: Edema present.      Left lower leg: Edema present.      Comments: L>R LE edema    Skin:     General: Skin is warm and dry.      Comments: Bilateral toes with ischemic necrosis  Provena wound VAC in place left lower extremity   Neurological:      General: No focal deficit present.      Mental Status: He is alert and oriented to person, place, and time.      Motor: Weakness present.   Psychiatric:         Mood and Affect: Mood is anxious.         Behavior: Behavior normal.         Thought Content: Thought content normal.         Fluids    Intake/Output Summary (Last 24 hours) at 2/12/2025 1553  Last data filed at 2/12/2025 0800  Gross per 24 hour   Intake --   Output 1676 ml   Net -1676 ml        Laboratory  Recent Labs     02/10/25  0638 02/12/25  0731   WBC 7.6 8.4   RBC 2.35* 2.08*   HEMOGLOBIN 7.4* 6.6*   HEMATOCRIT 23.0* 20.6*   MCV 97.9* 99.0*   MCH 31.5 31.7   MCHC 32.2* 32.0*   RDW 59.2* 59.3*   PLATELETCT 304 335   MPV 10.2 10.7     Recent Labs     02/12/25  0731   SODIUM 133*   POTASSIUM 4.0   CHLORIDE 91*   CO2 22   GLUCOSE 170*   BUN 99*   CREATININE 8.98*   CALCIUM 9.0                   Imaging  No orders to display        Assessment/Plan  * Obstruction of cystic duct- (present on admission)  Assessment &  Plan  Noted on imaging at [facility]  I have discussed the case with gastroenterology, no plans for intervention   I have also discussed the case with general surgery, plan for cholecystectomy  There is concern that the peritoneal dialysis catheter will need to be removed however general surgeon does believe that this can be done laparoscopically without much contamination and then hopefully will be able to leave the peritoneal dialysis catheter however he does state that patient will need hemodialysis with a tunneled catheter for 2 weeks post cholecystectomy  I have discussed case with vascular surgeon, they are okay with holding the Plavix and Eliquis for the amount of time surgery needs, they are also okay placing a tunneled catheter when the cholecystectomy is taking place    Patient still deciding if he is agreeable to surgery or not, n.p.o. at midnight in case of surgery tomorrow    PAD (peripheral artery disease) (HCC)- (present on admission)  Assessment & Plan  Patient is status post left lower extremity bypass, popliteal to tibial on 1/20  Was on Eliquis and Plavix  I have reached out to vascular surgery, they are okay with these medications being hold for surgery  They will also place a tunneled catheter  Patient still has Prevena wound VAC in place to the left extremity, discussed with vascular surgery who will reevaluate the patient today to see if this can be removed or if anyone needs to be placed    BPH (benign prostatic hyperplasia)- (present on admission)  Assessment & Plan  Continue home Flomax  Monitor urine output    Dyslipidemia- (present on admission)  Assessment & Plan  Continue home statin    Anemia in chronic renal disease- (present on admission)  Assessment & Plan  No sign of gross bleeding  Repeat CBC in the morning    Essential hypertension- (present on admission)  Assessment & Plan  Continue home amlodipine, metoprolol  Start as needed labetalol  Adjust as needed    ESRD on peritoneal  dialysis ([facility])- (present on admission)  Assessment & Plan  Nephrology consulted for peritoneal dialysis  I also discussed Dr. Zamarripa's plan of hemodialysis for 2 weeks post cholecystectomy, he is in agreement and is requesting tunneled catheter which will be placed by Dr. Miller  Obtain labs and repeat a BMP in the morning         VTE prophylaxis:   SCDs/TEDs   pharmacologic prophylaxis contraindicated due to surgery      I have performed a physical exam and reviewed and updated ROS and Plan today (2/12/2025). In review of yesterday's note (2/11/2025), there are no changes except as documented above.

## 2025-02-13 PROBLEM — K92.1 MELENA: Status: ACTIVE | Noted: 2025-02-13

## 2025-02-13 PROBLEM — I48.3 TYPICAL ATRIAL FLUTTER (HCC): Status: ACTIVE | Noted: 2025-02-13

## 2025-02-13 LAB
ERYTHROCYTE [DISTWIDTH] IN BLOOD BY AUTOMATED COUNT: 83.2 FL (ref 35.9–50)
HCT VFR BLD AUTO: 23.2 % (ref 42–52)
HCT VFR BLD AUTO: 23.2 % (ref 42–52)
HGB BLD-MCNC: 7.3 G/DL (ref 14–18)
HGB BLD-MCNC: 7.6 G/DL (ref 14–18)
MCH RBC QN AUTO: 29.7 PG (ref 27–33)
MCHC RBC AUTO-ENTMCNC: 31.5 G/DL (ref 32.3–36.5)
MCV RBC AUTO: 94.3 FL (ref 81.4–97.8)
PLATELET # BLD AUTO: 331 K/UL (ref 164–446)
PMV BLD AUTO: 10.3 FL (ref 9–12.9)
RBC # BLD AUTO: 2.46 M/UL (ref 4.7–6.1)
WBC # BLD AUTO: 9.5 K/UL (ref 4.8–10.8)

## 2025-02-13 PROCEDURE — 700102 HCHG RX REV CODE 250 W/ 637 OVERRIDE(OP): Performed by: INTERNAL MEDICINE

## 2025-02-13 PROCEDURE — A9270 NON-COVERED ITEM OR SERVICE: HCPCS | Performed by: INTERNAL MEDICINE

## 2025-02-13 PROCEDURE — 700111 HCHG RX REV CODE 636 W/ 250 OVERRIDE (IP): Performed by: INTERNAL MEDICINE

## 2025-02-13 PROCEDURE — 93005 ELECTROCARDIOGRAM TRACING: CPT | Mod: TC | Performed by: INTERNAL MEDICINE

## 2025-02-13 PROCEDURE — 90945 DIALYSIS ONE EVALUATION: CPT

## 2025-02-13 PROCEDURE — 99232 SBSQ HOSP IP/OBS MODERATE 35: CPT | Performed by: NURSE PRACTITIONER

## 2025-02-13 PROCEDURE — 36415 COLL VENOUS BLD VENIPUNCTURE: CPT

## 2025-02-13 PROCEDURE — 85014 HEMATOCRIT: CPT

## 2025-02-13 PROCEDURE — 700105 HCHG RX REV CODE 258: Performed by: INTERNAL MEDICINE

## 2025-02-13 PROCEDURE — 97605 NEG PRS WND THER DME<=50SQCM: CPT

## 2025-02-13 PROCEDURE — 770004 HCHG ROOM/CARE - ONCOLOGY PRIVATE *

## 2025-02-13 PROCEDURE — 85027 COMPLETE CBC AUTOMATED: CPT

## 2025-02-13 PROCEDURE — 85018 HEMOGLOBIN: CPT

## 2025-02-13 PROCEDURE — 99233 SBSQ HOSP IP/OBS HIGH 50: CPT | Performed by: INTERNAL MEDICINE

## 2025-02-13 RX ORDER — METOPROLOL TARTRATE 25 MG/1
12.5 TABLET, FILM COATED ORAL 2 TIMES DAILY
Status: DISCONTINUED | OUTPATIENT
Start: 2025-02-13 | End: 2025-02-15

## 2025-02-13 RX ORDER — PANTOPRAZOLE SODIUM 40 MG/10ML
40 INJECTION, POWDER, LYOPHILIZED, FOR SOLUTION INTRAVENOUS 2 TIMES DAILY
Status: DISCONTINUED | OUTPATIENT
Start: 2025-02-13 | End: 2025-02-15

## 2025-02-13 RX ORDER — CLOPIDOGREL BISULFATE 75 MG/1
75 TABLET ORAL DAILY
Status: DISCONTINUED | OUTPATIENT
Start: 2025-02-13 | End: 2025-02-18 | Stop reason: HOSPADM

## 2025-02-13 RX ORDER — MONTELUKAST SODIUM 10 MG/1
10 TABLET ORAL
Status: DISCONTINUED | OUTPATIENT
Start: 2025-02-13 | End: 2025-02-18 | Stop reason: HOSPADM

## 2025-02-13 RX ORDER — OMEPRAZOLE 20 MG/1
40 CAPSULE, DELAYED RELEASE ORAL DAILY
Status: DISCONTINUED | OUTPATIENT
Start: 2025-02-13 | End: 2025-02-13

## 2025-02-13 RX ADMIN — METRONIDAZOLE 500 MG: 500 INJECTION, SOLUTION INTRAVENOUS at 05:03

## 2025-02-13 RX ADMIN — PANTOPRAZOLE SODIUM 40 MG: 40 INJECTION, POWDER, FOR SOLUTION INTRAVENOUS at 10:30

## 2025-02-13 RX ADMIN — MONTELUKAST SODIUM 10 MG: 10 TABLET, FILM COATED ORAL at 21:21

## 2025-02-13 RX ADMIN — METRONIDAZOLE 500 MG: 500 INJECTION, SOLUTION INTRAVENOUS at 17:05

## 2025-02-13 RX ADMIN — ROSUVASTATIN CALCIUM 20 MG: 20 TABLET, FILM COATED ORAL at 21:21

## 2025-02-13 RX ADMIN — METOPROLOL TARTRATE 12.5 MG: 25 TABLET, FILM COATED ORAL at 18:05

## 2025-02-13 RX ADMIN — GENTAMICIN SULFATE: 1 CREAM TOPICAL at 18:55

## 2025-02-13 RX ADMIN — CEFTRIAXONE SODIUM 1000 MG: 10 INJECTION, POWDER, FOR SOLUTION INTRAVENOUS at 17:04

## 2025-02-13 RX ADMIN — PANTOPRAZOLE SODIUM 40 MG: 40 INJECTION, POWDER, FOR SOLUTION INTRAVENOUS at 17:04

## 2025-02-13 ASSESSMENT — ENCOUNTER SYMPTOMS
VOMITING: 1
WEAKNESS: 1
FEVER: 0
NAUSEA: 0
HEADACHES: 0
DIZZINESS: 0
CHILLS: 0
DEPRESSION: 0
SHORTNESS OF BREATH: 0
ABDOMINAL PAIN: 0
MYALGIAS: 0

## 2025-02-13 ASSESSMENT — PAIN DESCRIPTION - PAIN TYPE
TYPE: ACUTE PAIN
TYPE: ACUTE PAIN

## 2025-02-13 NOTE — DISCHARGE PLANNING
[facility] Transitional Care Coordination    Referral from:  Dr. Crystal Valle  Insurance Provider on Facesheet:  Medicare/  Potential Rehab diagnosis:      Chart review indicates patient has ongoing medical management and may have therapy needs to possibly meet inpatient rehab facility criteria with the goal of returning to community.      D/C Support:  Spouse    Physiatry consult pended, waiting for additional information.  Out acute from [facility] 2 midnights.  Would welcome PT/OT as clinically appropriate.  TCC will follow.  Please reach out sooner if PMR consult requested for medical management.     Last Covid test:    Thank you for the referral.

## 2025-02-13 NOTE — ASSESSMENT & PLAN NOTE
Noted 2/13, could be due to iron replacement  PPI twice daily  Trend H&H q8H   Consulted GI   -Status post EGD 2/14, concern for Stapleton's biopsies pending  Restart Eliquis, Plavix

## 2025-02-13 NOTE — PROGRESS NOTES
[facility] Medicine Daily Progress Note    Date of Service  2/13/2025    Chief Complaint  Edy Bennett is a 79 y.o. male admitted 2/11/2025 with intractable nausea and vomiting     Hospital Course  79 y.o. male who presented 2/11/2025 with intractable nausea and vomiting.  Patient did receive a left lower extremity bypass, popliteal to tibial on 1/20, was being treated at [facility].  He began having nausea and vomiting as well as some right upper quadrant pain.  Workup eventually showed a obstruction of the cystic duct.  Because of this, patient was transferred here due to need for intervention.  Consulted GI, vascular surgery and general surgery as well as nephrology.  Patient is on chronic peritoneal dialysis.  Unfortunately, patient will need tunneled hemodialysis catheter placed if he goes to OR and will need hemodialysis for 2 weeks postop. Hopefully can leave the peritoneal dialysis catheter in.  Vascular surgery is okay with Plavix and Eliquis being held for surgical intervention and they will place a tunneled catheter.  No intervention needed or planned from GI.     Interval Problem Update  2/12 Vitals stable on room air   WBC 8.4, hemoglobin 6.6  -S/p 1 unit PRBC transfusion.  Patient tolerated transfusion well no signs of fluid overload or dyspnea, continue to monitor  -Discussed with nephrology, continue PD for now  -Discussed with general surgery, they are recommending cholecystectomy however patient is undecided.  Will keep patient n.p.o. at midnight for possible OR tomorrow if patient agreeable  -Discussed with vascular surgery, they will come and evaluate the patient as he still has Prevena wound VAC in place from surgery  -Long discussion with patient and his wife at bedside.  Patient reports since starting the antibiotics he feels significantly better no longer having the vomiting episodes was also having urinary urgency over at the rehab which has now resolved.  He is very hesitant to have  another surgery as he has had multiple recent surgeries and he is not enthused about going back on HD again as he just had his catheter taken out a week ago.  Discussed risk versus benefits of surgical intervention versus supportive care with antibiotic treatment, he would be at risk for having another acute event if his gallbladder is not removed as cystic duct is unable to be evacuated by GI.  Patient and his wife will talk about it some more and will let us know their decision tomorrow.    2/13 No acute events overnight   Hemoglobin 7.3 post transfusion yesterday   Discussed with surgery, patient would prefer non operative treatment  Notified by nursing that patient had black stool yesterday evening as well as this morning.  Patient does report he has been having difficulty controlling his bowel movements when he urinates he also defecates which has not happened previously.  He was on iron supplementation previously.  Continue to hold Plavix and Eliquis for now.  Start IV PPI twice daily  Discussed with gastroenterology, plan for EGD tomorrow.  Lengthy discussion with GI, myself, patient and his wife. Concerned that patient's vomiting is an anginal equivalent as he would vomit in the evenings about 5 hours after eating lunch without preceding abdominal pain or nausea and usually with exercise.  EKG pending.  On review of prior imaging studies patient has not had an echo previously, will check echocardiogram.  Also noted on chart review that patient was on scopolamine patch at rehab I suspect this may be the cause of his urinary retention, patient's wife does report that this was started at the same time the urinary retention started.  I have advised him against using this for vomiting in the future.    I have discussed this patient's plan of care and discharge plan at IDT rounds today with Case Management, Nursing, Nursing leadership, and other members of the IDT team.    Consultants/Specialty  general surgery,  nephrology, and vascular surgery  Gastroenterology    Code Status  Full Code    Disposition  The patient is not medically cleared for discharge to home or a post-acute facility.  Anticipate discharge to: skilled nursing facility    I have placed the appropriate orders for post-discharge needs.    Review of Systems  Review of Systems   Constitutional:  Positive for malaise/fatigue. Negative for chills and fever.   Respiratory:  Negative for shortness of breath.    Cardiovascular:  Negative for chest pain.   Gastrointestinal:  Positive for melena and vomiting. Negative for abdominal pain and nausea.   Genitourinary:  Negative for dysuria, frequency and urgency.   Musculoskeletal:  Negative for myalgias.   Skin:  Negative for rash.   Neurological:  Positive for weakness. Negative for dizziness and headaches.   Psychiatric/Behavioral:  Negative for depression.    All other systems reviewed and are negative.       Physical Exam  Temp:  [36.6 °C (97.8 °F)-36.8 °C (98.3 °F)] 36.6 °C (97.9 °F)  Pulse:  [] 110  Resp:  [14-18] 18  BP: (115-131)/(67-78) 120/72  SpO2:  [98 %-100 %] 99 %    Physical Exam  Vitals and nursing note reviewed. Exam conducted with a chaperone present (Wife at bedside).   Constitutional:       General: He is not in acute distress.     Appearance: He is ill-appearing (Chronically).   HENT:      Head: Normocephalic and atraumatic.      Mouth/Throat:      Pharynx: Oropharynx is clear.   Eyes:      Conjunctiva/sclera: Conjunctivae normal.   Cardiovascular:      Rate and Rhythm: Normal rate and regular rhythm.   Pulmonary:      Effort: Pulmonary effort is normal. No respiratory distress.      Breath sounds: Normal breath sounds. No wheezing.   Abdominal:      General: There is distension.      Palpations: Abdomen is soft.      Tenderness: There is no abdominal tenderness.      Comments: PD catheter in place   Musculoskeletal:         General: No tenderness.      Cervical back: Normal range of motion  and neck supple.      Right lower leg: Edema present.      Left lower leg: Edema present.      Comments: L>R LE edema    Skin:     General: Skin is warm and dry.      Comments: Bilateral toes with ischemic necrosis   Neurological:      General: No focal deficit present.      Mental Status: He is alert and oriented to person, place, and time.      Motor: Weakness present.   Psychiatric:         Mood and Affect: Mood is anxious.         Behavior: Behavior normal.         Thought Content: Thought content normal.         Fluids    Intake/Output Summary (Last 24 hours) at 2/13/2025 1507  Last data filed at 2/13/2025 0900  Gross per 24 hour   Intake 44 ml   Output 853 ml   Net -809 ml        Laboratory  Recent Labs     02/12/25  0731 02/13/25  0741   WBC 8.4 9.5   RBC 2.08* 2.46*   HEMOGLOBIN 6.6* 7.3*   HEMATOCRIT 20.6* 23.2*   MCV 99.0* 94.3   MCH 31.7 29.7   MCHC 32.0* 31.5*   RDW 59.3* 83.2*   PLATELETCT 335 331   MPV 10.7 10.3     Recent Labs     02/12/25  0731   SODIUM 133*   POTASSIUM 4.0   CHLORIDE 91*   CO2 22   GLUCOSE 170*   BUN 99*   CREATININE 8.98*   CALCIUM 9.0                   Imaging  EC-ECHOCARDIOGRAM COMPLETE W/O CONT    (Results Pending)        Assessment/Plan  * Obstruction of cystic duct- (present on admission)  Assessment & Plan  Noted on imaging at [facility]  General surgery consulted, after multiple discussions patient refused surgery preferred     Melena  Assessment & Plan  Noted 2/13, could be due to iron replacement  Start IV PPI   Trend H&H q8H   Consulted GI   -ok for diet today, n.p.o. at midnight for EGD tomorrow    PAD (peripheral artery disease) ([facility])- (present on admission)  Assessment & Plan  Patient is status post left lower extremity bypass, popliteal to tibial on 1/20  Was on Eliquis and Plavix  I have reached out to vascular surgery, they are okay with these medications being hold for surgery  They will also place a tunneled catheter  Vascular surgery saw the patient removed his  wound VAC    BPH (benign prostatic hyperplasia)- (present on admission)  Assessment & Plan  Continue home Flomax  Monitor urine output    Dyslipidemia- (present on admission)  Assessment & Plan  Continue home statin    Anemia in chronic renal disease- (present on admission)  Assessment & Plan  No sign of gross bleeding  Repeat CBC in the morning    Essential hypertension- (present on admission)  Assessment & Plan  Continue home amlodipine, metoprolol  Start as needed labetalol  Adjust as needed    ESRD on peritoneal dialysis (HCC)- (present on admission)  Assessment & Plan  Nephrology consulted for peritoneal dialysis  I also discussed Dr. Zamarripa's plan of hemodialysis for 2 weeks post cholecystectomy, he is in agreement and is requesting tunneled catheter which will be placed by Dr. Miller  Obtain labs and repeat a BMP in the morning         VTE prophylaxis:   SCDs/TEDs      I have performed a physical exam and reviewed and updated ROS and Plan today (2/13/2025). In review of yesterday's note (2/12/2025), there are no changes except as documented above.

## 2025-02-13 NOTE — PROGRESS NOTES
4 Eyes Skin Assessment Completed by Nataliia RN and MANOJ Gandhi.    Head WDL  Ears WDL  Nose WDL  Mouth WDL  Neck WDL  Breast/Chest WDL  Shoulder Blades WDL  Spine WDL  (R) Arm/Elbow/Hand Bruising  (L) Arm/Elbow/Hand Bruising  Abdomen WDL PD catheter dressing CDI  Groin WDL  Scrotum/Coccyx/Buttocks Redness and Discoloration slow to jackie   (R) Leg Redness and Scar  (L) Leg Redness and Blanching  (R) Heel/Foot/Toe Redness, Blanching, Non-Blanching, Discoloration, Bruising, and Scab black toes   (L) Heel/Foot/Toe Redness, Blanching, Non-Blanching, Discoloration, Bruising, and Scab black toes          Devices In Places AV fistula on L forearm, scar on inside of L leg, abdominal PD catheter       Interventions In Place Sacral Mepilex, TAP System, Pillows, and Dri-Galindo Pads    Possible Skin Injury Yes    Pictures Uploaded Into Epic Yes  Wound Consult Placed Yes  RN Wound Prevention Protocol Ordered Yes

## 2025-02-13 NOTE — PROGRESS NOTES
Pt aseptically connected to [facility] at 1815 using 2.5% pd solution x 2 bags to run for 8.5 hrs. Initial drain of 0 ml. Vital signs stable. Dressing changed, no s/s of infection noted. Gave in service to Alyssa William RN.

## 2025-02-13 NOTE — WOUND TEAM
[facility]  Inpatient Services  Wound and Skin Care Brief Evaluation    Admission Date: 2/11/2025     Last order of IP CONSULT TO WOUND CARE was found on 2/11/2025 from Hospital Encounter on 2/11/2025     HPI, PMH, SH: Reviewed    No chief complaint on file.    Diagnosis: Obstruction of cystic duct [K82.0]    Unit where seen by Wound Team: [id number]     Wound consult placed regarding LLE prevena placement per Dr. Miller's request. Chart and images reviewed. This clinician in to assess patient. Patient pleasant and agreeable. Customizable prevena placed, no leaks detected. Offloading adhesive foam then place around heel.    NOTE: Prevena wound vacs stay in place over a closed incision for 7 days, at day 7 the prevena wound vac machine will stop functioning. Changing batteries will not make machine turn back on. At that time prevena can be removed and discarded or stay in place as there is a white layer beneath the foam that is imbedded with silver.      Wound consult completed. Wound team following peripherally for any questions or if additional assistance needed with prevena dressing.    Wound 01/20/25 Incision Leg Left Incision (Active)   Date First Assessed/Time First Assessed: 01/20/25 1648   Present on Original Admission: Yes  Primary Wound Type: Incision  Location: Leg  Laterality: Left  Wound Description (Comments): Incision      Assessments 2/13/2025  3:00 PM   Wound Image      Site Assessment Red;Slough;Scabbed   Periwound Assessment Pink   Margins Defined edges;Attached edges   Closure Secondary intention   Drainage Amount None   Treatments Cleansed   Periwound Protectant Skin Protectant Wipes to Periwound   Dressing Status Clean;Dry;Intact   Dressing Changed New   Dressing Cleansing/Solutions Not Applicable   Dressing Options Wound Vac   Dressing Change/Treatment Frequency Weekly, and As Needed   NEXT Dressing Change/Treatment Date 02/20/25   NEXT Weekly Photo (Inpatient Only) 02/20/25    Wound Team Following Other (comment)   Shape Incision   Wound Odor None   WOUND NURSE ONLY - Time Spent with Patient (mins) 45       Negative Pressure Wound Therapy 02/13/25 Surgical Leg Left (Active)   Placement Date/Time: 02/13/25 1533   Present on Original Admission: No  Wound Type: Surgical  Location: Leg  Laterality: Left      Assessments 2/13/2025  3:00 PM   NPWT Pump Mode / Pressure Setting Prevena Plus   Dressing Type Purple Foam (Prevena)   Number of Foam Pieces Used 3   Canister Changed Yes       PREVENTATIVE INTERVENTIONS:    Q shift Jalen - performed per nursing policy  Q shift pressure point assessments - performed per nursing policy    Surface/Positioning  Low Airloss - Currently in Place

## 2025-02-13 NOTE — PROGRESS NOTES
"  DATE: 2/13/2025    This is a 79 y.o. male who was transferred from our local Rehab with 10-12 days of nausea and vomiting.  A HIDA was obtained and showed an obstructed cystic duct.     INTERVAL EVENTS:    Tolerated breakfast and lunch yesterday.  Small amount of emesis after dinner.  Overall feeling better today.  No right upper quadrant tenderness.    REVIEW OF SYSTEMS:  Comprehensive review of systems is negative with the exception of the aforementioned HPI, PMH, and PSH bullets in accordance with CMS guidelines.    PHYSICAL EXAMINATION:  Vital Signs: /67   Pulse 62   Temp 36.8 °C (98.3 °F) (Oral)   Resp 14   Ht 1.707 m (5' 7.21\")   Wt 70.9 kg (156 lb 4.9 oz)   SpO2 100%   Physical Exam  Vitals and nursing note reviewed.   Constitutional:       General: He is not in acute distress.     Comments: Deconditioned.  Chronically ill in appearance.   HENT:      Head: Normocephalic.   Cardiovascular:      Rate and Rhythm: Normal rate.      Pulses: Normal pulses.   Pulmonary:      Effort: No respiratory distress.   Chest:      Chest wall: No tenderness.   Abdominal:      General: There is no distension.      Tenderness: There is no abdominal tenderness. There is no guarding or rebound.      Comments: Peritoneal dialysis catheter.   Skin:     General: Skin is warm.      Capillary Refill: Capillary refill takes less than 2 seconds.   Neurological:      Mental Status: He is alert.      Comments: Conversant         LABORATORY VALUES:  Recent Labs     02/12/25  0731   WBC 8.4   RBC 2.08*   HEMOGLOBIN 6.6*   HEMATOCRIT 20.6*   MCV 99.0*   MCH 31.7   MCHC 32.0*   RDW 59.3*   PLATELETCT 335   MPV 10.7     Recent Labs     02/12/25  0731   SODIUM 133*   POTASSIUM 4.0   CHLORIDE 91*   CO2 22   GLUCOSE 170*   BUN 99*   CREATININE 8.98*   CALCIUM 9.0     Recent Labs     02/12/25  0731   ASTSGOT 30   ALTSGPT 22   TBILIRUBIN 0.5   ALKPHOSPHAT 91   GLOBULIN 2.9           IMAGING:  No orders to display       ASSESSMENT AND " PLAN:    Patient seen at bedside with wife.  Patient is currently asymptomatic.  They did talk with vascular surgery yesterday and reported Dr. Miller recommended nonoperative management.  Given their conversation with Dr. Miller, that he is markedly feeling better, and tolerating an oral diet they are refusing surgical intervention.  Okay to resume Eliquis and Plavix from surgical perspective.  ACS Medina will sign off at this time.  Please reconsult should the need arise.  Thank you for allowing us to participate in this patient's care       ____________________________________     FLOR Paige    DD: 2/13/2025  8:09 AM

## 2025-02-13 NOTE — CONSULTS
GASTROENTEROLOGY CONSULTATION    PATIENT NAME: Edy Bennett  : 1945  CSN: [id number]  MRN:  [id number]     CONSULTATION DATE:  2025    PRIMARY CARE PROVIDER:  Denise Carver M.D.      REASON FOR CONSULT:  Vomiting  Consult requested by Dr. Crystal Valle    HISTORY OF PRESENT ILLNESS:  Edy Bennett is a 79 y.o. male who presented 2025 with persistent vomiting.  Patient underwent  a left lower extremity bypass, popliteal to tibial on 2025 and  was being treated at [facility].  He began having vomiting as well as some right upper quadrant pain.  Workup eventually showed a obstruction of the cystic duct.  Because of this, patient was transferred here due to need for surgical intervention.  Surgery agreed cholecystectomy needed, but patient is refusing. This morning and last night patient had formed stool but it was black. He is on iron, but his hemoglobin dropped a point. He did get a unit of PRBC's. Patient is on chronic peritoneal dialysis.  Unfortunately, patient will need tunneled hemodialysis catheter placed if he goes to OR and will need hemodialysis for 2 weeks postop and the patient does not want to do this as he already did it for previous surgery and he feels better with PD.. Hopefully can leave the peritoneal dialysis catheter in.  Vascular surgery is okay with Plavix and Eliquis.   We had a long discussion with patient and his wife at bedside. Patient usually throws up after PT around 4 pm. He does not get nauseated. He does not have chest pain. He usually has lunch around 1130 am.  Patient reports since starting the antibiotics he feels significantly better. He does sometimes have decreased appetite after looking at food.   PAST MEDICAL HISTORY:  Past Medical History:   Diagnosis Date    A-V fistula (HCC)     Left Arm    Arrhythmia 2024    Due to kidney failure treatment    Breath shortness     with exertion    Cataract     shon iol    Congestive heart failure  ([facility])     Diabetes ([facility])     not on any medication at this time, being monitored at dialysis    Dialysis patient ([facility])     monday wednesday friday at [facility]    Dialysis patient ([facility]) 01/17/2025    Starting Peritoneal Dialysis @ Home on 1/17/2025. Follows Nephrologist MD Milian.    Foot ulcer ([facility])     left foot. being seen by wound care    Hemorrhagic disorder ([facility])     plavix eliquis    High cholesterol     Hypertension 05/24/2024    states controlled    DARIEN (obstructive sleep apnea) 01/17/2025    O2 @ 2.5L Per Patient's Wife.    Pain     Peritoneal dialysis catheter in place ([facility]) 01/17/2025    Pneumonia     3/24    Renal disorder     Sleep apnea 01/10/2025    Home sleep study done 1/3/2025 - no results yet.    Stroke ([facility]) 2021    tia       PAST SURGICAL HISTORY:  Past Surgical History:   Procedure Laterality Date    MT VEIN BYPASS GRAFT,FEM-TIBIAL Left 1/20/2025    Procedure: LEFT LOWER EXTREMITY BYPASS, POPLITIAL TO TIBIAL BYPASS;  Surgeon: Marco Antonio Miller M.D.;  Location: SURGERY [facility];  Service: Vascular    AORTOGRAM WITH RUNOFF  1/14/2025    Procedure: AORTOGRAM WITH RUNOFF, right peroneal artery intravascular lithotripsy and stent placement;  Surgeon: Marco Antonio Miller M.D.;  Location: SURGERY [facility];  Service: Vascular    INGUINAL HERNIA LAPAROSCOPIC N/A 12/18/2024    Procedure: LAPAROSCOPIC LEFT INGUINAL HERNIA REPAIR WITH MESH, LAPAROSCOPIC REPOSITIONING OF PERITONEAL DIALYSIS CATHETER;  Surgeon: Marco Antonio Miller M.D.;  Location: SURGERY [facility];  Service: Vascular    CATH PLACEMENT CAPD N/A 12/18/2024    Procedure: REPOSITION, CATHETER, CAPD;  Surgeon: Marco Antonio Miller M.D.;  Location: SURGERY [facility];  Service: Vascular    CATH PLACEMENT CAPD N/A 09/16/2024    Procedure: LAPAROSCOPIC INSERTION OF PERITONEAL DIALYSIS CATHETER PLACEMENT;  Surgeon: Marco Antonio Miller M.D.;  Location: SURGERY SAME DAY [facility];  Service: Vascular    AV FISTULA CREATION Left  07/01/2024    Procedure: CREATION OF LEFT UPPER EXTREMITY DIALYSIS FISTULA;  Surgeon: Shilo Mercedes M.D.;  Location: SURGERY [facility];  Service: General    OTHER ORTHOPEDIC SURGERY  2004    bunion r foot    OTHER      tonsils adenoids    OTHER      dialysis catheter in upper right chest    OTHER      shon iol    OTHER CARDIAC SURGERY      cardiac stents        CURRENT MEDS:  Current Facility-Administered Medications   Medication Dose Route Frequency Provider Last Rate Last Admin    [Held by provider] apixaban (Eliquis) tablet 5 mg  5 mg Oral BID MEERA RobO.        [Held by provider] clopidogrel (Plavix) tablet 75 mg  75 mg Oral DAILY Crystal Valle D.O.        montelukast (Singulair) tablet 10 mg  10 mg Oral QHS Crystal Valle D.O.        pantoprazole (Protonix) injection 40 mg  40 mg Intravenous BID MEERA RobOSaima   40 mg at 02/13/25 1030    gentamicin (Garamycin) 0.1 % cream   Topical PRN Crystal Valle D.O.        rosuvastatin (Crestor) tablet 20 mg  20 mg Oral QHS MEERA MuseO.   20 mg at 02/12/25 2049    sevelamer carbonate (Renvela) tablet 800 mg  800 mg Oral QDAY PRN Zach Knight D.O.        acetaminophen (Tylenol) tablet 650 mg  650 mg Oral Q6HRS PRN Zach Knight D.O.        Pharmacy Consult Request ...Pain Management Review 1 Each  1 Each Other PHARMACY TO DOSE Zach Knight D.O.        oxyCODONE immediate-release (Roxicodone) tablet 2.5 mg  2.5 mg Oral Q3HRS PRN Zach Knight D.O.        Or    oxyCODONE immediate-release (Roxicodone) tablet 5 mg  5 mg Oral Q3HRS PRN Zach Knight D.O.        Or    morphine 4 MG/ML injection 1 mg  1 mg Intravenous Q4HRS PRN Zach Knight D.O.        labetalol (Normodyne/Trandate) injection 10 mg  10 mg Intravenous Q4HRS PRN Zach Knight D.O.        ondansetron (Zofran) syringe/vial injection 4 mg  4 mg Intravenous Q4HRS PRN MEERA MuseO.   4 mg at 02/12/25 2040    Or    ondansetron (Zofran ODT) dispertab 4 mg   4 mg Oral Q4HRS PRN Zach Knight D.O.        haloperidol lactate (Haldol) injection 1 mg  1 mg Intravenous Q3HRS PRN Zach Knight D.O.        methocarbamol (Robaxin) tablet 250 mg  250 mg Oral 4X/DAY PRN Zach Knight D.O.        cefTRIAXone (Rocephin) syringe 1,000 mg  1,000 mg Intravenous Q24HRS Zach Knight D.O.   1,000 mg at 02/12/25 1831    metroNIDAZOLE (Flagyl) IVPB 500 mg  500 mg Intravenous Q12HRS Zach Knight D.O.   Stopped at 02/13/25 0603     Facility-Administered Medications Ordered in Other Encounters   Medication Dose Route Frequency Provider Last Rate Last Admin    [MAR Hold - Suspended Admission] simethicone (Mylicon) chewable tablet 125 mg  125 mg Oral TID Daniel Madrigal M.D.   125 mg at 02/11/25 0849    [MAR Hold - Suspended Admission] midodrine (Proamatine) tablet 15 mg  15 mg Oral TID WITH MEALS Mere Klein D.O.   15 mg at 02/11/25 0848    [MAR Hold - Suspended Admission] scopolamine (Transderm-Scop) patch 1 Patch  1 Patch Transdermal Q72HRS Daniel Madrigal M.D.   1 Patch at 02/09/25 1728    [MAR Hold - Suspended Admission] simethicone (Mylicon) chewable tablet 125 mg  125 mg Oral TID PRN Daniel Madrigal M.D.        [MAR Hold - Suspended Admission] ondansetron (Zofran ODT) dispertab 4 mg  4 mg Oral BID WITH MEALS Mere Klein D.O.   4 mg at 02/10/25 1649    [MAR Hold - Suspended Admission] formulation r (Preparation H) 0.25-14-74.9 % ointment   Rectal PRN Mere Klein D.O.        [MAR Hold - Suspended Admission] torsemide (Demadex) tablet 10 mg  10 mg Oral Q DAY Mere Klein D.O.        [MAR Hold - Suspended Admission] potassium chloride SA (Kdur) tablet 20 mEq  20 mEq Oral DAILY IDA Marte   20 mEq at 02/11/25 0849    [MAR Hold - Suspended Admission] bisacodyl (Dulcolax) suppository 10 mg  10 mg Rectal QDAY PRN MEERA EnriqueO.   10 mg at 02/05/25 1021    [MAR Hold - Suspended Admission] oxyCODONE immediate-release (Roxicodone)  tablet 5 mg  5 mg Oral Q4HRS PRN Mere Klein D.O.   5 mg at 02/08/25 1712    Or    [MAR Hold - Suspended Admission] oxyCODONE immediate release (Roxicodone) tablet 10 mg  10 mg Oral Q4HRS PRN Mere Klein D.O.        [MAR Hold - Suspended Admission] methocarbamol (Robaxin) tablet 250 mg  250 mg Oral 4X/DAY PRN Mere Klein D.O.        [MAR Hold - Suspended Admission] omeprazole (PriLOSEC) capsule 40 mg  40 mg Oral DAILY Erica King A.P.R.N.   40 mg at 02/11/25 0848    [MAR Hold - Suspended Admission] lanthanum carbonate (Fosrenol) 500 MG chewable tablet CHEW 500 mg  500 mg Oral TID WITH MEALS RAMOS Powell.P.R.N.   500 mg at 02/11/25 0849    [MAR Hold - Suspended Admission] epoetin (Retacrit) injection 4,000 Units  4,000 Units Subcutaneous Q7 DAYS Erica King A.P.R.N.   4,000 Units at 02/08/25 1608    [MAR Hold - Suspended Admission] sevelamer carbonate (Renvela) tablet 800 mg  800 mg Oral PRN Edelmira Kerr A.P.R.N.   800 mg at 02/01/25 2122    [MAR Hold - Suspended Admission] Pharmacy Consult Request ...Pain Management Review 1 Each  1 Each Other PHARMACY TO DOSE Mere Klein D.O.        [MAR Hold - Suspended Admission] lidocaine (Asperflex) 4 % patch 1-2 Patch  1-2 Patch Transdermal Q24HRS PRN Mere Klein D.O.        [MAR Hold - Suspended Admission] hydrALAZINE (Apresoline) tablet 25 mg  25 mg Oral Q8HRS PRN Mere Klein D.O.        [MAR Hold - Suspended Admission] lactulose 20 GM/30ML solution 30 mL  30 mL Oral QDAY PRN Mere Klein D.O.        [MAR Hold - Suspended Admission] bisacodyl EC (Dulcolax) tablet 5 mg  5 mg Oral QDAY PRN Mere Klein D.O.        [MAR Hold - Suspended Admission] magnesium hydroxide (Milk Of Magnesia) suspension 30 mL  30 mL Oral QDAY PRN Mere Klein D.O.   30 mL at 02/01/25 0759    [MAR Hold - Suspended Admission] carboxymethylcellulose (Refresh Tears) 0.5 % ophthalmic drops 1 Drop  1 Drop Both Eyes PRN  Mere Klein D.O.        [MAR Hold - Suspended Admission] benzocaine-menthol (Cepacol) lozenge 1 Lozenge  1 Lozenge Mouth/Throat Q2HRS PRN Mere Klein D.O.        [MAR Hold - Suspended Admission] mag hydrox-al hydrox-simeth (Maalox Plus Es Or Mylanta Ds) suspension 20 mL  20 mL Oral Q2HRS PRN Mere Klein D.O.        [MAR Hold - Suspended Admission] ondansetron (Zofran ODT) dispertab 4 mg  4 mg Oral 4X/DAY PRN Mere Klein D.O.   4 mg at 02/09/25 1537    Or    [MAR Hold - Suspended Admission] ondansetron (Zofran) syringe/vial injection 4 mg  4 mg Intramuscular 4X/DAY PRN Mere Klein D.O.        [MAR Hold - Suspended Admission] traZODone (Desyrel) tablet 50 mg  50 mg Oral QHS PRN Mere Klein D.O.        [MAR Hold - Suspended Admission] sodium chloride (Ocean) 0.65 % nasal spray 2 Spray  2 Spray Nasal PRN Mere Klein D.O.        [MAR Hold - Suspended Admission] apixaban (Eliquis) tablet 5 mg  5 mg Oral BID Mere Klein D.O.   5 mg at 02/11/25 0848    [MAR Hold - Suspended Admission] clopidogrel (Plavix) tablet 75 mg  75 mg Oral DAILY MEERA EnriqueOSaima   75 mg at 02/11/25 0849    [MAR Hold - Suspended Admission] montelukast (Singulair) tablet 10 mg  10 mg Oral QHS MEERA EnriqueO.   10 mg at 02/10/25 2126    [MAR Hold - Suspended Admission] rosuvastatin (Crestor) tablet 20 mg  20 mg Oral QHS MEERA EnriqueO.   20 mg at 02/10/25 2126    [MAR Hold - Suspended Admission] acetaminophen (Tylenol) tablet 650 mg  650 mg Oral Q4HRS PRN Mere Klein D.O.        [MAR Hold - Suspended Admission] senna-docusate (Pericolace Or Senokot S) 8.6-50 MG per tablet 2 Tablet  2 Tablet Oral BID Mere Klein D.O.   2 Tablet at 02/11/25 0848    And    [MAR Hold - Suspended Admission] polyethylene glycol/lytes (Miralax) Packet 1 Packet  1 Packet Oral QDAY PRN Mere Klein D.O.   1 Packet at 02/01/25 0538    [MAR Hold - Suspended Admission] gentamicin  (Garamycin) 0.1 % cream   Topical DAILY AT 1800 Mere Klein D.O.   Given at 02/10/25 1935        ALLERGIES:  No Known Allergies    SOCIAL HISTORY:  Social History     Socioeconomic History    Marital status:      Spouse name: Not on file    Number of children: Not on file    Years of education: Not on file    Highest education level: Bachelor's degree (e.g., BA, AB, BS)   Occupational History    Not on file   Tobacco Use    Smoking status: Never    Smokeless tobacco: Never    Tobacco comments:     Used chewing tobacco many years ago.   Vaping Use    Vaping status: Never Used   Substance and Sexual Activity    Alcohol use: Not Currently    Drug use: Never    Sexual activity: Not on file   Other Topics Concern    Not on file   Social History Narrative    Not on file     Social Drivers of Health     Financial Resource Strain: Low Risk  (1/25/2025)    Overall Financial Resource Strain (CARDIA)     Difficulty of Paying Living Expenses: Not hard at all   Food Insecurity: No Food Insecurity (2/11/2025)    Hunger Vital Sign     Worried About Running Out of Food in the Last Year: Never true     Ran Out of Food in the Last Year: Never true   Transportation Needs: No Transportation Needs (2/11/2025)    PRAPARE - Transportation     Lack of Transportation (Medical): No     Lack of Transportation (Non-Medical): No   Physical Activity: Inactive (1/25/2025)    Exercise Vital Sign     Days of Exercise per Week: 0 days     Minutes of Exercise per Session: 0 min   Stress: Stress Concern Present (1/25/2025)    [employer] [place] [employer] - Occupational Stress Questionnaire     Feeling of Stress : To some extent   Social Connections: Unknown (1/25/2025)    Social Connection and Isolation Panel [NHANES]     Frequency of Communication with Friends and Family: More than three times a week     Frequency of Social Gatherings with Friends and Family: Twice a week     Attends Denominational Services: Patient declined      "Active Member of Clubs or Organizations: Yes     Attends Club or Organization Meetings: More than 4 times per year     Marital Status:    Intimate Partner Violence: Not At Risk (2/11/2025)    Humiliation, Afraid, Rape, and Kick questionnaire     Fear of Current or Ex-Partner: No     Emotionally Abused: No     Physically Abused: No     Sexually Abused: No   Housing Stability: Low Risk  (2/11/2025)    Housing Stability Vital Sign     Unable to Pay for Housing in the Last Year: No     Number of Times Moved in the Last Year: 0     Homeless in the Last Year: No       FAMILY HISTORY:  Family History   Problem Relation Age of Onset    Cancer Mother         Brain cancer    Heart Disease Father         Several By-pass Surgeries    Cancer Daughter         Thyroid Cancer    Heart Disease Brother         Fatal Heart Attack        REVIEW OF SYSTEMS:  General ROS: Negative for - chills, fever, night sweats or weight loss.  HEENT ROS: Negative  Respiratory ROS: Negative for - cough or shortness of breath.  Cardiovascular ROS:  Negative for - chest pain or palpitations.  Gastrointestinal ROS: As per the history of present illness.  Genito-Urinary ROS: Negative  Musculoskeletal ROS: Negative.  Neurological ROS: Negative  Skin ROS: negative  Hematology ROS: negative  Endocrinology ROS: Negative        PHYSICAL EXAM:  VITALS: /72   Pulse (!) 110   Temp 36.6 °C (97.9 °F) (Temporal)   Resp 18   Ht 1.707 m (5' 7.21\")   Wt 70.9 kg (156 lb 4.9 oz)   SpO2 99%   BMI 24.33 kg/m²   GEN:  Edy Bennett is a 79 y.o. male in no acute distress but ill appearing  HEENT: Mucous membranes pink and moist.  Sclera anicteric.    NECK:    Neck supple without lymphadenopathy or thyromegaly.  LUNGS: Clear to auscultation posteriorly.  HEART: Regular rate and rhythm. S1 and S2 normal. No murmurs, gallops  ABD:  + BS non tender, non distended. PD catheter in place. No sign so of infection  RECTAL: Not done at this " "time.  EXT:  Without cyanosis, deformity or bilateral pitting edema.Bilateral toes with ischemic necrosis  Provena wound VAC in place left lower extremity  SKIN:  Pink, warm, dry.  NEURO: Grossly intact, A/OR.    LABS:  Recent Labs     02/12/25  0731 02/13/25  0741   WBC 8.4 9.5   MCV 99.0* 94.3     Recent Labs     02/12/25  0731   GLUCOSE 170*   BUN 99*   CO2 22     Lab Results   Component Value Date    INR 1.31 (H) 01/20/2025    INR 1.47 (H) 01/14/2025    INR 1.41 (H) 09/16/2024     No components found for: \"ALT\", \"AST\", \"GGT\", \"ALKPHOS\"  No results found for: \"BILINEO\"      @LASTIMGCAT(VS6591)@     @LASTIMGCAT(OV5888)@       IMPRESSION/PLAN:  Nausea  Obstruction of cystic duct  Peripheral artery disease - s/p left extremity bypass.   ESRD - on PD  Anemia - multi-factorial  ? melena  Spent a long time talking with patient as his wife and we can proceed with EGD to make sure nothing else causing nausea. It does not preclude him needing a cholecystectomy due to obstruction. As vomiting occurs with exercise, I would make sure no signs of CAD    EGD in am  NPO after midnight  PPI  Further recommendations when EGD completed.          Melany Richards M.D.  Gastroenterology      "

## 2025-02-13 NOTE — CARE PLAN
The patient is Watcher - Medium risk of patient condition declining or worsening    Shift Goals  Clinical Goals: IV abx, nausea control, tolerate PO intake  Patient Goals: Rest, tolerate PO intake  Family Goals: Updates    Progress made toward(s) clinical / shift goals:       Problem: Knowledge Deficit - Standard  Goal: Patient and family/care givers will demonstrate understanding of plan of care, disease process/condition, diagnostic tests and medications  Outcome: Progressing  Note: A/Ox4, patient able to understand plan of care, all questions answered at this time. Plan for EGD tomorrow.      Problem: Fall Risk  Goal: Patient will remain free from falls  Outcome: Progressing  Note: Patient remained free from falls throughout shift. Bed locked and in lowest position, call light and belongings within reach. Room free from clutter. Patient calls appropriately.        Patient is not progressing towards the following goals:

## 2025-02-13 NOTE — PROGRESS NOTES
[facility] Services Progress Notes    CCPD ordered by Dr. Mehta. Disconnected aseptically from PD Cycler at 0800.    Pt stable, VSS, alert and oriented.  Effluent clear and yellow, no signs of bleeding. Small fibrin clot noted. Nephrologist notified.   No alarms on machine was noted or reported before disconnection.    24 hour UF= 853mL (I.Drain= 0 + Total UF= 853mL - Last fill= 0)    Report given to Roshan ARNADA

## 2025-02-13 NOTE — CARE PLAN
The patient is Watcher - Medium risk of patient condition declining or worsening    Shift Goals  Clinical Goals: NPO at MN  Patient Goals: NPO at MN, Sleep  Family Goals: Updates    Progress made toward(s) clinical / shift goals:      Problem: Knowledge Deficit - Standard  Goal: Patient and family/care givers will demonstrate understanding of plan of care, disease process/condition, diagnostic tests and medications  Outcome: Progressing     Problem: Communication  Goal: The ability to communicate needs accurately and effectively will improve  Outcome: Progressing     Problem: Safety  Goal: Will remain free from injury  Outcome: Progressing     Problem: Pain Management  Goal: Pain level will decrease to patient's comfort goal  Outcome: Progressing     Problem: Psychosocial Needs:  Goal: Level of anxiety will decrease  Outcome: Progressing     Problem: Fall Risk  Goal: Patient will remain free from falls  Outcome: Progressing       Patient is not progressing towards the following goals:

## 2025-02-13 NOTE — PROGRESS NOTES
[facility] -  PROGRESS NOTE               Author: Zach Mehta M.D. Date & Time: 2/13/2025  11:44 AM     HPI:  79 y.o. male with HTN, ESRD on PD, PVD/ gangrenous left foot, BPH admitted for monitoring s/p  left leg bypass surgery on 1/20 with Dr. Miller. He recently underwent hernial repair and was temporarily transferred to in center HD via RIJ [facility]. He has returned to  PD  4 days ago, doing low fill.volumes and has been tolerating at home.   Patient was connected to cycler last night. Total UF reported to be 276ml. Reports of low drain UF alarm last night but no major issues with PD overnight. Pt reports no complaints today morning  No F/C/N/V/CP/SOB.  No melena, hematochezia, hematemesis.  No HA, visual changes,or abdominal pain.     DAILY NEPHROLOGY SUMMARY:  See note from 1/31 for prior summaries   2/01: Disconnected aseptically from PD CyclerEffluent clear and yellow, no signs of bleeding/fibrin clots. 24 hour UF= 678mL. Tolerated well.   2/2: Patient and wife are doing well. Wife states she gave wrong # of cycles to provider yesterday and wanted then reduced back from 7 to 5. Overall doing well without complaints. Looking forward to going home around Feb 7th per patient. Also stated they were told that RT IJ TDC would be removed sometime this week. On-coming provider to ask the Hospitalist to help arrange.   2/3: pt sitting up in bed, spouse at bedside, PD improved with change in rx (reduced to 5 exchanges from 7) great UF: 1.1L, edema has improved, biggest complaint NV, vomits after each meal and fatigue, requesting a reduction in gabapentin, also reports constipation, had to dig out stool yesterday with finger  2/4 net UF 1500 ml overnight. Potassium is 3.3 I am replacing. Patient reports doing better today, wife at bedside. Patient feels much better now since Erica made med changes yesterday  2/5: 1437 mL Net UF removed overnight. Wife BS. Patient oob in BR performing adls  "independently. Reports improvement every day.   2/6: doing well, eating lunch with wife, net UF 1273 ml  2/7: patient doing well, low K I will order additional dose of potassium  2/8: NAEO, no complaints, wife and friends at bedside; Feels good  2/9: NAEO, wife at bedside, he is in wheelchair, having multiple BM's after lactulose  2/10: 725 mL Net UF removed last night via CCPD. Low drain UF alarmed overnight. Resting in bed, wife BS. Bps low 90s systolic. C/o n/v yesterday, Complaining of persistent nausea. Per wife, appears weaker. Multiple Bms yesterday. Denies fever or chills.   2/11: Fluid + some blood, 425 mL Net UF removed via CCPD. C/o weakness and fatigue. Declining antimedic. HIDA + cystic duct obstruction.  2/12: Pt trasnferred to main for eval of N/V, cystic duct  2/13: melanotic appearing stools, no issues with PD      REVIEW OF SYSTEMS:    10 point ROS reviewed and is as per HPI/daily summary or otherwise negative    PMH/PSH/SH/FH:   Reviewed and unchanged since admission note    CURRENT MEDICATIONS:   Reviewed from admission to present day    VS:  /72   Pulse (!) 110   Temp 36.6 °C (97.9 °F) (Temporal)   Resp 18   Ht 1.707 m (5' 7.21\")   Wt 70.9 kg (156 lb 4.9 oz)   SpO2 99%   BMI 24.33 kg/m²     Physical Exam  General: well appearing NAD  HEENT: EOMI, no scleral icterus  Heart: RR no rubs  Pulm: non labored  Abdomen: Pd catheter  Extremites: Rmidial wound vac in aplce  Derm: no rashes  Neuro: grossly non focal    Fluids:  In: 44 [Blood:44]  Out: 1626     LABS:  Recent Labs     02/12/25  0731   SODIUM 133*   POTASSIUM 4.0   CHLORIDE 91*   CO2 22   GLUCOSE 170*   BUN 99*   CREATININE 8.98*   CALCIUM 9.0       IMAGING:   All imaging reviewed from admission to present day    IMPRESSION:  # ESRD on CCPD  - Returned to PD 1/17/25   - Home PD prescription CCPD 2.5% alternating with 1.5% 5  no last fill. Patient wife stated she initially told last Nephrologist wrong script. Now all green bags " and 5 cycles, not 7.   - Bloody fluid with CCPD 2/10, Hgb stable on Eliquis/Plavix   # HTN, controlled   - Goal BP < 140/90  - torsemide 10mg daily   # Anemia of CKD  - worsened overnight ? Unclear source of bleed  - awaiting scope in am  # CKD-MBD  - Ca 8.6   - PO4 7.4 -> 6.6 -> 7.0  - Lanthanum 500mg TID, titrate up prn  # Chronic hypoxic respiratory failure   - On home oxygen 2-2.5L at night   # Nausea/Vomiting              - ddx: binder intolerance, switched to lanthanum              - constipation, started miralax BID and lactulose added recently              - pill burden, increase omeprazole to 40mg daily              - reduced gabapentin to 100mg daily               - H. Pylori (-)               - HIDA Scan 2/10 + Cystic Duct Obstruction, GI Consulted               - Viral Panel (-) 2/11  # Hyponatremia, mild              - Torsemide daily              - UF with PD, on all 2.5%      SUGGESTIONS:  - Continue CCPD during hospital stay (now on oupt rx and tolerating well)  - EGD in am  - defer gallbladder surgery for now  - Dose all meds per ESRD  - Renal diet, low phos   - L arm AVF not fully matured,  will need intervention outpt   - Avoid Fleet enema in dialysis patients   - Labs daily

## 2025-02-14 ENCOUNTER — ANESTHESIA (OUTPATIENT)
Dept: SURGERY | Facility: MEDICAL CENTER | Age: 80
DRG: 444 | End: 2025-02-14
Payer: MEDICARE

## 2025-02-14 ENCOUNTER — ANESTHESIA EVENT (OUTPATIENT)
Dept: SURGERY | Facility: MEDICAL CENTER | Age: 80
DRG: 444 | End: 2025-02-14
Payer: MEDICARE

## 2025-02-14 ENCOUNTER — APPOINTMENT (OUTPATIENT)
Dept: CARDIOLOGY | Facility: MEDICAL CENTER | Age: 80
DRG: 444 | End: 2025-02-14
Attending: INTERNAL MEDICINE
Payer: MEDICARE

## 2025-02-14 LAB
ALBUMIN SERPL BCP-MCNC: 2.6 G/DL (ref 3.2–4.9)
BUN SERPL-MCNC: 107 MG/DL (ref 8–22)
CALCIUM ALBUM COR SERPL-MCNC: 10.1 MG/DL (ref 8.5–10.5)
CALCIUM SERPL-MCNC: 9 MG/DL (ref 8.5–10.5)
CHLORIDE SERPL-SCNC: 93 MMOL/L (ref 96–112)
CO2 SERPL-SCNC: 20 MMOL/L (ref 20–33)
CREAT SERPL-MCNC: 9.14 MG/DL (ref 0.5–1.4)
EKG IMPRESSION: NORMAL
ERYTHROCYTE [DISTWIDTH] IN BLOOD BY AUTOMATED COUNT: 80.1 FL (ref 35.9–50)
GFR SERPLBLD CREATININE-BSD FMLA CKD-EPI: 5 ML/MIN/1.73 M 2
GLUCOSE BLD STRIP.AUTO-MCNC: 138 MG/DL (ref 65–99)
GLUCOSE SERPL-MCNC: 253 MG/DL (ref 65–99)
HCT VFR BLD AUTO: 22.6 % (ref 42–52)
HCT VFR BLD AUTO: 23.7 % (ref 42–52)
HCT VFR BLD AUTO: 24.5 % (ref 42–52)
HGB BLD-MCNC: 7.2 G/DL (ref 14–18)
HGB BLD-MCNC: 7.4 G/DL (ref 14–18)
HGB BLD-MCNC: 7.6 G/DL (ref 14–18)
LV EJECT FRACT MOD 2C 99903: 45.71
LV EJECT FRACT MOD 4C 99902: 40.23
LV EJECT FRACT MOD BP 99901: 40.93
MCH RBC QN AUTO: 29.8 PG (ref 27–33)
MCHC RBC AUTO-ENTMCNC: 31.9 G/DL (ref 32.3–36.5)
MCV RBC AUTO: 93.4 FL (ref 81.4–97.8)
PATHOLOGY CONSULT NOTE: NORMAL
PHOSPHATE SERPL-MCNC: 6.7 MG/DL (ref 2.5–4.5)
PLATELET # BLD AUTO: 322 K/UL (ref 164–446)
PMV BLD AUTO: 9.9 FL (ref 9–12.9)
POTASSIUM SERPL-SCNC: 4.5 MMOL/L (ref 3.6–5.5)
RBC # BLD AUTO: 2.42 M/UL (ref 4.7–6.1)
SODIUM SERPL-SCNC: 136 MMOL/L (ref 135–145)
WBC # BLD AUTO: 8.3 K/UL (ref 4.8–10.8)

## 2025-02-14 PROCEDURE — 99233 SBSQ HOSP IP/OBS HIGH 50: CPT | Performed by: INTERNAL MEDICINE

## 2025-02-14 PROCEDURE — 160009 HCHG ANES TIME/MIN: Performed by: SPECIALIST

## 2025-02-14 PROCEDURE — 700111 HCHG RX REV CODE 636 W/ 250 OVERRIDE (IP): Performed by: INTERNAL MEDICINE

## 2025-02-14 PROCEDURE — 93306 TTE W/DOPPLER COMPLETE: CPT

## 2025-02-14 PROCEDURE — 85018 HEMOGLOBIN: CPT

## 2025-02-14 PROCEDURE — 160203 HCHG ENDO MINUTES - 1ST 30 MINS LEVEL 4: Performed by: SPECIALIST

## 2025-02-14 PROCEDURE — 97162 PT EVAL MOD COMPLEX 30 MIN: CPT

## 2025-02-14 PROCEDURE — 160002 HCHG RECOVERY MINUTES (STAT): Performed by: SPECIALIST

## 2025-02-14 PROCEDURE — 97535 SELF CARE MNGMENT TRAINING: CPT

## 2025-02-14 PROCEDURE — 700105 HCHG RX REV CODE 258: Performed by: INTERNAL MEDICINE

## 2025-02-14 PROCEDURE — 80069 RENAL FUNCTION PANEL: CPT

## 2025-02-14 PROCEDURE — 93010 ELECTROCARDIOGRAM REPORT: CPT | Performed by: INTERNAL MEDICINE

## 2025-02-14 PROCEDURE — 93306 TTE W/DOPPLER COMPLETE: CPT | Mod: 26 | Performed by: INTERNAL MEDICINE

## 2025-02-14 PROCEDURE — 36415 COLL VENOUS BLD VENIPUNCTURE: CPT

## 2025-02-14 PROCEDURE — 700102 HCHG RX REV CODE 250 W/ 637 OVERRIDE(OP): Performed by: INTERNAL MEDICINE

## 2025-02-14 PROCEDURE — 82962 GLUCOSE BLOOD TEST: CPT

## 2025-02-14 PROCEDURE — 90945 DIALYSIS ONE EVALUATION: CPT

## 2025-02-14 PROCEDURE — 88312 SPECIAL STAINS GROUP 1: CPT | Performed by: PATHOLOGY

## 2025-02-14 PROCEDURE — 160015 HCHG STAT PREOP MINUTES: Performed by: SPECIALIST

## 2025-02-14 PROCEDURE — 88312 SPECIAL STAINS GROUP 1: CPT | Mod: 26 | Performed by: PATHOLOGY

## 2025-02-14 PROCEDURE — 88305 TISSUE EXAM BY PATHOLOGIST: CPT | Mod: 59 | Performed by: PATHOLOGY

## 2025-02-14 PROCEDURE — 160035 HCHG PACU - 1ST 60 MINS PHASE I: Performed by: SPECIALIST

## 2025-02-14 PROCEDURE — 700111 HCHG RX REV CODE 636 W/ 250 OVERRIDE (IP): Performed by: ANESTHESIOLOGY

## 2025-02-14 PROCEDURE — A9270 NON-COVERED ITEM OR SERVICE: HCPCS | Performed by: INTERNAL MEDICINE

## 2025-02-14 PROCEDURE — 85014 HEMATOCRIT: CPT | Mod: 91

## 2025-02-14 PROCEDURE — 85027 COMPLETE CBC AUTOMATED: CPT

## 2025-02-14 PROCEDURE — 88305 TISSUE EXAM BY PATHOLOGIST: CPT | Mod: 26 | Performed by: PATHOLOGY

## 2025-02-14 PROCEDURE — 770004 HCHG ROOM/CARE - ONCOLOGY PRIVATE *

## 2025-02-14 PROCEDURE — 160048 HCHG OR STATISTICAL LEVEL 1-5: Performed by: SPECIALIST

## 2025-02-14 PROCEDURE — 700105 HCHG RX REV CODE 258: Performed by: ANESTHESIOLOGY

## 2025-02-14 PROCEDURE — 0DB68ZX EXCISION OF STOMACH, VIA NATURAL OR ARTIFICIAL OPENING ENDOSCOPIC, DIAGNOSTIC: ICD-10-PCS | Performed by: SPECIALIST

## 2025-02-14 PROCEDURE — 0DB58ZX EXCISION OF ESOPHAGUS, VIA NATURAL OR ARTIFICIAL OPENING ENDOSCOPIC, DIAGNOSTIC: ICD-10-PCS | Performed by: SPECIALIST

## 2025-02-14 RX ORDER — SODIUM CHLORIDE 9 MG/ML
INJECTION, SOLUTION INTRAVENOUS
Status: DISCONTINUED | OUTPATIENT
Start: 2025-02-14 | End: 2025-02-14 | Stop reason: SURG

## 2025-02-14 RX ORDER — ONDANSETRON 2 MG/ML
4 INJECTION INTRAMUSCULAR; INTRAVENOUS
Status: DISCONTINUED | OUTPATIENT
Start: 2025-02-14 | End: 2025-02-14 | Stop reason: HOSPADM

## 2025-02-14 RX ORDER — DIPHENHYDRAMINE HYDROCHLORIDE 50 MG/ML
12.5 INJECTION INTRAMUSCULAR; INTRAVENOUS
Status: DISCONTINUED | OUTPATIENT
Start: 2025-02-14 | End: 2025-02-14 | Stop reason: HOSPADM

## 2025-02-14 RX ORDER — HALOPERIDOL 5 MG/ML
1 INJECTION INTRAMUSCULAR
Status: DISCONTINUED | OUTPATIENT
Start: 2025-02-14 | End: 2025-02-14 | Stop reason: HOSPADM

## 2025-02-14 RX ORDER — SODIUM CHLORIDE 9 MG/ML
INJECTION, SOLUTION INTRAVENOUS CONTINUOUS
Status: DISCONTINUED | OUTPATIENT
Start: 2025-02-14 | End: 2025-02-14 | Stop reason: HOSPADM

## 2025-02-14 RX ORDER — PHENYLEPHRINE HYDROCHLORIDE 10 MG/ML
INJECTION, SOLUTION INTRAMUSCULAR; INTRAVENOUS; SUBCUTANEOUS PRN
Status: DISCONTINUED | OUTPATIENT
Start: 2025-02-14 | End: 2025-02-14 | Stop reason: SURG

## 2025-02-14 RX ORDER — TAMSULOSIN HYDROCHLORIDE 0.4 MG/1
0.4 CAPSULE ORAL
Status: DISCONTINUED | OUTPATIENT
Start: 2025-02-15 | End: 2025-02-18 | Stop reason: HOSPADM

## 2025-02-14 RX ADMIN — PROPOFOL 150 MG: 10 INJECTION, EMULSION INTRAVENOUS at 09:03

## 2025-02-14 RX ADMIN — METRONIDAZOLE 500 MG: 500 INJECTION, SOLUTION INTRAVENOUS at 17:11

## 2025-02-14 RX ADMIN — PANTOPRAZOLE SODIUM 40 MG: 40 INJECTION, POWDER, FOR SOLUTION INTRAVENOUS at 17:09

## 2025-02-14 RX ADMIN — MONTELUKAST SODIUM 10 MG: 10 TABLET, FILM COATED ORAL at 20:00

## 2025-02-14 RX ADMIN — SODIUM CHLORIDE: 9 INJECTION, SOLUTION INTRAVENOUS at 08:48

## 2025-02-14 RX ADMIN — PANTOPRAZOLE SODIUM 40 MG: 40 INJECTION, POWDER, FOR SOLUTION INTRAVENOUS at 05:04

## 2025-02-14 RX ADMIN — METRONIDAZOLE 500 MG: 500 INJECTION, SOLUTION INTRAVENOUS at 05:07

## 2025-02-14 RX ADMIN — METOPROLOL TARTRATE 12.5 MG: 25 TABLET, FILM COATED ORAL at 05:04

## 2025-02-14 RX ADMIN — CEFTRIAXONE SODIUM 1000 MG: 10 INJECTION, POWDER, FOR SOLUTION INTRAVENOUS at 17:09

## 2025-02-14 RX ADMIN — PHENYLEPHRINE HYDROCHLORIDE 100 MCG: 10 INJECTION INTRAVENOUS at 08:54

## 2025-02-14 RX ADMIN — APIXABAN 5 MG: 5 TABLET, FILM COATED ORAL at 17:08

## 2025-02-14 RX ADMIN — METOPROLOL TARTRATE 12.5 MG: 25 TABLET, FILM COATED ORAL at 17:08

## 2025-02-14 RX ADMIN — ROSUVASTATIN CALCIUM 20 MG: 20 TABLET, FILM COATED ORAL at 20:01

## 2025-02-14 RX ADMIN — PHENYLEPHRINE HYDROCHLORIDE 100 MCG: 10 INJECTION INTRAVENOUS at 09:00

## 2025-02-14 RX ADMIN — GENTAMICIN SULFATE: 1 CREAM TOPICAL at 18:20

## 2025-02-14 ASSESSMENT — ENCOUNTER SYMPTOMS
ABDOMINAL PAIN: 0
FEVER: 0
DIZZINESS: 0
SHORTNESS OF BREATH: 0
WEAKNESS: 1
NERVOUS/ANXIOUS: 1
CONSTIPATION: 0
DEPRESSION: 0
VOMITING: 0
HEADACHES: 0
NAUSEA: 0
CHILLS: 0
MYALGIAS: 0

## 2025-02-14 ASSESSMENT — GAIT ASSESSMENTS
GAIT LEVEL OF ASSIST: CONTACT GUARD ASSIST
DISTANCE (FEET): 20
ASSISTIVE DEVICE: FRONT WHEEL WALKER

## 2025-02-14 ASSESSMENT — COGNITIVE AND FUNCTIONAL STATUS - GENERAL
WALKING IN HOSPITAL ROOM: A LITTLE
STANDING UP FROM CHAIR USING ARMS: A LITTLE
DAILY ACTIVITIY SCORE: 20
MOVING FROM LYING ON BACK TO SITTING ON SIDE OF FLAT BED: A LITTLE
SUGGESTED CMS G CODE MODIFIER MOBILITY: CK
SUGGESTED CMS G CODE MODIFIER MOBILITY: CK
STANDING UP FROM CHAIR USING ARMS: A LITTLE
MOVING TO AND FROM BED TO CHAIR: A LITTLE
TURNING FROM BACK TO SIDE WHILE IN FLAT BAD: A LITTLE
WALKING IN HOSPITAL ROOM: A LITTLE
MOBILITY SCORE: 17
MOVING FROM LYING ON BACK TO SITTING ON SIDE OF FLAT BED: A LITTLE
MOVING TO AND FROM BED TO CHAIR: A LITTLE
MOBILITY SCORE: 17
DRESSING REGULAR LOWER BODY CLOTHING: A LITTLE
CLIMB 3 TO 5 STEPS WITH RAILING: A LOT
HELP NEEDED FOR BATHING: A LITTLE
CLIMB 3 TO 5 STEPS WITH RAILING: A LOT
SUGGESTED CMS G CODE MODIFIER DAILY ACTIVITY: CJ
TURNING FROM BACK TO SIDE WHILE IN FLAT BAD: A LITTLE
TOILETING: A LITTLE
DRESSING REGULAR UPPER BODY CLOTHING: A LITTLE

## 2025-02-14 ASSESSMENT — PAIN DESCRIPTION - PAIN TYPE
TYPE: SURGICAL PAIN
TYPE: ACUTE PAIN

## 2025-02-14 NOTE — CARE PLAN
The patient is Watcher - Medium risk of patient condition declining or worsening    Shift Goals  Clinical Goals: tolerate PO, rest  Patient Goals: rest  Family Goals: Updates    Progress made toward(s) clinical / shift goals:      Problem: Safety  Goal: Will remain free from injury  Outcome: Progressing     Problem: Pain Management  Goal: Pain level will decrease to patient's comfort goal  Outcome: Progressing     Problem: Fall Risk  Goal: Patient will remain free from falls  Outcome: Progressing       Patient is not progressing towards the following goals:

## 2025-02-14 NOTE — PROGRESS NOTES
[facility] Progress Notes    CCPD ordered by Dr. Mehta. Disconnected aseptically from PD Cycler at 0730.    Pt stable, VSS, alert and oriented.  Effluent clear and yellow, no signs of bleeding. Minimal fibrin clot noted. Nephrologist notified.  No alarms on machine was noted or reported before disconnection.    24 hour UF= 321mL (I.Drain= 17mL + Total UF= 304mL - Last fill= 0)    Report given to Roshan ARANDA

## 2025-02-14 NOTE — DISCHARGE PLANNING
1202  DPA [facility]. Per MANOJ Mejía.     1210  DPA spoke with Erica ([facility]). Erica says unable to accommodate Peritoneal Dialysis. If pt switches to HD, can reconsider. Advised MANOJ Mejía.

## 2025-02-14 NOTE — ANESTHESIA POSTPROCEDURE EVALUATION
Patient: Edy Bennett    Procedure Summary       Date: 02/14/25 Room / Location: [facility] ROOM 26 / SURGERY SAME DAY [facility]    Anesthesia Start: 0848 Anesthesia Stop: 0903    Procedures:       GASTROSCOPY (Esophagus)      GASTROSCOPY, WITH BIOPSY (Esophagus) Diagnosis: (possible Barretts, thickened gastric folds)    Surgeons: Melany Richards M.D. Responsible Provider: Lazaro Jara M.D.    Anesthesia Type: MAC ASA Status: 3            Final Anesthesia Type: MAC  Last vitals  BP   Blood Pressure : (!) 86/54    Temp   36.1 °C (97 °F)    Pulse   67   Resp   13    SpO2   100 %      Anesthesia Post Evaluation    Patient location during evaluation: PACU  Patient participation: complete - patient participated  Level of consciousness: awake and alert    Airway patency: patent  Anesthetic complications: no  Cardiovascular status: hemodynamically stable  Respiratory status: acceptable  Hydration status: euvolemic    PONV: none          No notable events documented.     Nurse Pain Score: 0 (NPRS)

## 2025-02-14 NOTE — OR NURSING
0858 - Pt to PACU 1 from OR. Bedside report from anesthesiologist and RN. Attached to monitoring, VSS, breathing is calm and unlabored. 6L mask, no signs pain or nausea.     0937 - Report to MANOJ William on CNU.  Pt stable to go back, legs elevated on pillows.  VSS, room air.     1013 - Taken with transport tech back to room on [facility], hearing aides were placed back into patient's ears. Tele box leads sent in bag back to room on [facility], there was no tele box present, confirmed with pre-op nurse also.

## 2025-02-14 NOTE — DISCHARGE PLANNING
Case Management Discharge Planning    Admission Date: 2/11/2025  GMLOS: 4.5  ALOS: 3    6-Clicks ADL Score:    6-Clicks Mobility Score: 17  PT and/or OT Eval ordered: Yes  Post-acute Referrals Ordered: Yes  Post-acute Choice Obtained: Yes  Has referral(s) been sent to post-acute provider:  Yes      Anticipated Discharge Dispo: Discharge Disposition: Disch to IP rehab facility or distinct part unit (62)    DME Needed: No    Action(s) Taken: Patient was discussed in IDT rounds, echo is pending. PT is recommending post-acute. OT eval is pending. Patient came from [facility] and plan is to go back. RNCM requested DPA send SNF referrals in case patient is declined from Rehab.     [facility] [id number]    Escalations Completed: None    Medically Clear: No    Next Steps: pending medical clearance     Barriers to Discharge: Medical clearance    Is the patient up for discharge tomorrow: No

## 2025-02-14 NOTE — CARE PLAN
The patient is Watcher - Medium risk of patient condition declining or worsening    Shift Goals  Clinical Goals: EGD, tolerate PO, mobility  Patient Goals: Rest, EGD, tolerate PO intake  Family Goals: Updates, rest    Progress made toward(s) clinical / shift goals:       Problem: Knowledge Deficit - Standard  Goal: Patient and family/care givers will demonstrate understanding of plan of care, disease process/condition, diagnostic tests and medications  Outcome: Progressing  Note: A/Ox4, patient able to understand plan of care, all questions answered at this time.      Problem: Skin Integrity  Goal: Skin integrity is maintained or improved  Outcome: Progressing  Note: Patient turns self from side to side, pillows in place for support and positioning. Patient is continent of bowel and bladder. Skin remains intact.     Problem: Mobility  Goal: Risk for activity intolerance will decrease  Outcome: Progressing  Note: Pt ambulated with PT this morning with x1 assist and FWW.        Patient is not progressing towards the following goals:

## 2025-02-14 NOTE — ANESTHESIA TIME REPORT
Anesthesia Start and Stop Event Times       Date Time Event    2/14/2025 0838 Ready for Procedure     0848 Anesthesia Start     0903 Anesthesia Stop          Responsible Staff  02/14/25      Name Role Begin End    Lazaro Jara M.D. Anesth 0848 0903          Overtime Reason:  no overtime (within assigned shift)    Comments:

## 2025-02-14 NOTE — DISCHARGE PLANNING
[facility] Transitional Care Coordination    Physiatry consult pended, waiting for additional information.  Would welcome PT/OT as clinically appropriate.  TCC following.  Please reach out sooner if PMR consult requested for medical management.

## 2025-02-14 NOTE — ANESTHESIA PREPROCEDURE EVALUATION
Case: [id number] Date/Time: 02/14/25 0915    Procedure: GASTROSCOPY (Esophagus)    Anesthesia type: MAC    Pre-op diagnosis: Vomiting    Location: [facility] ROOM 26 / SURGERY SAME DAY [facility]    Surgeons: Melany Richards M.D.            Relevant Problems   CARDIAC   (positive) CAD (coronary artery disease)   (positive) Essential hypertension   (positive) PAD (peripheral artery disease) (HCC)   (positive) Peripheral arterial disease (HCC)   (positive) Typical atrial flutter (HCC)         (positive) ESRD on peritoneal dialysis (HCC)   (positive) Renal disease       Physical Exam    Airway   Mallampati: II  TM distance: >3 FB  Neck ROM: full       Cardiovascular - normal exam  Rhythm: regular  Rate: normal  (-) murmur     Dental - normal exam           Pulmonary - normal exam  Breath sounds clear to auscultation     Abdominal    Neurological - normal exam                   Anesthesia Plan    ASA 3   ASA physical status 3 criteria: CAD (>3 months) and ESRD undergoing regularly scheduled dialysis    Plan - MAC               Induction: intravenous    Postoperative Plan: Postoperative administration of opioids is intended.    Pertinent diagnostic labs and testing reviewed    Informed Consent:    Anesthetic plan and risks discussed with patient.    Use of blood products discussed with: patient whom consented to blood products.

## 2025-02-14 NOTE — PROGRESS NOTES
[facility] Progress Note     CCPD x 8.5 hours, 2100 mL fills x 5 cycles using all 2.5% PD solution ordered per      CCPD treatment initiated at 1900 without any issues.  Patient AOx4, resting calmly, interactive, (-) abd distention, denies pain/abd discomfort, stable VS.  PD catheter and exit site in good condition, no signs of infection noted, cleansed with antiseptic solution and dressing changed per policy. Gentamicin cream applied at PD exit site.     Initial drain: 17 mL   Effluent clear and yellow     Report/Inservice given PCN Caty RN   Patient on dwell 1/5 prior to departure from bedside.     Please contact on call dialysis RN for any issues with persistent alarms/assistance with troubleshooting or patient not tolerating therapy.      For on-call Dialysis RN, pls contact [facility] at [phone].

## 2025-02-14 NOTE — PROCEDURES
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    ESTIMATED BLOOD LOSS:nil    LOCATION: [facility]    CONSENT:  OBTAINED. The risks, benefits and alternatives of the procedure were discussed in details. The risks include and are not limited to bleeding, infection, perforation, missed lesions, and sedations risks (cardiopulmonary compromise and allergic reaction to medications).    DESCRIPTION: The patient presented to the procedure room.  After routine checkup was performed, patient was brought into the endoscopy suite.  Patient was placed on his left lateral decubitus position. A bite block was placed in patient's mouth. Patient was sedated by anesthesia.  Vital signs were monitored throughout the procedure.  Oxygenation support was provided throughout the procedure. Upper endoscope was inserted into patient's mouth and advanced to the second portion of the duodenum under direct visualization.      Once the site was reached and examined, the upper endoscope was withdrawn.  Retroflexion was made within the stomach.  The stomach was decompressed, scope was withdrawn and the procedure was terminated.     The patient tolerated the procedure well.  There were no immediate complications.    OPERATIVE FINDINGS:    1. Esophagus: 2 cm [place] colored mucosa. Biopsied for Stapleton's  2. Stomach: small hiatal hernia, mildly thickened folds. biopsied  3. 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 GI to sign off  Recommend cholecystetcomy      This note has been transcribed with digital voice recognition software  and although it has been reviewed may contain grammatical or word errors

## 2025-02-14 NOTE — THERAPY
"Physical Therapy   Initial Evaluation     Patient Name: Edy Bennett  Age:  79 y.o., Sex:  male  Medical Record #: [id number]  Today's Date: 2/14/2025     Precautions  Precautions: (P) Fall Risk  Comments: (P) Prevena L leg    Assessment    79 y.o. male was admitted for cystic duct obstruction with vomiting and RUQ pain.  Pt has declined surgery at this time.  He transferred from [facility] where he was doing therapy s/p L popliteal -> tibial artery bypass, performed on 1/20.  PMHx also includes DM, HTN, DARIEN, CHF, ESRD with peritoneal dialysis, and arrhythmia.  He lives with his wife in a 1SH, no DARIEL and was independent for mobility without an AD.  On eval, he presents with pain, weakness, and decreased activity tolerance impairing his mobility.  He will benefit from continued acute PT services to address the above deficits in order to return home safely.   Recommend post acute PT services.  Wife states if he could get back to what he was doing with rehab (ambulating 100'), she feels she will be able to manage him at home.    Plan    Physical Therapy Initial Treatment Plan   Treatment Plan : (P) Bed Mobility, Equipment, Family / Caregiver Training, Gait Training, Manual Therapy, Neuro Re-Education / Balance, Self Care / Home Evaluation, Therapeutic Activities, Therapeutic Exercise  Treatment Frequency: (P) 4 Times per Week  Duration: (P) Until Therapy Goals Met    DC Equipment Recommendations: (P) Unable to determine at this time  Discharge Recommendations: (P) Recommend post-acute placement for additional physical therapy services prior to discharge home       Subjective    \"I don't think I can walk.  I haven't been up in a week.\"     Objective       02/14/25 0828   Initial Contact Note    Initial Contact Note Order Received and Verified, Physical Therapy Evaluation in Progress with Full Report to Follow.   Precautions   Precautions Fall Risk   Comments Prevena L leg   Vitals   O2 Delivery Device None - Room Air "   Pain 0 - 10 Group   Therapist Pain Assessment During Activity  (L leg with weight bearing)   Prior Living Situation   Housing / Facility 1 Story House   Steps Into Home 0   Bathroom Set up Walk In Shower;Grab Bars;Built-In Shower Chair   Equipment Owned Front-Wheel Walker;4-Wheel Walker;Tub / Shower Seat;Adjustable Bed Without Rails  ([facility].  Wife reports rehab ordered a w/c for them.)   Lives with - Patient's Self Care Capacity Spouse   Prior Level of Functional Mobility   Bed Mobility Independent   Transfer Status Independent   Ambulation Independent   Ambulation Distance community   Comments Prior to leg surgery, pt was not using an AD.  Wife reports the past year has been up and down for him d/t dialysis and medical issues.  At rehab, wife reports pt's was ambulating 100' with FWW before he started declining again d/t the vomiting.   History of Falls   History of Falls No   Cognition    Cognition / Consciousness WDL   Level of Consciousness Alert   Active ROM Upper Body   Active ROM Upper Body  WDL   Strength Upper Body   Upper Body Strength  X   Gross Strength Generalized Weakness, Equal Bilaterally.    Comments BUEs grossly 4-/5   Active ROM Lower Body    Active ROM Lower Body  WDL   Strength Lower Body   Lower Body Strength  X   Gross Strength Generalized Weakness, Equal Bilaterally   Comments BLEs grossly 4-/5   Sensation Lower Body   Lower Extremity Sensation   X   Lt Lower Extremity Light Touch Impaired   Comments L leg at surgery site   Balance Assessment   Sitting Balance (Static) Fair   Sitting Balance (Dynamic) Fair -   Standing Balance (Static) Fair -   Standing Balance (Dynamic) Fair -   Weight Shift Sitting Fair   Weight Shift Standing Poor   Comments with FWW   Bed Mobility    Supine to Sit Standby Assist   Sit to Supine Standby Assist   Scooting Standby Assist   Rolling Standby Assist   Comments HOB elevated, no rail   Gait Analysis   Gait Level Of Assist Contact Guard Assist   Assistive  Device Front Wheel Walker   Distance (Feet) 20   # of Times Distance was Traveled 1   Deviation Step To;Antalgic;Decreased Heel Strike  (mild L foot drop with IC = toe strike)   Weight Bearing Status FWB BLEs   Functional Mobility   Sit to Stand Minimal Assist   Bed, Chair, Wheelchair Transfer Contact Guard Assist   Transfer Method Stand Step   Mobility with FWW   Edema / Skin Assessment   Edema / Skin  Not Assessed   Comments Prevena intact L leg   Short Term Goals    Short Term Goal # 1 Pt will perform supine < > sit SPV with bed flat, no rail in 6 visits in order to set up for upright mobility   Short Term Goal # 2 Pt will perform sit < > stand SPV in 6 visits in order to prepare for ambulation   Short Term Goal # 3 Pt will ambulate 150' SPV /c FWW in 6 visits in order to return home safely   Education Group   Education Provided Role of Physical Therapist;Gait Training;Transfer Status   Role of Physical Therapist Patient Response Patient;Acceptance;Explanation;Action Demonstration   Gait Training Patient Response Patient;Acceptance;Explanation;Action Demonstration;Reinforcement Needed   Transfer Status Patient Response Patient;Acceptance;Explanation;Action Demonstration;Reinforcement Needed   Additional Comments Wife educated on importance of encourageing the pt to get out of bed and ue the FWW to ambulate with staff for tasks like getting to the cahir and going to the bathroom.  Suggestion to gradually move his target (chair or BSC) as pt gains more confidence in his ability to complete tasks.  Information acknowledged.   Physical Therapy Initial Treatment Plan    Treatment Plan  Bed Mobility;Equipment;Family / Caregiver Training;Gait Training;Manual Therapy;Neuro Re-Education / Balance;Self Care / Home Evaluation;Therapeutic Activities;Therapeutic Exercise   Treatment Frequency 4 Times per Week   Duration Until Therapy Goals Met   Problem List    Problems Pain;Impaired Bed Mobility;Impaired Transfers;Impaired  Ambulation;Functional Strength Deficit;Decreased Activity Tolerance   Anticipated Discharge Equipment and Recommendations   DC Equipment Recommendations Unable to determine at this time   Discharge Recommendations Recommend post-acute placement for additional physical therapy services prior to discharge home   Interdisciplinary Plan of Care Collaboration   IDT Collaboration with  Nursing   Patient Position at End of Therapy   (on gurney with [place] to go to a procedure)   Collaboration Comments RN updated   Session Information   Date / Session Number  2/14 -1 (1/4, 2/20)

## 2025-02-14 NOTE — PROGRESS NOTES
[facility] Medicine Daily Progress Note    Date of Service  2/14/2025    Chief Complaint  Edy Bennett is a 79 y.o. male admitted 2/11/2025 with intractable nausea and vomiting     Hospital Course  79 y.o. male who presented 2/11/2025 with intractable nausea and vomiting.  Patient did receive a left lower extremity bypass, popliteal to tibial on 1/20, was being treated at [facility].  He began having nausea and vomiting as well as some right upper quadrant pain.  Workup eventually showed a obstruction of the cystic duct.  Because of this, patient was transferred here due to need for intervention.  Consulted GI, vascular surgery and general surgery as well as nephrology.  Patient is on chronic peritoneal dialysis.  Patient was started on IV antibiotics for cholecystitis and his symptoms improved.  Currently patient refusing to have surgery and general surgery signed off.  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  His anticoagulation has been resumed.  Vascular surgery removed patient's Prevena wound VAC that was in place and wound care replaced a new one on 2/13.     EKG was performed and showed atrial flutter, patient does have a history of this and was previously on metoprolol which has been restarted.  Echocardiogram pending.  PT recommending postacute placement.  PM&R consulted.     Interval Problem Update  2/12 Vitals stable on room air   WBC 8.4, hemoglobin 6.6  -S/p 1 unit PRBC transfusion.  Patient tolerated transfusion well no signs of fluid overload or dyspnea, continue to monitor  -Discussed with nephrology, continue PD for now  -Discussed with general surgery, they are recommending cholecystectomy however patient is undecided.  Will keep patient n.p.o. at midnight for possible OR tomorrow if patient agreeable  -Discussed with vascular surgery, they will come and evaluate the patient as he still  has Prevena wound VAC in place from surgery  -Long discussion with patient and his wife at bedside.  Patient reports since starting the antibiotics he feels significantly better no longer having the vomiting episodes was also having urinary urgency over at the rehab which has now resolved.  He is very hesitant to have another surgery as he has had multiple recent surgeries and he is not enthused about going back on HD again as he just had his catheter taken out a week ago.  Discussed risk versus benefits of surgical intervention versus supportive care with antibiotic treatment, he would be at risk for having another acute event if his gallbladder is not removed as cystic duct is unable to be evacuated by GI.  Patient and his wife will talk about it some more and will let us know their decision tomorrow.    2/13 No acute events overnight   Hemoglobin 7.3 post transfusion yesterday   Discussed with surgery, patient would prefer non operative treatment  Notified by nursing that patient had black stool yesterday evening as well as this morning.  Patient does report he has been having difficulty controlling his bowel movements when he urinates he also defecates which has not happened previously.  He was on iron supplementation previously.  Continue to hold Plavix and Eliquis for now.  Start IV PPI twice daily  Discussed with gastroenterology, plan for EGD tomorrow.  Lengthy discussion with GI, myself, patient and his wife. Concerned that patient's vomiting is an anginal equivalent as he would vomit in the evenings about 5 hours after eating lunch without preceding abdominal pain or nausea and usually with exercise.  EKG pending.  On review of prior imaging studies patient has not had an echo previously, will check echocardiogram.  Also noted on chart review that patient was on scopolamine patch at rehab I suspect this may be the cause of his urinary retention, patient's wife does report that this was started at the same  time the urinary retention started.  I have advised him against using this for vomiting in the future.    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  This afternoon patient reports that he is not having any nausea and is tolerating full liquid diet well and has not had any vomiting.  Patient had been taken off of his metoprolol at rehab due to his blood pressure and was actually on midodrine and Lasix at that time.  He has not required midodrine and his lower extremity edema has resolved without Lasix during this admission.  I would like for him to continue working with therapy while on telemetry to see if the vomiting episodes correlate with his atrial flutter.  Patient would like to wait and see how the weekend goes if his vomiting does not return he does not want surgery at this time however if he becomes symptomatic again he will reconsider.    I have discussed this patient's plan of care and discharge plan at IDT rounds today with Case Management, Nursing, Nursing leadership, and other members of the IDT team.    Consultants/Specialty  general surgery  Nephrology-[facility]  vascular surgery  Gastroenterology  PM&R    Code Status  Full Code    Disposition  The patient is not medically cleared for discharge to home or a post-acute  facility.  Anticipate discharge to: an inpatient rehabilitation hospital    I have placed the appropriate orders for post-discharge needs.    Review of Systems  Review of Systems   Constitutional:  Positive for malaise/fatigue. Negative for chills and fever.   Respiratory:  Negative for shortness of breath.    Cardiovascular:  Negative for chest pain and leg swelling.   Gastrointestinal:  Negative for abdominal pain, constipation, nausea and vomiting.   Musculoskeletal:  Negative for myalgias.   Skin:  Negative for rash.   Neurological:  Positive for weakness. Negative for dizziness and headaches.   Psychiatric/Behavioral:  Negative for depression. The patient is nervous/anxious.    All other systems reviewed and are negative.       Physical Exam  Temp:  [35.9 °C (96.6 °F)-36.7 °C (98 °F)] 36.2 °C (97.2 °F)  Pulse:  [63-95] 77  Resp:  [12-20] 16  BP: ()/(47-79) 121/57  SpO2:  [95 %-100 %] 100 %    Physical Exam  Vitals and nursing note reviewed. Exam conducted with a chaperone present (Wife at bedside).   Constitutional:       General: He is not in acute distress.     Appearance: He is ill-appearing (Chronically).   HENT:      Head: Normocephalic and atraumatic.      Mouth/Throat:      Pharynx: Oropharynx is clear.   Eyes:      Conjunctiva/sclera: Conjunctivae normal.   Cardiovascular:      Rate and Rhythm: Normal rate and regular rhythm.   Pulmonary:      Effort: Pulmonary effort is normal. No respiratory distress.      Breath sounds: Normal breath sounds. No wheezing.   Abdominal:      General: There is distension.      Palpations: Abdomen is soft.      Tenderness: There is no abdominal tenderness.      Comments: PD catheter in place   Musculoskeletal:         General: No tenderness.      Cervical back: Normal range of motion and neck supple.      Right lower leg: No edema.      Left lower leg: No edema.      Comments: Prevena wound VAC replaced left lower extremity   Skin:     General: Skin is warm and dry.       Comments: Bilateral toes with ischemic necrosis   Neurological:      General: No focal deficit present.      Mental Status: He is alert and oriented to person, place, and time.      Motor: Weakness present.   Psychiatric:         Mood and Affect: Mood is anxious. Affect is tearful.         Behavior: Behavior normal.         Thought Content: Thought content normal.         Cognition and Memory: Cognition normal.         Fluids    Intake/Output Summary (Last 24 hours) at 2/14/2025 1644  Last data filed at 2/14/2025 0936  Gross per 24 hour   Intake 150 ml   Output 321 ml   Net -171 ml        Laboratory  Recent Labs     02/12/25  0731 02/13/25  0741 02/13/25  1709 02/14/25  0102 02/14/25  1123   WBC 8.4 9.5  --  8.3  --    RBC 2.08* 2.46*  --  2.42*  --    HEMOGLOBIN 6.6* 7.3* 7.6* 7.2* 7.6*   HEMATOCRIT 20.6* 23.2* 23.2* 22.6* 24.5*   MCV 99.0* 94.3  --  93.4  --    MCH 31.7 29.7  --  29.8  --    MCHC 32.0* 31.5*  --  31.9*  --    RDW 59.3* 83.2*  --  80.1*  --    PLATELETCT 335 331  --  322  --    MPV 10.7 10.3  --  9.9  --      Recent Labs     02/12/25  0731 02/14/25  0102   SODIUM 133* 136   POTASSIUM 4.0 4.5   CHLORIDE 91* 93*   CO2 22 20   GLUCOSE 170* 253*   BUN 99* 107*   CREATININE 8.98* 9.14*   CALCIUM 9.0 9.0                   Imaging  EC-ECHOCARDIOGRAM COMPLETE W/O CONT    (Results Pending)        Assessment/Plan  * Obstruction of cystic duct- (present on admission)  Assessment & Plan  Noted on imaging at [facility]  General surgery consulted, after multiple discussions patient refused surgery preferred     Typical atrial flutter (HCC)  Assessment & Plan  Patient reports history of intermittent atrial flutter with dialysis, was previously on metoprolol XL  Remote cardiac monitoring   Restart Eliquis  Echo pending   Start metoprolol 12.5 mg BID and increase as tolerated    Melena  Assessment & Plan  Noted 2/13, could be due to iron replacement  PPI twice daily  Trend H&H q8H   Consulted GI   -Status  post EGD 2/14, concern for Stapleton's biopsies pending  Restart Eliquis, Plavix    PAD (peripheral artery disease) (HCC)- (present on admission)  Assessment & Plan  Patient is status post left lower extremity bypass, popliteal to tibial on 1/20  Eliquis and Plavix held for possible surgery and possible GI bleed.  EGD showed no bleeding and patient does not want surgery at this time will restart  Continue rosuvastatin  Vascular surgery consulted, Prevena wound VAC replaced on 2/13 by wound care    BPH (benign prostatic hyperplasia)- (present on admission)  Assessment & Plan  Continue home Flomax  Monitor urine output    Dyslipidemia- (present on admission)  Assessment & Plan  Continue home statin    Anemia in chronic renal disease- (present on admission)  Assessment & Plan  No sign of gross bleeding  Repeat CBC in the morning    Essential hypertension- (present on admission)  Assessment & Plan  Was on midodrine at rehab which has been Dc'd  -Continue Flomax, metoprolol 12.5 mg BID   As needed antihypertensives    ESRD on peritoneal dialysis (HCC)- (present on admission)  Assessment & Plan  Nephrology consulted for peritoneal dialysis  I also discussed Dr. Zamarripa's plan of hemodialysis for 2 weeks post cholecystectomy, he is in agreement and is requesting tunneled catheter which will be placed by Dr. Miller  Obtain labs and repeat a BMP in the morning         VTE prophylaxis:    therapeutic anticoagulation with eliquis 5 mg BID    I have performed a physical exam and reviewed and updated ROS and Plan today (2/14/2025). In review of yesterday's note (2/13/2025), there are no changes except as documented above.

## 2025-02-14 NOTE — ASSESSMENT & PLAN NOTE
Patient reports history of intermittent atrial flutter with dialysis, was previously on metoprolol XL  Remote cardiac monitoring   Restart Eliquis  Echo pending   Start metoprolol 12.5 mg BID and increase as tolerated

## 2025-02-15 ENCOUNTER — APPOINTMENT (OUTPATIENT)
Dept: RADIOLOGY | Facility: MEDICAL CENTER | Age: 80
DRG: 444 | End: 2025-02-15
Attending: INTERNAL MEDICINE
Payer: MEDICARE

## 2025-02-15 PROBLEM — I50.22 CHRONIC SYSTOLIC HEART FAILURE (HCC): Status: ACTIVE | Noted: 2025-02-15

## 2025-02-15 PROBLEM — I50.42 CHRONIC COMBINED SYSTOLIC AND DIASTOLIC HEART FAILURE (HCC): Status: ACTIVE | Noted: 2025-02-15

## 2025-02-15 LAB
ALBUMIN SERPL BCP-MCNC: 2.6 G/DL (ref 3.2–4.9)
BUN SERPL-MCNC: 109 MG/DL (ref 8–22)
CALCIUM ALBUM COR SERPL-MCNC: 10.1 MG/DL (ref 8.5–10.5)
CALCIUM SERPL-MCNC: 9 MG/DL (ref 8.5–10.5)
CHLORIDE SERPL-SCNC: 93 MMOL/L (ref 96–112)
CO2 SERPL-SCNC: 19 MMOL/L (ref 20–33)
CREAT SERPL-MCNC: 8.74 MG/DL (ref 0.5–1.4)
ERYTHROCYTE [DISTWIDTH] IN BLOOD BY AUTOMATED COUNT: 82.1 FL (ref 35.9–50)
GFR SERPLBLD CREATININE-BSD FMLA CKD-EPI: 6 ML/MIN/1.73 M 2
GLUCOSE SERPL-MCNC: 241 MG/DL (ref 65–99)
HCT VFR BLD AUTO: 22.1 % (ref 42–52)
HCT VFR BLD AUTO: 25.8 % (ref 42–52)
HGB BLD-MCNC: 6.8 G/DL (ref 14–18)
HGB BLD-MCNC: 8.1 G/DL (ref 14–18)
MCH RBC QN AUTO: 29.8 PG (ref 27–33)
MCHC RBC AUTO-ENTMCNC: 30.8 G/DL (ref 32.3–36.5)
MCV RBC AUTO: 96.9 FL (ref 81.4–97.8)
PHOSPHATE SERPL-MCNC: 6.7 MG/DL (ref 2.5–4.5)
PLATELET # BLD AUTO: 331 K/UL (ref 164–446)
PMV BLD AUTO: 10.3 FL (ref 9–12.9)
POTASSIUM SERPL-SCNC: 4.5 MMOL/L (ref 3.6–5.5)
RBC # BLD AUTO: 2.28 M/UL (ref 4.7–6.1)
SODIUM SERPL-SCNC: 136 MMOL/L (ref 135–145)
WBC # BLD AUTO: 8.4 K/UL (ref 4.8–10.8)

## 2025-02-15 PROCEDURE — A9270 NON-COVERED ITEM OR SERVICE: HCPCS | Performed by: INTERNAL MEDICINE

## 2025-02-15 PROCEDURE — 36415 COLL VENOUS BLD VENIPUNCTURE: CPT

## 2025-02-15 PROCEDURE — 90945 DIALYSIS ONE EVALUATION: CPT

## 2025-02-15 PROCEDURE — 700102 HCHG RX REV CODE 250 W/ 637 OVERRIDE(OP): Performed by: INTERNAL MEDICINE

## 2025-02-15 PROCEDURE — 85014 HEMATOCRIT: CPT

## 2025-02-15 PROCEDURE — 700111 HCHG RX REV CODE 636 W/ 250 OVERRIDE (IP): Performed by: INTERNAL MEDICINE

## 2025-02-15 PROCEDURE — P9016 RBC LEUKOCYTES REDUCED: HCPCS

## 2025-02-15 PROCEDURE — 30233N1 TRANSFUSION OF NONAUTOLOGOUS RED BLOOD CELLS INTO PERIPHERAL VEIN, PERCUTANEOUS APPROACH: ICD-10-PCS | Performed by: INTERNAL MEDICINE

## 2025-02-15 PROCEDURE — 86923 COMPATIBILITY TEST ELECTRIC: CPT

## 2025-02-15 PROCEDURE — 36430 TRANSFUSION BLD/BLD COMPNT: CPT

## 2025-02-15 PROCEDURE — 85018 HEMOGLOBIN: CPT

## 2025-02-15 PROCEDURE — 76775 US EXAM ABDO BACK WALL LIM: CPT

## 2025-02-15 PROCEDURE — 770004 HCHG ROOM/CARE - ONCOLOGY PRIVATE *

## 2025-02-15 PROCEDURE — 85027 COMPLETE CBC AUTOMATED: CPT

## 2025-02-15 PROCEDURE — 80069 RENAL FUNCTION PANEL: CPT

## 2025-02-15 PROCEDURE — 99233 SBSQ HOSP IP/OBS HIGH 50: CPT | Performed by: INTERNAL MEDICINE

## 2025-02-15 RX ORDER — TRAZODONE HYDROCHLORIDE 100 MG/1
50 TABLET ORAL
Status: DISCONTINUED | OUTPATIENT
Start: 2025-02-15 | End: 2025-02-18 | Stop reason: HOSPADM

## 2025-02-15 RX ORDER — FUROSEMIDE 20 MG/1
20 TABLET ORAL
Status: DISCONTINUED | OUTPATIENT
Start: 2025-02-15 | End: 2025-02-18 | Stop reason: HOSPADM

## 2025-02-15 RX ORDER — METOPROLOL TARTRATE 25 MG/1
25 TABLET, FILM COATED ORAL 2 TIMES DAILY
Status: DISCONTINUED | OUTPATIENT
Start: 2025-02-15 | End: 2025-02-18 | Stop reason: HOSPADM

## 2025-02-15 RX ORDER — OMEPRAZOLE 20 MG/1
40 CAPSULE, DELAYED RELEASE ORAL DAILY
Status: DISCONTINUED | OUTPATIENT
Start: 2025-02-16 | End: 2025-02-18 | Stop reason: HOSPADM

## 2025-02-15 RX ORDER — AMOXICILLIN AND CLAVULANATE POTASSIUM 500; 125 MG/1; MG/1
1 TABLET, FILM COATED ORAL EVERY 12 HOURS
Status: DISCONTINUED | OUTPATIENT
Start: 2025-02-15 | End: 2025-02-18 | Stop reason: HOSPADM

## 2025-02-15 RX ADMIN — METOPROLOL TARTRATE 12.5 MG: 25 TABLET, FILM COATED ORAL at 06:19

## 2025-02-15 RX ADMIN — MONTELUKAST SODIUM 10 MG: 10 TABLET, FILM COATED ORAL at 21:44

## 2025-02-15 RX ADMIN — TRAZODONE HYDROCHLORIDE 50 MG: 100 TABLET ORAL at 21:45

## 2025-02-15 RX ADMIN — METOPROLOL TARTRATE 25 MG: 25 TABLET, FILM COATED ORAL at 18:04

## 2025-02-15 RX ADMIN — PANTOPRAZOLE SODIUM 40 MG: 40 INJECTION, POWDER, FOR SOLUTION INTRAVENOUS at 07:49

## 2025-02-15 RX ADMIN — CLOPIDOGREL BISULFATE 75 MG: 75 TABLET ORAL at 06:19

## 2025-02-15 RX ADMIN — METRONIDAZOLE 500 MG: 500 INJECTION, SOLUTION INTRAVENOUS at 07:55

## 2025-02-15 RX ADMIN — TAMSULOSIN HYDROCHLORIDE 0.4 MG: 0.4 CAPSULE ORAL at 08:08

## 2025-02-15 RX ADMIN — APIXABAN 5 MG: 5 TABLET, FILM COATED ORAL at 18:04

## 2025-02-15 RX ADMIN — FUROSEMIDE 20 MG: 20 TABLET ORAL at 12:39

## 2025-02-15 RX ADMIN — APIXABAN 5 MG: 5 TABLET, FILM COATED ORAL at 06:19

## 2025-02-15 RX ADMIN — AMOXICILLIN AND CLAVULANATE POTASSIUM 1 TABLET: 500; 125 TABLET, FILM COATED ORAL at 18:04

## 2025-02-15 RX ADMIN — ROSUVASTATIN CALCIUM 20 MG: 20 TABLET, FILM COATED ORAL at 21:45

## 2025-02-15 ASSESSMENT — ENCOUNTER SYMPTOMS
INSOMNIA: 1
MYALGIAS: 0
WHEEZING: 0
ABDOMINAL PAIN: 0
DEPRESSION: 0
NAUSEA: 0
WEAKNESS: 1
DIZZINESS: 0
COUGH: 0
HEMOPTYSIS: 0
CHILLS: 0
DIAPHORESIS: 0
FEVER: 0
HEADACHES: 0
CONSTIPATION: 0
VOMITING: 0
HEMATOCHEZIA: 0
NERVOUS/ANXIOUS: 1
SHORTNESS OF BREATH: 0

## 2025-02-15 ASSESSMENT — PAIN DESCRIPTION - PAIN TYPE
TYPE: ACUTE PAIN
TYPE: ACUTE PAIN

## 2025-02-15 NOTE — CONSULTS
Cardiology Initial Consult Note    Date of note:    2/15/2025      Consulting Physician: Crystal Valle D.O.    Name:   Edy Bennett   YOB: 1945  Age:   79 y.o.  male   MRN:   [id number]    Assessment/Recommendations:  1.  Heart failure with reduced ejection fraction.  It is possibly a new diagnosis.  However would be nice to obtain records from renal heart and vascular clinic.  Prior nephrology notes mention heart failure with preserved ejection fraction but I did not see a formal echo.  Discussed with him causes for decreased LVEF, and with his prior history of stent placement, obstructive coronary disease is high possibility.  However his presenting symptoms of vomiting seems unlikely anginal equivalent and seems more due to cholecystitis.  Patient is very reluctant to have any further procedures done because he does not tolerate hemodialysis well.  If we were to have cardiac catheterization there is potential chance he will also have to go temporarily on hemodialysis.  He feels if he can at all treat this medically he would prefer that.  However he does know down the road he may have to have cardiac catheterization with possible percutaneous coronary invention.  His anemia also presents a problem as to whether he can tolerate antiplatelet along with anticoagulation therapy. Troponins were not checked, but would likely be abnormal and would not help with decision-making.     -restart clopidogrel 75 mg po qd   -restart eliquis   -at some point will need cardiac cath and possible PCI but may not need to be this adm   -vomiting is unlikely anginal equivalent and more from acute cholecystitis   -Change to toprol XL 25 mg po qpm upon discharge   -continue with rosuvastatin    -add low dose losartan 12.5 to 25 mg po qam if BP tolerates and nephrology okay   -will need maintenance lasix would not hold unless SBP < 90 mmHg   -No entresto, spironolactone and Farxiga due to renal failure   -get  records from [facility] [place]   -Patient knows to call Dr. Elaine's office for follow-up in 2 weeks after discharge    2.  CAD prior history of stents (See above)    3.  Anemia felt due to anemia of chronic disease.    4.  Acute cholecystitis per primary treating team and general surgery.    Disposition: Cardiology will sign off at this time.  Discussed with Dr. Valle, please reconsult cardiology for changes in clinical status.    Reason for Consultation: New diagnosis of heart failure with reduced ejection fraction    HPI:  Edy Bennett is a 79 y.o. male end-stage renal disease on peritoneal dialysis, hyperlipidemia on rosuvastatin, low blood pressure as he was on outpatient midodrine, coronary artery disease, status post 3 stents in August 2023, diabetes mellitus, history of atrial flutter that is paroxysmal (on Eliquis), moderate aortic stenosis peripheral vascular disease status post recent left lower extremity popliteal to tibial bypass on 1/20/2025, who [facility] for nausea vomiting as well as some right upper quadrant pain.    Per patient and wife, patient was doing fine at rehab, however he started developing sudden onset of vomiting.  Wife is clear he actually did not feel any nausea, he did vomit and feel better.    Patient reports his cardiologist Dr. Elaine at [facility] [place].  He did have 3 stents placed (3.5 x 15, 2.5 x 38 into the proximal RCA and a 2.5 x 12 into the first obtuse marginal branch.  Reports are currently not available this is from the card he carries.  His symptoms were more dyspnea on exertion.  He was playing golf 5 times a week and was noticing more shortness of breath with golfing.  After the stents were placed, his breathing did improve.  However last year, he started to feel very short of breath again.  They thought it was more because of his kidneys failing.  He has been seen at [facility] in [place]  "for possible renal transplant.    Of note, he has had to go on hemodialysis for brief periods when he undergoes surgery and he has not tolerated hemodialysis well at all.    Interval history:  2/12: Hemoglobin 6.6, status post 1 unit of packed red blood cell transfusion.  Seen by general surgery who recommended cholecystectomy but patient currently is not wanting to have surgery.  Patient has improved since starting on antibiotics.  There is discussion with nephrology about changing from peritoneal dialysis to hemodialysis.  Patient does have a new left arm AV fistula.    2/13: There was concern for vomiting was anginal equivalent and therefore that prompted the echocardiogram.    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    No troponins this hospitalization.  BNP > 67792  Cardiac Imaging and Procedures Review (personally reviewed and notable for):    EKG dated 2/14/2025: Atrial fibrillation, 116 bpm, nonspecific interventricular conduction delay (borderline), ST-T changes laterally (similar to ECG of 12/18/2024).  Atrial fibrillation is new compared to ECG of 12/18/2024.    Echo dated 2/14/2025: Mildly enlarged left ventricular cavity size, moderate concentric hypertrophy, and severely decreased systolic function, visually estimated LVEF 25%.  Akinetic apex anteroseptal and inferoseptal wall.  Moderate left atrial enlargement.  Mild to moderate mitral regurgitation.  Mioderate aortic stenosis although could be underestimated.  Mild tricuspid regurgitation.  RV systolic pressure estimated at 40 mm capital Hg.  Low normal right ventricular systolic function.    Telemetry (last 24 hours): sinus PACs, PVCs    Radiology test Review:  EC-ECHOCARDIOGRAM COMPLETE W/O CONT   Final Result        Physical Exam  Body mass index is 24.33 kg/m².  /69   Pulse 74   Temp 36.8 °C (98.3 °F) (Oral)   Resp 18   Ht 1.707 m (5' 7.21\")   Wt 70.9 kg (156 lb 4.9 oz)   SpO2 100% "   Vitals:    25 2355 02/15/25 0418 02/15/25 0433 02/15/25 0743   BP: 115/62 119/72 117/78 110/69   Pulse: 75 78 79 74   Resp: 14 16 16 18   Temp: 36.5 °C (97.7 °F) 36 °C (96.8 °F) 36.3 °C (97.3 °F) 36.8 °C (98.3 °F)   TempSrc: Temporal Oral Oral Oral   SpO2: 100% 99% 99% 100%   Weight:       Height:         Oxygen Therapy:  Pulse Oximetry: 100 %, O2 (LPM): 0, O2 Delivery Device: None - Room Air    Physical Exam  Constitutional:       Appearance: Normal appearance. He is underweight.   Eyes:      Extraocular Movements: Extraocular movements intact.      Conjunctiva/sclera: Conjunctivae normal.   Neck:      Vascular: No carotid bruit or JVD.   Cardiovascular:      Rate and Rhythm: Normal rate and regular rhythm.      Pulses:           Radial pulses are 1+ on the right side and 1+ on the left side.        Posterior tibial pulses are 1+ on the right side and 1+ on the left side.      Heart sounds: Heart sounds are distant. Murmur heard.      Early systolic murmur is present with a grade of 2/6.      No friction rub. No gallop.   Pulmonary:      Effort: Pulmonary effort is normal. No respiratory distress.      Breath sounds: Normal breath sounds. No wheezing or rales.   Abdominal:      General: Bowel sounds are normal.      Palpations: Abdomen is soft.   Musculoskeletal:      Cervical back: Neck supple.      Right lower le+ Edema present.      Left lower le+ Edema present.      Comments: Left greater then right wound vac   Skin:     General: Skin is warm and dry.   Neurological:      Mental Status: He is alert and oriented to person, place, and time. Mental status is at baseline.   Psychiatric:         Mood and Affect: Mood normal.          Problem list:  Principal Problem:    Obstruction of cystic duct (POA: Yes)  Active Problems:    ESRD on peritoneal dialysis (HCC) (POA: Yes)    Essential hypertension (POA: Yes)    Anemia in chronic renal disease (POA: Yes)    Dyslipidemia (POA: Yes)    BPH (benign  prostatic hyperplasia) (POA: Yes)    PAD (peripheral artery disease) ([facility]) (POA: Yes)    Melena (POA: No)    Typical atrial flutter ([facility]) (POA: Unknown)  Resolved Problems:    * No resolved hospital problems. *      Past Medical History:   Diagnosis Date    A-V fistula ([facility])     Left Arm    Arrhythmia 03/13/2024    Due to kidney failure treatment    Breath shortness     with exertion    Cataract     shon iol    Congestive heart failure ([facility])     Diabetes ([facility])     not on any medication at this time, being monitored at dialysis    Dialysis patient ([facility])     monday wednesday friday at [facility]    Dialysis patient ([facility]) 01/17/2025    Starting Peritoneal Dialysis @ Home on 1/17/2025. Follows Nephrologist MD Milian.    Foot ulcer ([facility])     left foot. being seen by wound care    Hemorrhagic disorder ([facility])     plavix eliquis    High cholesterol     Hypertension 05/24/2024    states controlled    DARIEN (obstructive sleep apnea) 01/17/2025    O2 @ 2.5L Per Patient's Wife.    Pain     Peritoneal dialysis catheter in place ([facility]) 01/17/2025    Pneumonia     3/24    Renal disorder     Sleep apnea 01/10/2025    Home sleep study done 1/3/2025 - no results yet.    Stroke ([facility]) 2021    tia       Past Surgical History:   Procedure Laterality Date    NC UPPER GI ENDOSCOPY,DIAGNOSIS N/A 2/14/2025    Procedure: GASTROSCOPY;  Surgeon: Melany Richards M.D.;  Location: SURGERY SAME DAY [facility];  Service: Gastroenterology    NC UPPER GI ENDOSCOPY,BIOPSY N/A 2/14/2025    Procedure: GASTROSCOPY, WITH BIOPSY;  Surgeon: Melany Richards M.D.;  Location: SURGERY SAME DAY [facility];  Service: Gastroenterology    NC VEIN BYPASS GRAFT,FEM-TIBIAL Left 1/20/2025    Procedure: LEFT LOWER EXTREMITY BYPASS, POPLITIAL TO TIBIAL BYPASS;  Surgeon: Marco Antonio Miller M.D.;  Location: SURGERY [facility];  Service: Vascular    AORTOGRAM WITH RUNOFF  1/14/2025    Procedure: AORTOGRAM WITH RUNOFF, right peroneal artery intravascular lithotripsy and  stent placement;  Surgeon: Marco Antonio Miller M.D.;  Location: SURGERY [facility];  Service: Vascular    INGUINAL HERNIA LAPAROSCOPIC N/A 12/18/2024    Procedure: LAPAROSCOPIC LEFT INGUINAL HERNIA REPAIR WITH MESH, LAPAROSCOPIC REPOSITIONING OF PERITONEAL DIALYSIS CATHETER;  Surgeon: Marco Antonio Miller M.D.;  Location: SURGERY [facility];  Service: Vascular    CATH PLACEMENT CAPD N/A 12/18/2024    Procedure: REPOSITION, CATHETER, CAPD;  Surgeon: Marco Antonio Miller M.D.;  Location: SURGERY [facility];  Service: Vascular    CATH PLACEMENT CAPD N/A 09/16/2024    Procedure: LAPAROSCOPIC INSERTION OF PERITONEAL DIALYSIS CATHETER PLACEMENT;  Surgeon: Marco Antonio Miller M.D.;  Location: SURGERY SAME DAY [facility];  Service: Vascular    AV FISTULA CREATION Left 07/01/2024    Procedure: CREATION OF LEFT UPPER EXTREMITY DIALYSIS FISTULA;  Surgeon: Shilo Mercedes M.D.;  Location: SURGERY [facility];  Service: General    OTHER ORTHOPEDIC SURGERY  2004    bunion r foot    OTHER      tonsils adenoids    OTHER      dialysis catheter in upper right chest    OTHER      shon iol    OTHER CARDIAC SURGERY      cardiac stents       Medications Prior to Admission   Medication Sig Dispense Refill Last Dose/Taking    bisacodyl (DULCOLAX) 10 MG Suppos Insert 10 mg into the rectum 1 time a day as needed. Indications: Constipation   2/5/2025 at 10:21 AM    epoetin jayna (EPOGEN/PROCRIT) 4000 UNIT/ML Solution Inject 4,000 Units under the skin every 7 days.   2/8/2025 at  4:05 PM    lanthanum carbonate (FOSRENOL) 500 MG Chew Tab Chew 500 mg 3 times a day with meals.   2/11/2025 at  8:49 AM    magnesium hydroxide (MILK OF MAGNESIA) 400 MG/5ML Suspension Take 30 mL by mouth 1 time a day as needed. Indications: Constipation   2/1/2025 at  7:59 AM    methocarbamol (ROBAXIN) 500 MG Tab Take 250 mg by mouth 4 times a day as needed. Indications: Musculoskeletal Pain   2/3/2025 at  8:14 AM    midodrine (PROAMATINE) 10 MG tablet Take 15 mg by mouth  3 times a day with meals.   2/11/2025 at  8:48 AM    omeprazole (PRILOSEC) 40 MG delayed-release capsule Take 40 mg by mouth every day.   2/11/2025 at  8:48 AM    ondansetron (ZOFRAN ODT) 4 MG TABLET DISPERSIBLE Take 4 mg by mouth 2 times a day with meals.   2/10/2025 at  4:49 PM    ondansetron (ZOFRAN ODT) 4 MG TABLET DISPERSIBLE Take 4 mg by mouth 4 times a day as needed for Nausea/Vomiting.   2/9/2025 at  3:37 PM    oxyCODONE immediate-release (ROXICODONE) 5 MG Tab Take 5 mg by mouth every four hours as needed for Severe Pain.   2/8/2025 at  5:12 PM    potassium chloride SA (KDUR) 20 MEQ Tab CR Take 20 mEq by mouth every day.   2/11/2025 at  8:49 AM    senna-docusate (PERICOLACE OR SENOKOT S) 8.6-50 MG Tab Take 2 Tablets by mouth 2 times a day.   2/11/2025 at  8:48 AM    polyethylene glycol/lytes (MIRALAX) Pack Take 17 g by mouth 1 time a day as needed. Indications: Constipation   2/1/2025 at  5:38 AM    sevelamer carbonate (RENVELA) 800 MG Tab tablet Take 800 mg by mouth 1 time a day as needed (with snack).   2/1/2025 at  9:22 PM    simethicone (MYLICON) 125 MG chewable tablet Chew 125 mg in the morning, at noon, and at bedtime.   2/11/2025 at  8:49 AM    torsemide (DEMADEX) 10 MG tablet Take 10 mg by mouth every day.   2/1/2025 at  5:11 AM    clopidogrel (PLAVIX) 75 MG Tab Take 75 mg by mouth every day.   2/11/2025 at  8:49 AM    gentamicin (GARAMYCIN) 0.1 % cream Apply 1 Application topically every day.   2/10/2025 at  7:35 PM    apixaban (ELIQUIS) 5mg Tab Take 5 mg by mouth 2 times a day.   2/11/2025 at  8:48 AM    montelukast (SINGULAIR) 10 MG Tab Take 10 mg by mouth at bedtime.   2/10/2025 at  9:26 PM    rosuvastatin (CRESTOR) 20 MG Tab Take 20 mg by mouth at bedtime.      2/10/2025 at  9:26 PM     Current Facility-Administered Medications   Medication Dose Route Frequency Provider Last Rate Last Admin    tamsulosin (Flomax) capsule 0.4 mg  0.4 mg Oral AFTER BREAKFAST Crystal Valle D.O.        apixaban  (Eliquis) tablet 5 mg  5 mg Oral BID Crystal Valle D.O.   5 mg at 02/15/25 0619    clopidogrel (Plavix) tablet 75 mg  75 mg Oral DAILY Crystal Valle D.O.   75 mg at 02/15/25 0619    montelukast (Singulair) tablet 10 mg  10 mg Oral QHS Crystal Valle D.O.   10 mg at 02/14/25 2000    pantoprazole (Protonix) injection 40 mg  40 mg Intravenous BID Crystal Valle D.O.   40 mg at 02/15/25 0749    metoprolol tartrate (Lopressor) tablet 12.5 mg  12.5 mg Oral BID Crystal Valle D.O.   12.5 mg at 02/15/25 0619    gentamicin (Garamycin) 0.1 % cream   Topical PRN OZEIL Rob.O.   Given at 02/14/25 1820    rosuvastatin (Crestor) tablet 20 mg  20 mg Oral QHS MEERA MuseO.   20 mg at 02/14/25 2001    sevelamer carbonate (Renvela) tablet 800 mg  800 mg Oral QDAY PRN Zach Knight D.O.        acetaminophen (Tylenol) tablet 650 mg  650 mg Oral Q6HRS PRN Zach Knight D.O.        Pharmacy Consult Request ...Pain Management Review 1 Each  1 Each Other PHARMACY TO DOSE Zach Knight D.O.        oxyCODONE immediate-release (Roxicodone) tablet 2.5 mg  2.5 mg Oral Q3HRS PRN Zach Knight D.O.        Or    oxyCODONE immediate-release (Roxicodone) tablet 5 mg  5 mg Oral Q3HRS PRN Zach Knight D.O.        Or    morphine 4 MG/ML injection 1 mg  1 mg Intravenous Q4HRS PRN Zach Knight D.O.        labetalol (Normodyne/Trandate) injection 10 mg  10 mg Intravenous Q4HRS PRN Zach Knight D.O.        ondansetron (Zofran) syringe/vial injection 4 mg  4 mg Intravenous Q4HRS PRN MEERA MuseO.   4 mg at 02/12/25 2040    Or    ondansetron (Zofran ODT) dispertab 4 mg  4 mg Oral Q4HRS PRN Zach Knight D.O.        haloperidol lactate (Haldol) injection 1 mg  1 mg Intravenous Q3HRS PRN Zach Knight D.O.        methocarbamol (Robaxin) tablet 250 mg  250 mg Oral 4X/DAY PRN Zach Knight D.O.        cefTRIAXone (Rocephin) syringe 1,000 mg  1,000 mg Intravenous Q24HRS Zach Knight D.O.    1,000 mg at 02/14/25 1709    metroNIDAZOLE (Flagyl) IVPB 500 mg  500 mg Intravenous Q12HRS Zach Knight D.O.   Stopped at 02/14/25 1811       No Known Allergies    Family History   Problem Relation Age of Onset    Cancer Mother         Brain cancer    Heart Disease Father         Several By-pass Surgeries    Cancer Daughter         Thyroid Cancer    Heart Disease Brother         Fatal Heart Attack       Social History     Socioeconomic History    Marital status:      Spouse name: Not on file    Number of children: Not on file    Years of education: Not on file    Highest education level: Bachelor's degree (e.g., BA, AB, BS)   Occupational History    Not on file   Tobacco Use    Smoking status: Never    Smokeless tobacco: Never    Tobacco comments:     Used chewing tobacco many years ago.   Vaping Use    Vaping status: Never Used   Substance and Sexual Activity    Alcohol use: Not Currently    Drug use: Never    Sexual activity: Not on file   Other Topics Concern    Not on file   Social History Narrative    Not on file     Social Drivers of Health     Financial Resource Strain: Low Risk  (1/25/2025)    Overall Financial Resource Strain (CARDIA)     Difficulty of Paying Living Expenses: Not hard at all   Food Insecurity: No Food Insecurity (2/11/2025)    Hunger Vital Sign     Worried About Running Out of Food in the Last Year: Never true     Ran Out of Food in the Last Year: Never true   Transportation Needs: No Transportation Needs (2/11/2025)    PRAPARE - Transportation     Lack of Transportation (Medical): No     Lack of Transportation (Non-Medical): No   Physical Activity: Inactive (1/25/2025)    Exercise Vital Sign     Days of Exercise per Week: 0 days     Minutes of Exercise per Session: 0 min   Stress: Stress Concern Present (1/25/2025)    [place] [employer] - Occupational Stress Questionnaire     Feeling of Stress : To some extent   Social Connections: Unknown (1/25/2025)     Social Connection and Isolation Panel [NHANES]     Frequency of Communication with Friends and Family: More than three times a week     Frequency of Social Gatherings with Friends and Family: Twice a week     Attends Caodaism Services: Patient declined     Active Member of Clubs or Organizations: Yes     Attends Club or Organization Meetings: More than 4 times per year     Marital Status:    Intimate Partner Violence: Not At Risk (2/11/2025)    Humiliation, Afraid, Rape, and Kick questionnaire     Fear of Current or Ex-Partner: No     Emotionally Abused: No     Physically Abused: No     Sexually Abused: No   Housing Stability: Low Risk  (2/11/2025)    Housing Stability Vital Sign     Unable to Pay for Housing in the Last Year: No     Number of Times Moved in the Last Year: 0     Homeless in the Last Year: No         Intake/Output Summary (Last 24 hours) at 2/15/2025 0758  Last data filed at 2/15/2025 0743  Gross per 24 hour   Intake 790.67 ml   Output 1482 ml   Net -691.33 ml         Review of Systems   Constitutional: Negative for diaphoresis and fever.   Respiratory:  Negative for cough, hemoptysis and wheezing.    Gastrointestinal:  Negative for hematochezia and melena.   Genitourinary:  Negative for hematuria.        Labs (personally reviewed and notable for):   Lab Results   Component Value Date/Time    SODIUM 136 02/15/2025 01:12 AM    POTASSIUM 4.5 02/15/2025 01:12 AM    CHLORIDE 93 (L) 02/15/2025 01:12 AM    CO2 19 (L) 02/15/2025 01:12 AM    GLUCOSE 241 (H) 02/15/2025 01:12 AM     (H) 02/15/2025 01:12 AM    CREATININE 8.74 (HH) 02/15/2025 01:12 AM      Lab Results   Component Value Date/Time    WBC 8.4 02/15/2025 01:12 AM    RBC 2.28 (L) 02/15/2025 01:12 AM    HEMOGLOBIN 6.8 (L) 02/15/2025 01:12 AM    HEMATOCRIT 22.1 (L) 02/15/2025 01:12 AM    MCV 96.9 02/15/2025 01:12 AM    MCH 29.8 02/15/2025 01:12 AM    MCHC 30.8 (L) 02/15/2025 01:12 AM    MPV 10.3 02/15/2025 01:12 AM    NEUTSPOLYS 82.00  "(H) 01/31/2025 05:27 AM    LYMPHOCYTES 8.40 (L) 01/31/2025 05:27 AM    MONOCYTES 6.50 01/31/2025 05:27 AM    EOSINOPHILS 2.00 01/31/2025 05:27 AM    BASOPHILS 0.50 01/31/2025 05:27 AM    INR 1.31 (H) 01/20/2025 12:33 PM      No results found for: \"CHOLSTRLTOT\", \"LDL\", \"HDL\", \"TRIGLYCERIDE\"    No results found for: \"TROPONINT\"  Lab Results   Component Value Date/Time    NTPROBNP >71954 (H) 01/24/2025 0519     Lab Results   Component Value Date/Time    HBA1C 5.8 (H) 01/24/2025 0519       Thank you for allowing me to participate in the care of this patient.  Please contact me with any questions.    Bryanna Stone M.D.  Cardiologist, [facility] [place]     This note was generated using voice recognition software which has a small chance of producing errors of grammar and possibly content. I have made every reasonable attempt to find and correct any obvious errors, but expect that some may not be found prior to finalization of this note.    "

## 2025-02-15 NOTE — PROGRESS NOTES
[facility]    24 hour UF: 1482 mL. (Total UF 1238 mL + I drain 244 mL - last fill 0 mL )    Patient is alert and oriented x 4.No discomfort and pain reported. VS within normal limits. PD catheter on Right lower quadrant, catherter intact, dressing is CDI. No reported concerns and alarms from PCN.   Patient aseptically disconnected from [facility] cycler at 0630. Effluent is clear,yellow and no fibrin noted.      Report given to PCN SHELDON Sepulveda RN    See dialysis treatment flow sheet for details.

## 2025-02-15 NOTE — ASSESSMENT & PLAN NOTE
No signs of fluid overload or exacerbation on exam  Echo showed EF 30-35% with akinesis and global hypokinesis, grade II diastolic   Cardiology consulted, suspect this is chronic  -Recommending outpatient follow-up with cardiology for consideration of cardiac cath in the future  -Increase metoprolol 25 mg, start oral Lasix 20 mg  -Per nephrology okay to start losartan, will add if blood pressures remain stable  Cannot start Entresto, Aldactone, or Farxiga due to ESRD

## 2025-02-15 NOTE — CARE PLAN
The patient is Watcher - Medium risk of patient condition declining or worsening    Shift Goals  Clinical Goals: tolerate dialysis, wound care  Patient Goals: rest  Family Goals: not at bedside    Progress made toward(s) clinical / shift goals:    Wound care on sacrum and toes performed. Pt says that it looks like its getting better.   Pt tolerating dialysis  Problem: Knowledge Deficit - Standard  Goal: Patient and family/care givers will demonstrate understanding of plan of care, disease process/condition, diagnostic tests and medications  Outcome: Progressing   .     Patient is not progressing towards the following goals:    Hemoglobin is trending down. Pt is currently receiving 1 unit of RBC.

## 2025-02-15 NOTE — CARE PLAN
The patient is Watcher - Medium risk of patient condition declining or worsening    Shift Goals  Clinical Goals: Nausea control  Patient Goals: Rest  Family Goals: No family at bedside    Progress made toward(s) clinical / shift goals: Nausea well controlled at this time, patient napping, eyes closed chest rising      Problem: Pain Management  Goal: Pain level will decrease to patient's comfort goal  Outcome: Progressing  Note: Reported pain using 0 - 10 pain scale, Pain management medications administered as ordered      Problem: Fall Risk  Goal: Patient will remain free from falls  Outcome: Progressing  Note: Patient call appropriately for toileting, patient wearing nonslip socks, patient belongings and call light within reach       Patient is not progressing towards the following goals:

## 2025-02-15 NOTE — PROGRESS NOTES
[facility] Medicine Daily Progress Note    Date of Service  2/15/2025    Chief Complaint  Edy Bennett is a 79 y.o. male admitted 2/11/2025 with intractable nausea and vomiting     Hospital Course  79 y.o. male who presented 2/11/2025 with intractable nausea and vomiting.  Patient did receive a left lower extremity bypass, popliteal to tibial on 1/20, was being treated at [facility].  He began having nausea and vomiting as well as some right upper quadrant pain.  Workup eventually showed a obstruction of the cystic duct.  Because of this, patient was transferred here due to need for intervention.  Consulted GI, vascular surgery and general surgery as well as nephrology.  Patient is on chronic peritoneal dialysis.  Patient was started on IV antibiotics for cholecystitis and his symptoms improved.  Currently patient refusing to have surgery and general surgery signed off.  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  His anticoagulation has been resumed.  Vascular surgery removed patient's Prevena wound VAC that was in place and wound care replaced a new one on 2/13.     EKG was performed and showed atrial flutter, patient does have a history of this and was previously on metoprolol which has been restarted.  Echocardiogram pending.  PT recommending postacute placement.  PM&R consulted.     Interval Problem Update  2/12 Vitals stable on room air   WBC 8.4, hemoglobin 6.6  -S/p 1 unit PRBC transfusion.  Patient tolerated transfusion well no signs of fluid overload or dyspnea, continue to monitor  -Discussed with nephrology, continue PD for now  -Discussed with general surgery, they are recommending cholecystectomy however patient is undecided.  Will keep patient n.p.o. at midnight for possible OR tomorrow if patient agreeable  -Discussed with vascular surgery, they will come and evaluate the patient as he still  has Prevena wound VAC in place from surgery  -Long discussion with patient and his wife at bedside.  Patient reports since starting the antibiotics he feels significantly better no longer having the vomiting episodes was also having urinary urgency over at the rehab which has now resolved.  He is very hesitant to have another surgery as he has had multiple recent surgeries and he is not enthused about going back on HD again as he just had his catheter taken out a week ago.  Discussed risk versus benefits of surgical intervention versus supportive care with antibiotic treatment, he would be at risk for having another acute event if his gallbladder is not removed as cystic duct is unable to be evacuated by GI.  Patient and his wife will talk about it some more and will let us know their decision tomorrow.    2/13 No acute events overnight   Hemoglobin 7.3 post transfusion yesterday   Discussed with surgery, patient would prefer non operative treatment  Notified by nursing that patient had black stool yesterday evening as well as this morning.  Patient does report he has been having difficulty controlling his bowel movements when he urinates he also defecates which has not happened previously.  He was on iron supplementation previously.  Continue to hold Plavix and Eliquis for now.  Start IV PPI twice daily  Discussed with gastroenterology, plan for EGD tomorrow.  Lengthy discussion with GI, myself, patient and his wife. Concerned that patient's vomiting is an anginal equivalent as he would vomit in the evenings about 5 hours after eating lunch without preceding abdominal pain or nausea and usually with exercise.  EKG pending.  On review of prior imaging studies patient has not had an echo previously, will check echocardiogram.  Also noted on chart review that patient was on scopolamine patch at rehab I suspect this may be the cause of his urinary retention, patient's wife does report that this was started at the same  time the urinary retention started.  I have advised him against using this for vomiting in the future.    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  This afternoon patient reports that he is not having any nausea and is tolerating full liquid diet well and has not had any vomiting.  Patient had been taken off of his metoprolol at rehab due to his blood pressure and was actually on midodrine and Lasix at that time.  He has not required midodrine and his lower extremity edema has resolved without Lasix during this admission.  I would like for him to continue working with therapy while on telemetry to see if the vomiting episodes correlate with his atrial flutter.  Patient would like to wait and see how the weekend goes if his vomiting does not return he does not want surgery at this time however if he becomes symptomatic again he will reconsider.    2/15 BP stable   Hb 6.8, 1 unit pRBC transfused this morning   Cr 8.74,    Echo showed EF 30-35% with akinesis and global hypokinesis, grade II diastolic   Discussed with cardiology recommended metoprolol 25 mg, changed to XL formulation on discharge, restart low-dose Lasix and recommending losartan.  He may ultimately need cardiac cath but can be done as an outpatient especially since  patient is hesitant to go back on hemodialysis  Discussed with nephrology okay from their parent to start losartan, also agreeable to restarting EPO for his blood counts.  Patient overall is feeling better, he has not had any vomiting in the last 2 days.  Will upgrade to GI soft diet.  He is working with his wife to start getting up and moving around his room more.  If he moves around today and his heart remained stable will DC his cardiac monitor.  Needs OT evaluation for PM&R consult.     I have discussed this patient's plan of care and discharge plan at IDT rounds today with Case Management, Nursing, Nursing leadership, and other members of the IDT team.    Consultants/Specialty  general surgery  Nephrology-[facility]  vascular surgery  Gastroenterology  PM&R  Cardiology     Code Status  Full Code    Disposition  The patient is not medically cleared for discharge to home or a post-acute facility.  Anticipate discharge to: an inpatient rehabilitation hospital    I have placed the appropriate orders for post-discharge needs.    Review of Systems  Review of Systems   Constitutional:  Positive for malaise/fatigue. Negative for chills and fever.   Respiratory:  Negative for shortness of breath.    Cardiovascular:  Negative for chest pain and leg swelling.   Gastrointestinal:  Negative for abdominal pain, constipation, nausea and vomiting.   Musculoskeletal:  Negative for myalgias.   Skin:  Negative for rash.   Neurological:  Positive for weakness. Negative for dizziness and headaches.   Psychiatric/Behavioral:  Negative for depression. The patient is nervous/anxious and has insomnia.    All other systems reviewed and are negative.       Physical Exam  Temp:  [36 °C (96.8 °F)-36.8 °C (98.3 °F)] 36.4 °C (97.6 °F)  Pulse:  [73-79] 73  Resp:  [14-18] 17  BP: ()/(55-78) 99/55  SpO2:  [96 %-100 %] 98 %    Physical Exam  Vitals and nursing note reviewed. Exam conducted with a chaperone present (Wife at bedside).    Constitutional:       General: He is not in acute distress.     Appearance: He is not toxic-appearing.   HENT:      Head: Normocephalic and atraumatic.   Eyes:      Conjunctiva/sclera: Conjunctivae normal.   Cardiovascular:      Rate and Rhythm: Normal rate and regular rhythm.   Pulmonary:      Effort: Pulmonary effort is normal. No respiratory distress.      Breath sounds: Normal breath sounds. No wheezing.   Abdominal:      Palpations: Abdomen is soft.      Tenderness: There is no abdominal tenderness.      Comments: PD catheter in place   Musculoskeletal:         General: No tenderness.      Cervical back: Normal range of motion and neck supple.      Right lower leg: No edema.      Left lower leg: No edema.      Comments: Prevena wound VAC replaced left lower extremity   Skin:     General: Skin is warm and dry.      Comments: Bilateral toes with ischemic necrosis   Neurological:      General: No focal deficit present.      Mental Status: He is alert and oriented to person, place, and time.      Motor: Weakness present.   Psychiatric:         Mood and Affect: Mood is anxious.         Behavior: Behavior normal.         Thought Content: Thought content normal.         Cognition and Memory: Cognition normal.         Fluids    Intake/Output Summary (Last 24 hours) at 2/15/2025 1509  Last data filed at 2/15/2025 0743  Gross per 24 hour   Intake 640.67 ml   Output 1482 ml   Net -841.33 ml        Laboratory  Recent Labs     02/13/25  0741 02/13/25  1709 02/14/25  0102 02/14/25  1123 02/14/25  2032 02/15/25  0112 02/15/25  0939   WBC 9.5  --  8.3  --   --  8.4  --    RBC 2.46*  --  2.42*  --   --  2.28*  --    HEMOGLOBIN 7.3*   < > 7.2*   < > 7.4* 6.8* 8.1*   HEMATOCRIT 23.2*   < > 22.6*   < > 23.7* 22.1* 25.8*   MCV 94.3  --  93.4  --   --  96.9  --    MCH 29.7  --  29.8  --   --  29.8  --    MCHC 31.5*  --  31.9*  --   --  30.8*  --    RDW 83.2*  --  80.1*  --   --  82.1*  --    PLATELETCT 331  --  322  --   --  331   --    MPV 10.3  --  9.9  --   --  10.3  --     < > = values in this interval not displayed.     Recent Labs     02/14/25  0102 02/15/25  0112   SODIUM 136 136   POTASSIUM 4.5 4.5   CHLORIDE 93* 93*   CO2 20 19*   GLUCOSE 253* 241*   * 109*   CREATININE 9.14* 8.74*   CALCIUM 9.0 9.0                   Imaging  EC-ECHOCARDIOGRAM COMPLETE W/O CONT   Final Result           Assessment/Plan  * Obstruction of cystic duct- (present on admission)  Assessment & Plan  Noted on imaging at [facility]  General surgery consulted, after multiple discussions patient refused surgery preferred     Chronic combined systolic and diastolic heart failure (HCC)- (present on admission)  Assessment & Plan  No signs of fluid overload or exacerbation on exam  Echo showed EF 30-35% with akinesis and global hypokinesis, grade II diastolic   Cardiology consulted, suspect this is chronic  -Recommending outpatient follow-up with cardiology for consideration of cardiac cath in the future  -Increase metoprolol 25 mg, start oral Lasix 20 mg  -Per nephrology okay to start losartan, will add if blood pressures remain stable  Cannot start Entresto, Aldactone, or Farxiga due to ESRD    Typical atrial flutter (HCC)  Assessment & Plan  Patient reports history of intermittent atrial flutter with dialysis, was previously on metoprolol XL  Remote cardiac monitoring   Restart Eliquis  Echo pending   Start metoprolol 12.5 mg BID and increase as tolerated    Melena  Assessment & Plan  Noted 2/13, could be due to iron replacement  PPI twice daily  Trend H&H q8H   Consulted GI   -Status post EGD 2/14, concern for Stapleton's biopsies pending  Restart Eliquis, Plavix    PAD (peripheral artery disease) (HCC)- (present on admission)  Assessment & Plan  Patient is status post left lower extremity bypass, popliteal to tibial on 1/20  Eliquis and Plavix held for possible surgery and possible GI bleed.  EGD showed no bleeding and patient does not want surgery at  this time will restart  Continue rosuvastatin  Vascular surgery consulted, Prevena wound VAC replaced on 2/13 by wound care    BPH (benign prostatic hyperplasia)- (present on admission)  Assessment & Plan  Continue home Flomax  Monitor urine output    Dyslipidemia- (present on admission)  Assessment & Plan  Continue home statin    Anemia in chronic renal disease- (present on admission)  Assessment & Plan  EGD showed no bleeding  On Eliquis and Plavix  -1 unit PRBC transfusion today  Discussed with nephrology, restart EPO  Daily CBC    Essential hypertension- (present on admission)  Assessment & Plan  Was on midodrine at rehab which has been Dc'd  -Continue Flomax, metoprolol 12.5 mg BID   As needed antihypertensives    ESRD on peritoneal dialysis (HCC)- (present on admission)  Assessment & Plan  Nephrology consulted for peritoneal dialysis  I also discussed Dr. Zamarripa's plan of hemodialysis for 2 weeks post cholecystectomy, he is in agreement and is requesting tunneled catheter which will be placed by Dr. Miller  Obtain labs and repeat a BMP in the morning         VTE prophylaxis:    therapeutic anticoagulation with eliquis 5 mg BID    I have performed a physical exam and reviewed and updated ROS and Plan today (2/15/2025). In review of yesterday's note (2/14/2025), there are no changes except as documented above.

## 2025-02-15 NOTE — PROGRESS NOTES
Pt aseptically connected to CCPD at 1828 using 3 bags of 2.5% PD solution to run for 10 hrs as newly ordered by Dr Mehta. Right lower quadrant PD catheter was clean, dry and intact. Dressing changed, no s/s of infection. Gentamicin cream was applied topically as ordered. Initial drain of 244 ml. Gave report to Alyssa William RN. See flow sheet for details.

## 2025-02-15 NOTE — PROGRESS NOTES
[facility] -  PROGRESS NOTE               Author: IDA Marte Date & Time: 2/15/2025  12:07 PM     HPI:  79 y.o. male with HTN, ESRD on PD, PVD/ gangrenous left foot, BPH admitted for monitoring s/p  left leg bypass surgery on 1/20 with Dr. Miller. He recently underwent hernial repair and was temporarily transferred to in center HD via [facility]. He has returned to  PD  4 days ago, doing low fill.volumes and has been tolerating at home.   Patient was connected to cycler last night. Total UF reported to be 276ml. Reports of low drain UF alarm last night but no major issues with PD overnight. Pt reports no complaints today morning  No F/C/N/V/CP/SOB.  No melena, hematochezia, hematemesis.  No HA, visual changes,or abdominal pain.     DAILY NEPHROLOGY SUMMARY:  See note from 1/31 for prior summaries   2/01: Disconnected aseptically from PD CyclerEffluent clear and yellow, no signs of bleeding/fibrin clots. 24 hour UF= 678mL. Tolerated well.   2/2: Patient and wife are doing well. Wife states she gave wrong # of cycles to provider yesterday and wanted then reduced back from 7 to 5. Overall doing well without complaints. Looking forward to going home around Feb 7th per patient. Also stated they were told that RT IJ TDC would be removed sometime this week. On-coming provider to ask the Hospitalist to help arrange.   2/3: pt sitting up in bed, spouse at bedside, PD improved with change in rx (reduced to 5 exchanges from 7) great UF: 1.1L, edema has improved, biggest complaint NV, vomits after each meal and fatigue, requesting a reduction in gabapentin, also reports constipation, had to dig out stool yesterday with finger  2/4 net UF 1500 ml overnight. Potassium is 3.3 I am replacing. Patient reports doing better today, wife at bedside. Patient feels much better now since Erica made med changes yesterday  2/5: 1437 mL Net UF removed overnight. Wife BS. Patient oob in BR performing adls  "independently. Reports improvement every day.   2/6: doing well, eating lunch with wife, net UF 1273 ml  2/7: patient doing well, low K I will order additional dose of potassium  2/8: NAEO, no complaints, wife and friends at bedside; Feels good  2/9: NAEO, wife at bedside, he is in wheelchair, having multiple BM's after lactulose  2/10: 725 mL Net UF removed last night via CCPD. Low drain UF alarmed overnight. Resting in bed, wife BS. Bps low 90s systolic. C/o n/v yesterday, Complaining of persistent nausea. Per wife, appears weaker. Multiple Bms yesterday. Denies fever or chills.   2/11: Fluid + some blood, 425 mL Net UF removed via CCPD. C/o weakness and fatigue. Declining antimedic. HIDA + cystic duct obstruction.  2/12: Pt trasnferred to main for eval of N/V, cystic duct  2/13: melanotic appearing stools, no issues with PD  2/15: Hgb 8.1 s/p 1 unit pRBCs tx. Reports feeling tired d/t alarms going off all night. Denies emesis x2 days. Tolerating 6 cycles of CCPD. 1482 mL Net UF removed overnight.       REVIEW OF SYSTEMS:    10 point ROS reviewed and is as per HPI/daily summary or otherwise negative    PMH/PSH/SH/FH:   Reviewed and unchanged since admission note    CURRENT MEDICATIONS:   Reviewed from admission to present day    VS:  BP 99/55   Pulse 73   Temp 36.4 °C (97.6 °F) (Temporal)   Resp 17   Ht 1.707 m (5' 7.21\")   Wt 70.9 kg (156 lb 4.9 oz)   SpO2 98%   BMI 24.33 kg/m²     Physical Exam  General: well appearing NAD  HEENT: EOMI, no scleral icterus  Heart: RR no rubs  Pulm: non labored  Abdomen: Pd catheter  Extremites: Rmidial wound vac in place, LE L>R  Derm: no rashes  Neuro: grossly non focal    Fluids:  In: 150 [I.V.:150]  Out: 321     LABS:  Recent Labs     02/14/25  0102 02/15/25  0112   SODIUM 136 136   POTASSIUM 4.5 4.5   CHLORIDE 93* 93*   CO2 20 19*   GLUCOSE 253* 241*   * 109*   CREATININE 9.14* 8.74*   CALCIUM 9.0 9.0       IMAGING:   All imaging reviewed from admission to " present day    IMPRESSION:  # ESRD on CCPD  - Returned to PD 1/17/25   - Home PD prescription CCPD 2.5% alternating with 1.5% 5  no last fill. Patient wife stated she initially told last Nephrologist wrong script. Now all green bags and 5 cycles, not 7.   - Bloody fluid with CCPD 2/10, Hgb stable on Eliquis/Plavix   # HTN, controlled   - Goal BP < 140/90  - torsemide 10mg daily   # Anemia of CKD  - worsened overnight ? Unclear source of bleed  - awaiting scope in am  # CKD-MBD  - Ca 8.6   - PO4 7.4 -> 6.6 -> 7.0  - Lanthanum 500mg TID, titrate up prn  # Chronic hypoxic respiratory failure   - On home oxygen 2-2.5L at night   # Nausea/Vomiting              - ddx: binder intolerance, switched to lanthanum              - constipation, started miralax BID and lactulose added recently              - pill burden, increase omeprazole to 40mg daily              - reduced gabapentin to 100mg daily               - H. Pylori (-)               - HIDA Scan 2/10 + Cystic Duct Obstruction, GI Consulted               - Viral Panel (-) 2/11   - EGD bx for r/o Stapleton's esophagus, sm hiatal hernia GI signing off recommends Pamela  # Hyponatremia, mild              - Torsemide daily              - UF with PD, on all 2.5%   # HFrEF  # Hx CAD with stent      SUGGESTIONS:  - Continue CCPD during hospital stay (now on oupt rx and tolerating well)  - GI signing off  - defer gallbladder surgery for now  - Dose all meds per ESRD  - Renal diet, low phos   - Okay for ARB from renal standpoint in ESRD, bps had been trending low  - ROSALIND SQ Qweek resume 2/18  - L arm AVF not fully matured,  will need intervention outpt   - Avoid Fleet enema in dialysis patients   - Labs daily  - Plan to return to Rehab

## 2025-02-16 PROBLEM — R31.0 GROSS HEMATURIA: Status: ACTIVE | Noted: 2025-02-16

## 2025-02-16 PROBLEM — R31.9 HEMATURIA: Status: ACTIVE | Noted: 2025-02-16

## 2025-02-16 LAB
ALBUMIN SERPL BCP-MCNC: 2.6 G/DL (ref 3.2–4.9)
APPEARANCE UR: ABNORMAL
BACTERIA #/AREA URNS HPF: ABNORMAL /HPF
BILIRUB UR QL STRIP.AUTO: ABNORMAL
BUN SERPL-MCNC: 99 MG/DL (ref 8–22)
CALCIUM ALBUM COR SERPL-MCNC: 10 MG/DL (ref 8.5–10.5)
CALCIUM SERPL-MCNC: 8.9 MG/DL (ref 8.5–10.5)
CASTS URNS QL MICRO: ABNORMAL /LPF (ref 0–2)
CHLORIDE SERPL-SCNC: 93 MMOL/L (ref 96–112)
CO2 SERPL-SCNC: 22 MMOL/L (ref 20–33)
COLOR UR: ABNORMAL
CREAT SERPL-MCNC: 8.92 MG/DL (ref 0.5–1.4)
EPITHELIAL CELLS 1715: ABNORMAL /HPF (ref 0–5)
EPITHELIAL CELLS RENAL  1715R: PRESENT /HPF
ERYTHROCYTE [DISTWIDTH] IN BLOOD BY AUTOMATED COUNT: 78.6 FL (ref 35.9–50)
GFR SERPLBLD CREATININE-BSD FMLA CKD-EPI: 6 ML/MIN/1.73 M 2
GLUCOSE SERPL-MCNC: 216 MG/DL (ref 65–99)
GLUCOSE UR STRIP.AUTO-MCNC: ABNORMAL MG/DL
HCT VFR BLD AUTO: 26.8 % (ref 42–52)
HGB BLD-MCNC: 8.1 G/DL (ref 14–18)
KETONES UR STRIP.AUTO-MCNC: ABNORMAL MG/DL
LEUKOCYTE ESTERASE UR QL STRIP.AUTO: ABNORMAL
MCH RBC QN AUTO: 30 PG (ref 27–33)
MCHC RBC AUTO-ENTMCNC: 30.2 G/DL (ref 32.3–36.5)
MCV RBC AUTO: 99.3 FL (ref 81.4–97.8)
MICRO URNS: ABNORMAL
NITRITE UR QL STRIP.AUTO: ABNORMAL
PH UR STRIP.AUTO: ABNORMAL [PH] (ref 5–8)
PHOSPHATE SERPL-MCNC: 6.7 MG/DL (ref 2.5–4.5)
PLATELET # BLD AUTO: 298 K/UL (ref 164–446)
PMV BLD AUTO: 10.2 FL (ref 9–12.9)
POTASSIUM SERPL-SCNC: 4.8 MMOL/L (ref 3.6–5.5)
PROT UR QL STRIP: ABNORMAL MG/DL
RBC # BLD AUTO: 2.7 M/UL (ref 4.7–6.1)
RBC # URNS HPF: >100 /HPF (ref 0–2)
RBC UR QL AUTO: ABNORMAL
SODIUM SERPL-SCNC: 135 MMOL/L (ref 135–145)
SP GR UR STRIP.AUTO: ABNORMAL
UROBILINOGEN UR STRIP.AUTO-MCNC: ABNORMAL EU/DL
WBC # BLD AUTO: 9.1 K/UL (ref 4.8–10.8)
WBC #/AREA URNS HPF: ABNORMAL /HPF

## 2025-02-16 PROCEDURE — 700102 HCHG RX REV CODE 250 W/ 637 OVERRIDE(OP): Performed by: NURSE PRACTITIONER

## 2025-02-16 PROCEDURE — 770004 HCHG ROOM/CARE - ONCOLOGY PRIVATE *

## 2025-02-16 PROCEDURE — 81001 URINALYSIS AUTO W/SCOPE: CPT

## 2025-02-16 PROCEDURE — 85027 COMPLETE CBC AUTOMATED: CPT

## 2025-02-16 PROCEDURE — 700102 HCHG RX REV CODE 250 W/ 637 OVERRIDE(OP): Performed by: INTERNAL MEDICINE

## 2025-02-16 PROCEDURE — 90945 DIALYSIS ONE EVALUATION: CPT

## 2025-02-16 PROCEDURE — 99233 SBSQ HOSP IP/OBS HIGH 50: CPT | Performed by: INTERNAL MEDICINE

## 2025-02-16 PROCEDURE — 97166 OT EVAL MOD COMPLEX 45 MIN: CPT

## 2025-02-16 PROCEDURE — 80069 RENAL FUNCTION PANEL: CPT

## 2025-02-16 PROCEDURE — 97535 SELF CARE MNGMENT TRAINING: CPT

## 2025-02-16 PROCEDURE — A9270 NON-COVERED ITEM OR SERVICE: HCPCS | Performed by: INTERNAL MEDICINE

## 2025-02-16 PROCEDURE — A9270 NON-COVERED ITEM OR SERVICE: HCPCS | Performed by: NURSE PRACTITIONER

## 2025-02-16 PROCEDURE — 36415 COLL VENOUS BLD VENIPUNCTURE: CPT

## 2025-02-16 RX ORDER — LANTHANUM CARBONATE 500 MG/1
500 TABLET, CHEWABLE ORAL
Status: DISCONTINUED | OUTPATIENT
Start: 2025-02-16 | End: 2025-02-18 | Stop reason: HOSPADM

## 2025-02-16 RX ORDER — LOSARTAN POTASSIUM 25 MG/1
12.5 TABLET ORAL
Status: DISCONTINUED | OUTPATIENT
Start: 2025-02-16 | End: 2025-02-18 | Stop reason: HOSPADM

## 2025-02-16 RX ADMIN — AMOXICILLIN AND CLAVULANATE POTASSIUM 1 TABLET: 500; 125 TABLET, FILM COATED ORAL at 17:18

## 2025-02-16 RX ADMIN — FUROSEMIDE 20 MG: 20 TABLET ORAL at 05:11

## 2025-02-16 RX ADMIN — METOPROLOL TARTRATE 25 MG: 25 TABLET, FILM COATED ORAL at 17:25

## 2025-02-16 RX ADMIN — GENTAMICIN SULFATE: 1 CREAM TOPICAL at 19:00

## 2025-02-16 RX ADMIN — TAMSULOSIN HYDROCHLORIDE 0.4 MG: 0.4 CAPSULE ORAL at 09:09

## 2025-02-16 RX ADMIN — METOPROLOL TARTRATE 25 MG: 25 TABLET, FILM COATED ORAL at 05:11

## 2025-02-16 RX ADMIN — LANTHANUM CARBONATE 500 MG: 500 TABLET, CHEWABLE ORAL at 17:22

## 2025-02-16 RX ADMIN — LANTHANUM CARBONATE 500 MG: 500 TABLET, CHEWABLE ORAL at 14:06

## 2025-02-16 RX ADMIN — LOSARTAN POTASSIUM 12.5 MG: 25 TABLET, FILM COATED ORAL at 09:09

## 2025-02-16 RX ADMIN — ROSUVASTATIN CALCIUM 20 MG: 20 TABLET, FILM COATED ORAL at 21:37

## 2025-02-16 RX ADMIN — CLOPIDOGREL BISULFATE 75 MG: 75 TABLET ORAL at 05:11

## 2025-02-16 RX ADMIN — AMOXICILLIN AND CLAVULANATE POTASSIUM 1 TABLET: 500; 125 TABLET, FILM COATED ORAL at 05:11

## 2025-02-16 RX ADMIN — TRAZODONE HYDROCHLORIDE 50 MG: 100 TABLET ORAL at 21:37

## 2025-02-16 RX ADMIN — MONTELUKAST SODIUM 10 MG: 10 TABLET, FILM COATED ORAL at 21:37

## 2025-02-16 RX ADMIN — OMEPRAZOLE 40 MG: 20 CAPSULE, DELAYED RELEASE ORAL at 05:11

## 2025-02-16 ASSESSMENT — ENCOUNTER SYMPTOMS
SHORTNESS OF BREATH: 0
CONSTIPATION: 0
CHILLS: 0
VOMITING: 0
ABDOMINAL PAIN: 0
FEVER: 0
HEADACHES: 0
NERVOUS/ANXIOUS: 1
DIZZINESS: 0
DEPRESSION: 0
MYALGIAS: 0
WEAKNESS: 1
NAUSEA: 0
INSOMNIA: 1

## 2025-02-16 ASSESSMENT — COGNITIVE AND FUNCTIONAL STATUS - GENERAL
DRESSING REGULAR UPPER BODY CLOTHING: A LITTLE
DRESSING REGULAR LOWER BODY CLOTHING: A LITTLE
DAILY ACTIVITIY SCORE: 20
TOILETING: A LITTLE
HELP NEEDED FOR BATHING: A LITTLE
SUGGESTED CMS G CODE MODIFIER DAILY ACTIVITY: CJ

## 2025-02-16 ASSESSMENT — PAIN DESCRIPTION - PAIN TYPE
TYPE: ACUTE PAIN
TYPE: ACUTE PAIN

## 2025-02-16 ASSESSMENT — FIBROSIS 4 INDEX: FIB4 SCORE: 1.65

## 2025-02-16 ASSESSMENT — ACTIVITIES OF DAILY LIVING (ADL): TOILETING: INDEPENDENT

## 2025-02-16 NOTE — THERAPY
"Occupational Therapy   Initial Evaluation     Patient Name: Edy Bennett  Age:  79 y.o., Sex:  male  Medical Record #: [id number]  Today's Date: 2/16/2025     Precautions  Precautions: (P) Fall Risk  Comments: (P) prevena LLE    Assessment  Patient is 79 y.o. male admitted for cystic duct obstruction with vomiting and RUQ pain. Pt has declined surgery at this time. He transferred from [facility] where he was doing therapy s/p L popliteal -> tibial artery bypass, performed on 1/20. PMHx also includes DM, HTN, DARIEN, CHF, ESRD with peritoneal dialysis, and arrhythmia.   Pt seen for OT eval. Pt seen with supportive spouse at bedside, pt required CGA for functional transfers with FWW, CGA for ADLs overall. Pt limited d/t diminished activity tolerance, strength, balance and pain. Pt educ on continued mobility with nursing staff, chair for meals. Pt would benefit from cont inpt OT and recommend return to [facility] prior to home. Pt and wife are very motivated for recovery     Plan    Occupational Therapy Initial Treatment Plan   Treatment Interventions: (P) Self Care / Activities of Daily Living, Adaptive Equipment, Neuro Re-Education / Balance, Therapeutic Exercises, Therapeutic Activity  Treatment Frequency: (P) 4 Times per Week  Duration: (P) Until Therapy Goals Met    DC Equipment Recommendations: (P) Unable to determine at this time  Discharge Recommendations: (P) Recommend post-acute placement for additional occupational therapy services prior to discharge home     Subjective    \"I was close to going home\" (from rehab)      Objective       02/16/25 1401   Prior Living Situation   Prior Services Intermittent Physical Support for ADL Per Family   Housing / Facility 1 Story House   Steps Into Home 0   Bathroom Set up Walk In Shower;Shower Chair;Grab Bars   Equipment Owned Front-Wheel Walker;Tub / Shower Seat;Grab Bar(s) In Tub / Shower;Adjustable Bed Without Rails;Grab Bar(s) By Toilet;4-Wheel Walker   Lives with - Patient's Self " Care Capacity Spouse   Comments Pt had been at rehab from 1/23- 2/11. Spouse is home and available to provide assist as needed   Prior Level of ADL Function   Self Feeding Independent   Grooming / Hygiene Independent   Bathing Independent   Dressing Independent   Toileting Independent   Comments pt was indep prior to hospitalization in January. At rehab pt was requiring assist for ADLs   Prior Level of IADL Function   Comments prior to hospitalization pt was indep for IADLS. driving, playing golf   History of Falls   History of Falls No   Precautions   Precautions Fall Risk   Comments prevena LLE   Vitals   O2 Delivery Device None - Room Air   Pain   Intervention Declines   Pain 0 - 10 Group   Location Leg   Location Orientation Left   Therapist Pain Assessment During Activity;Post Activity Pain Same as Prior to Activity;Nurse Notified;1   Non Verbal Descriptors   Non Verbal Scale  Calm   Cognition    Cognition / Consciousness WDL   Level of Consciousness Alert   Comments very pleasant and cooperative, receptive to all educ   Passive ROM Upper Body   Passive ROM Upper Body WDL   Active ROM Upper Body   Active ROM Upper Body  WDL   Dominant Hand Right   Strength Upper Body   Upper Body Strength  X   Gross Strength Generalized Weakness, Equal Bilaterally.    Comments BUE grossly 4/5   Sensation Upper Body   Upper Extremity Sensation  WDL   Upper Body Muscle Tone   Upper Body Muscle Tone  WDL   Neurological Concerns   Neurological Concerns No   Coordination Upper Body   Coordination WDL   Balance Assessment   Sitting Balance (Static) Fair +   Sitting Balance (Dynamic) Fair   Standing Balance (Static) Fair -   Standing Balance (Dynamic) Fair -   Weight Shift Sitting Fair   Weight Shift Standing Fair   Comments w/FWW   Bed Mobility    Supine to Sit Supervised   Sit to Supine Standby Assist   Scooting Supervised   Rolling Supervised   Comments HOB slightly elevated   ADL Assessment   Eating Independent   Grooming Standby  Assist;Standing   Upper Body Dressing Supervision   Lower Body Dressing Contact Guard Assist  (performing figure four technique)   Toileting Contact Guard Assist  (standing to use urinal)   Functional Mobility   Sit to Stand Contact Guard Assist   Bed, Chair, Wheelchair Transfer Contact Guard Assist   Toilet Transfers Contact Guard Assist  (from low toilet)   Transfer Method Stand Step   Mobility sup>sit EOB> STS> in rm> bathroom> toilet> supine   Comments w/FWW   Visual Perception   Visual Perception  WDL   Edema / Skin Assessment   Edema / Skin  X   Comments LLE prevena wound vac   Activity Tolerance   Sitting in Chair EOB and toilet approx 5 min   Sitting Edge of Bed 10 min   Standing 6 min   Patient / Family Goals   Patient / Family Goal #1 to get back to rehab then home   Short Term Goals   Short Term Goal # 1 Pt will complete toileting SBA   Short Term Goal # 2 Pt will complete toilet transfer SBA   Short Term Goal # 3 Pt will complete standing grooming tasks SBA   Short Term Goal # 4 Pt will complete UB/LB dressing SBA   Education Group   Education Provided Role of Occupational Therapist;Activities of Daily Living;Transfers   Role of Occupational Therapist Patient Response Patient;Significant Other;Acceptance;Demonstration;Explanation;Verbal Demonstration;Action Demonstration   Transfers Patient Response Patient;Significant Other;Acceptance;Explanation;Demonstration;Verbal Demonstration;Action Demonstration   ADL Patient Response Patient;Significant Other;Acceptance;Explanation;Demonstration;Action Demonstration;Verbal Demonstration   Occupational Therapy Initial Treatment Plan    Treatment Interventions Self Care / Activities of Daily Living;Adaptive Equipment;Neuro Re-Education / Balance;Therapeutic Exercises;Therapeutic Activity   Treatment Frequency 4 Times per Week   Duration Until Therapy Goals Met   Problem List   Problem List Decreased Active Daily Living Skills;Decreased Upper Extremity Strength  Left;Decreased Upper Extremity Strength Right;Decreased Functional Mobility;Decreased Activity Tolerance;Impaired Posture / Trunk Alignment   Anticipated Discharge Equipment and Recommendations   DC Equipment Recommendations Unable to determine at this time   Discharge Recommendations Recommend post-acute placement for additional occupational therapy services prior to discharge home   Interdisciplinary Plan of Care Collaboration   IDT Collaboration with  Nursing;Family / Caregiver   Patient Position at End of Therapy In Bed;Call Light within Reach;Tray Table within Reach   Collaboration Comments RN updated   Session Information   Date / Session Number  2/16, #1 (1/4, 2/22)

## 2025-02-16 NOTE — PROGRESS NOTES
Hospital Medicine Daily Progress Note    Date of Service  2/16/2025    Chief Complaint  Edy Bennett is a 79 y.o. male admitted 2/11/2025 with intractable nausea and vomiting     Hospital Course  79 y.o. male who presented 2/11/2025 with intractable nausea and vomiting.  Patient did receive a left lower extremity bypass, popliteal to tibial on 1/20, was being treated at [facility].  He began having nausea and vomiting as well as some right upper quadrant pain.  Workup eventually showed a obstruction of the cystic duct.  Because of this, patient was transferred here due to need for intervention.  Consulted GI, vascular surgery and general surgery as well as nephrology.  Patient is on chronic peritoneal dialysis.  Patient was started on IV antibiotics for cholecystitis and his symptoms improved.  Currently patient refusing to have surgery and general surgery signed off.  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  His anticoagulation has been resumed.  Vascular surgery removed patient's Prevena wound VAC that was in place and wound care replaced a new one on 2/13.     EKG was performed and showed atrial flutter, patient does have a history of this and was previously on metoprolol which has been restarted.  Echocardiogram EF 30-35% with akinesis and global hypokinesis, grade II diastolic.  New heart failure diagnosis for the patient, no signs of fluid overload or exacerbation on exam.  Cardiology consulted patient may eventually need cardiac cath however he would like to defer at this time as he would likely need hemodialysis afterwards.  Recommended medical management with metoprolol, losartan, Lasix with outpatient follow-up with his cardiologist. PT/OT recommending postacute placement.  PM&R consulted.     Interval Problem Update  2/15 BP stable   Hb 6.8, 1 unit pRBC transfused this morning   Cr 8.74, BUN  109   Echo showed EF 30-35% with akinesis and global hypokinesis, grade II diastolic   Discussed with cardiology recommended metoprolol 25 mg, changed to XL formulation on discharge, restart low-dose Lasix and recommending losartan.  He may ultimately need cardiac cath but can be done as an outpatient especially since patient is hesitant to go back on hemodialysis  Discussed with nephrology okay from their parent to start losartan, also agreeable to restarting EPO for his blood counts.  Patient overall is feeling better, he has not had any vomiting in the last 2 days.  Will upgrade to GI soft diet.  He is working with his wife to start getting up and moving around his room more.  If he moves around today and his heart remained stable will DC his cardiac monitor.  Needs OT evaluation for PM&R consult.     2/16 Vitals stable, start losartan   Hb 8.1, 8.1  BUN 99, Cr 8.92   Pathology still pending from EGD  Patient had hematuria last night, renal US showed no hydronephrosis or stone, normal bladder   Continue to hold eliquis   -UA pending  If continues to have hematuria will consult urology  Discussed with nephrology okay to restart Phos binder with meals, was previously held for nausea and vomiting  OT evaluated the patient today also recommending postacute placement  Pending PM&R consult to determine if still a candidate to discharge back to [facility]  Discussed plan of care with patient and his wife at bedside, will continue to monitor for hematuria.    I have discussed this patient's plan of care and discharge plan at IDT rounds today with Case Management, Nursing, Nursing leadership, and other members of the IDT team.    Consultants/Specialty  general surgery  Nephrology-[facility]  vascular surgery  Gastroenterology  PM&R  Cardiology     Code Status  Full Code    Disposition  The patient is not medically cleared for discharge to home or a post-acute facility.  Anticipate discharge to: an inpatient rehabilitation  hospital    I have placed the appropriate orders for post-discharge needs.    Review of Systems  Review of Systems   Constitutional:  Positive for malaise/fatigue. Negative for chills and fever.   Respiratory:  Negative for shortness of breath.    Cardiovascular:  Negative for chest pain and leg swelling.   Gastrointestinal:  Negative for abdominal pain, constipation, nausea and vomiting.   Musculoskeletal:  Negative for myalgias.   Skin:  Negative for rash.   Neurological:  Positive for weakness. Negative for dizziness and headaches.   Psychiatric/Behavioral:  Negative for depression. The patient is nervous/anxious and has insomnia.    All other systems reviewed and are negative.       Physical Exam  Temp:  [36.5 °C (97.7 °F)-36.7 °C (98.1 °F)] 36.5 °C (97.7 °F)  Pulse:  [72-82] 82  Resp:  [16-17] 17  BP: (115-123)/(68-73) 122/70  SpO2:  [96 %-100 %] 100 %    Physical Exam  Vitals and nursing note reviewed. Exam conducted with a chaperone present (Wife at bedside).   Constitutional:       General: He is not in acute distress.     Appearance: He is not toxic-appearing.   HENT:      Head: Normocephalic and atraumatic.   Eyes:      Conjunctiva/sclera: Conjunctivae normal.   Cardiovascular:      Rate and Rhythm: Normal rate and regular rhythm.   Pulmonary:      Effort: Pulmonary effort is normal. No respiratory distress.      Breath sounds: Normal breath sounds. No wheezing.   Abdominal:      Palpations: Abdomen is soft.      Tenderness: There is no abdominal tenderness.      Comments: PD catheter in place   Musculoskeletal:         General: No tenderness.      Cervical back: Normal range of motion and neck supple.      Right lower leg: No edema.      Left lower leg: No edema.      Comments: Prevena wound VAC replaced left lower extremity   Skin:     General: Skin is warm and dry.      Comments: Bilateral toes with ischemic necrosis   Neurological:      General: No focal deficit present.      Mental Status: He is alert  and oriented to person, place, and time.      Motor: Weakness present.   Psychiatric:         Mood and Affect: Mood is anxious.         Behavior: Behavior normal.         Thought Content: Thought content normal.         Cognition and Memory: Cognition normal.         Fluids    Intake/Output Summary (Last 24 hours) at 2/16/2025 1507  Last data filed at 2/16/2025 0730  Gross per 24 hour   Intake 400 ml   Output 1826 ml   Net -1426 ml        Laboratory  Recent Labs     02/14/25  0102 02/14/25  1123 02/15/25  0112 02/15/25  0939 02/16/25  0405   WBC 8.3  --  8.4  --  9.1   RBC 2.42*  --  2.28*  --  2.70*   HEMOGLOBIN 7.2*   < > 6.8* 8.1* 8.1*   HEMATOCRIT 22.6*   < > 22.1* 25.8* 26.8*   MCV 93.4  --  96.9  --  99.3*   MCH 29.8  --  29.8  --  30.0   MCHC 31.9*  --  30.8*  --  30.2*   RDW 80.1*  --  82.1*  --  78.6*   PLATELETCT 322  --  331  --  298   MPV 9.9  --  10.3  --  10.2    < > = values in this interval not displayed.     Recent Labs     02/14/25  0102 02/15/25  0112 02/16/25  0405   SODIUM 136 136 135   POTASSIUM 4.5 4.5 4.8   CHLORIDE 93* 93* 93*   CO2 20 19* 22   GLUCOSE 253* 241* 216*   * 109* 99*   CREATININE 9.14* 8.74* 8.92*   CALCIUM 9.0 9.0 8.9                   Imaging  US-RENAL   Final Result      1.  No hydronephrosis.   2.  Evidence of medical renal disease.   3.  Incidental note of small volume ascites.      EC-ECHOCARDIOGRAM COMPLETE W/O CONT   Final Result           Assessment/Plan  * Obstruction of cystic duct- (present on admission)  Assessment & Plan  Noted on imaging at [facility]  General surgery consulted, after multiple discussions patient refused surgery preferred     Gross hematuria  Assessment & Plan  Started in the evening 2/15  Renal ultrasound shows no stones or bladder irregularities  UA pending  Held Eliquis  If does not improve will consult urology    Chronic combined systolic and diastolic heart failure (HCC)- (present on admission)  Assessment & Plan  No signs of fluid  overload or exacerbation on exam  Echo showed EF 30-35% with akinesis and global hypokinesis, grade II diastolic   Cardiology consulted, suspect this is chronic  -Recommending outpatient follow-up with cardiology for consideration of cardiac cath in the future  -Increase metoprolol 25 mg, start oral Lasix 20 mg  -Per nephrology okay to start losartan, will add if blood pressures remain stable  Cannot start Entresto, Aldactone, or Farxiga due to ESRD    Typical atrial flutter (HCC)  Assessment & Plan  Patient reports history of intermittent atrial flutter with dialysis, was previously on metoprolol XL  Remote cardiac monitoring   Restart Eliquis  Echo pending   Start metoprolol 12.5 mg BID and increase as tolerated    Melena  Assessment & Plan  Noted 2/13, could be due to iron replacement  PPI twice daily  Trend H&H q8H   Consulted GI   -Status post EGD 2/14, concern for Stapleton's biopsies pending  Restart Eliquis, Plavix    PAD (peripheral artery disease) (HCC)- (present on admission)  Assessment & Plan  Patient is status post left lower extremity bypass, popliteal to tibial on 1/20  Eliquis and Plavix held for possible surgery and possible GI bleed.  EGD showed no bleeding and patient does not want surgery at this time will restart  Continue rosuvastatin  Vascular surgery consulted, Prevena wound VAC replaced on 2/13 by wound care    BPH (benign prostatic hyperplasia)- (present on admission)  Assessment & Plan  Continue home Flomax  Monitor urine output    Dyslipidemia- (present on admission)  Assessment & Plan  Continue home statin    Anemia in chronic renal disease- (present on admission)  Assessment & Plan  EGD showed no bleeding  On Eliquis and Plavix  -1 unit PRBC transfusion today  Discussed with nephrology, restart EPO  Daily CBC    Essential hypertension- (present on admission)  Assessment & Plan  Was on midodrine at rehab which has been Dc'd  -Continue Flomax, metoprolol 12.5 mg BID   As needed  antihypertensives    ESRD on peritoneal dialysis (HCC)- (present on admission)  Assessment & Plan  Nephrology consulted for peritoneal dialysis  I also discussed Dr. Zamarripa's plan of hemodialysis for 2 weeks post cholecystectomy, he is in agreement and is requesting tunneled catheter which will be placed by Dr. Miller  Obtain labs and repeat a BMP in the morning         VTE prophylaxis:    therapeutic anticoagulation with eliquis 5 mg BID    I have performed a physical exam and reviewed and updated ROS and Plan today (2/16/2025). In review of yesterday's note (2/15/2025), there are no changes except as documented above.

## 2025-02-16 NOTE — PROGRESS NOTES
Aseptically disconnected from CCPD,effluent clear yellow, no fibrin.Net UF =1676ml (ID 35ml + UF 1641ml - 0 LF),PD exit site dressing CDI. Report given to PCN Acacia Davies.V/S122/70,79,17,97.7F,100% @ RA. Per patient he urinated dark almost black colored urine,per PCN it was priti blood.

## 2025-02-16 NOTE — PROGRESS NOTES
[facility] -  PROGRESS NOTE               Author: IDA Marte Date & Time: 2/16/2025  12:17 PM     HPI:  79 y.o. male with HTN, ESRD on PD, PVD/ gangrenous left foot, BPH admitted for monitoring s/p  left leg bypass surgery on 1/20 with Dr. Miller. He recently underwent hernial repair and was temporarily transferred to in center HD via [facility]. He has returned to  PD  4 days ago, doing low fill.volumes and has been tolerating at home.   Patient was connected to cycler last night. Total UF reported to be 276ml. Reports of low drain UF alarm last night but no major issues with PD overnight. Pt reports no complaints today morning  No F/C/N/V/CP/SOB.  No melena, hematochezia, hematemesis.  No HA, visual changes,or abdominal pain.     DAILY NEPHROLOGY SUMMARY:  See note from 1/31 for prior summaries   2/01: Disconnected aseptically from PD CyclerEffluent clear and yellow, no signs of bleeding/fibrin clots. 24 hour UF= 678mL. Tolerated well.   2/2: Patient and wife are doing well. Wife states she gave wrong # of cycles to provider yesterday and wanted then reduced back from 7 to 5. Overall doing well without complaints. Looking forward to going home around Feb 7th per patient. Also stated they were told that RT IJ TDC would be removed sometime this week. On-coming provider to ask the Hospitalist to help arrange.   2/3: pt sitting up in bed, spouse at bedside, PD improved with change in rx (reduced to 5 exchanges from 7) great UF: 1.1L, edema has improved, biggest complaint NV, vomits after each meal and fatigue, requesting a reduction in gabapentin, also reports constipation, had to dig out stool yesterday with finger  2/4 net UF 1500 ml overnight. Potassium is 3.3 I am replacing. Patient reports doing better today, wife at bedside. Patient feels much better now since Erica made med changes yesterday  2/5: 1437 mL Net UF removed overnight. Wife BS. Patient oob in BR performing adls  "independently. Reports improvement every day.   2/6: doing well, eating lunch with wife, net UF 1273 ml  2/7: patient doing well, low K I will order additional dose of potassium  2/8: NAEO, no complaints, wife and friends at bedside; Feels good  2/9: NAEO, wife at bedside, he is in wheelchair, having multiple BM's after lactulose  2/10: 725 mL Net UF removed last night via CCPD. Low drain UF alarmed overnight. Resting in bed, wife BS. Bps low 90s systolic. C/o n/v yesterday, Complaining of persistent nausea. Per wife, appears weaker. Multiple Bms yesterday. Denies fever or chills.   2/11: Fluid + some blood, 425 mL Net UF removed via CCPD. C/o weakness and fatigue. Declining antimedic. HIDA + cystic duct obstruction.  2/12: Pt trasnferred to main for eval of N/V, cystic duct  2/13: melanotic appearing stools, no issues with PD  2/15: Hgb 8.1 s/p 1 unit pRBCs tx. Reports feeling tired d/t alarms going off all night. Denies emesis x2 days. Tolerating 6 cycles of CCPD. 1482 mL Net UF removed overnight.   2/16: 1676 mL net UF removed with CCPD overnight. Reports difficulty sleeping, resting now in no distress. Per primary rn family requesting to restart phos binder, had been on hold d/t n/v. Phos 6.7       REVIEW OF SYSTEMS:    10 point ROS reviewed and is as per HPI/daily summary or otherwise negative    PMH/PSH/SH/FH:   Reviewed and unchanged since admission note    CURRENT MEDICATIONS:   Reviewed from admission to present day    VS:  /70   Pulse 82   Temp 36.5 °C (97.7 °F) (Temporal)   Resp 17   Ht 1.707 m (5' 7.21\")   Wt 70.9 kg (156 lb 4.9 oz)   SpO2 100%   BMI 24.33 kg/m²     Physical Exam  General: well appearing NAD  HEENT: EOMI, no scleral icterus  Heart: RR no rubs  Pulm: non labored  Abdomen: Pd catheter  Extremites: Rmidial wound vac in place, LE L>R  Derm: no rashes  Neuro: grossly non focal    Fluids:  In: 1040.7 [P.O.:400; Blood:640.7]  Out: 1632     LABS:  Recent Labs     02/14/25  0102 " 02/15/25  0112 02/16/25  0405   SODIUM 136 136 135   POTASSIUM 4.5 4.5 4.8   CHLORIDE 93* 93* 93*   CO2 20 19* 22   GLUCOSE 253* 241* 216*   * 109* 99*   CREATININE 9.14* 8.74* 8.92*   CALCIUM 9.0 9.0 8.9       IMAGING:   All imaging reviewed from admission to present day    IMPRESSION:  # ESRD on CCPD  - Returned to PD 1/17/25   - Home PD prescription CCPD 2.5% alternating with 1.5% 5  no last fill. Patient wife stated she initially told last Nephrologist wrong script. Now all green bags and 5 cycles, not 7.   - Bloody fluid with CCPD 2/10, Hgb stable on Eliquis/Plavix   # HTN, controlled   - Goal BP < 140/90  - torsemide 10mg daily   # Anemia of CKD  - worsened overnight ? Unclear source of bleed  - awaiting scope in am  # CKD-MBD  - Ca 8.6   - PO4 7.4 -> 6.6 -> 7.0 -> 6.7  - Lanthanum 500mg TID, titrate up prn  # Chronic hypoxic respiratory failure   - On home oxygen 2-2.5L at night   # Nausea/Vomiting              - ddx: binder intolerance, switched to lanthanum              - constipation, started miralax BID and lactulose added recently              - pill burden, increase omeprazole to 40mg daily              - reduced gabapentin to 100mg daily               - H. Pylori (-)               - HIDA Scan 2/10 + Cystic Duct Obstruction, GI Consulted               - Viral Panel (-) 2/11   - EGD bx for r/o Stapleton's esophagus, sm hiatal hernia GI signing off recommends Pamela  # Hyponatremia, mild              - Torsemide daily              - UF with PD, on all 2.5%   # HFrEF  # Hx CAD with stent      SUGGESTIONS:  - Continue CCPD during hospital stay (now on oupt rx and tolerating well)  - GI signing off  - Defer gallbladder surgery for now  - Dose all meds per ESRD  - Renal diet, low phos   - Okay for ARB from renal standpoint in ESRD, bps had been trending low  - ROSALIND SQ Qweek resume 2/18  - Restart Phos binder TID w/meals   - L arm AVF not fully matured,  will need intervention outpt   - Avoid Fleet enema in  dialysis patients   - Labs daily  - Discharge planning underway

## 2025-02-16 NOTE — ASSESSMENT & PLAN NOTE
Started in the evening 2/15  Renal ultrasound shows no stones or bladder irregularities    2/17 urine clear of blood this morning, restart Eliquis.  Patient has recurrent hematuria will need urology consult

## 2025-02-16 NOTE — CARE PLAN
The patient is Stable - Low risk of patient condition declining or worsening    Shift Goals  Clinical Goals: Monitor Hematuria  Patient Goals: Rest  Family Goals: not at bedside    Progress made toward(s) clinical / shift goals: Patient pending UA, was able to rest this morning before his PT evaluation   Problem: Fall Risk  Goal: Patient will remain free from falls  Outcome: Progressing  Note: Patient call appropriately for toileting, patient wearing nonslip socks, patient belongings and call light within reach     Problem: Pain - Standard  Goal: Alleviation of pain or a reduction in pain to the patient’s comfort goal  Outcome: Progressing  Note: Reported pain using 0 - 10 pain scale, Pain management medications administered as ordered        Patient is not progressing towards the following goals:

## 2025-02-17 LAB
ALBUMIN SERPL BCP-MCNC: 2.4 G/DL (ref 3.2–4.9)
BUN SERPL-MCNC: 96 MG/DL (ref 8–22)
CALCIUM ALBUM COR SERPL-MCNC: 9.6 MG/DL (ref 8.5–10.5)
CALCIUM SERPL-MCNC: 8.3 MG/DL (ref 8.5–10.5)
CHLORIDE SERPL-SCNC: 92 MMOL/L (ref 96–112)
CO2 SERPL-SCNC: 23 MMOL/L (ref 20–33)
CREAT SERPL-MCNC: 8.93 MG/DL (ref 0.5–1.4)
ERYTHROCYTE [DISTWIDTH] IN BLOOD BY AUTOMATED COUNT: 76.1 FL (ref 35.9–50)
GFR SERPLBLD CREATININE-BSD FMLA CKD-EPI: 6 ML/MIN/1.73 M 2
GLUCOSE SERPL-MCNC: 204 MG/DL (ref 65–99)
HCT VFR BLD AUTO: 24.1 % (ref 42–52)
HGB BLD-MCNC: 7.7 G/DL (ref 14–18)
MCH RBC QN AUTO: 30.8 PG (ref 27–33)
MCHC RBC AUTO-ENTMCNC: 32 G/DL (ref 32.3–36.5)
MCV RBC AUTO: 96.4 FL (ref 81.4–97.8)
PHOSPHATE SERPL-MCNC: 6.4 MG/DL (ref 2.5–4.5)
PLATELET # BLD AUTO: 257 K/UL (ref 164–446)
PMV BLD AUTO: 10.6 FL (ref 9–12.9)
POTASSIUM SERPL-SCNC: 4.4 MMOL/L (ref 3.6–5.5)
RBC # BLD AUTO: 2.5 M/UL (ref 4.7–6.1)
SODIUM SERPL-SCNC: 133 MMOL/L (ref 135–145)
WBC # BLD AUTO: 9.7 K/UL (ref 4.8–10.8)

## 2025-02-17 PROCEDURE — A9270 NON-COVERED ITEM OR SERVICE: HCPCS | Performed by: INTERNAL MEDICINE

## 2025-02-17 PROCEDURE — 85027 COMPLETE CBC AUTOMATED: CPT

## 2025-02-17 PROCEDURE — 770004 HCHG ROOM/CARE - ONCOLOGY PRIVATE *

## 2025-02-17 PROCEDURE — 700102 HCHG RX REV CODE 250 W/ 637 OVERRIDE(OP): Performed by: INTERNAL MEDICINE

## 2025-02-17 PROCEDURE — 80069 RENAL FUNCTION PANEL: CPT

## 2025-02-17 PROCEDURE — A9270 NON-COVERED ITEM OR SERVICE: HCPCS | Performed by: NURSE PRACTITIONER

## 2025-02-17 PROCEDURE — 90945 DIALYSIS ONE EVALUATION: CPT

## 2025-02-17 PROCEDURE — 700102 HCHG RX REV CODE 250 W/ 637 OVERRIDE(OP): Performed by: NURSE PRACTITIONER

## 2025-02-17 PROCEDURE — 36415 COLL VENOUS BLD VENIPUNCTURE: CPT

## 2025-02-17 PROCEDURE — 99233 SBSQ HOSP IP/OBS HIGH 50: CPT | Performed by: INTERNAL MEDICINE

## 2025-02-17 RX ORDER — TRAMADOL HYDROCHLORIDE 50 MG/1
50 TABLET ORAL EVERY 12 HOURS PRN
Status: DISCONTINUED | OUTPATIENT
Start: 2025-02-17 | End: 2025-02-17

## 2025-02-17 RX ORDER — TRAMADOL HYDROCHLORIDE 50 MG/1
50 TABLET ORAL EVERY 12 HOURS PRN
Status: DISCONTINUED | OUTPATIENT
Start: 2025-02-17 | End: 2025-02-18 | Stop reason: HOSPADM

## 2025-02-17 RX ORDER — OXYCODONE HYDROCHLORIDE 5 MG/1
5 TABLET ORAL EVERY 4 HOURS PRN
Status: DISCONTINUED | OUTPATIENT
Start: 2025-02-17 | End: 2025-02-18 | Stop reason: HOSPADM

## 2025-02-17 RX ADMIN — GENTAMICIN SULFATE: 1 CREAM TOPICAL at 21:45

## 2025-02-17 RX ADMIN — OMEPRAZOLE 40 MG: 20 CAPSULE, DELAYED RELEASE ORAL at 06:00

## 2025-02-17 RX ADMIN — TAMSULOSIN HYDROCHLORIDE 0.4 MG: 0.4 CAPSULE ORAL at 09:27

## 2025-02-17 RX ADMIN — OXYCODONE 5 MG: 5 TABLET ORAL at 17:59

## 2025-02-17 RX ADMIN — AMOXICILLIN AND CLAVULANATE POTASSIUM 1 TABLET: 500; 125 TABLET, FILM COATED ORAL at 06:00

## 2025-02-17 RX ADMIN — METOPROLOL TARTRATE 25 MG: 25 TABLET, FILM COATED ORAL at 06:00

## 2025-02-17 RX ADMIN — LANTHANUM CARBONATE 500 MG: 500 TABLET, CHEWABLE ORAL at 12:46

## 2025-02-17 RX ADMIN — METOPROLOL TARTRATE 25 MG: 25 TABLET, FILM COATED ORAL at 17:49

## 2025-02-17 RX ADMIN — LANTHANUM CARBONATE 500 MG: 500 TABLET, CHEWABLE ORAL at 09:27

## 2025-02-17 RX ADMIN — LANTHANUM CARBONATE 500 MG: 500 TABLET, CHEWABLE ORAL at 17:48

## 2025-02-17 RX ADMIN — AMOXICILLIN AND CLAVULANATE POTASSIUM 1 TABLET: 500; 125 TABLET, FILM COATED ORAL at 17:49

## 2025-02-17 RX ADMIN — CLOPIDOGREL BISULFATE 75 MG: 75 TABLET ORAL at 06:00

## 2025-02-17 RX ADMIN — TRAMADOL HYDROCHLORIDE 50 MG: 50 TABLET, COATED ORAL at 11:41

## 2025-02-17 RX ADMIN — TRAZODONE HYDROCHLORIDE 50 MG: 100 TABLET ORAL at 20:53

## 2025-02-17 RX ADMIN — APIXABAN 5 MG: 5 TABLET, FILM COATED ORAL at 17:48

## 2025-02-17 RX ADMIN — FUROSEMIDE 20 MG: 20 TABLET ORAL at 06:00

## 2025-02-17 RX ADMIN — ROSUVASTATIN CALCIUM 20 MG: 20 TABLET, FILM COATED ORAL at 20:53

## 2025-02-17 RX ADMIN — LOSARTAN POTASSIUM 12.5 MG: 25 TABLET, FILM COATED ORAL at 06:00

## 2025-02-17 RX ADMIN — MONTELUKAST SODIUM 10 MG: 10 TABLET, FILM COATED ORAL at 20:53

## 2025-02-17 ASSESSMENT — ENCOUNTER SYMPTOMS
DEPRESSION: 0
ABDOMINAL PAIN: 0
HEADACHES: 0
NAUSEA: 0
CHILLS: 0
FEVER: 0
SHORTNESS OF BREATH: 0
NERVOUS/ANXIOUS: 1
WEAKNESS: 1
VOMITING: 0
MYALGIAS: 1
INSOMNIA: 1
CONSTIPATION: 0
DIZZINESS: 0

## 2025-02-17 ASSESSMENT — PAIN DESCRIPTION - PAIN TYPE
TYPE: ACUTE PAIN

## 2025-02-17 NOTE — PROGRESS NOTES
[facility]    Initial Drain: 15 mL    Patient is alert and oriented x 4, no reported  pain and discomfort. BP and HR stable and within normal limits, no SOB and afebrile. PD catheter on Right lower quadrant, dressing is clean dry and intact. PD catheter dressing changed aseptically per protocol and no sign and symptoms of infection. Gentamicin cream administered as ordered.      Patient aseptically connected to PD cycler at 1845. PD treatment troubleshooting and emergency disconnect in-services provided to PCN. All questions answered.    Report given to PCN PANFILO Davies RN / PANFILO Sepulveda RN     [facility] Answering service number: [phone]    See dialysis treatment flow sheet for details.

## 2025-02-17 NOTE — CONSULTS
Physical Medicine and Rehabilitation Consultation              Date of initial consultation: 2/17/2025  Requesting provider: Crystal Valle D.O.  Consulting provider: Livan Meade D.O.  Reason for consultation: assess for acute inpatient rehab appropriateness  LOS: 6 Day(s)    Chief complaint: nausea, vomiting    HPI: The patient is a 79 y.o.  male with a past medical history of moderate aortic stenosis, hypertension, CAD with stents on eliquis, ESRD on PD, BPH and severe PAD with left bypass graft on 1/20/25 with Dr Miller who presented on 2/11/2025  1:42 PM from [facility] for intractable nausea and vomiting. He was being treated at [facility] from 1/23/24 and was progressing well until he began having RUQ pain, nausea and vomiting. HIDA scan showed obstructed cystic duct, concern for cholecystitis. He was started on IV antibiotics and surgery was consulted. However, patient refused surgery and his symptoms have improved. 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 Metoprolol restarted for atrial flutter. Echo showed reduced EF, new diagnosis of heart failure. Hgb downtrended to 6.8, transfused on 2/15. Also found to have hematuria. Current labs remarkable for hgb 8.1, Cr 8.92, GFR 6, phosphorus 6.7, recent UA with hematuria. Patient seen by PT/OT with recommendation for post-acute placement. PM&R consulted to evaluate for possible inpatient rehab admission.      On my evaluation, the patient is feeling better overall. Denies any abdominal pain, nausea or vomiting at the present. Has pain in his bilateral toes. States hematuria has improved, starting back with anticoagulants today.  Patient denies fevers, chills, headache, chest pain, shortness of breath, cough, abdominal pain, nausea, vomiting, dysuria.      Social Hx:  Patient lives in a single-story house with spouse, who is able to help upon discharge.  0  DARIEL  At prior level of function, patient was independent with front wheel walker (although has been in rehab since last admission)      THERAPY:  PT: Functional mobility   : Ambulating 20 feet with a front wheel walker contact-guard assist, sit to stand min assist, bed/chair transfer contact-guard assist.    OT: ADLs  : Grooming standby assist, upper body dressing supervision, lower body dressing contact-guard assist, toileting contact-guard assist    SLP:   None    IMAGIN/10/2025: Nuclear medicine hepatobiliary scan  IMPRESSION:  Nonvisualization of the gallbladder despite the administration of morphine is consistent with cystic duct obstruction.    2025: Chest x-ray:  Impression no acute cardiac or pulmonary abnormalities are noted.    2025 echocardiogram  CONCLUSIONS  No prior study is available for comparison.   The left ventricle is mildly dilated. Moderate concentric left   ventricular hypertrophy. Moderately reduced left ventricular systolic   function. The left ventricular ejection fraction is visually estimated   to be 30-35%.  Akinesis of the apex, anterioseptal and inferioseptal   wall . Global hypokinesis with regional variation. Grade II diastolic   dysfunction.  Moderately dilated left atrium  Mild to Moderate mitral regurgitation  Mild aortic valve stenosis, in which transvalvular velocities may be   underestimated given concern for low flow, low gradient aortic   stenosis.  Mild tricuspid regurgitation with estimated RV systolic pressure of 40   mmHg  Elevated RA pressure    2/15/2025: Renal ultrasound:  IMPRESSION:  1.  No hydronephrosis.  2.  Evidence of medical renal disease.  3.  Incidental note of small volume ascites.    PROCEDURES:  2025: Esophagogastroduodenoscopy with biopsy  OPERATIVE FINDINGS:     1. Esophagus: 2 cm Howe colored mucosa. Biopsied for Stapleton's  2. Stomach: small hiatal hernia, mildly thickened folds. biopsied  3. Duodenum: normal to third  portion     PMH:  Past Medical History:   Diagnosis Date    A-V fistula ([facility])     Left Arm    Arrhythmia 03/13/2024    Due to kidney failure treatment    Breath shortness     with exertion    Cataract     shon iol    Congestive heart failure ([facility])     Diabetes ([facility])     not on any medication at this time, being monitored at dialysis    Dialysis patient [facility]    Dialysis patient ([facility]) 01/17/2025    Starting Peritoneal Dialysis @ Home on 1/17/2025. Follows Nephrologist MD Miilan.    Foot ulcer ([facility])     left foot. being seen by wound care    Hemorrhagic disorder ([facility])     plavix eliquis    High cholesterol     Hypertension 05/24/2024    states controlled    DARIEN (obstructive sleep apnea) 01/17/2025    O2 @ 2.5L Per Patient's Wife.    Pain     Peritoneal dialysis catheter in place ([facility]) 01/17/2025    Pneumonia     3/24    Renal disorder     Sleep apnea 01/10/2025    Home sleep study done 1/3/2025 - no results yet.    Stroke ([facility]) 2021    tia       PSH:  Past Surgical History:   Procedure Laterality Date    UT UPPER GI ENDOSCOPY,DIAGNOSIS N/A 2/14/2025    Procedure: GASTROSCOPY;  Surgeon: Melany Richards M.D.;  Location: SURGERY SAME DAY [facility];  Service: Gastroenterology    UT UPPER GI ENDOSCOPY,BIOPSY N/A 2/14/2025    Procedure: GASTROSCOPY, WITH BIOPSY;  Surgeon: Melany Richards M.D.;  Location: SURGERY SAME DAY [facility];  Service: Gastroenterology    UT VEIN BYPASS GRAFT,FEM-TIBIAL Left 1/20/2025    Procedure: LEFT LOWER EXTREMITY BYPASS, POPLITIAL TO TIBIAL BYPASS;  Surgeon: Marco Antonio Miller M.D.;  Location: SURGERY [facility];  Service: Vascular    AORTOGRAM WITH RUNOFF  1/14/2025    Procedure: AORTOGRAM WITH RUNOFF, right peroneal artery intravascular lithotripsy and stent placement;  Surgeon: Marco Antonio Miller M.D.;  Location: [facility];  Service: Vascular    INGUINAL HERNIA LAPAROSCOPIC N/A 12/18/2024    Procedure: LAPAROSCOPIC LEFT INGUINAL  HERNIA REPAIR WITH MESH, LAPAROSCOPIC REPOSITIONING OF PERITONEAL DIALYSIS CATHETER;  Surgeon: Marco Antonio Miller M.D.;  Location: SURGERY [facility];  Service: Vascular    CATH PLACEMENT CAPD N/A 12/18/2024    Procedure: REPOSITION, CATHETER, CAPD;  Surgeon: Marco Antonio Miller M.D.;  Location: SURGERY [facility];  Service: Vascular    CATH PLACEMENT CAPD N/A 09/16/2024    Procedure: LAPAROSCOPIC INSERTION OF PERITONEAL DIALYSIS CATHETER PLACEMENT;  Surgeon: Marco Antonio Miller M.D.;  Location: SURGERY SAME DAY [facility];  Service: Vascular    AV FISTULA CREATION Left 07/01/2024    Procedure: CREATION OF LEFT UPPER EXTREMITY DIALYSIS FISTULA;  Surgeon: Shilo Mercedes M.D.;  Location: SURGERY [facility];  Service: General    OTHER ORTHOPEDIC SURGERY  2004    bunion r foot    OTHER      tonsils adenoids    OTHER      dialysis catheter in upper right chest    OTHER      shon iol    OTHER CARDIAC SURGERY      cardiac stents       FHX:  Family History   Problem Relation Age of Onset    Cancer Mother         Brain cancer    Heart Disease Father         Several By-pass Surgeries    Cancer Daughter         Thyroid Cancer    Heart Disease Brother         Fatal Heart Attack       Medications:  Current Facility-Administered Medications   Medication Dose    traMADol (Ultram) 50 MG tablet 50 mg  50 mg    losartan (Cozaar) tablet 12.5 mg  12.5 mg    lanthanum carbonate (Fosrenol) 500 MG chewable tablet CHEW 500 mg  500 mg    furosemide (Lasix) tablet 20 mg  20 mg    metoprolol tartrate (Lopressor) tablet 25 mg  25 mg    traZODone (Desyrel) tablet 50 mg  50 mg    amoxicillin-clavulanate (Augmentin) 500-125 MG per tablet 1 Tablet  1 Tablet    omeprazole (PriLOSEC) capsule 40 mg  40 mg    [START ON 2/18/2025] epoetin (Retacrit) injection 4,000 Units  4,000 Units    tamsulosin (Flomax) capsule 0.4 mg  0.4 mg    apixaban (Eliquis) tablet 5 mg  5 mg    clopidogrel (Plavix) tablet 75 mg  75 mg    montelukast (Singulair) tablet 10 mg   "10 mg    gentamicin (Garamycin) 0.1 % cream      rosuvastatin (Crestor) tablet 20 mg  20 mg    sevelamer carbonate (Renvela) tablet 800 mg  800 mg    acetaminophen (Tylenol) tablet 650 mg  650 mg    Pharmacy Consult Request ...Pain Management Review 1 Each  1 Each    labetalol (Normodyne/Trandate) injection 10 mg  10 mg    ondansetron (Zofran) syringe/vial injection 4 mg  4 mg    Or    ondansetron (Zofran ODT) dispertab 4 mg  4 mg    haloperidol lactate (Haldol) injection 1 mg  1 mg    methocarbamol (Robaxin) tablet 250 mg  250 mg       Allergies:  No Known Allergies      Physical Exam:  Vitals: /65   Pulse 70   Temp 36.7 °C (98 °F) (Temporal)   Resp 18   Ht 1.707 m (5' 7.21\")   Wt 71 kg (156 lb 8.4 oz)   SpO2 93%   Gen: NAD  Head: Normocephalic, atraumatic  Eyes/ Nose/ Mouth: PERRL, moist mucous membranes  Cardio: good distal perfusion, warm extremities  Pulm: normal respiratory effort, no cyanosis   Abd: Soft, Nontender, Nondistended   Ext: bilateral pitting edema in bilateral feet  Skin: see below.   2/13: left leg      Mental status: Alert. Oriented to person, place, month and year.   Speech: fluent, no aphasia or dysarthria    Motor:      Upper Extremity  Myotome R L   Shoulder flexion C5 4+/5 4+/5   Elbow flexion C5 4+/5 4+/5   Wrist extension C6 4+/5 4+/5   Elbow extension C7 4+/5 4+/5   Finger flexion C8 4+/5 4+/5   Finger abduction T1 4+/5 4+/5     Lower Extremity Myotome R L   Hip flexion L2 4/5 3/5   Knee extension L3 4/5 4-/5   Ankle dorsiflexion L4 4/5 4-/5   Toe extension L5 4/5 4-/5   Ankle plantarflexion S1 4/5 4/5     Sensory:   intact to light touch through out arms and legs    DTRs: 2+ in bilateral  biceps, 2+ in bilateral patellar tendons  No clonus at bilateral ankles  Negative babinski b/l  Negative Buckley b/l     Tone: no spasticity noted, no cogwheeling noted    Labs: Reviewed and significant for   Recent Labs     02/15/25  0112 02/15/25  0939 02/16/25  0405   RBC 2.28*  --  " 2.70*   HEMOGLOBIN 6.8* 8.1* 8.1*   HEMATOCRIT 22.1* 25.8* 26.8*   PLATELETCT 331  --  298     Recent Labs     02/15/25  0112 02/16/25  0405   SODIUM 136 135   POTASSIUM 4.5 4.8   CHLORIDE 93* 93*   CO2 19* 22   GLUCOSE 241* 216*   * 99*   CREATININE 8.74* 8.92*   CALCIUM 9.0 8.9     Recent Results (from the past 24 hours)   URINALYSIS    Collection Time: 02/16/25  4:56 PM    Specimen: Urine, Clean Catch   Result Value Ref Range    Color Red (A)     Character Bloody (A)     Specific Gravity see below <1.035    Ph see below 5.0 - 8.0    Glucose see below Negative mg/dL    Ketones see below Negative mg/dL    Protein see below Negative mg/dL    Bilirubin see below Negative    Urobilinogen, Urine see below <=1.0 EU/dL    Nitrite see below Negative    Leukocyte Esterase see below Negative    Occult Blood see below Negative    Micro Urine Req Microscopic    URINE MICROSCOPIC (W/UA)    Collection Time: 02/16/25  4:56 PM   Result Value Ref Range    WBC 6-10 (A) /hpf    RBC >100 (A) 0 - 2 /hpf    Bacteria None None /hpf    Epithelial Cells 0-2 0 - 5 /hpf    Epithelial Cells Renal Present (A) Absent /hpf    Urine Casts 0-2 0 - 2 /lpf         ASSESSMENT:  Patient is a 79 y.o. male admitted with cystic duct obstruction, nausea, vomiting treated nonoperatively.    [facility] Code / Diagnosis to Support: 0016 - Debility (Non-Cardiac, Non-Pulmonary)  See DISPO details below for recommendations on appropriate level of rehab for this diagnosis.    Barriers to transfer include: Insurance authorization, TCCs to verify disposition, medical clearance and bed availability     Assessment and Plan:    DISPO:  - patient is a possible candidate for IRF. However, it is likely he will progress to being able to go home with home health  - If his functional status deteriorates or stalls, IRF can be considered.   - He has deficits in ADLs and mobility secondary to bypass surgery and cystic duct obstruction. Patient has good family support from  wife and a stable discharge environment which is [facility].   - PMR to follow in the periphery for rehab appropriateness, please reach out with questions or request for medical management     Medical Complexity:    Impaired mobility and ADLs  -PT and OT while in-house     Cystic duct obstruction  Nausea, vomiting, RUQ  -HIDA scan on 2/10/2025 consistent with cystic duct obstruction  -Initially planning for cholecystectomy, however patient refused.   -Symptoms have improved with-nonoperative management  -Augmentin q12h ends 2/21.     Melena  Small hiatal hernia  Stapleton's esophagus?  -EGD performed on 2/14/2025, and found small hiatal hernia and findings consistent with Stapleton's esophagus, which was biopsied.  -GI to follow-up on biopsies.  Recommended treating reflux.  -Omeprazole 40 mg daily     Severe PAD  -has left great toe wound, appears necrotic at the tip of the toe.   -S/p Left popliteal to posterior tibial artery bypass with ipsilateral reversed greater saphenous vein by Marco Antonio Miller MD on 1/20/2025.  -wound care, currently with prevena wound vac.   -Eliquis, Plavix     Atrial flutter  -Eliquis  -Metoprolol    Aortic stenosis  New diagnosis of HFrEF  -Echocardiogram on 2/14/2025 showed EF 30 to 35%, grade 2 diastolic dysfunction.  -Metoprolol  -Lasix 20 mg daily    Pain management  -prn tramadol     ESRD  -On peritoneal dialysis at home, which was continued for hospitalization  -Nephrology following  -Fosrenol  -PD exit site skin care     Anemia  -Likely from renal disease, recent surgery, and maybe hematuria?  -Required transfusion on 2/12 and 2/15.  -2/17: Hgb 8.1. Now restarting anticoagulation, watch for hematuria.   -On Retacrit     Hematuria  -UA on 2/16 showing hematuria.   -Watch h/h as above.  -may need urology consult.    Hypertension  -Metoprolol  -Losartan 12.5 mg daily     Dyslipidemia  -Crestor     Seasonal allergies  -Montelukast     BPH  -Flomax 0.4 mg nightly     Bowel management  -MiraLAX,  senna/docusate  -Last BM 1/21     DVT PPX: Eliquis as above        Thank you for allowing us to participate in the care of this patient.     Total time spent: 90 minutes.     Livan Meade D.O.   Physical Medicine and Rehabilitation     Please note that this dictation was created using voice recognition software. I have made every reasonable attempt to correct obvious errors, but there may be errors of grammar and possibly content that I did not discover before finalizing the note.

## 2025-02-17 NOTE — PROGRESS NOTES
Hospital Medicine Daily Progress Note    Date of Service  2/17/2025    Chief Complaint  Edy Bennett is a 79 y.o. male admitted 2/11/2025 with intractable nausea and vomiting     Hospital Course  79 y.o. male who presented 2/11/2025 with intractable nausea and vomiting.  Patient did receive a left lower extremity bypass, popliteal to tibial on 1/20, was being treated at [facility].  He began having nausea and vomiting as well as some right upper quadrant pain.  Workup eventually showed a obstruction of the cystic duct.  Because of this, patient was transferred here due to need for intervention.  Consulted GI, vascular surgery and general surgery as well as nephrology.  Patient is on chronic peritoneal dialysis.  Patient was started on IV antibiotics for cholecystitis and his symptoms improved.  Currently patient refusing to have surgery and general surgery signed off.  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  His anticoagulation has been resumed.  Vascular surgery removed patient's Prevena wound VAC that was in place and wound care replaced a new one on 2/13.     EKG was performed and showed atrial flutter, patient does have a history of this and was previously on metoprolol which has been restarted.  Echocardiogram EF 30-35% with akinesis and global hypokinesis, grade II diastolic.  New heart failure diagnosis for the patient, no signs of fluid overload or exacerbation on exam.  Cardiology consulted patient may eventually need cardiac cath however he would like to defer at this time as he would likely need hemodialysis afterwards.  Recommended medical management with metoprolol, losartan, Lasix with outpatient follow-up with his cardiologist. PT/OT recommending postacute placement.  PM&R consulted.     Interval Problem Update  2/15 BP stable   Hb 6.8, 1 unit pRBC transfused this morning   Cr 8.74, BUN  109   Echo showed EF 30-35% with akinesis and global hypokinesis, grade II diastolic   Discussed with cardiology recommended metoprolol 25 mg, changed to XL formulation on discharge, restart low-dose Lasix and recommending losartan.  He may ultimately need cardiac cath but can be done as an outpatient especially since patient is hesitant to go back on hemodialysis  Discussed with nephrology okay from their parent to start losartan, also agreeable to restarting EPO for his blood counts.  Patient overall is feeling better, he has not had any vomiting in the last 2 days.  Will upgrade to GI soft diet.  He is working with his wife to start getting up and moving around his room more.  If he moves around today and his heart remained stable will DC his cardiac monitor.  Needs OT evaluation for PM&R consult.     2/16 Vitals stable, start losartan   Hb 8.1, 8.1  BUN 99, Cr 8.92   Pathology still pending from EGD  Patient had hematuria last night, renal US showed no hydronephrosis or stone, normal bladder   Continue to hold eliquis   -UA pending  If continues to have hematuria will consult urology  Discussed with nephrology okay to restart Phos binder with meals, was previously held for nausea and vomiting  OT evaluated the patient today also recommending postacute placement  Pending PM&R consult to determine if still a candidate to discharge back to IPR  Discussed plan of care with patient and his wife at bedside, will continue to monitor for hematuria.    2/17 Vitals stable on room air   WBC 9.7, hemoglobin 7.7  Sodium 133  UA yesterday was positive for blood  OT recommending post acute placement  PM&R consulted patient too high functioning for IPR, expect patient to progress to home health  Home health ordered  Discussed with patient and his wife, his urine was yellow this morning no more hematuria after a blood clot yesterday evening.  Restart Eliquis and monitor for recurrence.  If patient has recurrence of hematuria  will need urology consult. Patient starting to have pain in his toes and reports the oxycodone makes him very sleepy. Change to tramadol prn.     I have discussed this patient's plan of care and discharge plan at IDT rounds today with Case Management, Nursing, Nursing leadership, and other members of the IDT team.    Consultants/Specialty  general surgery  Nephrology-[facility]  vascular surgery  Gastroenterology  PM&R  Cardiology     Code Status  Full Code    Disposition  The patient is not medically cleared for discharge to home or a post-acute facility.  Anticipate discharge to: home with organized home healthcare and close outpatient follow-up    I have placed the appropriate orders for post-discharge needs.    Review of Systems  Review of Systems   Constitutional:  Positive for malaise/fatigue. Negative for chills and fever.   Respiratory:  Negative for shortness of breath.    Cardiovascular:  Negative for chest pain and leg swelling.   Gastrointestinal:  Negative for abdominal pain, constipation, nausea and vomiting.   Genitourinary:  Negative for hematuria.   Musculoskeletal:  Positive for myalgias.   Skin:  Negative for rash.   Neurological:  Positive for weakness. Negative for dizziness and headaches.   Psychiatric/Behavioral:  Negative for depression. The patient is nervous/anxious and has insomnia.    All other systems reviewed and are negative.       Physical Exam  Temp:  [36.3 °C (97.4 °F)-36.7 °C (98 °F)] 36.3 °C (97.4 °F)  Pulse:  [67-77] 67  Resp:  [18] 18  BP: (102-120)/(60-68) 102/60  SpO2:  [93 %-96 %] 96 %    Physical Exam  Vitals and nursing note reviewed. Exam conducted with a chaperone present (Wife at bedside).   Constitutional:       General: He is not in acute distress.     Appearance: He is not toxic-appearing.   HENT:      Head: Normocephalic and atraumatic.   Eyes:      Conjunctiva/sclera: Conjunctivae normal.   Cardiovascular:      Rate and Rhythm: Normal rate and regular rhythm.    Pulmonary:      Effort: Pulmonary effort is normal. No respiratory distress.      Breath sounds: Normal breath sounds. No wheezing.   Abdominal:      Palpations: Abdomen is soft.      Tenderness: There is no abdominal tenderness.      Comments: PD catheter in place   Musculoskeletal:         General: No tenderness.      Cervical back: Normal range of motion and neck supple.      Right lower leg: No edema.      Left lower leg: No edema.      Comments: Prevena wound VAC replaced left lower extremity   Skin:     General: Skin is warm and dry.      Comments: Bilateral toes with ischemic necrosis   Neurological:      General: No focal deficit present.      Mental Status: He is alert and oriented to person, place, and time.      Motor: Weakness present.   Psychiatric:         Mood and Affect: Mood is anxious.         Behavior: Behavior normal.         Thought Content: Thought content normal.         Cognition and Memory: Cognition normal.         Fluids    Intake/Output Summary (Last 24 hours) at 2/17/2025 1724  Last data filed at 2/17/2025 1414  Gross per 24 hour   Intake 200 ml   Output 150 ml   Net 50 ml        Laboratory  Recent Labs     02/15/25  0112 02/15/25  0939 02/16/25  0405 02/17/25  1049   WBC 8.4  --  9.1 9.7   RBC 2.28*  --  2.70* 2.50*   HEMOGLOBIN 6.8* 8.1* 8.1* 7.7*   HEMATOCRIT 22.1* 25.8* 26.8* 24.1*   MCV 96.9  --  99.3* 96.4   MCH 29.8  --  30.0 30.8   MCHC 30.8*  --  30.2* 32.0*   RDW 82.1*  --  78.6* 76.1*   PLATELETCT 331  --  298 257   MPV 10.3  --  10.2 10.6     Recent Labs     02/15/25  0112 02/16/25  0405 02/17/25  1049   SODIUM 136 135 133*   POTASSIUM 4.5 4.8 4.4   CHLORIDE 93* 93* 92*   CO2 19* 22 23   GLUCOSE 241* 216* 204*   * 99* 96*   CREATININE 8.74* 8.92* 8.93*   CALCIUM 9.0 8.9 8.3*                   Imaging  US-RENAL   Final Result      1.  No hydronephrosis.   2.  Evidence of medical renal disease.   3.  Incidental note of small volume ascites.      EC-ECHOCARDIOGRAM  COMPLETE W/O CONT   Final Result           Assessment/Plan  * Obstruction of cystic duct- (present on admission)  Assessment & Plan  Noted on imaging at [facility]  General surgery consulted, after multiple discussions patient refused surgery preferred     Gross hematuria  Assessment & Plan  Started in the evening 2/15  Renal ultrasound shows no stones or bladder irregularities    2/17 urine clear of blood this morning, restart Eliquis.  Patient has recurrent hematuria will need urology consult    Chronic combined systolic and diastolic heart failure (HCC)- (present on admission)  Assessment & Plan  No signs of fluid overload or exacerbation on exam  Echo showed EF 30-35% with akinesis and global hypokinesis, grade II diastolic   Cardiology consulted, suspect this is chronic  -Recommending outpatient follow-up with cardiology for consideration of cardiac cath in the future  -Increase metoprolol 25 mg, start oral Lasix 20 mg  -Per nephrology okay to start losartan, will add if blood pressures remain stable  Cannot start Entresto, Aldactone, or Farxiga due to ESRD    Typical atrial flutter (HCC)  Assessment & Plan  Patient reports history of intermittent atrial flutter with dialysis, was previously on metoprolol XL  Remote cardiac monitoring   Restart Eliquis  Echo pending   Start metoprolol 12.5 mg BID and increase as tolerated    Melena  Assessment & Plan  Noted 2/13, could be due to iron replacement  PPI twice daily  Trend H&H q8H   Consulted GI   -Status post EGD 2/14, concern for Stapleton's biopsies pending  Restart Eliquis, Plavix    PAD (peripheral artery disease) (HCC)- (present on admission)  Assessment & Plan  Patient is status post left lower extremity bypass, popliteal to tibial on 1/20  Eliquis and Plavix held for possible surgery and possible GI bleed.  EGD showed no bleeding and patient does not want surgery at this time will restart  Continue rosuvastatin  Vascular surgery consulted, Prevena wound VAC  replaced on 2/13 by wound care    BPH (benign prostatic hyperplasia)- (present on admission)  Assessment & Plan  Continue home Flomax  Monitor urine output    Dyslipidemia- (present on admission)  Assessment & Plan  Continue home statin    Anemia in chronic renal disease- (present on admission)  Assessment & Plan  EGD showed no bleeding  On Eliquis and Plavix  -1 unit PRBC transfusion today  Discussed with nephrology, restart EPO  Daily CBC    Essential hypertension- (present on admission)  Assessment & Plan  Was on midodrine at rehab which has been Dc'd  -Continue Flomax, metoprolol 12.5 mg BID   As needed antihypertensives    ESRD on peritoneal dialysis (HCC)- (present on admission)  Assessment & Plan  Nephrology consulted for peritoneal dialysis  Patient refused surgical intervention and cardiac cath as he does not want to go back on hemodialysis at this time  Obtain labs and repeat a BMP in the morning    Total time spent in chart review, at bedside with the patient, discussing with consultants, nursing and case management: 53 minutes    VTE prophylaxis:    therapeutic anticoagulation with eliquis 5 mg BID    I have performed a physical exam and reviewed and updated ROS and Plan today (2/17/2025). In review of yesterday's note (2/16/2025), there are no changes except as documented above.

## 2025-02-17 NOTE — DISCHARGE PLANNING
Case Management Discharge Planning    Admission Date: 2/11/2025  GMLOS: 4.5  ALOS: 6    6-Clicks ADL Score: 20  6-Clicks Mobility Score: 17  PT and/or OT Eval ordered: Yes  Post-acute Referrals Ordered: Yes  Post-acute Choice Obtained: Yes  Has referral(s) been sent to post-acute provider:  Yes      Anticipated Discharge Dispo: Discharge Disposition: D/T to SNF with Medicare cert in anticipation of skilled care (03)    DME Needed: No    Action(s) Taken: Discussed in IDT rounds, pt has been declined from Rehab, as he functionally does not need a higher level of care. SNF referrals sent out, pending acceptance, SNFs not accepting PD. Plan to restart eliquis today.     Escalations Completed: None    Medically Clear: No    Next Steps: Pending medical clearance.     Barriers to Discharge: Medical clearance    Is the patient up for discharge tomorrow: No

## 2025-02-17 NOTE — DISCHARGE PLANNING
[facility]    Physiatry to consult.     1224 - Case reviewed with [facility] admission team, Medical Director.  Pt functionally high level for IPR admission, recommending home with  services.  Volate update to JOSIE Patel.  Please see PMR follow up recommendations.

## 2025-02-17 NOTE — CARE PLAN
The patient is Watcher - Medium risk of patient condition declining or worsening    Shift Goals  Clinical Goals: monitor hematuria,tolerate dialysis  Patient Goals: rest  Family Goals: comfort    Progress made toward(s) clinical / shift goals:    Urine was adiel with blood clots.   Pt tolerating dialysis. Labs pushed to 0730 after dialysis for more accurate results.     Problem: Knowledge Deficit - Standard  Goal: Patient and family/care givers will demonstrate understanding of plan of care, disease process/condition, diagnostic tests and medications  Outcome: Progressing       Patient is not progressing towards the following goals:

## 2025-02-17 NOTE — PROGRESS NOTES
[facility] -  PROGRESS NOTE               Author: IDA Marte Date & Time: 2/17/2025  12:53 PM     HPI:  79 y.o. male with HTN, ESRD on PD, PVD/ gangrenous left foot, BPH admitted for monitoring s/p  left leg bypass surgery on 1/20 with Dr. Miller. He recently underwent hernial repair and was temporarily transferred to in center HD via [facility]. He has returned to  PD  4 days ago, doing low fill.volumes and has been tolerating at home.   Patient was connected to cycler last night. Total UF reported to be 276ml. Reports of low drain UF alarm last night but no major issues with PD overnight. Pt reports no complaints today morning  No F/C/N/V/CP/SOB.  No melena, hematochezia, hematemesis.  No HA, visual changes,or abdominal pain.     DAILY NEPHROLOGY SUMMARY:  See note from 1/31 for prior summaries   2/01: Disconnected aseptically from PD CyclerEffluent clear and yellow, no signs of bleeding/fibrin clots. 24 hour UF= 678mL. Tolerated well.   2/2: Patient and wife are doing well. Wife states she gave wrong # of cycles to provider yesterday and wanted then reduced back from 7 to 5. Overall doing well without complaints. Looking forward to going home around Feb 7th per patient. Also stated they were told that RT IJ TDC would be removed sometime this week. On-coming provider to ask the Hospitalist to help arrange.   2/3: pt sitting up in bed, spouse at bedside, PD improved with change in rx (reduced to 5 exchanges from 7) great UF: 1.1L, edema has improved, biggest complaint NV, vomits after each meal and fatigue, requesting a reduction in gabapentin, also reports constipation, had to dig out stool yesterday with finger  2/4 net UF 1500 ml overnight. Potassium is 3.3 I am replacing. Patient reports doing better today, wife at bedside. Patient feels much better now since rEica made med changes yesterday  2/5: 1437 mL Net UF removed overnight. Wife BS. Patient oob in BR performing adls  "independently. Reports improvement every day.   2/6: doing well, eating lunch with wife, net UF 1273 ml  2/7: patient doing well, low K I will order additional dose of potassium  2/8: NAEO, no complaints, wife and friends at bedside; Feels good  2/9: NAEO, wife at bedside, he is in wheelchair, having multiple BM's after lactulose  2/10: 725 mL Net UF removed last night via CCPD. Low drain UF alarmed overnight. Resting in bed, wife BS. Bps low 90s systolic. C/o n/v yesterday, Complaining of persistent nausea. Per wife, appears weaker. Multiple Bms yesterday. Denies fever or chills.   2/11: Fluid + some blood, 425 mL Net UF removed via CCPD. C/o weakness and fatigue. Declining antimedic. HIDA + cystic duct obstruction.  2/12: Pt trasnferred to main for eval of N/V, cystic duct  2/13: melanotic appearing stools, no issues with PD  2/15: Hgb 8.1 s/p 1 unit pRBCs tx. Reports feeling tired d/t alarms going off all night. Denies emesis x2 days. Tolerating 6 cycles of CCPD. 1482 mL Net UF removed overnight.   2/16: 1676 mL net UF removed with CCPD overnight. Reports difficulty sleeping, resting now in no distress. Per primary rn family requesting to restart phos binder, had been on hold d/t n/v. Phos 6.7   2/17: Wife BS. Reports improved Edema overall, increases after sitting in chair. C/o increased neuropathy pain, relieved by Tramadol. Reports indep adls this am and increased mobility. Denies any hematuria today. Denies n/v. Denies fever or chills.       REVIEW OF SYSTEMS:    10 point ROS reviewed and is as per HPI/daily summary or otherwise negative    PMH/PSH/SH/FH:   Reviewed and unchanged since admission note    CURRENT MEDICATIONS:   Reviewed from admission to present day    VS:  /65   Pulse 70   Temp 36.7 °C (98 °F) (Temporal)   Resp 18   Ht 1.707 m (5' 7.21\")   Wt 71 kg (156 lb 8.4 oz)   SpO2 93%   BMI 24.37 kg/m²     Physical Exam  General: well appearing NAD  HEENT: EOMI, no scleral icterus  Heart: " RR no rubs  Pulm: non labored  Abdomen: Pd catheter  Extremites: Rmidial wound vac in place, LE L>R  Derm: no rashes  Neuro: grossly non focal    Fluids:  In: -   Out: 1726     LABS:  Recent Labs     02/15/25  0112 02/16/25  0405 02/17/25  1049   SODIUM 136 135 133*   POTASSIUM 4.5 4.8 4.4   CHLORIDE 93* 93* 92*   CO2 19* 22 23   GLUCOSE 241* 216* 204*   * 99* 96*   CREATININE 8.74* 8.92* 8.93*   CALCIUM 9.0 8.9 8.3*       IMAGING:   All imaging reviewed from admission to present day  US-RENAL  Order: [id number]   Status: Final result       Next appt: None    Test Result Released: Yes (seen)    0 Result Notes  Details    Reading Physician Reading Date Result Priority   Judd Mcclellan M.D.  [phone]     2/15/2025      Narrative & Impression     2/15/2025 7:30 PM     HISTORY/REASON FOR EXAM:  Gross hematuria        TECHNIQUE/EXAM DESCRIPTION:  Renal ultrasound.     COMPARISON:  11/26/2024     FINDINGS:     The right kidney measures 8.67 cm.  Increased cortical echotexture. The right renal collecting system is not dilated. No hydronephrosis. There are no renal calculi. Simple right renal cyst measuring up to 4.8 cm.     The left kidney measures 7.81 cm. Increased cortical echotexture. The left renal collecting system is not dilated. No hydronephrosis. There are no renal calculi.     The bladder is partially decompressed.     Incidental note of small volume ascites.     IMPRESSION:     1.  No hydronephrosis.  2.  Evidence of medical renal disease.  3.  Incidental note of small volume ascites.       IMPRESSION:  # ESRD on CCPD  - Returned to PD 1/17/25   - Home PD prescription CCPD 2.5% alternating with 1.5% 5  no last fill. Patient wife stated she initially told last Nephrologist wrong script. Now all green bags and 5 cycles, not 7.   - Bloody fluid with CCPD 2/10, Hgb stable on Eliquis/Plavix   # HTN, controlled   - Goal BP < 140/90  - torsemide 10mg daily   # Anemia of CKD  - worsened overnight ? Unclear  source of bleed  - awaiting scope in am  # CKD-MBD  - Ca 8.6   - PO4 7.4 -> 6.6 -> 7.0 -> 6.7  - Lanthanum 500mg TID, titrate up prn  # Chronic hypoxic respiratory failure   - On home oxygen 2-2.5L at night   # Nausea/Vomiting              - ddx: binder intolerance, switched to lanthanum              - constipation, started miralax BID and lactulose added recently              - pill burden, increase omeprazole to 40mg daily              - reduced gabapentin to 100mg daily               - H. Pylori (-)               - HIDA Scan 2/10 + Cystic Duct Obstruction, GI Consulted               - Viral Panel (-) 2/11   - EGD bx for r/o Stapleton's esophagus, sm hiatal hernia GI signing off recommends Pamela  # Hyponatremia, mild              - Torsemide daily              - UF with PD, on all 2.5%   # HFrEF  # Hx CAD with stent      SUGGESTIONS:  - Continue CCPD during hospital stay (now on oupt rx and tolerating well)  - GI signing off  - Defer gallbladder surgery for now  - Dose all meds per ESRD  - Renal diet, low phos   - Okay for ARB from renal standpoint in ESRD, bps had been trending low  - ROSALIND SQ Qweek resume 2/18  - Restart Phos binder TID w/meals   - L arm AVF not fully matured,  will need intervention outpt   - Avoid Fleet enema in dialysis patients   - Labs daily  - Discharge planning underway, tenative plan tomorrow home with family

## 2025-02-17 NOTE — DISCHARGE SUMMARY
"  Physical Medicine & Rehabilitation Discharge Summary    Admission Date: 1/23/2025    Discharge Date: 2/11/2025    Attending Provider: Mere Klein DO, MS    Admission Diagnosis:   Active Hospital Problems    Diagnosis     Nausea & vomiting     Lower extremity edema     PAD (peripheral artery disease) (HCC)     Anemia in chronic renal disease     ESRD on peritoneal dialysis (HCC)     Essential hypertension        Discharge Diagnosis:  Active Hospital Problems    Diagnosis     Nausea & vomiting     Lower extremity edema     PAD (peripheral artery disease) (HCC)     Anemia in chronic renal disease     ESRD on peritoneal dialysis (HCC)     Essential hypertension        HPI per Admission History & Physical:  Adapted from the PM&R Consult by Dr. Meade:   \"HPI: The patient is a 79 y.o.  male with a past medical history of moderate aortic stenosis, hypertension, CAD with stents on eliquis, ESRD on PD, BPH and severe PAD who presented on 1/20/2025 10:52 AM for elective left bypass graft.  01/20/2025, the patient was taken to the OR for left popliteal to posterior tibial artery bypass by Marco Antonio Miller MD. Current labs remarkable for WBC 8.6, Hgb 9.7, platelets 157, creatinine 7.2, GFR 7. Vitals remarkable for 0 to 2 L supplemental O2.  Patient seen by PT with recommendation for post-acute placement. PM&R consulted to evaluate for possible inpatient rehab admission.\"     Patient admitted to acute rehab 1/23/2025.  On admission patient reports he is concerned about the pain.  He has a lot of pain whenever he moves.  Discussed scheduled medications which he is okay with.  Also has trouble with peritoneal dialysis if he is to constipated.    Patient was admitted to [facility] on 1/23/2025.     Hospital Course by Problem List:  S/p left popliteal to posterior tibial artery bypass Dr. Miller 1/20/2025  PT and OT for mobility and ADLs. Per guidelines, 15 hours per week between PT, OT and/or " SLP.  Follow-up vascular surgery  Continue Eliquis and Plavix  Continue statin     1/24 Prevena saturation. Wound consult. Vasc surg to eval as well. Prevena now working.   1/30 vascular to change dressing at some point per patient's wife  2/5 swelling stable. Not getting torsemide nor BB  2/11 Vac to come off      Type 2 diabetes mellitus with hyperglycemia -outpatient medication had been Januvia 1 tablet twice daily     Pain -Tylenol and oxycodone as needed.  Scheduled Robaxin, gabapentin.  Schedule oxycodone 5x daily, morphine contraindicated based on renal function. 1/29 Reduce Oxycodone to QID. 1/30 reduce oxycodone to scheduled 3 times daily.  Decrease methocarbamol to 250 mg 4 times daily. 2/3 Patient refusing scheduled Oxy. Continue only as PRN and d/c scheduled. Make Robaxin PRN.      Last as needed oxycodone use was 1/24 2/4 d/c Gabapentin, not using Oxy reduce frequency to q4 hrs PRN  2/11 Not requiring any PRN pain medications.      Anemia - 1/24 Improving. 1/27 Drop to 8's, had a lot of OP from Prevena last week. 1/29 7.7 - expected given prevena OP. Monitor need for transfusion.  1/30 hemoglobin dropped to 7.3.  Discussed with nephrology, they deferred to us to transfuse.  Transfuse less than 7.  Prevena putting out less. 2/5 Stable 7's. 2/11 Stable 7's. Black stool get FOBT, known hemorrhoids likely etiology.      Thrombocytopenia - Resolved 1/30     ESRD on PD - Nnephrology following.  On sevelamer - changed due to naussea.  Nephrology increased fill 1/30.  Tunneled cath removal 2/6 per nephrology     2/11 Bloody output with PD last night.      Hypermagnesemia - Nephro managing     Hyperphosphatemia - Nephro managing      History of moderate aortic stenosis -stable     Hypertension - On admit: Norvasc 2.5 mg every morning, Lasix 40 mg twice daily, Metoprolol XL 100mg. Norvasc stopped.       2/5 Has not been getting metoprolol nor torsemide for many days (>6) will reduce midodrine, change torsemide  to 1400 and add midodrine.   2/6 continue midodrine.  2/7 Not getting beta blocker. Stop for now, hasn't been given in > 7 days. Increase Midodrine and continue Torsemide so can improve swelling.   2/10 Has not received Torsemide. Reduce BP parameter to not administer if BP < 110. Increase Midodrine to 15mg TID.  2/11 BP stable but still not getting Torsemide.      CAD s/p SYDNEE 3x - On aspirin and statin.      Gout - No meds at home.      Hyperlipidemia -continue statin     Recent left inguinal hernia repair - 12/18/2024 Dr. Miller     Bowel - Patient on Senna-docusate for constipation prophylaxis. 1/23 Constipation. 1/29 BM. 2/1 BM. 2/3 Bowel meds increased.      Nausea & Vomiting - Only after lunch. Get KUB - WNL. Schedule Zofran after lunch.  Scheduled Zofran after each meal. 2/3 Declining scheduled Zofran. Make PRN only. 2/4 reduced to only afternoon. 2/5 No vomiting yesterday. Monitor.  2/6 having some emesis using as needed Zofran.  2/6 schedule Zofran again in the afternoon and evening.  Will try without Nepro as seems to correlate with when he gets Nepro. 2/7 Down grade diet per patient preference, cannot tolerate regular textures. Consult hospitalist.      2/10 HIDA pending today at 1400. No improvement in N/V. Off Miralax, on Lactulose. Viral panel pending.   2/11 HIDA with cystic duct obstruction. D/w GI, voalte to gen surg on call, d/w hospitalist. Direct admit for intractable N/V. Viral panel neg.      Hand tremors -LFTs nonconcerning for etiology.  Possibly med induced. 2/5 Improved but still continues. Off meds that may contribute.      BPH - TV/PVR/BS PRN.  Continue Flomax and Hytrin. 1/28 Hold due to low BP. Favor diuresis. 2/3 Low PVRs.      Insomnia -continue trazodone at night as needed        Upcoming Labs/imaging: Per Nephro     DVT PROPHYLAXIS: On Eliquis     HOSPITALIST FOLLOWING: Yes     Consultants following: Nephrology     CODE STATUS: Full code     DISPO: Home with spouse     SHAE:  2/14/25     MEDS SENT TO: [facility]     DISCHARGE SPECIALIST FOLLOW UP:   Vascular surgery - 2/13  Transplant team     Patient to scheduled follow up with their PCP within 2 weeks from discharge from the [facility].   _________________________________    Functional Status at Discharge  Eating:     Eating Description:     Grooming:  Modified Independent, Seated  Grooming Description:  Seated in wheelchair at sink  Bathing:  Contact Guard Assist  Bathing Description:  Grab bar, Hand held shower, Long handled bath tool, Set up for wound protection, Supervision for safety  Upper Body Dressing:  Independent  Upper Body Dressing Description:  Increased time, Initial preparation for task, Verbal cueing, Supervision for safety  Lower Body Dressing:  Minimal Assist  Lower Body Dressing Description:  Assist with threading into pant leg     Walk:  Contact Guard Assist  Distance Walked:  75 (and 25')  Number of Times Distance Was Traveled:  1  Assistive Device:  Front Wheel Walker  Gait Deviation:  Antalgic, Step To, Decreased Base Of Support, Bradykinetic, Decreased Heel Strike, Decreased Toe Off  Wheelchair:  Supervised  Distance Propelled:  150   Wheelchair Description:  Extra time, Limited by fatigue, Supervision for safety  Stairs Unable to Participate  Stairs Description       Comprehension:     Comprehension Description:     Expression:     Expression Description:     Social Interaction:     Social Interaction Description:     Problem Solving:     Problem Solving Description:     Memory:     Memory Description:          IMere DSaimaOSaima, personally performed a complete drug regimen review and no potential clinically significant medication issues were identified.   Discharge Medication:     Medication List        ASK your doctor about these medications        Instructions   apixaban 5mg Tabs  Commonly known as: Eliquis   Take 5 mg by mouth 2 times a day.  Dose: 5 mg     bisacodyl 10 MG  Supp  Commonly known as: Dulcolax   Insert 10 mg into the rectum 1 time a day as needed. Indications: Constipation  Dose: 10 mg     clopidogrel 75 MG Tabs  Commonly known as: Plavix   Take 75 mg by mouth every day.  Dose: 75 mg     epoetin jayna 4000 UNIT/ML Soln  Commonly known as: Epogen/Procrit   Inject 4,000 Units under the skin every 7 days.  Dose: 4,000 Units     gentamicin 0.1 % cream  Commonly known as: Garamycin   Apply 1 Application topically every day.  Dose: 1 Application     lanthanum carbonate 500 MG Chew  Commonly known as: Fosrenol   Chew 500 mg 3 times a day with meals.  Dose: 500 mg     magnesium hydroxide 400 MG/5ML Susp  Commonly known as: Milk Of Magnesia   Take 30 mL by mouth 1 time a day as needed. Indications: Constipation  Dose: 30 mL     methocarbamol 500 MG Tabs  Commonly known as: Robaxin   Take 250 mg by mouth 4 times a day as needed. Indications: Musculoskeletal Pain  Dose: 250 mg     midodrine 10 MG tablet  Commonly known as: Proamatine   Take 15 mg by mouth 3 times a day with meals.  Dose: 15 mg     montelukast 10 MG Tabs  Commonly known as: Singulair   Take 10 mg by mouth at bedtime.  Dose: 10 mg     omeprazole 40 MG delayed-release capsule  Commonly known as: PriLOSEC   Take 40 mg by mouth every day.  Dose: 40 mg     * ondansetron 4 MG Tbdp  Commonly known as: Zofran ODT   Take 4 mg by mouth 2 times a day with meals.  Dose: 4 mg     * ondansetron 4 MG Tbdp  Commonly known as: Zofran ODT   Take 4 mg by mouth 4 times a day as needed for Nausea/Vomiting.  Dose: 4 mg     * oxyCODONE immediate-release 5 MG Tabs  Commonly known as: Roxicodone   Take 5 mg by mouth every four hours as needed for Severe Pain.  Dose: 5 mg     * oxyCODONE immediate-release 5 MG Tabs  Commonly known as: Roxicodone  Ask about: Should I take this medication?   Take 1 Tablet by mouth every 6 hours as needed for Severe Pain for up to 5 days.  Dose: 5 mg     polyethylene glycol/lytes Pack  Commonly known as:  Miralax   Take 17 g by mouth 1 time a day as needed. Indications: Constipation  Dose: 17 g     potassium chloride SA 20 MEQ Tbcr  Commonly known as: Kdur   Take 20 mEq by mouth every day.  Dose: 20 mEq     Renvela 800 MG Tabs tablet  Generic drug: sevelamer carbonate   Take 800 mg by mouth 1 time a day as needed (with snack).  Dose: 800 mg     rosuvastatin 20 MG Tabs  Commonly known as: Crestor   Take 20 mg by mouth at bedtime.   Dose: 20 mg     senna-docusate 8.6-50 MG Tabs  Commonly known as: Pericolace Or Senokot S   Take 2 Tablets by mouth 2 times a day.  Dose: 2 Tablet     simethicone 125 MG chewable tablet  Commonly known as: Mylicon   Chew 125 mg in the morning, at noon, and at bedtime.  Dose: 125 mg     torsemide 10 MG tablet  Commonly known as: Demadex   Take 10 mg by mouth every day.  Dose: 10 mg           * This list has 4 medication(s) that are the same as other medications prescribed for you. Read the directions carefully, and ask your doctor or other care provider to review them with you.                  Discharge Diet:  No Active Diet Orders      Discharge Activity:  Per PT/OT    Disposition:  Patient to discharge to [facility] for medical management.     Equipment:  Per PT/OT    Follow-up & Discharge Instructions:  Follow up with your primary care provider (PCP) within 7-10 days of discharge to review your medications and take over your care.     If you develop chest pain, fever, chills, change in neurologic function (weakness, sensation changes, vision changes), or other concerning sxs, seek immediate medical attention or call 911.      No future appointments.    Condition on Discharge:  Guarded    Dr. Mere Klein DO, MS  Department of Physical Medicine & Rehabilitation    Date of Service: 2/11/2025

## 2025-02-18 ENCOUNTER — HOME HEALTH ADMISSION (OUTPATIENT)
Dept: HOME HEALTH SERVICES | Facility: HOME HEALTHCARE | Age: 80
End: 2025-02-18
Payer: MEDICARE

## 2025-02-18 ENCOUNTER — APPOINTMENT (OUTPATIENT)
Dept: RADIOLOGY | Facility: MEDICAL CENTER | Age: 80
DRG: 444 | End: 2025-02-18
Attending: HOSPITALIST
Payer: MEDICARE

## 2025-02-18 VITALS
SYSTOLIC BLOOD PRESSURE: 116 MMHG | WEIGHT: 156.53 LBS | TEMPERATURE: 97.5 F | HEART RATE: 64 BPM | OXYGEN SATURATION: 99 % | BODY MASS INDEX: 24.57 KG/M2 | RESPIRATION RATE: 18 BRPM | HEIGHT: 67 IN | DIASTOLIC BLOOD PRESSURE: 60 MMHG

## 2025-02-18 PROBLEM — R31.0 GROSS HEMATURIA: Status: RESOLVED | Noted: 2025-02-16 | Resolved: 2025-02-18

## 2025-02-18 PROBLEM — K92.1 MELENA: Status: RESOLVED | Noted: 2025-02-13 | Resolved: 2025-02-18

## 2025-02-18 LAB
ALBUMIN SERPL BCP-MCNC: 2.5 G/DL (ref 3.2–4.9)
BUN SERPL-MCNC: 94 MG/DL (ref 8–22)
CALCIUM ALBUM COR SERPL-MCNC: 9.6 MG/DL (ref 8.5–10.5)
CALCIUM SERPL-MCNC: 8.4 MG/DL (ref 8.5–10.5)
CHLORIDE SERPL-SCNC: 92 MMOL/L (ref 96–112)
CO2 SERPL-SCNC: 24 MMOL/L (ref 20–33)
CREAT SERPL-MCNC: 8.93 MG/DL (ref 0.5–1.4)
ERYTHROCYTE [DISTWIDTH] IN BLOOD BY AUTOMATED COUNT: 86.3 FL (ref 35.9–50)
GFR SERPLBLD CREATININE-BSD FMLA CKD-EPI: 6 ML/MIN/1.73 M 2
GLUCOSE SERPL-MCNC: 170 MG/DL (ref 65–99)
HCT VFR BLD AUTO: 27.2 % (ref 42–52)
HGB BLD-MCNC: 8.4 G/DL (ref 14–18)
MCH RBC QN AUTO: 30.9 PG (ref 27–33)
MCHC RBC AUTO-ENTMCNC: 30.9 G/DL (ref 32.3–36.5)
MCV RBC AUTO: 100 FL (ref 81.4–97.8)
PHOSPHATE SERPL-MCNC: 6.2 MG/DL (ref 2.5–4.5)
PLATELET # BLD AUTO: 254 K/UL (ref 164–446)
PMV BLD AUTO: 10.3 FL (ref 9–12.9)
POTASSIUM SERPL-SCNC: 3.9 MMOL/L (ref 3.6–5.5)
RBC # BLD AUTO: 2.72 M/UL (ref 4.7–6.1)
SODIUM SERPL-SCNC: 132 MMOL/L (ref 135–145)
WBC # BLD AUTO: 9 K/UL (ref 4.8–10.8)

## 2025-02-18 PROCEDURE — 90945 DIALYSIS ONE EVALUATION: CPT

## 2025-02-18 PROCEDURE — A9270 NON-COVERED ITEM OR SERVICE: HCPCS | Performed by: NURSE PRACTITIONER

## 2025-02-18 PROCEDURE — 97530 THERAPEUTIC ACTIVITIES: CPT

## 2025-02-18 PROCEDURE — 93926 LOWER EXTREMITY STUDY: CPT | Mod: LT

## 2025-02-18 PROCEDURE — 97116 GAIT TRAINING THERAPY: CPT

## 2025-02-18 PROCEDURE — 99239 HOSP IP/OBS DSCHRG MGMT >30: CPT | Performed by: HOSPITALIST

## 2025-02-18 PROCEDURE — 97602 WOUND(S) CARE NON-SELECTIVE: CPT

## 2025-02-18 PROCEDURE — 700102 HCHG RX REV CODE 250 W/ 637 OVERRIDE(OP): Performed by: NURSE PRACTITIONER

## 2025-02-18 PROCEDURE — 85027 COMPLETE CBC AUTOMATED: CPT

## 2025-02-18 PROCEDURE — A9270 NON-COVERED ITEM OR SERVICE: HCPCS

## 2025-02-18 PROCEDURE — 36415 COLL VENOUS BLD VENIPUNCTURE: CPT

## 2025-02-18 PROCEDURE — A9270 NON-COVERED ITEM OR SERVICE: HCPCS | Performed by: INTERNAL MEDICINE

## 2025-02-18 PROCEDURE — 700102 HCHG RX REV CODE 250 W/ 637 OVERRIDE(OP): Performed by: INTERNAL MEDICINE

## 2025-02-18 PROCEDURE — 700102 HCHG RX REV CODE 250 W/ 637 OVERRIDE(OP)

## 2025-02-18 PROCEDURE — 80069 RENAL FUNCTION PANEL: CPT

## 2025-02-18 RX ORDER — LOSARTAN POTASSIUM 25 MG/1
12.5 TABLET ORAL DAILY
Qty: 15 TABLET | Refills: 0 | Status: ON HOLD | OUTPATIENT
Start: 2025-02-19 | End: 2025-03-21

## 2025-02-18 RX ORDER — TRAMADOL HYDROCHLORIDE 50 MG/1
50 TABLET ORAL EVERY 12 HOURS PRN
Qty: 6 TABLET | Refills: 0 | Status: SHIPPED | OUTPATIENT
Start: 2025-02-18 | End: 2025-02-18

## 2025-02-18 RX ORDER — TAMSULOSIN HYDROCHLORIDE 0.4 MG/1
0.4 CAPSULE ORAL
Qty: 30 CAPSULE | Refills: 0 | Status: ON HOLD | OUTPATIENT
Start: 2025-02-19

## 2025-02-18 RX ORDER — AMOXICILLIN AND CLAVULANATE POTASSIUM 500; 125 MG/1; MG/1
1 TABLET, FILM COATED ORAL EVERY 12 HOURS
Qty: 6 TABLET | Refills: 0 | Status: ACTIVE | OUTPATIENT
Start: 2025-02-18 | End: 2025-02-21

## 2025-02-18 RX ORDER — METOPROLOL TARTRATE 25 MG/1
25 TABLET, FILM COATED ORAL 2 TIMES DAILY
Qty: 60 TABLET | Refills: 0 | Status: SHIPPED | OUTPATIENT
Start: 2025-02-18 | End: 2025-02-18

## 2025-02-18 RX ORDER — TAMSULOSIN HYDROCHLORIDE 0.4 MG/1
0.4 CAPSULE ORAL
Qty: 30 CAPSULE | Refills: 0 | Status: SHIPPED | OUTPATIENT
Start: 2025-02-19 | End: 2025-02-18

## 2025-02-18 RX ORDER — METOPROLOL TARTRATE 25 MG/1
25 TABLET, FILM COATED ORAL 2 TIMES DAILY
Qty: 60 TABLET | Refills: 0 | Status: ON HOLD | OUTPATIENT
Start: 2025-02-18 | End: 2025-03-20

## 2025-02-18 RX ORDER — LOSARTAN POTASSIUM 25 MG/1
12.5 TABLET ORAL DAILY
Qty: 15 TABLET | Refills: 0 | Status: SHIPPED | OUTPATIENT
Start: 2025-02-19 | End: 2025-02-18

## 2025-02-18 RX ORDER — AMOXICILLIN AND CLAVULANATE POTASSIUM 500; 125 MG/1; MG/1
1 TABLET, FILM COATED ORAL EVERY 12 HOURS
Qty: 6 TABLET | Refills: 0 | Status: ACTIVE | OUTPATIENT
Start: 2025-02-18 | End: 2025-02-18

## 2025-02-18 RX ORDER — TRAMADOL HYDROCHLORIDE 50 MG/1
50 TABLET ORAL EVERY 12 HOURS PRN
Qty: 6 TABLET | Refills: 0 | Status: SHIPPED | OUTPATIENT
Start: 2025-02-18 | End: 2025-02-21

## 2025-02-18 RX ADMIN — LOSARTAN POTASSIUM 12.5 MG: 25 TABLET, FILM COATED ORAL at 05:38

## 2025-02-18 RX ADMIN — TRAMADOL HYDROCHLORIDE 50 MG: 50 TABLET, COATED ORAL at 02:28

## 2025-02-18 RX ADMIN — AMOXICILLIN AND CLAVULANATE POTASSIUM 1 TABLET: 500; 125 TABLET, FILM COATED ORAL at 05:28

## 2025-02-18 RX ADMIN — TAMSULOSIN HYDROCHLORIDE 0.4 MG: 0.4 CAPSULE ORAL at 08:39

## 2025-02-18 RX ADMIN — FUROSEMIDE 20 MG: 20 TABLET ORAL at 05:28

## 2025-02-18 RX ADMIN — OMEPRAZOLE 40 MG: 20 CAPSULE, DELAYED RELEASE ORAL at 05:29

## 2025-02-18 RX ADMIN — LANTHANUM CARBONATE 500 MG: 500 TABLET, CHEWABLE ORAL at 08:39

## 2025-02-18 RX ADMIN — METOPROLOL TARTRATE 25 MG: 25 TABLET, FILM COATED ORAL at 05:39

## 2025-02-18 RX ADMIN — CLOPIDOGREL BISULFATE 75 MG: 75 TABLET ORAL at 05:29

## 2025-02-18 RX ADMIN — APIXABAN 5 MG: 5 TABLET, FILM COATED ORAL at 05:29

## 2025-02-18 RX ADMIN — LANTHANUM CARBONATE 500 MG: 500 TABLET, CHEWABLE ORAL at 12:59

## 2025-02-18 RX ADMIN — OXYCODONE 5 MG: 5 TABLET ORAL at 05:27

## 2025-02-18 ASSESSMENT — COGNITIVE AND FUNCTIONAL STATUS - GENERAL
WALKING IN HOSPITAL ROOM: A LITTLE
MOVING FROM LYING ON BACK TO SITTING ON SIDE OF FLAT BED: A LITTLE
CLIMB 3 TO 5 STEPS WITH RAILING: A LOT
MOVING TO AND FROM BED TO CHAIR: A LITTLE
STANDING UP FROM CHAIR USING ARMS: A LITTLE
TURNING FROM BACK TO SIDE WHILE IN FLAT BAD: A LITTLE
MOBILITY SCORE: 17
SUGGESTED CMS G CODE MODIFIER MOBILITY: CK

## 2025-02-18 ASSESSMENT — GAIT ASSESSMENTS
GAIT LEVEL OF ASSIST: STANDBY ASSIST
DEVIATION: STEP TO;ANTALGIC;BRADYKINETIC;DECREASED HEEL STRIKE;DECREASED TOE OFF
ASSISTIVE DEVICE: FRONT WHEEL WALKER
DISTANCE (FEET): 90

## 2025-02-18 ASSESSMENT — PAIN DESCRIPTION - PAIN TYPE
TYPE: ACUTE PAIN

## 2025-02-18 NOTE — THERAPY
Physical Therapy   Daily Treatment     Patient Name: Edy Bennett  Age:  79 y.o., Sex:  male  Medical Record #: [id number]  Today's Date: 2/18/2025     Precautions  Precautions: Fall Risk  Comments: prevena LLE    Assessment    Pt was agreeable to PT.  He was able to stand an walk with SBA x90' with FWW and standing rest breaks, no buckling.  He continues to have edema in the R foot and L leg -> foot.  PT also reports that he has been having pain in the toes like he had prior to his bypass.  Pt and wife edcuated about dependent positioning to assist PAD pain with caution to not to keep his feet down for too long d.t edema.  Pt reports massage helps.  They were educated on lymphatic massage for the skin to assist edema reduction.  Pt is limited by pain, decreased activity tolerance, and weakness.  PT will continue to follow. Pt and wife report they feel more confident about his ability to move around the house.  Recommend home health PT services.    Plan    Treatment Plan Status: Continue Current Treatment Plan  Type of Treatment: Bed Mobility, Equipment, Family / Caregiver Training, Gait Training, Manual Therapy, Neuro Re-Education / Balance, Self Care / Home Evaluation, Therapeutic Activities, Therapeutic Exercise  Treatment Frequency: 4 Times per Week  Treatment Duration: Until Therapy Goals Met    DC Equipment Recommendations: (P) None (Pt has a FWW at home)  Discharge Recommendations: (P) Recommend home health for continued physical therapy services      Subjective    Pt and wife report return on pain in the toes that he had prior to his bypass surgery.  Nursing aware.     Objective       02/18/25 1113   Precautions   Precautions Fall Risk   Comments prevena LLE   Vitals   O2 Delivery Device None - Room Air   Pain 0 - 10 Group   Therapist Pain Assessment During Activity  (L toes- hit the walker, premedicated for B toe pain- like electric shocks)   Cognition    Cognition / Consciousness WDL   Level of  Consciousness Alert   Bed Mobility    Supine to Sit Standby Assist   Scooting Supervised   Rolling Supervised   Comments bed flat, no rail   Gait Analysis   Gait Level Of Assist Standby Assist   Assistive Device Front Wheel Walker   Distance (Feet) 90   # of Times Distance was Traveled 1  (4 standing rest breaks)   Deviation Step To;Antalgic;Bradykinetic;Decreased Heel Strike;Decreased Toe Off  (discontinuous steps, decreased L stance time)   Weight Bearing Status FWB BLEs   Functional Mobility   Sit to Stand Standby Assist   Bed, Chair, Wheelchair Transfer Standby Assist   Transfer Method Stand Step   Mobility with FWW   Activity Tolerance   Sitting Edge of Bed post session   Short Term Goals    Short Term Goal # 1 Pt will perform supine < > sit SPV with bed flat, no rail in 6 visits in order to set up for upright mobility   Goal Outcome # 1 goal not met   Short Term Goal # 2 Pt will perform sit < > stand SPV in 6 visits in order to prepare for ambulation   Goal Outcome # 2 Goal not met   Short Term Goal # 3 Pt will ambulate 150' SPV /c FWW in 6 visits in order to return home safely   Goal Outcome # 3 Goal not met   Education Group   Additional Comments Pt and wife educated on light, lymphatic massage and caution with elatic bands in socks, ossibly cutting the band, to assist edema reduction, dependent positioning of the foot with onset of pain in the toes- information acknowledged   Physical Therapy Treatment Plan   Physical Therapy Treatment Plan Continue Current Treatment Plan   Anticipated Discharge Equipment and Recommendations   DC Equipment Recommendations None  (Pt has a FWW at home)   Discharge Recommendations Recommend home health for continued physical therapy services   Interdisciplinary Plan of Care Collaboration   IDT Collaboration with  Nursing   Patient Position at End of Therapy Edge of Bed;Call Light within Reach;Tray Table within Reach;Family / Friend in Room  (wife in the room)   Collaboration  Comments RN updated   Session Information   Date / Session Number  2/18 -2(2/4, 2/20)

## 2025-02-18 NOTE — PROGRESS NOTES
[facility] Services Progress Note     CCPD x 10 hours  2100 mL fills x 6 cycles   using 2.5 % PD solution x 3 bags   ordered per NIHARIKA Nascimento     Orders reviewed, verified and updated, CCPD cycler therapy settings verified.     CCPD treatment initiated at 2145 without any issues.    Patient and PD access assessed prior to therapy.    Patient Aox 4, resting calmly, interactive, (-) abd distention, denies pain/abd discomfort, stable VS.    Patient c/o pain.     Patient with intact and patent RLQ PD catheter aseptically connected per policy.    RLQ PD catheter and exit site in perfect condition, no signs of infection noted, cleansed with antiseptic solution and dressings changed per policy. Gentamicin applied to exit site.     Initial drain: 14 mL   Clear, yellow, no fibrin.     Report/Inservice given Nimisha Sepulveda RN,  nocturnal nurse.     Patient on dwell 1/6 prior to departure from bedside.     Please contact on call dialysis RN for any issues with persistent alarms/assistance with troubleshooting or patient not tolerating therapy.      For on-call Dialysis RN, pls contact [facility] at [phone].

## 2025-02-18 NOTE — CARE PLAN
The patient is Watcher - Medium risk of patient condition declining or worsening    Shift Goals  Clinical Goals: monitor urine, monitor labs  Patient Goals: go home  Family Goals: VISHAL    Progress made toward(s) clinical / shift goals:    Problem: Knowledge Deficit - Standard  Goal: Patient and family/care givers will demonstrate understanding of plan of care, disease process/condition, diagnostic tests and medications  Outcome: Progressing     Problem: Safety  Goal: Will remain free from injury  Outcome: Progressing     Problem: Mobility  Goal: Risk for activity intolerance will decrease  Outcome: Progressing       Patient is not progressing towards the following goals:

## 2025-02-18 NOTE — PROGRESS NOTES
[facility] Services Progress Notes     Disconnected aseptically from PD Cycler at 0715.     Pt stable, VSS, alert and oriented.  Effluent clear and yellow, no signs of bleeding/fibrin clots.  No alarms on machine was noted or reported before disconnection.     24H UF 1459 ML (initial drain 15 ML + total UF 1444 ML - Last fill 0)      Report given to Angelita ARANDA

## 2025-02-18 NOTE — CARE PLAN
The patient is Watcher - Medium risk of patient condition declining or worsening    Shift Goals  Clinical Goals: monitor urine, monitor labs  Patient Goals: pain control  Family Goals: pain control, updates on POC    Progress made toward(s) clinical / shift goals:    Problem: Urinary Elimination:  Goal: Ability to reestablish a normal urinary elimination pattern will improve  Outcome: Progressing  Note: No hematuria at throughout shift        Patient is not progressing towards the following goals:      Problem: Pain Management  Goal: Pain level will decrease to patient's comfort goal  Outcome: Not Met  Note: Pt continues to express pain on toes. PRN pain medication working effectively to promote comfort.

## 2025-02-18 NOTE — DISCHARGE PLANNING
[id number]  DPA sent referral to [facility], per choice form.     [id number]  DPA sent referral to [facility], per choice form.

## 2025-02-18 NOTE — PROGRESS NOTES
VASCULAR SURGERY SERVICE                        Progress Note  _________________________________    Patient was having increased toe pain bilaterally yesterday, appears to be better today.    BLE well perfused with brisk PT doppler signals    No evidence of infection    Patient may potentially go home with home health care today or tomorrow.  I would like to replace his Prevena before he goes.  I ordered a new Prevena kit to bedside.      Marco Antonio Miller MD  [facility]   Voalte preferred or call my office [phone]  __________________________________________________  Patient:Edy Reza Donald   MRN:[id number]

## 2025-02-18 NOTE — PROGRESS NOTES
[facility] -  PROGRESS NOTE               Author: Devan Rogers D.O. Date & Time: 2/18/2025  1:46 PM     HPI:  79 y.o. male with HTN, ESRD on PD, PVD/ gangrenous left foot, BPH admitted for monitoring s/p  left leg bypass surgery on 1/20 with Dr. Miller. He recently underwent hernial repair and was temporarily transferred to in center HD via RIJ PC. He has returned to  PD  4 days ago, doing low fill.volumes and has been tolerating at home.   Patient was connected to cycler last night. Total UF reported to be 276ml. Reports of low drain UF alarm last night but no major issues with PD overnight. Pt reports no complaints today morning  No F/C/N/V/CP/SOB.  No melena, hematochezia, hematemesis.  No HA, visual changes,or abdominal pain.     DAILY NEPHROLOGY SUMMARY:  See note from 1/31 for prior summaries   2/01: Disconnected aseptically from PD CyclerEffluent clear and yellow, no signs of bleeding/fibrin clots. 24 hour UF= 678mL. Tolerated well.   2/2: Patient and wife are doing well. Wife states she gave wrong # of cycles to provider yesterday and wanted then reduced back from 7 to 5. Overall doing well without complaints. Looking forward to going home around Feb 7th per patient. Also stated they were told that RT IJ TDC would be removed sometime this week. On-coming provider to ask the Hospitalist to help arrange.   2/3: pt sitting up in bed, spouse at bedside, PD improved with change in rx (reduced to 5 exchanges from 7) great UF: 1.1L, edema has improved, biggest complaint NV, vomits after each meal and fatigue, requesting a reduction in gabapentin, also reports constipation, had to dig out stool yesterday with finger  2/4 net UF 1500 ml overnight. Potassium is 3.3 I am replacing. Patient reports doing better today, wife at bedside. Patient feels much better now since Erica made med changes yesterday  2/5: 1437 mL Net UF removed overnight. Wife BS. Patient oob in BR performing adls  "independently. Reports improvement every day.   2/6: doing well, eating lunch with wife, net UF 1273 ml  2/7: patient doing well, low K I will order additional dose of potassium  2/8: NAEO, no complaints, wife and friends at bedside; Feels good  2/9: NAEO, wife at bedside, he is in wheelchair, having multiple BM's after lactulose  2/10: 725 mL Net UF removed last night via CCPD. Low drain UF alarmed overnight. Resting in bed, wife BS. Bps low 90s systolic. C/o n/v yesterday, Complaining of persistent nausea. Per wife, appears weaker. Multiple Bms yesterday. Denies fever or chills.   2/11: Fluid + some blood, 425 mL Net UF removed via CCPD. C/o weakness and fatigue. Declining antimedic. HIDA + cystic duct obstruction.  2/12: Pt trasnferred to main for eval of N/V, cystic duct  2/13: melanotic appearing stools, no issues with PD  2/15: Hgb 8.1 s/p 1 unit pRBCs tx. Reports feeling tired d/t alarms going off all night. Denies emesis x2 days. Tolerating 6 cycles of CCPD. 1482 mL Net UF removed overnight.   2/16: 1676 mL net UF removed with CCPD overnight. Reports difficulty sleeping, resting now in no distress. Per primary rn family requesting to restart phos binder, had been on hold d/t n/v. Phos 6.7   2/17: Wife BS. Reports improved Edema overall, increases after sitting in chair. C/o increased neuropathy pain, relieved by Tramadol. Reports indep adls this am and increased mobility. Denies any hematuria today. Denies n/v. Denies fever or chills.   2/18: pt tolerated PD overnight. 1.5L out. Feels well this AM.       REVIEW OF SYSTEMS:    10 point ROS reviewed and is as per HPI/daily summary or otherwise negative    PMH/PSH/SH/FH:   Reviewed and unchanged since admission note    CURRENT MEDICATIONS:   Reviewed from admission to present day    VS:  BP 92/51 Comment: post CCPD  Pulse 63   Temp 36.7 °C (98 °F) (Temporal)   Resp 16   Ht 1.707 m (5' 7.21\")   Wt 71 kg (156 lb 8.4 oz)   SpO2 99%   BMI 24.37 kg/m² "     Physical Exam  General: well appearing NAD  HEENT: EOMI, no scleral icterus  Heart: RR no rubs  Pulm: non labored  Abdomen: Pd catheter  Extremites: Rmidial wound vac in place, LE L>R  Derm: no rashes  Neuro: grossly non focal    Fluids:  In: 200 [P.O.:200]  Out: 1559     LABS:  Recent Labs     02/16/25  0405 02/17/25  1049 02/18/25  0920   SODIUM 135 133* 132*   POTASSIUM 4.8 4.4 3.9   CHLORIDE 93* 92* 92*   CO2 22 23 24   GLUCOSE 216* 204* 170*   BUN 99* 96* 94*   CREATININE 8.92* 8.93* 8.93*   CALCIUM 8.9 8.3* 8.4*       IMAGING:   All imaging reviewed from admission to present day  [facility]  Order: [id number]   Status: Final result       Next appt: None    Test Result Released: Yes (seen)    0 Result Notes  Details    Reading Physician Reading Date Result Priority   Judd Mcclellan M.D.  [phone]     2/15/2025      Narrative & Impression     2/15/2025 7:30 PM     HISTORY/REASON FOR EXAM:  Gross hematuria        TECHNIQUE/EXAM DESCRIPTION:  Renal ultrasound.     COMPARISON:  11/26/2024     FINDINGS:     The right kidney measures 8.67 cm.  Increased cortical echotexture. The right renal collecting system is not dilated. No hydronephrosis. There are no renal calculi. Simple right renal cyst measuring up to 4.8 cm.     The left kidney measures 7.81 cm. Increased cortical echotexture. The left renal collecting system is not dilated. No hydronephrosis. There are no renal calculi.     The bladder is partially decompressed.     Incidental note of small volume ascites.     IMPRESSION:     1.  No hydronephrosis.  2.  Evidence of medical renal disease.  3.  Incidental note of small volume ascites.       IMPRESSION:  # ESRD on CCPD  - Returned to PD 1/17/25   - Bloody fluid with CCPD 2/10, Hgb stable on Eliquis/Plavix   -  Currently with all green bags and 6 cycles    # HTN, controlled   - Goal BP < 140/90  - torsemide 10mg daily   # Anemia of CKD  - worsened overnight ? Unclear source of bleed  - awaiting scope in  am  # CKD-MBD  - Ca 8.6   - PO4 7.4 -> 6.6 -> 7.0 -> 6.7  - Lanthanum 500mg TID, titrate up prn  # Chronic hypoxic respiratory failure   - On home oxygen 2-2.5L at night   # Nausea/Vomiting              - ddx: binder intolerance, switched to lanthanum              - constipation, started miralax BID and lactulose added recently              - pill burden, increase omeprazole to 40mg daily              - reduced gabapentin to 100mg daily               - H. Pylori (-)               - HIDA Scan 2/10 + Cystic Duct Obstruction, GI Consulted               - Viral Panel (-) 2/11   - EGD bx for r/o Stapleton's esophagus, sm hiatal hernia GI signing off recommends Pamela  # Hyponatremia, mild              - Torsemide daily              - UF with PD, on all 2.5%   # HFrEF  # Hx CAD with stent      SUGGESTIONS:    - Continue CCPD during hospital stay, cont at 6 cycles  - Dose all meds per ESRD  - Renal diet, low phos   - Okay for ARB from renal standpoint in ESRD, but bps had been trending low  - ROSALIND SQ Qweek resume 2/18  - Cont Phos binder TID w/meals   - L arm AVF not fully matured,  will need intervention outpt   - Avoid Fleet enema in dialysis patients   - Labs daily    Dispo: can DC from renal standpoint once medically cleared. Will otherwise cont nightly PD.    Please page nephrology with any questions or concerns

## 2025-02-18 NOTE — PROGRESS NOTES
[facility] Services Progress Notes     Received patient awake in bed, wife at bedside.  Patient AOx4, calm and coherent, denies any complaints  No CCPD issues or alarms reported overnight.  CCPD treatment ended at 0800.  Patient aseptically disconnected from PD cycler.  PD catheter tip capped aseptically with sterile mini cap.  RLQ PD catheter intact patent with CDI dressings in place.  Eflluent clear yellow, with no signs of clot or fibrin   Patient tolerated treatment well overnight without issues  Patient stable, VS checked-BPs soft, asymptomatic, otherwise stable (see MAR)  CCPD supplies restocked on PD cart- 1 6L 2.5% bag still needed.     NET PD UF:  1500 mL (CCPD UF of 1486 mL + initial drain of 14 mL )      Report given to primary care nurse IMMANUEL Bradford RN/N. Reyes, RN

## 2025-02-18 NOTE — DISCHARGE PLANNING
ATTN: Case Management  RE: Referral for Home Health    As of 2/18/25, we have accepted the Home Health referral for the patient listed above.    A [facility]  will contact the patient within 48 hours. If you have any questions or concerns regarding the patient’s transition to Home Health, please do not hesitate to contact us at [phone].      We look forward to collaborating with you,  [facility] Team

## 2025-02-19 ENCOUNTER — TELEPHONE (OUTPATIENT)
Dept: HOME HEALTH SERVICES | Facility: HOME HEALTHCARE | Age: 80
End: 2025-02-19
Payer: MEDICARE

## 2025-02-19 NOTE — DISCHARGE SUMMARY
Discharge Summary    CHIEF COMPLAINT ON ADMISSION  No chief complaint on file.      Reason for Admission  Cystic duct obstruction     Admission Date  2/11/2025    CODE STATUS  Full code    HPI & HOSPITAL COURSE  Please see original H&P and consult notes for specific information    79 y.o. male who presented 2/11/2025 with intractable nausea and vomiting.  Patient did receive a left lower extremity bypass, popliteal to tibial on 1/20, was being treated at [facility].  He began having nausea and vomiting as well as some right upper quadrant pain.  Workup eventually showed a obstruction of the cystic duct.  Because of this, patient was transferred here due to need for intervention.  Consulted GI, vascular surgery and general surgery as well as nephrology.  Patient is on chronic peritoneal dialysis.  Patient was started on IV antibiotics for cholecystitis and his symptoms improved.  Currently patient refusing to have surgery and general surgery signed off.  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  His anticoagulation has been resumed.  Vascular surgery removed patient's Prevena wound VAC that was in place and wound care replaced a new one on 2/13.      EKG was performed and showed atrial flutter, patient does have a history of this and was previously on metoprolol which has been restarted.  Echocardiogram EF 30-35% with akinesis and global hypokinesis, grade II diastolic.  New heart failure diagnosis for the patient, no signs of fluid overload or exacerbation on exam.  Cardiology consulted patient may eventually need cardiac cath however he would like to defer at this time as he would likely need hemodialysis afterwards.  Recommended medical management with metoprolol, losartan, Lasix with outpatient follow-up with his cardiologist. PT/OT recommending postacute placement.  PM&R consulted.     Patient today is doing  much better, hemoglobin is stable, no more hematuria, patient would like to go home with home health care which has been approved, I discussed with vascular surgeon Prevena changed today by wound care, patient is feeling better, wife is at bedside, he is awake oriented follows commands he is able to speak in full sentences, discussed with bedside nurse charge nurse  pharmacist, patient is being discharged today in stable condition, all questions been answered.    Patient going home on low-dose metoprolol, low-dose losartan with holding parameters, unable to start other cardiac medications due to normal low blood pressure patient will need to follow-up with cardiology as outpatient to assess for medication adjustment, patient is unable to start spironolactone due to end-stage renal disease, Farxiga initiation will be evaluated as outpatient and will probably have to have clearance from urology.        Therefore, he is discharged in good and stable condition to home with organized home healthcare and close outpatient follow-up.    The patient met 2-midnight criteria for an inpatient stay at the time of discharge.    Discharge Date  2/18/2025    FOLLOW UP ITEMS POST DISCHARGE  Primary care physician  Vascular surgery  Cardiology  Urology    DISCHARGE DIAGNOSES  Principal Problem:    Obstruction of cystic duct (POA: Yes)  Active Problems:    ESRD on peritoneal dialysis (HCC) (POA: Yes)    Essential hypertension (POA: Yes)    Anemia in chronic renal disease (POA: Yes)    Dyslipidemia (POA: Yes)    BPH (benign prostatic hyperplasia) (POA: Yes)    PAD (peripheral artery disease) (HCC) (POA: Yes)    Typical atrial flutter (HCC) (POA: Unknown)    Chronic combined systolic and diastolic heart failure (HCC) (POA: Yes)  Resolved Problems:    Melena (POA: No)    Gross hematuria (POA: No)      FOLLOW UP  No future appointments.  Denise Carver M.D.  [address] [place]  [address]  [phone]            MEDICATIONS ON DISCHARGE     Medication List        START taking these medications        Instructions   amoxicillin-clavulanate 500-125 MG Tabs  Commonly known as: Augmentin   Take 1 Tablet by mouth every 12 hours for 3 days.  Dose: 1 Tablet     losartan 25 MG Tabs  Commonly known as: Cozaar   Take 0.5 Tablets by mouth every day for 30 days. Do not take for blood pressure less than 100/60  Dose: 12.5 mg     metoprolol tartrate 25 MG Tabs  Commonly known as: Lopressor   Take 1 Tablet by mouth 2 times a day for 30 days.  Dose: 25 mg     tamsulosin 0.4 MG capsule  Commonly known as: Flomax   Take 1 Capsule by mouth 1/2 hour after breakfast.  Dose: 0.4 mg     traMADol 50 MG Tabs  Commonly known as: Ultram   Take 1 Tablet by mouth every 12 hours as needed for Severe Pain for up to 3 days.  Dose: 50 mg            CONTINUE taking these medications        Instructions   apixaban 5mg Tabs  Commonly known as: Eliquis   Take 5 mg by mouth 2 times a day.  Dose: 5 mg     bisacodyl 10 MG Supp  Commonly known as: Dulcolax   Insert 10 mg into the rectum 1 time a day as needed. Indications: Constipation  Dose: 10 mg     clopidogrel 75 MG Tabs  Commonly known as: Plavix   Take 75 mg by mouth every day.  Dose: 75 mg     epoetin jayna 4000 UNIT/ML Soln  Commonly known as: Epogen/Procrit   Inject 4,000 Units under the skin every 7 days.  Dose: 4,000 Units     gentamicin 0.1 % cream  Commonly known as: Garamycin   Apply 1 Application topically every day.  Dose: 1 Application     lanthanum carbonate 500 MG Chew  Commonly known as: Fosrenol   Chew 500 mg 3 times a day with meals.  Dose: 500 mg     methocarbamol 500 MG Tabs  Commonly known as: Robaxin   Take 250 mg by mouth 4 times a day as needed. Indications: Musculoskeletal Pain  Dose: 250 mg     montelukast 10 MG Tabs  Commonly known as: Singulair   Take 10 mg by mouth at bedtime.  Dose: 10 mg     omeprazole 40 MG delayed-release capsule  Commonly  known as: PriLOSEC   Take 40 mg by mouth every day.  Dose: 40 mg     polyethylene glycol/lytes Pack  Commonly known as: Miralax   Take 17 g by mouth 1 time a day as needed. Indications: Constipation  Dose: 17 g     Renvela 800 MG Tabs tablet  Generic drug: sevelamer carbonate   Take 800 mg by mouth 1 time a day as needed (with snack).  Dose: 800 mg     rosuvastatin 20 MG Tabs  Commonly known as: Crestor   Take 20 mg by mouth at bedtime.   Dose: 20 mg     simethicone 125 MG chewable tablet  Commonly known as: Mylicon   Chew 125 mg in the morning, at noon, and at bedtime.  Dose: 125 mg     torsemide 10 MG tablet  Commonly known as: Demadex   Take 10 mg by mouth every day.  Dose: 10 mg            STOP taking these medications      magnesium hydroxide 400 MG/5ML Susp  Commonly known as: Milk Of Magnesia     midodrine 10 MG tablet  Commonly known as: Proamatine     ondansetron 4 MG Tbdp  Commonly known as: Zofran ODT     oxyCODONE immediate-release 5 MG Tabs  Commonly known as: Roxicodone     potassium chloride SA 20 MEQ Tbcr  Commonly known as: Kdur     senna-docusate 8.6-50 MG Tabs  Commonly known as: Pericolace Or Senokot S              Allergies  No Known Allergies    DIET  No orders of the defined types were placed in this encounter.      ACTIVITY  As tolerated.  Weight bearing as tolerated    CONSULTATIONS  Cardiology  GI  General Surgery    PROCEDURES  EGD    LABORATORY  Lab Results   Component Value Date    SODIUM 132 (L) 02/18/2025    POTASSIUM 3.9 02/18/2025    CHLORIDE 92 (L) 02/18/2025    CO2 24 02/18/2025    GLUCOSE 170 (H) 02/18/2025    BUN 94 (H) 02/18/2025    CREATININE 8.93 (HH) 02/18/2025        Lab Results   Component Value Date    WBC 9.0 02/18/2025    HEMOGLOBIN 8.4 (L) 02/18/2025    HEMATOCRIT 27.2 (L) 02/18/2025    PLATELETCT 254 02/18/2025        Total time of the discharge process exceeds 32 minutes.

## 2025-02-19 NOTE — PROGRESS NOTES
Pt was discharged home. PIV was removed. Pt was wheeled down via escort. Pt discharged with family at bedside.

## 2025-02-19 NOTE — DISCHARGE PLANNING
Case Management Discharge Planning    Admission Date: 2/11/2025  GMLOS: 4.5  ALOS: 7    6-Clicks ADL Score: 20  6-Clicks Mobility Score: 17  PT and/or OT Eval ordered: Yes  Post-acute Referrals Ordered: Yes  Post-acute Choice Obtained: Yes  Has referral(s) been sent to post-acute provider:  Yes      Anticipated Discharge Dispo: Discharge Disposition: D/T to home under HHA care in anticipation of covered skilled care (06)    DME Needed: No    Action(s) Taken: Patient is medically [facility] RNCM delivered IMM to patient and faxed to [facility].     Escalations Completed: None    Medically Clear: Yes    Next Steps: no other CM needs    Barriers to Discharge: None    Is the patient up for discharge tomorrow: No

## 2025-02-19 NOTE — WOUND TEAM
Received call from Dr. Miller regarding prevena dressing. Pt has prevena incisional vac to LLE medial. Wound team was asked to remove prevena and apply new prevena dressing. This RN in to assess. Dressing removed. Incisions cleaned with NS and gauze. No sting applied to masoud-wound incision. Prevena purple foam was cut to fit incision. Hydrocolloid applied to each end. Dressing applied down over incision and secured with vac drape. Medial ankle wound was then dressed with aquacel ag and adhesive foam as it appeared too wet with the prevena dressing in place. Pt has supplies for DC home.

## 2025-02-21 ENCOUNTER — TELEPHONE (OUTPATIENT)
Dept: HEALTH INFORMATION MANAGEMENT | Facility: OTHER | Age: 80
End: 2025-02-21
Payer: MEDICARE

## 2025-02-22 ENCOUNTER — HOME CARE VISIT (OUTPATIENT)
Dept: HOME HEALTH SERVICES | Facility: HOME HEALTHCARE | Age: 80
End: 2025-02-22
Payer: MEDICARE

## 2025-02-22 PROCEDURE — G0299 HHS/HOSPICE OF RN EA 15 MIN: HCPCS

## 2025-02-22 PROCEDURE — 665005 NO-PAY RAP - HOME HEALTH

## 2025-02-24 ENCOUNTER — TELEPHONE (OUTPATIENT)
Dept: VASCULAR SURGERY | Facility: MEDICAL CENTER | Age: 80
End: 2025-02-24
Payer: MEDICARE

## 2025-02-25 ENCOUNTER — DOCUMENTATION (OUTPATIENT)
Dept: CARDIOLOGY | Facility: MEDICAL CENTER | Age: 80
End: 2025-02-25

## 2025-02-25 ENCOUNTER — APPOINTMENT (OUTPATIENT)
Facility: MEDICAL CENTER | Age: 80
End: 2025-02-25
Payer: MEDICARE

## 2025-02-25 ENCOUNTER — HOME CARE VISIT (OUTPATIENT)
Dept: HOME HEALTH SERVICES | Facility: HOME HEALTHCARE | Age: 80
End: 2025-02-25
Payer: MEDICARE

## 2025-02-25 ENCOUNTER — APPOINTMENT (OUTPATIENT)
Dept: RADIOLOGY | Facility: MEDICAL CENTER | Age: 80
DRG: 377 | End: 2025-02-25
Attending: STUDENT IN AN ORGANIZED HEALTH CARE EDUCATION/TRAINING PROGRAM
Payer: MEDICARE

## 2025-02-25 ENCOUNTER — HOSPITAL ENCOUNTER (INPATIENT)
Facility: MEDICAL CENTER | Age: 80
End: 2025-02-25
Attending: STUDENT IN AN ORGANIZED HEALTH CARE EDUCATION/TRAINING PROGRAM
Payer: MEDICARE

## 2025-02-25 VITALS
SYSTOLIC BLOOD PRESSURE: 110 MMHG | HEART RATE: 76 BPM | OXYGEN SATURATION: 98 % | DIASTOLIC BLOOD PRESSURE: 78 MMHG | TEMPERATURE: 97.7 F | RESPIRATION RATE: 18 BRPM

## 2025-02-25 DIAGNOSIS — K26.9 DUODENAL ULCER: ICD-10-CM

## 2025-02-25 DIAGNOSIS — S81.802D WOUND OF LEFT LOWER EXTREMITY, SUBSEQUENT ENCOUNTER: ICD-10-CM

## 2025-02-25 DIAGNOSIS — K92.1 MELENA: ICD-10-CM

## 2025-02-25 DIAGNOSIS — R07.9 CHEST PAIN, UNSPECIFIED TYPE: ICD-10-CM

## 2025-02-25 DIAGNOSIS — D62 ACUTE BLOOD LOSS ANEMIA: ICD-10-CM

## 2025-02-25 DIAGNOSIS — R04.0 EPISTAXIS: ICD-10-CM

## 2025-02-25 DIAGNOSIS — I73.9 PAD (PERIPHERAL ARTERY DISEASE) (HCC): ICD-10-CM

## 2025-02-25 DIAGNOSIS — Z99.2 ESRD ON PERITONEAL DIALYSIS (HCC): ICD-10-CM

## 2025-02-25 DIAGNOSIS — N18.6 ESRD ON PERITONEAL DIALYSIS (HCC): ICD-10-CM

## 2025-02-25 DIAGNOSIS — R60.0 LOWER EXTREMITY EDEMA: ICD-10-CM

## 2025-02-25 DIAGNOSIS — R57.9 SHOCK (HCC): ICD-10-CM

## 2025-02-25 PROBLEM — I21.4 NSTEMI (NON-ST ELEVATED MYOCARDIAL INFARCTION) (HCC): Status: ACTIVE | Noted: 2025-02-25

## 2025-02-25 PROBLEM — E87.29 HIGH ANION GAP METABOLIC ACIDOSIS: Status: ACTIVE | Noted: 2025-02-25

## 2025-02-25 PROBLEM — E86.1 HYPOTENSION DUE TO HYPOVOLEMIA: Status: ACTIVE | Noted: 2025-02-25

## 2025-02-25 LAB
ABO GROUP BLD: NORMAL
ALBUMIN SERPL BCP-MCNC: 2.2 G/DL (ref 3.2–4.9)
ALBUMIN SERPL BCP-MCNC: 2.8 G/DL (ref 3.2–4.9)
ALBUMIN/GLOB SERPL: 1 G/DL
ALBUMIN/GLOB SERPL: 1.1 G/DL
ALP SERPL-CCNC: 121 U/L (ref 30–99)
ALP SERPL-CCNC: 75 U/L (ref 30–99)
ALT SERPL-CCNC: 29 U/L (ref 2–50)
ALT SERPL-CCNC: 39 U/L (ref 2–50)
ANION GAP SERPL CALC-SCNC: 21 MMOL/L (ref 7–16)
ANION GAP SERPL CALC-SCNC: 25 MMOL/L (ref 7–16)
ANISOCYTOSIS BLD QL SMEAR: ABNORMAL
AST SERPL-CCNC: 38 U/L (ref 12–45)
AST SERPL-CCNC: 38 U/L (ref 12–45)
BARCODED ABORH UBTYP: 5100
BARCODED PRD CODE UBPRD: NORMAL
BARCODED UNIT NUM UBUNT: NORMAL
BASOPHILS # BLD AUTO: 0.5 % (ref 0–1.8)
BASOPHILS # BLD: 0.04 K/UL (ref 0–0.12)
BILIRUB SERPL-MCNC: 0.4 MG/DL (ref 0.1–1.5)
BILIRUB SERPL-MCNC: 0.7 MG/DL (ref 0.1–1.5)
BLD GP AB SCN SERPL QL: NORMAL
BUN SERPL-MCNC: 146 MG/DL (ref 8–22)
BUN SERPL-MCNC: 149 MG/DL (ref 8–22)
CALCIUM ALBUM COR SERPL-MCNC: 9.4 MG/DL (ref 8.5–10.5)
CALCIUM ALBUM COR SERPL-MCNC: 9.6 MG/DL (ref 8.5–10.5)
CALCIUM SERPL-MCNC: 8 MG/DL (ref 8.5–10.5)
CALCIUM SERPL-MCNC: 8.6 MG/DL (ref 8.5–10.5)
CHLORIDE SERPL-SCNC: 93 MMOL/L (ref 96–112)
CHLORIDE SERPL-SCNC: 98 MMOL/L (ref 96–112)
CO2 SERPL-SCNC: 18 MMOL/L (ref 20–33)
CO2 SERPL-SCNC: 22 MMOL/L (ref 20–33)
COMMENT 1642: NORMAL
COMPONENT R 8504R: NORMAL
CREAT SERPL-MCNC: 8.82 MG/DL (ref 0.5–1.4)
CREAT SERPL-MCNC: 9.65 MG/DL (ref 0.5–1.4)
EKG IMPRESSION: NORMAL
EKG IMPRESSION: NORMAL
EOSINOPHIL # BLD AUTO: 0.08 K/UL (ref 0–0.51)
EOSINOPHIL NFR BLD: 0.9 % (ref 0–6.9)
ERYTHROCYTE [DISTWIDTH] IN BLOOD BY AUTOMATED COUNT: 79.2 FL (ref 35.9–50)
GFR SERPLBLD CREATININE-BSD FMLA CKD-EPI: 5 ML/MIN/1.73 M 2
GFR SERPLBLD CREATININE-BSD FMLA CKD-EPI: 6 ML/MIN/1.73 M 2
GLOBULIN SER CALC-MCNC: 2.2 G/DL (ref 1.9–3.5)
GLOBULIN SER CALC-MCNC: 2.5 G/DL (ref 1.9–3.5)
GLUCOSE SERPL-MCNC: 203 MG/DL (ref 65–99)
GLUCOSE SERPL-MCNC: 217 MG/DL (ref 65–99)
HCT VFR BLD AUTO: 19.1 % (ref 42–52)
HCT VFR BLD AUTO: 19.4 % (ref 42–52)
HCT VFR BLD AUTO: 20.3 % (ref 42–52)
HCT VFR BLD AUTO: 20.8 % (ref 42–52)
HCT VFR BLD AUTO: 21.2 % (ref 42–52)
HGB BLD-MCNC: 6 G/DL (ref 14–18)
HGB BLD-MCNC: 6 G/DL (ref 14–18)
HGB BLD-MCNC: 6.8 G/DL (ref 14–18)
HGB BLD-MCNC: 6.8 G/DL (ref 14–18)
HGB BLD-MCNC: 7.2 G/DL (ref 14–18)
IMM GRANULOCYTES # BLD AUTO: 0.08 K/UL (ref 0–0.11)
IMM GRANULOCYTES NFR BLD AUTO: 0.9 % (ref 0–0.9)
LACTATE SERPL-SCNC: 2.3 MMOL/L (ref 0.5–2)
LACTATE SERPL-SCNC: 2.7 MMOL/L (ref 0.5–2)
LACTATE SERPL-SCNC: 3.3 MMOL/L (ref 0.5–2)
LACTATE SERPL-SCNC: 3.6 MMOL/L (ref 0.5–2)
LYMPHOCYTES # BLD AUTO: 0.77 K/UL (ref 1–4.8)
LYMPHOCYTES NFR BLD: 9 % (ref 22–41)
MACROCYTES BLD QL SMEAR: ABNORMAL
MCH RBC QN AUTO: 30.6 PG (ref 27–33)
MCHC RBC AUTO-ENTMCNC: 30.9 G/DL (ref 32.3–36.5)
MCV RBC AUTO: 99 FL (ref 81.4–97.8)
MONOCYTES # BLD AUTO: 0.56 K/UL (ref 0–0.85)
MONOCYTES NFR BLD AUTO: 6.5 % (ref 0–13.4)
MORPHOLOGY BLD-IMP: NORMAL
NEUTROPHILS # BLD AUTO: 7.03 K/UL (ref 1.82–7.42)
NEUTROPHILS NFR BLD: 82.2 % (ref 44–72)
NRBC # BLD AUTO: 0 K/UL
NRBC BLD-RTO: 0 /100 WBC (ref 0–0.2)
NT-PROBNP SERPL IA-MCNC: ABNORMAL PG/ML (ref 0–125)
OVALOCYTES BLD QL SMEAR: NORMAL
PLATELET # BLD AUTO: 240 K/UL (ref 164–446)
PMV BLD AUTO: 10.1 FL (ref 9–12.9)
POIKILOCYTOSIS BLD QL SMEAR: NORMAL
POTASSIUM SERPL-SCNC: 5 MMOL/L (ref 3.6–5.5)
POTASSIUM SERPL-SCNC: 5.6 MMOL/L (ref 3.6–5.5)
PRODUCT TYPE UPROD: NORMAL
PROT SERPL-MCNC: 4.4 G/DL (ref 6–8.2)
PROT SERPL-MCNC: 5.3 G/DL (ref 6–8.2)
RBC # BLD AUTO: 1.96 M/UL (ref 4.7–6.1)
RBC BLD AUTO: PRESENT
RH BLD: NORMAL
SODIUM SERPL-SCNC: 137 MMOL/L (ref 135–145)
SODIUM SERPL-SCNC: 140 MMOL/L (ref 135–145)
TROPONIN T SERPL-MCNC: 288 NG/L (ref 6–19)
TROPONIN T SERPL-MCNC: 355 NG/L (ref 6–19)
TROPONIN T SERPL-MCNC: 413 NG/L (ref 6–19)
UNIT STATUS USTAT: NORMAL
WBC # BLD AUTO: 8.6 K/UL (ref 4.8–10.8)

## 2025-02-25 PROCEDURE — 84484 ASSAY OF TROPONIN QUANT: CPT

## 2025-02-25 PROCEDURE — 700111 HCHG RX REV CODE 636 W/ 250 OVERRIDE (IP): Mod: JZ

## 2025-02-25 PROCEDURE — 83605 ASSAY OF LACTIC ACID: CPT | Mod: 91

## 2025-02-25 PROCEDURE — 700105 HCHG RX REV CODE 258

## 2025-02-25 PROCEDURE — 700102 HCHG RX REV CODE 250 W/ 637 OVERRIDE(OP): Performed by: HOSPITALIST

## 2025-02-25 PROCEDURE — 80053 COMPREHEN METABOLIC PANEL: CPT

## 2025-02-25 PROCEDURE — A9270 NON-COVERED ITEM OR SERVICE: HCPCS

## 2025-02-25 PROCEDURE — 93005 ELECTROCARDIOGRAM TRACING: CPT | Mod: TC

## 2025-02-25 PROCEDURE — 99222 1ST HOSP IP/OBS MODERATE 55: CPT | Performed by: INTERNAL MEDICINE

## 2025-02-25 PROCEDURE — 85018 HEMOGLOBIN: CPT

## 2025-02-25 PROCEDURE — 51798 US URINE CAPACITY MEASURE: CPT

## 2025-02-25 PROCEDURE — 3E1M39Z IRRIGATION OF PERITONEAL CAVITY USING DIALYSATE, PERCUTANEOUS APPROACH: ICD-10-PCS | Performed by: INTERNAL MEDICINE

## 2025-02-25 PROCEDURE — 700102 HCHG RX REV CODE 250 W/ 637 OVERRIDE(OP): Performed by: INTERNAL MEDICINE

## 2025-02-25 PROCEDURE — 700105 HCHG RX REV CODE 258: Performed by: STUDENT IN AN ORGANIZED HEALTH CARE EDUCATION/TRAINING PROGRAM

## 2025-02-25 PROCEDURE — 86923 COMPATIBILITY TEST ELECTRIC: CPT | Mod: 91

## 2025-02-25 PROCEDURE — 99291 CRITICAL CARE FIRST HOUR: CPT

## 2025-02-25 PROCEDURE — 85014 HEMATOCRIT: CPT

## 2025-02-25 PROCEDURE — 86850 RBC ANTIBODY SCREEN: CPT

## 2025-02-25 PROCEDURE — 30233N1 TRANSFUSION OF NONAUTOLOGOUS RED BLOOD CELLS INTO PERIPHERAL VEIN, PERCUTANEOUS APPROACH: ICD-10-PCS | Performed by: INTERNAL MEDICINE

## 2025-02-25 PROCEDURE — 99292 CRITICAL CARE ADDL 30 MIN: CPT | Performed by: HOSPITALIST

## 2025-02-25 PROCEDURE — 700105 HCHG RX REV CODE 258: Performed by: HOSPITALIST

## 2025-02-25 PROCEDURE — 85025 COMPLETE CBC W/AUTO DIFF WBC: CPT

## 2025-02-25 PROCEDURE — A9270 NON-COVERED ITEM OR SERVICE: HCPCS | Performed by: INTERNAL MEDICINE

## 2025-02-25 PROCEDURE — 90945 DIALYSIS ONE EVALUATION: CPT

## 2025-02-25 PROCEDURE — 36430 TRANSFUSION BLD/BLD COMPNT: CPT

## 2025-02-25 PROCEDURE — 700101 HCHG RX REV CODE 250

## 2025-02-25 PROCEDURE — 700102 HCHG RX REV CODE 250 W/ 637 OVERRIDE(OP): Performed by: STUDENT IN AN ORGANIZED HEALTH CARE EDUCATION/TRAINING PROGRAM

## 2025-02-25 PROCEDURE — 71045 X-RAY EXAM CHEST 1 VIEW: CPT

## 2025-02-25 PROCEDURE — A9270 NON-COVERED ITEM OR SERVICE: HCPCS | Performed by: STUDENT IN AN ORGANIZED HEALTH CARE EDUCATION/TRAINING PROGRAM

## 2025-02-25 PROCEDURE — 86900 BLOOD TYPING SEROLOGIC ABO: CPT

## 2025-02-25 PROCEDURE — 83880 ASSAY OF NATRIURETIC PEPTIDE: CPT

## 2025-02-25 PROCEDURE — 700102 HCHG RX REV CODE 250 W/ 637 OVERRIDE(OP)

## 2025-02-25 PROCEDURE — 93005 ELECTROCARDIOGRAM TRACING: CPT | Mod: TC | Performed by: STUDENT IN AN ORGANIZED HEALTH CARE EDUCATION/TRAINING PROGRAM

## 2025-02-25 PROCEDURE — 36415 COLL VENOUS BLD VENIPUNCTURE: CPT

## 2025-02-25 PROCEDURE — P9016 RBC LEUKOCYTES REDUCED: HCPCS | Mod: 91

## 2025-02-25 PROCEDURE — 86901 BLOOD TYPING SEROLOGIC RH(D): CPT

## 2025-02-25 PROCEDURE — A9270 NON-COVERED ITEM OR SERVICE: HCPCS | Performed by: HOSPITALIST

## 2025-02-25 PROCEDURE — 770000 HCHG ROOM/CARE - INTERMEDIATE ICU *

## 2025-02-25 PROCEDURE — 96365 THER/PROPH/DIAG IV INF INIT: CPT

## 2025-02-25 RX ORDER — AMOXICILLIN 250 MG
2 CAPSULE ORAL EVERY EVENING
Status: DISCONTINUED | OUTPATIENT
Start: 2025-02-25 | End: 2025-03-01 | Stop reason: HOSPADM

## 2025-02-25 RX ORDER — PANTOPRAZOLE SODIUM 40 MG/10ML
80 INJECTION, POWDER, LYOPHILIZED, FOR SOLUTION INTRAVENOUS ONCE
Status: DISCONTINUED | OUTPATIENT
Start: 2025-02-25 | End: 2025-02-25

## 2025-02-25 RX ORDER — METOPROLOL TARTRATE 25 MG/1
25 TABLET, FILM COATED ORAL 2 TIMES DAILY
Status: DISCONTINUED | OUTPATIENT
Start: 2025-02-25 | End: 2025-03-01 | Stop reason: HOSPADM

## 2025-02-25 RX ORDER — SODIUM BICARBONATE 650 MG/1
650 TABLET ORAL 3 TIMES DAILY PRN
Status: DISCONTINUED | OUTPATIENT
Start: 2025-02-25 | End: 2025-03-01 | Stop reason: HOSPADM

## 2025-02-25 RX ORDER — AMOXICILLIN 500 MG/1
500 CAPSULE ORAL 2 TIMES DAILY
Status: ON HOLD | COMMUNITY
End: 2025-03-01

## 2025-02-25 RX ORDER — GENTAMICIN SULFATE 1 MG/G
CREAM TOPICAL
Status: DISCONTINUED | OUTPATIENT
Start: 2025-02-25 | End: 2025-03-01 | Stop reason: HOSPADM

## 2025-02-25 RX ORDER — MONTELUKAST SODIUM 10 MG/1
10 TABLET ORAL
Status: DISCONTINUED | OUTPATIENT
Start: 2025-02-25 | End: 2025-03-01 | Stop reason: HOSPADM

## 2025-02-25 RX ORDER — SEVELAMER CARBONATE 800 MG/1
800 TABLET, FILM COATED ORAL
Status: DISCONTINUED | OUTPATIENT
Start: 2025-02-25 | End: 2025-02-28

## 2025-02-25 RX ORDER — TAMSULOSIN HYDROCHLORIDE 0.4 MG/1
0.4 CAPSULE ORAL
Status: DISCONTINUED | OUTPATIENT
Start: 2025-02-25 | End: 2025-03-01 | Stop reason: HOSPADM

## 2025-02-25 RX ORDER — EUCALYPTUS/PEPPERMINT OIL
10 SOLUTION, NON-ORAL NASAL EVERY 6 HOURS
Status: DISPENSED | OUTPATIENT
Start: 2025-02-25 | End: 2025-02-28

## 2025-02-25 RX ORDER — POLYETHYLENE GLYCOL 3350 17 G/17G
1 POWDER, FOR SOLUTION ORAL
Status: DISCONTINUED | OUTPATIENT
Start: 2025-02-25 | End: 2025-03-01 | Stop reason: HOSPADM

## 2025-02-25 RX ORDER — SODIUM CHLORIDE 9 MG/ML
500 INJECTION, SOLUTION INTRAVENOUS ONCE
Status: DISCONTINUED | OUTPATIENT
Start: 2025-02-25 | End: 2025-02-25

## 2025-02-25 RX ORDER — SODIUM CHLORIDE 9 MG/ML
INJECTION, SOLUTION INTRAVENOUS ONCE
Status: DISCONTINUED | OUTPATIENT
Start: 2025-02-25 | End: 2025-02-25

## 2025-02-25 RX ORDER — PANTOPRAZOLE SODIUM 40 MG/10ML
40 INJECTION, POWDER, LYOPHILIZED, FOR SOLUTION INTRAVENOUS 2 TIMES DAILY
Status: DISCONTINUED | OUTPATIENT
Start: 2025-02-25 | End: 2025-02-27

## 2025-02-25 RX ORDER — HYDROCORTISONE 25 MG/G
CREAM TOPICAL EVERY 6 HOURS PRN
Status: DISCONTINUED | OUTPATIENT
Start: 2025-02-25 | End: 2025-03-01 | Stop reason: HOSPADM

## 2025-02-25 RX ORDER — ONDANSETRON 4 MG/1
4 TABLET, ORALLY DISINTEGRATING ORAL EVERY 4 HOURS PRN
Status: DISCONTINUED | OUTPATIENT
Start: 2025-02-25 | End: 2025-03-01 | Stop reason: HOSPADM

## 2025-02-25 RX ORDER — SODIUM CHLORIDE 9 MG/ML
500 INJECTION, SOLUTION INTRAVENOUS ONCE
Status: COMPLETED | OUTPATIENT
Start: 2025-02-25 | End: 2025-02-25

## 2025-02-25 RX ORDER — SODIUM CHLORIDE 9 MG/ML
INJECTION, SOLUTION INTRAVENOUS CONTINUOUS
Status: ACTIVE | OUTPATIENT
Start: 2025-02-25 | End: 2025-02-25

## 2025-02-25 RX ORDER — ROSUVASTATIN CALCIUM 20 MG/1
20 TABLET, COATED ORAL
Status: DISCONTINUED | OUTPATIENT
Start: 2025-02-25 | End: 2025-03-01 | Stop reason: HOSPADM

## 2025-02-25 RX ORDER — NOREPINEPHRINE BITARTRATE 0.03 MG/ML
0-1 INJECTION, SOLUTION INTRAVENOUS CONTINUOUS
Status: DISCONTINUED | OUTPATIENT
Start: 2025-02-25 | End: 2025-02-27

## 2025-02-25 RX ORDER — ASPIRIN 81 MG/1
324 TABLET, CHEWABLE ORAL ONCE
Status: COMPLETED | OUTPATIENT
Start: 2025-02-25 | End: 2025-02-25

## 2025-02-25 RX ORDER — CLOPIDOGREL BISULFATE 75 MG/1
75 TABLET ORAL DAILY
Status: DISCONTINUED | OUTPATIENT
Start: 2025-02-25 | End: 2025-03-01 | Stop reason: HOSPADM

## 2025-02-25 RX ORDER — OXYMETAZOLINE HYDROCHLORIDE 0.05 G/100ML
2 SPRAY NASAL 2 TIMES DAILY
Status: COMPLETED | OUTPATIENT
Start: 2025-02-25 | End: 2025-02-27

## 2025-02-25 RX ORDER — ONDANSETRON 2 MG/ML
4 INJECTION INTRAMUSCULAR; INTRAVENOUS EVERY 4 HOURS PRN
Status: DISCONTINUED | OUTPATIENT
Start: 2025-02-25 | End: 2025-03-01 | Stop reason: HOSPADM

## 2025-02-25 RX ORDER — FUROSEMIDE 40 MG/1
40 TABLET ORAL DAILY
Status: DISCONTINUED | OUTPATIENT
Start: 2025-02-25 | End: 2025-03-01 | Stop reason: HOSPADM

## 2025-02-25 RX ORDER — LOSARTAN POTASSIUM 25 MG/1
12.5 TABLET ORAL DAILY
Status: DISCONTINUED | OUTPATIENT
Start: 2025-02-25 | End: 2025-03-01 | Stop reason: HOSPADM

## 2025-02-25 RX ORDER — ACETAMINOPHEN 325 MG/1
650 TABLET ORAL EVERY 6 HOURS PRN
Status: DISCONTINUED | OUTPATIENT
Start: 2025-02-25 | End: 2025-02-25

## 2025-02-25 RX ADMIN — SODIUM CHLORIDE 500 ML: 9 INJECTION, SOLUTION INTRAVENOUS at 08:38

## 2025-02-25 RX ADMIN — SODIUM CHLORIDE 500 ML: 9 INJECTION, SOLUTION INTRAVENOUS at 15:31

## 2025-02-25 RX ADMIN — SODIUM CHLORIDE: 9 INJECTION, SOLUTION INTRAVENOUS at 04:17

## 2025-02-25 RX ADMIN — TAMSULOSIN HYDROCHLORIDE 0.4 MG: 0.4 CAPSULE ORAL at 11:33

## 2025-02-25 RX ADMIN — NOREPINEPHRINE BITARTRATE 0.05 MCG/KG/MIN: 1 INJECTION, SOLUTION, CONCENTRATE INTRAVENOUS at 04:45

## 2025-02-25 RX ADMIN — HYDROCORTISONE: 25 CREAM TOPICAL at 17:46

## 2025-02-25 RX ADMIN — GENTAMICIN SULFATE: 1 CREAM TOPICAL at 17:00

## 2025-02-25 RX ADMIN — MONTELUKAST 10 MG: 10 TABLET, FILM COATED ORAL at 21:32

## 2025-02-25 RX ADMIN — ONDANSETRON 4 MG: 2 INJECTION INTRAMUSCULAR; INTRAVENOUS at 07:05

## 2025-02-25 RX ADMIN — SODIUM CHLORIDE 500 ML: 9 INJECTION, SOLUTION INTRAVENOUS at 03:16

## 2025-02-25 RX ADMIN — OXYMETAZOLINE HYDROCHLORIDE 2 SPRAY: 0.5 SPRAY NASAL at 13:26

## 2025-02-25 RX ADMIN — PANTOPRAZOLE SODIUM 40 MG: 40 INJECTION, POWDER, FOR SOLUTION INTRAVENOUS at 07:38

## 2025-02-25 RX ADMIN — ASPIRIN 324 MG: 81 TABLET, CHEWABLE ORAL at 01:02

## 2025-02-25 RX ADMIN — Medication 0.5 ML: at 17:46

## 2025-02-25 RX ADMIN — OXYMETAZOLINE HYDROCHLORIDE 2 SPRAY: 0.5 SPRAY NASAL at 17:46

## 2025-02-25 RX ADMIN — ROSUVASTATIN CALCIUM 20 MG: 20 TABLET, FILM COATED ORAL at 21:32

## 2025-02-25 RX ADMIN — PANTOPRAZOLE SODIUM 40 MG: 40 INJECTION, POWDER, FOR SOLUTION INTRAVENOUS at 17:46

## 2025-02-25 ASSESSMENT — PAIN DESCRIPTION - PAIN TYPE
TYPE: ACUTE PAIN

## 2025-02-25 ASSESSMENT — ENCOUNTER SYMPTOMS
EYES NEGATIVE: 1
LOWEST PAIN SEVERITY IN PAST 24 HOURS: 0/10
VOMITING: DENIES
HIGHEST PAIN SEVERITY IN PAST 24 HOURS: 4/10
CHILLS: 0
LAST BOWEL MOVEMENT: 67258
WHEEZING: 0
PAIN: 1
SHORTNESS OF BREATH: 0
BLOOD IN STOOL: 1
PAIN LOCATION - PAIN QUALITY: ACHE
ABDOMINAL PAIN: 0
WEAKNESS: 1
NAUSEA: DENIES
DIARRHEA: 0
DYSPNEA ACTIVITY LEVEL: AFTER AMBULATING MORE THAN 20 FT
NERVOUS/ANXIOUS: 1
BOWEL PATTERN NORMAL: 1
MUSCULOSKELETAL NEGATIVE: 1
HEADACHES: 0
DIZZINESS: 0
VOMITING: 0
RESPIRATORY NEGATIVE: 1
PAIN LOCATION: BUTTOCK
DOUBLE VISION: 0
PALPITATIONS: 0
PAIN LOCATION - PAIN SEVERITY: 2/10
NAUSEA: 0
FEVER: 0
STOOL FREQUENCY: DAILY
PAIN LOCATION - PAIN DURATION: CONSTANT
SHORTNESS OF BREATH: 1
SUBJECTIVE PAIN PROGRESSION: UNCHANGED
PAIN LOCATION - EXACERBATING FACTORS: MOVMENT
PAIN LOCATION - PAIN FREQUENCY: CONSTANT
LOWER EXTREMITY EDEMA: 1
ABDOMINAL PAIN: 1
PAIN LOCATION - RELIEVING FACTORS: REST MEDICATION
PAIN SEVERITY GOAL: 0/10
MYALGIAS: 0
COUGH: 0
CONSTIPATION: 0

## 2025-02-25 ASSESSMENT — CHA2DS2 SCORE
AGE 65 TO 74: NO
PRIOR STROKE OR TIA OR THROMBOEMBOLISM: NO
AGE 75 OR GREATER: YES
CHA2DS2 VASC SCORE: 5
VASCULAR DISEASE: YES
SEX: MALE
CHF OR LEFT VENTRICULAR DYSFUNCTION: YES
HYPERTENSION: YES
DIABETES: NO

## 2025-02-25 ASSESSMENT — HEART SCORE
HISTORY: MODERATELY SUSPICIOUS
AGE: 65+
RISK FACTORS: >2 RISK FACTORS OR HX OF ATHEROSCLEROTIC DISEASE
TROPONIN: LESS THAN OR EQUAL TO NORMAL LIMIT
ECG: SIGNIFICANT ST-DEPRESSION
HEART SCORE: 7

## 2025-02-25 ASSESSMENT — FIBROSIS 4 INDEX: FIB4 SCORE: 1.94

## 2025-02-25 ASSESSMENT — ACTIVITIES OF DAILY LIVING (ADL): OASIS_M1830: 03

## 2025-02-25 ASSESSMENT — PAIN DESCRIPTION - DESCRIPTORS: DESCRIPTORS: DULL

## 2025-02-25 NOTE — ED NOTES
Assist RN: Lab called with critical result of troponin at 288.    Dr. Gale notified of critical lab result at 0154.

## 2025-02-25 NOTE — H&P
Hospital Medicine History & Physical Note    Date of Service  2/25/2025    Primary Care Physician  Denise Carver M.D.    Code Status  Full Code    Chief Complaint  Chief Complaint   Patient presents with    Weakness    Chest Pain       History of Presenting Illness  Patient is a 79-year-old male with past medical history of new onset heart failure with reduced ejection fraction secondary to coronary artery disease on Plavix, end-stage renal disease on peritoneal dialysis, peripheral artery disease status post bypass as well as atrial flutter on Eliquis who presents due to persistent melena, epistasis, chest pressure and generalized weakness/fatigue.  Patient is accompanied by his wife, both stating since discharge, patient has continued to become more fatigued with persistent melanotic episodes, several times per day.  However, 2/23 patient developed chest pressure that worsened on ambulation and transport to and from the bathroom that progressively worsened with associated epistasis that made it difficult for the patient to sleep.  Due to persistent symptoms, patient presented to the emergency department for further evaluation and found to have a hemoglobin of 6.0, with EKG demonstrating T wave inversions in the lateral leads likely secondary to have demand NSTEMI (type II) with resolution of chest pressure while in emergency department.  Patient was ordered 1 unit of packed red blood cells, however blood pressure continued to downtrend to the lowest point of 79/45 and will be admitted to the IMCU for slight pressor support as well as close hemodynamic monitoring.    I discussed the plan of care with patient and family.    Review of Systems  Review of Systems   Constitutional:  Positive for malaise/fatigue. Negative for chills and fever.   Eyes:  Negative for double vision.   Respiratory:  Negative for cough, shortness of breath and wheezing.    Cardiovascular:  Positive for chest pain and leg swelling.  Negative for palpitations.   Gastrointestinal:  Positive for melena. Negative for abdominal pain, constipation, diarrhea, nausea and vomiting.   Genitourinary:  Negative for dysuria.   Musculoskeletal:  Negative for myalgias.   Neurological:  Positive for weakness. Negative for dizziness and headaches.       Past Medical History   has a past medical history of A-V fistula ([facility]), Arrhythmia (03/13/2024), Breath shortness, Cataract, Congestive heart failure ([facility]), Diabetes ([facility]), Dialysis patient ([facility]), Dialysis patient ([facility]) (01/17/2025), Foot ulcer ([facility]), Hemorrhagic disorder ([facility]), High cholesterol, Hypertension (05/24/2024), NSTEMI (non-ST elevated myocardial infarction) ([facility]) (2/25/2025), DARIEN (obstructive sleep apnea) (01/17/2025), Pain, Peritoneal dialysis catheter in place ([facility]) (01/17/2025), Pneumonia, Renal disorder, Sleep apnea (01/10/2025), and Stroke ([facility]) (2021).    Surgical History   has a past surgical history that includes other; other orthopedic surgery (2004); other cardiac surgery; other; other; av fistula creation (Left, 07/01/2024); cath placement capd (N/A, 09/16/2024); inguinal hernia laparoscopic (N/A, 12/18/2024); cath placement capd (N/A, 12/18/2024); aortogram with runoff (1/14/2025); pr vein bypass graft,fem-tibial (Left, 1/20/2025); pr upper gi endoscopy,diagnosis (N/A, 2/14/2025); and pr upper gi endoscopy,biopsy (N/A, 2/14/2025).     Family History  family history includes Cancer in his daughter and mother; Heart Disease in his brother and father.   Family history reviewed with patient. There is no family history that is pertinent to the chief complaint.     Social History   reports that he has never smoked. He has never used smokeless tobacco. He reports that he does not currently use alcohol. He reports that he does not use drugs.    Allergies  No Known Allergies    Medications  Prior to Admission Medications   Prescriptions Last Dose Informant Patient Reported? Taking?   acetaminophen  (TYLENOL) 500 MG Tab 2/23/2025 Morning Significant Other Yes No   Sig: Take 1,000 mg by mouth every 8 hours as needed for Mild Pain. Indications: Pain, 1-4/10 pain   amoxicillin (AMOXIL) 500 MG Cap 2/21/2025 Evening  Yes Yes   Sig: Take 500 mg by mouth 2 times a day. 3 day course completed   apixaban (ELIQUIS) 5mg Tab 2/24/2025 at  5:00 PM Significant Other Yes Yes   Sig: Take 5 mg by mouth 2 times a day. Indications: Atrial Fibrillation   clopidogrel (PLAVIX) 75 MG Tab 2/24/2025 at 10:00 AM Significant Other Yes Yes   Sig: Take 75 mg by mouth every day. Indications: Coronary Bypass Surgery   epoetin jayna (EPOGEN/PROCRIT) 4000 UNIT/ML Solution 2/24/2025 Morning Significant Other Yes Yes   Sig: Inject 4,000 Units under the skin every 7 days. Mondays   furosemide (LASIX) 40 MG Tab 2/24/2025 at  7:00 PM Significant Other Yes Yes   Sig: Take 40 mg by mouth every day. Indications: Cardiac Failure, Edema   gentamicin (GARAMYCIN) 0.1 % cream 2/24/2025 Morning Significant Other Yes Yes   Sig: Apply 1 Application topically every day.   lanthanum carbonate (FOSRENOL) 500 MG Chew Tab 2/24/2025 Evening Significant Other Yes Yes   Sig: Chew 500 mg 3 times a day with meals.   losartan (COZAAR) 25 MG Tab 2/24/2025 Morning Significant Other No Yes   Sig: Take 0.5 Tablets by mouth every day for 30 days. Do not take for blood pressure less than 100/60   Patient taking differently: Take 25 mg by mouth every day. Do not take for blood pressure less than 100/60  Indications: High Blood Pressure   metoprolol tartrate (LOPRESSOR) 25 MG Tab 2/24/2025 Evening Significant Other No Yes   Sig: Take 1 Tablet by mouth 2 times a day for 30 days.   montelukast (SINGULAIR) 10 MG Tab 2/24/2025 Evening Significant Other Yes Yes   Sig: Take 10 mg by mouth at bedtime. Indications: Asthma, Hayfever   polyethylene glycol/lytes (MIRALAX) Pack 2/23/2025 Morning Significant Other Yes No   Sig: Take 17 g by mouth 1 time a day as needed. Indications:  Constipation   rosuvastatin (CRESTOR) 20 MG Tab 2/24/2025 Evening Significant Other Yes Yes   Sig: Take 20 mg by mouth at bedtime.     Indications: High Amount of Fats in the Blood   sevelamer carbonate (RENVELA) 800 MG Tab tablet 2/24/2025 at  7:00 PM Significant Other Yes Yes   Sig: Take 800 mg by mouth 1 time a day as needed (with snack). Indications: High Amount of Phosphate in the Blood   sodium bicarbonate (SODIUM BICARBONATE) 650 MG Tab 2/24/2025 Evening Significant Other Yes Yes   Sig: Take 650 mg by mouth 3 times a day as needed (pt takes as needed for stomach burning).   tamsulosin (FLOMAX) 0.4 MG capsule 2/24/2025 Morning Significant Other No Yes   Sig: Take 1 Capsule by mouth 1/2 hour after breakfast.      Facility-Administered Medications: None       Physical Exam  Temp:  [36.3 °C (97.3 °F)-36.8 °C (98.3 °F)] 36.8 °C (98.2 °F)  Pulse:  [69-83] 77  Resp:  [16-19] 16  BP: ()/(43-59) 101/56  SpO2:  [97 %-100 %] 100 %  Blood Pressure : 101/56   Temperature: 36.8 °C (98.2 °F)   Pulse: 77   Respiration: 16   Pulse Oximetry: 100 %       Physical Exam  Vitals and nursing note reviewed.   Constitutional:       General: He is not in acute distress.  HENT:      Head: Normocephalic and atraumatic.      Mouth/Throat:      Mouth: Mucous membranes are moist.   Eyes:      General: No scleral icterus.     Extraocular Movements: Extraocular movements intact.   Cardiovascular:      Rate and Rhythm: Normal rate and regular rhythm.      Heart sounds: No murmur heard.     No gallop.   Pulmonary:      Effort: Pulmonary effort is normal. No respiratory distress.      Breath sounds: No wheezing, rhonchi or rales.   Abdominal:      General: Abdomen is flat. Bowel sounds are normal. There is no distension.      Palpations: Abdomen is soft.      Tenderness: There is no abdominal tenderness.   Musculoskeletal:         General: Swelling present. No tenderness.      Cervical back: Neck supple.   Skin:     General: Skin is warm.       Coloration: Skin is pale.   Neurological:      General: No focal deficit present.      Mental Status: He is alert.   Psychiatric:         Mood and Affect: Mood normal.         Laboratory:  Recent Labs     02/25/25  0050   WBC 8.6   RBC 1.96*   HEMOGLOBIN 6.0*   HEMATOCRIT 19.4*   MCV 99.0*   MCH 30.6   MCHC 30.9*   RDW 79.2*   PLATELETCT 240   MPV 10.1     Recent Labs     02/25/25  0050   SODIUM 140   POTASSIUM 5.0   CHLORIDE 93*   CO2 22   GLUCOSE 203*   *   CREATININE 9.65*   CALCIUM 8.6     Recent Labs     02/25/25  0050   ALTSGPT 39   ASTSGOT 38   ALKPHOSPHAT 121*   TBILIRUBIN 0.4   GLUCOSE 203*         Recent Labs     02/25/25  0050   NTPROBNP >40906*         Recent Labs     02/25/25  0050   TROPONINT 288*       Imaging:  DX-CHEST-PORTABLE (1 VIEW)   Final Result         1. No acute cardiopulmonary abnormalities are identified.          X-Ray:  I have personally reviewed the images and compared with prior images.  EKG:  I have personally reviewed the images and compared with prior images.    Assessment/Plan:  Justification for Admission Status  I anticipate this patient will require at least two midnights for appropriate medical management, necessitating inpatient admission because blood loss anemia resulting in hypovolemic shock requiring mild pressor support as well as blood transfusion.    Patient will need a Intermediate Care (Adult and Pediatrics) bed on MEDICAL service .  The need is secondary to currently needing pressor support to maintain maps greater than 65..    * Shock (HCC)- (present on admission)  Assessment & Plan  Soft blood pressures currently downtrending requiring Levophed not responsive to 500 cc normal saline bolus in emergency department likely secondary to hypovolemia/blood loss given melena.  However, patient does have history of cholecystitis that did not receive surgery but is currently asymptomatic and denies right upper quadrant abdominal pain.  - Currently transfusion 1  unit of packed red blood cells, monitor hemoglobin  - Holding home antihypertensives and Eliquis/Plavix  - Less likely cardiogenic shock with warm extremities  - GI consult in a.m.    High anion gap metabolic acidosis  Assessment & Plan  Elevated anion gap to 25 with bicarb of 22 likely secondary to end-stage renal disease with elevated BUN of 146 as well as acute blood loss anemia  - Ordered lactic acid    NSTEMI (non-ST elevated myocardial infarction) (HCC)  Assessment & Plan  Patient with chest pressure and elevated troponin to 288 with T wave inversions in the lateral leads consistent with likely type II NSTEMI from demand given acute blood loss.  Chest pressure alleviated, more lethargic  - Monitor after blood transfusion  - Will likely need to aim for hemoglobin goal greater than 8    Typical atrial flutter (HCC)- (present on admission)  Assessment & Plan  Holding home Eliquis    Melena- (present on admission)  Assessment & Plan  Patient with multifactorial anemia including blood loss anemia, chronic kidney disease on EPO.  Patient presents with melanotic stool that persisted since last hospitalization as well as epistasis that is now resolved.  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  - Repeat hemoglobin after transfusion  - Potential GI bleed postbiopsy, GI consult in a.m.  - Will hold home Eliquis and plavix      CAD (coronary artery disease)- (present on admission)  Assessment & Plan  History of coronary artery disease status post stents x3 8/2023  -Holding home Plavix in presence of melena    Peripheral arterial disease (HCC)- (present on admission)  Assessment & Plan  Status post left popliteal-PT bypass by Dr. Ochoa 1/20/2025 on wound VAC    ESRD on peritoneal dialysis (HCC)- (present on  admission)  Assessment & Plan  History of ESRD on peritoneal dialysis followed by [facility] nephrology  - Consult in a.m.        VTE prophylaxis: SCDs/TEDs    Total critical care time not including procedures: 60 minutes

## 2025-02-25 NOTE — PROGRESS NOTES
Medication chart review for [facility] services    Received referral from [facility].   Medications reviewed  compared with discharge summary if available.  Discharge summary date, if applicable:   2/18/25    Current medication list per [facility]     Medication list one, patient is currently taking  No current facility-administered medications for this visit.    Current Outpatient Medications:     Home Care Oxygen, 2 L/min, Inhalation, QHS    Facility-Administered Medications Ordered in Other Visits:     NS    senna-docusate **AND** polyethylene glycol/lytes    ondansetron **OR** ondansetron    [Held by provider] apixaban    [Held by provider] clopidogrel    [Held by provider] furosemide    [Held by provider] losartan    metoprolol tartrate    montelukast    rosuvastatin    sevelamer carbonate    sodium bicarbonate    tamsulosin    pantoprazole    NORepinephrine    NS      Medication list two, drugs that the patient has been prescribed or recommended to take by their healthcare provider on discharge summary                MEDICATIONS ON DISCHARGE      Medication List          START taking these medications         Instructions   amoxicillin-clavulanate 500-125 MG Tabs  Commonly known as: Augmentin    Take 1 Tablet by mouth every 12 hours for 3 days.  Dose: 1 Tablet      losartan 25 MG Tabs  Commonly known as: Cozaar    Take 0.5 Tablets by mouth every day for 30 days. Do not take for blood pressure less than 100/60  Dose: 12.5 mg      metoprolol tartrate 25 MG Tabs  Commonly known as: Lopressor    Take 1 Tablet by mouth 2 times a day for 30 days.  Dose: 25 mg      tamsulosin 0.4 MG capsule  Commonly known as: Flomax    Take 1 Capsule by mouth 1/2 hour after breakfast.  Dose: 0.4 mg      traMADol 50 MG Tabs  Commonly known as: Ultram    Take 1 Tablet by mouth every 12 hours as needed for Severe Pain for up to 3 days.  Dose: 50 mg                CONTINUE taking these medications         Instructions    apixaban 5mg Tabs  Commonly known as: Eliquis    Take 5 mg by mouth 2 times a day.  Dose: 5 mg      bisacodyl 10 MG Supp  Commonly known as: Dulcolax    Insert 10 mg into the rectum 1 time a day as needed. Indications: Constipation  Dose: 10 mg      clopidogrel 75 MG Tabs  Commonly known as: Plavix    Take 75 mg by mouth every day.  Dose: 75 mg      epoetin jayna 4000 UNIT/ML Soln  Commonly known as: Epogen/Procrit    Inject 4,000 Units under the skin every 7 days.  Dose: 4,000 Units      gentamicin 0.1 % cream  Commonly known as: Garamycin    Apply 1 Application topically every day.  Dose: 1 Application      lanthanum carbonate 500 MG Chew  Commonly known as: Fosrenol    Chew 500 mg 3 times a day with meals.  Dose: 500 mg      methocarbamol 500 MG Tabs  Commonly known as: Robaxin    Take 250 mg by mouth 4 times a day as needed. Indications: Musculoskeletal Pain  Dose: 250 mg      montelukast 10 MG Tabs  Commonly known as: Singulair    Take 10 mg by mouth at bedtime.  Dose: 10 mg      omeprazole 40 MG delayed-release capsule  Commonly known as: PriLOSEC    Take 40 mg by mouth every day.  Dose: 40 mg      polyethylene glycol/lytes Pack  Commonly known as: Miralax    Take 17 g by mouth 1 time a day as needed. Indications: Constipation  Dose: 17 g      Renvela 800 MG Tabs tablet  Generic drug: sevelamer carbonate    Take 800 mg by mouth 1 time a day as needed (with snack).  Dose: 800 mg      rosuvastatin 20 MG Tabs  Commonly known as: Crestor    Take 20 mg by mouth at bedtime.   Dose: 20 mg      simethicone 125 MG chewable tablet  Commonly known as: Mylicon    Chew 125 mg in the morning, at noon, and at bedtime.  Dose: 125 mg      torsemide 10 MG tablet  Commonly known as: Demadex    Take 10 mg by mouth every day.  Dose: 10 mg                STOP taking these medications       magnesium hydroxide 400 MG/5ML Susp  Commonly known as: Milk Of Magnesia      midodrine 10 MG tablet  Commonly known as: Proamatine       ondansetron 4 MG Tbdp  Commonly known as: Zofran ODT      oxyCODONE immediate-release 5 MG Tabs  Commonly known as: Roxicodone      potassium chloride SA 20 MEQ Tbcr  Commonly known as: Kdur      senna-docusate 8.6-50 MG Tabs  Commonly known as: Pericolace Or Senokot S          No Known Allergies    Labs     Lab Results   Component Value Date/Time    SODIUM 140 02/25/2025 12:50 AM    POTASSIUM 5.0 02/25/2025 12:50 AM    CHLORIDE 93 (L) 02/25/2025 12:50 AM    CO2 22 02/25/2025 12:50 AM    GLUCOSE 203 (H) 02/25/2025 12:50 AM     (H) 02/25/2025 12:50 AM    CREATININE 9.65 (HH) 02/25/2025 12:50 AM     Lab Results   Component Value Date/Time    ALKPHOSPHAT 121 (H) 02/25/2025 12:50 AM    ASTSGOT 38 02/25/2025 12:50 AM    ALTSGPT 39 02/25/2025 12:50 AM    TBILIRUBIN 0.4 02/25/2025 12:50 AM    INR 1.31 (H) 01/20/2025 12:33 PM    ALBUMIN 2.8 (L) 02/25/2025 12:50 AM        Assessment for clinically significant drug interactions, drug omissions/additions, duplicative therapies.            CC   Denise Carver M.D.  [address] [facility] [place] [address]  Fax: [phone]    [facility]  Phone [phone] fax [phone]    This note was created using voice recognition software (Dragon). The accuracy of the dictation is limited by the abilities of the software. I have reviewed the note prior to signing, however some errors in grammar and context are still possible. If you have any questions related to this note please do not hesitate to contact our office.

## 2025-02-25 NOTE — ASSESSMENT & PLAN NOTE
Patient now diagnosed with duodenal ulcer, 1 apparently bleeding    Patient with multifactorial anemia, including blood loss anemia, chronic kidney disease on EPO.  Patient presents with melanotic stool that persisted since last hospitalization as well as epistasis .  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

## 2025-02-25 NOTE — ED TRIAGE NOTES
Pt BIB [facility] from home for weakness and chest pain since last night. Midsternal chest pain radiating to back described as dull pain, unprovoked. Was d/c'd last Tuesday for cholecystitis and obstructed bile duct. Had left leg bypass surgery 1-. On peritoneal dialysis currently; completed about 10% tonight at home. Currently on Plavix and Eliquis. CP 4/10 currently and back pain 5/10. ERP at bedside.

## 2025-02-25 NOTE — CARE PLAN
The patient is Unstable - High likelihood or risk of patient condition declining or worsening    Shift Goals  Clinical Goals: Hemnodynamic stability; trend hgb, trop, lactic; monitor I/Os  Patient Goals: rest  Family Goals: rest    Progress made toward(s) clinical / shift goals:    Problem: Knowledge Deficit - Standard  Goal: Patient and family/care givers will demonstrate understanding of plan of care, disease process/condition, diagnostic tests and medications  2/25/2025 1052 by Edmund Webb RSaimaN.  Outcome: Progressing  2/25/2025 1051 by Edmund Webb R.N.  Outcome: Progressing     Problem: Skin Integrity  Goal: Skin integrity is maintained or improved  2/25/2025 1052 by SHIRA Covington.N.  Outcome: Progressing  2/25/2025 1051 by Edmund Webb R.N.  Outcome: Progressing     Problem: Fall Risk  Goal: Patient will remain free from falls  2/25/2025 1052 by Edmund Webb R.N.  Outcome: Progressing  2/25/2025 1051 by Edmund Webb R.N.  Outcome: Progressing     Problem: Pain - Standard  Goal: Alleviation of pain or a reduction in pain to the patient’s comfort goal  Outcome: Progressing

## 2025-02-25 NOTE — ED NOTES
Assist RN:   Patient ENE 83/48 with map 60, norepinephrine (Levophed) 8 mg in 250 mL NS infusion (premix) initiated at 0.05mcg /kg/min as per MAR, primary RN made aware.

## 2025-02-25 NOTE — ASSESSMENT & PLAN NOTE
History of ESRD on peritoneal dialysis followed by [facility] nephrology  Nephrology consulted

## 2025-02-25 NOTE — ASSESSMENT & PLAN NOTE
Patient with chest pressure and elevated troponin to 288 with T wave inversions in the lateral leads consistent with likely type II NSTEMI from demand given acute blood loss.  Chest pressure alleviated  - Monitor after blood transfusion  - Will likely need to aim for hemoglobin goal greater than 8

## 2025-02-25 NOTE — PROGRESS NOTES
IMCU Acceptance Note    Called by ERP for admission to IMCU for melena and recent epistaxis.  Patient has a history of ESRD on PD, CAD, DM, HTN, recent admission with EGD and biopsy, recent femoropopliteal and cardiac PCI on Eliquis and Plavix.  Found to have a hemoglobin of 6.  He received 1 unit PRBCs and has been hemodynamically stable by report.  Appropriate for admission to IMCU under the care of the hospitalist.  Critical care available as needed

## 2025-02-25 NOTE — ED PROVIDER NOTES
ER Provider Note    Scribed for Jian Gale M.D. by Arin Huitron. 2/25/2025   12:36 AM    Primary Care Provider: Denise Carver M.D.    CHIEF COMPLAINT  Chief Complaint   Patient presents with    Weakness    Chest Pain     EXTERNAL RECORDS REVIEWED  Inpatient Notes recently admitted, had an extensive workup for nausea and epigastric pain including cardiology, GI consultation, upper endoscopy, with biopsy, diagnosed with Stapleton's esophagus, considered cardiac cath however given patient's ESRD and peritoneal dialysis status, would have required dialysis catheter placement ultimately was deferred.    HPI/ROS  LIMITATION TO HISTORY   Select: : None  OUTSIDE HISTORIAN(S):  Significant other Patient's wife is present at bedside.    Edy Bennett is a 79 y.o. male who presents to the ED for evaluation of improving chest pain onset approximately 6 hours ago with associated weakness. Patient notes that he has had tarry stools for the past week. Denies fever or chills in the past week. Patient reports that he is on peritoneal dialysis for kidney failure and makes little urine. He notes that he is in kidney failure for ibuprofen overdose, diabetes and hypertension. Patient recent femoropopliteal bypass in January, on Eliquis, per spouse, cardiac stent placed in January as well.  Denies history of heart attacks.  Notes intermittent large-volume epistaxis over similar timeframe.  Denies abdominal pain.     PAST MEDICAL HISTORY  Past Medical History:   Diagnosis Date    A-V fistula (formerly [facility])     Left Arm    Arrhythmia 03/13/2024    Due to kidney failure treatment    Breath shortness     with exertion    Cataract     shon iol    Congestive heart failure (HCC)     Diabetes (formerly [facility])     not on any medication at this time, being monitored at dialysis    Dialysis patient (formerly [facility])     monday wednesday friday at [facility]    Dialysis patient (formerly [facility]) 01/17/2025    Starting Peritoneal Dialysis @ Home on 1/17/2025.  Follows Nephrologist MD Milian.    Foot ulcer ([facility])     left foot. being seen by wound care    Hemorrhagic disorder ([facility])     plavix eliquis    High cholesterol     Hypertension 05/24/2024    states controlled    NSTEMI (non-ST elevated myocardial infarction) ([facility]) 2/25/2025    DARIEN (obstructive sleep apnea) 01/17/2025    O2 @ 2.5L Per Patient's Wife.    Pain     Peritoneal dialysis catheter in place ([facility]) 01/17/2025    Pneumonia     3/24    Renal disorder     Sleep apnea 01/10/2025    Home sleep study done 1/3/2025 - no results yet.    Stroke ([facility]) 2021    tia       SURGICAL HISTORY  Past Surgical History:   Procedure Laterality Date    CO UPPER GI ENDOSCOPY,DIAGNOSIS N/A 2/14/2025    Procedure: GASTROSCOPY;  Surgeon: Melany Richards M.D.;  Location: SURGERY SAME DAY [facility];  Service: Gastroenterology    CO UPPER GI ENDOSCOPY,BIOPSY N/A 2/14/2025    Procedure: GASTROSCOPY, WITH BIOPSY;  Surgeon: Melany Richards M.D.;  Location: SURGERY SAME DAY [facility];  Service: Gastroenterology    CO VEIN BYPASS GRAFT,FEM-TIBIAL Left 1/20/2025    Procedure: LEFT LOWER EXTREMITY BYPASS, POPLITIAL TO TIBIAL BYPASS;  Surgeon: Marco Antonio Miller M.D.;  Location: [facility];  Service: Vascular    AORTOGRAM WITH RUNOFF  1/14/2025    Procedure: AORTOGRAM WITH RUNOFF, right peroneal artery intravascular lithotripsy and stent placement;  Surgeon: Marco Antonio Miller M.D.;  Location: SURGERY [facility];  Service: Vascular    INGUINAL HERNIA LAPAROSCOPIC N/A 12/18/2024    Procedure: LAPAROSCOPIC LEFT INGUINAL HERNIA REPAIR WITH MESH, LAPAROSCOPIC REPOSITIONING OF PERITONEAL DIALYSIS CATHETER;  Surgeon: Marco Antonio Miller M.D.;  Location: SURGERY [facility];  Service: Vascular    CATH PLACEMENT CAPD N/A 12/18/2024    Procedure: REPOSITION, CATHETER, CAPD;  Surgeon: Marco Antonio Miller M.D.;  Location: SURGERY [facility];  Service: Vascular    CATH PLACEMENT CAPD N/A 09/16/2024    Procedure: LAPAROSCOPIC INSERTION OF  PERITONEAL DIALYSIS CATHETER PLACEMENT;  Surgeon: Marco Antonio Miller M.D.;  Location: SURGERY SAME DAY [facility];  Service: Vascular    AV FISTULA CREATION Left 07/01/2024    Procedure: CREATION OF LEFT UPPER EXTREMITY DIALYSIS FISTULA;  Surgeon: Shilo Mercedes M.D.;  Location: SURGERY [facility];  Service: General    OTHER ORTHOPEDIC SURGERY  2004    bunion r foot    OTHER      tonsils adenoids    OTHER      dialysis catheter in upper right chest    OTHER      shon iol    OTHER CARDIAC SURGERY      cardiac stents       FAMILY HISTORY  Family History   Problem Relation Age of Onset    Cancer Mother         Brain cancer    Heart Disease Father         Several By-pass Surgeries    Cancer Daughter         Thyroid Cancer    Heart Disease Brother         Fatal Heart Attack       SOCIAL HISTORY   reports that he has never smoked. He has never used smokeless tobacco. He reports that he does not currently use alcohol. He reports that he does not use drugs.    CURRENT MEDICATIONS  Previous Medications    ACETAMINOPHEN (TYLENOL) 500 MG TAB    Take 1,000 mg by mouth every 8 hours as needed for Mild Pain. Indications: Pain, 1-4/10 pain    APIXABAN (ELIQUIS) 5MG TAB    Take 5 mg by mouth 2 times a day. Indications: Atrial Fibrillation    CLOPIDOGREL (PLAVIX) 75 MG TAB    Take 75 mg by mouth every day. Indications: Coronary Bypass Surgery    EPOETIN ANLI (EPOGEN/PROCRIT) 4000 UNIT/ML SOLUTION    Inject 4,000 Units under the skin every 7 days.    FUROSEMIDE (LASIX) 40 MG TAB    Take 40 mg by mouth every day. Indications: Cardiac Failure, Edema    GENTAMICIN (GARAMYCIN) 0.1 % CREAM    Apply 1 Application topically every day.    LANTHANUM CARBONATE (FOSRENOL) 500 MG CHEW TAB    Chew 500 mg 3 times a day with meals.    LOSARTAN (COZAAR) 25 MG TAB    Take 0.5 Tablets by mouth every day for 30 days. Do not take for blood pressure less than 100/60    METOPROLOL TARTRATE (LOPRESSOR) 25 MG TAB    Take 1 Tablet by mouth 2 times a day for  "30 days.    MONTELUKAST (SINGULAIR) 10 MG TAB    Take 10 mg by mouth at bedtime. Indications: Asthma, Hayfever    OMEPRAZOLE (PRILOSEC) 40 MG DELAYED-RELEASE CAPSULE    Take 40 mg by mouth every day.    POLYETHYLENE GLYCOL/LYTES (MIRALAX) PACK    Take 17 g by mouth 1 time a day as needed. Indications: Constipation    ROSUVASTATIN (CRESTOR) 20 MG TAB    Take 20 mg by mouth at bedtime.     Indications: High Amount of Fats in the Blood    SEVELAMER CARBONATE (RENVELA) 800 MG TAB TABLET    Take 800 mg by mouth 1 time a day as needed (with snack). Indications: High Amount of Phosphate in the Blood    SIMETHICONE (MYLICON) 125 MG CHEWABLE TABLET    Chew 125 mg in the morning, at noon, and at bedtime.    SODIUM BICARBONATE (SODIUM BICARBONATE) 650 MG TAB    Take 650 mg by mouth 3 times a day as needed (pt takes as needed for stomach burning).    TAMSULOSIN (FLOMAX) 0.4 MG CAPSULE    Take 1 Capsule by mouth 1/2 hour after breakfast.    TORSEMIDE (DEMADEX) 10 MG TABLET    Take 10 mg by mouth every day.       ALLERGIES  No Known Allergies     PHYSICAL EXAM  BP 96/55   Pulse 83   Temp 36.3 °C (97.3 °F) (Temporal)   Resp 16   Ht 1.778 m (5' 10\")   Wt 72.6 kg (160 lb)   SpO2 100%   BMI 22.96 kg/m²    General: chronically and acutely ill appearing, fatigued  Head: Normocephalic atraumatic  Eyes: Extraocular motion intact  HEENT: Excoriations to the nasal septum bilaterally, no active bleeding  Neck: Supple, no rigidity  Cardiovascular: Regular rate and rhythm no murmurs rubs or gallops  Respiratory: Clear to auscultation bilaterally, equal chest rise and fall, no increased work of breathing  Abdomen: Soft nontender no guarding, peritoneal catheter in place  Musculoskeletal: Warm and well perfused, peripheral edema, 1+ dorsalis pedis/posterior tibial pulses bilaterally, wound vac on left lower extremity functioning well, chronic appearing ischemic toes/dry gangrene bilaterally  Neuro: Alert, no focal " deficits  Integumentary: No wounds or rashes   Rectal: Melena  DIAGNOSTIC STUDIES    Labs:   Labs Reviewed   CBC WITH DIFFERENTIAL - Abnormal; Notable for the following components:       Result Value    RBC 1.96 (*)     Hemoglobin 6.0 (*)     Hematocrit 19.4 (*)     MCV 99.0 (*)     MCHC 30.9 (*)     RDW 79.2 (*)     Neutrophils-Polys 82.20 (*)     Lymphocytes 9.00 (*)     Lymphs (Absolute) 0.77 (*)     All other components within normal limits   COMP METABOLIC PANEL - Abnormal; Notable for the following components:    Chloride 93 (*)     Anion Gap 25.0 (*)     Glucose 203 (*)     Bun 146 (*)     Creatinine 9.65 (*)     Alkaline Phosphatase 121 (*)     Albumin 2.8 (*)     Total Protein 5.3 (*)     All other components within normal limits   TROPONIN - Abnormal; Notable for the following components:    Troponin T 288 (*)     All other components within normal limits   PROBRAIN NATRIURETIC PEPTIDE, NT - Abnormal; Notable for the following components:    NT-proBNP >43747 (*)     All other components within normal limits   ESTIMATED GFR - Abnormal; Notable for the following components:    GFR (CKD-EPI) 5 (*)     All other components within normal limits   MORPHOLOGY   PERIPHERAL SMEAR REVIEW   DIFFERENTIAL COMMENT   COD (ADULT)   RELEASE RED BLOOD CELLS   TRANSFUSE RED BLOOD CELLS-NURSING COMMUNICATION (UNITS)       EKG:   Results for orders placed or performed during the hospital encounter of 25   EKG   Result Value Ref Range    Report       [facility] Emergency Dept.    Test Date:  2025  Pt Name:    SWAPNA ROSS                Department: ER  MRN:        [id number]                      Room:       T931  Gender:     Male                         Technician: [id number]  :        1945                   Requested By:ER TRIAGE PROTOCOL  Order #:    [id number]                    Juliana MD: Zach Gale    Measurements  Intervals                                Axis  Rate:       85                            P:          62  WV:         181                          QRS:        34  QRSD:       101                          T:          211  QT:         405  QTc:        482    Interpretive Statements  Sinus rhythm.  Rate of 85.  Normal axis.  Normal intervals.  ST depressions  in the lateral leads.  ST elevation in aVR.  LVH is present.  Electronically Signed On 02- 06:39:36 PST by Zach Gale        I have independently interpreted this EKG as detailed above.   A second EKG was obtained, not uploaded through exozet, 15 minutes after the first, which showed no interval changes/dynamic changes.    Radiology:       Radiologist interpretation:   DX-CHEST-PORTABLE (1 VIEW)   Final Result         1. No acute cardiopulmonary abnormalities are identified.           INITIAL ASSESSMENT COURSE AND PLAN  Care Narrative     12:36 AM - Patient was evaluated at bedside. They present for chest pain onset earlier tonight. Pertinent PE findings include: peripheral edema, 1+ dorsalis pedis/posterior tibial pulses bilaterally, wound vac on left lower extremity functioning well, chronic appearing ischemic toes/dry gangrene bilaterally. Vitals unremarkable.  Differential diagnoses include but not limited to: ACS, arrhythmia, gastric ulcer, symptomatic anemia, demand ischemia secondary to anemia, bleeding from biopsy site, large-volume epistaxis.      Given these EKG abnormalities and chest pain, patient was given an oral dose of aspirin before any laboratory studies resulted.      Labs notable for acute hemoglobin drop, 2 points in the past several days.  At this point, we discussed patient's stooling, and he noted that he has been having black tarry stools.  He also notes intermittent large-volume epistaxis which he is able to stop about 24 hours ago.        2:23 AM - Patient was reevaluated at bedside. Patient's wife reports that the patient's hemoglobin has dropped in the past few days since it was last  tested.    2:38 AM - Patient was reevaluated at bedside. Performed rectal exam at this time. CHIVO is positive for melena.    2:54 AM - I discussed the patient's case and the above findings with Dr. Naranjo (Hospitalist) who agrees to evaluate the patient for admission.   Shortly thereafter, patient started having a hypotensive episode.  Thankfully, his crossmatched blood arrived, and he had a good response to transfusion.  I did consider reversal of his anticoagulation however given his stent, and his femoropopliteal bypass, and the relatively slow burning, gradual worsening of his symptoms, with multiple days of melanotic stool, I feel reversal is likely to cause more harm than good as patient has responded appropriately to interventions until now.     CHEST PAIN:   HEART Score for Major Cardiac Events  HEART Score     History: Moderately suspicious  ECG: Significant ST-depression  Age: 65+  Risk Factors: >2 risk factors or hx of atherosclerotic disease  Troponin: Less than or equal to normal limit    Heart Score: 7    Total Score   0-3 Points = Low Score, risk of MACE 0.9-1.7%.  4-6 Points = Moderate Score, risk of MACE 12-16.6%  7-10 Points = High Score, risk of MACE 50-65%      Transfusion: I have explained to the patient the risks and benefits of transfusion of blood products.  This includes, as appropriate, the risk of mild allergic reaction, hemolytic reaction, transfusion-associated lung injury, febrile reactions, circulatory or iron overload, and infection.    We discussed possible alternatives and their risks, including directed donation, autologous transfusion, and no transfusion, including IV or oral iron supplementation, as appropriate.  I believe the patient understands the risks and benefits and was able to express understanding.    DISPOSITION AND DISCUSSIONS    I have discussed management of the patient with the following physicians and SHANE's:  Dr. Naranjo (Hospitalist) Dr. Alex,  intensivist    Discussion of management with other [facility] or appropriate source(s): None       Barriers to care at this time, including but not limited to:  None .     Decision tools and prescription drugs considered including, but not limited to: Antibiotics no evidence of infection currently.  Suspect hypotension is due to blood loss, medications .    DISPOSITION:  Patient will be hospitalized by Dr. Naranjo (Hospitalist) in critical condition.     FINAL DIAGNOSIS  1. Acute blood loss anemia    2. Chest pain, unspecified type    3. Epistaxis    4. Melena      CRITICAL CARE  The very real possibilty of a deterioration of this patient's condition required the highest level of my preparedness for sudden, emergent intervention.  I provided critical care services, which included medication orders, frequent reevaluations of the patient's condition and response to treatment, ordering and reviewing test results, and discussing the case with various consultants.  The critical care time associated with the care of the patient was 40 minutes. Review chart for interventions. This time is exclusive of any other billable procedures.       Arin WHITAKER), am scribing for, and in the presence of, Zach Gale M.D..    Electronically signed by: Arin Helms), 2/25/2025    Zach WHITAKER M.D. personally performed the services described in this documentation, as scribed by Arin Huitron in my presence, and it is both accurate and complete.      The note accurately reflects work and decisions made by me.  Zach Gale M.D.  2/25/2025  6:44 AM

## 2025-02-25 NOTE — CONSULTS
"[facility] -  CONSULTATION NOTE               Author: Camryn Leal M.D. Date & Time: 2/25/2025  2:40 PM       REASON FOR CONSULTATION:   - Evaluation and management of acute and chronic conditions related to Nephrology    CHIEF COMPLAINT:   -  \"Weakness, melena, chest pain  \"    HISTORY OF PRESENT ILLNESS:    79 y.o. male with HTN, ESRD on PD, PVDs/p bypass, NSTEMI who presents due to persistent melena, epistasis, chest pressure and generalized weakness/fatigue. 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  Came back to the hospital for chest pain, weakness, dark stool.   Patient he was doing PD at home w/o any issues after discharge, draining clear fluid, and clean cath site.   Admitted for anemia and GI consult.       REVIEW OF SYSTEMS:    10 point ROS was performed and is as per HPI or otherwise negative    PAST MEDICAL HISTORY:   Past Medical History:   Diagnosis Date    A-V fistula ([facility])     Left Arm    Arrhythmia 03/13/2024    Due to kidney failure treatment    Breath shortness     with exertion    Cataract     shon iol    Congestive heart failure ([facility])     Diabetes ([facility])     not on any medication at this time, being monitored at dialysis    Dialysis patient ([facility])     monday wednesday friday at [facility]    Dialysis patient ([facility]) 01/17/2025    Starting Peritoneal Dialysis @ Home on 1/17/2025. Follows Nephrologist MD Milian.    Foot ulcer ([facility])     left foot. being seen by wound care    Hemorrhagic disorder ([facility])     plavix eliquis    High cholesterol     Hypertension 05/24/2024    states controlled    NSTEMI (non-ST elevated myocardial infarction) ([facility]) 2/25/2025    DARIEN (obstructive sleep apnea) 01/17/2025    O2 @ 2.5L Per Patient's Wife.    Pain     Peritoneal dialysis catheter in place " ([facility]) 01/17/2025    Pneumonia     3/24    Renal disorder     Sleep apnea 01/10/2025    Home sleep study done 1/3/2025 - no results yet.    Stroke ([facility]) 2021    tia       PAST SURGICAL HISTORY:   Past Surgical History:   Procedure Laterality Date    NM UPPER GI ENDOSCOPY,DIAGNOSIS N/A 2/14/2025    Procedure: GASTROSCOPY;  Surgeon: Melany Richards M.D.;  Location: SURGERY SAME DAY [facility];  Service: Gastroenterology    NM UPPER GI ENDOSCOPY,BIOPSY N/A 2/14/2025    Procedure: GASTROSCOPY, WITH BIOPSY;  Surgeon: Melany Richards M.D.;  Location: SURGERY SAME DAY [facility];  Service: Gastroenterology    NM VEIN BYPASS GRAFT,FEM-TIBIAL Left 1/20/2025    Procedure: LEFT LOWER EXTREMITY BYPASS, POPLITIAL TO TIBIAL BYPASS;  Surgeon: Marco Antonio Miller M.D.;  Location: [facility];  Service: Vascular    AORTOGRAM WITH RUNOFF  1/14/2025    Procedure: AORTOGRAM WITH RUNOFF, right peroneal artery intravascular lithotripsy and stent placement;  Surgeon: Marco Antonio Miller M.D.;  Location: [facility];  Service: Vascular    INGUINAL HERNIA LAPAROSCOPIC N/A 12/18/2024    Procedure: LAPAROSCOPIC LEFT INGUINAL HERNIA REPAIR WITH MESH, LAPAROSCOPIC REPOSITIONING OF PERITONEAL DIALYSIS CATHETER;  Surgeon: Marco Antonio Miller M.D.;  Location: [facility];  Service: Vascular    CATH PLACEMENT CAPD N/A 12/18/2024    Procedure: REPOSITION, CATHETER, CAPD;  Surgeon: Marco Antonio Miller M.D.;  Location: [facility];  Service: Vascular    CATH PLACEMENT CAPD N/A 09/16/2024    Procedure: LAPAROSCOPIC INSERTION OF PERITONEAL DIALYSIS CATHETER PLACEMENT;  Surgeon: Marco Antonio Miller M.D.;  Location: SURGERY SAME DAY [facility];  Service: Vascular    AV FISTULA CREATION Left 07/01/2024    Procedure: CREATION OF LEFT UPPER EXTREMITY DIALYSIS FISTULA;  Surgeon: Shilo Mercedes M.D.;  Location: [facility];  Service: General    OTHER ORTHOPEDIC SURGERY  2004    bunion r foot    OTHER      tonsils adenoids  "   OTHER      dialysis catheter in upper right chest    OTHER      shon iol    OTHER CARDIAC SURGERY      cardiac stents       FAMILY HISTORY:   - Reviewed and non contributory to current illness    SOCIAL HISTORY:   Social History     Tobacco Use   Smoking Status Never   Smokeless Tobacco Never   Tobacco Comments    Used chewing tobacco many years ago.     Social History     Substance and Sexual Activity   Alcohol Use Not Currently     Social History     Substance and Sexual Activity   Drug Use Never        HOME MEDICATIONS:   - Reviewed and documented in chart    LABORATORY STUDIES:   - Reviewed and documented in chart    ALLERGIES:  Patient has no known allergies.    VS:  /57   Pulse 80   Temp 36.4 °C (97.6 °F)   Resp 16   Ht 1.778 m (5' 10\")   Wt 72.6 kg (160 lb)   SpO2 99%   BMI 22.96 kg/m²   Physical Exam  Constitutional:       Appearance: He is ill-appearing.   HENT:      Head: Normocephalic and atraumatic.      Mouth/Throat:      Mouth: Mucous membranes are moist.   Cardiovascular:      Rate and Rhythm: Normal rate and regular rhythm.      Pulses: Normal pulses.      Heart sounds: Normal heart sounds.   Pulmonary:      Effort: Pulmonary effort is normal.      Breath sounds: Normal breath sounds.   Abdominal:      General: There is no distension.      Palpations: Abdomen is soft.      Tenderness: There is no abdominal tenderness.      Comments: PD cath in place w/ dressing c/d/I;no erythema/induration/discharge   Musculoskeletal:      Right lower leg: Edema present.      Left lower leg: Edema present.   Skin:     Capillary Refill: Capillary refill takes less than 2 seconds.   Neurological:      General: No focal deficit present.      Mental Status: He is alert and oriented to person, place, and time.   Psychiatric:         Mood and Affect: Mood normal.         FLUID BALANCE:  In: 300 [Blood:300]  Out: -     LABS:  Recent Labs     02/25/25  0050   SODIUM 140   POTASSIUM 5.0   CHLORIDE 93*   CO2 22 "   GLUCOSE 203*   *   CREATININE 9.65*   CALCIUM 8.6     Recent Labs     02/25/25  0050 02/25/25  0734 02/25/25  1334   WBC 8.6  --   --    RBC 1.96*  --   --    HEMOGLOBIN 6.0* 6.0* 6.8*   HEMATOCRIT 19.4* 19.1* 20.3*   MCV 99.0*  --   --    MCH 30.6  --   --    MCHC 30.9*  --   --    RDW 79.2*  --   --    PLATELETCT 240  --   --    MPV 10.1  --   --        IMAGING:  - All imaging reviewed from admission to present day    IMPRESSION:  # ESRD on CCPD  - 6 exchanges daily     # HTN, controlled   - Goal BP < 140/90  - torsemide 10mg daily     # Melena, GIB  # Anemia of CKD  - GI following    # CKD-MBD  - Ca at goal, check phos  - Lanthanum 500mg TID, titrate up prn    # Chronic hypoxic respiratory failure   - On home oxygen 2-2.5L at night     # HFrEF  # Hx CAD with stent      SUGGESTIONS:  - Continue CCPD during hospital stay, cont at 6 cycles- will use 1.5% bags due to concern of ongoing GIB   - hold antihypertensive, diuretics iso hypotension  - PRBC for hb<7  - Dose all meds per ESRD  - Renal diet, low phos   - ROSALIND SQ Qweek   - Cont Phos binder TID w/meals   - L arm AVF not fully matured,  will need intervention outpt   - Avoid Fleet enema in dialysis patients   - Labs daily    Thank you for the consultation!

## 2025-02-25 NOTE — ED NOTES
"Assist RN;  1 Unit  packed RBCs was infused , IV flushed with normal saline 0.9%. Time of completion: 0441.     Post-transfusion completion vital signs and assessments:  BP (!) 83/48   Pulse 73   Temp 36.8 °C (98.2 °F) (Temporal)   Resp 18   Ht 1.778 m (5' 10\")   Wt 72.6 kg (160 lb)   SpO2 100%   BMI 22.96 kg/m² .  on room air, lungs sounds clear, regular heart rate with no extra sounds. IV site intact and patent without signs of phlebitis or infiltration. Patient appears comfortable and denies discomfort.     "

## 2025-02-25 NOTE — ED NOTES
Report given to MANOJ Anton. Pt will be going to TICU- [id number]. Desean will be  down to pick pt up as soon as possible.

## 2025-02-25 NOTE — PROGRESS NOTES
4 Eyes Skin Assessment Completed by MANOJ Le and MANOJ Shaffer.    Head WDL  Ears bruising to ears, hearing aids in place x2  Nose scab to left nostril   Mouth WDL  Neck WDL  Breast/Chest WDL  Shoulder Blades WDL  Spine WDL  (R) Arm/Elbow/Hand  bruising and 2 PIVs  (L) Arm/Elbow/Hand bruising and skin tear   Abdomen WDL  Groin WDL  Scrotum/Coccyx/Buttocks healing wound, red and blanching   (R) Leg old scabs on knee and shin, skin tear to R shin  (L) Leg wound vac in place PTA  (R) Heel/Foot/Toe Black and calloused toes   (L) Heel/Foot/Toe Black and calloused toes          Devices In Places ECG, Blood Pressure Cuff, Pulse Ox, and SCD's previna dressing;       Interventions In Place Sacral Mepilex, TAP System, Pillows, Q2 Turns, and Low Air Loss Mattress    Possible Skin Injury Yes    Pictures Uploaded Into Epic Yes  Wound Consult Placed Yes  RN Wound Prevention Protocol Ordered Yes

## 2025-02-25 NOTE — CONSULTS
Date of Consultation:  2/25/2025    Patient: : Edy Bennett  MRN: [id number]    Referring Physician:  Marty Andres M.D.Attending      GI:Frantz Dailey M.D.     Reason for Consultation: Anemia, rule out GI bleeding or nosebleeding related.    History of Present Illness:   The patient is a 79 years old gentleman with complex medical history including peritoneal dialysis, coronary artery disease, non-STEMI, sleep apnea, peripheral artery disease disease, recent upper GI scope February 14 by this GI team, which show possible Stap[place] with acid regurgitation, but no evidence of GI bleeding.  Came back to the hospital for anemia, also report intermittent bleeding from nose, blood clot from the nose, dark stool.    Last bowel movement today in the morning, dark stool.    Last vomiting was overnight, denied fresh blood or coffee-ground emesis.    Last colonoscopy likely more than 10 years ago.    At the bedside, the patient denies any active upper GI or lower GI symptoms or signs.  The patient is hungry and thirsty, asking for oral intake.    Abdomen is soft.  Denies any urge to have more vomiting or bowel movement.      Past Medical History:   Diagnosis Date    NSTEMI (non-ST elevated myocardial infarction) ([facility]) 2/25/2025    DARIEN (obstructive sleep apnea) 01/17/2025    O2 @ 2.5L Per Patient's Wife.    Peritoneal dialysis catheter in place ([facility]) 01/17/2025    Dialysis patient ([facility]) 01/17/2025    Starting Peritoneal Dialysis @ Home on 1/17/2025. Follows Nephrologist MD Milian.    Sleep apnea 01/10/2025    Home sleep study done 1/3/2025 - no results yet.    Hypertension 05/24/2024    states controlled    Stroke ([facility]) 2021    tia    A-V fistula ([facility])     Left Arm    Arrhythmia 03/13/2024    Due to kidney failure treatment    Breath shortness     with exertion    Cataract     shon iol    Congestive heart failure ([facility])     Diabetes ([facility])     not on any medication at this time, being monitored at dialysis    Dialysis  patient (HCC)     monday wednesday friday at [facility]    Foot ulcer (HCC)     left foot. being seen by wound care    Hemorrhagic disorder (HCC)     plavix eliquis    High cholesterol     Pain     Pneumonia     3/24    Renal disorder          Past Surgical History:   Procedure Laterality Date    IA UPPER GI ENDOSCOPY,DIAGNOSIS N/A 2/14/2025    Procedure: GASTROSCOPY;  Surgeon: Melany Richards M.D.;  Location: SURGERY SAME DAY [facility];  Service: Gastroenterology    IA UPPER GI ENDOSCOPY,BIOPSY N/A 2/14/2025    Procedure: GASTROSCOPY, WITH BIOPSY;  Surgeon: Melany Richards M.D.;  Location: SURGERY SAME DAY [facility];  Service: Gastroenterology    IA VEIN BYPASS GRAFT,FEM-TIBIAL Left 1/20/2025    Procedure: LEFT LOWER EXTREMITY BYPASS, POPLITIAL TO TIBIAL BYPASS;  Surgeon: Marco Antonio Miller M.D.;  Location: [facility];  Service: Vascular    AORTOGRAM WITH RUNOFF  1/14/2025    Procedure: AORTOGRAM WITH RUNOFF, right peroneal artery intravascular lithotripsy and stent placement;  Surgeon: Marco Antonio Miller M.D.;  Location: [facility];  Service: Vascular    INGUINAL HERNIA LAPAROSCOPIC N/A 12/18/2024    Procedure: LAPAROSCOPIC LEFT INGUINAL HERNIA REPAIR WITH MESH, LAPAROSCOPIC REPOSITIONING OF PERITONEAL DIALYSIS CATHETER;  Surgeon: Marco Antonio Miller M.D.;  Location: [facility];  Service: Vascular    CATH PLACEMENT CAPD N/A 12/18/2024    Procedure: REPOSITION, CATHETER, CAPD;  Surgeon: Marco Antonio Miller M.D.;  Location: [facility];  Service: Vascular    CATH PLACEMENT CAPD N/A 09/16/2024    Procedure: LAPAROSCOPIC INSERTION OF PERITONEAL DIALYSIS CATHETER PLACEMENT;  Surgeon: Marco Antonio Miller M.D.;  Location: SURGERY SAME DAY [facility];  Service: Vascular    AV FISTULA CREATION Left 07/01/2024    Procedure: CREATION OF LEFT UPPER EXTREMITY DIALYSIS FISTULA;  Surgeon: Shilo Mercedes M.D.;  Location: [facility];  Service: General    OTHER ORTHOPEDIC SURGERY   2004    bunion r foot    OTHER      tonsils adenoids    OTHER      dialysis catheter in upper right chest    OTHER      shon iol    OTHER CARDIAC SURGERY      cardiac stents       Family History   Problem Relation Age of Onset    Cancer Mother         Brain cancer    Heart Disease Father         Several By-pass Surgeries    Cancer Daughter         Thyroid Cancer    Heart Disease Brother         Fatal Heart Attack       Social History     Socioeconomic History    Marital status:     Highest education level: Bachelor's degree (e.g., BA, AB, BS)   Tobacco Use    Smoking status: Never    Smokeless tobacco: Never    Tobacco comments:     Used chewing tobacco many years ago.   Vaping Use    Vaping status: Never Used   Substance and Sexual Activity    Alcohol use: Not Currently    Drug use: Never     Social Drivers of Health     Financial Resource Strain: Low Risk  (1/25/2025)    Overall Financial Resource Strain (CARDIA)     Difficulty of Paying Living Expenses: Not hard at all   Food Insecurity: No Food Insecurity (2/11/2025)    Hunger Vital Sign     Worried About Running Out of Food in the Last Year: Never true     Ran Out of Food in the Last Year: Never true   Transportation Needs: No Transportation Needs (2/11/2025)    PRAPARE - Transportation     Lack of Transportation (Medical): No     Lack of Transportation (Non-Medical): No   Physical Activity: Inactive (1/25/2025)    Exercise Vital Sign     Days of Exercise per Week: 0 days     Minutes of Exercise per Session: 0 min   Stress: Stress Concern Present (1/25/2025)    [place] [employer] - Occupational Stress Questionnaire     Feeling of Stress : To some extent   Social Connections: Feeling Socially Integrated (2/22/2025)    OASIS : Social Isolation     Frequency of experiencing loneliness or isolation: Never   Intimate Partner Violence: Not At Risk (2/11/2025)    Humiliation, Afraid, Rape, and Kick questionnaire     Fear of Current or  Ex-Partner: No     Emotionally Abused: No     Physically Abused: No     Sexually Abused: No   Housing Stability: Low Risk  (2/11/2025)    Housing Stability Vital Sign     Unable to Pay for Housing in the Last Year: No     Number of Times Moved in the Last Year: 0     Homeless in the Last Year: No           Physical Exam:  Vitals:    02/25/25 0609 02/25/25 0614 02/25/25 0840 02/25/25 0917   BP: 94/53 90/54 99/55 97/56   Pulse: 78 77 78 78   Resp:   15 12   Temp:   36.6 °C (97.9 °F) 36.3 °C (97.4 °F)   TempSrc:   Temporal Temporal   SpO2: 100% 100% 99% 100%   Weight:       Height:           Constitutional: No fevers.  Eyes: No symptoms reported.   Ears/Nose/Throat/Mouth: No choking reported.  Cardiovascular: No chest pain reported.   Respiratory: Denies shortness of breath.  Gastrointestinal/Hepatic: Per H/P.   Skin/Breast: No new rash reported.   Psychiatric: No complaints    HEENT: grossly normal.  Cardiovascular: Please refer to primary team's daily assessment.   Lungs: Please refer to primary team's daily assessment.   Abdomen: No pain, no acute abdomen.  Skin: No erythema, No rash.  Neurologic: Alert & oriented x 3, Normal motor function, No focal deficits noted.  PSY: stable mood.         Labs:  Recent Labs     02/25/25  0050 02/25/25  0734   WBC 8.6  --    RBC 1.96*  --    HEMOGLOBIN 6.0* 6.0*   HEMATOCRIT 19.4* 19.1*   MCV 99.0*  --    MCH 30.6  --    MCHC 30.9*  --    RDW 79.2*  --    PLATELETCT 240  --    MPV 10.1  --      Recent Labs     02/25/25  0050   SODIUM 140   POTASSIUM 5.0   CHLORIDE 93*   CO2 22   GLUCOSE 203*   *           Recent Labs     02/25/25  0050   ASTSGOT 38   ALTSGPT 39   TBILIRUBIN 0.4   ALKPHOSPHAT 121*   GLOBULIN 2.5         Imaging:  DX-CHEST-PORTABLE (1 VIEW)  Narrative: 2/25/2025 12:58 AM    HISTORY/REASON FOR EXAM:  Chest Pain    TECHNIQUE/EXAM DESCRIPTION AND NUMBER OF VIEWS:  Single portable view of the chest.    COMPARISON: 2/11/2025    FINDINGS:    No pulmonary  infiltrates or consolidations are noted.  No pleural effusion. No pneumothorax.    Stable cardiopericardial silhouette.  Impression: 1. No acute cardiopulmonary abnormalities are identified.            Impressions:  The patient is a 79 years old gentleman with complex medical history including peritoneal dialysis, coronary artery disease, non-STEMI, sleep apnea, peripheral artery disease disease, recent upper GI scope February 14 by this GI team, which show possible [place]ap[place] with acid regurgitation, but no evidence of GI bleeding.  Came back to the hospital for anemia, also report intermittent bleeding from nose, blood clot from the nose, dark stool.    Last bowel movement today in the morning, dark stool.    Last vomiting was overnight, denied fresh blood or coffee-ground emesis.    Last colonoscopy likely more than 10 years ago.      Currently, still more likely to be upper GI bleeding or nosebleeding related.  Based on recent upper GI scope and also no blood or coffee-ground in the last vomiting, the GI team will hold off on upper GI scope today.  The patient is okay to resume clear liquid.  If more evidence of upper GI bleeding such as more melena and a hemoglobin drop or any hematemesis/coffee-ground emesis, we will do upper GI scope February 26.  At this time, less likely to be lower GI bleeding, no plan to have colonoscopy for now based on the benefit and the risk.    Inpatient GI team will continue to follow up.             This note was generated using voice recognition software which has a small chance of producing errors of grammar and possibly content. I have made every reasonable attempt to find and correct any obvious errors, but expect that some may not be found prior to finalization of this note.

## 2025-02-25 NOTE — ASSESSMENT & PLAN NOTE
Soft blood pressures currently downtrending likely secondary to hypovolemia/blood loss  -Status post 500 cc normal saline bolus in emergency department  - Monitor blood pressures  - Holding home antihypertensives

## 2025-02-26 ENCOUNTER — ANESTHESIA EVENT (OUTPATIENT)
Dept: SURGERY | Facility: MEDICAL CENTER | Age: 80
End: 2025-02-26
Payer: MEDICARE

## 2025-02-26 ENCOUNTER — ANESTHESIA (OUTPATIENT)
Dept: SURGERY | Facility: MEDICAL CENTER | Age: 80
End: 2025-02-26
Payer: MEDICARE

## 2025-02-26 ENCOUNTER — HOME CARE VISIT (OUTPATIENT)
Dept: HOME HEALTH SERVICES | Facility: HOME HEALTHCARE | Age: 80
End: 2025-02-26
Payer: MEDICARE

## 2025-02-26 PROBLEM — K26.9 DUODENAL ULCER: Status: ACTIVE | Noted: 2025-02-26

## 2025-02-26 LAB
ALBUMIN SERPL BCP-MCNC: 2.3 G/DL (ref 3.2–4.9)
ALBUMIN/GLOB SERPL: 1 G/DL
ALP SERPL-CCNC: 67 U/L (ref 30–99)
ALT SERPL-CCNC: 27 U/L (ref 2–50)
ANION GAP SERPL CALC-SCNC: 20 MMOL/L (ref 7–16)
APTT PPP: 32.7 SEC (ref 24.7–36)
AST SERPL-CCNC: 36 U/L (ref 12–45)
BASOPHILS # BLD AUTO: 0.7 % (ref 0–1.8)
BASOPHILS # BLD: 0.06 K/UL (ref 0–0.12)
BILIRUB SERPL-MCNC: 0.6 MG/DL (ref 0.1–1.5)
BUN SERPL-MCNC: 142 MG/DL (ref 8–22)
CALCIUM ALBUM COR SERPL-MCNC: 9.7 MG/DL (ref 8.5–10.5)
CALCIUM SERPL-MCNC: 8.3 MG/DL (ref 8.5–10.5)
CFT BLD TEG: 7.9 MIN (ref 4.6–9.1)
CFT P HPASE BLD TEG: 7 MIN (ref 4.3–8.3)
CHLORIDE SERPL-SCNC: 95 MMOL/L (ref 96–112)
CLOT ANGLE BLD TEG: 76.6 DEGREES (ref 63–78)
CO2 SERPL-SCNC: 19 MMOL/L (ref 20–33)
CREAT SERPL-MCNC: 8.68 MG/DL (ref 0.5–1.4)
CT.EXTRINSIC BLD ROTEM: 1 MIN (ref 0.8–2.1)
EKG IMPRESSION: NORMAL
EOSINOPHIL # BLD AUTO: 0.2 K/UL (ref 0–0.51)
EOSINOPHIL NFR BLD: 2.4 % (ref 0–6.9)
ERYTHROCYTE [DISTWIDTH] IN BLOOD BY AUTOMATED COUNT: 53.7 FL (ref 35.9–50)
GFR SERPLBLD CREATININE-BSD FMLA CKD-EPI: 6 ML/MIN/1.73 M 2
GLOBULIN SER CALC-MCNC: 2.2 G/DL (ref 1.9–3.5)
GLUCOSE SERPL-MCNC: 154 MG/DL (ref 65–99)
HCT VFR BLD AUTO: 21.6 % (ref 42–52)
HCT VFR BLD AUTO: 22.7 % (ref 42–52)
HCT VFR BLD AUTO: 23.7 % (ref 42–52)
HCT VFR BLD AUTO: 24.3 % (ref 42–52)
HGB BLD-MCNC: 7.3 G/DL (ref 14–18)
HGB BLD-MCNC: 7.7 G/DL (ref 14–18)
HGB BLD-MCNC: 8 G/DL (ref 14–18)
HGB BLD-MCNC: 8.2 G/DL (ref 14–18)
IMM GRANULOCYTES # BLD AUTO: 0.1 K/UL (ref 0–0.11)
IMM GRANULOCYTES NFR BLD AUTO: 1.2 % (ref 0–0.9)
INR PPP: 1.42 (ref 0.87–1.13)
LYMPHOCYTES # BLD AUTO: 1.01 K/UL (ref 1–4.8)
LYMPHOCYTES NFR BLD: 12.2 % (ref 22–41)
MAGNESIUM SERPL-MCNC: 2.3 MG/DL (ref 1.5–2.5)
MCF BLD TEG: 68 MM (ref 52–69)
MCF.PLATELET INHIB BLD ROTEM: 35.7 MM (ref 15–32)
MCH RBC QN AUTO: 30.8 PG (ref 27–33)
MCHC RBC AUTO-ENTMCNC: 33.8 G/DL (ref 32.3–36.5)
MCV RBC AUTO: 91.1 FL (ref 81.4–97.8)
MONOCYTES # BLD AUTO: 0.64 K/UL (ref 0–0.85)
MONOCYTES NFR BLD AUTO: 7.8 % (ref 0–13.4)
NEUTROPHILS # BLD AUTO: 6.24 K/UL (ref 1.82–7.42)
NEUTROPHILS NFR BLD: 75.7 % (ref 44–72)
NRBC # BLD AUTO: 0 K/UL
NRBC BLD-RTO: 0 /100 WBC (ref 0–0.2)
PA AA BLD-ACNC: 62.4 % (ref 0–11)
PA ADP BLD-ACNC: 21.9 % (ref 0–17)
PATHOLOGY CONSULT NOTE: NORMAL
PLATELET # BLD AUTO: 165 K/UL (ref 164–446)
PMV BLD AUTO: 10.6 FL (ref 9–12.9)
POTASSIUM SERPL-SCNC: 5 MMOL/L (ref 3.6–5.5)
PROT SERPL-MCNC: 4.5 G/DL (ref 6–8.2)
PROTHROMBIN TIME: 17.4 SEC (ref 12–14.6)
RBC # BLD AUTO: 2.37 M/UL (ref 4.7–6.1)
SODIUM SERPL-SCNC: 134 MMOL/L (ref 135–145)
TEG ALGORITHM TGALG: ABNORMAL
WBC # BLD AUTO: 8.3 K/UL (ref 4.8–10.8)

## 2025-02-26 PROCEDURE — 85576 BLOOD PLATELET AGGREGATION: CPT

## 2025-02-26 PROCEDURE — 0DB68ZX EXCISION OF STOMACH, VIA NATURAL OR ARTIFICIAL OPENING ENDOSCOPIC, DIAGNOSTIC: ICD-10-PCS | Performed by: INTERNAL MEDICINE

## 2025-02-26 PROCEDURE — 86923 COMPATIBILITY TEST ELECTRIC: CPT

## 2025-02-26 PROCEDURE — 160035 HCHG PACU - 1ST 60 MINS PHASE I: Performed by: INTERNAL MEDICINE

## 2025-02-26 PROCEDURE — 88305 TISSUE EXAM BY PATHOLOGIST: CPT | Mod: 26 | Performed by: PATHOLOGY

## 2025-02-26 PROCEDURE — 160208 HCHG ENDO MINUTES - EA ADDL 1 MIN LEVEL 4: Performed by: INTERNAL MEDICINE

## 2025-02-26 PROCEDURE — 80053 COMPREHEN METABOLIC PANEL: CPT

## 2025-02-26 PROCEDURE — A9270 NON-COVERED ITEM OR SERVICE: HCPCS

## 2025-02-26 PROCEDURE — 160002 HCHG RECOVERY MINUTES (STAT): Performed by: INTERNAL MEDICINE

## 2025-02-26 PROCEDURE — 85610 PROTHROMBIN TIME: CPT

## 2025-02-26 PROCEDURE — 85014 HEMATOCRIT: CPT

## 2025-02-26 PROCEDURE — 36430 TRANSFUSION BLD/BLD COMPNT: CPT

## 2025-02-26 PROCEDURE — 85347 COAGULATION TIME ACTIVATED: CPT

## 2025-02-26 PROCEDURE — 90945 DIALYSIS ONE EVALUATION: CPT

## 2025-02-26 PROCEDURE — 700101 HCHG RX REV CODE 250: Performed by: ANESTHESIOLOGY

## 2025-02-26 PROCEDURE — 160203 HCHG ENDO MINUTES - 1ST 30 MINS LEVEL 4: Performed by: INTERNAL MEDICINE

## 2025-02-26 PROCEDURE — 700111 HCHG RX REV CODE 636 W/ 250 OVERRIDE (IP)

## 2025-02-26 PROCEDURE — 43239 EGD BIOPSY SINGLE/MULTIPLE: CPT | Mod: 59 | Performed by: INTERNAL MEDICINE

## 2025-02-26 PROCEDURE — 0W3P8ZZ CONTROL BLEEDING IN GASTROINTESTINAL TRACT, VIA NATURAL OR ARTIFICIAL OPENING ENDOSCOPIC: ICD-10-PCS | Performed by: INTERNAL MEDICINE

## 2025-02-26 PROCEDURE — 88305 TISSUE EXAM BY PATHOLOGIST: CPT | Performed by: PATHOLOGY

## 2025-02-26 PROCEDURE — 85025 COMPLETE CBC W/AUTO DIFF WBC: CPT

## 2025-02-26 PROCEDURE — 700102 HCHG RX REV CODE 250 W/ 637 OVERRIDE(OP)

## 2025-02-26 PROCEDURE — 43255 EGD CONTROL BLEEDING ANY: CPT | Performed by: INTERNAL MEDICINE

## 2025-02-26 PROCEDURE — 160048 HCHG OR STATISTICAL LEVEL 1-5: Performed by: INTERNAL MEDICINE

## 2025-02-26 PROCEDURE — 85018 HEMOGLOBIN: CPT | Mod: 91

## 2025-02-26 PROCEDURE — P9016 RBC LEUKOCYTES REDUCED: HCPCS

## 2025-02-26 PROCEDURE — 700105 HCHG RX REV CODE 258: Performed by: ANESTHESIOLOGY

## 2025-02-26 PROCEDURE — A9270 NON-COVERED ITEM OR SERVICE: HCPCS | Performed by: HOSPITALIST

## 2025-02-26 PROCEDURE — 88312 SPECIAL STAINS GROUP 1: CPT | Mod: 26 | Performed by: PATHOLOGY

## 2025-02-26 PROCEDURE — 85730 THROMBOPLASTIN TIME PARTIAL: CPT

## 2025-02-26 PROCEDURE — 700111 HCHG RX REV CODE 636 W/ 250 OVERRIDE (IP): Performed by: ANESTHESIOLOGY

## 2025-02-26 PROCEDURE — 88312 SPECIAL STAINS GROUP 1: CPT | Performed by: PATHOLOGY

## 2025-02-26 PROCEDURE — 700111 HCHG RX REV CODE 636 W/ 250 OVERRIDE (IP): Mod: JZ,TB | Performed by: INTERNAL MEDICINE

## 2025-02-26 PROCEDURE — 83735 ASSAY OF MAGNESIUM: CPT

## 2025-02-26 PROCEDURE — 160015 HCHG STAT PREOP MINUTES: Performed by: INTERNAL MEDICINE

## 2025-02-26 PROCEDURE — 770000 HCHG ROOM/CARE - INTERMEDIATE ICU *

## 2025-02-26 PROCEDURE — 700102 HCHG RX REV CODE 250 W/ 637 OVERRIDE(OP): Performed by: HOSPITALIST

## 2025-02-26 PROCEDURE — 160009 HCHG ANES TIME/MIN: Performed by: INTERNAL MEDICINE

## 2025-02-26 PROCEDURE — 93010 ELECTROCARDIOGRAM REPORT: CPT | Performed by: INTERNAL MEDICINE

## 2025-02-26 PROCEDURE — 93005 ELECTROCARDIOGRAM TRACING: CPT | Mod: TC | Performed by: HOSPITALIST

## 2025-02-26 PROCEDURE — 85384 FIBRINOGEN ACTIVITY: CPT

## 2025-02-26 RX ORDER — SODIUM CHLORIDE 9 MG/ML
INJECTION, SOLUTION INTRAVENOUS CONTINUOUS
Status: DISCONTINUED | OUTPATIENT
Start: 2025-02-26 | End: 2025-02-26 | Stop reason: HOSPADM

## 2025-02-26 RX ORDER — SODIUM CHLORIDE 9 MG/ML
INJECTION, SOLUTION INTRAVENOUS
Status: DISCONTINUED | OUTPATIENT
Start: 2025-02-26 | End: 2025-02-26 | Stop reason: SURG

## 2025-02-26 RX ORDER — LIDOCAINE HYDROCHLORIDE 20 MG/ML
INJECTION, SOLUTION EPIDURAL; INFILTRATION; INTRACAUDAL; PERINEURAL PRN
Status: DISCONTINUED | OUTPATIENT
Start: 2025-02-26 | End: 2025-02-26 | Stop reason: SURG

## 2025-02-26 RX ORDER — ROCURONIUM BROMIDE 10 MG/ML
INJECTION, SOLUTION INTRAVENOUS PRN
Status: DISCONTINUED | OUTPATIENT
Start: 2025-02-26 | End: 2025-02-26 | Stop reason: SURG

## 2025-02-26 RX ORDER — OXYCODONE HYDROCHLORIDE 5 MG/1
2.5 TABLET ORAL EVERY 6 HOURS PRN
Refills: 0 | Status: DISCONTINUED | OUTPATIENT
Start: 2025-02-26 | End: 2025-03-01 | Stop reason: HOSPADM

## 2025-02-26 RX ORDER — DIPHENHYDRAMINE HYDROCHLORIDE 50 MG/ML
12.5 INJECTION, SOLUTION INTRAMUSCULAR; INTRAVENOUS
Status: DISCONTINUED | OUTPATIENT
Start: 2025-02-26 | End: 2025-02-26 | Stop reason: HOSPADM

## 2025-02-26 RX ORDER — HALOPERIDOL 5 MG/ML
1 INJECTION INTRAMUSCULAR
Status: DISCONTINUED | OUTPATIENT
Start: 2025-02-26 | End: 2025-02-26 | Stop reason: HOSPADM

## 2025-02-26 RX ORDER — ONDANSETRON 2 MG/ML
4 INJECTION INTRAMUSCULAR; INTRAVENOUS
Status: DISCONTINUED | OUTPATIENT
Start: 2025-02-26 | End: 2025-02-26 | Stop reason: HOSPADM

## 2025-02-26 RX ORDER — SUCCINYLCHOLINE CHLORIDE 20 MG/ML
INJECTION INTRAMUSCULAR; INTRAVENOUS PRN
Status: DISCONTINUED | OUTPATIENT
Start: 2025-02-26 | End: 2025-02-26 | Stop reason: SURG

## 2025-02-26 RX ORDER — SODIUM HYPOCHLORITE 1.25 MG/ML
SOLUTION TOPICAL DAILY
Status: DISCONTINUED | OUTPATIENT
Start: 2025-02-26 | End: 2025-02-28

## 2025-02-26 RX ADMIN — EPOETIN ALFA-EPBX 4000 UNITS: 4000 INJECTION, SOLUTION INTRAVENOUS; SUBCUTANEOUS at 19:14

## 2025-02-26 RX ADMIN — OXYMETAZOLINE HYDROCHLORIDE 2 SPRAY: 0.5 SPRAY NASAL at 17:11

## 2025-02-26 RX ADMIN — MONTELUKAST 10 MG: 10 TABLET, FILM COATED ORAL at 20:14

## 2025-02-26 RX ADMIN — METOPROLOL TARTRATE 25 MG: 25 TABLET, FILM COATED ORAL at 17:10

## 2025-02-26 RX ADMIN — OXYCODONE 2.5 MG: 5 TABLET ORAL at 16:31

## 2025-02-26 RX ADMIN — SODIUM CHLORIDE: 9 INJECTION, SOLUTION INTRAVENOUS at 09:32

## 2025-02-26 RX ADMIN — ROSUVASTATIN CALCIUM 20 MG: 20 TABLET, FILM COATED ORAL at 20:14

## 2025-02-26 RX ADMIN — LIDOCAINE HYDROCHLORIDE 60 MG: 20 INJECTION, SOLUTION EPIDURAL; INFILTRATION; INTRACAUDAL; PERINEURAL at 09:45

## 2025-02-26 RX ADMIN — HYDROCORTISONE: 25 CREAM TOPICAL at 03:00

## 2025-02-26 RX ADMIN — PROPOFOL 20 MG: 10 INJECTION, EMULSION INTRAVENOUS at 09:54

## 2025-02-26 RX ADMIN — TAMSULOSIN HYDROCHLORIDE 0.4 MG: 0.4 CAPSULE ORAL at 08:42

## 2025-02-26 RX ADMIN — Medication 0.5 ML: at 23:12

## 2025-02-26 RX ADMIN — PANTOPRAZOLE SODIUM 40 MG: 40 INJECTION, POWDER, FOR SOLUTION INTRAVENOUS at 05:19

## 2025-02-26 RX ADMIN — Medication 0.5 ML: at 17:11

## 2025-02-26 RX ADMIN — PROPOFOL 30 MG: 10 INJECTION, EMULSION INTRAVENOUS at 09:49

## 2025-02-26 RX ADMIN — GENTAMICIN SULFATE: 1 CREAM TOPICAL at 18:45

## 2025-02-26 RX ADMIN — METOPROLOL TARTRATE 25 MG: 25 TABLET, FILM COATED ORAL at 05:19

## 2025-02-26 RX ADMIN — Medication 0.5 ML: at 05:19

## 2025-02-26 RX ADMIN — ROCURONIUM BROMIDE 5 MG: 10 INJECTION INTRAVENOUS at 09:45

## 2025-02-26 RX ADMIN — PROPOFOL 50 MG: 10 INJECTION, EMULSION INTRAVENOUS at 09:45

## 2025-02-26 RX ADMIN — SUCCINYLCHOLINE CHLORIDE 120 MG: 20 INJECTION, SOLUTION INTRAMUSCULAR; INTRAVENOUS at 09:45

## 2025-02-26 RX ADMIN — OXYMETAZOLINE HYDROCHLORIDE 2 SPRAY: 0.5 SPRAY NASAL at 05:19

## 2025-02-26 RX ADMIN — PANTOPRAZOLE SODIUM 40 MG: 40 INJECTION, POWDER, FOR SOLUTION INTRAVENOUS at 17:11

## 2025-02-26 RX ADMIN — OXYCODONE 2.5 MG: 5 TABLET ORAL at 08:41

## 2025-02-26 ASSESSMENT — PAIN DESCRIPTION - PAIN TYPE
TYPE: ACUTE PAIN
TYPE: SURGICAL PAIN
TYPE: ACUTE PAIN
TYPE: ACUTE PAIN
TYPE: SURGICAL PAIN
TYPE: SURGICAL PAIN
TYPE: ACUTE PAIN
TYPE: SURGICAL PAIN
TYPE: ACUTE PAIN
TYPE: SURGICAL PAIN

## 2025-02-26 ASSESSMENT — COGNITIVE AND FUNCTIONAL STATUS - GENERAL
TURNING FROM BACK TO SIDE WHILE IN FLAT BAD: A LITTLE
SUGGESTED CMS G CODE MODIFIER MOBILITY: CL
DAILY ACTIVITIY SCORE: 17
MOVING FROM LYING ON BACK TO SITTING ON SIDE OF FLAT BED: A LOT
SUGGESTED CMS G CODE MODIFIER DAILY ACTIVITY: CK
HELP NEEDED FOR BATHING: A LOT
CLIMB 3 TO 5 STEPS WITH RAILING: TOTAL
DRESSING REGULAR LOWER BODY CLOTHING: A LOT
MOBILITY SCORE: 11
TOILETING: A LOT
MOVING TO AND FROM BED TO CHAIR: A LOT
WALKING IN HOSPITAL ROOM: TOTAL
DRESSING REGULAR UPPER BODY CLOTHING: A LITTLE
STANDING UP FROM CHAIR USING ARMS: A LOT

## 2025-02-26 ASSESSMENT — SOCIAL DETERMINANTS OF HEALTH (SDOH)
WITHIN THE PAST 12 MONTHS, YOU WORRIED THAT YOUR FOOD WOULD RUN OUT BEFORE YOU GOT THE MONEY TO BUY MORE: NEVER TRUE
WITHIN THE LAST YEAR, HAVE YOU BEEN KICKED, HIT, SLAPPED, OR OTHERWISE PHYSICALLY HURT BY YOUR PARTNER OR EX-PARTNER?: PATIENT DECLINED
WITHIN THE LAST YEAR, HAVE YOU BEEN AFRAID OF YOUR PARTNER OR EX-PARTNER?: PATIENT DECLINED
IN THE PAST 12 MONTHS, HAS THE ELECTRIC, GAS, OIL, OR WATER COMPANY THREATENED TO SHUT OFF SERVICE IN YOUR HOME?: NO
WITHIN THE LAST YEAR, HAVE TO BEEN RAPED OR FORCED TO HAVE ANY KIND OF SEXUAL ACTIVITY BY YOUR PARTNER OR EX-PARTNER?: PATIENT DECLINED
WITHIN THE LAST YEAR, HAVE YOU BEEN HUMILIATED OR EMOTIONALLY ABUSED IN OTHER WAYS BY YOUR PARTNER OR EX-PARTNER?: PATIENT DECLINED
WITHIN THE PAST 12 MONTHS, THE FOOD YOU BOUGHT JUST DIDN'T LAST AND YOU DIDN'T HAVE MONEY TO GET MORE: NEVER TRUE

## 2025-02-26 ASSESSMENT — ENCOUNTER SYMPTOMS
ABDOMINAL PAIN: 1
EYES NEGATIVE: 1
BLOOD IN STOOL: 1
VOMITING: 0
WEAKNESS: 1
RESPIRATORY NEGATIVE: 1
MUSCULOSKELETAL NEGATIVE: 1
NAUSEA: 0
NERVOUS/ANXIOUS: 1

## 2025-02-26 ASSESSMENT — PAIN SCALES - GENERAL: PAIN_LEVEL: 0

## 2025-02-26 NOTE — ANESTHESIA PREPROCEDURE EVALUATION
Case: [id number] Date/Time: 02/26/25 1009    Procedure: GASTROSCOPY (Esophagus)    Anesthesia type: MAC    Location: [facility] ROOM 26 / SURGERY SAME DAY [facility]    Surgeons: Frantz Dailey M.D.            Relevant Problems   CARDIAC   (positive) CAD (coronary artery disease)   (positive) Essential hypertension   (positive) NSTEMI (non-ST elevated myocardial infarction) (HCC)   (positive) PAD (peripheral artery disease) (HCC)   (positive) Peripheral arterial disease (HCC)   (positive) Typical atrial flutter (HCC)         (positive) ESRD on peritoneal dialysis (HCC)   (positive) Renal disease      Other   (positive) Anemia in chronic renal disease   (positive) Chronic combined systolic and diastolic heart failure (HCC)   (positive) Hypotension due to hypovolemia   (positive) Melena   (positive) Shock (HCC)       Physical Exam    Airway   Mallampati: II  TM distance: >3 FB  Neck ROM: full       Cardiovascular - normal exam  Rhythm: regular  Rate: normal  (-) murmur     Dental - normal exam           Pulmonary - normal exam  Breath sounds clear to auscultation     Abdominal    Neurological - normal exam                   Anesthesia Plan    ASA 4   ASA physical status 4 criteria: severe reduction of ejection fractions, shock and MI - recent (< 3 months)    Plan - general       Airway plan will be ETT          Induction: intravenous    Postoperative Plan: Postoperative administration of opioids is intended.    Pertinent diagnostic labs and testing reviewed    Informed Consent:    Anesthetic plan and risks discussed with patient.    Use of blood products discussed with: patient whom consented to blood products.

## 2025-02-26 NOTE — PROGRESS NOTES
[facility] Progress Note        [facility] disconnected aseptically at 0630. Pt alarming LOW UF on arrival, pt repositioned and drain restarted. Pt stable, VSS, alert and oriented. Procedure explained to pt and pt verbalized understanding.     PD exit site CDI with gauze dressing in place.     Effluent clear yellow with no fibrin noted.     24 hour UF: 73mL (65mL total UF + 8mL initial drain - 0mL last fill)      Report given to primary RN.

## 2025-02-26 NOTE — ASSESSMENT & PLAN NOTE
Transfusions ordered, volume boluses ordered with caution given the patient's reduced ejection fraction and renal disease  Currently stabilized, bleeding seems to have stopped

## 2025-02-26 NOTE — PROGRESS NOTES
Patient transported to pre-op via gurney; on monitor and oxygen; with blood infusing; accompanied by ACLS RN, transport, and patient's spouse.     Report given to pre-op RN Sherri via phone.

## 2025-02-26 NOTE — CARE PLAN
The patient is Watcher - Medium risk of patient condition declining or worsening    Shift Goals  Clinical Goals: Q6 HGB, hemodynamic stability  Patient Goals: rest  Family Goals: rest    Progress made toward(s) clinical / shift goals:    Problem: Knowledge Deficit - Standard  Goal: Patient and family/care givers will demonstrate understanding of plan of care, disease process/condition, diagnostic tests and medications  Outcome: Progressing     Problem: Pain - Standard  Goal: Alleviation of pain or a reduction in pain to the patient’s comfort goal  Outcome: Progressing

## 2025-02-26 NOTE — ANESTHESIA POSTPROCEDURE EVALUATION
Patient: Edy Bennett    Procedure Summary       Date: 02/26/25 Room / Location: [facility] ROOM 26 / SURGERY SAME DAY [facility]    Anesthesia Start: 0932 Anesthesia Stop: 1010    Procedure: EGD, WITH CLIP INSERTION (Esophagus) Diagnosis: (Duodenal AVM, GERD Grade B)    Surgeons: Frantz Dailey M.D. Responsible Provider: Didier Butts M.D.    Anesthesia Type: general ASA Status: 4            Final Anesthesia Type: general  Last vitals  BP   Blood Pressure : 103/58    Temp   36.2 °C (97.1 °F)    Pulse   76   Resp   18    SpO2   100 %      Anesthesia Post Evaluation    Patient location during evaluation: PACU  Patient participation: complete - patient participated  Level of consciousness: awake and alert  Pain score: 0    Airway patency: patent  Anesthetic complications: no  Cardiovascular status: hemodynamically stable  Respiratory status: acceptable  Hydration status: euvolemic    PONV: none          No notable events documented.     Nurse Pain Score: 0 (NPRS)

## 2025-02-26 NOTE — DOCUMENTATION QUERY
[facility]                                                                       Query Response Note      PATIENT:               SWAPNA ROSS  ACCT #:                  [id number]  MRN:                     [id number]  :                      1945  ADMIT DATE:       2025 12:16 AM  DISCH DATE:          RESPONDING  PROVIDER #:        [id number]           QUERY TEXT:    Please clarify the relationship, if any, between melena due to duodenal erosion and Eliquis based on the clinical indicators documented.    Note: If you agree with a diagnosis listed, please remember to include it in your concurrent daily documentation and onto the Discharge Summary.    The patient's Clinical Indicators include:   PT/INR: 17.4/1.42    H&P: Dx Melena     EGD Duodenal erosions, with one active bleeding spot s/p clip    Treatment: EGD s/p 1 clip; PRBC transfusions; hold home Eliquis and Plavix    Risk factors: Duodenal erosion with active bleed; Home Eliquis    Thank you,  Lara Jacques BSN  Clinical   Connect via [employer]  Options provided:   -- Condition melena is due to/associated with use of Eliquis (hemorrhagic disorder due to circulating anticoagulant)   -- Condition melena is not due to/associated with use of Eliquis   -- Other explanation, (please specify other explanation)      Query created by: Lara Jacques on 2025 11:57 AM    RESPONSE TEXT:    Condition melena is due to/associated with use of Eliquis (hemorrhagic disorder due to circulating anticoagulant)       QUERY TEXT:    Can the acuity of the heart failure be further specified based on the clinical indicators documented??  PN documents heart failure with reduced EF 30%. Per  Anesthesiology Preprocedure note: Relevant problems chronic combined systolic and diastolic heart failure. 2025 Echo: EF 30-35%... Grade II diastolic  dysfunction    Note: If you agree with a diagnosis listed, please remember to include it in your concurrent daily documentation and onto the Discharge Summary.    The patient's Clinical Indicators include:  2/14/2025 Echo: EF 30-35%... Grade II diastolic dysfunction    2/25 PN: past med history of heart failure with reduced EF 30%  2/26 Anesthesiology Preprocedure note: Relevant problems chronic combined systolic and diastolic heart failure    Treatment: PO Metoprolol 25 mg    Risk factors: Heart failure    Thank you,  Lara MATT  Clinical   Connect via [employer]  Options provided:   -- Chronic systolic heart failure   -- Chronic combined systolic and diastolic heart failure   -- Other explanation, (please specify other explanation)      Query created by: Lara Jacques on 2/26/2025 11:57 AM    RESPONSE TEXT:    Chronic systolic heart failure          Electronically signed by:  DASHAWN BAILEY MD 2/26/2025 1:01 PM

## 2025-02-26 NOTE — ANESTHESIA TIME REPORT
Anesthesia Start and Stop Event Times       Date Time Event    2/26/2025 0925 Ready for Procedure     0932 Anesthesia Start     1010 Anesthesia Stop          Responsible Staff  02/26/25      Name Role Begin End    Didier Butts M.D. Anesth 0932 1010          Overtime Reason:  no overtime (within assigned shift)    Comments:

## 2025-02-26 NOTE — PROGRESS NOTES
[facility] Medicine Daily Progress Note    Date of Service  2/26/2025    Chief Complaint  Edy Bennett is a 79 y.o. male admitted 2/25/2025 with weakness, lethargic stools, chest discomfort    Hospital Course  This is a 79-year-old male with a past med history of heart failure with reduced ejection fraction in the 30% range, coronary artery disease, on Plavix, end-stage renal disease on peritoneal dialysis, peripheral arterial disease, s/p bypass surgery, atrial flutter on anticoagulation with Eliquis, presents secondary to persistent melena, epistaxis, chest pressure and generalized weakness fatigue, the patient was recently hospitalized, and reportedly had continued fatigue with persistent melanotic stool episodes, several times a day, on 2/23 the patient developed additionally some chest discomfort, this reportedly had worsened with ambulation, the patient had alternating nosebleeds that he tried to manage with Afrin, he felt that it was fairly high volume, the patient eventually then presented here to the emergency department where the patient was found with a hemoglobin of 6, EKG showed some subtle changes possibly indicating some degree of ischemia, type II NSTEMI, this resolved in the emergency department, the patient was transfused 1 unit of red cells, given some limited amount of IV fluids and was admitted to the [facility] level of care on low-dose pressors and close hemodynamic monitoring  The patient during his recent hospitalization did have a upper endoscopy which did not reveal a bleeding lesion or reason for the patient's possible blood loss at that time, the patient also was evaluated for a possible cholecystectomy but declined as he was symptomatically not fitting a acute cholecystitis picture.    Interval Problem Update  Patient seen and examined today.  Data, Medication data reviewed.  Case discussed with nursing as available.  Plan of Care reviewed with patient and notified of changes.  2/25 the  patient found this morning with ongoing malaise, low blood pressure requiring still IV pressors to stabilize, given additional orders for blood transfusion, volume boluses,  Consulted nephrology as well as gastroenterology  The patient currently denies ongoing epistaxis, blood loss from his nosebleeds  Current blood pressures 90s over 50s, the patient is afebrile, heart rate in the 70s, the patient is saturating 100% on room air, hemoglobin after 1 unit of red cells stayed at 6, additional 2 units were written for as well as a have 100 cc IV fluid bolus with normal saline  After 2 units and hemoglobin only paty from 66.8 without further severe blood loss noted clinically  The patient did have some ongoing small-volume melena  Lactic acid has improved to 2.7, BNP elevated, troponin level flattening in the 3-400 range, currently no chest pain  2/26 patient with ongoing hematochezia, melena, decrease in hemoglobin despite a total of 8 units of PRBC since admission  The patient scheduled for EGD, this then later performed found a evidence of duodenal erosions with 1 active bleeding spot s/p clip, the patient denies any nausea vomiting, no abdominal pain, BP has stabilized, the patient is titrated off pressors, afebrile, heart rate in the 80s, respiration unlabored, blood pressure 9130s over 60s,  White second 8.3 hemoglobin 7.3 this morning, then transfused to 8.2 after 1 unit, platelet count 154  creatinine 8.68  I have discussed this patient's plan of care and discharge plan at IDT rounds today with Case Management, Nursing, Nursing leadership, and other members of the IDT team.    Consultants/Specialty  critical care, GI, and nephrology    Code Status  Full Code    Disposition  The patient is not medically cleared for discharge to home or a post-acute facility.  Anticipate discharge to: home with close outpatient follow-up    I have placed the appropriate orders for post-discharge needs.    Review of  Systems  Review of Systems   Constitutional:  Positive for malaise/fatigue.   HENT: Negative.     Eyes: Negative.    Respiratory: Negative.     Cardiovascular:  Positive for leg swelling. Negative for chest pain.   Gastrointestinal:  Positive for abdominal pain, blood in stool and melena. Negative for nausea and vomiting.   Genitourinary: Negative.    Musculoskeletal: Negative.    Skin: Negative.    Neurological:  Positive for weakness.   Endo/Heme/Allergies: Negative.    Psychiatric/Behavioral:  The patient is nervous/anxious.    All other systems reviewed and are negative.       Physical Exam  Temp:  [36.2 °C (97.1 °F)-37.2 °C (98.9 °F)] 36.2 °C (97.1 °F)  Pulse:  [] 81  Resp:  [7-34] 20  BP: ()/(48-81) 134/65  SpO2:  [94 %-100 %] 100 %    Physical Exam  Vitals and nursing note reviewed.   Constitutional:       Appearance: He is well-developed. He is ill-appearing.      Comments: Pt seen and examined.   HENT:      Head: Normocephalic and atraumatic.   Eyes:      Pupils: Pupils are equal, round, and reactive to light.   Cardiovascular:      Rate and Rhythm: Normal rate and regular rhythm.      Heart sounds: Normal heart sounds.   Pulmonary:      Effort: Pulmonary effort is normal.      Breath sounds: Rhonchi present.   Abdominal:      General: Bowel sounds are normal. There is no distension.      Palpations: Abdomen is soft.      Tenderness: There is no abdominal tenderness.      Comments: PD catheter without inflammation   Musculoskeletal:         General: Swelling present. Normal range of motion.      Cervical back: Normal range of motion and neck supple.   Skin:     General: Skin is warm and dry.      Coloration: Skin is pale.   Neurological:      General: No focal deficit present.      Mental Status: He is alert and oriented to person, place, and time.   Psychiatric:         Behavior: Behavior normal.         Fluids    Intake/Output Summary (Last 24 hours) at 2/26/2025 7813  Last data filed at  2/26/2025 1010  Gross per 24 hour   Intake 2389.85 ml   Output 173 ml   Net 2216.85 ml        Laboratory  Recent Labs     02/25/25  0050 02/25/25  0734 02/26/25  0012 02/26/25  0510 02/26/25  1200   WBC 8.6  --   --  8.3  --    RBC 1.96*  --   --  2.37*  --    HEMOGLOBIN 6.0*   < > 7.7* 7.3* 8.2*   HEMATOCRIT 19.4*   < > 22.7* 21.6* 24.3*   MCV 99.0*  --   --  91.1  --    MCH 30.6  --   --  30.8  --    MCHC 30.9*  --   --  33.8  --    RDW 79.2*  --   --  53.7*  --    PLATELETCT 240  --   --  165  --    MPV 10.1  --   --  10.6  --     < > = values in this interval not displayed.     Recent Labs     02/25/25  0050 02/25/25 2045 02/26/25  0510   SODIUM 140 137 134*   POTASSIUM 5.0 5.6* 5.0   CHLORIDE 93* 98 95*   CO2 22 18* 19*   GLUCOSE 203* 217* 154*   * 149* 142*   CREATININE 9.65* 8.82* 8.68*   CALCIUM 8.6 8.0* 8.3*     Recent Labs     02/26/25  0510   APTT 32.7   INR 1.42*               Imaging  DX-CHEST-PORTABLE (1 VIEW)   Final Result         1. No acute cardiopulmonary abnormalities are identified.           Assessment/Plan  * Shock (HCC)- (present on admission)  Assessment & Plan  Hypovolemic, hemorrhagic shock likely  Ongoing moderate resuscitation, blood pressure support with pressors as well as transfusions  Follow-up parameters closely, follow coagulation parameters      Duodenal ulcer  Assessment & Plan  S/p repeat upper endoscopy, local treatment by endoscopy, PPI    Melena- (present on admission)  Assessment & Plan  Patient now diagnosed with duodenal ulcer, 1 apparently bleeding    Patient with multifactorial anemia, including blood loss anemia, chronic kidney disease on EPO.  Patient presents with melanotic stool that persisted since last hospitalization as well as epistasis .  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  - Keep hemoglobin possibly above 8 given the patient's coronary disease  - Will hold home Eliquis and plavix      High anion gap metabolic acidosis- (present on admission)  Assessment & Plan  Resolved, monitor r    NSTEMI (non-ST elevated myocardial infarction) (HCC)  Assessment & Plan  Patient with chest pressure and elevated troponin to 288 with T wave inversions in the lateral leads consistent with likely type II NSTEMI from demand given acute blood loss.  Chest pressure alleviated  - Monitor after blood transfusion  - Will likely need to aim for hemoglobin goal greater than 8    Hypotension due to hypovolemia  Assessment & Plan  Transfusions ordered, volume boluses ordered with caution given the patient's reduced ejection fraction and renal disease    Typical atrial flutter (HCC)- (present on admission)  Assessment & Plan  Holding home Eliquis    CAD (coronary artery disease)- (present on admission)  Assessment & Plan  History of coronary artery disease status post stents x3 8/2023  -Holding home Plavix in presence of melena  Currently no room for intervention, monitor closely, keep hemodynamics optimized  Transfuse to hemoglobin greater than 8 given coronary artery disease    Peripheral arterial disease (HCC)- (present on admission)  Assessment & Plan  Status post left popliteal-PT bypass by Dr. Ochoa 1/20/2025 on wound VAC    ESRD on peritoneal dialysis ([facility])- (present on admission)  Assessment & Plan  History of ESRD on peritoneal dialysis followed by [facility] nephrology  Nephrology consulted    Plan  2/26  Ongoing severe blood loss anemia with inadequate response to transfusion, additional transfusion provided  Awaiting upper endoscopy, then identified to have a duodenal ulcer with apparent bleeding spot  The patient received a total of 8 units of PRBCs so far  Ongoing PPI, Carafate  Gastroenterology assistance appreciated  Nephrology is consulted,  ongoing PD  Ongoing volume support, blood transfusion support  Hold all anticoagulation, antiplatelet therapy with risk  See orders  Patient is has a high medical complexity, complex decision making and is at high risk for complication, morbidity, and mortality.  Patient is critically ill.   The patient continues to have: Hypovolemic, possibly hemorrhagic shock requiring IV pressor therapy  The vital organ system that is affected is the: Cardiovascular, hematologic, multiorgan system  If untreated there is a high chance of deterioration into: Hemorrhagic, hypovolemic shock, hypotension, organ perfusion deficit  And eventually death.   The critical care that I am providing today is: Aggressive additional transfusion, Red cell transfusion, volume replacement with IV fluids and a bolus fashion with close hemodynamic monitoring and titration, evaluation of multiple additional lab draws, values  The critical that has been undertaken is medically complex.   There has been no overlap in critical care time.   Critical Care Time not including procedures: 36 minutes in addition to previous critical care time documented      VTE prophylaxis: Contraindicated chemo prophylactically    I have performed a physical exam and reviewed and updated ROS and Plan today (2/26/2025). In review of yesterday's note (2/25/2025), there are no changes except as documented above.      Please note that this dictation was created using voice recognition software. I have made every reasonable attempt to correct obvious errors, but I expect that there are errors of grammar and possibly context that I did not discover before finalizing the note.

## 2025-02-26 NOTE — ANESTHESIA PROCEDURE NOTES
Arterial Line    Performed by: Didier Butts M.D.  Authorized by: Didier Butts M.D.    Start Time:  2/26/2025 9:37 AM  End Time:  2/26/2025 9:40 AM  Localization: ultrasound guidance and surface landmarks  Image captured, interpreted and electronically stored.  Patient Location:  OR  Indication: continuous blood pressure monitoring and blood sampling needed        Catheter Size:  20 G  Seldinger Technique?: Yes    Laterality:  Right  Site:  Radial artery  Line Secured:  Antimicrobial disc, tape and transparent dressing  Events: patient tolerated procedure well with no complications

## 2025-02-26 NOTE — PROCEDURES
OPERATIVE REPORT    PATIENT:   Edy Bennett   1945       PREOPERATIVE DIAGNOSES/INDICATIONS: Melena, anemia.     POSTOPERATIVE DIAGNOSIS:   Active bleeding from duodenum, vascular lesion, s/p clip x 1. Good control after clip.       PROCEDURE:  ESOPHAGOGASTRODUODENOSCOPY    PHYSICIAN:  Frantz Dailey MD. MPH.    ANESTHESIA:  Per anesthesiologist or ICU/ED team.     LOCATION: [facility]    CONSENT:  OBTAINED.  The risks, benefits and alternatives of the procedure were discussed in details. The risks include and are not limited to bleeding, infection, perforation, missed lesions, and sedations risks (cardiopulmonary compromise and allergic reaction to medications).    DESCRIPTION: Scope team at bedside  Patient was placed on his/her left lateral decubitus position. A bite block was placed in patient's mouth. Patient was sedated by anesthesia.  Vital signs were monitored throughout the procedure.  Oxygenation support was provided throughout the procedure. Upper endoscope was inserted into patient's mouth and advanced to the second portion of the duodenum under direct visualization.      Once the site was reached and examined, the upper endoscope was withdrawn.  Retroflexion was made within the stomach.  The stomach was decompressed, scope was withdrawn and the procedure was terminated.    The patient tolerated the procedure well.  There were no immediate complications.    Estimate procedure related bleeding: less than 1cc.     OPERATIVE FINDINGS:    1. Esophagus: GERD grade B, hiatal hernia. No bleeding.   2. Stomach: Grossly normal. S/p biopsy for HP.   3. Duodenum: Duodenal erosions, with one active bleeding spot s/p clip.     RECOMMENDATIONS:  PPI 40mg bid oral dose for now.   Okay to resume clear diet.   GI team will follow up.       This note has been transcribed with digital voice recognition software and although it has been reviewed may contain grammatical or word errors

## 2025-02-26 NOTE — ANESTHESIA PROCEDURE NOTES
Airway    Date/Time: 2/26/2025 9:46 AM    Performed by: Didier Butts M.D.  Authorized by: Didier Butts M.D.    Location:  OR  Urgency:  Elective  Difficult Airway: No    Indications for Airway Management:  Anesthesia      Spontaneous Ventilation: absent    Sedation Level:  Deep  Preoxygenated: Yes    Patient Position:  Sniffing  Mask Difficulty Assessment:  1 - vent by mask  Final Airway Type:  Endotracheal airway  Final Endotracheal Airway:  ETT  Cuffed: Yes    Technique Used for Successful ETT Placement:  Direct laryngoscopy  Devices/Methods Used in Placement:  Cricoid pressure and intubating stylet    Insertion Site:  Oral  Blade Type:  Vargas  Laryngoscope Blade/Videolaryngoscope Blade Size:  3  ETT Size (mm):  7.5  Measured from:  Teeth  ETT to Teeth (cm):  23  Placement Verified by: auscultation and capnometry    Cormack-Lehane Classification:  Grade IIa - partial view of glottis  Number of Attempts at Approach:  1

## 2025-02-26 NOTE — PROGRESS NOTES
0051 Dr. Iglesias notified of repeat hgb of  7.7 as well as delayed TEG due to back log in blood bank.

## 2025-02-26 NOTE — PROGRESS NOTES
"[facility] -  PROGRESS NOTE               Author: Camryn Leal M.D. Date & Time: 2/26/2025  11:16 AM     HPI:  79 y.o. male with HTN, ESRD on PD, PVDs/p bypass, NSTEMI who presents due to persistent melena, epistasis, chest pressure and generalized weakness/fatigue. 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  Came back to the hospital for chest pain, weakness, dark stool.   Patient he was doing PD at home w/o any issues after discharge, draining clear fluid, and clean cath site.   Admitted for anemia and GI consult.    DAILY NEPHROLOGY SUMMARY:  2/25: Consult done.   2/26: Had multiple episodes of melena overnight, requiring PRBC->taken for EGD today. OD overnight w/o issues- did gentle PD given hypotensive on pressors and GI bleed, net 3.5l +ve.     REVIEW OF SYSTEMS:    10 point ROS reviewed and is as per HPI/daily summary or otherwise negative    PMH/PSH/SH/FH: Reviewed and unchanged since admission note  CURRENT MEDICATIONS: Reviewed from admission to present day    VS:  /68   Pulse 79   Temp 36.2 °C (97.1 °F) (Temporal)   Resp 18   Ht 1.778 m (5' 10\")   Wt 72.6 kg (160 lb)   SpO2 100%   BMI 22.96 kg/m²   Physical Exam  Constitutional:       Appearance: He is ill-appearing.   HENT:      Head: Normocephalic and atraumatic.      Mouth/Throat:      Mouth: Mucous membranes are moist.   Cardiovascular:      Rate and Rhythm: Normal rate and regular rhythm.      Pulses: Normal pulses.      Heart sounds: Normal heart sounds.   Pulmonary:      Effort: Pulmonary effort is normal.      Breath sounds: Normal breath sounds.   Abdominal:      General: There is no distension.      Palpations: Abdomen is soft.      Tenderness: There is no abdominal tenderness.      Comments: PD cath in place w/ " dressing c/d/I;no erythema/induration/discharge   Musculoskeletal:      Right lower leg: Edema present.      Left lower leg: Edema present.   Skin:     Capillary Refill: Capillary refill takes less than 2 seconds.   Neurological:      General: No focal deficit present.      Mental Status: He is alert and oriented to person, place, and time.   Psychiatric:         Mood and Affect: Mood normal.         FLUID BALANCE:  In: 3545.5 [P.O.:280; I.V.:2050; Blood:1215.5]  Out: 373     LABS:  Recent Labs     02/25/25  0050 02/25/25 2045 02/26/25  0510   SODIUM 140 137 134*   POTASSIUM 5.0 5.6* 5.0   CHLORIDE 93* 98 95*   CO2 22 18* 19*   GLUCOSE 203* 217* 154*   * 149* 142*   CREATININE 9.65* 8.82* 8.68*   CALCIUM 8.6 8.0* 8.3*     Recent Labs     02/25/25  0050 02/25/25  0734 02/25/25  1805 02/26/25  0012 02/26/25  0510   WBC 8.6  --   --   --  8.3   RBC 1.96*  --   --   --  2.37*   HEMOGLOBIN 6.0*   < > 7.2* 7.7* 7.3*   HEMATOCRIT 19.4*   < > 21.2* 22.7* 21.6*   MCV 99.0*  --   --   --  91.1   MCH 30.6  --   --   --  30.8   MCHC 30.9*  --   --   --  33.8   RDW 79.2*  --   --   --  53.7*   PLATELETCT 240  --   --   --  165   MPV 10.1  --   --   --  10.6    < > = values in this interval not displayed.       IMPRESSION:  # ESRD on CCPD  - 6 exchanges daily     # HTN, controlled   - Goal BP < 140/90  - torsemide 10mg daily     #Hemorrhagic shock   # Melena, GIB  # Anemia of CKD  - GI following     # CKD-MBD  - Ca at goal, check phos  - Lanthanum 500mg TID, titrate up prn     # Chronic hypoxic respiratory failure   - On home oxygen 2-2.5L at night      # HFrEF  # Hx CAD with stent      SUGGESTIONS:  - Continue CCPD during hospital stay, cont at 6 cycles- switching to 2.5% dialysate as pt is 3.5l +ve.   - hold antihypertensive, diuretics iso hypotension  - PRBC for hb<7  - Dose all meds per ESRD  - Renal diet, low phos   - ROSALIND SQ Qweek   - Cont Phos binder TID w/meals   - L arm AVF not fully matured,  will need  intervention outpt   - Avoid Fleet enema in dialysis patients   - Labs daily

## 2025-02-26 NOTE — OR NURSING
1006: Pt arrived from OR to PACU 11. Connected to monitor. Report received from anesthesia & RN. VSS. Oxygen at 4L via mask. Breaths calm, even, and unlabored.  No signs of pain.     1040: pt tolerating liquids    1045: Wife at bedside    1053: report called to Eusebia ARANDA!!    1108: pt transferred back to T9 with transport and ACLS RN on an ICU monitor. Wife with patient at time of transfer.

## 2025-02-26 NOTE — PROGRESS NOTES
GI progress note:    Scheduled for urgent EGD now. Further plan of care to be determined post-procedure.    ..Mariela Coleman, DNP,  APRN

## 2025-02-26 NOTE — PROGRESS NOTES
[facility] Services Progress Note     CCPD x 12 hours, 2000 mL fills x 6 cycles using all 1.5% PD solution ordered per Dr.Sakshi Leal.     CCPD treatment initiated at 1645 without any issues.  Patient AOx4, VSS. Pitting edema +3 bilateral lower extremities.   No stomach issues. Denied pain.  Patient was receiving blood transfusion.   Patient with intact and patent RLQ PD catheter aseptically connected per policy.  PD catheter and exit site in good condition with slight pink and reports some burning sensation when applying Except skin cleanser, no signs of infection noted, Gentamicin cream applied at PD catheter site and dressing changed per policy.      Initial drain:  8 mL   Effluent clear and yellow with no fibrin     Report/Inservice given FELICIANO Borges RN, will relay instruction and troubleshooting to nocturnal nurse.     Patient on dwell 1/6 prior to departure from bedside.     Please contact on call dialysis RN for any issues with persistent alarms/assistance with troubleshooting or patient not tolerating therapy.      For on-call Dialysis RN, pls contact [facility] at [phone].

## 2025-02-26 NOTE — PROGRESS NOTES
[facility] Medicine Daily Progress Note    Date of Service  2/25/2025    Chief Complaint  Edy Bennett is a 79 y.o. male admitted 2/25/2025 with weakness, lethargic stools, chest discomfort    Hospital Course  This is a 79-year-old male with a past med history of heart failure with reduced ejection fraction in the 30% range, coronary artery disease, on Plavix, end-stage renal disease on peritoneal dialysis, peripheral arterial disease, s/p bypass surgery, atrial flutter on anticoagulation with Eliquis, presents secondary to persistent melena, epistaxis, chest pressure and generalized weakness fatigue, the patient was recently hospitalized, and reportedly had continued fatigue with persistent melanotic stool episodes, several times a day, on 2/23 the patient developed additionally some chest discomfort, this reportedly had worsened with ambulation, the patient had alternating nosebleeds that he tried to manage with Afrin, he felt that it was fairly high volume, the patient eventually then presented here to the emergency department where the patient was found with a hemoglobin of 6, EKG showed some subtle changes possibly indicating some degree of ischemia, type II NSTEMI, this resolved in the emergency department, the patient was transfused 1 unit of red cells, given some limited amount of IV fluids and was admitted to the [facility] level of care on low-dose pressors and close hemodynamic monitoring  The patient during his recent hospitalization did have a upper endoscopy which did not reveal a bleeding lesion or reason for the patient's possible blood loss at that time, the patient also was evaluated for a possible cholecystectomy but declined as he was symptomatically not fitting a acute cholecystitis picture.    Interval Problem Update  Patient seen and examined today.  Data, Medication data reviewed.  Case discussed with nursing as available.  Plan of Care reviewed with patient and notified of changes.  2/25 the  patient found this morning with ongoing malaise, low blood pressure requiring still IV pressors to stabilize, given additional orders for blood transfusion, volume boluses,  Consulted nephrology as well as gastroenterology  The patient currently denies ongoing epistaxis, blood loss from his nosebleeds  Current blood pressures 90s over 50s, the patient is afebrile, heart rate in the 70s, the patient is saturating 100% on room air, hemoglobin after 1 unit of red cells stayed at 6, additional 2 units were written for as well as a have 100 cc IV fluid bolus with normal saline  After 2 units and hemoglobin only paty from 66.8 without further severe blood loss noted clinically  The patient did have some ongoing small-volume melena  Lactic acid has improved to 2.7, BNP elevated, troponin level flattening in the 3-400 range, currently no chest pain    I have discussed this patient's plan of care and discharge plan at IDT rounds today with Case Management, Nursing, Nursing leadership, and other members of the IDT team.    Consultants/Specialty  critical care, GI, and nephrology    Code Status  Full Code    Disposition  The patient is not medically cleared for discharge to home or a post-acute facility.  Anticipate discharge to: home with close outpatient follow-up    I have placed the appropriate orders for post-discharge needs.    Review of Systems  Review of Systems   Constitutional:  Positive for malaise/fatigue.   HENT: Negative.     Eyes: Negative.    Respiratory: Negative.     Cardiovascular:  Positive for leg swelling. Negative for chest pain.   Gastrointestinal:  Positive for abdominal pain, blood in stool and melena. Negative for nausea and vomiting.   Genitourinary: Negative.    Musculoskeletal: Negative.    Skin: Negative.    Neurological:  Positive for weakness.   Endo/Heme/Allergies: Negative.    Psychiatric/Behavioral:  The patient is nervous/anxious.    All other systems reviewed and are negative.        Physical Exam  Temp:  [36.3 °C (97.3 °F)-36.8 °C (98.3 °F)] 36.4 °C (97.6 °F)  Pulse:  [69-83] 77  Resp:  [9-30] 10  BP: ()/(43-64) 102/63  SpO2:  [96 %-100 %] 100 %    Physical Exam  Vitals and nursing note reviewed.   Constitutional:       Appearance: He is well-developed. He is ill-appearing.      Comments: Pt seen and examined.   HENT:      Head: Normocephalic and atraumatic.   Eyes:      Pupils: Pupils are equal, round, and reactive to light.   Cardiovascular:      Rate and Rhythm: Normal rate and regular rhythm.      Heart sounds: Normal heart sounds.   Pulmonary:      Effort: Pulmonary effort is normal.      Breath sounds: Rhonchi present.   Abdominal:      General: Bowel sounds are normal. There is no distension.      Palpations: Abdomen is soft.      Tenderness: There is no abdominal tenderness.      Comments: PD catheter without inflammation   Musculoskeletal:         General: Swelling present. Normal range of motion.      Cervical back: Normal range of motion and neck supple.   Skin:     General: Skin is warm and dry.      Coloration: Skin is pale.   Neurological:      General: No focal deficit present.      Mental Status: He is alert and oriented to person, place, and time.   Psychiatric:         Behavior: Behavior normal.         Fluids    Intake/Output Summary (Last 24 hours) at 2/25/2025 1652  Last data filed at 2/25/2025 1200  Gross per 24 hour   Intake 1865.66 ml   Output 200 ml   Net 1665.66 ml        Laboratory  Recent Labs     02/25/25  0050 02/25/25  0734 02/25/25  1334 02/25/25  1519   WBC 8.6  --   --   --    RBC 1.96*  --   --   --    HEMOGLOBIN 6.0* 6.0* 6.8* 6.8*   HEMATOCRIT 19.4* 19.1* 20.3* 20.8*   MCV 99.0*  --   --   --    MCH 30.6  --   --   --    MCHC 30.9*  --   --   --    RDW 79.2*  --   --   --    PLATELETCT 240  --   --   --    MPV 10.1  --   --   --      Recent Labs     02/25/25  0050   SODIUM 140   POTASSIUM 5.0   CHLORIDE 93*   CO2 22   GLUCOSE 203*   *    CREATININE 9.65*   CALCIUM 8.6                   Imaging  DX-CHEST-PORTABLE (1 VIEW)   Final Result         1. No acute cardiopulmonary abnormalities are identified.           Assessment/Plan  * Shock (HCC)- (present on admission)  Assessment & Plan  Hypovolemic, hemorrhagic shock likely  Ongoing moderate resuscitation, blood pressure support with pressors as well as transfusions  Follow-up parameters closely, follow coagulation parameters      Melena- (present on admission)  Assessment & Plan  Patient with multifactorial anemia, including blood loss anemia, chronic kidney disease on EPO.  Patient presents with melanotic stool that persisted since last hospitalization as well as epistasis .  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  - GI consulted, currently holding of emergent endoscopy  - Keep hemoglobin possibly above 8 given the patient's coronary disease  - Will hold home Eliquis and plavix      High anion gap metabolic acidosis  Assessment & Plan  Elevated anion gap to 25 with bicarb of 22 likely secondary to end-stage renal disease with elevated BUN of 146 as well as acute blood loss anemia  Improving parameters, monitor    NSTEMI (non-ST elevated myocardial infarction) ([facility])  Assessment & Plan  Patient with chest pressure and elevated troponin to 288 with T wave inversions in the lateral leads consistent with likely type II NSTEMI from demand given acute blood loss.  Chest pressure alleviated  - Monitor after blood transfusion  - Will likely need to aim for hemoglobin goal greater than 8    Hypotension due to hypovolemia  Assessment & Plan  Transfusions ordered, volume boluses ordered with caution given the patient's reduced ejection fraction and renal disease    Typical atrial flutter  (HCC)- (present on admission)  Assessment & Plan  Holding home Eliquis    CAD (coronary artery disease)- (present on admission)  Assessment & Plan  History of coronary artery disease status post stents x3 8/2023  -Holding home Plavix in presence of melena  Currently no room for intervention, monitor closely, keep hemodynamics optimized  Transfuse to hemoglobin greater than 8 given coronary artery disease    Peripheral arterial disease (HCC)- (present on admission)  Assessment & Plan  Status post left popliteal-PT bypass by Dr. Ochoa 1/20/2025 on wound VAC    ESRD on peritoneal dialysis (HCC)- (present on admission)  Assessment & Plan  History of ESRD on peritoneal dialysis followed by [facility]  Nephrology consulted    Plan  2/25  Ongoing severe blood loss anemia with inadequate response to transfusion so far  The patient received 3 units and only brought him up to 6.8 hemoglobin  Unlikely to be secondary to epistaxis  Likely still ongoing GI bleeding  Gastroenterology is consulted  Nephrology is consulted  Ongoing volume support, blood transfusion support  Clear liquid diet in anticipation of likely additional endoscopy needed on 2/26  Hold all anticoagulation, antiplatelet therapy with risk  See orders  Patient is has a high medical complexity, complex decision making and is at high risk for complication, morbidity, and mortality.  Patient is critically ill.   The patient continues to have: Hypovolemic, possibly hemorrhagic shock requiring IV pressor therapy  The vital organ system that is affected is the: Cardiovascular, hematologic, multiorgan system  If untreated there is a high chance of deterioration into: Hemorrhagic, hypovolemic shock, hypotension, organ perfusion deficit  And eventually death.   The critical care that I am providing today is: Aggressive additional transfusion, Red cell transfusion, volume replacement with IV fluids and a bolus fashion with close hemodynamic monitoring and  titration, evaluation of multiple additional lab draws, values  The critical that has been undertaken is medically complex.   There has been no overlap in critical care time.   Critical Care Time not including procedures: 48 minutes in addition to previous critical care time documented      VTE prophylaxis: VTE Selection    I have performed a physical exam and reviewed and updated ROS and Plan today (2/25/2025). In review of yesterday's note (2/24/2025), there are no changes except as documented above.      Please note that this dictation was created using voice recognition software. I have made every reasonable attempt to correct obvious errors, but I expect that there are errors of grammar and possibly context that I did not discover before finalizing the note.

## 2025-02-27 ENCOUNTER — TELEPHONE (OUTPATIENT)
Dept: VASCULAR SURGERY | Facility: MEDICAL CENTER | Age: 80
End: 2025-02-27
Payer: MEDICARE

## 2025-02-27 LAB
ALBUMIN SERPL BCP-MCNC: 2.4 G/DL (ref 3.2–4.9)
ALBUMIN/GLOB SERPL: 1 G/DL
ALP SERPL-CCNC: 61 U/L (ref 30–99)
ALT SERPL-CCNC: 28 U/L (ref 2–50)
ANION GAP SERPL CALC-SCNC: 20 MMOL/L (ref 7–16)
AST SERPL-CCNC: 31 U/L (ref 12–45)
BASOPHILS # BLD AUTO: 0.5 % (ref 0–1.8)
BASOPHILS # BLD: 0.04 K/UL (ref 0–0.12)
BILIRUB SERPL-MCNC: 0.6 MG/DL (ref 0.1–1.5)
BUN SERPL-MCNC: 135 MG/DL (ref 8–22)
CALCIUM ALBUM COR SERPL-MCNC: 9.5 MG/DL (ref 8.5–10.5)
CALCIUM SERPL-MCNC: 8.2 MG/DL (ref 8.5–10.5)
CHLORIDE SERPL-SCNC: 96 MMOL/L (ref 96–112)
CO2 SERPL-SCNC: 19 MMOL/L (ref 20–33)
CREAT SERPL-MCNC: 8.37 MG/DL (ref 0.5–1.4)
EOSINOPHIL # BLD AUTO: 0.24 K/UL (ref 0–0.51)
EOSINOPHIL NFR BLD: 3.3 % (ref 0–6.9)
ERYTHROCYTE [DISTWIDTH] IN BLOOD BY AUTOMATED COUNT: 63 FL (ref 35.9–50)
GFR SERPLBLD CREATININE-BSD FMLA CKD-EPI: 6 ML/MIN/1.73 M 2
GLOBULIN SER CALC-MCNC: 2.3 G/DL (ref 1.9–3.5)
GLUCOSE SERPL-MCNC: 201 MG/DL (ref 65–99)
HCT VFR BLD AUTO: 22.9 % (ref 42–52)
HCT VFR BLD AUTO: 23.2 % (ref 42–52)
HCT VFR BLD AUTO: 23.6 % (ref 42–52)
HGB BLD-MCNC: 7.8 G/DL (ref 14–18)
HGB BLD-MCNC: 7.8 G/DL (ref 14–18)
HGB BLD-MCNC: 7.9 G/DL (ref 14–18)
IMM GRANULOCYTES # BLD AUTO: 0.08 K/UL (ref 0–0.11)
IMM GRANULOCYTES NFR BLD AUTO: 1.1 % (ref 0–0.9)
LYMPHOCYTES # BLD AUTO: 0.83 K/UL (ref 1–4.8)
LYMPHOCYTES NFR BLD: 11.3 % (ref 22–41)
MAGNESIUM SERPL-MCNC: 2.3 MG/DL (ref 1.5–2.5)
MCH RBC QN AUTO: 30.6 PG (ref 27–33)
MCHC RBC AUTO-ENTMCNC: 34.1 G/DL (ref 32.3–36.5)
MCV RBC AUTO: 89.8 FL (ref 81.4–97.8)
MONOCYTES # BLD AUTO: 0.59 K/UL (ref 0–0.85)
MONOCYTES NFR BLD AUTO: 8 % (ref 0–13.4)
NEUTROPHILS # BLD AUTO: 5.58 K/UL (ref 1.82–7.42)
NEUTROPHILS NFR BLD: 75.8 % (ref 44–72)
NRBC # BLD AUTO: 0 K/UL
NRBC BLD-RTO: 0 /100 WBC (ref 0–0.2)
PHOSPHATE SERPL-MCNC: 6 MG/DL (ref 2.5–4.5)
PLATELET # BLD AUTO: 155 K/UL (ref 164–446)
PMV BLD AUTO: 10.4 FL (ref 9–12.9)
POTASSIUM SERPL-SCNC: 4.5 MMOL/L (ref 3.6–5.5)
PROT SERPL-MCNC: 4.7 G/DL (ref 6–8.2)
RBC # BLD AUTO: 2.55 M/UL (ref 4.7–6.1)
SODIUM SERPL-SCNC: 135 MMOL/L (ref 135–145)
WBC # BLD AUTO: 7.4 K/UL (ref 4.8–10.8)

## 2025-02-27 PROCEDURE — 97162 PT EVAL MOD COMPLEX 30 MIN: CPT

## 2025-02-27 PROCEDURE — 97166 OT EVAL MOD COMPLEX 45 MIN: CPT

## 2025-02-27 PROCEDURE — 85018 HEMOGLOBIN: CPT

## 2025-02-27 PROCEDURE — 85014 HEMATOCRIT: CPT

## 2025-02-27 PROCEDURE — 770020 HCHG ROOM/CARE - TELE (206)

## 2025-02-27 PROCEDURE — 84100 ASSAY OF PHOSPHORUS: CPT

## 2025-02-27 PROCEDURE — 700102 HCHG RX REV CODE 250 W/ 637 OVERRIDE(OP): Performed by: HOSPITALIST

## 2025-02-27 PROCEDURE — A9270 NON-COVERED ITEM OR SERVICE: HCPCS | Performed by: HOSPITALIST

## 2025-02-27 PROCEDURE — 80053 COMPREHEN METABOLIC PANEL: CPT

## 2025-02-27 PROCEDURE — 700111 HCHG RX REV CODE 636 W/ 250 OVERRIDE (IP)

## 2025-02-27 PROCEDURE — 700102 HCHG RX REV CODE 250 W/ 637 OVERRIDE(OP)

## 2025-02-27 PROCEDURE — 83735 ASSAY OF MAGNESIUM: CPT

## 2025-02-27 PROCEDURE — 700101 HCHG RX REV CODE 250: Performed by: SURGERY

## 2025-02-27 PROCEDURE — A9270 NON-COVERED ITEM OR SERVICE: HCPCS

## 2025-02-27 PROCEDURE — 90945 DIALYSIS ONE EVALUATION: CPT

## 2025-02-27 PROCEDURE — 85025 COMPLETE CBC W/AUTO DIFF WBC: CPT

## 2025-02-27 PROCEDURE — 99232 SBSQ HOSP IP/OBS MODERATE 35: CPT | Performed by: NURSE PRACTITIONER

## 2025-02-27 RX ORDER — OMEPRAZOLE 20 MG/1
40 CAPSULE, DELAYED RELEASE ORAL 2 TIMES DAILY
Status: DISCONTINUED | OUTPATIENT
Start: 2025-02-27 | End: 2025-03-01 | Stop reason: HOSPADM

## 2025-02-27 RX ADMIN — METOPROLOL TARTRATE 25 MG: 25 TABLET, FILM COATED ORAL at 17:45

## 2025-02-27 RX ADMIN — TAMSULOSIN HYDROCHLORIDE 0.4 MG: 0.4 CAPSULE ORAL at 09:26

## 2025-02-27 RX ADMIN — OXYCODONE 2.5 MG: 5 TABLET ORAL at 19:32

## 2025-02-27 RX ADMIN — ROSUVASTATIN CALCIUM 20 MG: 20 TABLET, FILM COATED ORAL at 21:55

## 2025-02-27 RX ADMIN — OMEPRAZOLE 40 MG: 20 CAPSULE, DELAYED RELEASE ORAL at 17:45

## 2025-02-27 RX ADMIN — Medication 0.5 ML: at 17:37

## 2025-02-27 RX ADMIN — HYDROCORTISONE: 25 CREAM TOPICAL at 13:58

## 2025-02-27 RX ADMIN — OXYMETAZOLINE HYDROCHLORIDE 2 SPRAY: 0.5 SPRAY NASAL at 05:13

## 2025-02-27 RX ADMIN — OXYCODONE 2.5 MG: 5 TABLET ORAL at 09:47

## 2025-02-27 RX ADMIN — METOPROLOL TARTRATE 25 MG: 25 TABLET, FILM COATED ORAL at 05:14

## 2025-02-27 RX ADMIN — Medication 0.5 ML: at 13:58

## 2025-02-27 RX ADMIN — SODIUM HYPOCHLORITE 5 ML: 1.25 SOLUTION TOPICAL at 05:14

## 2025-02-27 RX ADMIN — MONTELUKAST 10 MG: 10 TABLET, FILM COATED ORAL at 21:55

## 2025-02-27 RX ADMIN — Medication 0.5 ML: at 05:13

## 2025-02-27 RX ADMIN — SENNOSIDES AND DOCUSATE SODIUM 2 TABLET: 50; 8.6 TABLET ORAL at 17:45

## 2025-02-27 RX ADMIN — PANTOPRAZOLE SODIUM 40 MG: 40 INJECTION, POWDER, FOR SOLUTION INTRAVENOUS at 05:13

## 2025-02-27 RX ADMIN — OXYMETAZOLINE HYDROCHLORIDE 2 SPRAY: 0.5 SPRAY NASAL at 17:37

## 2025-02-27 RX ADMIN — OXYCODONE 2.5 MG: 5 TABLET ORAL at 02:36

## 2025-02-27 ASSESSMENT — LIFESTYLE VARIABLES
TOTAL SCORE: 0
HOW MANY TIMES IN THE PAST YEAR HAVE YOU HAD 5 OR MORE DRINKS IN A DAY: 0
ALCOHOL_USE: NO
AVERAGE NUMBER OF DAYS PER WEEK YOU HAVE A DRINK CONTAINING ALCOHOL: 0
HAVE YOU EVER FELT YOU SHOULD CUT DOWN ON YOUR DRINKING: NO
HAVE PEOPLE ANNOYED YOU BY CRITICIZING YOUR DRINKING: NO
EVER HAD A DRINK FIRST THING IN THE MORNING TO STEADY YOUR NERVES TO GET RID OF A HANGOVER: NO
TOTAL SCORE: 0
CONSUMPTION TOTAL: NEGATIVE
ON A TYPICAL DAY WHEN YOU DRINK ALCOHOL HOW MANY DRINKS DO YOU HAVE: 0
DOES PATIENT WANT TO STOP DRINKING: NO
EVER FELT BAD OR GUILTY ABOUT YOUR DRINKING: NO
TOTAL SCORE: 0

## 2025-02-27 ASSESSMENT — PAIN DESCRIPTION - PAIN TYPE
TYPE: ACUTE PAIN
TYPE: ACUTE PAIN;CHRONIC PAIN
TYPE: ACUTE PAIN

## 2025-02-27 ASSESSMENT — COGNITIVE AND FUNCTIONAL STATUS - GENERAL
DRESSING REGULAR UPPER BODY CLOTHING: A LITTLE
HELP NEEDED FOR BATHING: A LITTLE
SUGGESTED CMS G CODE MODIFIER DAILY ACTIVITY: CK
EATING MEALS: A LITTLE
PERSONAL GROOMING: A LITTLE
TURNING FROM BACK TO SIDE WHILE IN FLAT BAD: A LITTLE
MOVING TO AND FROM BED TO CHAIR: A LITTLE
MOBILITY SCORE: 18
STANDING UP FROM CHAIR USING ARMS: A LITTLE
TOILETING: A LITTLE
CLIMB 3 TO 5 STEPS WITH RAILING: A LITTLE
DRESSING REGULAR LOWER BODY CLOTHING: A LITTLE
DAILY ACTIVITIY SCORE: 18
SUGGESTED CMS G CODE MODIFIER MOBILITY: CK
MOVING FROM LYING ON BACK TO SITTING ON SIDE OF FLAT BED: A LITTLE
WALKING IN HOSPITAL ROOM: A LITTLE

## 2025-02-27 ASSESSMENT — SOCIAL DETERMINANTS OF HEALTH (SDOH)
IN THE PAST 12 MONTHS, HAS THE ELECTRIC, GAS, OIL, OR WATER COMPANY THREATENED TO SHUT OFF SERVICE IN YOUR HOME?: NO
WITHIN THE PAST 12 MONTHS, THE FOOD YOU BOUGHT JUST DIDN'T LAST AND YOU DIDN'T HAVE MONEY TO GET MORE: NEVER TRUE
WITHIN THE PAST 12 MONTHS, YOU WORRIED THAT YOUR FOOD WOULD RUN OUT BEFORE YOU GOT THE MONEY TO BUY MORE: NEVER TRUE

## 2025-02-27 ASSESSMENT — ACTIVITIES OF DAILY LIVING (ADL): TOILETING: INDEPENDENT

## 2025-02-27 ASSESSMENT — ENCOUNTER SYMPTOMS
CONSTIPATION: 0
ABDOMINAL PAIN: 0
BLURRED VISION: 0
SHORTNESS OF BREATH: 0
HEARTBURN: 0
RESPIRATORY NEGATIVE: 1
WEAKNESS: 1
DEPRESSION: 0
BLOOD IN STOOL: 0
DIARRHEA: 0
DIZZINESS: 0
NERVOUS/ANXIOUS: 1
FEVER: 0
NAUSEA: 0
VOMITING: 0
EYES NEGATIVE: 1
CHILLS: 0
MUSCULOSKELETAL NEGATIVE: 1
BACK PAIN: 0
COUGH: 0

## 2025-02-27 ASSESSMENT — GAIT ASSESSMENTS
GAIT LEVEL OF ASSIST: STANDBY ASSIST
DEVIATION: BRADYKINETIC;SHUFFLED GAIT
DISTANCE (FEET): 25
ASSISTIVE DEVICE: FRONT WHEEL WALKER

## 2025-02-27 ASSESSMENT — FIBROSIS 4 INDEX: FIB4 SCORE: 3.23

## 2025-02-27 NOTE — PROGRESS NOTES
"[facility] -  PROGRESS NOTE               Author: Camryn Leal M.D. Date & Time: 2/27/2025  1:04 PM     HPI:  79 y.o. male with HTN, ESRD on PD, PVDs/p bypass, NSTEMI who presents due to persistent melena, epistasis, chest pressure and generalized weakness/fatigue. 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  Came back to the hospital for chest pain, weakness, dark stool.   Patient he was doing PD at home w/o any issues after discharge, draining clear fluid, and clean cath site.   Admitted for anemia and GI consult.    DAILY NEPHROLOGY SUMMARY:  2/25: Consult done.   2/26: Had multiple episodes of melena overnight, requiring PRBC->taken for EGD today. OD overnight w/o issues- did gentle PD given hypotensive on pressors and GI bleed, net 3.5l +ve.  2/27: EGD yesterday w/ Active bleeding from duodenum, vascular lesion, s/p clip x 1. Feels much better today. PD went well. Leg swelling improved.     REVIEW OF SYSTEMS:    10 point ROS reviewed and is as per HPI/daily summary or otherwise negative    PMH/PSH/SH/FH: Reviewed and unchanged since admission note  CURRENT MEDICATIONS: Reviewed from admission to present day    VS:  /57   Pulse (P) 76   Temp (P) 36.8 °C (98.2 °F) (Temporal)   Resp (P) 15   Ht 1.778 m (5' 10\")   Wt 73.9 kg (162 lb 14.7 oz)   SpO2 98%   BMI 23.38 kg/m²   Physical Exam  Constitutional:       Appearance: He is ill-appearing.   HENT:      Head: Normocephalic and atraumatic.      Mouth/Throat:      Mouth: Mucous membranes are moist.   Cardiovascular:      Rate and Rhythm: Normal rate and regular rhythm.      Pulses: Normal pulses.      Heart sounds: Normal heart sounds.   Pulmonary:      Effort: Pulmonary effort is normal.      Breath sounds: Normal breath sounds. "   Abdominal:      General: There is no distension.      Palpations: Abdomen is soft.      Tenderness: There is no abdominal tenderness.      Comments: PD cath in place w/ dressing c/d/I;no erythema/induration/discharge   Musculoskeletal:      Right lower leg: Edema present.      Left lower leg: Edema present.   Skin:     Capillary Refill: Capillary refill takes less than 2 seconds.   Neurological:      General: No focal deficit present.      Mental Status: He is alert and oriented to person, place, and time.   Psychiatric:         Mood and Affect: Mood normal.         FLUID BALANCE:  In: 1110 [P.O.:800; I.V.:310]  Out: 40     LABS:  Recent Labs     02/25/25  2045 02/26/25  0510 02/27/25  0340   SODIUM 137 134* 135   POTASSIUM 5.6* 5.0 4.5   CHLORIDE 98 95* 96   CO2 18* 19* 19*   GLUCOSE 217* 154* 201*   * 142* 135*   CREATININE 8.82* 8.68* 8.37*   CALCIUM 8.0* 8.3* 8.2*     Recent Labs     02/25/25  0050 02/25/25  0734 02/26/25  0510 02/26/25  1200 02/27/25  0013 02/27/25  0340 02/27/25  0621   WBC 8.6  --  8.3  --   --  7.4  --    RBC 1.96*  --  2.37*  --   --  2.55*  --    HEMOGLOBIN 6.0*   < > 7.3*   < > 7.9* 7.8* 7.8*   HEMATOCRIT 19.4*   < > 21.6*   < > 23.6* 22.9* 23.2*   MCV 99.0*  --  91.1  --   --  89.8  --    MCH 30.6  --  30.8  --   --  30.6  --    MCHC 30.9*  --  33.8  --   --  34.1  --    RDW 79.2*  --  53.7*  --   --  63.0*  --    PLATELETCT 240  --  165  --   --  155*  --    MPV 10.1  --  10.6  --   --  10.4  --     < > = values in this interval not displayed.       IMPRESSION:  # ESRD on CCPD  - 6 exchanges daily     # HTN, controlled   - Goal BP < 140/90  - torsemide 10mg daily     #Hemorrhagic shock   # Melena, GIB  # Anemia of CKD  - GI following: active bleeding DU clipped     # CKD-MBD  - Ca at goal, check phos  - Lanthanum 500mg TID, titrate up prn     # Chronic hypoxic respiratory failure   - On home oxygen 2-2.5L at night      # HFrEF  # Hx CAD with stent      SUGGESTIONS:  - Continue  CCPD during hospital stay, cont at 6 cycles- will use 1 4.25% bag to facilitate volume removal, other 2.5% bags  - hold antihypertensive, diuretics iso hypotension  - PRBC for hb<7  - Dose all meds per ESRD  - Renal diet, low phos   - ROSALIND SQ Qweek   - Cont Phos binder TID w/meals   - L arm AVF not fully matured,  will need intervention outpt   - Avoid Fleet enema in dialysis patients   - Labs daily

## 2025-02-27 NOTE — THERAPY
Physical Therapy   Initial Evaluation     Patient Name: Edy Bennett  Age:  79 y.o., Sex:  male  Medical Record #: [id number]  Today's Date: 2/27/2025     Precautions  Precautions: Fall Risk    Assessment  Patient is 79 y.o. male admitted with weakness, lethargic stools, chest discomfort. Elevated troponin 2/2 demand ischemia per chart. PMHx including but not limited to heart failure, CAD, end stage renal disease on PD, atrial flutter, prior bypass surgery. S/p EDG with with x1 clip 2/2 active bleeding from duodenum. Pt required SBA for standing, transfers, and short distance ambulation in room. Anticipate pt will progress to home with HHPT once able to demonstrate further distance ambulation. Pt would benefit from continued acute IP PT services to address below said deficits.     Plan    Physical Therapy Initial Treatment Plan   Treatment Plan : Bed Mobility, Equipment, Family / Caregiver Training, Gait Training, Manual Therapy, Neuro Re-Education / Balance, Self Care / Home Evaluation, Stair Training, Therapeutic Activities, Therapeutic Exercise  Treatment Frequency: 3 Times per Week  Duration: Until Therapy Goals Met    DC Equipment Recommendations: None  Discharge Recommendations: Recommend home health for continued physical therapy services (once demonstrating further ambulation for household distances)       Subjective    Pt and wife endorsing their goal is to [place]. Stating they know they will need to bring a peritoneal dialysis machine with them     Objective     02/27/25 1027   Vitals   O2 Delivery Device None - Room Air   Vitals Comments VSS   Pain 0 - 10 Group   Therapist Pain Assessment Post Activity Pain Same as Prior to Activity;Nurse Notified;0   Prior Living Situation   Housing / Facility 1 Story House   Steps Into Home   (threshold)   Steps In Home 0   Bathroom Set up Walk In Shower;Grab Bars;Shower Chair   Equipment Owned Front-Wheel Walker;4-Wheel  "Walker;Wheelchair   Lives with - Patient's Self Care Capacity Spouse   Comments spouse able to assist as needed   Prior Level of Functional Mobility   Bed Mobility Independent   Transfer Status Independent   Ambulation Independent   Ambulation Distance   (community)   Assistive Devices Used   (FWW intermittently)   Stairs Independent   Cognition    Cognition / Consciousness WDL   Level of Consciousness Alert   Comments pleasant and cooperative. has hearing aides in   Active ROM Lower Body    Active ROM Lower Body  WDL   Strength Lower Body   Comments BLE grossly 4/5, lacking muscular endurance, not buckling however   Sensation Lower Body   Comments denies N/T   Balance Assessment   Sitting Balance (Static) Good   Sitting Balance (Dynamic) Fair +   Standing Balance (Static) Fair   Standing Balance (Dynamic) Fair -   Weight Shift Sitting Fair   Weight Shift Standing Fair   Comments w/ FWW   Bed Mobility    Comments received EOB, up to chair post   Gait Analysis   Gait Level Of Assist Standby Assist   Assistive Device Front Wheel Walker   Distance (Feet) 25   # of Times Distance was Traveled 1   Deviation Bradykinetic;Shuffled Gait  (slow analia, endorses doing his \"1,2,3\" pattern and \"1-2 step\" for turns)   Comments 1st time ambulating since admission per pt   Functional Mobility   Sit to Stand Standby Assist   Bed, Chair, Wheelchair Transfer Standby Assist   Transfer Method Stand Step   Mobility w/ FWW in room to chair   ICU Target Mobility Level   ICU Mobility - Targeted Level Level 4   6 Clicks Assessment - How much HELP from from another person do you currently need... (If the patient hasn't done an activity recently, how much help from another person do you think he/she would need if he/she tried?)   Turning from your back to your side while in a flat bed without using bedrails? 3   Moving from lying on your back to sitting on the side of a flat bed without using bedrails? 3   Moving to and from a bed to a chair " (including a wheelchair)? 3   Standing up from a chair using your arms (e.g., wheelchair, or bedside chair)? 3   Walking in hospital room? 3   Climbing 3-5 steps with a railing? 3   6 clicks Mobility Score 18   Activity Tolerance   Sitting in Chair up post   Sitting Edge of Bed received EOB with RN   Standing few min   Short Term Goals    Short Term Goal # 1 Pt will perform supine <> sit with SPV in 6 visits to get in/out of bed   Short Term Goal # 2 Pt will perform stand step transfers with FWW and SPV in 6 visits to get in/out of chair   Short Term Goal # 3 Pt will ambulate 150ft with FWW and SPV in 6 visits to access home environment   Education Group   Education Provided Role of Physical Therapist;Use of Assistive Device   Role of Physical Therapist Patient Response Patient;Acceptance;Explanation;Verbal Demonstration   Use of Assistive Device Patient Response Patient;Family;Acceptance;Explanation;Verbal Demonstration;Action Demonstration   Additional Comments Pt receptive to self management and compensatory strategies with mobility   Physical Therapy Initial Treatment Plan    Treatment Plan  Bed Mobility;Equipment;Family / Caregiver Training;Gait Training;Manual Therapy;Neuro Re-Education / Balance;Self Care / Home Evaluation;Stair Training;Therapeutic Activities;Therapeutic Exercise   Treatment Frequency 3 Times per Week   Duration Until Therapy Goals Met   Problem List    Problems Impaired Bed Mobility;Impaired Transfers;Impaired Ambulation;Functional Strength Deficit;Impaired Balance;Decreased Activity Tolerance   Anticipated Discharge Equipment and Recommendations   DC Equipment Recommendations None   Discharge Recommendations Recommend home health for continued physical therapy services  (once demonstrating further ambulation for household distances)   Interdisciplinary Plan of Care Collaboration   IDT Collaboration with  Nursing;Occupational Therapist   Patient Position at End of Therapy Seated;Chair Alarm  On;Call Light within Reach;Tray Table within Reach;Phone within Reach;Family / Friend in Room   Collaboration Comments RN updated   Session Information   Date / Session Number  2/27- 1 (1/3, 2/4)

## 2025-02-27 NOTE — CARE PLAN
The patient is Watcher - Medium risk of patient condition declining or worsening    Shift Goals  Clinical Goals: q6h hgb, hemodynamic stability, determine plan of care, pain management  Patient Goals: rest, get better  Family Goals: remain updated on patient care    Progress made toward(s) clinical / shift goals:      Problem: Knowledge Deficit - Standard  Goal: Patient and family/care givers will demonstrate understanding of plan of care, disease process/condition, diagnostic tests and medications  Outcome: Progressing     Problem: Fall Risk  Goal: Patient will remain free from falls  Outcome: Progressing     Problem: Pain - Standard  Goal: Alleviation of pain or a reduction in pain to the patient’s comfort goal  Outcome: Progressing

## 2025-02-27 NOTE — PROGRESS NOTES
..Gastroenterology Progress Note               Author:  Mariela Coleman, DNP,  APRN Date & Time Created: 2/27/2025 8:23 AM       Patient ID:  Name:             Edy Bennett  YOB: 1945  Age:                 79 y.o.  male  MRN:               [id number]        Medical Decision Making, by Problem:  Active Hospital Problems    Diagnosis     Duodenal ulcer [K26.9]     Hypotension due to hypovolemia [E86.1]     NSTEMI (non-ST elevated myocardial infarction) (HCC) [I21.4]     High anion gap metabolic acidosis [E87.29]     Shock (HCC) [R57.9]     Melena [K92.1]     Typical atrial flutter (HCC) [I48.3]     CAD (coronary artery disease) [I25.10]     Peripheral arterial disease (HCC) [I73.9]     ESRD on peritoneal dialysis (HCC) [N18.6, Z99.2]        Presenting Chief Complaint:  Anemia, rule out GI bleeding or nosebleeding related.     History of Present Illness:   The patient is a 79 years old gentleman with complex medical history including peritoneal dialysis, coronary artery disease, non-STEMI, sleep apnea, peripheral artery disease disease, recent EGD 2/14 who came back to the hospital for anemia, also report intermittent nasal bleeding and dark stool Last bowel movement today in the morning, dark stool. Last vomiting was overnight, denied fresh blood or coffee-ground emesis. Last colonoscopy likely more than 10 years ago. Hgb 6.0 on admission.    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 Pathology reveals chronic esophageal inflamed mucosa. Gastric biopsy with mild chronic inactive gastritis and parietal cell hypertrophy, both negative for malignancy and H. Pylori      2/26/2025: EGD with GERD grade B, hiatal hernia, duodenal erosions with one active bleeding s/p clip. Path with mild eosinophilia negative for H. Pylori.     Interval History:  2/27/2025: Patient seen in collaboration with Dr. Barnes with wife bedside.  Feels better, but would like to move slowly with diet advancement. Hgb  7.8. 4 BM, 1 black and 2 bloody. Mild eosinophilia on path, no peripheral eosinophilia noted.     Hospital Medications:  Current Facility-Administered Medications   Medication Dose Frequency Provider Last Rate Last Admin    dakins 0.125% (1/4 strength) topical soln   DAILY Marco Antonio Miller M.D.   5 mL at 02/27/25 0514    oxyCODONE immediate-release (Roxicodone) tablet 2.5 mg  2.5 mg Q6HRS PRN Marty Andres M.D.   2.5 mg at 02/27/25 0236    epoetin (Retacrit) injection 4,000 Units  4,000 Units Q7 DAYS Camryn Leal M.D.   4,000 Units at 02/26/25 1914    senna-docusate (Pericolace Or Senokot S) 8.6-50 MG per tablet 2 Tablet  2 Tablet Q EVENING Noe Naranjo D.O.        And    polyethylene glycol/lytes (Miralax) Packet 1 Packet  1 Packet QDAY PRN OZIEL Reeder.O.        ondansetron (Zofran) syringe/vial injection 4 mg  4 mg Q4HRS PRN OZIEL Reeder.O.   4 mg at 02/25/25 0705    Or    ondansetron (Zofran ODT) dispertab 4 mg  4 mg Q4HRS PRN OZIEL Reeder.O.        [Held by provider] apixaban (Eliquis) tablet 5 mg  5 mg BID MEERA ReederOSaima        [Held by provider] clopidogrel (Plavix) tablet 75 mg  75 mg DAILY OZIEL Reeder.O.        [Held by provider] furosemide (Lasix) tablet 40 mg  40 mg DAILY OZIEL Reeder.O.        [Held by provider] losartan (Cozaar) tablet 12.5 mg  12.5 mg DAILY OZIEL Reeder.O.        metoprolol tartrate (Lopressor) tablet 25 mg  25 mg BID OZIEL Reeder.O.   25 mg at 02/27/25 0514    montelukast (Singulair) tablet 10 mg  10 mg QHS OZIEL Reeder.O.   10 mg at 02/26/25 2014    rosuvastatin (Crestor) tablet 20 mg  20 mg QHS MEERA ReederOSaima   20 mg at 02/26/25 2014    sevelamer carbonate (Renvela) tablet 800 mg  800 mg QDAY PRN OZIEL Reeder.OSaima        sodium bicarbonate tablet 650 mg  650 mg TID PRN OZIEL Reeder.O.        tamsulosin (Flomax) capsule 0.4 mg  0.4 mg AFTER BREAKFAST Noe Naranjo,  "D.O.   0.4 mg at 02/26/25 0842    pantoprazole (Protonix) injection 40 mg  40 mg BID MEERA ReederOSaima   40 mg at 02/27/25 0513    norepinephrine (Levophed) 8 mg in 250 mL NS infusion (premix)  0-1 mcg/kg/min Continuous Noe Naranjo D.O.   Stopped at 02/25/25 1830    oxymetazoline (Afrin) 0.05 % nasal spray 2 Spray  2 Spray BID Marty Andres M.D.   2 [place] at 02/27/25 0513    eucalyptus/peppermint oil (Ponaris Nasal) nasal emollient 0.5 mL  10 Drop Q6HRS Marty Andres M.D.   0.5 mL at 02/27/25 0513    hydrocortisone rectal (Perianal) 2.5% cream   Q6HRS PRN Marty Andres M.D.   Given at 02/26/25 0300    gentamicin (Garamycin) 0.1 % cream   ACUTE DIALYSIS PRN Camryn Leal M.D.   Given at 02/26/25 1845   Last reviewed on 2/26/2025  9:20 AM by Arina Armas R.N.       Review of Systems:  Review of Systems   Constitutional:  Negative for chills, fever and malaise/fatigue.   HENT:  Negative for hearing loss.    Eyes:  Negative for blurred vision.   Respiratory:  Negative for cough and shortness of breath.    Cardiovascular:  Negative for chest pain and leg swelling.   Gastrointestinal:  Positive for melena. Negative for abdominal pain, blood in stool, constipation, diarrhea, heartburn, nausea and vomiting.   Genitourinary:  Negative for dysuria.   Musculoskeletal:  Negative for back pain.   Skin:  Negative for rash.   Neurological:  Positive for weakness. Negative for dizziness.   Psychiatric/Behavioral:  Negative for depression.    All other systems reviewed and are negative.        Vital signs:  Weight/BMI: Body mass index is 23.38 kg/m².  /81   Pulse 82   Temp 36.1 °C (96.9 °F) (Temporal)   Resp 20   Ht 1.778 m (5' 10\")   Wt 73.9 kg (162 lb 14.7 oz)   SpO2 98%   Vitals:    02/27/25 0400 02/27/25 0500 02/27/25 0514 02/27/25 0600   BP: (!) 141/73 134/72 134/72 138/81   Pulse: 81 82 82 82   Resp: 19 16  20   Temp: 36.1 °C (96.9 °F) 37 °C (98.6 °F)  36.1 °C (96.9 °F)   TempSrc: " Temporal Temporal  Temporal   SpO2: 92% 93%  98%   Weight:       Height:         Oxygen Therapy:  Pulse Oximetry: 98 %, O2 (LPM): 0, O2 Delivery Device: Room air w/o2 available    Intake/Output Summary (Last 24 hours) at 2/27/2025 0823  Last data filed at 2/27/2025 0600  Gross per 24 hour   Intake 1010 ml   Output 40 ml   Net 970 ml         Physical Exam:  Physical Exam  Vitals and nursing note reviewed.   Constitutional:       General: He is not in acute distress.     Appearance: Normal appearance. He is not ill-appearing.   HENT:      Head: Normocephalic and atraumatic.      Right Ear: External ear normal.      Left Ear: External ear normal.      Nose: Nose normal.      Mouth/Throat:      Mouth: Mucous membranes are moist.      Pharynx: Oropharynx is clear.   Eyes:      General: No scleral icterus.  Cardiovascular:      Rate and Rhythm: Normal rate. Rhythm irregular.      Pulses: Normal pulses.      Heart sounds: Normal heart sounds.   Pulmonary:      Effort: Pulmonary effort is normal.      Breath sounds: Normal breath sounds.   Abdominal:      General: Abdomen is flat. Bowel sounds are normal. There is no distension.      Palpations: Abdomen is soft.   Musculoskeletal:         General: Normal range of motion.      Cervical back: Normal range of motion and neck supple.   Skin:     General: Skin is warm.      Capillary Refill: Capillary refill takes less than 2 seconds.      Coloration: Skin is pale.      Comments: Vascular wound left leg   Neurological:      Mental Status: He is alert and oriented to person, place, and time.      Motor: Weakness present.   Psychiatric:         Mood and Affect: Mood normal.         Behavior: Behavior normal.       Labs:  Recent Labs     02/25/25  2045 02/26/25  0510 02/27/25  0340   SODIUM 137 134* 135   POTASSIUM 5.6* 5.0 4.5   CHLORIDE 98 95* 96   CO2 18* 19* 19*   * 142* 135*   CREATININE 8.82* 8.68* 8.37*   MAGNESIUM  --  2.3 2.3   PHOSPHORUS  --   --  6.0*   CALCIUM  8.0* 8.3* 8.2*     Recent Labs     25  0510 25  0340   ALTSGPT 29 27 28   ASTSGOT 38 36 31   ALKPHOSPHAT 75 67 61   TBILIRUBIN 0.7 0.6 0.6   GLUCOSE 217* 154* 201*     Recent Labs     25  0050 255 25  0510 25  0340   WBC 8.6  --  8.3 7.4   NEUTSPOLYS 82.20*  --  75.70* 75.80*   LYMPHOCYTES 9.00*  --  12.20* 11.30*   MONOCYTES 6.50  --  7.80 8.00   EOSINOPHILS 0.90  --  2.40 3.30   BASOPHILS 0.50  --  0.70 0.50   ASTSGOT 38 38 36 31   ALTSGPT 39 29 27 28   ALKPHOSPHAT 121* 75 67 61   TBILIRUBIN 0.4 0.7 0.6 0.6     Recent Labs     25  0734 25  0510 25  1200 25  0013 25  0340 25  0621   RBC 1.96*  --  2.37*  --   --  2.55*  --    HEMOGLOBIN 6.0*   < > 7.3*   < > 7.9* 7.8* 7.8*   HEMATOCRIT 19.4*   < > 21.6*   < > 23.6* 22.9* 23.2*   PLATELETCT 240  --  165  --   --  155*  --    PROTHROMBTM  --   --  17.4*  --   --   --   --    APTT  --   --  32.7  --   --   --   --    INR  --   --  1.42*  --   --   --   --     < > = values in this interval not displayed.     Recent Results (from the past 24 hours)   Histology Request    Collection Time: 25  9:58 AM   Result Value Ref Range    Pathology Request Sent to Histo    HEMOGLOBIN AND HEMATOCRIT    Collection Time: 25 12:00 PM   Result Value Ref Range    Hemoglobin 8.2 (L) 14.0 - 18.0 g/dL    Hematocrit 24.3 (L) 42.0 - 52.0 %   EKG    Collection Time: 25  3:57 PM   Result Value Ref Range    Report       [facility]    Test Date:  2025  Pt Name:    SWAPNA ROSS                Department: [facility]  MRN:        [id number]                      Room:       [facility]  Gender:     Male                         Technician: AMPARO  :        1945                   Requested By:DASHAWN BAILEY  Order #:    [id number]                    Reading MD: Michael Stahl MD    Measurements  Intervals                                Axis  Rate:       86                            P:          60  KY:         171                          QRS:        18  QRSD:       98                           T:          163  QT:         382  QTc:        457    Interpretive Statements  Sinus rhythm  Repol abnrm suggests ischemia, anterolateral  Electronically Signed On 02- 15:57:30 PST by Micheal Stahl MD     HEMOGLOBIN AND HEMATOCRIT    Collection Time: 02/26/25  6:00 PM   Result Value Ref Range    Hemoglobin 8.0 (L) 14.0 - 18.0 g/dL    Hematocrit 23.7 (L) 42.0 - 52.0 %   HEMOGLOBIN AND HEMATOCRIT    Collection Time: 02/27/25 12:13 AM   Result Value Ref Range    Hemoglobin 7.9 (L) 14.0 - 18.0 g/dL    Hematocrit 23.6 (L) 42.0 - 52.0 %   CBC WITH DIFFERENTIAL    Collection Time: 02/27/25  3:40 AM   Result Value Ref Range    WBC 7.4 4.8 - 10.8 K/uL    RBC 2.55 (L) 4.70 - 6.10 M/uL    Hemoglobin 7.8 (L) 14.0 - 18.0 g/dL    Hematocrit 22.9 (L) 42.0 - 52.0 %    MCV 89.8 81.4 - 97.8 fL    MCH 30.6 27.0 - 33.0 pg    MCHC 34.1 32.3 - 36.5 g/dL    RDW 63.0 (H) 35.9 - 50.0 fL    Platelet Count 155 (L) 164 - 446 K/uL    MPV 10.4 9.0 - 12.9 fL    Neutrophils-Polys 75.80 (H) 44.00 - 72.00 %    Lymphocytes 11.30 (L) 22.00 - 41.00 %    Monocytes 8.00 0.00 - 13.40 %    Eosinophils 3.30 0.00 - 6.90 %    Basophils 0.50 0.00 - 1.80 %    Immature Granulocytes 1.10 (H) 0.00 - 0.90 %    Nucleated RBC 0.00 0.00 - 0.20 /100 WBC    Neutrophils (Absolute) 5.58 1.82 - 7.42 K/uL    Lymphs (Absolute) 0.83 (L) 1.00 - 4.80 K/uL    Monos (Absolute) 0.59 0.00 - 0.85 K/uL    Eos (Absolute) 0.24 0.00 - 0.51 K/uL    Baso (Absolute) 0.04 0.00 - 0.12 K/uL    Immature Granulocytes (abs) 0.08 0.00 - 0.11 K/uL    NRBC (Absolute) 0.00 K/uL   Comp Metabolic Panel    Collection Time: 02/27/25  3:40 AM   Result Value Ref Range    Sodium 135 135 - 145 mmol/L    Potassium 4.5 3.6 - 5.5 mmol/L    Chloride 96 96 - 112 mmol/L    Co2 19 (L) 20 - 33 mmol/L    Anion Gap 20.0 (H) 7.0 - 16.0    Glucose 201 (H) 65 - 99 mg/dL    Bun 135 (H) 8  - 22 mg/dL    Creatinine 8.37 (HH) 0.50 - 1.40 mg/dL    Calcium 8.2 (L) 8.5 - 10.5 mg/dL    Correct Calcium 9.5 8.5 - 10.5 mg/dL    AST(SGOT) 31 12 - 45 U/L    ALT(SGPT) 28 2 - 50 U/L    Alkaline Phosphatase 61 30 - 99 U/L    Total Bilirubin 0.6 0.1 - 1.5 mg/dL    Albumin 2.4 (L) 3.2 - 4.9 g/dL    Total Protein 4.7 (L) 6.0 - 8.2 g/dL    Globulin 2.3 1.9 - 3.5 g/dL    A-G Ratio 1.0 g/dL   MAGNESIUM    Collection Time: 02/27/25  3:40 AM   Result Value Ref Range    Magnesium 2.3 1.5 - 2.5 mg/dL   PHOSPHORUS    Collection Time: 02/27/25  3:40 AM   Result Value Ref Range    Phosphorus 6.0 (H) 2.5 - 4.5 mg/dL   ESTIMATED GFR    Collection Time: 02/27/25  3:40 AM   Result Value Ref Range    GFR (CKD-EPI) 6 (A) >60 mL/min/1.73 m 2   HEMOGLOBIN AND HEMATOCRIT    Collection Time: 02/27/25  6:21 AM   Result Value Ref Range    Hemoglobin 7.8 (L) 14.0 - 18.0 g/dL    Hematocrit 23.2 (L) 42.0 - 52.0 %       Radiology Review:  DX-CHEST-PORTABLE (1 VIEW)   Final Result         1. No acute cardiopulmonary abnormalities are identified.            MDM (Data Review):   -Records reviewed and summarized in current documentation  -I personally reviewed and interpreted the laboratory results  -I personally reviewed the radiology images    Assessment/Recommendations:  Acute blood loss anemia with hypovolemic shock  Melena, epistaxis   Weakness, fatigue  HFrEF 30% EF  Atrial flutter  NSTEMI  CAD  ESRD on peritoneal dialysis  DARIEN  PAD s/p bypass      Recommendations:  PPI 40 mg PO BID  Advance to full liquid diet today then as tolerated  Follow-up with OP GI, referral placed  Trend H/H and monitor for further brisk bleeding    [facility] team signing off. Please reconsult as needed.     Discussed with patient and his wife, primary team, Dr. Barnes    ..Mariela Coleman, JOCELYNE,  APRN    Core Quality Measures   Reviewed items::  Labs, Medications and Radiology reports reviewed

## 2025-02-27 NOTE — DOCUMENTATION QUERY
[facility]                                                                       Query Response Note      PATIENT:               SWAPNA ROSS  ACCT #:                  [id number]  MRN:                     [id number]  :                      1945  ADMIT DATE:       2025 12:16 AM  DISCH DATE:          RESPONDING  PROVIDER #:        [id number]           QUERY TEXT:    Duodenal erosions with 1 active bleeding spot is documented in the  PN. Please specify the acuity of this condition.    NOTE:  If the appropriate response is not listed below, please respond with a new note.      Note: If you agree with a diagnosis listed, please remember to include it in your concurrent daily documentation and onto the Discharge Summary.    The patient's Clinical Indicators include:   PN: ongoing hematochezia, melena, decrease in hemoglobin despite 8 units of PRBC since admission...evidence of duodenal erosions with 1 active bleeding spot s/p clip    Treatment: EGD    Risk factors: duodenal erosions with 1 active bleeding spot; decreased hemoglobin despite 8 PRBC    Thank you,  Lara Jacques BSN  Clinical   Connect via [employer]  Options provided:   -- Acute duodenal erosions with active bleeding   -- Chronic  duodenal erosions with active bleeding   -- Acute on chronic duodenal erosions with active bleeding   -- Other explanation  -, (Please document other explanation)      Query created by: Lara Jacques on 2025 7:44 AM    RESPONSE TEXT:    Acute duodenal erosions with active bleeding          Electronically signed by:  DASHAWN BAILEY MD 2025 8:03 AM

## 2025-02-27 NOTE — PROGRESS NOTES
"Assumed care of patient at bedside report from day shift RN. Updated on plan of care.   Patient currently A & O x 4; on room air. Able to turn self in bed; without complaints of acute pain, medicated per MAR. Assessment completed. Patient's last bowel movement 2/26 having bloody stools per patient. Patient currently running peritoneal dialysis, lines traced, no issues noted.   Call light within reach. Hourly rounding and fall precautions in place. Bed locked and in lowest position. All questions answered. No other needs indicated at this time.    Most recent vital signs  /62   Pulse 72   Temp 36.1 °C (96.9 °F) (Temporal)   Resp 14   Ht 1.778 m (5' 10\")   Wt 72.6 kg (160 lb)   SpO2 99%   BMI 22.96 kg/m²     "

## 2025-02-27 NOTE — PROGRESS NOTES
[facility] Progress Note     CCPD initiated at 1830 as ordered per Dr. Leal x 12 hours using 3 bags of 2.5% PD solution.       Pt A/Ox4, VSS, not in any form of distress, feeling tired      Aseptically connected PD cycler to PD catheter   PD Exit site cleansed, no signs of infection noted, gentamicin applied as ordered, PD dressing changed CDI.        Report given to Primary RN Eusebia with on-call Dialysis RN contact information ([phone]).      Initial drain : 37 ML     Please call for any issues with hemodynamic instability/persistent alarms/patient not tolerating therapy.

## 2025-02-27 NOTE — WOUND TEAM
Dr. Miller notified of dehisced surgical site, after removal of prevena by bedside staff. Will defer to surgical team on recommendations. Please consult as needed

## 2025-02-27 NOTE — PROGRESS NOTES
[facility] Progress Notes     CCPD ordered by Dr.Sakshi Leal. Disconnected aseptically from PD Cycler at 0800     Pt stable, VSS, alert and oriented.  Effluent clear and yellow, no signs of bleeding/fibrin clots.  No alarms on machine was noted or reported before disconnection.     24 hour UF= 1008 mL (I.Drain= 37mL + Total UF= 971mL - Last fill= 0mL)     Report given to MANOJ Piedra.

## 2025-02-27 NOTE — CARE PLAN
The patient is Watcher - Medium risk of patient condition declining or worsening    Shift Goals  Clinical Goals: mobility, dressing change, VSS  Patient Goals: comfort and rest  Family Goals: yudith      Problem: Knowledge Deficit - Standard  Goal: Patient and family/care givers will demonstrate understanding of plan of care, disease process/condition, diagnostic tests and medications  Outcome: Progressing  Note: Patient will demonstrate understanding of Q6 hemoglobin/hematocrit checks and why they are important for plan of care.     Problem: Fall Risk  Goal: Patient will remain free from falls  Outcome: Progressing  Note: Patient will utilize call light prior to exiting the bed.

## 2025-02-27 NOTE — THERAPY
Occupational Therapy   Initial Evaluation     Patient Name: Edy Bennett  Age:  79 y.o., Sex:  male  Medical Record #: [id number]  Today's Date: 2/27/2025     Precautions  Precautions: Fall Risk    Assessment  Patient is 79 y.o. male admitted with weakness, tarry stools for past week, epistaxis found to have demand ischemia, GIB now s/p EGD found to have active bleeding from duodenum, vascular lesion s/p clip x1. He required pressors. He has a hx of daily peritoneal dialysis for kidney failure, HTN, CKD, resp failure, HFrEF, CAD w/ stent. He also had a recent left popliteal-PT bypass by Dr. Ochoa 1/20/2025, with a prevena vac at bedside awaiting application. Seen in ICU for evaluation.     Pt is currently limited by weakness, fatigue, impaired balance, and impaired BUE function, which impacts ability to complete ADLs, ADL txfs, and functional mobility.    Plan    Occupational Therapy Initial Treatment Plan   Treatment Interventions: Self Care / Activities of Daily Living, Adaptive Equipment, Therapeutic Exercises, Neuro Re-Education / Balance, Therapeutic Activity  Treatment Frequency: 3 Times per Week  Duration: Until Therapy Goals Met    DC Equipment Recommendations: Unable to determine at this time  Discharge Recommendations: Recommend home health for continued occupational therapy services (once able to complete ADLs/functional mobility w/o assistance from staff)      Objective       02/27/25 1031   Prior Living Situation   Prior Services Intermittent Physical Support for ADL Per Family   Housing / Facility 1 Story House   Bathroom Set up Walk In Shower;Grab Bars;Shower Chair   Equipment Owned Front-Wheel Walker;4-Wheel Walker;Wheelchair;Grab Bar(s) By Toilet;Tub / Shower Seat;Grab Bar(s) In Tub / Shower   Lives with - Patient's Self Care Capacity Spouse   Comments Spouse has been managing IADLs. She is also able to assist with ADLs if needed.   Prior Level of ADL Function   Self Feeding Independent    Grooming / Hygiene Independent   Bathing Independent   Dressing Independent   Toileting Independent   Comments has been having difficulty over the past week.   Prior Level of IADL Function   Prior Level Of Mobility Independent With Device in Community;Independent With Device in Home   Precautions   Precautions Fall Risk   Vitals   Vitals Comments VSS; BP after activity was 114/56   Pain 0 - 10 Group   Therapist Pain Assessment Nurse Notified;0   Cognition    Cognition / Consciousness WDL   Level of Consciousness Alert   Comments pleasant and cooperative; has hearing aids in; very motivated to regain PLOF   Active ROM Upper Body   Active ROM Upper Body  WDL   Strength Upper Body   Upper Body Strength  X   Gross Strength Generalized Weakness, Equal Bilaterally.    Balance Assessment   Sitting Balance (Static) Good   Sitting Balance (Dynamic) Fair +   Standing Balance (Static) Fair   Standing Balance (Dynamic) Fair -   Weight Shift Sitting Fair   Weight Shift Standing Fair   Comments w/ FWW   Bed Mobility    Comments EOB upon arrival w/ RN   ADL Assessment   Grooming Supervision;Seated   Lower Body Dressing Minimal Assist  (able to demo ability to reach feet by bending forward; would likely need assistance for full LB dressing)   Toileting   (declined need)   Comments politely declined further ADLs   How much help from another person does the patient currently need...   Putting on and taking off regular lower body clothing? 3   Bathing (including washing, rinsing, and drying)? 3   Toileting, which includes using a toilet, bedpan, or urinal? 3   Putting on and taking off regular upper body clothing? 3   Taking care of personal grooming such as brushing teeth? 3   Eating meals? 3   6 Clicks Daily Activity Score 18   Functional Mobility   Sit to Stand Standby Assist   Bed, Chair, Wheelchair Transfer Contact Guard Assist   Mobility EOB>amb to chair   Comments w/ fww   Short Term Goals   Short Term Goal # 1 pt will demo  ADL txfs w/ supv   Short Term Goal # 2 pt will dress LB w/ supv and AE prn   Short Term Goal # 3 pt will demo toileting including clothing mgmt w/ supv     Patient seen for team evaluation with Physical Therapist for the following reason(s):  Due to the medical complexity, the skill of both practitioners is needed to monitor vitals, patient status, and adjust the intervention to fit the patient's needs and goals.

## 2025-02-27 NOTE — PROGRESS NOTES
[facility] Medicine Daily Progress Note    Date of Service  2/27/2025    Chief Complaint  Edy Bennett is a 79 y.o. male admitted 2/25/2025 with weakness, lethargic stools, chest discomfort    Hospital Course  This is a 79-year-old male with a past med history of heart failure with reduced ejection fraction in the 30% range, coronary artery disease, on Plavix, end-stage renal disease on peritoneal dialysis, peripheral arterial disease, s/p bypass surgery, atrial flutter on anticoagulation with Eliquis, presents secondary to persistent melena, epistaxis, chest pressure and generalized weakness fatigue, the patient was recently hospitalized, and reportedly had continued fatigue with persistent melanotic stool episodes, several times a day, on 2/23 the patient developed additionally some chest discomfort, this reportedly had worsened with ambulation, the patient had alternating nosebleeds that he tried to manage with Afrin, he felt that it was fairly high volume, the patient eventually then presented here to the emergency department where the patient was found with a hemoglobin of 6, EKG showed some subtle changes possibly indicating some degree of ischemia, type II NSTEMI, this resolved in the emergency department, the patient was transfused 1 unit of red cells, given some limited amount of IV fluids and was admitted to the [facility] level of care on low-dose pressors and close hemodynamic monitoring  The patient during his recent hospitalization did have a upper endoscopy which did not reveal a bleeding lesion or reason for the patient's possible blood loss at that time, the patient also was evaluated for a possible cholecystectomy but declined as he was symptomatically not fitting a acute cholecystitis picture.    Interval Problem Update  Patient seen and examined today.  Data, Medication data reviewed.  Case discussed with nursing as available.  Plan of Care reviewed with patient and notified of changes.  2/25 the  patient found this morning with ongoing malaise, low blood pressure requiring still IV pressors to stabilize, given additional orders for blood transfusion, volume boluses,  Consulted nephrology as well as gastroenterology  The patient currently denies ongoing epistaxis, blood loss from his nosebleeds  Current blood pressures 90s over 50s, the patient is afebrile, heart rate in the 70s, the patient is saturating 100% on room air, hemoglobin after 1 unit of red cells stayed at 6, additional 2 units were written for as well as a have 100 cc IV fluid bolus with normal saline  After 2 units and hemoglobin only paty from 66.8 without further severe blood loss noted clinically  The patient did have some ongoing small-volume melena  Lactic acid has improved to 2.7, BNP elevated, troponin level flattening in the 3-400 range, currently no chest pain  2/26 patient with ongoing hematochezia, melena, decrease in hemoglobin despite a total of 8 units of PRBC since admission  The patient scheduled for EGD, this then later performed found a evidence of duodenal erosions with 1 active bleeding spot s/p clip, the patient denies any nausea vomiting, no abdominal pain, BP has stabilized, the patient is titrated off pressors, afebrile, heart rate in the 80s, respiration unlabored, blood pressure 9130s over 60s,  White second 8.3 hemoglobin 7.3 this morning, then transfused to 8.2 after 1 unit, platelet count 154  creatinine 8.68  2/27 the patient feels significantly improved, no further hematochezia or melena is reported, hemoglobin currently stable, the patient denies abdominal pain, blood pressure has improved, afebrile, heart rate in the 80s, respiration unlabored, blood pressure in the 1 teens to 130s over 80s,  WBC 7.4 hemoglobin 7.8 platelet count 155 sodium 135 potassium 4.5 chloride 96 bicarb 19 glucose 201  creatinine 3 7  I have discussed this patient's plan of care and discharge plan at IDT rounds today with  Case Management, Nursing, Nursing leadership, and other members of the IDT team.    Consultants/Specialty  critical care, GI, and nephrology    Code Status  Full Code    Disposition  The patient is not medically cleared for discharge to home or a post-acute facility.  Anticipate discharge to: home with close outpatient follow-up    I have placed the appropriate orders for post-discharge needs.    Review of Systems  Review of Systems   Constitutional:  Positive for malaise/fatigue.   HENT: Negative.     Eyes: Negative.    Respiratory: Negative.     Cardiovascular:  Positive for leg swelling. Negative for chest pain.   Gastrointestinal:  Negative for nausea and vomiting.   Genitourinary: Negative.    Musculoskeletal: Negative.    Skin: Negative.    Neurological:  Positive for weakness.   Endo/Heme/Allergies: Negative.    Psychiatric/Behavioral:  The patient is nervous/anxious.    All other systems reviewed and are negative.       Physical Exam  Temp:  [36.1 °C (96.9 °F)-37 °C (98.6 °F)] (P) 36.8 °C (98.2 °F)  Pulse:  [72-86] (P) 76  Resp:  [8-26] (P) 15  BP: (110-141)/(57-81) 113/57  SpO2:  [92 %-100 %] 98 %    Physical Exam  Vitals and nursing note reviewed.   Constitutional:       Appearance: He is well-developed. He is ill-appearing.      Comments: Pt seen and examined.   HENT:      Head: Normocephalic and atraumatic.   Eyes:      Pupils: Pupils are equal, round, and reactive to light.   Cardiovascular:      Rate and Rhythm: Normal rate and regular rhythm.      Heart sounds: Normal heart sounds.   Pulmonary:      Effort: Pulmonary effort is normal.      Breath sounds: Rhonchi present.   Abdominal:      General: Bowel sounds are normal. There is no distension.      Palpations: Abdomen is soft.      Tenderness: There is no abdominal tenderness.      Comments: PD catheter without inflammation   Musculoskeletal:         General: Swelling present. Normal range of motion.      Cervical back: Normal range of motion and  neck supple.   Skin:     General: Skin is warm and dry.      Coloration: Skin is pale.   Neurological:      General: No focal deficit present.      Mental Status: He is alert and oriented to person, place, and time.   Psychiatric:         Behavior: Behavior normal.         Fluids    Intake/Output Summary (Last 24 hours) at 2/27/2025 1512  Last data filed at 2/27/2025 0800  Gross per 24 hour   Intake 600 ml   Output 1048 ml   Net -448 ml        Laboratory  Recent Labs     02/25/25  0050 02/25/25  0734 02/26/25  0510 02/26/25  1200 02/27/25  0013 02/27/25  0340 02/27/25  0621   WBC 8.6  --  8.3  --   --  7.4  --    RBC 1.96*  --  2.37*  --   --  2.55*  --    HEMOGLOBIN 6.0*   < > 7.3*   < > 7.9* 7.8* 7.8*   HEMATOCRIT 19.4*   < > 21.6*   < > 23.6* 22.9* 23.2*   MCV 99.0*  --  91.1  --   --  89.8  --    MCH 30.6  --  30.8  --   --  30.6  --    MCHC 30.9*  --  33.8  --   --  34.1  --    RDW 79.2*  --  53.7*  --   --  63.0*  --    PLATELETCT 240  --  165  --   --  155*  --    MPV 10.1  --  10.6  --   --  10.4  --     < > = values in this interval not displayed.     Recent Labs     02/25/25 2045 02/26/25  0510 02/27/25  0340   SODIUM 137 134* 135   POTASSIUM 5.6* 5.0 4.5   CHLORIDE 98 95* 96   CO2 18* 19* 19*   GLUCOSE 217* 154* 201*   * 142* 135*   CREATININE 8.82* 8.68* 8.37*   CALCIUM 8.0* 8.3* 8.2*     Recent Labs     02/26/25  0510   APTT 32.7   INR 1.42*               Imaging  DX-CHEST-PORTABLE (1 VIEW)   Final Result         1. No acute cardiopulmonary abnormalities are identified.           Assessment/Plan  * Shock (HCC)- (present on admission)  Assessment & Plan  Hypovolemic, hemorrhagic shock   Improving, monitor      Duodenal ulcer  Assessment & Plan  S/p repeat upper endoscopy, local treatment by endoscopy, PPI    Melena- (present on admission)  Assessment & Plan  Patient now diagnosed with duodenal ulcer, 1 apparently bleeding    Patient with multifactorial anemia, including blood loss anemia, chronic  kidney disease on EPO.  Patient presents with melanotic stool that persisted since last hospitalization as well as epistasis .  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  -Hemoglobin down trended from 8.4-6.0 in 1 week  - Aggressive replacement of red cells, transfusions ordered, close monitoring  - Ongoing PPI high-dose  - Keep hemoglobin possibly above 8 given the patient's coronary disease  - Will hold home Eliquis and plavix      High anion gap metabolic acidosis- (present on admission)  Assessment & Plan  Resolved, monitor     NSTEMI (non-ST elevated myocardial infarction) (HCC)  Assessment & Plan  Patient with chest pressure and elevated troponin to 288 with T wave inversions in the lateral leads consistent with likely type II NSTEMI from demand given acute blood loss.  Chest pressure alleviated  - Monitor after blood transfusion  - Will likely need to aim for hemoglobin goal greater than 8    Hypotension due to hypovolemia  Assessment & Plan  Transfusions ordered, volume boluses ordered with caution given the patient's reduced ejection fraction and renal disease  Currently stabilized, bleeding seems to have stopped    Typical atrial flutter (HCC)- (present on admission)  Assessment & Plan  Holding home Eliquis    CAD (coronary artery disease)- (present on admission)  Assessment & Plan  History of coronary artery disease status post stents x3 8/2023  -Holding home Plavix       Peripheral arterial disease (HCC)- (present on admission)  Assessment & Plan  Status post left popliteal-PT bypass by Dr. Ochoa 1/20/2025 , wound VAC    ESRD on peritoneal dialysis (HCC)- (present on admission)  Assessment & Plan  History of ESRD on peritoneal dialysis followed by [facility] nephrology  Nephrology consulted    Plan  2/27  The patient is stabilizing, close monitor, ongoing PPI, repeat labs tomorrow  The patient received a total of 8 units of PRBCs so  far  Ongoing PPI, Carafate  Gastroenterology assistance appreciated  Nephrology is consulted, ongoing PD  Will hold anticoagulation for another day or 2, ongoing monitoring  See orders  Patient is has a high medical complexity, complex decision making and is at high risk for complication, morbidity, and mortality.  I spent 56 minutes, reviewing the chart, obtaining and/or reviewing separately obtained history. Performing a medically appropriate examination and evaluation.  Counseling and educating the patient. Ordering and reviewing medications, tests, or procedures.   Documenting clinical information in EPIC. Independently interpreting results and communicating results to patient. Discussing future disposition of care with patient, RN and case management.      VTE prophylaxis: Contraindicated chemo prophylactically    I have performed a physical exam and reviewed and updated ROS and Plan today (2/27/2025). In review of yesterday's note (2/26/2025), there are no changes except as documented above.      Please note that this dictation was created using voice recognition software. I have made every reasonable attempt to correct obvious errors, but I expect that there are errors of grammar and possibly context that I did not discover before finalizing the note.

## 2025-02-28 VITALS
HEIGHT: 70 IN | OXYGEN SATURATION: 98 % | BODY MASS INDEX: 23.86 KG/M2 | DIASTOLIC BLOOD PRESSURE: 72 MMHG | TEMPERATURE: 97.7 F | HEART RATE: 91 BPM | WEIGHT: 166.67 LBS | RESPIRATION RATE: 15 BRPM | SYSTOLIC BLOOD PRESSURE: 127 MMHG

## 2025-02-28 LAB
ANION GAP SERPL CALC-SCNC: 20 MMOL/L (ref 7–16)
BUN SERPL-MCNC: 117 MG/DL (ref 8–22)
CALCIUM SERPL-MCNC: 8.6 MG/DL (ref 8.5–10.5)
CHLORIDE SERPL-SCNC: 93 MMOL/L (ref 96–112)
CO2 SERPL-SCNC: 21 MMOL/L (ref 20–33)
CREAT SERPL-MCNC: 8.29 MG/DL (ref 0.5–1.4)
ERYTHROCYTE [DISTWIDTH] IN BLOOD BY AUTOMATED COUNT: 61.7 FL (ref 35.9–50)
GFR SERPLBLD CREATININE-BSD FMLA CKD-EPI: 6 ML/MIN/1.73 M 2
GLUCOSE SERPL-MCNC: 172 MG/DL (ref 65–99)
HCT VFR BLD AUTO: 25.1 % (ref 42–52)
HGB BLD-MCNC: 8.1 G/DL (ref 14–18)
MAGNESIUM SERPL-MCNC: 2.1 MG/DL (ref 1.5–2.5)
MCH RBC QN AUTO: 30 PG (ref 27–33)
MCHC RBC AUTO-ENTMCNC: 32.3 G/DL (ref 32.3–36.5)
MCV RBC AUTO: 93 FL (ref 81.4–97.8)
PLATELET # BLD AUTO: 170 K/UL (ref 164–446)
PMV BLD AUTO: 10.4 FL (ref 9–12.9)
POTASSIUM SERPL-SCNC: 4.3 MMOL/L (ref 3.6–5.5)
RBC # BLD AUTO: 2.7 M/UL (ref 4.7–6.1)
SODIUM SERPL-SCNC: 134 MMOL/L (ref 135–145)
WBC # BLD AUTO: 8.6 K/UL (ref 4.8–10.8)

## 2025-02-28 PROCEDURE — 83735 ASSAY OF MAGNESIUM: CPT

## 2025-02-28 PROCEDURE — 90945 DIALYSIS ONE EVALUATION: CPT

## 2025-02-28 PROCEDURE — A9270 NON-COVERED ITEM OR SERVICE: HCPCS | Performed by: HOSPITALIST

## 2025-02-28 PROCEDURE — 700101 HCHG RX REV CODE 250: Performed by: HOSPITALIST

## 2025-02-28 PROCEDURE — A9270 NON-COVERED ITEM OR SERVICE: HCPCS

## 2025-02-28 PROCEDURE — 85027 COMPLETE CBC AUTOMATED: CPT

## 2025-02-28 PROCEDURE — 700111 HCHG RX REV CODE 636 W/ 250 OVERRIDE (IP): Performed by: HOSPITALIST

## 2025-02-28 PROCEDURE — 80048 BASIC METABOLIC PNL TOTAL CA: CPT

## 2025-02-28 PROCEDURE — 700102 HCHG RX REV CODE 250 W/ 637 OVERRIDE(OP): Performed by: HOSPITALIST

## 2025-02-28 PROCEDURE — 700102 HCHG RX REV CODE 250 W/ 637 OVERRIDE(OP)

## 2025-02-28 PROCEDURE — 770020 HCHG ROOM/CARE - TELE (206)

## 2025-02-28 RX ORDER — SEVELAMER CARBONATE 800 MG/1
800 TABLET, FILM COATED ORAL
Status: DISCONTINUED | OUTPATIENT
Start: 2025-02-28 | End: 2025-03-01 | Stop reason: HOSPADM

## 2025-02-28 RX ORDER — SODIUM HYPOCHLORITE 1.25 MG/ML
SOLUTION TOPICAL 2 TIMES DAILY
Status: DISCONTINUED | OUTPATIENT
Start: 2025-02-28 | End: 2025-03-01 | Stop reason: HOSPADM

## 2025-02-28 RX ORDER — HEPARIN SODIUM 5000 [USP'U]/ML
5000 INJECTION, SOLUTION INTRAVENOUS; SUBCUTANEOUS EVERY 8 HOURS
Status: DISCONTINUED | OUTPATIENT
Start: 2025-02-28 | End: 2025-03-01

## 2025-02-28 RX ADMIN — SODIUM HYPOCHLORITE 473 ML: 1.25 SOLUTION TOPICAL at 18:14

## 2025-02-28 RX ADMIN — SODIUM HYPOCHLORITE 1 ML: 1.25 SOLUTION TOPICAL at 04:25

## 2025-02-28 RX ADMIN — HEPARIN SODIUM 5000 UNITS: 5000 INJECTION, SOLUTION INTRAVENOUS; SUBCUTANEOUS at 22:41

## 2025-02-28 RX ADMIN — OXYCODONE 2.5 MG: 5 TABLET ORAL at 04:24

## 2025-02-28 RX ADMIN — OXYCODONE 2.5 MG: 5 TABLET ORAL at 21:45

## 2025-02-28 RX ADMIN — METOPROLOL TARTRATE 25 MG: 25 TABLET, FILM COATED ORAL at 18:11

## 2025-02-28 RX ADMIN — METOPROLOL TARTRATE 25 MG: 25 TABLET, FILM COATED ORAL at 04:22

## 2025-02-28 RX ADMIN — OMEPRAZOLE 40 MG: 20 CAPSULE, DELAYED RELEASE ORAL at 04:23

## 2025-02-28 RX ADMIN — LOSARTAN POTASSIUM 12.5 MG: 25 TABLET, FILM COATED ORAL at 04:22

## 2025-02-28 RX ADMIN — TAMSULOSIN HYDROCHLORIDE 0.4 MG: 0.4 CAPSULE ORAL at 08:32

## 2025-02-28 RX ADMIN — GENTAMICIN SULFATE: 1 CREAM TOPICAL at 18:30

## 2025-02-28 RX ADMIN — ROSUVASTATIN CALCIUM 20 MG: 20 TABLET, FILM COATED ORAL at 21:45

## 2025-02-28 RX ADMIN — Medication 0.5 ML: at 01:18

## 2025-02-28 RX ADMIN — Medication 0.5 ML: at 04:25

## 2025-02-28 RX ADMIN — HEPARIN SODIUM 5000 UNITS: 5000 INJECTION, SOLUTION INTRAVENOUS; SUBCUTANEOUS at 16:14

## 2025-02-28 RX ADMIN — OMEPRAZOLE 40 MG: 20 CAPSULE, DELAYED RELEASE ORAL at 18:10

## 2025-02-28 RX ADMIN — MONTELUKAST 10 MG: 10 TABLET, FILM COATED ORAL at 21:45

## 2025-02-28 ASSESSMENT — ENCOUNTER SYMPTOMS
EYES NEGATIVE: 1
NAUSEA: 0
RESPIRATORY NEGATIVE: 1
WEAKNESS: 1
NERVOUS/ANXIOUS: 1
VOMITING: 0
MUSCULOSKELETAL NEGATIVE: 1

## 2025-02-28 ASSESSMENT — FIBROSIS 4 INDEX: FIB4 SCORE: 2.65

## 2025-02-28 ASSESSMENT — PAIN DESCRIPTION - PAIN TYPE
TYPE: ACUTE PAIN
TYPE: ACUTE PAIN
TYPE: ACUTE PAIN;CHRONIC PAIN

## 2025-02-28 NOTE — PROGRESS NOTES
Bedside report received, Pt care assumed. Tele box on. VSS. Pt AOX4, no signs of distress noted at this time.  Pt c/o of 5/10 pain. Pt denies any additional needs at this time. Bed in lowest position,  ambulates with maximum assistance. POC discussed with Pt/family, verbalized understanding, no questions at this time. Pt educated on fall risk and verbalizes understanding, call light within reach. Wife at bedside

## 2025-02-28 NOTE — PROGRESS NOTES
[facility] Medicine Daily Progress Note    Date of Service  2/28/2025    Chief Complaint  Edy Bennett is a 79 y.o. male admitted 2/25/2025 with weakness, lethargic stools, chest discomfort    Hospital Course  This is a 79-year-old male with a past med history of heart failure with reduced ejection fraction in the 30% range, coronary artery disease, on Plavix, end-stage renal disease on peritoneal dialysis, peripheral arterial disease, s/p bypass surgery, atrial flutter on anticoagulation with Eliquis, presents secondary to persistent melena, epistaxis, chest pressure and generalized weakness fatigue, the patient was recently hospitalized, and reportedly had continued fatigue with persistent melanotic stool episodes, several times a day, on 2/23 the patient developed additionally some chest discomfort, this reportedly had worsened with ambulation, the patient had alternating nosebleeds that he tried to manage with Afrin, he felt that it was fairly high volume, the patient eventually then presented here to the emergency department where the patient was found with a hemoglobin of 6, EKG showed some subtle changes possibly indicating some degree of ischemia, type II NSTEMI, this resolved in the emergency department, the patient was transfused 1 unit of red cells, given some limited amount of IV fluids and was admitted to the [facility] level of care on low-dose pressors and close hemodynamic monitoring  The patient during his recent hospitalization did have a upper endoscopy which did not reveal a bleeding lesion or reason for the patient's possible blood loss at that time, the patient also was evaluated for a possible cholecystectomy but declined as he was symptomatically not fitting a acute cholecystitis picture.    Interval Problem Update  Patient seen and examined today.  Data, Medication data reviewed.  Case discussed with nursing as available.  Plan of Care reviewed with patient and notified of changes.  2/25 the  patient found this morning with ongoing malaise, low blood pressure requiring still IV pressors to stabilize, given additional orders for blood transfusion, volume boluses,  Consulted nephrology as well as gastroenterology  The patient currently denies ongoing epistaxis, blood loss from his nosebleeds  Current blood pressures 90s over 50s, the patient is afebrile, heart rate in the 70s, the patient is saturating 100% on room air, hemoglobin after 1 unit of red cells stayed at 6, additional 2 units were written for as well as a have 100 cc IV fluid bolus with normal saline  After 2 units and hemoglobin only paty from 66.8 without further severe blood loss noted clinically  The patient did have some ongoing small-volume melena  Lactic acid has improved to 2.7, BNP elevated, troponin level flattening in the 3-400 range, currently no chest pain  2/26 patient with ongoing hematochezia, melena, decrease in hemoglobin despite a total of 8 units of PRBC since admission  The patient scheduled for EGD, this then later performed found a evidence of duodenal erosions with 1 active bleeding spot s/p clip, the patient denies any nausea vomiting, no abdominal pain, BP has stabilized, the patient is titrated off pressors, afebrile, heart rate in the 80s, respiration unlabored, blood pressure 9130s over 60s,  White second 8.3 hemoglobin 7.3 this morning, then transfused to 8.2 after 1 unit, platelet count 154  creatinine 8.68  2/27 the patient feels significantly improved, no further hematochezia or melena is reported, hemoglobin currently stable, the patient denies abdominal pain, blood pressure has improved, afebrile, heart rate in the 80s, respiration unlabored, blood pressure in the 1 teens to 130s over 80s,  WBC 7.4 hemoglobin 7.8 platelet count 155 sodium 135 potassium 4.5 chloride 96 bicarb 19 glucose 201  creatinine 3 7  2/28 the patient feels better, he is off oxygen, no further bleeding noted, he feels still  weak and deconditioned, he feels he would need another day to start ambulating and then discharged on 3/1  Hemoglobin has been stable, discussed holding off on antiplatelet and anticoagulation therapy for few more days still  For starting with Plavix likely  Reached out to the patient's cardiologist from previous, Dr. Trenton Asencio for close follow-up  Ongoing PD  Repeat a.m. labs in the morning prior to discharge hopefully  I have discussed this patient's plan of care and discharge plan at IDT rounds today with Case Management, Nursing, Nursing leadership, and other members of the IDT team.    Consultants/Specialty  critical care, GI, and nephrology    Code Status  Full Code    Disposition  The patient is not medically cleared for discharge to home or a post-acute facility.  Anticipate discharge to: home with close outpatient follow-up    I have placed the appropriate orders for post-discharge needs.    Review of Systems  Review of Systems   Constitutional:  Positive for malaise/fatigue.   HENT: Negative.     Eyes: Negative.    Respiratory: Negative.     Cardiovascular:  Positive for leg swelling. Negative for chest pain.   Gastrointestinal:  Negative for nausea and vomiting.   Genitourinary: Negative.    Musculoskeletal: Negative.    Skin: Negative.    Neurological:  Positive for weakness.   Endo/Heme/Allergies: Negative.    Psychiatric/Behavioral:  The patient is nervous/anxious.    All other systems reviewed and are negative.       Physical Exam  Temp:  [36.5 °C (97.7 °F)-36.7 °C (98.1 °F)] 36.7 °C (98.1 °F)  Pulse:  [76-88] 76  Resp:  [15-19] 16  BP: (116-129)/(68-77) 126/71  SpO2:  [96 %-99 %] 97 %    Physical Exam  Vitals and nursing note reviewed.   Constitutional:       Appearance: He is well-developed. He is ill-appearing.      Comments: Pt seen and examined.   HENT:      Head: Normocephalic and atraumatic.   Eyes:      Pupils: Pupils are equal, round, and reactive to light.   Cardiovascular:      Rate and  Rhythm: Normal rate and regular rhythm.      Heart sounds: Normal heart sounds.   Pulmonary:      Effort: Pulmonary effort is normal.      Breath sounds: Rhonchi present.   Abdominal:      General: Bowel sounds are normal. There is no distension.      Palpations: Abdomen is soft.      Tenderness: There is no abdominal tenderness.      Comments: PD catheter without inflammation   Musculoskeletal:         General: Swelling present. Normal range of motion.      Cervical back: Normal range of motion and neck supple.   Skin:     General: Skin is warm and dry.      Coloration: Skin is pale.   Neurological:      General: No focal deficit present.      Mental Status: He is alert and oriented to person, place, and time.   Psychiatric:         Behavior: Behavior normal.         Fluids    Intake/Output Summary (Last 24 hours) at 2/28/2025 1506  Last data filed at 2/28/2025 0815  Gross per 24 hour   Intake --   Output 2033 ml   Net -2033 ml        Laboratory  Recent Labs     02/26/25  0510 02/26/25  1200 02/27/25  0340 02/27/25  0621 02/28/25  0318   WBC 8.3  --  7.4  --  8.6   RBC 2.37*  --  2.55*  --  2.70*   HEMOGLOBIN 7.3*   < > 7.8* 7.8* 8.1*   HEMATOCRIT 21.6*   < > 22.9* 23.2* 25.1*   MCV 91.1  --  89.8  --  93.0   MCH 30.8  --  30.6  --  30.0   MCHC 33.8  --  34.1  --  32.3   RDW 53.7*  --  63.0*  --  61.7*   PLATELETCT 165  --  155*  --  170   MPV 10.6  --  10.4  --  10.4    < > = values in this interval not displayed.     Recent Labs     02/26/25  0510 02/27/25  0340 02/28/25  0318   SODIUM 134* 135 134*   POTASSIUM 5.0 4.5 4.3   CHLORIDE 95* 96 93*   CO2 19* 19* 21   GLUCOSE 154* 201* 172*   * 135* 117*   CREATININE 8.68* 8.37* 8.29*   CALCIUM 8.3* 8.2* 8.6     Recent Labs     02/26/25  0510   APTT 32.7   INR 1.42*               Imaging  DX-CHEST-PORTABLE (1 VIEW)   Final Result         1. No acute cardiopulmonary abnormalities are identified.           Assessment/Plan  * Shock (HCC)- (present on  admission)  Assessment & Plan  Hypovolemic, hemorrhagic shock   Improving, monitor      Duodenal ulcer  Assessment & Plan  S/p repeat upper endoscopy, local treatment by endoscopy, PPI    Melena- (present on admission)  Assessment & Plan  Patient now diagnosed with duodenal ulcer, 1 apparently bleeding    Patient with multifactorial anemia, including blood loss anemia, chronic kidney disease on EPO.  Patient presents with melanotic stool that persisted since last hospitalization as well as epistasis .  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  -Hemoglobin down trended from 8.4-6.0 in 1 week  - Aggressive replacement of red cells, transfusions ordered, close monitoring  - Ongoing PPI high-dose  - Keep hemoglobin possibly above 8 given the patient's coronary disease  - Will hold home Eliquis and plavix      High anion gap metabolic acidosis- (present on admission)  Assessment & Plan  Resolved, monitor     NSTEMI (non-ST elevated myocardial infarction) (HCC)  Assessment & Plan  Patient with chest pressure and elevated troponin to 288 with T wave inversions in the lateral leads consistent with likely type II NSTEMI from demand given acute blood loss.  Chest pressure alleviated  - Monitor after blood transfusion  - Will likely need to aim for hemoglobin goal greater than 8    Hypotension due to hypovolemia  Assessment & Plan  Transfusions ordered, volume boluses ordered with caution given the patient's reduced ejection fraction and renal disease  Currently stabilized, bleeding seems to have stopped    Typical atrial flutter (HCC)- (present on admission)  Assessment & Plan  Holding home Eliquis    CAD (coronary artery disease)- (present on admission)  Assessment & Plan  History of coronary artery disease status post stents x3 8/2023  -Holding home Plavix       Peripheral arterial disease (HCC)- (present on admission)  Assessment & Plan  Status post left popliteal-PT  bypass by Dr. Ochoa 1/20/2025 , wound VAC    ESRD on peritoneal dialysis (HCC)- (present on admission)  Assessment & Plan  History of ESRD on peritoneal dialysis followed by [facility]  Nephrology consulted    Plan  2/28  Further improved, start ambulating, assure stability for discharge, we will plan on starting Plavix and a few days later Eliquis  A.m. labs  Will challenge with subcu heparin prior to discharge  The patient is stabilizing, close monitor, ongoing PPI  The patient received a total of 8 units of PRBCs so far  Ongoing PPI, Carafate  Gastroenterology assistance appreciated  Nephrology is consulted, ongoing PD  Will hold anticoagulation for another day or 2, ongoing monitoring  See orders  Patient is has a high medical complexity, complex decision making and is at high risk for complication, morbidity, and mortality.  I spent 54 minutes, reviewing the chart, obtaining and/or reviewing separately obtained history. Performing a medically appropriate examination and evaluation.  Counseling and educating the patient. Ordering and reviewing medications, tests, or procedures.   Documenting clinical information in EPIC. Independently interpreting results and communicating results to patient. Discussing future disposition of care with patient, RN and case management.      VTE prophylaxis: Heparin subcu    I have performed a physical exam and reviewed and updated ROS and Plan today (2/28/2025). In review of yesterday's note (2/27/2025), there are no changes except as documented above.      Please note that this dictation was created using voice recognition software. I have made every reasonable attempt to correct obvious errors, but I expect that there are errors of grammar and possibly context that I did not discover before finalizing the note.

## 2025-02-28 NOTE — CARE PLAN
The patient is Stable - Low risk of patient condition declining or worsening    Shift Goals  Clinical Goals: wound care, VSS  Patient Goals: rest  Family Goals: updates    Progress made toward(s) clinical / shift goals:    Problem: Knowledge Deficit - Standard  Goal: Patient and family/care givers will demonstrate understanding of plan of care, disease process/condition, diagnostic tests and medications  Outcome: Progressing     Problem: Fall Risk  Goal: Patient will remain free from falls  Outcome: Progressing     Problem: Pain - Standard  Goal: Alleviation of pain or a reduction in pain to the patient’s comfort goal  Outcome: Progressing     Problem: Hemodynamics  Goal: Patient's hemodynamics, fluid balance and neurologic status will be stable or improve  Outcome: Progressing     Problem: Wound/ / Incision Healing  Goal: Patient's wound/surgical incision will decrease in size and heals properly  Outcome: Progressing       Patient is not progressing towards the following goals:

## 2025-02-28 NOTE — PROGRESS NOTES
[facility].5% solution, Dr Leal notified of Pre tx BP of 114/68 with order to change solution to all 2.5% for tonight. PD cath intact. Dressing changed, no s/s of infection noted. Report given to primary RN. See PATH flow sheet for details

## 2025-02-28 NOTE — PROGRESS NOTES
Handoff report received from night shift nurse. Pt care assumed. Pt is currently resting in bed. POC discussed with Pt and Pt verbalizes no questions at this time. Pt is AAOx4, on room air, on Tele monitoring, and VSS. Call light and belongings within reach, bed in lowest and locked position, and Pt educated on use of call light. Hourly rounding initiated.

## 2025-02-28 NOTE — PROGRESS NOTES
Monitor summary:        Rhythm: SR  w BBB  Rate: 71-81  Ectopy: (o)PVC  Measurements: .16/.11/.41        12hr chart check

## 2025-02-28 NOTE — WOUND TEAM
"Received wound consult for \"L leg and BL feet (toes)\"    Appropriate dressing orders already in place; wet to dry for leg and betadine for toes.  Please follow orders for wound care.    Wound team not following, Dr. Miller aware of these sites. Please defer to Vascular for management of these areas.  "

## 2025-02-28 NOTE — PROGRESS NOTES
"[facility] -  PROGRESS NOTE               Author: Camryn Leal M.D. Date & Time: 2/28/2025  12:46 PM     HPI:  79 y.o. male with HTN, ESRD on PD, PVDs/p bypass, NSTEMI who presents due to persistent melena, epistasis, chest pressure and generalized weakness/fatigue. 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  Came back to the hospital for chest pain, weakness, dark stool.   Patient he was doing PD at home w/o any issues after discharge, draining clear fluid, and clean cath site.   Admitted for anemia and GI consult.    DAILY NEPHROLOGY SUMMARY:  2/25: Consult done.   2/26: Had multiple episodes of melena overnight, requiring PRBC->taken for EGD today. OD overnight w/o issues- did gentle PD given hypotensive on pressors and GI bleed, net 3.5l +ve.  2/27: EGD yesterday w/ Active bleeding from duodenum, vascular lesion, s/p clip x 1. Feels much better today. PD went well. Leg swelling improved.   2/28: Pt feeling stronger. PD overnight w/o issues, 2L UF achieved. Hb stable.     REVIEW OF SYSTEMS:    10 point ROS reviewed and is as per HPI/daily summary or otherwise negative    PMH/PSH/SH/FH: Reviewed and unchanged since admission note  CURRENT MEDICATIONS: Reviewed from admission to present day    VS:  /71   Pulse 76   Temp 36.7 °C (98.1 °F) (Temporal)   Resp 16   Ht 1.778 m (5' 10\")   Wt 75.6 kg (166 lb 10.7 oz)   SpO2 97%   BMI 23.91 kg/m²   Physical Exam  Constitutional:       Appearance: He is ill-appearing.   HENT:      Head: Normocephalic and atraumatic.      Mouth/Throat:      Mouth: Mucous membranes are moist.   Cardiovascular:      Rate and Rhythm: Normal rate and regular rhythm.      Pulses: Normal pulses.      Heart sounds: Normal heart sounds.   Pulmonary:      Effort: Pulmonary " effort is normal.      Breath sounds: Normal breath sounds.   Abdominal:      General: There is no distension.      Palpations: Abdomen is soft.      Tenderness: There is no abdominal tenderness.      Comments: PD cath in place w/ dressing c/d/I;no erythema/induration/discharge   Musculoskeletal:      Right lower leg: Edema present.      Left lower leg: Edema present.   Skin:     Capillary Refill: Capillary refill takes less than 2 seconds.   Neurological:      General: No focal deficit present.      Mental Status: He is alert and oriented to person, place, and time.   Psychiatric:         Mood and Affect: Mood normal.         FLUID BALANCE:  In: -   Out: 1008     LABS:  Recent Labs     02/26/25  0510 02/27/25  0340 02/28/25  0318   SODIUM 134* 135 134*   POTASSIUM 5.0 4.5 4.3   CHLORIDE 95* 96 93*   CO2 19* 19* 21   GLUCOSE 154* 201* 172*   * 135* 117*   CREATININE 8.68* 8.37* 8.29*   CALCIUM 8.3* 8.2* 8.6     Recent Labs     02/26/25  0510 02/26/25  1200 02/27/25  0340 02/27/25  0621 02/28/25  0318   WBC 8.3  --  7.4  --  8.6   RBC 2.37*  --  2.55*  --  2.70*   HEMOGLOBIN 7.3*   < > 7.8* 7.8* 8.1*   HEMATOCRIT 21.6*   < > 22.9* 23.2* 25.1*   MCV 91.1  --  89.8  --  93.0   MCH 30.8  --  30.6  --  30.0   MCHC 33.8  --  34.1  --  32.3   RDW 53.7*  --  63.0*  --  61.7*   PLATELETCT 165  --  155*  --  170   MPV 10.6  --  10.4  --  10.4    < > = values in this interval not displayed.       IMPRESSION:  # ESRD on CCPD  - 6 exchanges daily, 12h, 12L     # HTN, controlled   - Goal BP < 140/90  - torsemide 10mg daily     #Hyponatremia, mild  -correct w/ PD    #Hemorrhagic shock   # Melena, GIB  # Anemia of CKD  - S/p 8 U PRBC  - GI following: active bleeding DU clipped     # CKD-MBD  - Ca at goal, check phos  - Lanthanum 500mg TID, titrate up prn     # Chronic hypoxic respiratory failure   - On home oxygen 2-2.5L at night      # HFrEF  #NSTEMI type II  # Hx CAD with stent   TTE: EF 30-35%, Gd2DD, RVSP 40  CXR: no  pulm edema     SUGGESTIONS:  - Continue CCPD during hospital stay, cont at 6 cycles; no heparin  - hold antihypertensive, diuretics iso hypotension  - PRBC for hb<7  - Dose all meds per ESRD  - Renal diet, low phos   - ROSALIND SQ Qweek   - Cont Phos binder TID w/meals   - L arm AVF not fully matured,  will need intervention outpt   - Avoid Fleet enema in dialysis patients   - Labs daily

## 2025-02-28 NOTE — WOUND TEAM
[facility]  Inpatient Services  Wound and Skin Care Brief Evaluation    Admission Date: 2/25/2025     Last order of IP CONSULT TO WOUND CARE was found on 2/28/2025 from Hospital Encounter on 2/25/2025     HPI, PMH, SH: Reviewed    Chief Complaint   Patient presents with    Weakness    Chest Pain     Diagnosis: Shock (HCC) [R57.9]    Unit where seen by Wound Team: T733/02     Wound consult placed regarding toes and LLE. Chart and images reviewed. Patient known to wound team. Case discussed with MD Miller. MD Miller following incision sites on LLE. Patient with known eschar to toes, betadine to be applied per orders. There are no further dressings to transition toes to at this time, betadine most appropriate. MD Miller will reach out to wound team if our further follow up is indicated. No pressure injuries or advanced wound care needs identified. Wound consult completed. No further follow up unless indicated and consulted.                PREVENTATIVE INTERVENTIONS:    Q shift Jalen - performed per nursing policy  Q shift pressure point assessments - performed per nursing policy

## 2025-02-28 NOTE — PROGRESS NOTES
[facility] Services Progress Note     Received patient awake, A & O x 4, Vital signs stable overnight.     Tolerated treatment well  Effluent clear and yellow  Aseptically disconnected from the PD cycler  Secured PD catheter to patient.  Dressing CDI.     24 hr UF 2033 ml  (Total UF 1987 ml + Initial drain 46 ml - Last fill 0 ml)       Report given to AGNIESZKA Colon RN.

## 2025-02-28 NOTE — CARE PLAN
LAST SCOPE 10/17 IN [facility] --No personal history of polyps--Family history polyps--No family history of colon cancer--ASPIRIN ELIQUIS--Medications:          apixaban (ELIQUIS) 5 MG tablet tablet  aspirin 81 MG EC tablet  bimatoprost (LUMIGAN) 0.01 % ophthalmic drops  calcium carbonate (OS-NUVIA) 600 MG tablet    dilTIAZem CD (CARDIZEM CD) 120 MG 24 hr capsule  doxazosin (CARDURA) 2 MG tablet    furosemide (LASIX) 40 MG tablet    isosorbide dinitrate (ISORDIL) 30 MG tablet  metoprolol succinate XL (TOPROL-XL) 50 MG 24 hr tablet  nitroglycerin (NITROSTAT) 0.4 MG SL tablet  potassium chloride (K-DUR,KLOR-CON) 20 MEQ CR tablet  simvastatin (ZOCOR) 20 MG tablet        QUESTIONNAIRE SCEEENING  HAS BEEN SENT TO DOCTOR FOR REVIEW    The patient is Stable - Low risk of patient condition declining or worsening    Shift Goals  Clinical Goals: hemodynamic stability, wound care, monitor hgb  Patient Goals: comfort, rest  Family Goals: updates    Progress made toward(s) clinical / shift goals:    Problem: Knowledge Deficit - Standard  Goal: Patient and family/care givers will demonstrate understanding of plan of care, disease process/condition, diagnostic tests and medications  Outcome: Progressing     Problem: Skin Integrity  Goal: Skin integrity is maintained or improved  Outcome: Progressing     Problem: Fall Risk  Goal: Patient will remain free from falls  2/28/2025 0229 by Giana Doyle R.N.  Outcome: Progressing  2/28/2025 0229 by Giana Doyle R.N.  Outcome: Progressing     Problem: Pain - Standard  Goal: Alleviation of pain or a reduction in pain to the patient’s comfort goal  2/28/2025 0229 by Giana Doyle R.N.  Outcome: Progressing  2/28/2025 0229 by Giana Doyle R.N.  Outcome: Progressing     Problem: Hemodynamics  Goal: Patient's hemodynamics, fluid balance and neurologic status will be stable or improve  2/28/2025 0229 by Giana Doyle R.N.  Outcome: Progressing  2/28/2025 0229 by Giana Doyle R.N.  Outcome: Progressing       Patient is not progressing towards the following goals:      Problem: Wound/ / Incision Healing  Goal: Patient's wound/surgical incision will decrease in size and heals properly  2/28/2025 0229 by Giana Doyle R.N.  Outcome: Not Progressing  2/28/2025 0229 by Giana Doyle R.N.  Outcome: Progressing

## 2025-02-28 NOTE — PROGRESS NOTES
4 Eyes Skin Assessment Completed by Giana Doyle RN and Jeanine Rashid RN.    Head WDL  Ears WDL  Nose WDL  Mouth WDL  Neck WDL  Breast/Chest WDL  Shoulder Blades WDL  Spine WDL  (R) Arm/Elbow/Hand Bruising and Abrasion  (L) Arm/Elbow/Hand WDL  Abdomen peritoneal dialysis site  Groin Redness and Blanching  Scrotum/Coccyx/Buttocks Redness and Blanching  (R) Leg Abrasion  (L) Leg Incision  (R) Heel/Foot/Toe Necrotic toes 1st and 4th  (L) Heel/Foot/Toe Necrotic toes 1st 2nd and 3rd          Devices In Places Tele Box and Pulse Ox and peritoneal dialysis access      Interventions In Place Pillows    Possible Skin Injury Yes    Pictures Uploaded Into Epic Yes  Wound Consult Placed Yes  RN Wound Prevention Protocol Ordered Yes  L leg incision    L foot      R foot

## 2025-02-28 NOTE — PROGRESS NOTES
Patient arrived to unit at approximately 18:00. Wife at bedside. Patient A&Ox4. [place] with hearing aids in place. RA. VSS. Denies pain at this time. Bed locked in lowest position. Call light within reach. Safety precautions maintained.   18:35 - dialysis RN at bedside for PD.

## 2025-03-01 ENCOUNTER — PHARMACY VISIT (OUTPATIENT)
Dept: PHARMACY | Facility: MEDICAL CENTER | Age: 80
End: 2025-03-01
Payer: COMMERCIAL

## 2025-03-01 VITALS
TEMPERATURE: 98.2 F | RESPIRATION RATE: 16 BRPM | DIASTOLIC BLOOD PRESSURE: 59 MMHG | WEIGHT: 165.79 LBS | HEIGHT: 70 IN | HEART RATE: 84 BPM | OXYGEN SATURATION: 98 % | SYSTOLIC BLOOD PRESSURE: 113 MMHG | BODY MASS INDEX: 23.73 KG/M2

## 2025-03-01 LAB
ANION GAP SERPL CALC-SCNC: 17 MMOL/L (ref 7–16)
BUN SERPL-MCNC: 107 MG/DL (ref 8–22)
CALCIUM SERPL-MCNC: 8 MG/DL (ref 8.5–10.5)
CHLORIDE SERPL-SCNC: 93 MMOL/L (ref 96–112)
CO2 SERPL-SCNC: 23 MMOL/L (ref 20–33)
CREAT SERPL-MCNC: 8.09 MG/DL (ref 0.5–1.4)
ERYTHROCYTE [DISTWIDTH] IN BLOOD BY AUTOMATED COUNT: 58.4 FL (ref 35.9–50)
GFR SERPLBLD CREATININE-BSD FMLA CKD-EPI: 6 ML/MIN/1.73 M 2
GLUCOSE SERPL-MCNC: 168 MG/DL (ref 65–99)
HCT VFR BLD AUTO: 24.1 % (ref 42–52)
HGB BLD-MCNC: 8 G/DL (ref 14–18)
MCH RBC QN AUTO: 30.9 PG (ref 27–33)
MCHC RBC AUTO-ENTMCNC: 33.2 G/DL (ref 32.3–36.5)
MCV RBC AUTO: 93.1 FL (ref 81.4–97.8)
PLATELET # BLD AUTO: 172 K/UL (ref 164–446)
PMV BLD AUTO: 10.1 FL (ref 9–12.9)
POTASSIUM SERPL-SCNC: 3.9 MMOL/L (ref 3.6–5.5)
RBC # BLD AUTO: 2.59 M/UL (ref 4.7–6.1)
SODIUM SERPL-SCNC: 133 MMOL/L (ref 135–145)
WBC # BLD AUTO: 8.2 K/UL (ref 4.8–10.8)

## 2025-03-01 PROCEDURE — 90945 DIALYSIS ONE EVALUATION: CPT

## 2025-03-01 PROCEDURE — 700111 HCHG RX REV CODE 636 W/ 250 OVERRIDE (IP): Performed by: HOSPITALIST

## 2025-03-01 PROCEDURE — 80048 BASIC METABOLIC PNL TOTAL CA: CPT

## 2025-03-01 PROCEDURE — 700102 HCHG RX REV CODE 250 W/ 637 OVERRIDE(OP): Performed by: HOSPITALIST

## 2025-03-01 PROCEDURE — A9270 NON-COVERED ITEM OR SERVICE: HCPCS

## 2025-03-01 PROCEDURE — A9270 NON-COVERED ITEM OR SERVICE: HCPCS | Performed by: HOSPITALIST

## 2025-03-01 PROCEDURE — RXMED WILLOW AMBULATORY MEDICATION CHARGE: Performed by: HOSPITALIST

## 2025-03-01 PROCEDURE — 85027 COMPLETE CBC AUTOMATED: CPT

## 2025-03-01 PROCEDURE — 700102 HCHG RX REV CODE 250 W/ 637 OVERRIDE(OP)

## 2025-03-01 RX ORDER — OMEPRAZOLE 40 MG/1
40 CAPSULE, DELAYED RELEASE ORAL 2 TIMES DAILY
Qty: 60 CAPSULE | Refills: 1 | Status: SHIPPED | OUTPATIENT
Start: 2025-03-01 | End: 2025-03-31

## 2025-03-01 RX ADMIN — OMEPRAZOLE 40 MG: 20 CAPSULE, DELAYED RELEASE ORAL at 05:10

## 2025-03-01 RX ADMIN — SEVELAMER CARBONATE 800 MG: 800 TABLET, FILM COATED ORAL at 09:02

## 2025-03-01 RX ADMIN — SEVELAMER CARBONATE 800 MG: 800 TABLET, FILM COATED ORAL at 12:56

## 2025-03-01 RX ADMIN — HEPARIN SODIUM 5000 UNITS: 5000 INJECTION, SOLUTION INTRAVENOUS; SUBCUTANEOUS at 05:10

## 2025-03-01 RX ADMIN — TAMSULOSIN HYDROCHLORIDE 0.4 MG: 0.4 CAPSULE ORAL at 09:02

## 2025-03-01 RX ADMIN — FUROSEMIDE 40 MG: 40 TABLET ORAL at 05:10

## 2025-03-01 RX ADMIN — LOSARTAN POTASSIUM 12.5 MG: 25 TABLET, FILM COATED ORAL at 05:10

## 2025-03-01 RX ADMIN — METOPROLOL TARTRATE 25 MG: 25 TABLET, FILM COATED ORAL at 05:10

## 2025-03-01 RX ADMIN — OXYCODONE 2.5 MG: 5 TABLET ORAL at 05:56

## 2025-03-01 ASSESSMENT — PAIN DESCRIPTION - PAIN TYPE: TYPE: CHRONIC PAIN

## 2025-03-01 ASSESSMENT — FIBROSIS 4 INDEX: FIB4 SCORE: 2.65

## 2025-03-01 NOTE — DISCHARGE INSTRUCTIONS
Physician Discharge Instructions:  Discharge Diagnosis: GI bleeding with a duodenal ulcer on anticoagulation  Proceed to discharge/transfer  to home  Take Rx/Prescriptions as prescribed and reconciled per AVS  For OTC Medication consult with your Pharmacist first.  Diet: Resume heart healthy diet, renal diet, fluid restriction as previously ordered by nephrology  The patient resume anticoagulation, as well as Plavix/antiplatelet therapy on Wednesday, following the patient to pay close attention to his bowel movements and reports back to a medical facility or doctors office if he has evidence of melena, hematochezia  Patient to take Prilosec 40 mg twice daily for 1 month and then resume once daily until further assessed by his physician  Ongoing wound care with home health, follow-up with vascular surgery as previously indicated  Activity: As tolerated  Follow-up with PCP within 3-10 days at home location, ask for f/u prescriptions by PCP  Follow-up with Specialty MD: Cardiology, nephrology as previously arranged for  For new, severe symptoms refer to the next Emergency Care or call your Physician.  Controlled Substance History was reviewed if new Rx was issued.    Marty Andres MD   Pt tolerated water. Pt medicated with Motrin per order.

## 2025-03-01 NOTE — PROGRESS NOTES
Bedside report received, Pt care assumed. Tele box on. VSS.  Pt AOX4, no signs of distress noted at this time. Pt denied any additional needs at this time. Bed in lowest position, ambulates with standby assistance. POC discussed with Pt/family, verbalized understanding, no questions at this time. Pt educated on fall risk and verbalizes understanding, call light within reach. Wife at bedside

## 2025-03-01 NOTE — PROGRESS NOTES
[facility] Services Progress Notes    CCPD ordered by Dr. Leal. Disconnected aseptically from PD Cycler at 0700.    Pt stable, VSS, alert and oriented.  Effluent clear and yellow, no signs of bleeding/fibrin clots.  No alarms on machine was noted or reported before disconnection.    24 hour UF= 1731mL (I.Drain= 1mL + Total UF= 1730mL - Last fill= 0)    Report given to Tonja ARANDA

## 2025-03-01 NOTE — PROGRESS NOTES
"[facility] -  PROGRESS NOTE               Author: Camryn Leal M.D. Date & Time: 3/1/2025  11:56 AM     HPI:  79 y.o. male with HTN, ESRD on PD, PVDs/p bypass, NSTEMI who presents due to persistent melena, epistasis, chest pressure and generalized weakness/fatigue. 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  Came back to the hospital for chest pain, weakness, dark stool.   Patient he was doing PD at home w/o any issues after discharge, draining clear fluid, and clean cath site.   Admitted for anemia and GI consult.    DAILY NEPHROLOGY SUMMARY:  2/25: Consult done.   2/26: Had multiple episodes of melena overnight, requiring PRBC->taken for EGD today. OD overnight w/o issues- did gentle PD given hypotensive on pressors and GI bleed, net 3.5l +ve.  2/27: EGD yesterday w/ Active bleeding from duodenum, vascular lesion, s/p clip x 1. Feels much better today. PD went well. Leg swelling improved.   2/28: Pt feeling stronger. PD overnight w/o issues, 2L UF achieved. Hb stable.  3/1: Feels great, PD w/ 1.7l UF yest, well tolerated. Did have 1 bloody BM yesterday- being monitored     REVIEW OF SYSTEMS:    10 point ROS reviewed and is as per HPI/daily summary or otherwise negative    PMH/PSH/SH/FH: Reviewed and unchanged since admission note  CURRENT MEDICATIONS: Reviewed from admission to present day    VS:  /61   Pulse 78   Temp 37 °C (98.6 °F) (Temporal)   Resp 17   Ht 1.778 m (5' 10\")   Wt 75.2 kg (165 lb 12.6 oz)   SpO2 99%   BMI 23.79 kg/m²   Physical Exam  Constitutional:       General: He is not in acute distress.  HENT:      Head: Normocephalic and atraumatic.      Mouth/Throat:      Mouth: Mucous membranes are moist.   Cardiovascular:      Rate and Rhythm: Normal rate and regular rhythm. "      Pulses: Normal pulses.      Heart sounds: Normal heart sounds.   Pulmonary:      Effort: Pulmonary effort is normal.      Breath sounds: Normal breath sounds.   Abdominal:      General: There is no distension.      Palpations: Abdomen is soft.      Tenderness: There is no abdominal tenderness.      Comments: PD cath in place w/ dressing c/d/I;no erythema/induration/discharge   Musculoskeletal:      Right lower leg: No edema.      Left lower leg: No edema.      Comments: Edema resolved   Skin:     Capillary Refill: Capillary refill takes less than 2 seconds.   Neurological:      General: No focal deficit present.      Mental Status: He is alert and oriented to person, place, and time.   Psychiatric:         Mood and Affect: Mood normal.         FLUID BALANCE:  In: -   Out: 2033     LABS:  Recent Labs     02/27/25 0340 02/28/25 0318 03/01/25  0855   SODIUM 135 134* 133*   POTASSIUM 4.5 4.3 3.9   CHLORIDE 96 93* 93*   CO2 19* 21 23   GLUCOSE 201* 172* 168*   * 117* 107*   CREATININE 8.37* 8.29* 8.09*   CALCIUM 8.2* 8.6 8.0*     Recent Labs     02/27/25  0340 02/27/25  0621 02/28/25 0318 03/01/25  0855   WBC 7.4  --  8.6 8.2   RBC 2.55*  --  2.70* 2.59*   HEMOGLOBIN 7.8* 7.8* 8.1* 8.0*   HEMATOCRIT 22.9* 23.2* 25.1* 24.1*   MCV 89.8  --  93.0 93.1   MCH 30.6  --  30.0 30.9   MCHC 34.1  --  32.3 33.2   RDW 63.0*  --  61.7* 58.4*   PLATELETCT 155*  --  170 172   MPV 10.4  --  10.4 10.1       IMPRESSION:  # ESRD on CCPD  - 6 exchanges daily, 12h, 12L     # HTN, controlled   - Goal BP < 140/90  - torsemide 10mg daily     #Hyponatremia, mild  -correct w/ PD    #Hemorrhagic shock   # Melena, GIB  # Anemia of CKD  - S/p 8 U PRBC  - GI following: active bleeding DU clipped     # CKD-MBD  - Ca at goal, check phos  - Lanthanum 500mg TID, titrate up prn     # Chronic hypoxic respiratory failure   - On home oxygen 2-2.5L at night      # HFrEF  #NSTEMI type II  # Hx CAD with stent   TTE: EF 30-35%, Gd2DD, RVSP  40  CXR: no pulm edema     SUGGESTIONS:  - Continue CCPD during hospital stay, cont at 6 cycles; no heparin- using 2.5% bags X 2  - hold antihypertensive, diuretics iso hypotension  - PRBC for hb<7  - Dose all meds per ESRD  - Renal diet, low phos   - ROSALIND SQ Qweek   - Cont Phos binder TID w/meals   - L arm AVF not fully matured,  will need intervention outpt   - Avoid Fleet enema in dialysis patients

## 2025-03-01 NOTE — PROGRESS NOTES
Discharge orders received.  Patient arrived to the discharge lounge.  PIV removed by floor RN. Meds to beds medications verified by discharge RN, bag with medication given to patient.  Instructions given, medications reviewed and general discharge education provided to patient.  Follow up appointments discussed.  Patient verbalized understanding of dc instructions and prescriptions.  Patient signed discharge instructions.  Patient verbalized he had all belongings with him, Denied having any home medications locked in our inpatient pharmacy that  he needs back. Patient wheeled from discharge lounge to private vehicle. Patient left via car with family to home in stable condition.

## 2025-03-01 NOTE — DISCHARGE SUMMARY
Discharge Summary    CHIEF COMPLAINT ON ADMISSION  Chief Complaint   Patient presents with    Weakness    Chest Pain   Melanotic stools    Reason for Admission  Hemorrhagic shock    Admission Date  2/25/2025    CODE STATUS  Full Code    HPI & HOSPITAL COURSE  This is a 79-year-old male with a past med history of heart failure with reduced ejection fraction in the 30% range, coronary artery disease, on Plavix, end-stage renal disease on peritoneal dialysis, peripheral arterial disease, s/p bypass surgery, atrial flutter on anticoagulation with Eliquis, presents secondary to persistent melena, epistaxis, chest pressure and generalized weakness fatigue, the patient was recently hospitalized, and reportedly had continued fatigue with persistent melanotic stool episodes, several times a day, on 2/23 the patient developed additionally some chest discomfort, this reportedly had worsened with ambulation, the patient had alternating nosebleeds that he tried to manage with Afrin, he felt that it was fairly high volume, the patient eventually then presented here to the emergency department where the patient was found with a hemoglobin of 6, EKG showed some subtle changes possibly indicating some degree of ischemia, type II NSTEMI, this resolved in the emergency department, the patient was transfused 1 unit of red cells, given some limited amount of IV fluids and was admitted to the [facility] level of care on low-dose pressors and close hemodynamic monitoring  The patient during his recent hospitalization did have a upper endoscopy which did not reveal a bleeding lesion or reason for the patient's possible blood loss at that time, the patient also was evaluated for a possible cholecystectomy but declined as he was symptomatically not fitting a acute cholecystitis picture.  Hospital course endocrinologic fashion  2/25 the patient found this morning with ongoing malaise, low blood pressure requiring still IV pressors to stabilize,  given additional orders for blood transfusion, volume boluses,  Consulted nephrology as well as gastroenterology  The patient currently denies ongoing epistaxis, blood loss from his nosebleeds  Current blood pressures 90s over 50s, the patient is afebrile, heart rate in the 70s, the patient is saturating 100% on room air, hemoglobin after 1 unit of red cells stayed at 6, additional 2 units were written for as well as a have 100 cc IV fluid bolus with normal saline  After 2 units and hemoglobin only paty from 66.8 without further severe blood loss noted clinically  The patient did have some ongoing small-volume melena  Lactic acid has improved to 2.7, BNP elevated, troponin level flattening in the 3-400 range, currently no chest pain  2/26 patient with ongoing hematochezia, melena, decrease in hemoglobin despite a total of 8 units of PRBC since admission  The patient scheduled for EGD, this then later performed found a evidence of duodenal erosions with 1 active bleeding spot s/p clip, the patient denies any nausea vomiting, no abdominal pain, BP has stabilized, the patient is titrated off pressors, afebrile, heart rate in the 80s, respiration unlabored, blood pressure 9130s over 60s,  White second 8.3 hemoglobin 7.3 this morning, then transfused to 8.2 after 1 unit, platelet count 154  creatinine 8.68  2/27 the patient feels significantly improved, no further hematochezia or melena is reported, hemoglobin currently stable, the patient denies abdominal pain, blood pressure has improved, afebrile, heart rate in the 80s, respiration unlabored, blood pressure in the 1 teens to 130s over 80s,  WBC 7.4 hemoglobin 7.8 platelet count 155 sodium 135 potassium 4.5 chloride 96 bicarb 19 glucose 201  creatinine 3 7  2/28 the patient feels better, he is off oxygen, no further bleeding noted, he feels still weak and deconditioned, he feels he would need another day to start ambulating and then discharged on  3/1  Hemoglobin has been stable, discussed holding off on antiplatelet and anticoagulation therapy for few more days still  For starting with Plavix likely  Reached out to the patient's cardiologist from previous, Dr. Trenton Asencio for close follow-up  Ongoing PD  Repeat a.m. labs in the morning prior to discharge hopefully  On 3/1 the patient overall improved, his hemoglobin remained stable, the patient had a challenge with subcu heparin and had no further hematochezia or melanotic stools, the patient overall stabilized, he had no further epistaxis, he was referred back to home health and has follow-up with vascular surgery for his left lower extremity ulcer, a Suad was placed,  The patient at this time is instructed to restart his anticoagulation on Wednesday, following monitor closely in terms of additional blood loss  If the patient would have recurrent epistaxis to refer to ENT for cautery  The patient will continue peritoneal dialysis  Patient will continue lower extremity wound care  Home health was reinstituted  At this time the patient is stabilized to the point where he can be transitioned to the outpatient setting, certainly there is a risk of recurrent intestinal bleeding possibly once he resumes anticoagulation, at this time this is unknown, the patient has indication for antiplatelet therapy with stenting as well as Eliquis with cardiac dysrhythmia  The patient is instructed to return to the emergency room if he has further evidence of GI bleeding    Therefore, he is discharged in fair and stable condition to home with close outpatient follow-up.    The patient met 2-midnight criteria for an inpatient stay at the time of discharge.    Discharge Date  3/1/2025    FOLLOW UP ITEMS POST DISCHARGE  Close follow-up with his primary care, kidney care, vascular surgery      DISCHARGE DIAGNOSES  Principal Problem:    Shock (HCC) (POA: Yes)  Active Problems:    Melena (POA: Yes)    Duodenal ulcer (POA:  Unknown)    ESRD on peritoneal dialysis (HCC) (POA: Yes)    Peripheral arterial disease (HCC) (POA: Yes)    CAD (coronary artery disease) (POA: Yes)    Typical atrial flutter (HCC) (POA: Yes)    Hypotension due to hypovolemia (POA: Unknown)    NSTEMI (non-ST elevated myocardial infarction) (HCC) (POA: Unknown)    High anion gap metabolic acidosis (POA: Yes)  Resolved Problems:    * No resolved hospital problems. *      FOLLOW UP  Ongoing nephrology for peritoneal dialysis  Cardiology for follow-up on patient's heart failure diagnosis, the patient has an appointment with Dr. Trenton Asencio  Vascular surgery  Wound care  Home health  ENT if epistaxis returns    MEDICATIONS ON DISCHARGE     Medication List        PAUSE taking these medications        Instructions   apixaban 5mg Tabs  Wait to take this until: March 5, 2025  Commonly known as: Eliquis   Take 5 mg by mouth 2 times a day. Indications: Atrial Fibrillation  Dose: 5 mg     clopidogrel 75 MG Tabs  Wait to take this until: March 5, 2025  Commonly known as: Plavix   Take 75 mg by mouth every day. Indications: Coronary Bypass Surgery  Dose: 75 mg            START taking these medications        Instructions   Home Care Oxygen   Inhale 2 L/min at bedtime. Oxygen dose range: 2 to 2 L/min  Respiratory route via: Nasal Cannula   Oxygen supplier: [facility]      Indications: hypoxia  Dose: 2 L/min     omeprazole 40 MG delayed-release capsule  Commonly known as: PriLOSEC   Take 1 Capsule by mouth 2 times a day for 30 days.  Dose: 40 mg            CHANGE how you take these medications        Instructions   losartan 25 MG Tabs  What changed: how much to take  Commonly known as: Cozaar   Take 0.5 Tablets by mouth every day for 30 days. Do not take for blood pressure less than 100/60  Dose: 12.5 mg            CONTINUE taking these medications        Instructions   epoetin jayna 4000 UNIT/ML Soln  Commonly known as: Epogen/Procrit   Inject 4,000 Units under the skin every 7 days.  Indications: Anemia associated with Chronic Kidney Failure  Dose: 4,000 Units     furosemide 40 MG Tabs  Commonly known as: Lasix   Take 40 mg by mouth every day. Indications: Cardiac Failure, Edema  Dose: 40 mg     gentamicin 0.1 % cream  Commonly known as: Garamycin   Apply 1 Application topically every day. Indications: PD line site  Dose: 1 Application     lanthanum carbonate 500 MG Chew  Commonly known as: Fosrenol   Chew 500 mg 3 times a day with meals.  Dose: 500 mg     metoprolol tartrate 25 MG Tabs  Commonly known as: Lopressor   Take 1 Tablet by mouth 2 times a day for 30 days.  Dose: 25 mg     montelukast 10 MG Tabs  Commonly known as: Singulair   Take 10 mg by mouth at bedtime. Indications: Asthma, Hayfever  Dose: 10 mg     polyethylene glycol/lytes Pack  Commonly known as: Miralax   Take 17 g by mouth 1 time a day as needed. Indications: Constipation  Dose: 17 g     Renvela 800 MG Tabs tablet  Generic drug: sevelamer carbonate   Take 800 mg by mouth 1 time a day as needed (with snack). Indications: High Amount of Phosphate in the Blood  Dose: 800 mg     rosuvastatin 20 MG Tabs  Commonly known as: Crestor   Take 20 mg by mouth at bedtime.     Indications: High Amount of Fats in the Blood  Dose: 20 mg     sodium bicarbonate 650 MG Tabs  Commonly known as: Sodium Bicarbonate   Take 650 mg by mouth 3 times a day as needed (pt takes as needed for stomach burning). Indications: Heartburn  Dose: 650 mg     tamsulosin 0.4 MG capsule  Commonly known as: Flomax   Take 1 Capsule by mouth 1/2 hour after breakfast.  Dose: 0.4 mg     TYLENOL 500 MG Tabs  Generic drug: acetaminophen   Take 1,000 mg by mouth every 8 hours as needed for Mild Pain. Indications: Pain, 1-4/10 pain  Dose: 1,000 mg            STOP taking these medications      amoxicillin 500 MG Caps  Commonly known as: Amoxil              Allergies  No Known Allergies    DIET  Orders Placed This Encounter   Procedures    Diet Order Diet: Renal; Second  Modifier: (optional): Cardiac     Standing Status:   Standing     Number of Occurrences:   1     Diet::   Renal [8]     Second Modifier: (optional):   Cardiac [6]       ACTIVITY  As tolerated.  Weight bearing as tolerated    CONSULTATIONS  Gastroenterology  Nephrology  PROCEDURES  Frantz Dailey M.D.  Physician  Gastroenterology     Procedures     Signed     Date of Service: 2/26/2025 10:01 AM       OPERATIVE REPORT     PATIENT:   Edy Bennett   1945         PREOPERATIVE DIAGNOSES/INDICATIONS: Melena, anemia.      POSTOPERATIVE DIAGNOSIS:   Active bleeding from duodenum, vascular lesion, s/p clip x 1. Good control after clip.         PROCEDURE:  ESOPHAGOGASTRODUODENOSCOPY     PHYSICIAN:  Frantz Dailey MD. MPH.     ANESTHESIA:  Per anesthesiologist or ICU/ED team.      LOCATION: [facility]     CONSENT:  OBTAINED.  The risks, benefits and alternatives of the procedure were discussed in details. The risks include and are not limited to bleeding, infection, perforation, missed lesions, and sedations risks (cardiopulmonary compromise and allergic reaction to medications).     DESCRIPTION: Scope team at bedside  Patient was placed on his/her left lateral decubitus position. A bite block was placed in patient's mouth. Patient was sedated by anesthesia.  Vital signs were monitored throughout the procedure.  Oxygenation support was provided throughout the procedure. Upper endoscope was inserted into patient's mouth and advanced to the second portion of the duodenum under direct visualization.       Once the site was reached and examined, the upper endoscope was withdrawn.  Retroflexion was made within the stomach.  The stomach was decompressed, scope was withdrawn and the procedure was terminated.     The patient tolerated the procedure well.  There were no immediate complications.     Estimate procedure related bleeding: less than 1cc.      OPERATIVE FINDINGS:     1. Esophagus: GERD grade B, hiatal hernia. No  bleeding.   2. Stomach: Grossly normal. S/p biopsy for HP.   3. Duodenum: Duodenal erosions, with one active bleeding spot s/p clip.      RECOMMENDATIONS:  PPI 40mg bid oral dose for now.   Okay to resume clear diet.   GI team will follow up.         This note has been transcribed with digital voice recognition software and although it has been reviewed may contain grammatical or word errors                LABORATORY  Lab Results   Component Value Date    SODIUM 133 (L) 03/01/2025    POTASSIUM 3.9 03/01/2025    CHLORIDE 93 (L) 03/01/2025    CO2 23 03/01/2025    GLUCOSE 168 (H) 03/01/2025     (H) 03/01/2025    CREATININE 8.09 (HH) 03/01/2025        Lab Results   Component Value Date    WBC 8.2 03/01/2025    HEMOGLOBIN 8.0 (L) 03/01/2025    HEMATOCRIT 24.1 (L) 03/01/2025    PLATELETCT 172 03/01/2025        Total time of the discharge process to the extent of 55 minutes.

## 2025-03-01 NOTE — PROGRESS NOTES
[facility] Progress Note     CCPD x 12 hours  2000 mL fills x 6 cycles   using 2.5 % PD solution x 2 bags  ordered per Dr. Leal     Orders reviewed, verified and updated, CCPD cycler therapy settings verified.     CCPD treatment initiated at 1845 without any issues.    Patient and PD access assessed prior to therapy.    Patient Aox 4, resting calmly, interactive, (-) abd distention, denies pain/abd discomfort, stable VS.    Patient c/o pain from sitting. PCN notified    Patient with intact and patent RLQ PD catheter aseptically connected per policy.    RLQ PD catheter and exit site in good condition, no signs of infection noted, cleansed with antiseptic solution and dressings changed per policy. Gentamicin applied to exit site.     Initial drain: 1 mL        Report/Inservice given Giana Doyle RN, nocturnal nurse.     Patient on dwell 1/6 prior to departure from bedside.     Please contact on call dialysis RN for any issues with persistent alarms/assistance with troubleshooting or patient not tolerating therapy.      For on-call Dialysis RN, pls contact [facility] at [phone].

## 2025-03-01 NOTE — PROGRESS NOTES
Monitor summary:        Rhythm: SR   Rate: 81-87  Ectopy: (r)PVC, (f)PAC  Measurements: .16/.06/.32        12hr chart check

## 2025-03-01 NOTE — CARE PLAN
The patient is Stable - Low risk of patient condition declining or worsening    Shift Goals  Clinical Goals: wound care, vss, monitor bm  Patient Goals: comfort, rest, updates  Family Goals: updates    Progress made toward(s) clinical / shift goals:    Problem: Knowledge Deficit - Standard  Goal: Patient and family/care givers will demonstrate understanding of plan of care, disease process/condition, diagnostic tests and medications  Outcome: Progressing     Problem: Skin Integrity  Goal: Skin integrity is maintained or improved  Outcome: Progressing     Problem: Fall Risk  Goal: Patient will remain free from falls  Outcome: Progressing     Problem: Pain - Standard  Goal: Alleviation of pain or a reduction in pain to the patient’s comfort goal  Outcome: Progressing     Problem: Hemodynamics  Goal: Patient's hemodynamics, fluid balance and neurologic status will be stable or improve  Outcome: Progressing     Problem: Wound/ / Incision Healing  Goal: Patient's wound/surgical incision will decrease in size and heals properly  Outcome: Progressing       Patient is not progressing towards the following goals:

## 2025-03-02 NOTE — DISCHARGE PLANNING
Pt has order for HH. Was discharged prior to HCM meeting with pt to discuss choice. Called pt at home and got message. Unable to process order for HH without choice.

## 2025-03-03 NOTE — DISCHARGE PLANNING
1414  DPA sent HH referral to [facility] per choice form received at 1335.

## 2025-03-03 NOTE — DISCHARGE PLANNING
ATTN: Case Management  RE: Referral for Home Health    As of 3/3/25, we have accepted the Home Health referral for the patient listed above.    [facility]  will contact the patient within 48 hours. If you have any questions or concerns regarding the patient’s transition to Home Health, please do not hesitate to contact us at [phone].      We look forward to collaborating with you,  [facility] Team

## 2025-03-04 ENCOUNTER — TELEPHONE (OUTPATIENT)
Dept: HOME HEALTH SERVICES | Facility: HOME HEALTHCARE | Age: 80
End: 2025-03-04
Payer: MEDICARE

## 2025-03-04 NOTE — DISCHARGE PLANNING
Received phone message from wife yesterday. She stated pt was previously being seen by [facility] ans their choice to resume with [facility]. Referral sent yesterday and [facility] has accepted. Called with this am to inform that [facility] will be contacting them.

## 2025-03-05 ENCOUNTER — HOME CARE VISIT (OUTPATIENT)
Dept: HOME HEALTH SERVICES | Facility: HOME HEALTHCARE | Age: 80
End: 2025-03-05
Payer: MEDICARE

## 2025-03-05 VITALS
DIASTOLIC BLOOD PRESSURE: 50 MMHG | BODY MASS INDEX: 22.71 KG/M2 | SYSTOLIC BLOOD PRESSURE: 100 MMHG | HEART RATE: 72 BPM | WEIGHT: 158.6 LBS | RESPIRATION RATE: 16 BRPM | TEMPERATURE: 98.8 F | HEIGHT: 70 IN | OXYGEN SATURATION: 100 %

## 2025-03-05 PROCEDURE — G0493 RN CARE EA 15 MIN HH/HOSPICE: HCPCS

## 2025-03-05 ASSESSMENT — ENCOUNTER SYMPTOMS
PAIN: 1
DEBILITATING PAIN: 1
LOWEST PAIN SEVERITY IN PAST 24 HOURS: 0/10
FATIGUE: 1
HIGHEST PAIN SEVERITY IN PAST 24 HOURS: 5/10
PAIN LOCATION: LOWER BACK
SUBJECTIVE PAIN PROGRESSION: UNCHANGED
PAIN SEVERITY GOAL: 0/10

## 2025-03-05 ASSESSMENT — FIBROSIS 4 INDEX: FIB4 SCORE: 2.622677898468097068

## 2025-03-05 NOTE — Clinical Note
REFERRAL APPROVAL NOTICE         Sent on March 5, 2025                   Edy Bennett  [address] [place] [address]                   Dear Mr. Bennett,    After a careful review of the medical information and benefit coverage, [facility] has processed your referral. See below for additional details.    If applicable, you must be actively enrolled with your insurance for coverage of the authorized service. If you have any questions regarding your coverage, please contact your insurance directly.    REFERRAL INFORMATION   Referral #:  [id number]  Referred-To Provider    Referred-By Provider:  Gastroenterology    Mariela Coleman DNP,  APRN   [facility]      [address]  [place] [id number] [phone] [address] [place] [address] [phone]    Referral Start Date:  02/27/2025  Referral End Date:   02/27/2026             SCHEDULING  If you do not already have an appointment, please call [phone] to make an appointment.     MORE INFORMATION  If you do not already have a [facility] account, sign up at: [email or web address]  You can access your medical information, make appointments, see lab results, billing information, and more.  If you have questions regarding this referral, please contact  the [facility] Referrals department at:             [phone]. Monday - Friday 8:00AM - 5:00PM.     Sincerely,    [facility]

## 2025-03-06 ENCOUNTER — DOCUMENTATION (OUTPATIENT)
Dept: MEDICAL GROUP | Facility: PHYSICIAN GROUP | Age: 80
End: 2025-03-06

## 2025-03-06 ENCOUNTER — HOME CARE VISIT (OUTPATIENT)
Dept: HOME HEALTH SERVICES | Facility: HOME HEALTHCARE | Age: 80
End: 2025-03-06
Payer: MEDICARE

## 2025-03-06 ENCOUNTER — OFFICE VISIT (OUTPATIENT)
Facility: MEDICAL CENTER | Age: 80
End: 2025-03-06
Payer: MEDICARE

## 2025-03-06 VITALS
BODY MASS INDEX: 22.9 KG/M2 | WEIGHT: 160 LBS | SYSTOLIC BLOOD PRESSURE: 110 MMHG | DIASTOLIC BLOOD PRESSURE: 60 MMHG | HEART RATE: 76 BPM | HEIGHT: 70 IN | OXYGEN SATURATION: 100 % | TEMPERATURE: 98.9 F

## 2025-03-06 DIAGNOSIS — I77.9 PAOD (PERIPHERAL ARTERIAL OCCLUSIVE DISEASE) (HCC): ICD-10-CM

## 2025-03-06 PROCEDURE — 99024 POSTOP FOLLOW-UP VISIT: CPT | Performed by: SURGERY

## 2025-03-06 PROCEDURE — 3078F DIAST BP <80 MM HG: CPT | Performed by: SURGERY

## 2025-03-06 PROCEDURE — G0155 HHCP-SVS OF CSW,EA 15 MIN: HCPCS

## 2025-03-06 PROCEDURE — 3074F SYST BP LT 130 MM HG: CPT | Performed by: SURGERY

## 2025-03-06 SDOH — ECONOMIC STABILITY: HOUSING INSECURITY: EVIDENCE OF SMOKING MATERIAL: 0

## 2025-03-06 ASSESSMENT — ENCOUNTER SYMPTOMS
DYSPNEA ACTIVITY LEVEL: AFTER AMBULATING LESS THAN 10 FT
NAUSEA: 1
LOWER EXTREMITY EDEMA: 1
SHORTNESS OF BREATH: 1
DYSPNEA ON EXERTION: 1
LAST BOWEL MOVEMENT: 67269
HYPOTENSION: 1
STOOL FREQUENCY: DAILY
FATIGUES EASILY: 1

## 2025-03-06 ASSESSMENT — FIBROSIS 4 INDEX: FIB4 SCORE: 2.622677898468097068

## 2025-03-06 NOTE — PROGRESS NOTES
Medication chart review for [facility] services    Received referral from [facility].   Medications reviewed  compared with discharge summary if available.  Discharge summary date, if applicable:   3/25    Current medication list per [facility]     Medication list one, patient is currently taking    Current Outpatient Medications:     omeprazole, 40 mg, Oral, BID    Home Care Oxygen, 2 L/min, Inhalation, QHS    acetaminophen, 1,000 mg, Oral, Q8HRS PRN    furosemide, 40 mg, Oral, DAILY    sodium bicarbonate, 650 mg, Oral, TID PRN    losartan, 12.5 mg, Oral, DAILY    metoprolol tartrate, 25 mg, Oral, BID    tamsulosin, 0.4 mg, Oral, AFTER BREAKFAST    epoetin jayna, 4,000 Units, Subcutaneous, Q7 DAYS    polyethylene glycol/lytes, 17 g, Oral, QDAY PRN    sevelamer carbonate, 800 mg, Oral, QDAY PRN    clopidogrel, 75 mg, Oral, DAILY    gentamicin, 1 Application, Topical, DAILY    apixaban, 5 mg, Oral, BID    montelukast, 10 mg, Oral, QHS    rosuvastatin, 20 mg, Oral, QHS      Medication list two, drugs that the patient has been prescribed or recommended to take by their healthcare provider on discharge summary  Medication List          PAUSE taking these medications         Instructions   apixaban 5mg Tabs  Wait to take this until: March 5, 2025  Commonly known as: Eliquis    Take 5 mg by mouth 2 times a day. Indications: Atrial Fibrillation  Dose: 5 mg      clopidogrel 75 MG Tabs  Wait to take this until: March 5, 2025  Commonly known as: Plavix    Take 75 mg by mouth every day. Indications: Coronary Bypass Surgery  Dose: 75 mg                START taking these medications         Instructions   Home Care Oxygen    Inhale 2 L/min at bedtime. Oxygen dose range: 2 to 2 L/min  Respiratory route via: Nasal Cannula   Oxygen supplier: [facility]       Indications: hypoxia  Dose: 2 L/min      omeprazole 40 MG delayed-release capsule  Commonly known as: PriLOSEC    Take 1 Capsule by mouth 2 times a day for 30  days.  Dose: 40 mg                CHANGE how you take these medications         Instructions   losartan 25 MG Tabs  What changed: how much to take  Commonly known as: Cozaar    Take 0.5 Tablets by mouth every day for 30 days. Do not take for blood pressure less than 100/60  Dose: 12.5 mg                CONTINUE taking these medications         Instructions   epoetin jayna 4000 UNIT/ML Soln  Commonly known as: Epogen/Procrit    Inject 4,000 Units under the skin every 7 days. Indications: Anemia associated with Chronic Kidney Failure  Dose: 4,000 Units      furosemide 40 MG Tabs  Commonly known as: Lasix    Take 40 mg by mouth every day. Indications: Cardiac Failure, Edema  Dose: 40 mg      gentamicin 0.1 % cream  Commonly known as: Garamycin    Apply 1 Application topically every day. Indications: PD line site  Dose: 1 Application      lanthanum carbonate 500 MG Chew  Commonly known as: Fosrenol    Chew 500 mg 3 times a day with meals.  Dose: 500 mg      metoprolol tartrate 25 MG Tabs  Commonly known as: Lopressor    Take 1 Tablet by mouth 2 times a day for 30 days.  Dose: 25 mg      montelukast 10 MG Tabs  Commonly known as: Singulair    Take 10 mg by mouth at bedtime. Indications: Asthma, Hayfever  Dose: 10 mg      polyethylene glycol/lytes Pack  Commonly known as: Miralax    Take 17 g by mouth 1 time a day as needed. Indications: Constipation  Dose: 17 g      Renvela 800 MG Tabs tablet  Generic drug: sevelamer carbonate    Take 800 mg by mouth 1 time a day as needed (with snack). Indications: High Amount of Phosphate in the Blood  Dose: 800 mg      rosuvastatin 20 MG Tabs  Commonly known as: Crestor    Take 20 mg by mouth at bedtime.     Indications: High Amount of Fats in the Blood  Dose: 20 mg      sodium bicarbonate 650 MG Tabs  Commonly known as: Sodium Bicarbonate    Take 650 mg by mouth 3 times a day as needed (pt takes as needed for stomach burning). Indications: Heartburn  Dose: 650 mg      tamsulosin 0.4  MG capsule  Commonly known as: Flomax    Take 1 Capsule by mouth 1/2 hour after breakfast.  Dose: 0.4 mg      TYLENOL 500 MG Tabs  Generic drug: acetaminophen    Take 1,000 mg by mouth every 8 hours as needed for Mild Pain. Indications: Pain, 1-4/10 pain  Dose: 1,000 mg                STOP taking these medications       amoxicillin 500 MG Caps  Commonly known as: Amoxil                 No Known Allergies    Labs     Lab Results   Component Value Date/Time    SODIUM 133 (L) 03/01/2025 08:55 AM    POTASSIUM 3.9 03/01/2025 08:55 AM    CHLORIDE 93 (L) 03/01/2025 08:55 AM    CO2 23 03/01/2025 08:55 AM    GLUCOSE 168 (H) 03/01/2025 08:55 AM     (H) 03/01/2025 08:55 AM    CREATININE 8.09 (HH) 03/01/2025 08:55 AM     Lab Results   Component Value Date/Time    ALKPHOSPHAT 61 02/27/2025 03:40 AM    ASTSGOT 31 02/27/2025 03:40 AM    ALTSGPT 28 02/27/2025 03:40 AM    TBILIRUBIN 0.6 02/27/2025 03:40 AM    INR 1.42 (H) 02/26/2025 05:10 AM    ALBUMIN 2.4 (L) 02/27/2025 03:40 AM        Assessment for clinically significant drug interactions, drug omissions/additions, duplicative therapies.            CC   Denise Carver M.D.  [address] [place] [address]  Fax: [phone]    [facility]  Phone [phone] fax [phone]    This note was created using voice recognition software (Dragon). The accuracy of the dictation is limited by the abilities of the software. I have reviewed the note prior to signing, however some errors in grammar and context are still possible. If you have any questions related to this note please do not hesitate to contact our office.       No follow-ups on file.

## 2025-03-06 NOTE — PROGRESS NOTES
"                 VASCULAR SURGERY                 Clinic Progress Note  _________________________________    Vitals for Today's Visit  /60 (BP Location: Right arm, Patient Position: Sitting, BP Cuff Size: Adult)   Pulse 76   Temp 37.2 °C (98.9 °F) (Temporal)   Ht 1.778 m (5' 10\")   Wt 72.6 kg (160 lb)   SpO2 100%   BMI 22.96 kg/m²   _________________________________          3/6/2025  Doing well. Left lower leg incisions healing well. Feeling well. Ulcers on toes are healing. Left pop-tib bypass is patent with strong doppler signal. Counseled on local care. Follow-up 2-3 weeks for progress check and suture removal.          Marco Antonio Miller MD  [facility]  [phone]  [address]  [address], [place] [address]  "

## 2025-03-07 ENCOUNTER — HOME CARE VISIT (OUTPATIENT)
Dept: HOME HEALTH SERVICES | Facility: HOME HEALTHCARE | Age: 80
End: 2025-03-07
Payer: MEDICARE

## 2025-03-07 VITALS
WEIGHT: 154.2 LBS | HEART RATE: 76 BPM | SYSTOLIC BLOOD PRESSURE: 120 MMHG | DIASTOLIC BLOOD PRESSURE: 54 MMHG | BODY MASS INDEX: 22.13 KG/M2 | RESPIRATION RATE: 16 BRPM | TEMPERATURE: 98 F | OXYGEN SATURATION: 100 %

## 2025-03-07 PROCEDURE — G0299 HHS/HOSPICE OF RN EA 15 MIN: HCPCS

## 2025-03-07 ASSESSMENT — ENCOUNTER SYMPTOMS
DENIES PAIN: 1
LOWER EXTREMITY EDEMA: 1
DRY SKIN: 1
LOWEST PAIN SEVERITY IN PAST 24 HOURS: 0/10
PAIN LOCATION - PAIN DURATION: SHORT
HIGHEST PAIN SEVERITY IN PAST 24 HOURS: 8/10
MUSCLE WEAKNESS: 1
BOWEL PATTERN NORMAL: 1
PAIN LOCATION: BILATERAL TOES
PAIN: 1
PAIN LOCATION - PAIN FREQUENCY: INTERMITTENT
FATIGUE: 1
PAIN LOCATION - PAIN SEVERITY: 0/10
SUBJECTIVE PAIN PROGRESSION: UNCHANGED
PAIN LOCATION - PAIN QUALITY: SHARP
PERSON REPORTING PAIN: PATIENT
LAST BOWEL MOVEMENT: 67271
PAIN SEVERITY GOAL: 0/10

## 2025-03-07 ASSESSMENT — ACTIVITIES OF DAILY LIVING (ADL)
SHOPPING_REQUIRES_ASSISTANCE: 1
BATHING_REQUIRES_ASSISTANCE: 1
LAUNDRY_REQUIRES_ASSISTANCE: 1
AMBULATION_REQUIRES_ASSISTANCE: 1

## 2025-03-07 ASSESSMENT — FIBROSIS 4 INDEX: FIB4 SCORE: 2.622677898468097068

## 2025-03-07 NOTE — PROGRESS NOTES
HV to residence. Permitted entry by spouse who joined visit. Denied falls, changes in medication or pain. MSW reviewed reason for visit, advance directive. Patient oriented x4. MSW assisted in completing document and provided copy for spouse to complete as well. MSW educated how to finalize document. Couple pursuing trust and was educated to provide copy to  for them to include in the trust. Also discussed long term care planning. Couple has resources and plans to meet their needs as they age. Empethetic listening utilized as patient has been challenged by his health this last year which has changed his perspective on his health and value of life as well as quality of life. Agreeable to MSW eval only.    Falls: 0    DME: 2ww,     Community resources: private pay     Advance Directive: pending witness/notary    Oxygen:        Liters: 2      Storage: Tanks/concentrator stored correctly      Medication List: Oxygen on medication list    Discipline Frequency: 1 visit, patient agreeable    Follow Up: n/a

## 2025-03-08 ENCOUNTER — HOME CARE VISIT (OUTPATIENT)
Dept: HOME HEALTH SERVICES | Facility: HOME HEALTHCARE | Age: 80
End: 2025-03-08
Payer: MEDICARE

## 2025-03-10 ENCOUNTER — HOME CARE VISIT (OUTPATIENT)
Dept: HOME HEALTH SERVICES | Facility: HOME HEALTHCARE | Age: 80
End: 2025-03-10
Payer: MEDICARE

## 2025-03-10 VITALS
SYSTOLIC BLOOD PRESSURE: 98 MMHG | DIASTOLIC BLOOD PRESSURE: 52 MMHG | OXYGEN SATURATION: 95 % | BODY MASS INDEX: 22.13 KG/M2 | HEART RATE: 76 BPM | RESPIRATION RATE: 16 BRPM | TEMPERATURE: 99.2 F | WEIGHT: 154.2 LBS

## 2025-03-10 PROCEDURE — G0151 HHCP-SERV OF PT,EA 15 MIN: HCPCS

## 2025-03-10 SDOH — ECONOMIC STABILITY: HOUSING INSECURITY: HOME SAFETY: ES, KEEPING O2 PER MD ORDERS, NO OPEN FLAMES/SMOKING.. PATIENT ABLE TO SPEAKBACK VERBAL UNDERSTANDING.

## 2025-03-10 SDOH — ECONOMIC STABILITY: HOUSING INSECURITY
HOME SAFETY: PT EDUCATED IN REMOVAL OF AREA RUG FOR FALL PREVENTION.  PT TRACED TUBING TO CONCENTRATOR AND DID NOT NOTE ANY LEAKS OR PROBLEMS. CONCENTRATOR SET PER MD ORDER. NOTED NO OPEN FLAMES. PT EDUCATED IN O2 SAFETY WITH USE OF O2 VIA NASAL CANULA AT ALL TIM

## 2025-03-10 ASSESSMENT — ENCOUNTER SYMPTOMS
PAIN LOCATION - PAIN FREQUENCY: FREQUENT
SUBJECTIVE PAIN PROGRESSION: WAXING AND WANING
PAIN LOCATION - EXACERBATING FACTORS: WALKING, ACTIVITY
PAIN: 1
PAIN LOCATION - PAIN FREQUENCY: INTERMITTENT
PAIN LOCATION - PAIN SEVERITY: 4/10
HIGHEST PAIN SEVERITY IN PAST 24 HOURS: 9/10
PAIN LOCATION - PAIN SEVERITY: 5/10
PAIN LOCATION - RELIEVING FACTORS: SITTING
PAIN LOCATION - EXACERBATING FACTORS: VARIABLE
PAIN LOCATION - PAIN DURATION: CHRONIC
PAIN LOCATION - PAIN QUALITY: SHARP
PAIN LOCATION - RELIEVING FACTORS: VARIABLE
PAIN LOCATION: TOES
PAIN LOCATION - PAIN DURATION: CHRONIC
PAIN SEVERITY GOAL: 0/10
PAIN LOCATION: BACK
LOWEST PAIN SEVERITY IN PAST 24 HOURS: 0/10

## 2025-03-10 ASSESSMENT — ACTIVITIES OF DAILY LIVING (ADL)
TRANSPORTATION COMMENTS: REQUIRES ASSIST OF ANOTHER PERSON AND WALKER OUT OF HOME ON UNEVEN SURFACES
AMBULATION ASSISTANCE ON FLAT SURFACES: 1

## 2025-03-10 ASSESSMENT — FIBROSIS 4 INDEX: FIB4 SCORE: 2.622677898468097068

## 2025-03-11 ENCOUNTER — HOME CARE VISIT (OUTPATIENT)
Dept: HOME HEALTH SERVICES | Facility: HOME HEALTHCARE | Age: 80
End: 2025-03-11
Payer: MEDICARE

## 2025-03-11 VITALS
SYSTOLIC BLOOD PRESSURE: 102 MMHG | BODY MASS INDEX: 21.83 KG/M2 | RESPIRATION RATE: 16 BRPM | WEIGHT: 152.13 LBS | OXYGEN SATURATION: 100 % | HEART RATE: 82 BPM | DIASTOLIC BLOOD PRESSURE: 56 MMHG | TEMPERATURE: 97.7 F

## 2025-03-11 VITALS
HEART RATE: 82 BPM | WEIGHT: 152.2 LBS | DIASTOLIC BLOOD PRESSURE: 56 MMHG | OXYGEN SATURATION: 100 % | SYSTOLIC BLOOD PRESSURE: 102 MMHG | TEMPERATURE: 97.7 F | BODY MASS INDEX: 21.84 KG/M2 | RESPIRATION RATE: 16 BRPM

## 2025-03-11 PROCEDURE — G0299 HHS/HOSPICE OF RN EA 15 MIN: HCPCS

## 2025-03-11 PROCEDURE — G0152 HHCP-SERV OF OT,EA 15 MIN: HCPCS

## 2025-03-11 SDOH — ECONOMIC STABILITY: HOUSING INSECURITY: EVIDENCE OF SMOKING MATERIAL: 0

## 2025-03-11 ASSESSMENT — ENCOUNTER SYMPTOMS
FATIGUE: 1
MUSCLE WEAKNESS: 1
PAIN LOCATION: B LE'S
PAIN: 1
NAUSEA: DENIES
BOWEL PATTERN NORMAL: 1
STOOL FREQUENCY: DAILY
LAST BOWEL MOVEMENT: 67275
VOMITING: DENIES
HYPERTENSION: 1
LOWEST PAIN SEVERITY IN PAST 24 HOURS: 0/10
PAIN SEVERITY GOAL: 0/10
HIGHEST PAIN SEVERITY IN PAST 24 HOURS: 10/10
PAIN LOCATION - PAIN SEVERITY: 5/10
PAIN LOCATION - EXACERBATING FACTORS: NEUROPATHY
DRY SKIN: 1
FATIGUES EASILY: 1
DENIES PAIN: 1

## 2025-03-11 ASSESSMENT — ACTIVITIES OF DAILY LIVING (ADL)
AMBULATION ASSISTANCE: STAND BY ASSIST
CURRENT_FUNCTION: STAND BY ASSIST

## 2025-03-11 ASSESSMENT — FIBROSIS 4 INDEX
FIB4 SCORE: 2.622677898468097068
FIB4 SCORE: 2.622677898468097068

## 2025-03-12 ENCOUNTER — HOME CARE VISIT (OUTPATIENT)
Dept: HOME HEALTH SERVICES | Facility: HOME HEALTHCARE | Age: 80
End: 2025-03-12
Payer: MEDICARE

## 2025-03-12 SDOH — ECONOMIC STABILITY: HOUSING INSECURITY: EVIDENCE OF SMOKING MATERIAL: 0

## 2025-03-12 ASSESSMENT — ENCOUNTER SYMPTOMS
PAIN LOCATION - PAIN FREQUENCY: INTERMITTENT
SUBJECTIVE PAIN PROGRESSION: WAXING AND WANING
DEBILITATING PAIN: 1
PAIN LOCATION - PAIN DURATION: CHRONIC

## 2025-03-12 ASSESSMENT — ACTIVITIES OF DAILY LIVING (ADL)
BATHING_CURRENT_FUNCTION: STAND BY ASSIST
PHYSICAL TRANSFERS ASSESSED: 1
DRESSING_UB_CURRENT_FUNCTION: INDEPENDENT
TOILETING: SUPERVISION
GROOMING ASSESSED: 1
AMBULATION ASSISTANCE: 1
FEEDING ASSESSED: 1
FEEDING: INDEPENDENT
TOILETING: 1
BATHING ASSESSED: 1
AMBULATION ASSISTANCE: SUPERVISION
GROOMING_CURRENT_FUNCTION: INDEPENDENT
OASIS_M1830: 05
DRESSING_LB_CURRENT_FUNCTION: INDEPENDENT

## 2025-03-14 ENCOUNTER — HOME CARE VISIT (OUTPATIENT)
Dept: HOME HEALTH SERVICES | Facility: HOME HEALTHCARE | Age: 80
End: 2025-03-14
Payer: MEDICARE

## 2025-03-14 VITALS
BODY MASS INDEX: 21.36 KG/M2 | HEART RATE: 65 BPM | SYSTOLIC BLOOD PRESSURE: 130 MMHG | OXYGEN SATURATION: 98 % | DIASTOLIC BLOOD PRESSURE: 60 MMHG | TEMPERATURE: 99.8 F | WEIGHT: 148.88 LBS | RESPIRATION RATE: 16 BRPM

## 2025-03-14 PROCEDURE — G0299 HHS/HOSPICE OF RN EA 15 MIN: HCPCS

## 2025-03-14 ASSESSMENT — FIBROSIS 4 INDEX: FIB4 SCORE: 2.622677898468097068

## 2025-03-16 SDOH — ECONOMIC STABILITY: HOUSING INSECURITY
HOME SAFETY: PT USES O2 AT NIGHT ONLY.  EDUCATED PT/SPOUSE IN FALL RISK W/ AREA AND THROW RUGS. SPOUSE ID REMOVING RUG FRINGE AND WILL BTAIN NON-SLIP PAD. INSTRUCTED PT TO STEP OVER EDGE OF RUG TO AVOID TRIPPING. RECOMMEND REMVAL OF THROW RUGS IN BR, PT TO CONSID

## 2025-03-16 SDOH — ECONOMIC STABILITY: HOUSING INSECURITY: HOME SAFETY: ER.

## 2025-03-16 SDOH — ECONOMIC STABILITY: HOUSING INSECURITY: EVIDENCE OF SMOKING MATERIAL: 0

## 2025-03-16 ASSESSMENT — ENCOUNTER SYMPTOMS
DENIES PAIN: 1
DRY SKIN: 1
LAST BOWEL MOVEMENT: 67278
VOMITING: DENIES
STOOL FREQUENCY: DAILY
BOWEL PATTERN NORMAL: 1
DEPRESSED MOOD: 1
MUSCLE WEAKNESS: 1
FATIGUES EASILY: 1
SKIN LESIONS: 1
NAUSEA: DENIES

## 2025-03-16 ASSESSMENT — ACTIVITIES OF DAILY LIVING (ADL)
CURRENT_FUNCTION: STAND BY ASSIST
CURRENT_FUNCTION: SUPERVISION
AMBULATION ASSISTANCE: STAND BY ASSIST
CURRENT_FUNCTION: STAND BY ASSIST

## 2025-03-18 ENCOUNTER — HOME CARE VISIT (OUTPATIENT)
Dept: HOME HEALTH SERVICES | Facility: HOME HEALTHCARE | Age: 80
End: 2025-03-18
Payer: MEDICARE

## 2025-03-18 VITALS
DIASTOLIC BLOOD PRESSURE: 58 MMHG | OXYGEN SATURATION: 98 % | TEMPERATURE: 97.8 F | WEIGHT: 147 LBS | RESPIRATION RATE: 16 BRPM | SYSTOLIC BLOOD PRESSURE: 102 MMHG | BODY MASS INDEX: 21.09 KG/M2 | HEART RATE: 77 BPM

## 2025-03-18 PROCEDURE — G0151 HHCP-SERV OF PT,EA 15 MIN: HCPCS

## 2025-03-18 ASSESSMENT — FIBROSIS 4 INDEX: FIB4 SCORE: 2.622677898468097068

## 2025-03-18 ASSESSMENT — ENCOUNTER SYMPTOMS
PAIN LOCATION - PAIN SEVERITY: 5/10
PAIN LOCATION - PAIN FREQUENCY: INTERMITTENT
PAIN SEVERITY GOAL: 0/10
PAIN: 1
PAIN LOCATION - RELIEVING FACTORS: REST, POSITION CHANGE
LOWEST PAIN SEVERITY IN PAST 24 HOURS: 0/10
PAIN LOCATION: BILATERAL FEET/TOES
PAIN LOCATION - EXACERBATING FACTORS: ACTIVITY
SUBJECTIVE PAIN PROGRESSION: WAXING AND WANING
PAIN LOCATION - PAIN DURATION: VARIES
HIGHEST PAIN SEVERITY IN PAST 24 HOURS: 10/10
PAIN LOCATION - PAIN QUALITY: SHARP

## 2025-03-19 ENCOUNTER — HOME CARE VISIT (OUTPATIENT)
Dept: HOME HEALTH SERVICES | Facility: HOME HEALTHCARE | Age: 80
End: 2025-03-19
Payer: MEDICARE

## 2025-03-19 VITALS — WEIGHT: 148 LBS | BODY MASS INDEX: 21.24 KG/M2

## 2025-03-19 PROCEDURE — G0299 HHS/HOSPICE OF RN EA 15 MIN: HCPCS

## 2025-03-19 SDOH — ECONOMIC STABILITY: HOUSING INSECURITY: EVIDENCE OF SMOKING MATERIAL: 0

## 2025-03-19 ASSESSMENT — ENCOUNTER SYMPTOMS
LIMITED RANGE OF MOTION: 1
PAIN LOCATION - EXACERBATING FACTORS: WALKING
SUBJECTIVE PAIN PROGRESSION: UNCHANGED
PAIN LOCATION - PAIN DURATION: CONSTANT
PAIN: 1
STOOL FREQUENCY: DAILY
FATIGUES EASILY: 1
MUSCLE WEAKNESS: 1
BOWEL PATTERN NORMAL: 1
PAIN LOCATION - PAIN FREQUENCY: CONSTANT
DYSPNEA ON EXERTION: 1
PAIN SEVERITY GOAL: 0/10
PAIN LOCATION - PAIN SEVERITY: 3/10
PAIN LOCATION - PAIN QUALITY: ACHE
PAIN LOCATION: TOES
LAST BOWEL MOVEMENT: 67283
FATIGUE: 1
LOWER EXTREMITY EDEMA: 1
LOWEST PAIN SEVERITY IN PAST 24 HOURS: 5/10
HIGHEST PAIN SEVERITY IN PAST 24 HOURS: 10/10
SKIN LESIONS: 1

## 2025-03-19 ASSESSMENT — FIBROSIS 4 INDEX: FIB4 SCORE: 2.622677898468097068

## 2025-03-20 ENCOUNTER — OFFICE VISIT (OUTPATIENT)
Facility: MEDICAL CENTER | Age: 80
End: 2025-03-20
Payer: MEDICARE

## 2025-03-20 VITALS
SYSTOLIC BLOOD PRESSURE: 102 MMHG | TEMPERATURE: 98.5 F | HEIGHT: 68 IN | OXYGEN SATURATION: 98 % | WEIGHT: 148 LBS | HEART RATE: 76 BPM | DIASTOLIC BLOOD PRESSURE: 58 MMHG | BODY MASS INDEX: 22.43 KG/M2

## 2025-03-20 DIAGNOSIS — I77.9 PAOD (PERIPHERAL ARTERIAL OCCLUSIVE DISEASE) (HCC): ICD-10-CM

## 2025-03-20 PROCEDURE — 3074F SYST BP LT 130 MM HG: CPT | Performed by: SURGERY

## 2025-03-20 PROCEDURE — 3078F DIAST BP <80 MM HG: CPT | Performed by: SURGERY

## 2025-03-20 PROCEDURE — 99024 POSTOP FOLLOW-UP VISIT: CPT | Performed by: SURGERY

## 2025-03-20 ASSESSMENT — FIBROSIS 4 INDEX: FIB4 SCORE: 2.622677898468097068

## 2025-03-20 NOTE — PROGRESS NOTES
Thanks againokay so Shilo Mercedes I see you can click on the okay okay got                 VASCULAR SURGERY                    Postop Note  _________________________________    3/20/2025    This patient is here to follow-up after undergoing a left popliteal to tibial artery bypass performed by .     Patient reports doing well and has not concerns.      Vitals  There were no vitals taken for this visit.    Exam  Incision healing well  Clean, dry and intact  No erythema or drainage  ***      DiagnosisAssessment:        Plan:    ***  The patient demonstrates a clear understanding of our discussion and agrees. They will reach out with any questions or concerns via phone or My Chart.    Follow up:     ***      Shilo Mercedes. MD [facility]  [phone]  [address], [facility] [address]

## 2025-03-20 NOTE — PROGRESS NOTES
"Thanks againokay so Shilo Mercedes I see you can click on the okjahaira garcía got                 VASCULAR SURGERY                    Postop Note  _________________________________    3/20/2025    This patient is here to follow-up for second postoperative visit.  As you recall Dr. Miller performed a left popliteal to tibial artery bypass.  On his initial visit the patient had excellent signals.  The patient is here for suture removal check of his wounds.    He has a wound in the region of the posterior tibial artery down by the ankle.  I debrided this in the office and instructed the patient's wife on how to perform a wet-to-dry dressing change.   He has dry gangrene at the tips of his toes.  We are allowing this to demarcate but they have a may very well benefit from a transmetatarsal amputation.  Will give it more time to demarcate however.  The right side the ulcerations are healing after percutaneous intervention.    He once again has excellent signals within the left leg both in the dorsalis pedis and posterior tibial positions suggesting that the bypass is widely patent.    Vitals  /58 (BP Location: Left arm, Patient Position: Sitting, BP Cuff Size: Adult)   Pulse 76   Temp 36.9 °C (98.5 °F) (Temporal)   Ht 1.727 m (5' 8\")   Wt 67.1 kg (148 lb)   SpO2 98%   BMI 22.50 kg/m²     Exam    Excellent signals dorsalis pedis and posterior tibial arteries  Dry gangrene tips of the toes demarcating wound posterior aspect of the ankle  Clean after debridement    DiagnosisAssessment:      Peripheral arterial disease status post popliteal tibial artery bypass  Dry gangrene tips of the toes      Plan:      Once again I performed a minor debridement of the wound and instructed the wife on how to perform a wet-to-dry dressing change.  Will give the tips of the toes further time to demarcate but I do believe that the patient will likely end up with a transmetatarsal amputation.  Patient will follow-up with Dr. Miller in 2 " to 3 weeks to make that decision.      The patient demonstrates a clear understanding of our discussion and agrees. They will reach out with any questions or concerns via phone or My Chart.    Follow up:     2 to 3 weeks Dr. Angela Mercedes. MD [facility]  [phone]  [address] [facility], [facility] [address]

## 2025-03-21 ENCOUNTER — HOME CARE VISIT (OUTPATIENT)
Dept: HOME HEALTH SERVICES | Facility: HOME HEALTHCARE | Age: 80
End: 2025-03-21
Payer: MEDICARE

## 2025-03-21 VITALS
BODY MASS INDEX: 22.35 KG/M2 | TEMPERATURE: 97.6 F | RESPIRATION RATE: 16 BRPM | OXYGEN SATURATION: 99 % | HEART RATE: 74 BPM | DIASTOLIC BLOOD PRESSURE: 62 MMHG | SYSTOLIC BLOOD PRESSURE: 118 MMHG | WEIGHT: 147 LBS

## 2025-03-21 PROCEDURE — G0151 HHCP-SERV OF PT,EA 15 MIN: HCPCS

## 2025-03-21 ASSESSMENT — ENCOUNTER SYMPTOMS
PAIN LOCATION - PAIN FREQUENCY: INTERMITTENT
PAIN LOCATION - RELIEVING FACTORS: REST, POSITION CHANGE
PAIN LOCATION - PAIN QUALITY: SHARP
PAIN LOCATION - EXACERBATING FACTORS: CHRONIC PAIN
PAIN LOCATION - PAIN SEVERITY: 3/10
SUBJECTIVE PAIN PROGRESSION: WAXING AND WANING
PAIN LOCATION: BILATERAL FEET/TOES
LOWEST PAIN SEVERITY IN PAST 24 HOURS: 0/10
PAIN: 1
PAIN SEVERITY GOAL: 0/10
PAIN LOCATION - PAIN DURATION: VARIES
HIGHEST PAIN SEVERITY IN PAST 24 HOURS: 5/10

## 2025-03-21 ASSESSMENT — FIBROSIS 4 INDEX: FIB4 SCORE: 2.622677898468097068

## 2025-03-22 ENCOUNTER — HOME CARE VISIT (OUTPATIENT)
Dept: HOME HEALTH SERVICES | Facility: HOME HEALTHCARE | Age: 80
End: 2025-03-22
Payer: MEDICARE

## 2025-03-22 PROCEDURE — G0299 HHS/HOSPICE OF RN EA 15 MIN: HCPCS

## 2025-03-22 ASSESSMENT — FIBROSIS 4 INDEX: FIB4 SCORE: 2.622677898468097068

## 2025-03-24 VITALS
BODY MASS INDEX: 22.5 KG/M2 | OXYGEN SATURATION: 98 % | SYSTOLIC BLOOD PRESSURE: 110 MMHG | WEIGHT: 148 LBS | DIASTOLIC BLOOD PRESSURE: 62 MMHG | HEART RATE: 78 BPM | TEMPERATURE: 97.8 F | RESPIRATION RATE: 16 BRPM

## 2025-03-24 SDOH — ECONOMIC STABILITY: HOUSING INSECURITY: EVIDENCE OF SMOKING MATERIAL: 0

## 2025-03-24 ASSESSMENT — ENCOUNTER SYMPTOMS
BOWEL PATTERN NORMAL: 1
MUSCLE WEAKNESS: 1
SUBJECTIVE PAIN PROGRESSION: UNCHANGED
LAST BOWEL MOVEMENT: 67286
NAUSEA: NO
VOMITING: NO
STOOL FREQUENCY: LESS THAN DAILY
PAIN: 1
PAIN LOCATION - PAIN QUALITY: ACHY
HIGHEST PAIN SEVERITY IN PAST 24 HOURS: 3/10
PAIN LOCATION: LLE
PAIN SEVERITY GOAL: 0/10
PAIN LOCATION - PAIN SEVERITY: 3/10
LOWEST PAIN SEVERITY IN PAST 24 HOURS: 0/10
SKIN LESIONS: 1
PAIN LOCATION - PAIN FREQUENCY: INTERMITTENT

## 2025-03-25 ENCOUNTER — HOME CARE VISIT (OUTPATIENT)
Dept: HOME HEALTH SERVICES | Facility: HOME HEALTHCARE | Age: 80
End: 2025-03-25
Payer: MEDICARE

## 2025-03-25 VITALS
BODY MASS INDEX: 21.65 KG/M2 | RESPIRATION RATE: 16 BRPM | OXYGEN SATURATION: 99 % | HEART RATE: 60 BPM | WEIGHT: 142.4 LBS | DIASTOLIC BLOOD PRESSURE: 62 MMHG | SYSTOLIC BLOOD PRESSURE: 122 MMHG | TEMPERATURE: 97.4 F

## 2025-03-25 VITALS
RESPIRATION RATE: 16 BRPM | WEIGHT: 142.25 LBS | BODY MASS INDEX: 21.63 KG/M2 | HEART RATE: 60 BPM | SYSTOLIC BLOOD PRESSURE: 122 MMHG | DIASTOLIC BLOOD PRESSURE: 62 MMHG | TEMPERATURE: 97.4 F | OXYGEN SATURATION: 99 %

## 2025-03-25 PROCEDURE — G0151 HHCP-SERV OF PT,EA 15 MIN: HCPCS

## 2025-03-25 PROCEDURE — G0299 HHS/HOSPICE OF RN EA 15 MIN: HCPCS

## 2025-03-25 SDOH — ECONOMIC STABILITY: HOUSING INSECURITY: EVIDENCE OF SMOKING MATERIAL: 0

## 2025-03-25 ASSESSMENT — ENCOUNTER SYMPTOMS
PAIN LOCATION - PAIN QUALITY: SHARP
BOWEL PATTERN NORMAL: 1
LOWEST PAIN SEVERITY IN PAST 24 HOURS: 6/10
PAIN LOCATION - PAIN SEVERITY: 2/10
PAIN SEVERITY GOAL: 4/10
PAIN LOCATION - RELIEVING FACTORS: REST/MEDS
HIGHEST PAIN SEVERITY IN PAST 24 HOURS: 2/10
PAIN LOCATION: RIGHT TOES
SHORTNESS OF BREATH: 1
PAIN LOCATION - RELIEVING FACTORS: REST, POSITION CHANGE
PAIN LOCATION - EXACERBATING FACTORS: WALKING
PAIN LOCATION - EXACERBATING FACTORS: ACTIVITY
PAIN: 1
PAIN SEVERITY GOAL: 0/10
PAIN: 1
PAIN LOCATION - PAIN FREQUENCY: CONSTANT
MUSCLE WEAKNESS: 1
DEBILITATING PAIN: 1
NAUSEA: PT DENIES ANY NAUSEA AT THIS TIME
DYSPNEA ACTIVITY LEVEL: AFTER AMBULATING MORE THAN 20 FT
LOWEST PAIN SEVERITY IN PAST 24 HOURS: 2/10
SUBJECTIVE PAIN PROGRESSION: GRADUALLY IMPROVING
HIGHEST PAIN SEVERITY IN PAST 24 HOURS: 4/10
PAIN LOCATION: RIGHT TOES
PAIN LOCATION - PAIN SEVERITY: 2/10
PAIN LOCATION - PAIN QUALITY: ACHY/SHARP
SUBJECTIVE PAIN PROGRESSION: WAXING AND WANING
PAIN LOCATION - PAIN FREQUENCY: FREQUENT
VOMITING: PT DENIES ANY EMESIS AT THIS TIME
LAST BOWEL MOVEMENT: 67288
PAIN LOCATION - PAIN DURATION: VARIES
STOOL FREQUENCY: DAILY

## 2025-03-25 ASSESSMENT — FIBROSIS 4 INDEX
FIB4 SCORE: 2.622677898468097068
FIB4 SCORE: 2.622677898468097068

## 2025-03-27 ENCOUNTER — HOME CARE VISIT (OUTPATIENT)
Dept: HOME HEALTH SERVICES | Facility: HOME HEALTHCARE | Age: 80
End: 2025-03-27
Payer: MEDICARE

## 2025-04-01 ENCOUNTER — HOME CARE VISIT (OUTPATIENT)
Dept: HOME HEALTH SERVICES | Facility: HOME HEALTHCARE | Age: 80
End: 2025-04-01
Payer: MEDICARE

## 2025-04-01 VITALS
DIASTOLIC BLOOD PRESSURE: 62 MMHG | RESPIRATION RATE: 16 BRPM | SYSTOLIC BLOOD PRESSURE: 122 MMHG | BODY MASS INDEX: 20.98 KG/M2 | OXYGEN SATURATION: 99 % | TEMPERATURE: 97.6 F | WEIGHT: 138 LBS | HEART RATE: 62 BPM

## 2025-04-01 PROCEDURE — G0299 HHS/HOSPICE OF RN EA 15 MIN: HCPCS

## 2025-04-01 PROCEDURE — G0151 HHCP-SERV OF PT,EA 15 MIN: HCPCS

## 2025-04-01 ASSESSMENT — ENCOUNTER SYMPTOMS
PAIN LOCATION - PAIN SEVERITY: 4/10
PAIN LOCATION - PAIN FREQUENCY: CONSTANT
DEBILITATING PAIN: 1
PAIN SEVERITY GOAL: 0/10
SUBJECTIVE PAIN PROGRESSION: WAXING AND WANING
PAIN LOCATION - EXACERBATING FACTORS: ACTIVITY
PAIN: 1
PAIN LOCATION: BILATERAL TOES
PAIN LOCATION - RELIEVING FACTORS: REST, POSITION CHANGE
PAIN LOCATION - PAIN DURATION: VARIES
LOWEST PAIN SEVERITY IN PAST 24 HOURS: 1/10
PAIN LOCATION - PAIN QUALITY: ACHY, SHARP
HIGHEST PAIN SEVERITY IN PAST 24 HOURS: 6/10

## 2025-04-01 ASSESSMENT — FIBROSIS 4 INDEX
FIB4 SCORE: 2.622677898468097068
FIB4 SCORE: 2.622677898468097068

## 2025-04-02 VITALS — WEIGHT: 138 LBS | BODY MASS INDEX: 20.98 KG/M2

## 2025-04-02 ASSESSMENT — ENCOUNTER SYMPTOMS
HIGHEST PAIN SEVERITY IN PAST 24 HOURS: 7/10
PAIN: 1
STOOL FREQUENCY: DAILY
PAIN LOCATION - PAIN FREQUENCY: INTERMITTENT
LAST BOWEL MOVEMENT: 67296
FATIGUE: 1
PAIN LOCATION - RELIEVING FACTORS: MEDICATION
FATIGUES EASILY: 1
PAIN LOCATION: BILATERAL FEET
SKIN LESIONS: 1
MUSCLE WEAKNESS: 1
PAIN LOCATION - PAIN SEVERITY: 7/10
DEPRESSED MOOD: 1
BOWEL PATTERN NORMAL: 1
LOWEST PAIN SEVERITY IN PAST 24 HOURS: 0/10
LIMITED RANGE OF MOTION: 1
SUBJECTIVE PAIN PROGRESSION: UNCHANGED
PAIN LOCATION - PAIN DURATION: CHRONIC
PAIN LOCATION - PAIN QUALITY: SHOOTING
PAIN SEVERITY GOAL: 0/10

## 2025-04-02 ASSESSMENT — ACTIVITIES OF DAILY LIVING (ADL)
AMBULATION ASSISTANCE: STAND BY ASSIST
CURRENT_FUNCTION: STAND BY ASSIST

## 2025-04-03 ENCOUNTER — HOME CARE VISIT (OUTPATIENT)
Dept: HOME HEALTH SERVICES | Facility: HOME HEALTHCARE | Age: 80
End: 2025-04-03
Payer: MEDICARE

## 2025-04-03 ENCOUNTER — OFFICE VISIT (OUTPATIENT)
Facility: MEDICAL CENTER | Age: 80
End: 2025-04-03
Payer: MEDICARE

## 2025-04-03 VITALS
WEIGHT: 138 LBS | TEMPERATURE: 97.4 F | OXYGEN SATURATION: 93 % | BODY MASS INDEX: 20.98 KG/M2 | SYSTOLIC BLOOD PRESSURE: 118 MMHG | RESPIRATION RATE: 16 BRPM | HEART RATE: 76 BPM | DIASTOLIC BLOOD PRESSURE: 58 MMHG

## 2025-04-03 VITALS
SYSTOLIC BLOOD PRESSURE: 106 MMHG | WEIGHT: 138 LBS | HEART RATE: 71 BPM | OXYGEN SATURATION: 100 % | BODY MASS INDEX: 20.92 KG/M2 | TEMPERATURE: 97 F | HEIGHT: 68 IN | DIASTOLIC BLOOD PRESSURE: 50 MMHG

## 2025-04-03 DIAGNOSIS — I77.9 PAOD (PERIPHERAL ARTERIAL OCCLUSIVE DISEASE) (HCC): ICD-10-CM

## 2025-04-03 PROCEDURE — 3078F DIAST BP <80 MM HG: CPT | Performed by: SURGERY

## 2025-04-03 PROCEDURE — 99024 POSTOP FOLLOW-UP VISIT: CPT | Performed by: SURGERY

## 2025-04-03 PROCEDURE — G0151 HHCP-SERV OF PT,EA 15 MIN: HCPCS

## 2025-04-03 PROCEDURE — 3074F SYST BP LT 130 MM HG: CPT | Performed by: SURGERY

## 2025-04-03 ASSESSMENT — ENCOUNTER SYMPTOMS
PAIN LOCATION - PAIN FREQUENCY: WITH ACTIVITY
PAIN LOCATION: BILATERAL TOES
HIGHEST PAIN SEVERITY IN PAST 24 HOURS: 6/10
PAIN LOCATION - RELIEVING FACTORS: REST, PAIN MEDS
PAIN LOCATION - PAIN SEVERITY: 4/10
SUBJECTIVE PAIN PROGRESSION: UNCHANGED
LOWEST PAIN SEVERITY IN PAST 24 HOURS: 2/10
PAIN LOCATION - PAIN QUALITY: ACHY, SHARP
PAIN SEVERITY GOAL: 0/10
PAIN LOCATION - PAIN DURATION: VARIES
PAIN LOCATION - EXACERBATING FACTORS: ACTIVITY
PAIN: 1

## 2025-04-03 ASSESSMENT — FIBROSIS 4 INDEX
FIB4 SCORE: 2.622677898468097068
FIB4 SCORE: 2.622677898468097068

## 2025-04-03 NOTE — PROGRESS NOTES
"                 VASCULAR SURGERY                 Clinic Progress Note  _________________________________    Vitals for Today's Visit  /50 (BP Location: Left arm, Patient Position: Sitting, BP Cuff Size: Adult)   Pulse 71   Temp 36.1 °C (97 °F) (Temporal)   Ht 1.727 m (5' 8\")   Wt 62.6 kg (138 lb)   SpO2 100%   BMI 20.98 kg/m²   _________________________________          4/3/2025  ***      FU 1 month for clinical progress check          Marco Antonio Miller MD  [facility]  [phone]  [address]  [address], [place] [address]  "

## 2025-04-04 ASSESSMENT — ACTIVITIES OF DAILY LIVING (ADL)
AMBULATION ASSISTANCE ON FLAT SURFACES: 1
AMBULATION_DISTANCE/DURATION_TOLERATED: >300 FEET

## 2025-04-04 ASSESSMENT — ENCOUNTER SYMPTOMS: DEBILITATING PAIN: 1

## 2025-04-08 ENCOUNTER — HOME CARE VISIT (OUTPATIENT)
Dept: HOME HEALTH SERVICES | Facility: HOME HEALTHCARE | Age: 80
End: 2025-04-08
Payer: MEDICARE

## 2025-04-08 VITALS
WEIGHT: 140 LBS | RESPIRATION RATE: 16 BRPM | BODY MASS INDEX: 21.29 KG/M2 | OXYGEN SATURATION: 100 % | TEMPERATURE: 97.7 F | HEART RATE: 77 BPM | SYSTOLIC BLOOD PRESSURE: 120 MMHG | DIASTOLIC BLOOD PRESSURE: 68 MMHG

## 2025-04-08 PROCEDURE — G0299 HHS/HOSPICE OF RN EA 15 MIN: HCPCS

## 2025-04-08 PROCEDURE — G0151 HHCP-SERV OF PT,EA 15 MIN: HCPCS

## 2025-04-08 ASSESSMENT — ENCOUNTER SYMPTOMS
PAIN LOCATION - PAIN SEVERITY: 3/10
LOWEST PAIN SEVERITY IN PAST 24 HOURS: 3/10
LOWEST PAIN SEVERITY IN PAST 24 HOURS: 0/10
PAIN LOCATION - EXACERBATING FACTORS: WALKING
SUBJECTIVE PAIN PROGRESSION: WAXING AND WANING
SUBJECTIVE PAIN PROGRESSION: UNCHANGED
PAIN: 1
DEBILITATING PAIN: 1
SKIN LESIONS: 1
PAIN LOCATION - PAIN FREQUENCY: WITH ACTIVITY
BOWEL PATTERN NORMAL: 1
MUSCLE WEAKNESS: 1
LIMITED RANGE OF MOTION: 1
PAIN LOCATION - PAIN SEVERITY: 4/10
PAIN SEVERITY GOAL: 0/10
PAIN LOCATION - PAIN FREQUENCY: INTERMITTENT
PAIN LOCATION: BILATERAL TOES
PAIN LOCATION - PAIN QUALITY: SORE
PAIN SEVERITY GOAL: 0/10
PAIN: 1
HIGHEST PAIN SEVERITY IN PAST 24 HOURS: 4/10
STOOL FREQUENCY: DAILY
HIGHEST PAIN SEVERITY IN PAST 24 HOURS: 5/10
PAIN LOCATION - PAIN DURATION: VARIES
PAIN LOCATION - PAIN DURATION: CONSTANT
LAST BOWEL MOVEMENT: 67303
PAIN LOCATION - RELIEVING FACTORS: REST, POSITION CHANGE
PAIN LOCATION - PAIN QUALITY: ACHY
PAIN LOCATION: FEET
PAIN LOCATION - EXACERBATING FACTORS: ACTIVITY
FATIGUES EASILY: 1

## 2025-04-08 ASSESSMENT — ACTIVITIES OF DAILY LIVING (ADL)
CURRENT_FUNCTION: STAND BY ASSIST
AMBULATION ASSISTANCE: STAND BY ASSIST

## 2025-04-08 ASSESSMENT — FIBROSIS 4 INDEX: FIB4 SCORE: 2.622677898468097068

## 2025-04-10 ENCOUNTER — HOME CARE VISIT (OUTPATIENT)
Dept: HOME HEALTH SERVICES | Facility: HOME HEALTHCARE | Age: 80
End: 2025-04-10
Payer: MEDICARE

## 2025-04-10 VITALS
RESPIRATION RATE: 16 BRPM | DIASTOLIC BLOOD PRESSURE: 60 MMHG | SYSTOLIC BLOOD PRESSURE: 120 MMHG | TEMPERATURE: 97.9 F | WEIGHT: 139 LBS | OXYGEN SATURATION: 99 % | BODY MASS INDEX: 21.13 KG/M2

## 2025-04-10 PROCEDURE — G0299 HHS/HOSPICE OF RN EA 15 MIN: HCPCS

## 2025-04-10 ASSESSMENT — ENCOUNTER SYMPTOMS
MUSCLE WEAKNESS: 1
DYSPNEA ON EXERTION: 1
SUBJECTIVE PAIN PROGRESSION: WAXING AND WANING
PAIN LOCATION - PAIN QUALITY: SORE, ACHE
PAIN SEVERITY GOAL: 0/10
LAST BOWEL MOVEMENT: 67304
PAIN LOCATION - PAIN SEVERITY: 3/10
LIMITED RANGE OF MOTION: 1
LOWEST PAIN SEVERITY IN PAST 24 HOURS: 3/10
SKIN LESIONS: 1
FATIGUES EASILY: 1
HIGHEST PAIN SEVERITY IN PAST 24 HOURS: 5/10
STOOL FREQUENCY: DAILY
PAIN LOCATION - RELIEVING FACTORS: REST
PAIN LOCATION: BILATERAL FEET
PAIN LOCATION - PAIN FREQUENCY: CONSTANT
BOWEL PATTERN NORMAL: 1
FORGETFULNESS: 1
PAIN LOCATION - PAIN DURATION: CHRONIC
PAIN LOCATION - EXACERBATING FACTORS: WALKING
FATIGUE: 1
PAIN: 1

## 2025-04-10 ASSESSMENT — ACTIVITIES OF DAILY LIVING (ADL)
AMBULATION ASSISTANCE: STAND BY ASSIST
CURRENT_FUNCTION: ONE PERSON

## 2025-04-10 ASSESSMENT — FIBROSIS 4 INDEX: FIB4 SCORE: 2.622677898468097068

## 2025-04-12 ENCOUNTER — HOME CARE VISIT (OUTPATIENT)
Dept: HOME HEALTH SERVICES | Facility: HOME HEALTHCARE | Age: 80
End: 2025-04-12
Payer: MEDICARE

## 2025-04-12 VITALS
WEIGHT: 140 LBS | OXYGEN SATURATION: 99 % | BODY MASS INDEX: 21.29 KG/M2 | HEART RATE: 84 BPM | DIASTOLIC BLOOD PRESSURE: 78 MMHG | RESPIRATION RATE: 16 BRPM | SYSTOLIC BLOOD PRESSURE: 134 MMHG | TEMPERATURE: 97.5 F

## 2025-04-12 PROCEDURE — G0299 HHS/HOSPICE OF RN EA 15 MIN: HCPCS

## 2025-04-12 ASSESSMENT — ENCOUNTER SYMPTOMS
DENIES PAIN: 1
DRY SKIN: 1
VOMITING: PT CURRENTLY DENIES ANY VOMMITING
LAST BOWEL MOVEMENT: 67307
NAUSEA: PT CURRENTLY DENIES ANY NAUSEA
BOWEL PATTERN NORMAL: 1
STOOL FREQUENCY: DAILY

## 2025-04-12 ASSESSMENT — PATIENT HEALTH QUESTIONNAIRE - PHQ9: CLINICAL INTERPRETATION OF PHQ2 SCORE: 0

## 2025-04-12 ASSESSMENT — FIBROSIS 4 INDEX: FIB4 SCORE: 2.622677898468097068

## 2025-04-14 ENCOUNTER — HOME CARE VISIT (OUTPATIENT)
Dept: HOME HEALTH SERVICES | Facility: HOME HEALTHCARE | Age: 80
End: 2025-04-14
Payer: MEDICARE

## 2025-04-14 VITALS
DIASTOLIC BLOOD PRESSURE: 70 MMHG | BODY MASS INDEX: 20.89 KG/M2 | RESPIRATION RATE: 16 BRPM | HEART RATE: 83 BPM | SYSTOLIC BLOOD PRESSURE: 120 MMHG | WEIGHT: 137.4 LBS | OXYGEN SATURATION: 97 % | TEMPERATURE: 97.6 F

## 2025-04-14 PROCEDURE — G0299 HHS/HOSPICE OF RN EA 15 MIN: HCPCS

## 2025-04-14 ASSESSMENT — ENCOUNTER SYMPTOMS
LIMITED RANGE OF MOTION: 1
FATIGUE: 1
FATIGUES EASILY: 1
PAIN LOCATION - PAIN SEVERITY: 3/10
PAIN LOCATION: FEET/TOES
PAIN LOCATION - EXACERBATING FACTORS: WALKING, WOUND CARE
PAIN LOCATION - PAIN FREQUENCY: INTERMITTENT
PAIN: 1
LAST BOWEL MOVEMENT: 67309
FORGETFULNESS: 1
PAIN LOCATION - PAIN DURATION: CHRONIC
STOOL FREQUENCY: DAILY
PAIN LOCATION - RELIEVING FACTORS: MEDICATION REST
HIGHEST PAIN SEVERITY IN PAST 24 HOURS: 6/10
SKIN LESIONS: 1
LOWEST PAIN SEVERITY IN PAST 24 HOURS: 3/10
BOWEL PATTERN NORMAL: 1
PAIN SEVERITY GOAL: 3/10
SUBJECTIVE PAIN PROGRESSION: UNCHANGED
MUSCLE WEAKNESS: 1

## 2025-04-14 ASSESSMENT — ACTIVITIES OF DAILY LIVING (ADL)
AMBULATION ASSISTANCE: STAND BY ASSIST
CURRENT_FUNCTION: STAND BY ASSIST

## 2025-04-14 ASSESSMENT — FIBROSIS 4 INDEX: FIB4 SCORE: 2.622677898468097068

## 2025-04-15 ENCOUNTER — HOME CARE VISIT (OUTPATIENT)
Dept: HOME HEALTH SERVICES | Facility: HOME HEALTHCARE | Age: 80
End: 2025-04-15
Payer: MEDICARE

## 2025-04-15 VITALS
TEMPERATURE: 97.4 F | SYSTOLIC BLOOD PRESSURE: 120 MMHG | HEART RATE: 86 BPM | DIASTOLIC BLOOD PRESSURE: 76 MMHG | WEIGHT: 137.25 LBS | BODY MASS INDEX: 20.87 KG/M2 | RESPIRATION RATE: 16 BRPM | OXYGEN SATURATION: 99 %

## 2025-04-15 PROCEDURE — G0151 HHCP-SERV OF PT,EA 15 MIN: HCPCS

## 2025-04-15 ASSESSMENT — ENCOUNTER SYMPTOMS
PAIN LOCATION - PAIN DURATION: VARIES
PAIN LOCATION - PAIN SEVERITY: 3/10
PAIN: 1
PAIN LOCATION - PAIN QUALITY: SHARP
HIGHEST PAIN SEVERITY IN PAST 24 HOURS: 5/10
PAIN SEVERITY GOAL: 0/10
PAIN LOCATION - PAIN FREQUENCY: WITH ACTIVITY
PAIN LOCATION - EXACERBATING FACTORS: ACTIVITY
LOWEST PAIN SEVERITY IN PAST 24 HOURS: 0/10
SUBJECTIVE PAIN PROGRESSION: WAXING AND WANING
PAIN LOCATION: BILATERAL TOES
PAIN LOCATION - RELIEVING FACTORS: REST, POSITION CHANGE

## 2025-04-15 ASSESSMENT — FIBROSIS 4 INDEX: FIB4 SCORE: 2.622677898468097068

## 2025-04-16 ENCOUNTER — HOME CARE VISIT (OUTPATIENT)
Dept: HOME HEALTH SERVICES | Facility: HOME HEALTHCARE | Age: 80
End: 2025-04-16
Payer: MEDICARE

## 2025-04-16 VITALS
DIASTOLIC BLOOD PRESSURE: 70 MMHG | SYSTOLIC BLOOD PRESSURE: 112 MMHG | HEART RATE: 76 BPM | RESPIRATION RATE: 16 BRPM | TEMPERATURE: 97.3 F | OXYGEN SATURATION: 99 %

## 2025-04-16 PROCEDURE — G0299 HHS/HOSPICE OF RN EA 15 MIN: HCPCS

## 2025-04-16 ASSESSMENT — ACTIVITIES OF DAILY LIVING (ADL)
CURRENT_FUNCTION: STAND BY ASSIST
AMBULATION ASSISTANCE: STAND BY ASSIST

## 2025-04-16 ASSESSMENT — ENCOUNTER SYMPTOMS
FATIGUE: 1
PAIN SEVERITY GOAL: 0/10
BOWEL PATTERN NORMAL: 1
STOOL FREQUENCY: DAILY
LAST BOWEL MOVEMENT: 67311
PAIN: 1
HIGHEST PAIN SEVERITY IN PAST 24 HOURS: 6/10
SKIN LESIONS: 1
FATIGUES EASILY: 1
LOWEST PAIN SEVERITY IN PAST 24 HOURS: 0/10
SUBJECTIVE PAIN PROGRESSION: UNCHANGED

## 2025-04-18 ENCOUNTER — HOME CARE VISIT (OUTPATIENT)
Dept: HOME HEALTH SERVICES | Facility: HOME HEALTHCARE | Age: 80
End: 2025-04-18
Payer: MEDICARE

## 2025-04-21 ENCOUNTER — HOME CARE VISIT (OUTPATIENT)
Dept: HOME HEALTH SERVICES | Facility: HOME HEALTHCARE | Age: 80
End: 2025-04-21
Payer: MEDICARE

## 2025-04-21 VITALS
HEART RATE: 52 BPM | OXYGEN SATURATION: 98 % | RESPIRATION RATE: 16 BRPM | TEMPERATURE: 97.7 F | DIASTOLIC BLOOD PRESSURE: 60 MMHG | SYSTOLIC BLOOD PRESSURE: 130 MMHG

## 2025-04-21 PROCEDURE — G0299 HHS/HOSPICE OF RN EA 15 MIN: HCPCS

## 2025-04-21 ASSESSMENT — ENCOUNTER SYMPTOMS
PAIN: 1
LOWEST PAIN SEVERITY IN PAST 24 HOURS: 0/10
FATIGUE: 1
DIZZINESS: 1
DEBILITATING PAIN: 1
PAIN SEVERITY GOAL: 0/10
PAIN LOCATION - PAIN FREQUENCY: INTERMITTENT
PAIN LOCATION - PAIN QUALITY: SHARP
FATIGUES EASILY: 1
PAIN LOCATION: TOES
LOSS OF SENSATION: 1
SKIN LESIONS: 1
PAIN LOCATION - PAIN SEVERITY: 0/10
HIGHEST PAIN SEVERITY IN PAST 24 HOURS: 3/10
DYSPNEA ACTIVITY LEVEL: AFTER AMBULATING LESS THAN 10 FT
SHORTNESS OF BREATH: 1
SUBJECTIVE PAIN PROGRESSION: WAXING AND WANING

## 2025-04-21 ASSESSMENT — PAIN SCALES - PAIN ASSESSMENT IN ADVANCED DEMENTIA (PAINAD): NEGVOCALIZATION: 0 - NONE.

## 2025-04-21 ASSESSMENT — PATIENT HEALTH QUESTIONNAIRE - PHQ9: CLINICAL INTERPRETATION OF PHQ2 SCORE: 0

## 2025-04-21 ASSESSMENT — ACTIVITIES OF DAILY LIVING (ADL): OASIS_M1830: 05

## 2025-04-22 ENCOUNTER — HOME CARE VISIT (OUTPATIENT)
Dept: HOME HEALTH SERVICES | Facility: HOME HEALTHCARE | Age: 80
End: 2025-04-22
Payer: MEDICARE

## 2025-04-23 ENCOUNTER — TELEPHONE (OUTPATIENT)
Facility: MEDICAL CENTER | Age: 80
End: 2025-04-23
Payer: MEDICARE

## 2025-04-23 NOTE — TELEPHONE ENCOUNTER
Received called from Perla at [facility] stating that [facility] will not do a fistulagram even thought Dr. Miller requested them to do that. Patient will be calling to schedule appointment with Dr. Miller

## 2025-04-28 ENCOUNTER — APPOINTMENT (OUTPATIENT)
Dept: CARDIOLOGY | Facility: MEDICAL CENTER | Age: 80
DRG: 312 | End: 2025-04-28
Attending: STUDENT IN AN ORGANIZED HEALTH CARE EDUCATION/TRAINING PROGRAM
Payer: MEDICARE

## 2025-04-28 ENCOUNTER — APPOINTMENT (OUTPATIENT)
Dept: RADIOLOGY | Facility: MEDICAL CENTER | Age: 80
DRG: 312 | End: 2025-04-28
Attending: EMERGENCY MEDICINE
Payer: MEDICARE

## 2025-04-28 ENCOUNTER — HOSPITAL ENCOUNTER (INPATIENT)
Facility: MEDICAL CENTER | Age: 80
LOS: 1 days | End: 2025-04-30
Attending: EMERGENCY MEDICINE | Admitting: INTERNAL MEDICINE
Payer: MEDICARE

## 2025-04-28 DIAGNOSIS — I35.0 AORTIC VALVE STENOSIS, ETIOLOGY OF CARDIAC VALVE DISEASE UNSPECIFIED: ICD-10-CM

## 2025-04-28 DIAGNOSIS — R42 POSTURAL DIZZINESS WITH PRESYNCOPE: ICD-10-CM

## 2025-04-28 DIAGNOSIS — R55 POSTURAL DIZZINESS WITH PRESYNCOPE: ICD-10-CM

## 2025-04-28 DIAGNOSIS — R55 SYNCOPE, UNSPECIFIED SYNCOPE TYPE: ICD-10-CM

## 2025-04-28 PROBLEM — Z71.89 ADVANCE CARE PLANNING: Status: ACTIVE | Noted: 2025-04-28

## 2025-04-28 PROBLEM — R25.3 MUSCLE TWITCHING: Status: ACTIVE | Noted: 2025-04-28

## 2025-04-28 LAB
ALBUMIN SERPL BCP-MCNC: 3.3 G/DL (ref 3.2–4.9)
ALBUMIN/GLOB SERPL: 1 G/DL
ALP SERPL-CCNC: 217 U/L (ref 30–99)
ALT SERPL-CCNC: 58 U/L (ref 2–50)
ANION GAP SERPL CALC-SCNC: 20 MMOL/L (ref 7–16)
ANISOCYTOSIS BLD QL SMEAR: ABNORMAL
AST SERPL-CCNC: 43 U/L (ref 12–45)
BASOPHILS # BLD AUTO: 2.6 % (ref 0–1.8)
BASOPHILS # BLD: 0.24 K/UL (ref 0–0.12)
BILIRUB SERPL-MCNC: 0.4 MG/DL (ref 0.1–1.5)
BUN SERPL-MCNC: 103 MG/DL (ref 8–22)
CALCIUM ALBUM COR SERPL-MCNC: 9.4 MG/DL (ref 8.5–10.5)
CALCIUM SERPL-MCNC: 8.8 MG/DL (ref 8.5–10.5)
CHLORIDE SERPL-SCNC: 97 MMOL/L (ref 96–112)
CO2 SERPL-SCNC: 21 MMOL/L (ref 20–33)
CREAT SERPL-MCNC: 9.81 MG/DL (ref 0.5–1.4)
D DIMER PPP IA.FEU-MCNC: 1.39 UG/ML (FEU) (ref 0–0.5)
EKG IMPRESSION: NORMAL
EOSINOPHIL # BLD AUTO: 2.74 K/UL (ref 0–0.51)
EOSINOPHIL NFR BLD: 29.1 % (ref 0–6.9)
ERYTHROCYTE [DISTWIDTH] IN BLOOD BY AUTOMATED COUNT: 59.8 FL (ref 35.9–50)
GFR SERPLBLD CREATININE-BSD FMLA CKD-EPI: 5 ML/MIN/1.73 M 2
GLOBULIN SER CALC-MCNC: 3.2 G/DL (ref 1.9–3.5)
GLUCOSE SERPL-MCNC: 171 MG/DL (ref 65–99)
HCT VFR BLD AUTO: 35.3 % (ref 42–52)
HGB BLD-MCNC: 11.7 G/DL (ref 14–18)
LV EJECT FRACT  99904: 20
LV EJECT FRACT MOD 2C 99903: 22.52
LV EJECT FRACT MOD 4C 99902: 30.24
LV EJECT FRACT MOD BP 99901: 25.71
LYMPHOCYTES # BLD AUTO: 0.56 K/UL (ref 1–4.8)
LYMPHOCYTES NFR BLD: 6 % (ref 22–41)
MANUAL DIFF BLD: NORMAL
MCH RBC QN AUTO: 31.6 PG (ref 27–33)
MCHC RBC AUTO-ENTMCNC: 33.1 G/DL (ref 32.3–36.5)
MCV RBC AUTO: 95.4 FL (ref 81.4–97.8)
METAMYELOCYTES NFR BLD MANUAL: 1.7 %
MONOCYTES # BLD AUTO: 0.08 K/UL (ref 0–0.85)
MONOCYTES NFR BLD AUTO: 0.8 % (ref 0–13.4)
MORPHOLOGY BLD-IMP: NORMAL
NEUTROPHILS # BLD AUTO: 5.62 K/UL (ref 1.82–7.42)
NEUTROPHILS NFR BLD: 59.8 % (ref 44–72)
NRBC # BLD AUTO: 0 K/UL
NRBC BLD-RTO: 0 /100 WBC (ref 0–0.2)
NT-PROBNP SERPL IA-MCNC: ABNORMAL PG/ML (ref 0–125)
OVALOCYTES BLD QL SMEAR: NORMAL
PHOSPHATE SERPL-MCNC: 5 MG/DL (ref 2.5–4.5)
PLATELET # BLD AUTO: 198 K/UL (ref 164–446)
PLATELET BLD QL SMEAR: NORMAL
PMV BLD AUTO: 9.7 FL (ref 9–12.9)
POIKILOCYTOSIS BLD QL SMEAR: NORMAL
POTASSIUM SERPL-SCNC: 4.5 MMOL/L (ref 3.6–5.5)
PROT SERPL-MCNC: 6.5 G/DL (ref 6–8.2)
RBC # BLD AUTO: 3.7 M/UL (ref 4.7–6.1)
RBC BLD AUTO: PRESENT
SODIUM SERPL-SCNC: 138 MMOL/L (ref 135–145)
TROPONIN T SERPL-MCNC: 175 NG/L (ref 6–19)
WBC # BLD AUTO: 9.4 K/UL (ref 4.8–10.8)

## 2025-04-28 PROCEDURE — 700111 HCHG RX REV CODE 636 W/ 250 OVERRIDE (IP): Performed by: INTERNAL MEDICINE

## 2025-04-28 PROCEDURE — 93306 TTE W/DOPPLER COMPLETE: CPT

## 2025-04-28 PROCEDURE — 99223 1ST HOSP IP/OBS HIGH 75: CPT | Mod: 25 | Performed by: STUDENT IN AN ORGANIZED HEALTH CARE EDUCATION/TRAINING PROGRAM

## 2025-04-28 PROCEDURE — 700102 HCHG RX REV CODE 250 W/ 637 OVERRIDE(OP): Performed by: STUDENT IN AN ORGANIZED HEALTH CARE EDUCATION/TRAINING PROGRAM

## 2025-04-28 PROCEDURE — 85379 FIBRIN DEGRADATION QUANT: CPT

## 2025-04-28 PROCEDURE — A9270 NON-COVERED ITEM OR SERVICE: HCPCS | Performed by: INTERNAL MEDICINE

## 2025-04-28 PROCEDURE — 700117 HCHG RX CONTRAST REV CODE 255: Performed by: EMERGENCY MEDICINE

## 2025-04-28 PROCEDURE — 96374 THER/PROPH/DIAG INJ IV PUSH: CPT

## 2025-04-28 PROCEDURE — G0378 HOSPITAL OBSERVATION PER HR: HCPCS

## 2025-04-28 PROCEDURE — 99497 ADVNCD CARE PLAN 30 MIN: CPT | Performed by: STUDENT IN AN ORGANIZED HEALTH CARE EDUCATION/TRAINING PROGRAM

## 2025-04-28 PROCEDURE — 3E1M39Z IRRIGATION OF PERITONEAL CAVITY USING DIALYSATE, PERCUTANEOUS APPROACH: ICD-10-PCS | Performed by: INTERNAL MEDICINE

## 2025-04-28 PROCEDURE — 36415 COLL VENOUS BLD VENIPUNCTURE: CPT

## 2025-04-28 PROCEDURE — 71045 X-RAY EXAM CHEST 1 VIEW: CPT

## 2025-04-28 PROCEDURE — 90945 DIALYSIS ONE EVALUATION: CPT

## 2025-04-28 PROCEDURE — 99285 EMERGENCY DEPT VISIT HI MDM: CPT

## 2025-04-28 PROCEDURE — 70450 CT HEAD/BRAIN W/O DYE: CPT

## 2025-04-28 PROCEDURE — 700111 HCHG RX REV CODE 636 W/ 250 OVERRIDE (IP): Performed by: EMERGENCY MEDICINE

## 2025-04-28 PROCEDURE — 700102 HCHG RX REV CODE 250 W/ 637 OVERRIDE(OP): Performed by: INTERNAL MEDICINE

## 2025-04-28 PROCEDURE — A9270 NON-COVERED ITEM OR SERVICE: HCPCS | Performed by: STUDENT IN AN ORGANIZED HEALTH CARE EDUCATION/TRAINING PROGRAM

## 2025-04-28 PROCEDURE — 80053 COMPREHEN METABOLIC PANEL: CPT

## 2025-04-28 PROCEDURE — 93005 ELECTROCARDIOGRAM TRACING: CPT | Mod: TC | Performed by: EMERGENCY MEDICINE

## 2025-04-28 PROCEDURE — 85007 BL SMEAR W/DIFF WBC COUNT: CPT

## 2025-04-28 PROCEDURE — 85027 COMPLETE CBC AUTOMATED: CPT

## 2025-04-28 PROCEDURE — 71275 CT ANGIOGRAPHY CHEST: CPT

## 2025-04-28 PROCEDURE — 84100 ASSAY OF PHOSPHORUS: CPT

## 2025-04-28 PROCEDURE — 93005 ELECTROCARDIOGRAM TRACING: CPT | Mod: TC

## 2025-04-28 PROCEDURE — 700117 HCHG RX CONTRAST REV CODE 255: Performed by: STUDENT IN AN ORGANIZED HEALTH CARE EDUCATION/TRAINING PROGRAM

## 2025-04-28 PROCEDURE — 84484 ASSAY OF TROPONIN QUANT: CPT

## 2025-04-28 PROCEDURE — 93306 TTE W/DOPPLER COMPLETE: CPT | Mod: 26 | Performed by: INTERNAL MEDICINE

## 2025-04-28 PROCEDURE — 83880 ASSAY OF NATRIURETIC PEPTIDE: CPT

## 2025-04-28 RX ORDER — METOPROLOL TARTRATE 1 MG/ML
5 INJECTION, SOLUTION INTRAVENOUS
Status: DISCONTINUED | OUTPATIENT
Start: 2025-04-28 | End: 2025-04-30 | Stop reason: HOSPADM

## 2025-04-28 RX ORDER — HEPARIN SODIUM 1000 [USP'U]/ML
3000 INJECTION, SOLUTION INTRAVENOUS; SUBCUTANEOUS
Status: DISCONTINUED | OUTPATIENT
Start: 2025-04-28 | End: 2025-04-30 | Stop reason: HOSPADM

## 2025-04-28 RX ORDER — POLYETHYLENE GLYCOL 3350 17 G/17G
1 POWDER, FOR SOLUTION ORAL
Status: DISCONTINUED | OUTPATIENT
Start: 2025-04-28 | End: 2025-04-30 | Stop reason: HOSPADM

## 2025-04-28 RX ORDER — GENTAMICIN SULFATE 1 MG/G
1 CREAM TOPICAL DAILY
Status: DISCONTINUED | OUTPATIENT
Start: 2025-04-29 | End: 2025-04-28

## 2025-04-28 RX ORDER — GENTAMICIN SULFATE 1 MG/G
1 CREAM TOPICAL
Status: DISCONTINUED | OUTPATIENT
Start: 2025-04-28 | End: 2025-04-30 | Stop reason: HOSPADM

## 2025-04-28 RX ORDER — METOPROLOL SUCCINATE 50 MG/1
50 TABLET, EXTENDED RELEASE ORAL EVERY MORNING
COMMUNITY

## 2025-04-28 RX ORDER — AMOXICILLIN 250 MG
2 CAPSULE ORAL EVERY EVENING
Status: DISCONTINUED | OUTPATIENT
Start: 2025-04-28 | End: 2025-04-30 | Stop reason: HOSPADM

## 2025-04-28 RX ORDER — FUROSEMIDE 40 MG/1
80 TABLET ORAL 2 TIMES DAILY PRN
Status: DISCONTINUED | OUTPATIENT
Start: 2025-04-28 | End: 2025-04-30 | Stop reason: HOSPADM

## 2025-04-28 RX ORDER — HEPARIN SODIUM 1000 [USP'U]/ML
9000 INJECTION, SOLUTION INTRAVENOUS; SUBCUTANEOUS
Status: DISCONTINUED | OUTPATIENT
Start: 2025-04-28 | End: 2025-04-28

## 2025-04-28 RX ORDER — ONDANSETRON 2 MG/ML
4 INJECTION INTRAMUSCULAR; INTRAVENOUS EVERY 4 HOURS PRN
Status: DISCONTINUED | OUTPATIENT
Start: 2025-04-28 | End: 2025-04-30 | Stop reason: HOSPADM

## 2025-04-28 RX ORDER — CALCITRIOL 0.25 UG/1
0.25 CAPSULE, LIQUID FILLED ORAL
Status: DISCONTINUED | OUTPATIENT
Start: 2025-04-30 | End: 2025-04-30 | Stop reason: HOSPADM

## 2025-04-28 RX ORDER — DIPHENHYDRAMINE HCL 25 MG
25-50 TABLET ORAL EVERY 6 HOURS PRN
COMMUNITY
End: 2025-05-19

## 2025-04-28 RX ORDER — NITROGLYCERIN 0.4 MG/1
0.4 TABLET SUBLINGUAL
COMMUNITY

## 2025-04-28 RX ORDER — SEVELAMER CARBONATE 800 MG/1
800 TABLET, FILM COATED ORAL
Status: DISCONTINUED | OUTPATIENT
Start: 2025-04-28 | End: 2025-04-30 | Stop reason: HOSPADM

## 2025-04-28 RX ORDER — ROSUVASTATIN CALCIUM 5 MG/1
20 TABLET, COATED ORAL
Status: DISCONTINUED | OUTPATIENT
Start: 2025-04-28 | End: 2025-04-30 | Stop reason: HOSPADM

## 2025-04-28 RX ORDER — METOPROLOL SUCCINATE 25 MG/1
50 TABLET, EXTENDED RELEASE ORAL EVERY MORNING
Status: DISCONTINUED | OUTPATIENT
Start: 2025-04-29 | End: 2025-04-28

## 2025-04-28 RX ORDER — OMEPRAZOLE 20 MG/1
40 CAPSULE, DELAYED RELEASE ORAL 2 TIMES DAILY
Status: DISCONTINUED | OUTPATIENT
Start: 2025-04-28 | End: 2025-04-30 | Stop reason: HOSPADM

## 2025-04-28 RX ORDER — ONDANSETRON 4 MG/1
4 TABLET, ORALLY DISINTEGRATING ORAL EVERY 4 HOURS PRN
Status: DISCONTINUED | OUTPATIENT
Start: 2025-04-28 | End: 2025-04-30 | Stop reason: HOSPADM

## 2025-04-28 RX ORDER — CLOPIDOGREL BISULFATE 75 MG/1
75 TABLET ORAL DAILY
Status: DISCONTINUED | OUTPATIENT
Start: 2025-04-29 | End: 2025-04-30 | Stop reason: HOSPADM

## 2025-04-28 RX ORDER — METOPROLOL SUCCINATE 25 MG/1
50 TABLET, EXTENDED RELEASE ORAL EVERY MORNING
Status: DISCONTINUED | OUTPATIENT
Start: 2025-04-28 | End: 2025-04-30 | Stop reason: HOSPADM

## 2025-04-28 RX ORDER — ACETAMINOPHEN 325 MG/1
650 TABLET ORAL EVERY 6 HOURS PRN
Status: DISCONTINUED | OUTPATIENT
Start: 2025-04-28 | End: 2025-04-30 | Stop reason: HOSPADM

## 2025-04-28 RX ORDER — HEPARIN SODIUM 1000 [USP'U]/ML
3000 INJECTION, SOLUTION INTRAVENOUS; SUBCUTANEOUS
Status: DISCONTINUED | OUTPATIENT
Start: 2025-04-28 | End: 2025-04-28

## 2025-04-28 RX ORDER — DIAZEPAM 10 MG/2ML
5 INJECTION, SOLUTION INTRAMUSCULAR; INTRAVENOUS ONCE
Status: COMPLETED | OUTPATIENT
Start: 2025-04-28 | End: 2025-04-28

## 2025-04-28 RX ORDER — LABETALOL HYDROCHLORIDE 5 MG/ML
10 INJECTION, SOLUTION INTRAVENOUS EVERY 4 HOURS PRN
Status: DISCONTINUED | OUTPATIENT
Start: 2025-04-28 | End: 2025-04-30 | Stop reason: HOSPADM

## 2025-04-28 RX ORDER — OMEPRAZOLE 40 MG/1
40 CAPSULE, DELAYED RELEASE ORAL 2 TIMES DAILY
COMMUNITY

## 2025-04-28 RX ORDER — MONTELUKAST SODIUM 10 MG/1
10 TABLET ORAL
Status: DISCONTINUED | OUTPATIENT
Start: 2025-04-28 | End: 2025-04-30 | Stop reason: HOSPADM

## 2025-04-28 RX ORDER — NITROGLYCERIN 0.4 MG/1
0.4 TABLET SUBLINGUAL
Status: DISCONTINUED | OUTPATIENT
Start: 2025-04-28 | End: 2025-04-30 | Stop reason: HOSPADM

## 2025-04-28 RX ORDER — AMMONIUM LACTATE 12 G/100G
1 LOTION TOPICAL 2 TIMES DAILY
COMMUNITY
End: 2025-05-19

## 2025-04-28 RX ORDER — TAMSULOSIN HYDROCHLORIDE 0.4 MG/1
0.4 CAPSULE ORAL
Status: DISCONTINUED | OUTPATIENT
Start: 2025-04-29 | End: 2025-04-30 | Stop reason: HOSPADM

## 2025-04-28 RX ORDER — SODIUM BICARBONATE 650 MG/1
650 TABLET ORAL 3 TIMES DAILY PRN
Status: DISCONTINUED | OUTPATIENT
Start: 2025-04-28 | End: 2025-04-30 | Stop reason: HOSPADM

## 2025-04-28 RX ADMIN — IOHEXOL 50 ML: 350 INJECTION, SOLUTION INTRAVENOUS at 13:00

## 2025-04-28 RX ADMIN — HEPARIN SODIUM 3000 UNITS: 1000 INJECTION, SOLUTION INTRAVENOUS; SUBCUTANEOUS at 18:56

## 2025-04-28 RX ADMIN — OMEPRAZOLE 40 MG: 20 CAPSULE, DELAYED RELEASE ORAL at 17:44

## 2025-04-28 RX ADMIN — GENTAMICIN SULFATE 1 APPLICATION: 1 CREAM TOPICAL at 18:50

## 2025-04-28 RX ADMIN — HUMAN ALBUMIN MICROSPHERES AND PERFLUTREN 3 ML: 10; .22 INJECTION, SOLUTION INTRAVENOUS at 17:15

## 2025-04-28 RX ADMIN — SEVELAMER CARBONATE 800 MG: 800 TABLET, FILM COATED ORAL at 17:09

## 2025-04-28 RX ADMIN — METOPROLOL SUCCINATE 50 MG: 25 TABLET, EXTENDED RELEASE ORAL at 18:46

## 2025-04-28 RX ADMIN — APIXABAN 2.5 MG: 5 TABLET, FILM COATED ORAL at 17:44

## 2025-04-28 RX ADMIN — DIAZEPAM 5 MG: 10 INJECTION, SOLUTION INTRAMUSCULAR; INTRAVENOUS at 11:33

## 2025-04-28 ASSESSMENT — LIFESTYLE VARIABLES
CONSUMPTION TOTAL: NEGATIVE
AVERAGE NUMBER OF DAYS PER WEEK YOU HAVE A DRINK CONTAINING ALCOHOL: 0
ON A TYPICAL DAY WHEN YOU DRINK ALCOHOL HOW MANY DRINKS DO YOU HAVE: 0
ALCOHOL_USE: NO
EVER FELT BAD OR GUILTY ABOUT YOUR DRINKING: NO
HAVE YOU EVER FELT YOU SHOULD CUT DOWN ON YOUR DRINKING: NO
HOW MANY TIMES IN THE PAST YEAR HAVE YOU HAD 5 OR MORE DRINKS IN A DAY: 0
TOTAL SCORE: 0
HAVE PEOPLE ANNOYED YOU BY CRITICIZING YOUR DRINKING: NO
TOTAL SCORE: 0
TOTAL SCORE: 0
EVER HAD A DRINK FIRST THING IN THE MORNING TO STEADY YOUR NERVES TO GET RID OF A HANGOVER: NO
DOES PATIENT WANT TO STOP DRINKING: NO

## 2025-04-28 ASSESSMENT — COGNITIVE AND FUNCTIONAL STATUS - GENERAL
SUGGESTED CMS G CODE MODIFIER DAILY ACTIVITY: CH
DAILY ACTIVITIY SCORE: 24
MOBILITY SCORE: 24
SUGGESTED CMS G CODE MODIFIER MOBILITY: CH

## 2025-04-28 ASSESSMENT — CHA2DS2 SCORE
HYPERTENSION: YES
AGE 75 OR GREATER: YES
AGE 65 TO 74: NO
CHF OR LEFT VENTRICULAR DYSFUNCTION: NO
DIABETES: NO
PRIOR STROKE OR TIA OR THROMBOEMBOLISM: NO
VASCULAR DISEASE: YES
CHA2DS2 VASC SCORE: 4

## 2025-04-28 ASSESSMENT — FIBROSIS 4 INDEX
FIB4 SCORE: 2.2
FIB4 SCORE: 2.2
FIB4 SCORE: 2.622677898468097068

## 2025-04-28 ASSESSMENT — SOCIAL DETERMINANTS OF HEALTH (SDOH)
WITHIN THE PAST 12 MONTHS, THE FOOD YOU BOUGHT JUST DIDN'T LAST AND YOU DIDN'T HAVE MONEY TO GET MORE: NEVER TRUE
WITHIN THE LAST YEAR, HAVE YOU BEEN AFRAID OF YOUR PARTNER OR EX-PARTNER?: NO
WITHIN THE LAST YEAR, HAVE TO BEEN RAPED OR FORCED TO HAVE ANY KIND OF SEXUAL ACTIVITY BY YOUR PARTNER OR EX-PARTNER?: NO
IN THE PAST 12 MONTHS, HAS THE ELECTRIC, GAS, OIL, OR WATER COMPANY THREATENED TO SHUT OFF SERVICE IN YOUR HOME?: NO
WITHIN THE PAST 12 MONTHS, YOU WORRIED THAT YOUR FOOD WOULD RUN OUT BEFORE YOU GOT THE MONEY TO BUY MORE: NEVER TRUE
WITHIN THE LAST YEAR, HAVE YOU BEEN HUMILIATED OR EMOTIONALLY ABUSED IN OTHER WAYS BY YOUR PARTNER OR EX-PARTNER?: NO
WITHIN THE LAST YEAR, HAVE YOU BEEN KICKED, HIT, SLAPPED, OR OTHERWISE PHYSICALLY HURT BY YOUR PARTNER OR EX-PARTNER?: NO

## 2025-04-28 ASSESSMENT — ENCOUNTER SYMPTOMS: WEAKNESS: 1

## 2025-04-28 NOTE — ASSESSMENT & PLAN NOTE
On PD. Per wife, he was recently noted to have elevated phosphorus level. He is also noted to have intermittent extremity twitching. Concerning insufficient dialysis   Nephrology consulted

## 2025-04-28 NOTE — ASSESSMENT & PLAN NOTE
Follows Dr. Miller  On statin, Plavix and Eliquis  Noted necrotic toes in bilateral feet, dry, no drainage. I have reached out Dr. Miller and notify him patient is currently hospitalized

## 2025-04-28 NOTE — ED TRIAGE NOTES
"Chief Complaint   Patient presents with    Syncope     Pt was sitting on the toilet this am when his wife reported him having a loss of consciousness followed by his full body \"tensing up\" and shaking. Pt does not remember the event.        Pt BIBA from home with the above complaint. Upon EMS arrival, pt was hypotensive at 68/42. Pt was trendelenburged and BP improved to 104/56. Pt did not sustain a fall, - head strike. Pt is GCS 15, FSBG 186.     /60   Pulse 83   Temp 36.6 °C (97.8 °F) (Temporal)   Resp 16   Ht 1.778 m (5' 10\")   Wt 63.5 kg (140 lb)   SpO2 97%   BMI 20.09 kg/m²     "

## 2025-04-28 NOTE — ED NOTES
Medication history reviewed with PT's spouse, Kailey at bedside    Med rec is complete per spouse reporting    Allergies reviewed. Hydroxyzine allergy listed to PT's allergy list per spouse reporting.    PT was on Vancomycin interperitoneal once every 5 days for 3 weeks total. Last dose was 04/10/2025. Course was completed.    Patient is taking Eliquis 2.5mg. Last dose was 04/27/2025.    Dispense history is partially available in Epic.    PT receives Peritoneal Dialysis 7 nights a week. Supplies are provided from [facility] on [address] ([phone]). Last treatment was 04/27/2025. PT receives Mircera every 30 day. Last dose was 150mcg on 03/26/2025. Next Mircera dose would have been due 04/282025.    Preferred pharmacy for this visit - [facility] on [address] ([phone])

## 2025-04-28 NOTE — ED NOTES
Pt restless on gurney. Pt itching continuously. Wife stated this has been a problem. Pt medicated per orders

## 2025-04-28 NOTE — CONSULTS
[facility] -  CONSULTATION NOTE               Author: Ramírez Matta M.D. Date & Time: 4/28/2025  4:38 PM       REASON FOR CONSULTATION:   Evaluation and management of acute and chronic conditions related to Nephrology     CHIEF COMPLAINT:   Weakness and seizure-like activity    HISTORY OF PRESENT ILLNESS:    79 y.o. male with HTN, ESRD on PD, PVDs/p bypass, NSTEMI who presented 4/28/2025 with generalized weakness and presyncope episode. Wife reports patient has been feeling very week since last Thursday. This morning, when he was on toilet, he started to feel lightheadedness and was slumped over. He did not fall as his wife caught him. According to wife, his body was tense, stiff and shaking for less than a minute. Afterward patient was awake, alert. No postictal period. Nephrology was asked to consult for evaluation and management of acute and chronic conditions related to Nephrology     No F/C/N/V/CP/SOB.  No melena, hematochezia, hematemesis.  No HA, visual changes, or abdominal pain.    REVIEW OF SYSTEMS:    10 point ROS was performed and is as per HPI or otherwise negative.    PAST MEDICAL HISTORY:   Past Medical History:   Diagnosis Date    A-V fistula ([facility])     Left Arm    Arrhythmia 03/13/2024    Due to kidney failure treatment    Breath shortness     with exertion    Cataract     shon iol    Congestive heart failure ([facility])     Diabetes ([facility])     not on any medication at this time, being monitored at dialysis    Dialysis patient ([facility])     monday wednesday friday at [facility]    Dialysis patient ([facility]) 01/17/2025    Starting Peritoneal Dialysis @ Home on 1/17/2025. Follows Nephrologist MD Milian.    Foot ulcer ([facility])     left foot. being seen by wound care    Hemorrhagic disorder ([facility])     plavix eliquis    High cholesterol     Hypertension 05/24/2024    states controlled    NSTEMI (non-ST elevated myocardial infarction) ([facility]) 2/25/2025    DARIEN (obstructive sleep apnea)  01/17/2025    O2 @ 2.5L Per Patient's Wife.    Pain     Peritoneal dialysis catheter in place ([facility]) 01/17/2025    Pneumonia     3/24    Renal disorder     Sleep apnea 01/10/2025    Home sleep study done 1/3/2025 - no results yet.    Stroke ([facility]) 2021    tia       PAST SURGICAL HISTORY:   Past Surgical History:   Procedure Laterality Date    MS UPPER GI ENDOSCOPY,CTRL BLEED N/A 2/26/2025    Procedure: EGD, WITH CLIP INSERTION;  Surgeon: Frantz Dailey M.D.;  Location: SURGERY SAME DAY [facility];  Service: Gastroenterology    MS UPPER GI ENDOSCOPY,DIAGNOSIS N/A 2/14/2025    Procedure: GASTROSCOPY;  Surgeon: Melany Richards M.D.;  Location: SURGERY SAME DAY [facility];  Service: Gastroenterology    MS UPPER GI ENDOSCOPY,BIOPSY N/A 2/14/2025    Procedure: GASTROSCOPY, WITH BIOPSY;  Surgeon: Melany Richards M.D.;  Location: SURGERY SAME DAY [facility];  Service: Gastroenterology    MS VEIN BYPASS GRAFT,FEM-TIBIAL Left 1/20/2025    Procedure: LEFT LOWER EXTREMITY BYPASS, POPLITIAL TO TIBIAL BYPASS;  Surgeon: Marco Antonio Miller M.D.;  Location: SURGERY [facility];  Service: Vascular    AORTOGRAM WITH RUNOFF  1/14/2025    Procedure: AORTOGRAM WITH RUNOFF, right peroneal artery intravascular lithotripsy and stent placement;  Surgeon: Marco Antonio Miller M.D.;  Location: [facility];  Service: Vascular    LAPAROSCOPIC INGUINAL HERNIA REPAIR N/A 12/18/2024    Procedure: LAPAROSCOPIC LEFT INGUINAL HERNIA REPAIR WITH MESH, LAPAROSCOPIC REPOSITIONING OF PERITONEAL DIALYSIS CATHETER;  Surgeon: Marco Antonio Miller M.D.;  Location: SURGERY [facility];  Service: Vascular    CATH PLACEMENT CAPD N/A 12/18/2024    Procedure: REPOSITION, CATHETER, CAPD;  Surgeon: Marco Antonio Miller M.D.;  Location: SURGERY [facility];  Service: Vascular    CATH PLACEMENT CAPD N/A 09/16/2024    Procedure: LAPAROSCOPIC INSERTION OF PERITONEAL DIALYSIS CATHETER PLACEMENT;  Surgeon: Marco Antonio Miller M.D.;  Location: SURGERY SAME DAY [facility];   "Service: Vascular    AV FISTULA CREATION Left 07/01/2024    Procedure: CREATION OF LEFT UPPER EXTREMITY DIALYSIS FISTULA;  Surgeon: Shilo Mercedes M.D.;  Location: SURGERY [facility];  Service: General    OTHER ORTHOPEDIC SURGERY  2004    bunion r foot    OTHER      tonsils adenoids    OTHER      dialysis catheter in upper right chest    OTHER      shon iol    OTHER CARDIAC SURGERY      cardiac stents       FAMILY HISTORY:   Family History   Problem Relation Age of Onset    Cancer Mother         Brain cancer    Heart Disease Father         Several By-pass Surgeries    Cancer Daughter         Thyroid Cancer    Heart Disease Brother         Fatal Heart Attack       SOCIAL HISTORY:   Social History     Tobacco Use   Smoking Status Never   Smokeless Tobacco Never   Tobacco Comments    Used chewing tobacco many years ago.     Social History     Substance and Sexual Activity   Alcohol Use Not Currently     Social History     Substance and Sexual Activity   Drug Use Never       HOME MEDICATIONS:   Reviewed and documented in chart.    LABORATORY STUDIES:   Recent Labs     04/28/25  0944   SODIUM 138   POTASSIUM 4.5   CHLORIDE 97   CO2 21   GLUCOSE 171*   *   CREATININE 9.81*   CALCIUM 8.8       ALLERGIES:  Hydroxyzine    VS:  /66   Pulse 80   Temp 36.6 °C (97.8 °F) (Temporal)   Resp 13   Ht 1.778 m (5' 10\")   Wt 63.5 kg (140 lb)   SpO2 99%   BMI 20.09 kg/m²     Physical Exam  Vitals and nursing note reviewed.   Constitutional:       Appearance: Normal appearance.   HENT:      Head: Normocephalic and atraumatic.      Nose: Nose normal.      Mouth/Throat:      Mouth: Mucous membranes are moist.      Pharynx: Oropharynx is clear.   Eyes:      Extraocular Movements: Extraocular movements intact.      Conjunctiva/sclera: Conjunctivae normal.      Pupils: Pupils are equal, round, and reactive to light.   Cardiovascular:      Rate and Rhythm: Normal rate and regular rhythm.      Pulses: Normal pulses.      " Heart sounds: Normal heart sounds.   Pulmonary:      Effort: Pulmonary effort is normal.      Breath sounds: Normal breath sounds.   Abdominal:      General: Abdomen is flat. Bowel sounds are normal.      Palpations: Abdomen is soft.   Musculoskeletal:         General: Normal range of motion.      Cervical back: Normal range of motion and neck supple.   Skin:     General: Skin is warm.      Capillary Refill: Capillary refill takes less than 2 seconds.   Neurological:      General: No focal deficit present.      Mental Status: He is alert and oriented to person, place, and time. Mental status is at baseline.   Psychiatric:         Mood and Affect: Mood normal.         Behavior: Behavior normal.         Thought Content: Thought content normal.         Judgment: Judgment normal.            FLUID BALANCE:  No intake/output data recorded.    IMAGING:  All imaging reviewed from admission to present day    IMPRESSION:  # ESRD on CCPD followed by [facility] dialysis (Dr. Milian)  - Will continue his outpatient PD prescription.  # HTN, controlled   - Goal BP < 140/90  # Anemia of CKD  # CKD-MBD/Renal Osteodystrophy  # Generalized weakness/seizure-like activity  # HFrEF  # Hx CAD with stent      SUGGESTIONS:  - Continue CCPD during hospital stay, cont at 6 cycles- will continue his current outpatient prescription during stay. All 1.5%  - hold antihypertensives for now  - PRBC for hb<7  - Dose all meds per ESRD  - Renal diet, low phos   - Cont Phos binder TID w/meals   - Avoid Fleet enema in dialysis patients   - Labs daily       Thank you for the consultation!

## 2025-04-28 NOTE — ASSESSMENT & PLAN NOTE
Hx of   BNP >34760  Nephrology consulted for dialysis   Continue Toprol , lasix prn (hold given low blood pressure)  Challenging, PD with renal  Does not appear volume overloaded on exam, appears dehyrdated, persistent orthostatic hypotension  Start midodrine 2.5 mg tid prn  ECHo shows worsening EF to 20%, likely an element of low flow AS

## 2025-04-28 NOTE — H&P
"Hospital Medicine History & Physical Note    Date of Service  4/28/2025    Primary Care Physician  Denise Carver M.D.    Consultants  nephrology    Specialist Names: Dr. Rogers    Code Status  Full Code    Chief Complaint  Chief Complaint   Patient presents with    Syncope     Pt was sitting on the toilet this am when his wife reported him having a loss of consciousness followed by his full body \"tensing up\" and shaking. Pt does not remember the event.        History of Presenting Illness  Edy Bennett is a 79 y.o. male with history of ESRD on PD, Afib on Eliquis, PAD following Dr. Miller, CAD, HFrEF with EF 30-35% in Feb 2025,  who presented 4/28/2025 with generalized weakness and presyncope episode. Wife reports patient has been feeling very week since last Thursday. This morning, when he was on toilet, he started to feel lightheadedness and was slumped over. He did not fall as his wife caught him. According to wife, his body was tense, stiff and shaking for less than a minute. Afterward patient was awake, alert. No postictal period. Denies chest pain, nausea, vomiting or abdominal pain. Wife reports patient's phosphorus level was noted elevated recently. He is on PD nightly and his nephrologist has adjusted PD for this. He was noted to have intermittent involuntary extremity twitching.     Here in the ER, VS stable. Labs remarkable for Cr 9.81, AG 20,  BNP>46359, Trop 175.  CT head unremarkable. CTA chest negative for PE, but noted moderate amount of ascites in the upper abdomen.     I have consulted and discussed with nephrology Dr. Rogers.    I discussed the plan of care with patient, family, and ERP and nephrology .    Review of Systems  Review of Systems   Constitutional:  Positive for malaise/fatigue.   Neurological:  Positive for weakness.   All other systems reviewed and are negative.      Past Medical History   has a past medical history of A-V fistula (HCC), Arrhythmia (03/13/2024), Breath " shortness, Cataract, Congestive heart failure ([facility]), Diabetes ([facility]), Dialysis patient ([facility]), Dialysis patient ([facility]) (01/17/2025), Foot ulcer ([facility]), Hemorrhagic disorder ([facility]), High cholesterol, Hypertension (05/24/2024), NSTEMI (non-ST elevated myocardial infarction) ([facility]) (2/25/2025), DARIEN (obstructive sleep apnea) (01/17/2025), Pain, Peritoneal dialysis catheter in place ([facility]) (01/17/2025), Pneumonia, Renal disorder, Sleep apnea (01/10/2025), and Stroke ([facility]) (2021).    Surgical History   has a past surgical history that includes other; other orthopedic surgery (2004); other cardiac surgery; other; other; av fistula creation (Left, 07/01/2024); cath placement capd (N/A, 09/16/2024); laparoscopic inguinal hernia repair (N/A, 12/18/2024); cath placement capd (N/A, 12/18/2024); aortogram with runoff (1/14/2025); pr vein bypass graft,fem-tibial (Left, 1/20/2025); pr upper gi endoscopy,diagnosis (N/A, 2/14/2025); pr upper gi endoscopy,biopsy (N/A, 2/14/2025); and pr upper gi endoscopy,ctrl bleed (N/A, 2/26/2025).     Family History  family history includes Cancer in his daughter and mother; Heart Disease in his brother and father.   Family history reviewed with patient. There is no family history that is pertinent to the chief complaint.     Social History   reports that he has never smoked. He has never used smokeless tobacco. He reports that he does not currently use alcohol. He reports that he does not use drugs.    Allergies  No Known Allergies    Medications  Prior to Admission Medications   Prescriptions Last Dose Informant Patient Reported? Taking?   HYDROcodone-acetaminophen (NORCO) 5-325 MG Tab per tablet   Yes No   Sig: Take 2 Tablets by mouth every 6 hours as needed (severe pain). Indications: Pain   Home Care Oxygen   Yes No   Sig: Inhale 2 L/min at bedtime. Oxygen dose range: 2 to 2 L/min  Respiratory route via: Nasal Cannula   Oxygen supplier: [facility]      Indications: hypoxia   Sucroferric Oxyhydroxide  (VELPHORO) 500 MG Chew Tab   Yes No   Sig: Chew 1 Tablet 3 times a day with meals. Indications: High Amount of Phosphate in the Blood, Itching   acetaminophen (TYLENOL) 500 MG Tab  Significant Other Yes No   Sig: Take 1,000 mg by mouth every 8 hours as needed for Mild Pain. Indications: Pain, 1-4/10 pain   apixaban (ELIQUIS) 5mg Tab  Significant Other Yes No   Sig: Take 2.5 mg by mouth 2 times a day. Indications: Atrial Fibrillation   calcitRIOL (ROCALTROL) 0.25 MCG Cap   Yes No   Sig: Take 0.25 mcg by mouth 3 times a week. Indications: Low Amount of Calcium in the Blood   clopidogrel (PLAVIX) 75 MG Tab  Significant Other Yes No   Sig: Take 75 mg by mouth every day. Indications: Coronary Bypass Surgery   epoetin jayna (EPOGEN/PROCRIT) 4000 UNIT/ML Solution  Significant Other Yes No   Sig: Inject 4,000 Units under the skin every 7 days. Indications: Anemia associated with Chronic Kidney Failure   furosemide (LASIX) 80 MG Tab   Yes No   Sig: Take 80 mg by mouth 2 times a day. Indications: Kidney Disease   gentamicin (GARAMYCIN) 0.1 % cream  Significant Other Yes No   Sig: Apply 1 Application topically every day. Indications: PD line site   montelukast (SINGULAIR) 10 MG Tab  Significant Other Yes No   Sig: Take 10 mg by mouth at bedtime. Indications: Asthma, Hayfever   polyethylene glycol/lytes (MIRALAX) Pack  Significant Other Yes No   Sig: Take 17 g by mouth 1 time a day as needed. Indications: Constipation   rosuvastatin (CRESTOR) 20 MG Tab  Significant Other Yes No   Sig: Take 20 mg by mouth at bedtime.     Indications: High Amount of Fats in the Blood   sodium bicarbonate (SODIUM BICARBONATE) 650 MG Tab  Significant Other Yes No   Sig: Take 650 mg by mouth 3 times a day as needed (pt takes as needed for stomach burning). Indications: Heartburn   tamsulosin (FLOMAX) 0.4 MG capsule  Significant Other No No   Sig: Take 1 Capsule by mouth 1/2 hour after breakfast.   traMADol (ULTRAM) 50 MG Tab   Yes No   Sig: Take 50  mg by mouth every day. Indications: Acute Pain, Pain      Facility-Administered Medications: None       Physical Exam  Temp:  [36.6 °C (97.8 °F)] 36.6 °C (97.8 °F)  Pulse:  [82-87] 85  Resp:  [13-18] 13  BP: (109-135)/(60-76) 135/76  SpO2:  [97 %-100 %] 100 %  Blood Pressure : 135/76   Temperature: 36.6 °C (97.8 °F)   Pulse: 85   Respiration: 13   Pulse Oximetry: 100 %       Physical Exam  Vitals and nursing note reviewed.   Constitutional:       Appearance: He is ill-appearing.      Comments: frail   HENT:      Head: Normocephalic and atraumatic.      Mouth/Throat:      Pharynx: Oropharynx is clear.   Eyes:      Pupils: Pupils are equal, round, and reactive to light.   Neck:      Vascular: No carotid bruit.   Cardiovascular:      Rate and Rhythm: Normal rate and regular rhythm.   Pulmonary:      Effort: Pulmonary effort is normal.      Breath sounds: Normal breath sounds.   Abdominal:      General: Abdomen is flat. Bowel sounds are normal.      Palpations: Abdomen is soft. There is no mass.   Musculoskeletal:         General: Normal range of motion.      Cervical back: Neck supple.      Comments: Foot: noted multiple dark necrotic toes in bilateral feet, no drainage   Skin:     General: Skin is warm and dry.      Comments: Skin very dry, itching with scratching marks and bleeding     Neurological:      General: No focal deficit present.      Mental Status: He is alert and oriented to person, place, and time.   Psychiatric:         Mood and Affect: Mood normal.         Behavior: Behavior normal.       Laboratory:  Recent Labs     04/28/25  0944   WBC 9.4   RBC 3.70*   HEMOGLOBIN 11.7*   HEMATOCRIT 35.3*   MCV 95.4   MCH 31.6   MCHC 33.1   RDW 59.8*   PLATELETCT 198   MPV 9.7     Recent Labs     04/28/25  0944   SODIUM 138   POTASSIUM 4.5   CHLORIDE 97   CO2 21   GLUCOSE 171*   *   CREATININE 9.81*   CALCIUM 8.8     Recent Labs     04/28/25  0944   ALTSGPT 58*   ASTSGOT 43   ALKPHOSPHAT 217*   TBILIRUBIN 0.4    GLUCOSE 171*         Recent Labs     04/28/25  0944   NTPROBNP >33899*         Recent Labs     04/28/25  0944   TROPONINT 175*       Imaging:  CT-CTA CHEST PULMONARY ARTERY W/ RECONS   Final Result      1.  No CT evidence of pulmonary emboli.   2.  Coronary artery calcification.   3.  Moderate amount of ascites in the upper abdomen.   4.  Micronodular appearance of the liver suspicious for hepatocellular disease.               DX-CHEST-PORTABLE (1 VIEW)   Final Result      No acute cardiac or pulmonary abnormalities are identified.      CT-HEAD W/O   Final Result      1.  Cerebral atrophy.      2.  Otherwise, Head CT without contrast within normal limits. No evidence of acute cerebral infarction, hemorrhage or mass lesion.               EC-ECHOCARDIOGRAM COMPLETE W/O CONT    (Results Pending)       X-Ray:  I have personally reviewed the images and compared with prior images.  EKG:  I have personally reviewed the images and compared with prior images.    Assessment/Plan:  Justification for Admission Status  I anticipate this patient is appropriate for observation status at this time because presyncope evaluation and nephrology evaluation for ESRD    Patient will need a Telemetry bed on CARDIOLOGY service .  The need is secondary to presyncope.    * Postural dizziness with presyncope  Assessment & Plan  Generalized weakness, dizzy and slumped over. Denies fall. But did note extremity tense and shaking, last for one minute. No postictal period. No hx of seizure.     CT head unremarkable  Will check Echo, GENE to rule out seizure  PT/OT  Orthostatic vital sign  Tele     ESRD on peritoneal dialysis (HCC)- (present on admission)  Assessment & Plan  On PD. Per wife, he was recently noted to have elevated phosphorus level. He is also noted to have intermittent extremity twitching. Concerning insufficient dialysis   Nephrology consulted     PAD (peripheral artery disease) (HCC)- (present on admission)  Assessment &  Plan  Follows Dr. Miller  On statin, Plavix and Eliquis  Noted necrotic toes in bilateral feet, dry, no drainage. I have reached out Dr. Miller and notify him patient is currently hospitalized     Muscle twitching  Assessment & Plan  Noted to have intermittent involuntary extremity twitching, concerning electrolytes abnormalities due to insufficient PD  Nephrology consulted      Chronic combined systolic and diastolic heart failure (HCC)- (present on admission)  Assessment & Plan  Hx of   BNP >76866  Nephrology consulted for dialysis   Continue Toprol       Typical atrial flutter (HCC)- (present on admission)  Assessment & Plan  Continue Toprol and Eliquis  Patient has scheduled watchman device in May     CAD (coronary artery disease)- (present on admission)  Assessment & Plan  Continue statin, Toprol, plavix and Eliquis    Dyslipidemia- (present on admission)  Assessment & Plan  Continue statin    Advance care planning- (present on admission)  Assessment & Plan  79 y.o. male with history of ESRD on PD, Aflutter on Eliquis, PAD following Dr. Miller, CAD, HFrEF with EF 30-35% in Feb 2025,  who presented 4/28/2025 with generalized weakness and presyncope episode. Discussed goal of care and code status with patient and wife at bedside. They are agreeable for FULL CODE. ACP 16 mins        VTE prophylaxis: therapeutic anticoagulation with Eliquis

## 2025-04-28 NOTE — ASSESSMENT & PLAN NOTE
Persistently orthostatic, challenging volume status issue  PD per renal  Hold BP medications except for Toprol XL  Midodrine  Check orthostatic daily  Monitor on tele  PT

## 2025-04-28 NOTE — ASSESSMENT & PLAN NOTE
Noted to have intermittent involuntary extremity twitching, concerning electrolytes abnormalities due to insufficient PD  Nephrology consulted

## 2025-04-28 NOTE — EEG PROGRESS NOTE
Patient getting an ECHO, her bed is ready on the floor, but he has been waiting for two hours to go to the floor due to short staffing on the floor.  Will try after ECHO if he can get his EEG done.

## 2025-04-28 NOTE — ED PROVIDER NOTES
"ED PHYSICIAN NOTE    CHIEF COMPLAINT  Chief Complaint   Patient presents with    Syncope     Pt was sitting on the toilet this am when his wife reported him having a loss of consciousness followed by his full body \"tensing up\" and shaking. Pt does not remember the event.        EXTERNAL RECORDS REVIEWED  Inpatient note patient admitted with GI bleeding in February of this year    QASIM/YULISSA URIBE  OUTSIDE HISTORIAN(S):  Wife as noted below    Edy Bennett is a 79 y.o. male who presents after having a suspected syncopal episode.  Patient was on the toilet.  He started to feel very weak and lightheaded.  The next thing he remembers he was slumped over.  According the wife his body was tense and shaking.  There was no postictal period he denies any trauma.  He does report that he felt short of breath when he was feeling weak.  No shortness of breath now.  Denies any chest pain.  No pleuritic symptoms.  He never had any focal weakness or numbness.  No abdominal pain nausea vomiting.  No black or bloody stool.  Has not had fevers or recent illness.    Patient's wife arrived.  She reports she heard the patient yell out when she attended to him his arms were out to the side and he was stiff for less than a minute.  He did not have any postictal period no focal weakness or numbness.    PAST MEDICAL HISTORY  Past Medical History:   Diagnosis Date    A-V fistula ([facility])     Left Arm    Arrhythmia 03/13/2024    Due to kidney failure treatment    Breath shortness     with exertion    Cataract     shon iol    Congestive heart failure ([facility])     Diabetes ([facility])     not on any medication at this time, being monitored at dialysis    Dialysis patient ([facility])     monday wednesday friday at [facility]    Dialysis patient ([facility]) 01/17/2025    Starting Peritoneal Dialysis @ Home on 1/17/2025. Follows Nephrologist MD Milian.    Foot ulcer ([facility])     left foot. being seen by wound care    Hemorrhagic disorder ([facility])     kannan miles" "High cholesterol     Hypertension 05/24/2024    states controlled    NSTEMI (non-ST elevated myocardial infarction) ([facility]) 2/25/2025    DARIEN (obstructive sleep apnea) 01/17/2025    O2 @ 2.5L Per Patient's Wife.    Pain     Peritoneal dialysis catheter in place ([facility]) 01/17/2025    Pneumonia     3/24    Renal disorder     Sleep apnea 01/10/2025    Home sleep study done 1/3/2025 - no results yet.    Stroke ([facility]) 2021    tia       SOCIAL HISTORY  Social History     Tobacco Use    Smoking status: Never    Smokeless tobacco: Never    Tobacco comments:     Used chewing tobacco many years ago.   Vaping Use    Vaping status: Never Used   Substance Use Topics    Alcohol use: Not Currently    Drug use: Never       CURRENT MEDICATIONS  Home Medications    **Home medications have not yet been reviewed for this encounter**       Audit from Redirected Encounters    **Home medications have not yet been reviewed for this encounter**         ALLERGIES  No Known Allergies    PHYSICAL EXAM  VITAL SIGNS: /76   Pulse 85   Temp 36.6 °C (97.8 °F) (Temporal)   Resp 13   Ht 1.778 m (5' 10\")   Wt 63.5 kg (140 lb)   SpO2 100%   BMI 20.09 kg/m²    Constitutional: Awake and alert  HENT: Normal inspection  Eyes: Normal inspection  Neck: Grossly normal range of motion.  Cardiovascular: Normal heart rate, Normal rhythm.  Symmetric peripheral pulses.   Thorax & Lungs: No respiratory distress, No wheezing, No rales, No rhonchi, No chest tenderness.   Abdomen: Bowel sounds normal, soft, non-distended, nontender, no mass  Skin: No rash.  Back: No tenderness, No CVA tenderness.   Extremities: No clubbing, cyanosis, edema, no Homans or cords.  Neurologic: Grossly normal   Psychiatric: Normal for situation     DIAGNOSTIC STUDIES / PROCEDURES  LABS/EKG  Results for orders placed or performed during the hospital encounter of 04/28/25   CBC WITH DIFFERENTIAL    Collection Time: 04/28/25  9:44 AM   Result Value Ref Range    WBC 9.4 4.8 - 10.8 K/uL "    RBC 3.70 (L) 4.70 - 6.10 M/uL    Hemoglobin 11.7 (L) 14.0 - 18.0 g/dL    Hematocrit 35.3 (L) 42.0 - 52.0 %    MCV 95.4 81.4 - 97.8 fL    MCH 31.6 27.0 - 33.0 pg    MCHC 33.1 32.3 - 36.5 g/dL    RDW 59.8 (H) 35.9 - 50.0 fL    Platelet Count 198 164 - 446 K/uL    MPV 9.7 9.0 - 12.9 fL    Neutrophils-Polys 59.80 44.00 - 72.00 %    Lymphocytes 6.00 (L) 22.00 - 41.00 %    Monocytes 0.80 0.00 - 13.40 %    Eosinophils 29.10 (H) 0.00 - 6.90 %    Basophils 2.60 (H) 0.00 - 1.80 %    Nucleated RBC 0.00 0.00 - 0.20 /100 WBC    Neutrophils (Absolute) 5.62 1.82 - 7.42 K/uL    Lymphs (Absolute) 0.56 (L) 1.00 - 4.80 K/uL    Monos (Absolute) 0.08 0.00 - 0.85 K/uL    Eos (Absolute) 2.74 (H) 0.00 - 0.51 K/uL    Baso (Absolute) 0.24 (H) 0.00 - 0.12 K/uL    NRBC (Absolute) 0.00 K/uL    Anisocytosis 1+    COMP METABOLIC PANEL    Collection Time: 04/28/25  9:44 AM   Result Value Ref Range    Sodium 138 135 - 145 mmol/L    Potassium 4.5 3.6 - 5.5 mmol/L    Chloride 97 96 - 112 mmol/L    Co2 21 20 - 33 mmol/L    Anion Gap 20.0 (H) 7.0 - 16.0    Glucose 171 (H) 65 - 99 mg/dL    Bun 103 (H) 8 - 22 mg/dL    Creatinine 9.81 (HH) 0.50 - 1.40 mg/dL    Calcium 8.8 8.5 - 10.5 mg/dL    Correct Calcium 9.4 8.5 - 10.5 mg/dL    AST(SGOT) 43 12 - 45 U/L    ALT(SGPT) 58 (H) 2 - 50 U/L    Alkaline Phosphatase 217 (H) 30 - 99 U/L    Total Bilirubin 0.4 0.1 - 1.5 mg/dL    Albumin 3.3 3.2 - 4.9 g/dL    Total Protein 6.5 6.0 - 8.2 g/dL    Globulin 3.2 1.9 - 3.5 g/dL    A-G Ratio 1.0 g/dL   TROPONIN    Collection Time: 04/28/25  9:44 AM   Result Value Ref Range    Troponin T 175 (H) 6 - 19 ng/L   proBrain Natriuretic Peptide, NT    Collection Time: 04/28/25  9:44 AM   Result Value Ref Range    NT-proBNP >92708 (H) 0 - 125 pg/mL   D-DIMER    Collection Time: 04/28/25  9:44 AM   Result Value Ref Range    D-Dimer 1.39 (H) 0.00 - 0.50 ug/mL (FEU)   DIFFERENTIAL MANUAL    Collection Time: 04/28/25  9:44 AM   Result Value Ref Range    Metamyelocytes 1.70 %     Manual Diff Status PERFORMED    PERIPHERAL SMEAR REVIEW    Collection Time: 25  9:44 AM   Result Value Ref Range    Peripheral Smear Review see below    PLATELET ESTIMATE    Collection Time: 25  9:44 AM   Result Value Ref Range    Plt Estimation Normal    MORPHOLOGY    Collection Time: 25  9:44 AM   Result Value Ref Range    RBC Morphology Present     Poikilocytosis 2+     Ovalocytes 1+    ESTIMATED GFR    Collection Time: 25  9:44 AM   Result Value Ref Range    GFR (CKD-EPI) 5 (A) >60 mL/min/1.73 m 2   EKG    Collection Time: 25  1:09 PM   Result Value Ref Range    Report       [facility]    Test Date:  2025  Pt Name:    SWAPNA ROSS                Department: ER  MRN:        [id number]                      Room:       [facility]  Gender:     Male                         Technician: [id number]  :        1945                   Requested By:ER TRIAGE PROTOCOL  Order #:    [id number]                    Reading MD: TIGRE STEVE MD    Measurements  Intervals                                Axis  Rate:       81                           P:          55  MA:         174                          QRS:        27  QRSD:       104                          T:          206  QT:         424  QTc:        493    Interpretive Statements  Sinus rhythm  Abnormal T, consider ischemia, diffuse leads  Compared to ECG 2025 00:11:14  T-wave abnormality now present  Early repolarization no longer present  Possible ischemia still present  Electronically Signed On 2025 13:09:43 PDT by TIGRE STEVE MD        I have independently interpreted this EKG as documented above    Rhythm strip interpretation-sinus rhythm    RADIOLOGY  I have independently interpreted the diagnostic imaging associated with this visit and am waiting the final reading from the radiologist.   My preliminary interpretation is as follows: CT head without hemorrhage      CT-CTA CHEST  PULMONARY ARTERY W/ RECONS   Final Result      1.  No CT evidence of pulmonary emboli.   2.  Coronary artery calcification.   3.  Moderate amount of ascites in the upper abdomen.   4.  Micronodular appearance of the liver suspicious for hepatocellular disease.               DX-CHEST-PORTABLE (1 VIEW)   Final Result      No acute cardiac or pulmonary abnormalities are identified.      CT-HEAD W/O   Final Result      1.  Cerebral atrophy.      2.  Otherwise, Head CT without contrast within normal limits. No evidence of acute cerebral infarction, hemorrhage or mass lesion.                     COURSE & MEDICAL DECISION MAKING    INITIAL ASSESSMENT, COURSE AND PLAN  Case narrative: Patient presents after an episode of altered mental status.  This was brief.  No postictal period it sounds as if it was syncope as the patient reports lightheadedness and shortness of breath.  Wife describes muscle tenseness I suspect more likely this was myoclonic jerk regardless we will obtain CT scan of the head.  He is recently status post surgery for reported syncope.  PE is on the differential.  Will need to evaluate for anemia, electrolyte disturbance, ACS, arrhythmia excetra.  Workup is initiated.    Patient was extremely itchy agitated.  Itchiness is a chronic problem for the patient and he was given Valium.    Laboratory data shows elevated D-dimer.  Ordered CT pulmonary angiogram.  Patient has known end-stage renal disease.  His BUN is quite elevated likely contributing to his pruritus.  Troponin is elevated although prior values have also been elevated likely related to renal disease but will need to be trended.    CT pulmonary angiogram negative.    At this point the patient has remained stable I would like to admit to the hospital for further workup for syncope versus seizure.  Consulted hospitalist and discussed case with Dr. Yi.          Interventions  Medications   diazePAM (Valium) injection 5 mg (5 mg Intravenous Given  4/28/25 1133)   iohexol (OMNIPAQUE) 350 mg/mL (IV) (50 mL Intravenous Given 4/28/25 1300)         DISPOSITION AND DISCUSSIONS  I have discussed management of the patient with the following physicians and SHANE's:  as noted above    FINAL IMPRESSION  1.  Altered mental status, suspected syncope    This dictation was created using voice recognition software. The accuracy of the dictation is limited to the abilities of the software. I expect there may be some errors of grammar and possibly content. The nursing notes were reviewed and certain aspects of this information were incorporated into this note.    Electronically signed by: Ifeanyi Muñoz M.D., 4/28/2025

## 2025-04-29 ENCOUNTER — HOME CARE VISIT (OUTPATIENT)
Dept: HOME HEALTH SERVICES | Facility: HOME HEALTHCARE | Age: 80
End: 2025-04-29
Payer: MEDICARE

## 2025-04-29 ENCOUNTER — APPOINTMENT (OUTPATIENT)
Dept: RADIOLOGY | Facility: MEDICAL CENTER | Age: 80
DRG: 312 | End: 2025-04-29
Attending: INTERNAL MEDICINE
Payer: MEDICARE

## 2025-04-29 PROBLEM — I35.0 AORTIC STENOSIS: Status: ACTIVE | Noted: 2025-04-29

## 2025-04-29 LAB
ALBUMIN SERPL BCP-MCNC: 3.2 G/DL (ref 3.2–4.9)
BUN SERPL-MCNC: 99 MG/DL (ref 8–22)
CALCIUM ALBUM COR SERPL-MCNC: 9.5 MG/DL (ref 8.5–10.5)
CALCIUM SERPL-MCNC: 8.9 MG/DL (ref 8.5–10.5)
CHLORIDE SERPL-SCNC: 93 MMOL/L (ref 96–112)
CO2 SERPL-SCNC: 20 MMOL/L (ref 20–33)
CREAT SERPL-MCNC: 8.98 MG/DL (ref 0.5–1.4)
ERYTHROCYTE [DISTWIDTH] IN BLOOD BY AUTOMATED COUNT: 60.2 FL (ref 35.9–50)
GFR SERPLBLD CREATININE-BSD FMLA CKD-EPI: 6 ML/MIN/1.73 M 2
GLUCOSE SERPL-MCNC: 135 MG/DL (ref 65–99)
HCT VFR BLD AUTO: 35.8 % (ref 42–52)
HGB BLD-MCNC: 11.5 G/DL (ref 14–18)
MCH RBC QN AUTO: 30.7 PG (ref 27–33)
MCHC RBC AUTO-ENTMCNC: 32.1 G/DL (ref 32.3–36.5)
MCV RBC AUTO: 95.7 FL (ref 81.4–97.8)
PHOSPHATE SERPL-MCNC: 5.1 MG/DL (ref 2.5–4.5)
PLATELET # BLD AUTO: 201 K/UL (ref 164–446)
PMV BLD AUTO: 9.7 FL (ref 9–12.9)
POTASSIUM SERPL-SCNC: 4.3 MMOL/L (ref 3.6–5.5)
RBC # BLD AUTO: 3.74 M/UL (ref 4.7–6.1)
SODIUM SERPL-SCNC: 135 MMOL/L (ref 135–145)
WBC # BLD AUTO: 9.5 K/UL (ref 4.8–10.8)

## 2025-04-29 PROCEDURE — 95816 EEG AWAKE AND DROWSY: CPT | Mod: 26 | Performed by: STUDENT IN AN ORGANIZED HEALTH CARE EDUCATION/TRAINING PROGRAM

## 2025-04-29 PROCEDURE — A9270 NON-COVERED ITEM OR SERVICE: HCPCS | Performed by: STUDENT IN AN ORGANIZED HEALTH CARE EDUCATION/TRAINING PROGRAM

## 2025-04-29 PROCEDURE — 770020 HCHG ROOM/CARE - TELE (206)

## 2025-04-29 PROCEDURE — A9270 NON-COVERED ITEM OR SERVICE: HCPCS | Performed by: INTERNAL MEDICINE

## 2025-04-29 PROCEDURE — 700102 HCHG RX REV CODE 250 W/ 637 OVERRIDE(OP): Performed by: INTERNAL MEDICINE

## 2025-04-29 PROCEDURE — 700111 HCHG RX REV CODE 636 W/ 250 OVERRIDE (IP)

## 2025-04-29 PROCEDURE — 80069 RENAL FUNCTION PANEL: CPT

## 2025-04-29 PROCEDURE — 95816 EEG AWAKE AND DROWSY: CPT | Performed by: STUDENT IN AN ORGANIZED HEALTH CARE EDUCATION/TRAINING PROGRAM

## 2025-04-29 PROCEDURE — 85027 COMPLETE CBC AUTOMATED: CPT

## 2025-04-29 PROCEDURE — 700102 HCHG RX REV CODE 250 W/ 637 OVERRIDE(OP): Performed by: STUDENT IN AN ORGANIZED HEALTH CARE EDUCATION/TRAINING PROGRAM

## 2025-04-29 PROCEDURE — 90945 DIALYSIS ONE EVALUATION: CPT

## 2025-04-29 PROCEDURE — 36415 COLL VENOUS BLD VENIPUNCTURE: CPT

## 2025-04-29 PROCEDURE — 4A10X4Z MONITORING OF CENTRAL NERVOUS ELECTRICAL ACTIVITY, EXTERNAL APPROACH: ICD-10-PCS | Performed by: STUDENT IN AN ORGANIZED HEALTH CARE EDUCATION/TRAINING PROGRAM

## 2025-04-29 PROCEDURE — 97535 SELF CARE MNGMENT TRAINING: CPT

## 2025-04-29 PROCEDURE — 99232 SBSQ HOSP IP/OBS MODERATE 35: CPT | Performed by: INTERNAL MEDICINE

## 2025-04-29 RX ORDER — HEPARIN SODIUM 1000 [USP'U]/ML
INJECTION, SOLUTION INTRAVENOUS; SUBCUTANEOUS
Status: COMPLETED
Start: 2025-04-29 | End: 2025-04-29

## 2025-04-29 RX ORDER — AMMONIUM LACTATE 12 G/100G
1 LOTION TOPICAL 2 TIMES DAILY
Status: DISCONTINUED | OUTPATIENT
Start: 2025-04-29 | End: 2025-04-30 | Stop reason: HOSPADM

## 2025-04-29 RX ORDER — MIDODRINE HYDROCHLORIDE 5 MG/1
2.5 TABLET ORAL
Status: DISCONTINUED | OUTPATIENT
Start: 2025-04-29 | End: 2025-04-30 | Stop reason: HOSPADM

## 2025-04-29 RX ADMIN — SEVELAMER CARBONATE 800 MG: 800 TABLET, FILM COATED ORAL at 17:24

## 2025-04-29 RX ADMIN — TAMSULOSIN HYDROCHLORIDE 0.4 MG: 0.4 CAPSULE ORAL at 09:08

## 2025-04-29 RX ADMIN — METOPROLOL SUCCINATE 50 MG: 25 TABLET, EXTENDED RELEASE ORAL at 05:56

## 2025-04-29 RX ADMIN — APIXABAN 2.5 MG: 5 TABLET, FILM COATED ORAL at 17:22

## 2025-04-29 RX ADMIN — MIDODRINE HYDROCHLORIDE 2.5 MG: 5 TABLET ORAL at 17:21

## 2025-04-29 RX ADMIN — OMEPRAZOLE 40 MG: 20 CAPSULE, DELAYED RELEASE ORAL at 17:23

## 2025-04-29 RX ADMIN — ROSUVASTATIN CALCIUM 20 MG: 5 TABLET, FILM COATED ORAL at 00:02

## 2025-04-29 RX ADMIN — ROSUVASTATIN CALCIUM 20 MG: 5 TABLET, FILM COATED ORAL at 20:36

## 2025-04-29 RX ADMIN — SEVELAMER CARBONATE 800 MG: 800 TABLET, FILM COATED ORAL at 12:51

## 2025-04-29 RX ADMIN — SEVELAMER CARBONATE 800 MG: 800 TABLET, FILM COATED ORAL at 09:09

## 2025-04-29 RX ADMIN — MONTELUKAST 10 MG: 10 TABLET, FILM COATED ORAL at 00:02

## 2025-04-29 RX ADMIN — GENTAMICIN SULFATE 1 APPLICATION: 1 CREAM TOPICAL at 20:00

## 2025-04-29 RX ADMIN — HEPARIN SODIUM 3000 UNITS: 1000 INJECTION, SOLUTION INTRAVENOUS; SUBCUTANEOUS at 20:10

## 2025-04-29 RX ADMIN — APIXABAN 2.5 MG: 5 TABLET, FILM COATED ORAL at 05:54

## 2025-04-29 RX ADMIN — CLOPIDOGREL BISULFATE 75 MG: 75 TABLET, FILM COATED ORAL at 05:56

## 2025-04-29 RX ADMIN — OMEPRAZOLE 40 MG: 20 CAPSULE, DELAYED RELEASE ORAL at 05:56

## 2025-04-29 RX ADMIN — MONTELUKAST 10 MG: 10 TABLET, FILM COATED ORAL at 20:36

## 2025-04-29 ASSESSMENT — ENCOUNTER SYMPTOMS
FEVER: 0
VOMITING: 0
NAUSEA: 0
CHILLS: 0
DIZZINESS: 1
ABDOMINAL PAIN: 0

## 2025-04-29 ASSESSMENT — ACTIVITIES OF DAILY LIVING (ADL): TOILETING: INDEPENDENT

## 2025-04-29 ASSESSMENT — FIBROSIS 4 INDEX: FIB4 SCORE: 2.16

## 2025-04-29 NOTE — THERAPY
Physical Therapy Contact Note    Patient Name: Edy Bennett  Age:  79 y.o., Sex:  male  Medical Record #: [id number]  Today's Date: 4/29/2025 04/29/25 0859   Initial Contact Note    Initial Contact Note Order Received and Verified, Physical Therapy Evaluation in Progress with Full Report to Follow.   Interdisciplinary Plan of Care Collaboration   IDT Collaboration with  Nursing;Occupational Therapist   Collaboration Comments PT orders received. Attempted to see pt for PT evaluation, echo at bed side. Will re-attempt as able.   Session Information   Date / Session Number  4/29 - attempt  (EVAL)     Kinga Baldwin, PT, DPT

## 2025-04-29 NOTE — PROCEDURES
INPATIENT ROUTINE VIDEO ELECTROENCEPHALOGRAM REPORT    REFERRING PROVIDER: Kareem Patten M.d.  DOS: 4/29/2025  STUDY DURATION: 0 hours and 23 minutes of total recording time.     INDICATION:  Edy Bennett 79 y.o. male presenting with  syncope.    RELEVANT MEDICATIONS/TREATMENTS:   Scheduled Medications   Medication Dose Frequency    senna-docusate  2 Tablet Q EVENING    apixaban  2.5 mg BID    [START ON 4/30/2025] calcitRIOL  0.25 mcg MO, WE + FR    clopidogrel  75 mg DAILY    montelukast  10 mg QHS    omeprazole  40 mg BID    rosuvastatin  20 mg QHS    tamsulosin  0.4 mg AFTER BREAKFAST    sevelamer carbonate  800 mg TID WITH MEALS    metoprolol SR  50 mg QAM       TECHNIQUE:   Routine VEEG was set up by a Neurodiagnostic technologist who performed education to the patient and staff. A minimum of 23 electrodes and 23 channel recording was setup and performed by Neurodiagnostic technologist, in accordance with the international 10-20 system. The study was reviewed in bipolar and referential montages. The recording examined the patient in the  awake and drowsy state(s).     DESCRIPTION OF THE RECORD:  During wakefulness, the background was continuous and showed a 8 Hz posterior dominant rhythm.  There was reactivity to eye closure/opening.  An anterior-posterior gradient was noted with faster beta frequencies seen anteriorly.  During drowsiness, theta/delta frequencies were seen.    EEG Sleep: N2 sleep architecture was not seen.    ICTAL AND INTERICTAL FINDINGS:   No focal or generalized epileptiform activity noted.     No regional slowing or persistent focal asymmetries were seen.    No seizures.     ACTIVATION PROCEDURES:   Intermittent Photic stimulation was performed in a stepwise fashion from 1 to 30 Hz and did not elicited any abnormalities on EEG.     EKG: Sampling of the EKG recording showed sinus rhythm with occasional PACs and/or PVCs    EVENTS:  None    INTERPRETATION:   Normal video EEG  recording in the awake and drowsy state(s):  - No regional slowing or persistent focal asymmetries were seen.  - No epileptiform discharges were seen.  - No seizures.       Note: This EEG does not rule out the possibility of seizures or exclude a diagnosis of epilepsy.  If the clinical suspicion remains high for seizures, a prolonged video EEG recording to capture clinical or subclinical events may be helpful.    Renee Giron MD  Department of Neurology at [facility]  General Neurologist and Epileptologist   of Clinical Neurology, [facility].

## 2025-04-29 NOTE — PROGRESS NOTES
[facility] Services Progress Notes     CCPD therapy initiated at 1900 x 10.5 hrs with 2100 mL fills x 6 cycles with 2100 mL last fill using all 1.5 % PD solutions with Heparin 500 units/L added to heater bag per nephrologist orders.  Orders/prescription reviewed with patient and wife, verbalized understanding, consent fot treatment obtained.  Program settings entered verified with .  Patient and PD access assessed prior to therapy.  Patient calm, no distress, hard of hearing, cooperative otherwise  denies complaints.  VS stable- checked prior to treatment.  Heart rhythm change SR to A. Fib WS447p just prior to initiation of tx, pt with Hx of A. Fib. primary care team aware.  Patient with intact and patent RLQ PD catheter.   RLQ catheter exit site clean, dry, no signs of infection noted.  RLQ catheter exit site cleansed with approved PD antiseptic solution, antibiotic cream applied per orders and dressings changed per policy     Initial drain: 1849 mL from last nights LF from home (clear, yellow, no fibrin)     Estimated end time: 0540 (4/29/25)     Report and in service/basic troubleshooting instructed to primary care nurse PANFILO Veliz RN     Patient completed fill and dwell 1/6 without isssues prior to departure from bedside.     Please contact on call dialysis RN for any issues with persistent alarms/assistance with troubleshooting or patient not tolerating therapy..      For on-call Dialysis, pls contact Gian Beatty at [phone]

## 2025-04-29 NOTE — PROGRESS NOTES
"[facility] -  PROGRESS NOTE               Author: Pamela Silvestre N.P. Date & Time: 4/29/2025  10:42 AM     HPI:  79 y.o. male with HTN, ESRD on PD, PVDs/p bypass, NSTEMI who presented 4/28/2025 with generalized weakness and presyncope episode. Wife reports patient has been feeling very week since last Thursday. This morning, when he was on toilet, he started to feel lightheadedness and was slumped over. He did not fall as his wife caught him. According to wife, his body was tense, stiff and shaking for less than a minute. Afterward patient was awake, alert. No postictal period. Nephrology was asked to consult for evaluation and management of acute and chronic conditions related to Nephrology     No F/C/N/V/CP/SOB.  No melena, hematochezia, hematemesis.  No HA, visual changes, or abdominal pain.       DAILY NEPHROLOGY SUMMARY:  4/28: Consult done  4/29: Laying in bed, wife at bedside, denies CP,SOB/N/V/D. VSS , on CCPD 1849 mL from LF. Labs reviewed electrolytes stable.     REVIEW OF SYSTEMS:    10 point ROS reviewed and is as per HPI/daily summary or otherwise negative    PMH/PSH/SH/FH:   Reviewed and unchanged since admission note    CURRENT MEDICATIONS:   Reviewed from admission to present day    VS:  /68   Pulse 88   Temp 35.9 °C (96.6 °F) (Temporal)   Resp 16   Ht 1.778 m (5' 10\")   Wt 65.1 kg (143 lb 8.3 oz)   SpO2 100%   BMI 20.59 kg/m²     Physical Exam  Vitals and nursing note reviewed.   Constitutional:       General: He is not in acute distress.     Appearance: Normal appearance. He is not ill-appearing.   HENT:      Head: Normocephalic and atraumatic.      Nose: Nose normal.      Mouth/Throat:      Mouth: Mucous membranes are moist.      Pharynx: Oropharynx is clear.   Eyes:      General: No scleral icterus.     Extraocular Movements: Extraocular movements intact.      Conjunctiva/sclera: Conjunctivae normal.      Pupils: Pupils are equal, round, and reactive to " light.   Cardiovascular:      Rate and Rhythm: Normal rate and regular rhythm.      Pulses: Normal pulses.      Heart sounds: Normal heart sounds.   Pulmonary:      Effort: Pulmonary effort is normal. No respiratory distress.      Breath sounds: Normal breath sounds. No wheezing or rales.   Abdominal:      General: Abdomen is flat. Bowel sounds are normal.      Palpations: Abdomen is soft.   Musculoskeletal:         General: No swelling. Normal range of motion.      Cervical back: Normal range of motion and neck supple.      Right lower leg: No edema.      Left lower leg: No edema.   Skin:     General: Skin is warm and dry.      Capillary Refill: Capillary refill takes less than 2 seconds.   Neurological:      General: No focal deficit present.      Mental Status: He is alert and oriented to person, place, and time. Mental status is at baseline.   Psychiatric:         Mood and Affect: Mood normal.         Behavior: Behavior normal.         Thought Content: Thought content normal.         Judgment: Judgment normal.         Fluids:  In: -   Out: 500     LABS:  Recent Labs     04/28/25  0944 04/29/25  0542   SODIUM 138 135   POTASSIUM 4.5 4.3   CHLORIDE 97 93*   CO2 21 20   GLUCOSE 171* 135*   * 99*   CREATININE 9.81* 8.98*   CALCIUM 8.8 8.9       IMAGING:   All imaging reviewed from admission to present day    IMPRESSION:  # ESRD on CCPD followed by [facility] dialysis (Dr. Milian)  - Will continue his outpatient PD prescription.  # HTN, controlled   - Goal BP < 140/90  # Anemia of CKD             - At goal  # CKD-MBD/Renal Osteodystrophy    - Ca 8.9   - Phos 5.0->5.1                On Sevelamer 800mg TID w meals    - PTH pend - On calcitriol 0.25mcg 3x weekly   #Metabolic acidosis   - On Sodium bicarbonate TID   # Generalized weakness/seizure-like activity  # HFrEF  # Hx CAD with stent      SUGGESTIONS:  - Continue CCPD during hospital stay, cont at 6 cycles- will continue his current outpatient  prescription during stay. All 1.5%  - hold antihypertensives for now  - PRBC for hb<7  - Dose all meds per ESRD  - Renal diet, low phos   - Check PTH   - Cont Phos binders sevelamer TID w/meals   - No protein restrictions  - Avoid Fleet enema in dialysis patients   - Labs in AM for further recommendations

## 2025-04-29 NOTE — PROGRESS NOTES
Report received. Assumed care. Pt AAOx4. VSS. Denies pain at this time. HD RN at bedside. No other complaints reported; Pt was updated on POC, tele monitoring, peritoneal dialysis, fall precautions/safety. White board updated, All question answered. Pt has call light within reach, bed is in the lowest position. Pt has no other needs at this time.

## 2025-04-29 NOTE — PROGRESS NOTES
Monitor summary: per monitor room interpretation  Afib 105-125 ( - / 0.08 / - ) converted to Sinus rhythm at 2330 Rate 84-97  Occasional trigem, frequent PVC, occasional couplet ectopy  0.18/0.07/0.41

## 2025-04-29 NOTE — PROGRESS NOTES
Hospital Medicine Daily Progress Note    Date of Service  4/29/2025    Chief Complaint  Edy Bennett is a 79 y.o. male admitted 4/28/2025 with HFrEF with possible low-flow AAS, end-stage renal disease on peritoneal dialysis, peripheral vascular disease status post bypass, history of coronary disease who presents to hospital with presyncope in the setting of multiple medical issues.  Patient was admitted to the CDU and had consistent and persistent orthostatic hypotension.  He was receiving peritoneal dialysis overnight.  There is no clear infection of his toes because of persistent dry gangrene.  Repeat echocardiogram showed a reduced EF to 20% with low-flow AAS.    Hospital Course  See above    Interval Problem Update  4/29  Telemetry showed sinus rhythm after conversion from A-fib RVR with a heart rate of 100 525.  He remains in sinus rhythm now.  Patient states that he is minimal dizziness but still feels continued weak.  He has severe dry skin and itchiness of his skin.  Dr. Tomas tariq evaluated the patient today and deemed no surgical intervention needed.  Nephrology evaluated patient and received peritoneal dialysis without issue.    I have discussed this patient's plan of care and discharge plan at IDT rounds today with Case Management, Nursing, Nursing leadership, and other members of the IDT team.    Consultants/Specialty  Vascular surgery, renal    Code Status  Full Code    Disposition  The patient is not medically cleared for discharge to home or a post-acute facility.  Anticipate discharge to: home with close outpatient follow-up    I have placed the appropriate orders for post-discharge needs.    Review of Systems  Review of Systems   Constitutional:  Negative for chills and fever.   Gastrointestinal:  Negative for abdominal pain, nausea and vomiting.   Skin:  Positive for itching. Negative for rash.   Neurological:  Positive for dizziness.        Physical Exam  Temp:  [35.9 °C (96.6 °F)-36.3 °C  (97.4 °F)] 35.9 °C (96.6 °F)  Pulse:  [] 84  Resp:  [15-17] 16  BP: (107-148)/(56-77) 107/56  SpO2:  [99 %-100 %] 100 %    Physical Exam  Vitals and nursing note reviewed.   Constitutional:       General: He is not in acute distress.     Appearance: He is well-developed.      Comments: Thin, [place]   HENT:      Head: Normocephalic and atraumatic.      Mouth/Throat:      Pharynx: No oropharyngeal exudate.   Eyes:      General: No scleral icterus.     Pupils: Pupils are equal, round, and reactive to light.   Neck:      Thyroid: No thyromegaly.   Cardiovascular:      Rate and Rhythm: Normal rate and regular rhythm.      Heart sounds: Murmur heard.   Pulmonary:      Effort: Pulmonary effort is normal. No respiratory distress.      Breath sounds: Normal breath sounds. No wheezing or rales.   Abdominal:      General: Bowel sounds are normal. There is no distension.      Palpations: Abdomen is soft.      Tenderness: There is no abdominal tenderness. There is no guarding or rebound.      Comments: PD catheter in place, insertion appears c/d/i   Musculoskeletal:         General: No tenderness. Normal range of motion.      Cervical back: Normal range of motion and neck supple.   Skin:     General: Skin is warm and dry.      Findings: Lesion and rash present.      Comments: Multiple scabs everywhere    Dry gangrene of multiple toes, no e/o superimposed infection   Neurological:      Mental Status: He is alert and oriented to person, place, and time.      Cranial Nerves: No cranial nerve deficit.         Fluids    Intake/Output Summary (Last 24 hours) at 4/29/2025 1514  Last data filed at 4/29/2025 0446  Gross per 24 hour   Intake --   Output 500 ml   Net -500 ml        Laboratory  Recent Labs     04/28/25  0944 04/29/25  0542   WBC 9.4 9.5   RBC 3.70* 3.74*   HEMOGLOBIN 11.7* 11.5*   HEMATOCRIT 35.3* 35.8*   MCV 95.4 95.7   MCH 31.6 30.7   MCHC 33.1 32.1*   RDW 59.8* 60.2*   PLATELETCT 198 201   MPV 9.7 9.7     Recent Labs      04/28/25  0944 04/29/25  0542   SODIUM 138 135   POTASSIUM 4.5 4.3   CHLORIDE 97 93*   CO2 21 20   GLUCOSE 171* 135*   * 99*   CREATININE 9.81* 8.98*   CALCIUM 8.8 8.9                   Imaging  EC-ECHOCARDIOGRAM COMPLETE W/ CONT   Final Result      CT-CTA CHEST PULMONARY ARTERY W/ RECONS   Final Result      1.  No CT evidence of pulmonary emboli.   2.  Coronary artery calcification.   3.  Moderate amount of ascites in the upper abdomen.   4.  Micronodular appearance of the liver suspicious for hepatocellular disease.               DX-CHEST-PORTABLE (1 VIEW)   Final Result      No acute cardiac or pulmonary abnormalities are identified.      CT-HEAD W/O   Final Result      1.  Cerebral atrophy.      2.  Otherwise, Head CT without contrast within normal limits. No evidence of acute cerebral infarction, hemorrhage or mass lesion.               IR-US GUIDED PIV    (Results Pending)        Assessment/Plan  * Postural dizziness with presyncope- (present on admission)  Assessment & Plan  Persistently orthostatic, challenging volume status issue  PD per renal  Hold BP medications except for Toprol XL  Midodrine  Check orthostatic daily  Monitor on tele  PT    Aortic stenosis- (present on admission)  Assessment & Plan  Likely contributing to presentation  Structural heart clinic referral place    Advance care planning- (present on admission)  Assessment & Plan  Confirmed fc/fc    Muscle twitching- (present on admission)  Assessment & Plan  Noted to have intermittent involuntary extremity twitching, concerning electrolytes abnormalities due to insufficient PD  Nephrology consulted      Syncope and collapse- (present on admission)  Assessment & Plan  As noted above    Chronic combined systolic and diastolic heart failure (HCC)- (present on admission)  Assessment & Plan  Hx of   BNP >49049  Nephrology consulted for dialysis   Continue Toprol , lasix prn (hold given low blood pressure)  Challenging, PD with  renal  Does not appear volume overloaded on exam, appears dehyrdated, persistent orthostatic hypotension  Start midodrine 2.5 mg tid prn  ECHo shows worsening EF to 20%, likely an element of low flow AS      Typical atrial flutter (HCC)- (present on admission)  Assessment & Plan  Continue Toprol and Eliquis  Patient has scheduled watchman device in May     CAD (coronary artery disease)- (present on admission)  Assessment & Plan  Continue statin, Toprol, plavix and Eliquis    PAD (peripheral artery disease) (HCC)- (present on admission)  Assessment & Plan  Follows Dr. Miller  On statin, Plavix and Eliquis  Noted necrotic toes in bilateral feet, dry, no drainage. I have reached out Dr. Miller and notify him patient is currently hospitalized     Dyslipidemia- (present on admission)  Assessment & Plan  Continue statin    ESRD on peritoneal dialysis (HCC)- (present on admission)  Assessment & Plan  On PD. Per wife, he was recently noted to have elevated phosphorus level. He is also noted to have intermittent extremity twitching. Concerning insufficient dialysis   Nephrology consulted          VTE prophylaxis:    therapeutic anticoagulation with eliquis 2.5 mg BID      I have performed a physical exam and reviewed and updated ROS and Plan today (4/29/2025). In review of yesterday's note (4/28/2025), there are no changes except as documented above.

## 2025-04-29 NOTE — PROGRESS NOTES
4 Eyes Skin Assessment Completed by MANOJ Kingston and MANOJ King.    Head WDL  Ears WDL  Nose WDL  Mouth WDL  Neck WDL  Breast/Chest Scab  Shoulder Blades WDL  Spine WDL  (R) Arm/Elbow/Hand Bruising and Scab  (L) Arm/Elbow/Hand Bruising and Scab  Abdomen WDL  Groin WDL  Scrotum/Coccyx/Buttocks WDL  (R) Leg Redness and Scab  (L) Leg Redness and Scab  (R) Heel/Foot/Toe Discoloration, Ulcer(s), and Scab  (L) Heel/Foot/Toe Discoloration, Ulcer(s), and Scab          Devices In Places Tele Box, Blood Pressure Cuff, and Pulse Ox      Interventions In Place Pressure Redistribution Mattress    Possible Skin Injury No    Pictures Uploaded Into Epic Yes  Wound Consult Placed Yes  RN Wound Prevention Protocol Ordered Yes

## 2025-04-29 NOTE — CARE PLAN
The patient is Watcher - Medium risk of patient condition declining or worsening    Shift Goals  Clinical Goals: tele monitoring, peritoneal dialysis, fall precautions/safety, EEG, PT/OT  Patient Goals: feel better, get home  Family Goals: have  feel better    Progress made toward(s) clinical / shift goals:    Problem: Knowledge Deficit - Standard  Goal: Patient and family/care givers will demonstrate understanding of plan of care, disease process/condition, diagnostic tests and medications  Description: Target End Date:  1-3 days or as soon as patient condition allowsDocument in Patient Education1.  Patient and family/caregiver oriented to unit, equipment, visitation policy and means for communicating concern2.  Complete/review Learning Assessment3.  Assess knowledge level of disease process/condition, treatment plan, diagnostic tests and medications4.  Explain disease process/condition, treatment plan, diagnostic tests and medications  4/29/2025 0034 by Kamala Veliz R.N.  Outcome: Progressing     Problem: Fall Risk  Goal: Patient will remain free from falls  Description: Target End Date:  Prior to discharge or change in level of careDocument interventions on the [facility] Fall Risk Assessment1.  Assess for fall risk factors2.  Implement fall precautions  4/29/2025 0034 by Kamala Veliz R.N.  Outcome: Progressing

## 2025-04-29 NOTE — PROGRESS NOTES
I called and gave report to MANOJ Contreras on SMT.  All questions and concerns have been addressed.  I will gather patient's medication, chart, and belongings to go to his new room on [address]. Patient will go basic transport via wheelchair with transport.

## 2025-04-29 NOTE — THERAPY
Occupational Therapy Contact Note    Patient Name: Edy Bennett  Age:  79 y.o., Sex:  male  Medical Record #: [id number]  Today's Date: 4/29/2025    OT orders received and eval attempted. Introduced the role of acute OT and role in DC planning and acquired home set-up, PLOF, and level of assist available to him. Pt declined any attempts to complete EOB/OOB ADLs as he had recently gotten up with staff. Pt denied having any signs or symptoms of dizziness with OOB ADLs. Declined attempts at mobilizing to chair to reduce risk of deconditioning. Edu on importance of EOB/OOB ADLs with staff to reduce risk of deconditioning. Will re-attempt when appropriate/able.       Prior Living Situation   Prior Services Intermittent Physical Support for ADL Per Family;Skilled Home Health Services   Housing / Facility 1 Story House   Bathroom Set up Walk In Shower;Grab Bars;Shower Chair   Equipment Owned Front-Wheel Walker;4-Wheel Walker;Tub / Shower Seat;Grab Bar(s) In Tub / Shower;Grab Bar(s) By Toilet   Lives with - Patient's Self Care Capacity Spouse   Comments Pt currently resides with his wife who was providing assist with IADLs PTA. Pt reported that he just transitioned to wearing regular shoes after wearing his post-op shoes.   Prior Level of ADL Function   Self Feeding Independent   Grooming / Hygiene Independent   Bathing Independent   Dressing Independent   Toileting Independent   Prior Level of IADL Function   Medication Management Requires Assist   Laundry Requires Assist   Kitchen Mobility Requires Assist   Finances Requires Assist   Home Management Requires Assist   Shopping Requires Assist   Prior Level Of Mobility Independent Without Device in Home;Independent Without Device in Community  (Reproted that he intermittently uses AD in the home and the community, but always has it with him.)   Leisure Interests Other (Comments)  (Reported that he was golfing on Thursday prior to admission.)   Education Group    Education Provided Role of Occupational Therapist;Pathology of bedrest   Role of Occupational Therapist Patient Response Patient;Acceptance;Explanation;Verbal Demonstration   Pathology of Bedrest Patient Response Patient;Acceptance;Explanation;Verbal Demonstration

## 2025-04-29 NOTE — PROGRESS NOTES
[facility]    24 hour UF: 667 mL. (Total UF 1849 mL + I drain 918 mL - last fill 2100 mL )    Patient is alert and oriented x 4 but forgetful. No discomfort and pain reported, denies constipation and diarrhea. VS within normal limits. PD catheter on Right lower quadrant, catherter intact, dressing is CDI. No reported concerns and alarms from PCN. L FA AVF has bruit and thrill and no signs and symptoms of infection, noted PIV on the same arm. PCN informed on the risk of infection on AVF line and PIV needed to be removed. Dr. Rogers notified, [facility] Charge RN notified.  Patient aseptically disconnected from CCPD cycler at 0730. Effluent is clear,yellow and no fibrin noted.      Report given to PCN SAMIRA Garner RN    See dialysis treatment flow sheet for details.

## 2025-04-30 VITALS
TEMPERATURE: 97.8 F | DIASTOLIC BLOOD PRESSURE: 63 MMHG | HEART RATE: 80 BPM | BODY MASS INDEX: 20.99 KG/M2 | OXYGEN SATURATION: 98 % | HEIGHT: 70 IN | WEIGHT: 146.61 LBS | RESPIRATION RATE: 18 BRPM | SYSTOLIC BLOOD PRESSURE: 125 MMHG

## 2025-04-30 LAB
ALBUMIN SERPL BCP-MCNC: 3 G/DL (ref 3.2–4.9)
ALBUMIN/GLOB SERPL: 1 G/DL
ALP SERPL-CCNC: 176 U/L (ref 30–99)
ALT SERPL-CCNC: 51 U/L (ref 2–50)
ANION GAP SERPL CALC-SCNC: 20 MMOL/L (ref 7–16)
AST SERPL-CCNC: 38 U/L (ref 12–45)
BASOPHILS # BLD AUTO: 0 % (ref 0–1.8)
BASOPHILS # BLD: 0 K/UL (ref 0–0.12)
BILIRUB SERPL-MCNC: 0.3 MG/DL (ref 0.1–1.5)
BUN SERPL-MCNC: 96 MG/DL (ref 8–22)
CALCIUM ALBUM COR SERPL-MCNC: 9.5 MG/DL (ref 8.5–10.5)
CALCIUM SERPL-MCNC: 8.7 MG/DL (ref 8.5–10.5)
CHLORIDE SERPL-SCNC: 93 MMOL/L (ref 96–112)
CO2 SERPL-SCNC: 21 MMOL/L (ref 20–33)
CREAT SERPL-MCNC: 9.11 MG/DL (ref 0.5–1.4)
EOSINOPHIL # BLD AUTO: 1.68 K/UL (ref 0–0.51)
EOSINOPHIL NFR BLD: 20.7 % (ref 0–6.9)
ERYTHROCYTE [DISTWIDTH] IN BLOOD BY AUTOMATED COUNT: 59.3 FL (ref 35.9–50)
GFR SERPLBLD CREATININE-BSD FMLA CKD-EPI: 5 ML/MIN/1.73 M 2
GLOBULIN SER CALC-MCNC: 3 G/DL (ref 1.9–3.5)
GLUCOSE SERPL-MCNC: 148 MG/DL (ref 65–99)
HCT VFR BLD AUTO: 31.8 % (ref 42–52)
HGB BLD-MCNC: 10.7 G/DL (ref 14–18)
LYMPHOCYTES # BLD AUTO: 1.04 K/UL (ref 1–4.8)
LYMPHOCYTES NFR BLD: 12.9 % (ref 22–41)
MAGNESIUM SERPL-MCNC: 2.1 MG/DL (ref 1.5–2.5)
MANUAL DIFF BLD: NORMAL
MCH RBC QN AUTO: 31.8 PG (ref 27–33)
MCHC RBC AUTO-ENTMCNC: 33.6 G/DL (ref 32.3–36.5)
MCV RBC AUTO: 94.6 FL (ref 81.4–97.8)
METAMYELOCYTES NFR BLD MANUAL: 1.7 %
MONOCYTES # BLD AUTO: 0.56 K/UL (ref 0–0.85)
MONOCYTES NFR BLD AUTO: 6.9 % (ref 0–13.4)
MORPHOLOGY BLD-IMP: NORMAL
MYELOCYTES NFR BLD MANUAL: 0.9 %
NEUTROPHILS # BLD AUTO: 4.61 K/UL (ref 1.82–7.42)
NEUTROPHILS NFR BLD: 56.9 % (ref 44–72)
NRBC # BLD AUTO: 0 K/UL
NRBC BLD-RTO: 0 /100 WBC (ref 0–0.2)
OVALOCYTES BLD QL SMEAR: NORMAL
PHOSPHATE SERPL-MCNC: 5.5 MG/DL (ref 2.5–4.5)
PLATELET # BLD AUTO: 169 K/UL (ref 164–446)
PLATELET BLD QL SMEAR: NORMAL
PMV BLD AUTO: 9.9 FL (ref 9–12.9)
POIKILOCYTOSIS BLD QL SMEAR: NORMAL
POTASSIUM SERPL-SCNC: 4.2 MMOL/L (ref 3.6–5.5)
PROT SERPL-MCNC: 6 G/DL (ref 6–8.2)
PTH-INTACT SERPL-MCNC: 110 PG/ML (ref 14–72)
RBC # BLD AUTO: 3.36 M/UL (ref 4.7–6.1)
RBC BLD AUTO: PRESENT
SODIUM SERPL-SCNC: 134 MMOL/L (ref 135–145)
WBC # BLD AUTO: 8.1 K/UL (ref 4.8–10.8)

## 2025-04-30 PROCEDURE — 99239 HOSP IP/OBS DSCHRG MGMT >30: CPT | Mod: GC | Performed by: STUDENT IN AN ORGANIZED HEALTH CARE EDUCATION/TRAINING PROGRAM

## 2025-04-30 PROCEDURE — 83970 ASSAY OF PARATHORMONE: CPT

## 2025-04-30 PROCEDURE — 700102 HCHG RX REV CODE 250 W/ 637 OVERRIDE(OP): Performed by: INTERNAL MEDICINE

## 2025-04-30 PROCEDURE — 97535 SELF CARE MNGMENT TRAINING: CPT

## 2025-04-30 PROCEDURE — 84100 ASSAY OF PHOSPHORUS: CPT

## 2025-04-30 PROCEDURE — 97162 PT EVAL MOD COMPLEX 30 MIN: CPT

## 2025-04-30 PROCEDURE — 85027 COMPLETE CBC AUTOMATED: CPT

## 2025-04-30 PROCEDURE — 90945 DIALYSIS ONE EVALUATION: CPT

## 2025-04-30 PROCEDURE — A9270 NON-COVERED ITEM OR SERVICE: HCPCS | Performed by: STUDENT IN AN ORGANIZED HEALTH CARE EDUCATION/TRAINING PROGRAM

## 2025-04-30 PROCEDURE — 700102 HCHG RX REV CODE 250 W/ 637 OVERRIDE(OP): Performed by: STUDENT IN AN ORGANIZED HEALTH CARE EDUCATION/TRAINING PROGRAM

## 2025-04-30 PROCEDURE — A9270 NON-COVERED ITEM OR SERVICE: HCPCS | Performed by: INTERNAL MEDICINE

## 2025-04-30 PROCEDURE — 83735 ASSAY OF MAGNESIUM: CPT

## 2025-04-30 PROCEDURE — 85007 BL SMEAR W/DIFF WBC COUNT: CPT

## 2025-04-30 PROCEDURE — 80053 COMPREHEN METABOLIC PANEL: CPT

## 2025-04-30 RX ORDER — MIDODRINE HYDROCHLORIDE 2.5 MG/1
2.5 TABLET ORAL
Qty: 60 TABLET | Refills: 0 | Status: SHIPPED | OUTPATIENT
Start: 2025-04-30 | End: 2025-05-28

## 2025-04-30 RX ADMIN — SEVELAMER CARBONATE 800 MG: 800 TABLET, FILM COATED ORAL at 09:02

## 2025-04-30 RX ADMIN — OMEPRAZOLE 40 MG: 20 CAPSULE, DELAYED RELEASE ORAL at 04:12

## 2025-04-30 RX ADMIN — CLOPIDOGREL BISULFATE 75 MG: 75 TABLET, FILM COATED ORAL at 04:12

## 2025-04-30 RX ADMIN — CALCITRIOL CAPSULES 0.25 MCG 0.25 MCG: 0.25 CAPSULE ORAL at 09:02

## 2025-04-30 RX ADMIN — APIXABAN 2.5 MG: 5 TABLET, FILM COATED ORAL at 04:14

## 2025-04-30 RX ADMIN — SEVELAMER CARBONATE 800 MG: 800 TABLET, FILM COATED ORAL at 13:11

## 2025-04-30 RX ADMIN — MIDODRINE HYDROCHLORIDE 2.5 MG: 5 TABLET ORAL at 13:11

## 2025-04-30 RX ADMIN — METOPROLOL SUCCINATE 50 MG: 25 TABLET, EXTENDED RELEASE ORAL at 04:13

## 2025-04-30 RX ADMIN — MIDODRINE HYDROCHLORIDE 2.5 MG: 5 TABLET ORAL at 09:02

## 2025-04-30 RX ADMIN — TAMSULOSIN HYDROCHLORIDE 0.4 MG: 0.4 CAPSULE ORAL at 09:02

## 2025-04-30 ASSESSMENT — COGNITIVE AND FUNCTIONAL STATUS - GENERAL
SUGGESTED CMS G CODE MODIFIER MOBILITY: CJ
WALKING IN HOSPITAL ROOM: A LITTLE
MOVING TO AND FROM BED TO CHAIR: A LITTLE
CLIMB 3 TO 5 STEPS WITH RAILING: A LITTLE
STANDING UP FROM CHAIR USING ARMS: A LITTLE
SUGGESTED CMS G CODE MODIFIER DAILY ACTIVITY: CH
MOVING TO AND FROM BED TO CHAIR: A LITTLE
STANDING UP FROM CHAIR USING ARMS: A LITTLE
MOBILITY SCORE: 21
MOBILITY SCORE: 20
DAILY ACTIVITIY SCORE: 24
SUGGESTED CMS G CODE MODIFIER MOBILITY: CJ
CLIMB 3 TO 5 STEPS WITH RAILING: A LITTLE

## 2025-04-30 ASSESSMENT — PAIN DESCRIPTION - PAIN TYPE: TYPE: ACUTE PAIN

## 2025-04-30 ASSESSMENT — GAIT ASSESSMENTS
GAIT LEVEL OF ASSIST: STANDBY ASSIST
DISTANCE (FEET): 120
DEVIATION: BRADYKINETIC;DECREASED BASE OF SUPPORT;OTHER (COMMENT)
ASSISTIVE DEVICE: FRONT WHEEL WALKER

## 2025-04-30 ASSESSMENT — FIBROSIS 4 INDEX: FIB4 SCORE: 2.42

## 2025-04-30 NOTE — CARE PLAN
The patient is Stable - Low risk of patient condition declining or worsening    Shift Goals  Clinical Goals: PD, monitor VS, safety, skin integrity  Patient Goals: rest  Family Goals: get better    Progress made toward(s) clinical / shift goals:    Problem: Fluid Volume  Goal: Fluid volume balance will be maintained  Description: Target End Date:  Prior to discharge or change in level of careDocument on I/O flowsheet1.  Monitor intake and output as ordered2.  Promote oral intake as appropriate3.  Report inadequate intake or output to physician4.  Administer IV therapy as ordered5.  Weights per provider order6.  Assess for signs and symptoms of bleeding7.  Monitor for signs of fluid overload (respiratory changes, edema, weight gain, increased abdominal girth)8.  Monitor of signs for inadequate fluid volume (poor skin turgor, dry mucous membranes)9.  Instruct patient on adherence to fluid restrictions  Outcome: Progressing  Note: Pt on PD over night. Occasional repositioning proper PD during drain cycles.       Problem: Skin Integrity  Goal: Skin integrity is maintained or improved  Description: Target End Date:  Prior to discharge or change in level of careDocument interventions on Skin Risk/Jalen flowsheet groups and corresponding LDA1.  Assess and monitor skin integrity, appearance and/or temperature2.  Assess risk factors for impaired skin integrity and/or pressures ulcers3.  Implement precautions to protect skin integrity in collaboration with interdisciplinary team4.  Implement pressure ulcer prevention protocol if at risk for skin breakdown5.  Confirm wound care consult if at risk for skin breakdown6.  Ensure patient use of pressure relieving devices  (Low air loss bed, waffle overlay, heel protectors, ROHO cushion, etc)  Outcome: Progressing  Note: Pt on low airloss, pt turns self independently at night. Heel mepilexc applied.      Problem: Risk of Falls  Goal: Reduce the Risk of Falls and Fall Related  Injuries  Outcome: Progressing  Note: Pt scored high when assessed for fall risk. Bed alarm on. Bed locked and in lowest position. Call light and personal belongings within reach. Pt verbalized understanding to not get out of bed without assistance.

## 2025-04-30 NOTE — DISCHARGE SUMMARY
"[facility] Internal Medicine Discharge Summary    Attending: Jenn Olivas M.d.  Senior Resident: Dr. Cedeno  Contact Number: [phone]    CHIEF COMPLAINT ON ADMISSION  Chief Complaint   Patient presents with    Syncope     Pt was sitting on the toilet this am when his wife reported him having a loss of consciousness followed by his full body \"tensing up\" and shaking. Pt does not remember the event.        Reason for Admission  EMS     Admission Date  4/28/2025    CODE STATUS  Full Code    HPI & HOSPITAL COURSE  Edy Bennett is a 79 y.o. male admitted 4/28/2025 with HFrEF with possible low-flow AS, end-stage renal disease on peritoneal dialysis, peripheral vascular disease status post bypass, history of coronary disease who presents to hospital with presyncope in the setting of multiple medical issues.  Patient was in the kitchen when he all of a sudden needed to use the restroom.  However, he could not walk and his wife had to push him in his walker (has a seat).  Once he was on the toilet, patient could not get up, felt lightheaded, weak, and nauseated.  EMS was called and patient was admitted to the CDU where he had persistent orthostatic hypotension.  He was receiving peritoneal dialysis overnight.  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  Repeat echocardiogram showed a reduced EF to 20-25% with low-flow AS.  Patient had 10 beat episode of V. tach, but otherwise no noted arrhythmias.  Patient was started on midodrine 2.5 mg and felt much better.  Therefore, he was discharged in stable condition.  Given his low flow severe AS, he will need to follow-up with cardiology outpatient.    Therefore, he is discharged in good and stable condition to home with close outpatient follow-up.    The patient met 2-midnight criteria for an inpatient stay at the time of discharge.    Discharge Date  4/30/2025    Physical Exam on Day of Discharge  Physical Exam  Constitutional:  "      General: He is not in acute distress.     Appearance: Normal appearance. He is not ill-appearing, toxic-appearing or diaphoretic.   HENT:      Head: Normocephalic and atraumatic.      Nose: Nose normal. No rhinorrhea.      Mouth/Throat:      Mouth: Mucous membranes are moist.   Eyes:      General: No scleral icterus.        Right eye: No discharge.         Left eye: No discharge.      Extraocular Movements: Extraocular movements intact.      Conjunctiva/sclera: Conjunctivae normal.   Cardiovascular:      Rate and Rhythm: Normal rate and regular rhythm.      Pulses: Normal pulses.      Heart sounds: Murmur (2/6) heard.      No friction rub. No gallop.   Pulmonary:      Effort: Pulmonary effort is normal. No respiratory distress.      Breath sounds: Normal breath sounds. No stridor. No wheezing, rhonchi or rales.   Abdominal:      General: Abdomen is flat. Bowel sounds are normal. There is no distension.      Palpations: Abdomen is soft. There is no mass.      Tenderness: There is no abdominal tenderness. There is no guarding or rebound.   Musculoskeletal:         General: Normal range of motion.      Right lower leg: No edema.      Left lower leg: No edema.   Skin:     General: Skin is warm and dry.   Neurological:      General: No focal deficit present.      Mental Status: He is alert and oriented to person, place, and time. Mental status is at baseline.   Psychiatric:         Mood and Affect: Mood normal.         Behavior: Behavior normal.         FOLLOW UP ITEMS POST DISCHARGE  Follow-up with cardiology.    DISCHARGE DIAGNOSES  Principal Problem:    Postural dizziness with presyncope (POA: Yes)  Active Problems:    ESRD on peritoneal dialysis (HCC) (POA: Yes)    Dyslipidemia (POA: Yes)    PAD (peripheral artery disease) (HCC) (POA: Yes)    CAD (coronary artery disease) (POA: Yes)    Typical atrial flutter (HCC) (POA: Yes)    Chronic combined systolic and diastolic heart failure (HCC) (POA: Yes)    Syncope and  collapse (POA: Yes)    Muscle twitching (POA: Yes)    Advance care planning (POA: Yes)    Aortic stenosis (POA: Yes)  Resolved Problems:    * No resolved hospital problems. *      FOLLOW UP  Future Appointments   Date Time Provider Department Center   5/6/2025 To Be Determined Claudine Rey R.N. [facility] None   5/8/2025  1:15 PM Marco Antonio Miller M.D. [facility] None   5/13/2025 To Be Determined Claudine Rey R.N. [facility] None   5/14/2025  1:20 PM Rachelle Amaro MD, PhD [facility] None   5/20/2025 To Be Determined Claudine Rey R.N. [facility] None   5/27/2025 To Be Determined Claudine Rey R.N. [facility] None   6/3/2025 To Be Determined Claudine Rey R.N. [facility] None   6/10/2025 To Be Determined Claudine Rey R.N. [facility] None   6/17/2025 To Be Determined Claudine Rey R.N. [facility] None     [facility]  [address], [address]  [facility] [id number] [phone]  Follow up      Denise Carver M.D.  [address] [facility] [id number] [phone]    Follow up in 1 week(s)        MEDICATIONS ON DISCHARGE     Medication List        START taking these medications        Instructions   midodrine 2.5 MG Tabs  Commonly known as: Proamatine   Take 1 Tablet by mouth 3 times a day with meals.  Dose: 2.5 mg            CONTINUE taking these medications        Instructions   ammonium lactate 12 % Lotn  Commonly known as: Lac-Hydrin   Apply 1 Application topically 2 times a day. Apply to full body for severe itching  Dose: 1 Application     apixaban 2.5mg Tabs  Commonly known as: Eliquis   Take 2.5 mg by mouth 2 times a day. Indications: Atrial Fibrillation  Dose: 2.5 mg     calcitRIOL 0.25 MCG Caps  Commonly known as: Rocaltrol   Take 0.25 mcg by mouth 3 times a week. On Mon, Wed and Fri  Indications: Low Amount of Calcium in the Blood  Dose: 0.25 mcg     clopidogrel 75 MG Tabs  Commonly known as: Plavix   Take 75 mg by mouth every day. Indications: Coronary  Bypass Surgery  Dose: 75 mg     diphenhydrAMINE 25 MG Tabs  Commonly known as: Benadryl   Take 25-50 mg by mouth every 6 hours as needed for Itching.  Dose: 25-50 mg     furosemide 80 MG Tabs  Commonly known as: Lasix   Take 80 mg by mouth 2 times a day as needed (edema). Indications: Kidney Disease  Dose: 80 mg     gentamicin 0.1 % cream  Commonly known as: Garamycin   Apply 1 Application topically every day. Use each and every time cath is accessed for peritoneal dialysis please  Dose: 1 Application     Home Care Oxygen   Inhale 2 L/min at bedtime as needed for Shortness of Breath. Oxygen dose range: 2 to 2 L/min  Respiratory route via: Nasal Cannula   Oxygen supplier: [employer]      Indications: hypoxia  Dose: 2 L/min     HYDROcodone-acetaminophen 5-325 MG Tabs per tablet  Commonly known as: Norco   Take 2 Tablets by mouth every 6 hours as needed (severe pain). Take 1 hour prior to wound care appointment  Dose: 2 Tablet     metoprolol SR 50 MG Tb24  Commonly known as: Toprol XL   Take 50 mg by mouth every morning.  Dose: 50 mg     montelukast 10 MG Tabs  Commonly known as: Singulair   Take 10 mg by mouth at bedtime. Indications: Asthma, Hayfever  Dose: 10 mg     nitroglycerin 0.4 MG Subl  Commonly known as: Nitrostat   Place 0.4 mg under the tongue every 5 minutes as needed for Chest Pain.  Dose: 0.4 mg     omeprazole 40 MG delayed-release capsule  Commonly known as: PriLOSEC   Take 40 mg by mouth 2 times a day.  Dose: 40 mg     polyethylene glycol/lytes 17 g Pack  Commonly known as: Miralax   Take 17 g by mouth 1 time a day as needed. Indications: Constipation  Dose: 17 g     rosuvastatin 20 MG Tabs  Commonly known as: Crestor   Take 20 mg by mouth at bedtime.     Indications: High Amount of Fats in the Blood  Dose: 20 mg     sodium bicarbonate 650 MG Tabs  Commonly known as: Sodium Bicarbonate   Take 650 mg by mouth 3 times a day as needed (Heart burn).  Dose: 650 mg     tamsulosin 0.4 MG capsule  Commonly known  as: Flomax   Take 1 Capsule by mouth 1/2 hour after breakfast.  Dose: 0.4 mg     traMADol 50 MG Tabs  Commonly known as: Ultram   Take 50 mg by mouth 1 time a day as needed for Moderate Pain (wound care pain).  Dose: 50 mg     TYLENOL 500 MG Tabs  Generic drug: acetaminophen   Take 1,000 mg by mouth every 8 hours as needed for Mild Pain. Indications: Pain, 1-4/10 pain  Dose: 1,000 mg     Velphoro 500 MG Chew  Generic drug: Sucroferric Oxyhydroxide   Chew 1 Tablet 3 times a day with meals. And with every snack or food intake  Indications: High Amount of Phosphate in the Blood, Itching  Dose: 1 Tablet              Allergies  Allergies   Allergen Reactions    Hydroxyzine Hives, Itching and Anxiety     Severe Itching and hives.       DIET  Orders Placed This Encounter   Procedures    Diet Order Diet: Renal     Standing Status:   Standing     Number of Occurrences:   1     Diet::   Renal [8]       ACTIVITY  As tolerated.  Weight bearing as tolerated    CONSULTATIONS  None    PROCEDURES  None    LABORATORY  Lab Results   Component Value Date    SODIUM 134 (L) 04/30/2025    POTASSIUM 4.2 04/30/2025    CHLORIDE 93 (L) 04/30/2025    CO2 21 04/30/2025    GLUCOSE 148 (H) 04/30/2025    BUN 96 (H) 04/30/2025    CREATININE 9.11 (HH) 04/30/2025        Lab Results   Component Value Date    WBC 8.1 04/30/2025    HEMOGLOBIN 10.7 (L) 04/30/2025    HEMATOCRIT 31.8 (L) 04/30/2025    PLATELETCT 169 04/30/2025        Total time of the discharge process exceeds 38 minutes.

## 2025-04-30 NOTE — PROGRESS NOTES
[facility] Services Progress Note     CCPD ordered by Dr. Rogers. Connected aseptically to PD Cycler at 2010 for 10.5 hours using 3 bags of 1.5% PD solution with heparin 500 units/L (total of 3000 units) only in the bag on the heater as ordered.      Pt A/O x4, not in distress, on room air, has no edema, denies chest pain, and has no bleeding, but with scabs from scratching, all over the body.  PD exit site CDI, No s/sx of infection, dressing changed; gentamicin cream applied.    Low UF alarms were encountered, pt was repositioned for better draining. Initial drain was bypassed. Pt had a BM today.    I-Drain: 1104 ml      Education provided to primary RN regarding troubleshooting.      Report given to IMMANUEL Salinas RN.     Call [facility]/On Call at [phone] for further assistance.

## 2025-04-30 NOTE — DISCHARGE PLANNING
ATTN: Case Management  RE: Referral for Home Health    As of 4/30/2025, we have accepted the Home Health referral for the patient listed above.    [facility]  will contact the patient within 48 hours. If you have any questions or concerns regarding the patient’s transition to Home Health, please do not hesitate to contact us at [phone].      We look forward to collaborating with you,  [facility] Team

## 2025-04-30 NOTE — FACE TO FACE
Face to Face Supporting Documentation - Home Health    The encounter with this patient was in whole or in part the primary reason for home health admission.    Date of encounter:   Patient:                    MRN:                       YOB: 2023  Edy Bennett  [id number]  1945     Home health to see patient for:  Skilled Nursing care for assessment, interventions & education, Physical Therapy evaluation and treatment, and Occupational therapy evaluation and treatment    Skilled need for:  Exacerbation of Chronic Disease State weakness    Skilled nursing interventions to include:  Comment: PT/OT    Homebound status evidenced by:  Needs the assistance of another person in order to leave the home. Leaving home requires a considerable and taxing effort. There is a normal inability to leave the home.    Community Physician to provide follow up care: Denise Carver M.D.     Optional Interventions? No      I certify the face to face encounter for this home health care referral meets the CMS requirements and the encounter/clinical assessment with the patient was, in whole, or in part, for the medical condition(s) listed above, which is the primary reason for home health care. Based on my clinical findings: the service(s) are medically necessary, support the need for home health care, and the homebound criteria are met.  I certify that this patient has had a face to face encounter by myself.  Norbert Cedeno M.D. - NPI: [id number]

## 2025-04-30 NOTE — PROGRESS NOTES
I called to give report to MANOJ Palmer on SMT.  Patient will be going basic transport to S1 with his belongings and medication. Pt will go via wheelchair with transport.

## 2025-04-30 NOTE — PROGRESS NOTES
Report received from MANOJ Wray.  Assumed care of patient.  Assessment complete.  Plan of care gone over with the patient and all concerns addressed.  Patient resting in bed.   Patient A & O x 4.  No apparent signs of distress.  Safety precautions in place.  Patient educated to call for assistance.  Hourly rounding in place.

## 2025-04-30 NOTE — PROGRESS NOTES
Patient needs a private room for peritoneal dialysis, so RTOC has asa notified for a new room assignment.

## 2025-04-30 NOTE — PROGRESS NOTES
"[facility] -  PROGRESS NOTE               Author: IDA Marte Date & Time: 4/30/2025  11:32 AM     HPI:  79 y.o. male with HTN, ESRD on PD, PVDs/p bypass, NSTEMI who presented 4/28/2025 with generalized weakness and presyncope episode. Wife reports patient has been feeling very week since last Thursday. This morning, when he was on toilet, he started to feel lightheadedness and was slumped over. He did not fall as his wife caught him. According to wife, his body was tense, stiff and shaking for less than a minute. Afterward patient was awake, alert. No postictal period. Nephrology was asked to consult for evaluation and management of acute and chronic conditions related to Nephrology     No F/C/N/V/CP/SOB.  No melena, hematochezia, hematemesis.  No HA, visual changes, or abdominal pain.       DAILY NEPHROLOGY SUMMARY:  4/28: Consult done  4/29: Laying in bed, wife at bedside, denies CP,SOB/N/V/D. VSS , on CCPD 1849 mL from LF. Labs reviewed electrolytes stable.   4/30: 158 mL Net UF removed with CCPD overnight. 10-beat run of vtach this early am. Wife BS. Resting in bed. BM last night.     REVIEW OF SYSTEMS:    10 point ROS reviewed and is as per HPI/daily summary or otherwise negative    PMH/PSH/SH/FH:   Reviewed and unchanged since admission note    CURRENT MEDICATIONS:   Reviewed from admission to present day    VS:  /84   Pulse 86   Temp 36.4 °C (97.6 °F) (Temporal)   Resp 20   Ht 1.778 m (5' 10\")   Wt 66.5 kg (146 lb 9.7 oz)   SpO2 99%   BMI 21.04 kg/m²     Physical Exam  Vitals and nursing note reviewed.   Constitutional:       General: He is not in acute distress.     Appearance: Normal appearance. He is not ill-appearing.   HENT:      Head: Normocephalic and atraumatic.      Nose: Nose normal.      Mouth/Throat:      Mouth: Mucous membranes are moist.      Pharynx: Oropharynx is clear.   Eyes:      General: No scleral icterus.     Extraocular Movements: " Extraocular movements intact.      Conjunctiva/sclera: Conjunctivae normal.      Pupils: Pupils are equal, round, and reactive to light.   Cardiovascular:      Rate and Rhythm: Normal rate and regular rhythm.      Pulses: Normal pulses.      Heart sounds: Normal heart sounds.   Pulmonary:      Effort: Pulmonary effort is normal. No respiratory distress.      Breath sounds: Normal breath sounds. No wheezing or rales.   Abdominal:      General: Abdomen is flat. Bowel sounds are normal.      Palpations: Abdomen is soft.   Musculoskeletal:         General: No swelling. Normal range of motion.      Cervical back: Normal range of motion and neck supple.      Right lower leg: No edema.      Left lower leg: No edema.   Skin:     General: Skin is warm and dry.      Capillary Refill: Capillary refill takes less than 2 seconds.      Comments: + Scattered scratches t/o body  + Necrotic toes R 1-4th metatarsal and L 1,3, 4 metatarsal   Neurological:      General: No focal deficit present.      Mental Status: He is alert and oriented to person, place, and time. Mental status is at baseline.   Psychiatric:         Mood and Affect: Mood normal.         Behavior: Behavior normal.         Thought Content: Thought content normal.         Judgment: Judgment normal.         Fluids:  In: 880 [P.O.:880]  Out: 677     LABS:  Recent Labs     04/28/25  0944 04/29/25  0542 04/30/25  0051   SODIUM 138 135 134*   POTASSIUM 4.5 4.3 4.2   CHLORIDE 97 93* 93*   CO2 21 20 21   GLUCOSE 171* 135* 148*   * 99* 96*   CREATININE 9.81* 8.98* 9.11*   CALCIUM 8.8 8.9 8.7       IMAGING:   All imaging reviewed from admission to present day    IMPRESSION:  # ESRD on CCPD followed by [facility] dialysis (Dr. Milian)  - Will continue his outpatient PD prescription.  # HTN, controlled   - Goal BP < 140/90  # Anemia of CKD             - At goal  # CKD-MBD/Renal Osteodystrophy    - Ca 8.9   - Phos 5.0->5.1->5.5                On Sevelamer 800mg TID  w meals    -   - On calcitriol 0.25mcg 3x weekly   #Metabolic acidosis   - On Sodium bicarbonate TID   # Generalized weakness/seizure-like activity  # HFrEF  - EF 20%  # Hx CAD with stent   # S/p syncopal episode  - CT head unremarkable   - CTA chest negative for PE      SUGGESTIONS:  - Continue CCPD during hospital stay, cont at 6 cycles- will continue his current outpatient prescription during stay. All 1.5%  - Hold antihypertensives for now  - PRBC for hb<7  - Dose all meds per ESRD  - Renal diet, low phos   - Continue Calcitriol  - Cont Phos binders sevelamer TID w/meals   - No protein restrictions  - Avoid Fleet enema in dialysis patients   - Labs in AM for further recommendations  - May transition to outpatient PD when cleared by primary team

## 2025-04-30 NOTE — PROGRESS NOTES
Cardiac rhythm: SR  Rate range (shift): 83-88 bpm    Ectopy: PVC  Frequency: rare    10 beats of MD Eve informed     KY: 0.14  QRS: 0.08  QT: 0.40

## 2025-04-30 NOTE — PROGRESS NOTES
Edy Bennett has been provided discharge instructions, to include follow up care, home medications, and activity/diet reviewed. Copy of discharge instructions in patient chart, signed and reviewed. Patient verbalizes the understanding of the discharge instructions. Arm band removed. Patient did not have home meds during admit. Questions and concerns addressed prior to leaving the discharge lounge. Transported via car, accompanied by spouse. Patient discharge to home.

## 2025-04-30 NOTE — CARE PLAN
The patient is Stable - Low risk of patient condition declining or worsening    Shift Goals  Clinical Goals: PD, maintain skin integrity, work with therapy, safety  Patient Goals: Rest, food  Family Goals: VISHAL        Problem: Knowledge Deficit - Standard  Goal: Patient and family/care givers will demonstrate understanding of plan of care, disease process/condition, diagnostic tests and medications  Description: Target End Date:  1-3 days or as soon as patient condition allowsDocument in Patient Education1.  Patient and family/caregiver oriented to unit, equipment, visitation policy and means for communicating concern2.  Complete/review Learning Assessment3.  Assess knowledge level of disease process/condition, treatment plan, diagnostic tests and medications4.  Explain disease process/condition, treatment plan, diagnostic tests and medications  Outcome: Progressing       Progress made toward(s) clinical / shift goals: meds and POC explained to patient; pt provided understanding       Problem: Care Map:  Day of Discharge Optimal Outcome for the Heart Failure Patient  Goal: Day of Discharge:  Optimal Care of the heart failure patient  Description: Target End Date:  Prior to discharge or change in level of care  Outcome: Progressing       Progress made toward(s) clinical / shift goals: HF packet provided, HF edu discussed, provided education about diet and medications     Patient is not progressing towards the following goals: NA

## 2025-04-30 NOTE — DISCHARGE PLANNING
Case Management Discharge Planning    Admission Date: 4/28/2025  GMLOS: 2.3  ALOS: 1    6-Clicks ADL Score: 24  6-Clicks Mobility Score: 20      Anticipated Discharge Dispo: Discharge Disposition: D/T to home under HHA care in anticipation of covered skilled care (06)  Discharge Address: [address] [address]  Discharge Contact Phone Number: [phone]    DME Needed: No    Action(s) Taken: Patient discussed during IDT rounds, plan is for discharge today.  RN CM asked [facility] team for HH resumption orders.      RN CM met with the patient and his wife Kailey to discuss the discharge plan.  Patient confirmed that he would like to resume services with [facility].  Patient provided a signature for the HH choice form.  Patient's spouse will provide a ride home for discharge.  Patient confirmed that the above address and phone number are correct.  RN CM sent the HH referral to [facility] via [employer] in-basket, choice form faxed to [facility].      Escalations Completed: HH referral    Medically Clear: Yes    Next Steps: Case management to follow up with [facility].    Barriers to Discharge: HH arrangements    Is the patient up for discharge tomorrow: Discharging today.

## 2025-04-30 NOTE — CARE PLAN
Problem: Knowledge Deficit - Standard  Goal: Patient and family/care givers will demonstrate understanding of plan of care, disease process/condition, diagnostic tests and medications  Outcome: Progressing     Problem: Hemodynamics  Goal: Patient's hemodynamics, fluid balance and neurologic status will be stable or improve  Outcome: Progressing     Problem: Fluid Volume  Goal: Fluid volume balance will be maintained  Outcome: Progressing     Problem: Physical Regulation  Goal: Diagnostic test results will improve  Outcome: Progressing  Goal: Signs and symptoms of infection will decrease  Outcome: Progressing     Problem: Fall Risk  Goal: Patient will remain free from falls  Outcome: Progressing   The patient is Stable - Low risk of patient condition declining or worsening    Shift Goals  Clinical Goals: Peritoneal dialysis, Tele monitoring, EEG  Patient Goals: Feel better, discharge  Family Goals: have  feel better    Progress made toward(s) clinical / shift goals:  Patient is able to verbalize his plan of care.    Patient is not progressing towards the following goals: N/A

## 2025-04-30 NOTE — PROGRESS NOTES
4 Eyes Skin Assessment Completed by MANOJ Guillermo and MANOJ Hensley.    Head Swelling eye lids  Ears WDL  Nose WDL  Mouth WDL  Neck Scab  Breast/Chest Scab  Shoulder Blades WDL  Spine Redness and Blanching  (R) Arm/Elbow/Hand Bruising and Scab    (L) Arm/Elbow/Hand Bruising and Scab    Abdomen Scab    Groin WDL  Scrotum/Coccyx/Buttocks Redness and Blanching  (R) Leg Scab, rash, redness  (L) Leg Scab, rash, redness, DTI caudal left medial malleolus          (R) Heel/Foot/Toe Redness, Blanching, and Boggy,  Gangrene toes 1, 2, 4    (L) Heel/Foot/Toe Redness, Blanching, and Boggy, Gangrene toes 1,3,4                Devices In Places Tele Box and Pulse Ox, PD catheter      Interventions In Place Heel Mepilex, Low Air Loss Mattress, and Barrier Cream    Possible Skin Injury Yes    Pictures Uploaded Into Epic Yes  Wound Consult Placed Yes  RN Wound Prevention Protocol Ordered Yes

## 2025-04-30 NOTE — DISCHARGE INSTRUCTIONS
MEDICINE DISCHARGE INSTRUCTIONS  -You were admitted for presyncopal episodes.  - Improved back to baseline very quickly.  We feel this is potentially something called vasovagal, but could also be from your tight heart valve (aortic stenosis).  - Given this, you will need to follow-up with cardiology outpatient.  - Follow-up with your primary care provider in one to two weeks.  - Return to the ED if you develop similar symptoms again, you pass out completely, shortness of breath, or chest pain.    Dizziness  Dizziness is a common problem. It is a feeling of unsteadiness or light-headedness. You may feel like you are about to faint. Dizziness can lead to injury if you stumble or fall. Anyone can become dizzy, but dizziness is more common in older adults. This condition can be caused by a number of things, including medicines, dehydration, or illness.  Follow these instructions at home:  Eating and drinking    Drink enough fluid to keep your urine pale yellow. This helps to keep you from becoming dehydrated. Try to drink more clear fluids, such as water.  Do not drink alcohol.  Limit your caffeine intake if told to do so by your health care provider. Check ingredients and nutrition facts to see if a food or beverage contains caffeine.  Limit your salt (sodium) intake if told to do so by your health care provider. Check ingredients and nutrition facts to see if a food or beverage contains sodium.  Activity    Avoid making quick movements.  Rise slowly from chairs and steady yourself until you feel okay.  In the morning, first sit up on the side of the bed. When you feel okay, stand slowly while you hold onto something until you know that your balance is good.  If you need to  one place for a long time, move your legs often. Tighten and relax the muscles in your legs while you are standing.  Do not drive or use machinery if you feel dizzy.  Avoid bending down if you feel dizzy. Place items in your home so that  they are easy for you to reach without leaning over.  Lifestyle  Do not use any products that contain nicotine or tobacco. These products include cigarettes, chewing tobacco, and vaping devices, such as e-cigarettes. If you need help quitting, ask your health care provider.  Try to reduce your stress level by using methods such as yoga or meditation. Talk with your health care provider if you need help to manage your stress.  General instructions  Watch your dizziness for any changes.  Take over-the-counter and prescription medicines only as told by your health care provider. Talk with your health care provider if you think that your dizziness is caused by a medicine that you are taking.  Tell a friend or a family member that you are feeling dizzy. If he or she notices any changes in your behavior, have this person call your health care provider.  Keep all follow-up visits. This is important.  Contact a health care provider if:  Your dizziness does not go away or you have new symptoms.  Your dizziness or light-headedness gets worse.  You feel nauseous.  You have reduced hearing.  You have a fever.  You have neck pain or a stiff neck.  Your dizziness leads to an injury or a fall.  Get help right away if:  You vomit or have diarrhea and are unable to eat or drink anything.  You have problems talking, walking, swallowing, or using your arms, hands, or legs.  You feel generally weak.  You have any bleeding.  You are not thinking clearly or you have trouble forming sentences. It may take a friend or family member to notice this.  You have chest pain, abdominal pain, shortness of breath, or sweating.  Your vision changes or you develop a severe headache.  These symptoms may represent a serious problem that is an emergency. Do not wait to see if the symptoms will go away. Get medical help right away. Call your local emergency services (911 in the [place] Do not drive yourself to the hospital.  Summary  Dizziness is a  feeling of unsteadiness or light-headedness. This condition can be caused by a number of things, including medicines, dehydration, or illness.  Anyone can become dizzy, but dizziness is more common in older adults.  Drink enough fluid to keep your urine pale yellow. Do not drink alcohol.  Avoid making quick movements if you feel dizzy. Monitor your dizziness for any changes.  This information is not intended to replace advice given to you by your health care provider. Make sure you discuss any questions you have with your health care provider.  Document Revised: 11/22/2021 Document Reviewed: 11/22/2021  Elsevier Patient Education © 2023 [employer]

## 2025-04-30 NOTE — PROGRESS NOTES
[facility] Progress Notes     Disconnected aseptically from [facility] at 0730.     Pt stable, VSS, alert and oriented.  Effluent clear and yellow, no signs of bleeding/fibrin clots.  No alarms on machine was noted or reported before disconnection.     24H total  ML (initial drain 1104 ML + total UF 1154 ML - last fill 2100 ML)      Report given to MANOJ Rodriguez

## 2025-05-01 ENCOUNTER — TELEPHONE (OUTPATIENT)
Dept: HOME HEALTH SERVICES | Facility: HOME HEALTHCARE | Age: 80
End: 2025-05-01
Payer: MEDICARE

## 2025-05-01 NOTE — TELEPHONE ENCOUNTER
Attempted to schedule resumption. Left  for home phone and wife's mobile phone requesting call back.

## 2025-05-02 ENCOUNTER — TELEPHONE (OUTPATIENT)
Dept: CARDIOLOGY | Facility: MEDICAL CENTER | Age: 80
End: 2025-05-02
Payer: MEDICARE

## 2025-05-02 ENCOUNTER — TELEPHONE (OUTPATIENT)
Dept: HOME HEALTH SERVICES | Facility: HOME HEALTHCARE | Age: 80
End: 2025-05-02
Payer: MEDICARE

## 2025-05-02 DIAGNOSIS — I35.0 AORTIC VALVE STENOSIS, ETIOLOGY OF CARDIAC VALVE DISEASE UNSPECIFIED: ICD-10-CM

## 2025-05-02 NOTE — TELEPHONE ENCOUNTER
Spoke with patient and scheduled Resumption of care for tomorrow between 12:30-1:30. Later start of care 5/3 per request.

## 2025-05-02 NOTE — TELEPHONE ENCOUNTER
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 Dr. Amaya recommends DSE prior to consult    Patient called on 5/2/2025. LVM requesting a call back to discuss.    First attempt

## 2025-05-03 ENCOUNTER — HOME CARE VISIT (OUTPATIENT)
Dept: HOME HEALTH SERVICES | Facility: HOME HEALTHCARE | Age: 80
End: 2025-05-03
Payer: MEDICARE

## 2025-05-03 PROCEDURE — G0299 HHS/HOSPICE OF RN EA 15 MIN: HCPCS

## 2025-05-03 SDOH — ECONOMIC STABILITY: HOUSING INSECURITY: EVIDENCE OF SMOKING MATERIAL: 0

## 2025-05-03 ASSESSMENT — ENCOUNTER SYMPTOMS
PAIN LOCATION - EXACERBATING FACTORS: CHRONIC
PAIN: 1
HIGHEST PAIN SEVERITY IN PAST 24 HOURS: 4/10
NAUSEA: DENIES
PAIN LOCATION - PAIN SEVERITY: 4/10
BOWEL PATTERN NORMAL: 1
VOMITING: DENIES
PAIN LOCATION: BILATERAL FEET
PAIN LOCATION - PAIN FREQUENCY: CONSTANT
LOWEST PAIN SEVERITY IN PAST 24 HOURS: 1/10
PAIN LOCATION - PAIN QUALITY: ACHY
STOOL FREQUENCY: DAILY
SUBJECTIVE PAIN PROGRESSION: UNCHANGED
PAIN SEVERITY GOAL: 1/10
LAST BOWEL MOVEMENT: 67328

## 2025-05-03 ASSESSMENT — FIBROSIS 4 INDEX: FIB4 SCORE: 2.42

## 2025-05-04 VITALS
WEIGHT: 144.2 LBS | RESPIRATION RATE: 16 BRPM | SYSTOLIC BLOOD PRESSURE: 122 MMHG | BODY MASS INDEX: 20.69 KG/M2 | DIASTOLIC BLOOD PRESSURE: 60 MMHG | OXYGEN SATURATION: 97 % | HEART RATE: 81 BPM | TEMPERATURE: 97.7 F

## 2025-05-05 ENCOUNTER — DOCUMENTATION (OUTPATIENT)
Dept: MEDICAL GROUP | Facility: PHYSICIAN GROUP | Age: 80
End: 2025-05-05
Payer: MEDICARE

## 2025-05-05 NOTE — TELEPHONE ENCOUNTER
Received VM from patient's wife Kailey returning my phone call.    Called Kailey back. Discussed referral and MD's recommendation. She also states the patient is scheduled to see Dr. Amaro for Watchman consult. Advised Dr. Amaro does not currently do Watchman procedures. Kailey is agreeable to r/s office visit with Dr. Yoon after DSE to discuss AS and Watchman.

## 2025-05-05 NOTE — CASE COMMUNICATION
Primary dx/Skilled need: Medication teaching, assess overall health, education on acute and chronic conditions.            SN frequency: 1w7 (once a week Per Pt' request.            Zip code:     [address]       Disciplines ordered:    SN, PT        Insurance & authorization:  SOFI[employer] and Rodriguez            Certification period: 4/23/25 to 6/21/25            Special considerations:

## 2025-05-05 NOTE — PROGRESS NOTES
Medication chart review for [facility] services    Received referral from [facility].   Medications reviewed  compared with discharge summary if available.  Discharge summary date, if applicable:   4/30/25    Current medication list per [facility]     Medication list one, patient is currently taking    Current Outpatient Medications:     sevelamer carbonate, 800 mg, Oral, TID WITH MEALS    midodrine, 2.5 mg, Oral, TID WITH MEALS    metoprolol SR, 50 mg, Oral, QAM    nitroglycerin, 0.4 mg, Sublingual, Q5 MIN PRN    omeprazole, 40 mg, Oral, BID    diphenhydrAMINE, 25-50 mg, Oral, Q6HRS PRN    ammonium lactate, 1 Application, Topical, BID    calcitRIOL, 0.25 mcg, Oral, 3x/week    furosemide, 80 mg, Oral, BID PRN    traMADol, 50 mg, Oral, QDAY PRN    HYDROcodone-acetaminophen, 2 Tablet, Oral, Q6HRS PRN    Velphoro, 1 Tablet, Oral, TID WITH MEALS    Home Care Oxygen, 2 L/min, Inhalation, QHS PRN    acetaminophen, 1,000 mg, Oral, Q8HRS PRN    sodium bicarbonate, 650 mg, Oral, TID PRN    tamsulosin, 0.4 mg, Oral, AFTER BREAKFAST    polyethylene glycol/lytes, 17 g, Oral, QDAY PRN    clopidogrel, 75 mg, Oral, DAILY    gentamicin, 1 Application, Topical, DAILY    apixaban, 2.5 mg, Oral, BID    montelukast, 10 mg, Oral, QHS    rosuvastatin, 20 mg, Oral, QHS      Medication list two, drugs that the patient has been prescribed or recommended to take by their healthcare provider on discharge summary       MEDICATIONS ON DISCHARGE      Medication List          START taking these medications         Instructions   midodrine 2.5 MG Tabs  Commonly known as: Proamatine    Take 1 Tablet by mouth 3 times a day with meals.  Dose: 2.5 mg                CONTINUE taking these medications         Instructions   ammonium lactate 12 % Lotn  Commonly known as: Lac-Hydrin    Apply 1 Application topically 2 times a day. Apply to full body for severe itching  Dose: 1 Application      apixaban 2.5mg Tabs  Commonly known as: Eliquis     Take 2.5 mg by mouth 2 times a day. Indications: Atrial Fibrillation  Dose: 2.5 mg      calcitRIOL 0.25 MCG Caps  Commonly known as: Rocaltrol    Take 0.25 mcg by mouth 3 times a week. On Mon, Wed and Fri  Indications: Low Amount of Calcium in the Blood  Dose: 0.25 mcg      clopidogrel 75 MG Tabs  Commonly known as: Plavix    Take 75 mg by mouth every day. Indications: Coronary Bypass Surgery  Dose: 75 mg      diphenhydrAMINE 25 MG Tabs  Commonly known as: Benadryl    Take 25-50 mg by mouth every 6 hours as needed for Itching.  Dose: 25-50 mg      furosemide 80 MG Tabs  Commonly known as: Lasix    Take 80 mg by mouth 2 times a day as needed (edema). Indications: Kidney Disease  Dose: 80 mg      gentamicin 0.1 % cream  Commonly known as: Garamycin    Apply 1 Application topically every day. Use each and every time cath is accessed for peritoneal dialysis please  Dose: 1 Application      Home Care Oxygen    Inhale 2 L/min at bedtime as needed for Shortness of Breath. Oxygen dose range: 2 to 2 L/min  Respiratory route via: Nasal Cannula   Oxygen supplier: [facility]       Indications: hypoxia  Dose: 2 L/min      HYDROcodone-acetaminophen 5-325 MG Tabs per tablet  Commonly known as: Norco    Take 2 Tablets by mouth every 6 hours as needed (severe pain). Take 1 hour prior to wound care appointment  Dose: 2 Tablet      metoprolol SR 50 MG Tb24  Commonly known as: Toprol XL    Take 50 mg by mouth every morning.  Dose: 50 mg      montelukast 10 MG Tabs  Commonly known as: Singulair    Take 10 mg by mouth at bedtime. Indications: Asthma, Hayfever  Dose: 10 mg      nitroglycerin 0.4 MG Subl  Commonly known as: Nitrostat    Place 0.4 mg under the tongue every 5 minutes as needed for Chest Pain.  Dose: 0.4 mg      omeprazole 40 MG delayed-release capsule  Commonly known as: PriLOSEC    Take 40 mg by mouth 2 times a day.  Dose: 40 mg      polyethylene glycol/lytes 17 g Pack  Commonly known as: Miralax    Take 17 g by mouth 1 time a  day as needed. Indications: Constipation  Dose: 17 g      rosuvastatin 20 MG Tabs  Commonly known as: Crestor    Take 20 mg by mouth at bedtime.     Indications: High Amount of Fats in the Blood  Dose: 20 mg      sodium bicarbonate 650 MG Tabs  Commonly known as: Sodium Bicarbonate    Take 650 mg by mouth 3 times a day as needed (Heart burn).  Dose: 650 mg      tamsulosin 0.4 MG capsule  Commonly known as: Flomax    Take 1 Capsule by mouth 1/2 hour after breakfast.  Dose: 0.4 mg      traMADol 50 MG Tabs  Commonly known as: Ultram    Take 50 mg by mouth 1 time a day as needed for Moderate Pain (wound care pain).  Dose: 50 mg      TYLENOL 500 MG Tabs  Generic drug: acetaminophen    Take 1,000 mg by mouth every 8 hours as needed for Mild Pain. Indications: Pain, 1-4/10 pain  Dose: 1,000 mg      Velphoro 500 MG Chew  Generic drug: Sucroferric Oxyhydroxide    Chew 1 Tablet 3 times a day with meals. And with every snack or food intake  Indications: High Amount of Phosphate in the Blood, Itching  Dose: 1 Tablet           Allergies   Allergen Reactions    Hydroxyzine Hives, Itching and Anxiety     Severe Itching and hives.       Labs     Lab Results   Component Value Date/Time    SODIUM 134 (L) 04/30/2025 12:51 AM    POTASSIUM 4.2 04/30/2025 12:51 AM    CHLORIDE 93 (L) 04/30/2025 12:51 AM    CO2 21 04/30/2025 12:51 AM    GLUCOSE 148 (H) 04/30/2025 12:51 AM    BUN 96 (H) 04/30/2025 12:51 AM    CREATININE 9.11 (HH) 04/30/2025 12:51 AM     Lab Results   Component Value Date/Time    ALKPHOSPHAT 176 (H) 04/30/2025 12:51 AM    ASTSGOT 38 04/30/2025 12:51 AM    ALTSGPT 51 (H) 04/30/2025 12:51 AM    TBILIRUBIN 0.3 04/30/2025 12:51 AM    INR 1.42 (H) 02/26/2025 05:10 AM    ALBUMIN 3.0 (L) 04/30/2025 12:51 AM        Assessment for clinically significant drug interactions, drug omissions/additions, duplicative therapies.            CC   Denise Carver M.D.  [address] [place] [address]  Fax: [phone]    [facility]  Phone [phone] fax [phone]    This note was created using voice recognition software (Dragon). The accuracy of the dictation is limited by the abilities of the software. I have reviewed the note prior to signing, however some errors in grammar and context are still possible. If you have any questions related to this note please do not hesitate to contact our office.       No follow-ups on file.

## 2025-05-06 ENCOUNTER — HOME CARE VISIT (OUTPATIENT)
Dept: HOME HEALTH SERVICES | Facility: HOME HEALTHCARE | Age: 80
End: 2025-05-06
Payer: MEDICARE

## 2025-05-06 VITALS
HEART RATE: 82 BPM | SYSTOLIC BLOOD PRESSURE: 130 MMHG | RESPIRATION RATE: 16 BRPM | OXYGEN SATURATION: 100 % | TEMPERATURE: 97.3 F | DIASTOLIC BLOOD PRESSURE: 60 MMHG

## 2025-05-06 PROCEDURE — G0299 HHS/HOSPICE OF RN EA 15 MIN: HCPCS

## 2025-05-06 ASSESSMENT — ENCOUNTER SYMPTOMS
LIMITED RANGE OF MOTION: 1
HIGHEST PAIN SEVERITY IN PAST 24 HOURS: 4/10
FATIGUES EASILY: 1
PAIN LOCATION - PAIN SEVERITY: 2/10
MUSCLE WEAKNESS: 1
PAIN LOCATION - EXACERBATING FACTORS: WALKING
SUBJECTIVE PAIN PROGRESSION: WAXING AND WANING
PAIN: 1
PAIN SEVERITY GOAL: 0/10
STOOL FREQUENCY: LESS THAN DAILY
PAIN LOCATION - RELIEVING FACTORS: REST
PAIN LOCATION: TOES
DYSPNEA ACTIVITY LEVEL: AFTER AMBULATING MORE THAN 20 FT
SHORTNESS OF BREATH: 1
PAIN LOCATION - PAIN FREQUENCY: INTERMITTENT
BOWEL PATTERN NORMAL: 1
PAIN LOCATION - PAIN QUALITY: ACHEY
LAST BOWEL MOVEMENT: 67330
LOWEST PAIN SEVERITY IN PAST 24 HOURS: 2/10

## 2025-05-06 ASSESSMENT — ACTIVITIES OF DAILY LIVING (ADL)
AMBULATION ASSISTANCE: STAND BY ASSIST
CURRENT_FUNCTION: STAND BY ASSIST

## 2025-05-07 ENCOUNTER — HOME CARE VISIT (OUTPATIENT)
Dept: HOME HEALTH SERVICES | Facility: HOME HEALTHCARE | Age: 80
End: 2025-05-07
Payer: MEDICARE

## 2025-05-07 VITALS
SYSTOLIC BLOOD PRESSURE: 96 MMHG | HEART RATE: 62 BPM | WEIGHT: 146 LBS | DIASTOLIC BLOOD PRESSURE: 60 MMHG | TEMPERATURE: 97.9 F | RESPIRATION RATE: 16 BRPM | OXYGEN SATURATION: 97 % | BODY MASS INDEX: 20.95 KG/M2

## 2025-05-07 PROCEDURE — G0151 HHCP-SERV OF PT,EA 15 MIN: HCPCS

## 2025-05-07 ASSESSMENT — ENCOUNTER SYMPTOMS
PAIN: 1
PAIN LOCATION - PAIN FREQUENCY: INTERMITTENT
PAIN LOCATION - RELIEVING FACTORS: REST, POSITION CHANGE
PAIN LOCATION - PAIN SEVERITY: 2/10
PAIN LOCATION - PAIN DURATION: VARIES
LOWEST PAIN SEVERITY IN PAST 24 HOURS: 0/10
HIGHEST PAIN SEVERITY IN PAST 24 HOURS: 4/10
PAIN LOCATION - PAIN QUALITY: ACHY
SUBJECTIVE PAIN PROGRESSION: WAXING AND WANING
PAIN LOCATION: BILATERAL FEET
PAIN LOCATION - EXACERBATING FACTORS: WEIGHTBEARING
PAIN SEVERITY GOAL: 0/10

## 2025-05-07 ASSESSMENT — ACTIVITIES OF DAILY LIVING (ADL)
AMBULATION ASSISTANCE ON FLAT SURFACES: 1
AMBULATION_DISTANCE/DURATION_TOLERATED: <2 MINUTES

## 2025-05-07 ASSESSMENT — FIBROSIS 4 INDEX: FIB4 SCORE: 2.42

## 2025-05-13 ENCOUNTER — HOME CARE VISIT (OUTPATIENT)
Dept: HOME HEALTH SERVICES | Facility: HOME HEALTHCARE | Age: 80
End: 2025-05-13
Payer: MEDICARE

## 2025-05-13 VITALS
SYSTOLIC BLOOD PRESSURE: 100 MMHG | TEMPERATURE: 97.3 F | DIASTOLIC BLOOD PRESSURE: 58 MMHG | OXYGEN SATURATION: 99 % | RESPIRATION RATE: 16 BRPM | HEART RATE: 77 BPM

## 2025-05-13 PROCEDURE — G0299 HHS/HOSPICE OF RN EA 15 MIN: HCPCS

## 2025-05-13 PROCEDURE — G0151 HHCP-SERV OF PT,EA 15 MIN: HCPCS

## 2025-05-13 ASSESSMENT — ENCOUNTER SYMPTOMS
FORGETFULNESS: 1
PAIN LOCATION - EXACERBATING FACTORS: WEIGHTBEARING
MUSCLE WEAKNESS: 1
FATIGUES EASILY: 1
PAIN: 1
HYPOTENSION: 1
STOOL FREQUENCY: DAILY
PAIN LOCATION - RELIEVING FACTORS: REST, POSITION CHANGE
SUBJECTIVE PAIN PROGRESSION: UNCHANGED
SUBJECTIVE PAIN PROGRESSION: WAXING AND WANING
PAIN: 1
LAST BOWEL MOVEMENT: 67338
PAIN LOCATION - PAIN SEVERITY: 2/10
PAIN LOCATION - PAIN DURATION: VARIES
LIMITED RANGE OF MOTION: 1
BOWEL PATTERN NORMAL: 1
SHORTNESS OF BREATH: 1
HIGHEST PAIN SEVERITY IN PAST 24 HOURS: 3/10
DYSPNEA ACTIVITY LEVEL: AFTER AMBULATING 10 - 20 FT
PAIN SEVERITY GOAL: 0/10
HIGHEST PAIN SEVERITY IN PAST 24 HOURS: 4/10
PAIN LOCATION - PAIN QUALITY: SHARP
PAIN SEVERITY GOAL: 0/10
FATIGUE: 1
LOWEST PAIN SEVERITY IN PAST 24 HOURS: 0/10
PAIN LOCATION - PAIN SEVERITY: 2/10
PAIN LOCATION - PAIN FREQUENCY: INTERMITTENT
PAIN LOCATION: TOES
LOWEST PAIN SEVERITY IN PAST 24 HOURS: 2/10
ORTHOSTATIC HYPOTENSION: 1

## 2025-05-13 ASSESSMENT — ACTIVITIES OF DAILY LIVING (ADL)
AMBULATION ASSISTANCE: NON-AMBULATORY
OASIS_M1830: 05
CURRENT_FUNCTION: STAND BY ASSIST

## 2025-05-19 ENCOUNTER — APPOINTMENT (OUTPATIENT)
Dept: RADIOLOGY | Facility: MEDICAL CENTER | Age: 80
DRG: 286 | End: 2025-05-19
Payer: MEDICARE

## 2025-05-19 ENCOUNTER — HOSPITAL ENCOUNTER (INPATIENT)
Facility: MEDICAL CENTER | Age: 80
LOS: 2 days | DRG: 286 | End: 2025-05-21
Attending: EMERGENCY MEDICINE | Admitting: STUDENT IN AN ORGANIZED HEALTH CARE EDUCATION/TRAINING PROGRAM
Payer: MEDICARE

## 2025-05-19 ENCOUNTER — APPOINTMENT (OUTPATIENT)
Dept: RADIOLOGY | Facility: MEDICAL CENTER | Age: 80
DRG: 286 | End: 2025-05-19
Attending: EMERGENCY MEDICINE
Payer: MEDICARE

## 2025-05-19 DIAGNOSIS — I96 DRY GANGRENE (HCC): ICD-10-CM

## 2025-05-19 DIAGNOSIS — I50.9 ACUTE ON CHRONIC CONGESTIVE HEART FAILURE, UNSPECIFIED HEART FAILURE TYPE (HCC): ICD-10-CM

## 2025-05-19 DIAGNOSIS — R25.3 MUSCLE TWITCHING: ICD-10-CM

## 2025-05-19 DIAGNOSIS — R06.02 SHORTNESS OF BREATH: Primary | ICD-10-CM

## 2025-05-19 DIAGNOSIS — Z99.2 PERITONEAL DIALYSIS CATHETER IN PLACE (HCC): ICD-10-CM

## 2025-05-19 DIAGNOSIS — I35.0 AORTIC VALVE STENOSIS, ETIOLOGY OF CARDIAC VALVE DISEASE UNSPECIFIED: ICD-10-CM

## 2025-05-19 DIAGNOSIS — R55 SYNCOPE AND COLLAPSE: ICD-10-CM

## 2025-05-19 DIAGNOSIS — R79.89 ELEVATED TROPONIN: ICD-10-CM

## 2025-05-19 PROBLEM — I50.20 HEART FAILURE WITH REDUCED EJECTION FRACTION (HCC): Status: ACTIVE | Noted: 2025-05-19

## 2025-05-19 LAB
ALBUMIN SERPL BCP-MCNC: 3.5 G/DL (ref 3.2–4.9)
ALBUMIN/GLOB SERPL: 1 G/DL
ALP SERPL-CCNC: 173 U/L (ref 30–99)
ALT SERPL-CCNC: 25 U/L (ref 2–50)
ANION GAP SERPL CALC-SCNC: 23 MMOL/L (ref 7–16)
AST SERPL-CCNC: 24 U/L (ref 12–45)
BASOPHILS # BLD AUTO: 1.1 % (ref 0–1.8)
BASOPHILS # BLD: 0.09 K/UL (ref 0–0.12)
BILIRUB SERPL-MCNC: 0.3 MG/DL (ref 0.1–1.5)
BUN SERPL-MCNC: 75 MG/DL (ref 8–22)
CALCIUM ALBUM COR SERPL-MCNC: 9.6 MG/DL (ref 8.5–10.5)
CALCIUM SERPL-MCNC: 9.2 MG/DL (ref 8.5–10.5)
CHLORIDE SERPL-SCNC: 94 MMOL/L (ref 96–112)
CO2 SERPL-SCNC: 20 MMOL/L (ref 20–33)
CREAT SERPL-MCNC: 9.34 MG/DL (ref 0.5–1.4)
EKG IMPRESSION: NORMAL
EOSINOPHIL # BLD AUTO: 0.74 K/UL (ref 0–0.51)
EOSINOPHIL NFR BLD: 9.1 % (ref 0–6.9)
ERYTHROCYTE [DISTWIDTH] IN BLOOD BY AUTOMATED COUNT: 60.4 FL (ref 35.9–50)
GFR SERPLBLD CREATININE-BSD FMLA CKD-EPI: 5 ML/MIN/1.73 M 2
GLOBULIN SER CALC-MCNC: 3.4 G/DL (ref 1.9–3.5)
GLUCOSE SERPL-MCNC: 115 MG/DL (ref 65–99)
HCT VFR BLD AUTO: 34.2 % (ref 42–52)
HGB BLD-MCNC: 11.1 G/DL (ref 14–18)
IMM GRANULOCYTES # BLD AUTO: 0.14 K/UL (ref 0–0.11)
IMM GRANULOCYTES NFR BLD AUTO: 1.7 % (ref 0–0.9)
LYMPHOCYTES # BLD AUTO: 1.01 K/UL (ref 1–4.8)
LYMPHOCYTES NFR BLD: 12.5 % (ref 22–41)
MCH RBC QN AUTO: 32.2 PG (ref 27–33)
MCHC RBC AUTO-ENTMCNC: 32.5 G/DL (ref 32.3–36.5)
MCV RBC AUTO: 99.1 FL (ref 81.4–97.8)
MONOCYTES # BLD AUTO: 0.68 K/UL (ref 0–0.85)
MONOCYTES NFR BLD AUTO: 8.4 % (ref 0–13.4)
NEUTROPHILS # BLD AUTO: 5.43 K/UL (ref 1.82–7.42)
NEUTROPHILS NFR BLD: 67.2 % (ref 44–72)
NRBC # BLD AUTO: 0 K/UL
NRBC BLD-RTO: 0 /100 WBC (ref 0–0.2)
NT-PROBNP SERPL IA-MCNC: ABNORMAL PG/ML (ref 0–125)
PLATELET # BLD AUTO: 205 K/UL (ref 164–446)
PMV BLD AUTO: 10 FL (ref 9–12.9)
POTASSIUM SERPL-SCNC: 5.5 MMOL/L (ref 3.6–5.5)
PROT SERPL-MCNC: 6.9 G/DL (ref 6–8.2)
RBC # BLD AUTO: 3.45 M/UL (ref 4.7–6.1)
SODIUM SERPL-SCNC: 137 MMOL/L (ref 135–145)
TROPONIN T SERPL-MCNC: 175 NG/L (ref 6–19)
WBC # BLD AUTO: 8.1 K/UL (ref 4.8–10.8)

## 2025-05-19 PROCEDURE — A9270 NON-COVERED ITEM OR SERVICE: HCPCS

## 2025-05-19 PROCEDURE — 93971 EXTREMITY STUDY: CPT | Mod: LT

## 2025-05-19 PROCEDURE — 700102 HCHG RX REV CODE 250 W/ 637 OVERRIDE(OP): Performed by: EMERGENCY MEDICINE

## 2025-05-19 PROCEDURE — 99285 EMERGENCY DEPT VISIT HI MDM: CPT

## 2025-05-19 PROCEDURE — 93005 ELECTROCARDIOGRAM TRACING: CPT | Mod: TC

## 2025-05-19 PROCEDURE — 700102 HCHG RX REV CODE 250 W/ 637 OVERRIDE(OP)

## 2025-05-19 PROCEDURE — 36415 COLL VENOUS BLD VENIPUNCTURE: CPT

## 2025-05-19 PROCEDURE — 71045 X-RAY EXAM CHEST 1 VIEW: CPT

## 2025-05-19 PROCEDURE — 93005 ELECTROCARDIOGRAM TRACING: CPT | Mod: TC | Performed by: EMERGENCY MEDICINE

## 2025-05-19 PROCEDURE — 84484 ASSAY OF TROPONIN QUANT: CPT

## 2025-05-19 PROCEDURE — 87040 BLOOD CULTURE FOR BACTERIA: CPT

## 2025-05-19 PROCEDURE — 96374 THER/PROPH/DIAG INJ IV PUSH: CPT

## 2025-05-19 PROCEDURE — 85025 COMPLETE CBC W/AUTO DIFF WBC: CPT

## 2025-05-19 PROCEDURE — 700111 HCHG RX REV CODE 636 W/ 250 OVERRIDE (IP): Mod: JZ | Performed by: EMERGENCY MEDICINE

## 2025-05-19 PROCEDURE — 83880 ASSAY OF NATRIURETIC PEPTIDE: CPT

## 2025-05-19 PROCEDURE — A9270 NON-COVERED ITEM OR SERVICE: HCPCS | Performed by: EMERGENCY MEDICINE

## 2025-05-19 PROCEDURE — 80053 COMPREHEN METABOLIC PANEL: CPT

## 2025-05-19 PROCEDURE — 770020 HCHG ROOM/CARE - TELE (206)

## 2025-05-19 PROCEDURE — 99223 1ST HOSP IP/OBS HIGH 75: CPT | Mod: AI,GC | Performed by: STUDENT IN AN ORGANIZED HEALTH CARE EDUCATION/TRAINING PROGRAM

## 2025-05-19 RX ORDER — MONTELUKAST SODIUM 10 MG/1
10 TABLET ORAL
Status: DISCONTINUED | OUTPATIENT
Start: 2025-05-20 | End: 2025-05-21 | Stop reason: HOSPADM

## 2025-05-19 RX ORDER — ASPIRIN 325 MG
325 TABLET ORAL ONCE
Status: COMPLETED | OUTPATIENT
Start: 2025-05-19 | End: 2025-05-19

## 2025-05-19 RX ORDER — TRAMADOL HYDROCHLORIDE 50 MG/1
50 TABLET ORAL
Status: DISCONTINUED | OUTPATIENT
Start: 2025-05-19 | End: 2025-05-21 | Stop reason: HOSPADM

## 2025-05-19 RX ORDER — CLOPIDOGREL BISULFATE 75 MG/1
75 TABLET ORAL DAILY
Status: DISCONTINUED | OUTPATIENT
Start: 2025-05-20 | End: 2025-05-21 | Stop reason: HOSPADM

## 2025-05-19 RX ORDER — TAMSULOSIN HYDROCHLORIDE 0.4 MG/1
0.4 CAPSULE ORAL
Status: DISCONTINUED | OUTPATIENT
Start: 2025-05-20 | End: 2025-05-21 | Stop reason: HOSPADM

## 2025-05-19 RX ORDER — MIDODRINE HYDROCHLORIDE 5 MG/1
2.5 TABLET ORAL
Status: DISCONTINUED | OUTPATIENT
Start: 2025-05-20 | End: 2025-05-21 | Stop reason: HOSPADM

## 2025-05-19 RX ORDER — ROSUVASTATIN CALCIUM 20 MG/1
20 TABLET, COATED ORAL
Status: DISCONTINUED | OUTPATIENT
Start: 2025-05-19 | End: 2025-05-21 | Stop reason: HOSPADM

## 2025-05-19 RX ORDER — OMEPRAZOLE 20 MG/1
40 CAPSULE, DELAYED RELEASE ORAL 2 TIMES DAILY
Status: DISCONTINUED | OUTPATIENT
Start: 2025-05-19 | End: 2025-05-21 | Stop reason: HOSPADM

## 2025-05-19 RX ORDER — SEVELAMER CARBONATE 800 MG/1
800 TABLET, FILM COATED ORAL
Status: DISCONTINUED | OUTPATIENT
Start: 2025-05-20 | End: 2025-05-21 | Stop reason: HOSPADM

## 2025-05-19 RX ORDER — FUROSEMIDE 10 MG/ML
40 INJECTION INTRAMUSCULAR; INTRAVENOUS ONCE
Status: COMPLETED | OUTPATIENT
Start: 2025-05-19 | End: 2025-05-19

## 2025-05-19 RX ORDER — NITROGLYCERIN 0.4 MG/1
0.4 TABLET SUBLINGUAL
Status: DISCONTINUED | OUTPATIENT
Start: 2025-05-19 | End: 2025-05-21 | Stop reason: HOSPADM

## 2025-05-19 RX ORDER — METOPROLOL SUCCINATE 50 MG/1
50 TABLET, EXTENDED RELEASE ORAL EVERY MORNING
Status: DISCONTINUED | OUTPATIENT
Start: 2025-05-20 | End: 2025-05-21 | Stop reason: HOSPADM

## 2025-05-19 RX ADMIN — ROSUVASTATIN CALCIUM 20 MG: 20 TABLET, FILM COATED ORAL at 22:38

## 2025-05-19 RX ADMIN — ASPIRIN 325 MG: 325 TABLET ORAL at 19:47

## 2025-05-19 RX ADMIN — OMEPRAZOLE 40 MG: 20 CAPSULE, DELAYED RELEASE ORAL at 22:38

## 2025-05-19 RX ADMIN — FUROSEMIDE 40 MG: 10 INJECTION, SOLUTION INTRAVENOUS at 20:17

## 2025-05-19 ASSESSMENT — LIFESTYLE VARIABLES
TOTAL SCORE: 0
EVER FELT BAD OR GUILTY ABOUT YOUR DRINKING: NO
EVER HAD A DRINK FIRST THING IN THE MORNING TO STEADY YOUR NERVES TO GET RID OF A HANGOVER: NO
CONSUMPTION TOTAL: NEGATIVE
HAVE YOU EVER FELT YOU SHOULD CUT DOWN ON YOUR DRINKING: NO
ON A TYPICAL DAY WHEN YOU DRINK ALCOHOL HOW MANY DRINKS DO YOU HAVE: 0
TOTAL SCORE: 0
DOES PATIENT WANT TO STOP DRINKING: NO
TOTAL SCORE: 0
HOW MANY TIMES IN THE PAST YEAR HAVE YOU HAD 5 OR MORE DRINKS IN A DAY: 0
HAVE PEOPLE ANNOYED YOU BY CRITICIZING YOUR DRINKING: NO
ALCOHOL_USE: NO
AVERAGE NUMBER OF DAYS PER WEEK YOU HAVE A DRINK CONTAINING ALCOHOL: 0

## 2025-05-19 ASSESSMENT — SOCIAL DETERMINANTS OF HEALTH (SDOH)
WITHIN THE PAST 12 MONTHS, YOU WORRIED THAT YOUR FOOD WOULD RUN OUT BEFORE YOU GOT THE MONEY TO BUY MORE: NEVER TRUE
WITHIN THE LAST YEAR, HAVE YOU BEEN KICKED, HIT, SLAPPED, OR OTHERWISE PHYSICALLY HURT BY YOUR PARTNER OR EX-PARTNER?: NO
WITHIN THE LAST YEAR, HAVE TO BEEN RAPED OR FORCED TO HAVE ANY KIND OF SEXUAL ACTIVITY BY YOUR PARTNER OR EX-PARTNER?: NO
WITHIN THE LAST YEAR, HAVE YOU BEEN AFRAID OF YOUR PARTNER OR EX-PARTNER?: NO
WITHIN THE LAST YEAR, HAVE YOU BEEN HUMILIATED OR EMOTIONALLY ABUSED IN OTHER WAYS BY YOUR PARTNER OR EX-PARTNER?: NO
WITHIN THE PAST 12 MONTHS, THE FOOD YOU BOUGHT JUST DIDN'T LAST AND YOU DIDN'T HAVE MONEY TO GET MORE: NEVER TRUE
IN THE PAST 12 MONTHS, HAS THE ELECTRIC, GAS, OIL, OR WATER COMPANY THREATENED TO SHUT OFF SERVICE IN YOUR HOME?: NO

## 2025-05-19 ASSESSMENT — ENCOUNTER SYMPTOMS
WHEEZING: 0
NEUROLOGICAL NEGATIVE: 1
MUSCULOSKELETAL NEGATIVE: 1
PSYCHIATRIC NEGATIVE: 1
GASTROINTESTINAL NEGATIVE: 1
HEMOPTYSIS: 0
SHORTNESS OF BREATH: 1
CARDIOVASCULAR NEGATIVE: 1
EYES NEGATIVE: 1
COUGH: 0
CONSTITUTIONAL NEGATIVE: 1
SPUTUM PRODUCTION: 0

## 2025-05-19 ASSESSMENT — COGNITIVE AND FUNCTIONAL STATUS - GENERAL
MOVING TO AND FROM BED TO CHAIR: A LITTLE
DAILY ACTIVITIY SCORE: 24
SUGGESTED CMS G CODE MODIFIER MOBILITY: CJ
MOBILITY SCORE: 20
SUGGESTED CMS G CODE MODIFIER DAILY ACTIVITY: CH
STANDING UP FROM CHAIR USING ARMS: A LITTLE
CLIMB 3 TO 5 STEPS WITH RAILING: A LITTLE
WALKING IN HOSPITAL ROOM: A LITTLE

## 2025-05-19 ASSESSMENT — FIBROSIS 4 INDEX
FIB4 SCORE: 1.8
FIB4 SCORE: 2.42

## 2025-05-19 ASSESSMENT — PAIN DESCRIPTION - PAIN TYPE: TYPE: ACUTE PAIN

## 2025-05-19 NOTE — ED TRIAGE NOTES
Pt ambulated to triage with   Chief Complaint   Patient presents with    Sent by MD Dr Asencio called ahead for admission, pt scheduled for procedure and wants procedure to be completed sooner.      Shortness of Breath     With ambulation.  O2 at home at night at times.        Protocol ordered.  Pt Informed regarding triage process and verbalized understanding to inform triage tech or RN for any changes in condition. Placed in lobby.     Pt reports angiogram and stress test.

## 2025-05-19 NOTE — CONSULTS
80 yo M with ESRD on PD, AS CHfrEF. He presented to my office today with profound SOB, PND. He is scheduled for a dobutamine echo for possible low flow low gradient severe AS. His last echo at [facility] was read as possible severe AS. His exam is confusing. Based on his significanrt decompensation over a short period of time I discussed the case with Dr. Yoon. He recommended admission for expedited workup. I have instructed the patient to proceed to the ER.

## 2025-05-20 ENCOUNTER — APPOINTMENT (OUTPATIENT)
Dept: CARDIOLOGY | Facility: MEDICAL CENTER | Age: 80
DRG: 286 | End: 2025-05-20
Payer: MEDICARE

## 2025-05-20 ENCOUNTER — HOME CARE VISIT (OUTPATIENT)
Dept: HOME HEALTH SERVICES | Facility: HOME HEALTHCARE | Age: 80
End: 2025-05-20
Payer: MEDICARE

## 2025-05-20 LAB
ANION GAP SERPL CALC-SCNC: 24 MMOL/L (ref 7–16)
BUN SERPL-MCNC: 85 MG/DL (ref 8–22)
CALCIUM SERPL-MCNC: 8.9 MG/DL (ref 8.5–10.5)
CHLORIDE SERPL-SCNC: 95 MMOL/L (ref 96–112)
CO2 SERPL-SCNC: 17 MMOL/L (ref 20–33)
CREAT SERPL-MCNC: 9.29 MG/DL (ref 0.5–1.4)
ERYTHROCYTE [DISTWIDTH] IN BLOOD BY AUTOMATED COUNT: 60.7 FL (ref 35.9–50)
GFR SERPLBLD CREATININE-BSD FMLA CKD-EPI: 5 ML/MIN/1.73 M 2
GLUCOSE SERPL-MCNC: 157 MG/DL (ref 65–99)
HCT VFR BLD AUTO: 34.1 % (ref 42–52)
HGB BLD-MCNC: 10.8 G/DL (ref 14–18)
MAGNESIUM SERPL-MCNC: 2.6 MG/DL (ref 1.5–2.5)
MCH RBC QN AUTO: 31.5 PG (ref 27–33)
MCHC RBC AUTO-ENTMCNC: 31.7 G/DL (ref 32.3–36.5)
MCV RBC AUTO: 99.4 FL (ref 81.4–97.8)
PHOSPHATE SERPL-MCNC: 6.5 MG/DL (ref 2.5–4.5)
PLATELET # BLD AUTO: 191 K/UL (ref 164–446)
PMV BLD AUTO: 9.9 FL (ref 9–12.9)
POTASSIUM SERPL-SCNC: 5.9 MMOL/L (ref 3.6–5.5)
RBC # BLD AUTO: 3.43 M/UL (ref 4.7–6.1)
SODIUM SERPL-SCNC: 136 MMOL/L (ref 135–145)
WBC # BLD AUTO: 9.3 K/UL (ref 4.8–10.8)

## 2025-05-20 PROCEDURE — 700102 HCHG RX REV CODE 250 W/ 637 OVERRIDE(OP)

## 2025-05-20 PROCEDURE — A9270 NON-COVERED ITEM OR SERVICE: HCPCS | Performed by: INTERNAL MEDICINE

## 2025-05-20 PROCEDURE — 99223 1ST HOSP IP/OBS HIGH 75: CPT | Mod: 25 | Performed by: INTERNAL MEDICINE

## 2025-05-20 PROCEDURE — 93567 NJX CAR CTH SPRVLV AORTGRPHY: CPT | Performed by: INTERNAL MEDICINE

## 2025-05-20 PROCEDURE — 99222 1ST HOSP IP/OBS MODERATE 55: CPT | Mod: 25

## 2025-05-20 PROCEDURE — 99223 1ST HOSP IP/OBS HIGH 75: CPT | Performed by: INTERNAL MEDICINE

## 2025-05-20 PROCEDURE — G0278 ILIAC ART ANGIO,CARDIAC CATH: HCPCS | Performed by: INTERNAL MEDICINE

## 2025-05-20 PROCEDURE — B2111ZZ FLUOROSCOPY OF MULTIPLE CORONARY ARTERIES USING LOW OSMOLAR CONTRAST: ICD-10-PCS | Performed by: INTERNAL MEDICINE

## 2025-05-20 PROCEDURE — 84100 ASSAY OF PHOSPHORUS: CPT

## 2025-05-20 PROCEDURE — B3101ZZ FLUOROSCOPY OF THORACIC AORTA USING LOW OSMOLAR CONTRAST: ICD-10-PCS | Performed by: INTERNAL MEDICINE

## 2025-05-20 PROCEDURE — 700102 HCHG RX REV CODE 250 W/ 637 OVERRIDE(OP): Performed by: INTERNAL MEDICINE

## 2025-05-20 PROCEDURE — 700101 HCHG RX REV CODE 250

## 2025-05-20 PROCEDURE — A9270 NON-COVERED ITEM OR SERVICE: HCPCS

## 2025-05-20 PROCEDURE — 93454 CORONARY ARTERY ANGIO S&I: CPT | Mod: 26 | Performed by: INTERNAL MEDICINE

## 2025-05-20 PROCEDURE — 83735 ASSAY OF MAGNESIUM: CPT

## 2025-05-20 PROCEDURE — 36415 COLL VENOUS BLD VENIPUNCTURE: CPT

## 2025-05-20 PROCEDURE — 700111 HCHG RX REV CODE 636 W/ 250 OVERRIDE (IP): Mod: JZ

## 2025-05-20 PROCEDURE — 99233 SBSQ HOSP IP/OBS HIGH 50: CPT | Performed by: INTERNAL MEDICINE

## 2025-05-20 PROCEDURE — 85027 COMPLETE CBC AUTOMATED: CPT

## 2025-05-20 PROCEDURE — 90945 DIALYSIS ONE EVALUATION: CPT

## 2025-05-20 PROCEDURE — 770020 HCHG ROOM/CARE - TELE (206)

## 2025-05-20 PROCEDURE — 80048 BASIC METABOLIC PNL TOTAL CA: CPT

## 2025-05-20 PROCEDURE — 90935 HEMODIALYSIS ONE EVALUATION: CPT

## 2025-05-20 PROCEDURE — 99497 ADVNCD CARE PLAN 30 MIN: CPT

## 2025-05-20 PROCEDURE — 97602 WOUND(S) CARE NON-SELECTIVE: CPT

## 2025-05-20 PROCEDURE — 99153 MOD SED SAME PHYS/QHP EA: CPT

## 2025-05-20 PROCEDURE — B41C1ZZ FLUOROSCOPY OF PELVIC ARTERIES USING LOW OSMOLAR CONTRAST: ICD-10-PCS | Performed by: INTERNAL MEDICINE

## 2025-05-20 PROCEDURE — 700117 HCHG RX CONTRAST REV CODE 255: Performed by: INTERNAL MEDICINE

## 2025-05-20 PROCEDURE — 4A023N8 MEASUREMENT OF CARDIAC SAMPLING AND PRESSURE, BILATERAL, PERCUTANEOUS APPROACH: ICD-10-PCS | Performed by: INTERNAL MEDICINE

## 2025-05-20 RX ORDER — SODIUM CHLORIDE 9 MG/ML
200 INJECTION, SOLUTION INTRAVENOUS
Status: DISCONTINUED | OUTPATIENT
Start: 2025-05-20 | End: 2025-05-21 | Stop reason: HOSPADM

## 2025-05-20 RX ORDER — ASPIRIN 81 MG/1
TABLET, CHEWABLE ORAL
Status: COMPLETED
Start: 2025-05-20 | End: 2025-05-20

## 2025-05-20 RX ORDER — HEPARIN SODIUM 200 [USP'U]/100ML
INJECTION, SOLUTION INTRAVENOUS
Status: COMPLETED
Start: 2025-05-20 | End: 2025-05-20

## 2025-05-20 RX ORDER — HEPARIN SODIUM 1000 [USP'U]/ML
9000 INJECTION, SOLUTION INTRAVENOUS; SUBCUTANEOUS
Status: DISCONTINUED | OUTPATIENT
Start: 2025-05-20 | End: 2025-05-21 | Stop reason: HOSPADM

## 2025-05-20 RX ORDER — HEPARIN SODIUM 1000 [USP'U]/ML
INJECTION, SOLUTION INTRAVENOUS; SUBCUTANEOUS
Status: COMPLETED
Start: 2025-05-20 | End: 2025-05-20

## 2025-05-20 RX ORDER — MIDAZOLAM HYDROCHLORIDE 1 MG/ML
INJECTION INTRAMUSCULAR; INTRAVENOUS
Status: COMPLETED
Start: 2025-05-20 | End: 2025-05-20

## 2025-05-20 RX ORDER — VERAPAMIL HYDROCHLORIDE 2.5 MG/ML
INJECTION INTRAVENOUS
Status: COMPLETED
Start: 2025-05-20 | End: 2025-05-20

## 2025-05-20 RX ORDER — LIDOCAINE HYDROCHLORIDE 20 MG/ML
INJECTION, SOLUTION INFILTRATION; PERINEURAL
Status: COMPLETED
Start: 2025-05-20 | End: 2025-05-20

## 2025-05-20 RX ORDER — GENTAMICIN SULFATE 1 MG/G
1 CREAM TOPICAL DAILY
Status: DISCONTINUED | OUTPATIENT
Start: 2025-05-20 | End: 2025-05-21 | Stop reason: HOSPADM

## 2025-05-20 RX ADMIN — VERAPAMIL HYDROCHLORIDE 2.5 MG: 2.5 INJECTION, SOLUTION INTRAVENOUS at 14:14

## 2025-05-20 RX ADMIN — HEPARIN SODIUM 2000 UNITS: 200 INJECTION, SOLUTION INTRAVENOUS at 14:14

## 2025-05-20 RX ADMIN — FENTANYL CITRATE 50 MCG: 50 INJECTION, SOLUTION INTRAMUSCULAR; INTRAVENOUS at 14:55

## 2025-05-20 RX ADMIN — HEPARIN SODIUM 9000 UNITS: 1000 INJECTION, SOLUTION INTRAVENOUS; SUBCUTANEOUS at 17:45

## 2025-05-20 RX ADMIN — HEPARIN SODIUM: 1000 INJECTION, SOLUTION INTRAVENOUS; SUBCUTANEOUS at 14:13

## 2025-05-20 RX ADMIN — SEVELAMER CARBONATE 800 MG: 800 TABLET, FILM COATED ORAL at 16:43

## 2025-05-20 RX ADMIN — OMEPRAZOLE 40 MG: 20 CAPSULE, DELAYED RELEASE ORAL at 16:43

## 2025-05-20 RX ADMIN — OMEPRAZOLE 40 MG: 20 CAPSULE, DELAYED RELEASE ORAL at 06:13

## 2025-05-20 RX ADMIN — MIDAZOLAM HYDROCHLORIDE 2 MG: 1 INJECTION, SOLUTION INTRAMUSCULAR; INTRAVENOUS at 14:47

## 2025-05-20 RX ADMIN — ASPIRIN 81 MG: 81 TABLET, CHEWABLE ORAL at 14:11

## 2025-05-20 RX ADMIN — ROSUVASTATIN CALCIUM 20 MG: 20 TABLET, FILM COATED ORAL at 20:32

## 2025-05-20 RX ADMIN — MONTELUKAST 10 MG: 10 TABLET, FILM COATED ORAL at 20:32

## 2025-05-20 RX ADMIN — CLOPIDOGREL BISULFATE 75 MG: 75 TABLET, FILM COATED ORAL at 06:13

## 2025-05-20 RX ADMIN — LIDOCAINE HYDROCHLORIDE: 20 INJECTION, SOLUTION INFILTRATION; PERINEURAL at 14:13

## 2025-05-20 RX ADMIN — METOPROLOL SUCCINATE 50 MG: 50 TABLET, EXTENDED RELEASE ORAL at 06:13

## 2025-05-20 RX ADMIN — APIXABAN 2.5 MG: 5 TABLET, FILM COATED ORAL at 17:30

## 2025-05-20 RX ADMIN — NITROGLYCERIN 10 ML: 20 INJECTION INTRAVENOUS at 14:14

## 2025-05-20 RX ADMIN — IOHEXOL 80 ML: 350 INJECTION, SOLUTION INTRAVENOUS at 14:56

## 2025-05-20 ASSESSMENT — ENCOUNTER SYMPTOMS
SHORTNESS OF BREATH: 1
DIARRHEA: 0
VOMITING: 0
NERVOUS/ANXIOUS: 0
PALPITATIONS: 0
COUGH: 0
NECK PAIN: 0
PSYCHIATRIC NEGATIVE: 1
FEVER: 0
DOUBLE VISION: 0
PHOTOPHOBIA: 0
SPEECH CHANGE: 0
ABDOMINAL PAIN: 0
CARDIOVASCULAR NEGATIVE: 1
HEMOPTYSIS: 0
NAUSEA: 0
WEIGHT LOSS: 0
DIZZINESS: 0
MYALGIAS: 0
FATIGUE: 1
WEAKNESS: 0
DIZZINESS: 1
GASTROINTESTINAL NEGATIVE: 1
CLAUDICATION: 0
EYES NEGATIVE: 1
BLURRED VISION: 0
CONSTIPATION: 0
CHILLS: 0
ORTHOPNEA: 0
HEADACHES: 0
BRUISES/BLEEDS EASILY: 0
MUSCULOSKELETAL NEGATIVE: 1

## 2025-05-20 ASSESSMENT — CHA2DS2 SCORE
VASCULAR DISEASE: YES
CHF OR LEFT VENTRICULAR DYSFUNCTION: YES
DIABETES: NO
AGE 65 TO 74: NO
PRIOR STROKE OR TIA OR THROMBOEMBOLISM: NO
CHA2DS2 VASC SCORE: 5
HYPERTENSION: YES
AGE 75 OR GREATER: YES
SEX: MALE

## 2025-05-20 ASSESSMENT — PAIN DESCRIPTION - PAIN TYPE: TYPE: ACUTE PAIN

## 2025-05-20 ASSESSMENT — FIBROSIS 4 INDEX: FIB4 SCORE: 1.94

## 2025-05-20 NOTE — PROGRESS NOTES
Monitor Summary  Rhythm: Normal Sinus Rhythm  Rate: 71 - 79  Ectopy: PACs  .16 / .09 / .41

## 2025-05-20 NOTE — ED NOTES
Bedside report received from off going RN/tech: Abdon RN, assumed care of patient.  POC discussed with patient. Call light within reach, all needs addressed at this time.       Fall risk interventions in place: Move the patient closer to the nurse's station, Patient's personal possessions are with in their safe reach, Give patient urinal if applicable, Keep floor surfaces clean and dry, and Accompanied to restroom (all applicable per [place] Fall risk assessment)   Continuous monitoring: Cardiac Leads, Pulse Ox, or Blood Pressure  IVF/IV medications: Not Applicable   Oxygen: Room Air  Bedside sitter: Not Applicable   Isolation: Not Applicable

## 2025-05-20 NOTE — CONSULTS
INFECTIOUS DISEASES INPATIENT CONSULT NOTE     Date of Service:5/20/25    Consult Requested By: Benjamin Pleitez M.D.    Reason for Consultation: r/o infection/clearance for surgery (?)    History of Present Illness:   Edy Bennett is a 79 y.o. male with ESRD admitted 5/19/2025 for TAVR  Severe aortic stenosis, cardiomyopathy, CAD status post stent placement (x3 at [facility] by Pavel Brown 08/2024), Afib on chronic anticoagulation therapy (Eliquis), diabetes mellitus, peripheral vascular disease  He had left popliteal to posterior tibial artery bypass with ipsilateral reversed greater saphenous vein by Marco Antonio Duran 01/20/25) to try to heal toes-feels they have improved somewhat     Review Of Systems:  All other systems are reviewed and are negative     PMH:   Past Medical History[1]      PSH:  Past Surgical History[2]    FAMILY HX:  Family History   Problem Relation Age of Onset    Cancer Mother         Brain cancer    Heart Disease Father         Several By-pass Surgeries    Cancer Daughter         Thyroid Cancer    Heart Disease Brother         Fatal Heart Attack       SOCIAL HX:  Social History     Socioeconomic History    Marital status:      Spouse name: Not on file    Number of children: Not on file    Years of education: Not on file    Highest education level: Bachelor's degree (e.g., BA, AB, BS)   Occupational History    Not on file   Tobacco Use    Smoking status: Never    Smokeless tobacco: Never    Tobacco comments:     Used chewing tobacco many years ago.   Vaping Use    Vaping status: Never Used   Substance and Sexual Activity    Alcohol use: Not Currently    Drug use: Never    Sexual activity: Not on file   Other Topics Concern    Not on file   Social History Narrative    Not on file     Social Drivers of Health     Financial Resource Strain: Low Risk  (1/25/2025)    Overall Financial Resource Strain (CARDIA)     Difficulty of Paying Living  "Expenses: Not hard at all   Food Insecurity: No Food Insecurity (2025)    Hunger Vital Sign     Worried About Running Out of Food in the Last Year: Never true     Ran Out of Food in the Last Year: Never true   Transportation Needs: No Transportation Needs (2025)    PRAPARE - Transportation     Lack of Transportation (Medical): No     Lack of Transportation (Non-Medical): No   Physical Activity: Inactive (2025)    Exercise Vital Sign     Days of Exercise per Week: 0 days     Minutes of Exercise per Session: 0 min   Stress: Stress Concern Present (2025)    [employer] - Occupational Stress Questionnaire     Feeling of Stress : To some extent   Social Connections: Feeling Socially Integrated (5/3/2025)    OASIS : Social Isolation     Frequency of experiencing loneliness or isolation: Never   Intimate Partner Violence: Not At Risk (2025)    Humiliation, Afraid, Rape, and Kick questionnaire     Fear of Current or Ex-Partner: No     Emotionally Abused: No     Physically Abused: No     Sexually Abused: No   Housing Stability: Low Risk  (2025)    Housing Stability Vital Sign     Unable to Pay for Housing in the Last Year: No     Number of Times Moved in the Last Year: 0     Homeless in the Last Year: No     Tobacco Use History[3]  Social History     Substance and Sexual Activity   Alcohol Use Not Currently       Allergies/Intolerances:  Allergies[4]      Other Current Medications:  Current Medications[5]      Most Recent Vital Signs:  BP 98/50   Pulse 72   Temp 36.3 °C (97.3 °F) (Temporal)   Resp 18   Ht 1.778 m (5' 10\")   Wt 65.5 kg (144 lb 6.4 oz)   SpO2 100%   BMI 20.72 kg/m²   Temp  Av.4 °C (97.6 °F)  Min: 36.3 °C (97.3 °F)  Max: 36.9 °C (98.4 °F)    Physical Exam:  General: chronically ill-appearing, malnourished no acute distress  HEENT: NCAT, PERRLA, sclera anicteric, periorbital edema  Neck: supple, no lymphadenopathy  Chest: " "unlabored.  Cardiac:+murmurg   Abdomen: non-tender, non-distended  Extremities: + cyanosis,ischemic gangrene mult toes  Neuro: Alert and oriented times 3, Speech fluent CN intact PONCE  Psych: Mood normal Affect normal    Pertinent Lab Results:  Recent Labs     05/19/25  1702 05/20/25  0112   WBC 8.1 9.3      Recent Labs     05/19/25  1702 05/20/25  0112   HEMOGLOBIN 11.1* 10.8*   HEMATOCRIT 34.2* 34.1*   MCV 99.1* 99.4*   MCH 32.2 31.5   PLATELETCT 205 191         Recent Labs     05/19/25  1702 05/20/25  0112   SODIUM 137 136   POTASSIUM 5.5 5.9*   CHLORIDE 94* 95*   CO2 20 17*   CREATININE 9.34* 9.29*        Recent Labs     05/19/25  1702   ALBUMIN 3.5        Pertinent Micro:  Results       Procedure Component Value Units Date/Time    BLOOD CULTURE x2 [[id number]] Collected: 05/19/25 1856    Order Status: Completed Specimen: Blood from Peripheral Updated: 05/19/25 2208     Significant Indicator NEG     Source BLD     Site PERIPHERAL     Culture Result No Growth  Note: Blood cultures are incubated for 5 days and  are monitored continuously.Positive blood cultures  are called to the RN and reported as soon as  they are identified.      BLOOD CULTURE x2 [[id number]]     Order Status: Sent Specimen: Blood from Peripheral           No results found for: \"BLOODCULTU\", \"BLDCULT\", \"BCHOLD\"     Studies:    IMPRESSION:   S/p bypass  Ischemic gangrene multiple toes left foot-present at least 6 months  ESRD on HD        PLAN:   Consider amputation affected toes to decrease risk of infection  PO/IV antibiotics will not cure/treat chronic necrotic tissues that could serve as a nidus of infection.    Empiric antibiotics and prolonged antibiotics are not recommended  Surgery is the only curative option  Appropriateness for surgery per surgery, Cards, and PCT    High risk for higher amputation  Recommend Vascular/Ortho eval    Plan of care discussed with MACKENZIE Pleitez M.D.. Will sign off    Ailyn Schaffer M.D.         [1] "   Past Medical History:  Diagnosis Date    A-V fistula ([facility])     Left Arm    Arrhythmia 03/13/2024    Due to kidney failure treatment    Breath shortness     with exertion    Cataract     shon iol    Congestive heart failure ([facility])     Diabetes ([facility])     not on any medication at this time, being monitored at dialysis    Dialysis patient ([facility])     monday wednesday friday at [facility]    Dialysis patient ([facility]) 01/17/2025    Starting Peritoneal Dialysis @ Home on 1/17/2025. Follows Nephrologist MD Milian.    Foot ulcer ([facility])     left foot. being seen by wound care    Hemorrhagic disorder ([facility])     plavix eliquis    High cholesterol     Hypertension 05/24/2024    states controlled    NSTEMI (non-ST elevated myocardial infarction) ([facility]) 2/25/2025    DARIEN (obstructive sleep apnea) 01/17/2025    O2 @ 2.5L Per Patient's Wife.    Pain     Peritoneal dialysis catheter in place ([facility]) 01/17/2025    Pneumonia     3/24    Renal disorder     Sleep apnea 01/10/2025    Home sleep study done 1/3/2025 - no results yet.    Stroke ([facility]) 2021    tia   [2]   Past Surgical History:  Procedure Laterality Date    LA UPPER GI ENDOSCOPY,CTRL BLEED N/A 2/26/2025    Procedure: EGD, WITH CLIP INSERTION;  Surgeon: Frantz Dailey M.D.;  Location: SURGERY SAME DAY [facility];  Service: Gastroenterology    LA UPPER GI ENDOSCOPY,DIAGNOSIS N/A 2/14/2025    Procedure: GASTROSCOPY;  Surgeon: Melany Richards M.D.;  Location: SURGERY SAME DAY [facility];  Service: Gastroenterology    LA UPPER GI ENDOSCOPY,BIOPSY N/A 2/14/2025    Procedure: GASTROSCOPY, WITH BIOPSY;  Surgeon: Melany Richards M.D.;  Location: SURGERY SAME DAY [facility];  Service: Gastroenterology    LA VEIN BYPASS GRAFT,FEM-TIBIAL Left 1/20/2025    Procedure: LEFT LOWER EXTREMITY BYPASS, POPLITIAL TO TIBIAL BYPASS;  Surgeon: Marco Antonio Miller M.D.;  Location[facility]ERY [facility];  Service: Vascular    AORTOGRAM WITH RUNOFF  1/14/2025    Procedure: AORTOGRAM WITH RUNOFF, right  peroneal artery intravascular lithotripsy and stent placement;  Surgeon: Marco Antonio Miller M.D.;  Location: SURGERY [facility];  Service: Vascular    LAPAROSCOPIC INGUINAL HERNIA REPAIR N/A 12/18/2024    Procedure: LAPAROSCOPIC LEFT INGUINAL HERNIA REPAIR WITH MESH, LAPAROSCOPIC REPOSITIONING OF PERITONEAL DIALYSIS CATHETER;  Surgeon: Marco Antonio Miller M.D.;  Location: SURGERY [facility];  Service: Vascular    CATH PLACEMENT CAPD N/A 12/18/2024    Procedure: REPOSITION, CATHETER, CAPD;  Surgeon: Marco Antonio Miller M.D.;  Location: SURGERY [facility];  Service: Vascular    CATH PLACEMENT CAPD N/A 09/16/2024    Procedure: LAPAROSCOPIC INSERTION OF PERITONEAL DIALYSIS CATHETER PLACEMENT;  Surgeon: Marco Antonio Miller M.D.;  Location: SURGERY SAME DAY [facility];  Service: Vascular    AV FISTULA CREATION Left 07/01/2024    Procedure: CREATION OF LEFT UPPER EXTREMITY DIALYSIS FISTULA;  Surgeon: Shilo Mercedes M.D.;  Location: SURGERY [facility];  Service: General    OTHER ORTHOPEDIC SURGERY  2004    bunion r foot    OTHER      tonsils adenoids    OTHER      dialysis catheter in upper right chest    OTHER      shon iol    OTHER CARDIAC SURGERY      cardiac stents   [3]   Social History  Tobacco Use   Smoking Status Never   Smokeless Tobacco Never   Tobacco Comments    Used chewing tobacco many years ago.   [4]   Allergies  Allergen Reactions    Hydroxyzine Hives, Itching and Anxiety     Severe Itching and hives.   [5]   Current Facility-Administered Medications:     gentamicin (Garamycin) 0.1 % cream 1 Application, 1 Application, Topical, DAILY, Ramírez Matta M.D.    NS (Bolus) 0.9 % infusion 200 mL, 200 mL, Intravenous, DIALYSIS PRN, Ramírez Matta M.D.    sodium zirconium cyclosilicate (Lokelma) packet 10 g, 10 g, Oral, DAILY AT NOON, Ramírez Matta M.D.    [Held by provider] apixaban (Eliquis) tablet 2.5 mg, 2.5 mg, Oral, BID, Gray Duran M.D.    clopidogrel (Plavix) tablet 75 mg, 75 mg, Oral,  DAILY, Gray Duran M.D., 75 mg at 05/20/25 0613    [Held by provider] metoprolol SR (Toprol XL) tablet 50 mg, 50 mg, Oral, QAM, Gray Duran M.D., 50 mg at 05/20/25 0613    [Held by provider] midodrine (Proamatine) tablet 2.5 mg, 2.5 mg, Oral, TID WITH MEALS, Gray Duran M.D.    montelukast (Singulair) tablet 10 mg, 10 mg, Oral, QHS, Gray Duran M.D.    nitroglycerin (Nitrostat) tablet 0.4 mg, 0.4 mg, Sublingual, Q5 MIN PRN, Gray Duran M.D.    omeprazole (PriLOSEC) capsule 40 mg, 40 mg, Oral, BID, Gray Duran M.D., 40 mg at 05/20/25 0613    rosuvastatin (Crestor) tablet 20 mg, 20 mg, Oral, QHS, Gray Duran M.D., 20 mg at 05/19/25 2238    sevelamer carbonate (Renvela) tablet 800 mg, 800 mg, Oral, TID WITH MEALS, Gray Duran M.D.    tamsulosin (Flomax) capsule 0.4 mg, 0.4 mg, Oral, AFTER BREAKFAST, Gray Duran M.D.    traMADol (Ultram) 50 MG tablet 50 mg, 50 mg, Oral, QDAY PRN, Gray Duran M.D.

## 2025-05-20 NOTE — CARE PLAN
The patient is Stable - Low risk of patient condition declining or worsening         Progress made toward(s) clinical / shift goals:    Problem: Knowledge Deficit - Standard  Goal: Patient and family/care givers will demonstrate understanding of plan of care, disease process/condition, diagnostic tests and medications  Description: Target End Date:  1-3 days or as soon as patient condition allowsDocument in Patient Education1.  Patient and family/caregiver oriented to unit, equipment, visitation policy and means for communicating concern2.  Complete/review Learning Assessment3.  Assess knowledge level of disease process/condition, treatment plan, diagnostic tests and medications4.  Explain disease process/condition, treatment plan, diagnostic tests and medications  Outcome: Progressing     Problem: Fall Risk  Goal: Patient will remain free from falls  Description: Target End Date:  Prior to discharge or change in level of careDocument interventions on the [facility] Fall Risk Assessment1.  Assess for fall risk factors2.  Implement fall precautions  Outcome: Progressing       Patient is not progressing towards the following goals:

## 2025-05-20 NOTE — SENIOR ADMIT NOTE
[facility] IM Senior Admission Note    HPI  Edy Bennett is a 79 y.o. male with a past medical history relevant for AAS, ESRD on PD, PAD, CAD status post PCI, HFrEF, A-flutter OAC who presented on 5/20/2025 complaining of dyspnea on exertion, referred by cardiologist for consideration for TAVR.    ED course:   - Vitals stable  - Labwork significant for elevated troponin 175, high BNP greater than 70,000    Physical Exam  Constitutional:       General: He is not in acute distress.  HENT:      Mouth/Throat:      Mouth: Mucous membranes are moist.      Pharynx: No posterior oropharyngeal erythema.   Cardiovascular:      Rate and Rhythm: Normal rate and regular rhythm.      Heart sounds: No murmur heard.     No gallop.   Pulmonary:      Effort: Pulmonary effort is normal. No respiratory distress.      Breath sounds: Normal breath sounds. No stridor. No wheezing, rhonchi or rales.   Abdominal:      General: There is no distension.      Tenderness: There is no abdominal tenderness. There is no right CVA tenderness, left CVA tenderness or guarding.   Musculoskeletal:      Right lower leg: Edema present.      Left lower leg: No edema.   Lymphadenopathy:      Cervical: No cervical adenopathy.   Skin:     Findings: Bruising present. No rash.      Comments: Black necrotic ulceration over multiple distal toes, closed without drainage   Neurological:      Mental Status: He is alert and oriented to person, place, and time.      Cranial Nerves: No cranial nerve deficit.      Sensory: No sensory deficit.   Psychiatric:         Mood and Affect: Mood normal.         Assessment  #Severe aortic stenosis  #Elevated troponin  #HFrEF with volume overload  #Chronic ESRD on PD  #PAD  #A-flutter    Plan  -Admit to medicine  -Monitor on telemetry  -Cardiology consult in ER, appreciate recommendations, consideration for TAVR  -Continue diuresis as needed, status post Lasix in ED  -N.p.o. at midnight, hold anticoagulation  -Continue PD while  inpatient, nephrology consult when appropriate  -Monitor troponins, further ACS rule out workup as needed chest pain  -Continue home medications when appropriate    See Dr. Medrano's H&P for further details

## 2025-05-20 NOTE — WOUND TEAM
[facility]  Inpatient Services  Initial Wound and Skin Care Evaluation    Admission Date: 5/19/2025     Last order of IP CONSULT TO WOUND CARE was found on 5/19/2025 from Hospital Encounter on 5/19/2025     HPI, PMH, SH: Reviewed    Past Surgical History[1]  Social History     Tobacco Use    Smoking status: Never    Smokeless tobacco: Never    Tobacco comments:     Used chewing tobacco many years ago.   Substance Use Topics    Alcohol use: Not Currently     Chief Complaint   Patient presents with    Sent by MD Dr Asencio called ahead for admission, pt scheduled for procedure and wants procedure to be completed sooner.      Shortness of Breath     With ambulation.  O2 at home at night at times.      Diagnosis: Aortic stenosis, severe [I35.0]    Unit where seen by Wound Team: T809/00     WOUND CONSULT RELATED TO:  Left ankle, toes    WOUND TEAM PLAN OF CARE - Frequency of Follow-up:   Nursing to follow dressing orders written for wound care. Contact wound team if area fails to progress, deteriorates or with any questions/concerns if something comes up before next scheduled follow up (See below as to whether wound is following and frequency of wound follow up)   Not following, consult as needed  - All areas    WOUND HISTORY:   Previously very active 79 year old male. PMH of CAD, PVD, ESRD on dialysis. Also had a stent placed in right leg 1 week ago. Elective left popliteal to posterior tibial artery bypass by Marco Antonio Miller MD on 1/20/25.   Patient well known to both wound team and Dr. Miller       WOUND ASSESSMENT/LDA  Wound 01/23/25 Toe, Hallux;Toe, 2nd;Toe, 3rd;Toe, 4th Left Gangrene (Active)   Date First Assessed/Time First Assessed: 01/23/25 1600   Location: Toe, Hallux;Toe, 2nd;Toe, 3rd;Toe, 4th  Laterality: Left  Wound Description (Comments): Gangrene      Assessments 5/20/2025  3:00 PM   Wound Image     Site Assessment Black;Necrotic/nonviable/devitalized   Periwound Assessment Crusted    Margins Defined edges   Closure Open to air   Drainage Amount None   Treatments Cleansed;Nonselective debridement;Site care   Wound Cleansing Povidone-Iodine   Dressing Status Open to Air   Dressing Cleansing/Solutions Not Applicable   Dressing Options Open to Air   Dressing Change/Treatment Frequency Every Shift, and As Needed   NEXT Dressing Change/Treatment Date 05/21/25   NEXT Weekly Photo (Inpatient Only) 05/21/25   Wound Team Following Not following       Wound 01/23/25 Toe, 2nd;Toe, 4th;Toe, Hallux Right Gangrene (Active)   Date First Assessed/Time First Assessed: 01/23/25 1600   Location: Toe, 2nd;Toe, 4th;Toe, Hallux  Laterality: Right  Wound Description (Comments): Gangrene      Assessments 5/20/2025  3:00 PM   Wound Image     Site Assessment Black;Necrotic/nonviable/devitalized   Periwound Assessment Crusted   Margins Defined edges   Closure Open to air   Drainage Amount None   Treatments Cleansed;Nonselective debridement;Site care   Wound Cleansing Approved Wound Cleanser   Dressing Status Open to Air   Dressing Cleansing/Solutions Not Applicable   Dressing Options Open to Air   Dressing Change/Treatment Frequency Every Shift, and As Needed   NEXT Dressing Change/Treatment Date 05/21/25   NEXT Weekly Photo (Inpatient Only) 05/21/25   Wound Team Following Not following       Wound 04/29/25 Arterial Ulcer Ankle Medial Left (Active)   Date First Assessed/Time First Assessed: 04/29/25 2153   Present on Original Admission: Yes  Primary Wound Type: Arterial Ulcer  Location: Ankle  Wound Orientation: Medial  Laterality: Left      Assessments 5/20/2025  3:00 PM   Wound Image      Site Assessment Pink;Yellow   Periwound Assessment Intact   Margins Defined edges;Unattached edges   Closure Secondary intention   Drainage Amount Scant   Drainage Description Serous   Treatments Cleansed;Nonselective debridement;Site care   Wound Cleansing Approved Wound Cleanser   Periwound Protectant No-sting Skin Prep   Dressing  Status Removed   Dressing Changed Changed   Dressing Cleansing/Solutions Not Applicable   Dressing Options Collagen Dressing;Silicone Adhesive Foam   Dressing Change/Treatment Frequency Every 72 hrs, and As Needed   NEXT Dressing Change/Treatment Date 05/22/25   Wound Team Following Not following   Non-staged Wound Description Full thickness   Wound Length (cm) 2.5 cm   Wound Width (cm) 0.8 cm   Wound Depth (cm) 0.2 cm   Wound Surface Area (cm^2) 1.57 cm^2   Wound Volume (cm^3) 0.209 cm^3   WOUND NURSE ONLY - Time Spent with Patient (mins) 30        Vascular:    GLENYS:   No results found.    Lab Values:    Lab Results   Component Value Date/Time    WBC 9.3 05/20/2025 01:12 AM    RBC 3.43 (L) 05/20/2025 01:12 AM    HEMOGLOBIN 10.8 (L) 05/20/2025 01:12 AM    HEMATOCRIT 34.1 (L) 05/20/2025 01:12 AM    HBA1C 5.8 (H) 01/24/2025 05:19 AM    PLATELETCT 191 05/20/2025 01:12 AM         Culture Results show:  No results found for this or any previous visit (from the past 720 hours).    Pain Level/Medicated:  None, Tolerated without pain medication       INTERVENTIONS BY WOUND TEAM:  Chart and images reviewed. Discussed with bedside RN. All areas of concern (based on picture review, LDA review and discussion with bedside RN) have been thoroughly assessed. Documentation of areas based on significant findings. This RN in to assess patient. Performed standard wound care which includes appropriate positioning, dressing removal and non-selective debridement. Pictures and measurements obtained weekly if/when required.    Wound:  Left medial ankle  Preparation for Dressing removal: Removed without difficulty  Cleansed/Non-selectively Debrided with:  Wound cleanser and Gauze  Tory wound: Cleansed with Wound cleanser and Gauze, Prepped with No Sting  Primary Dressing:  Jessica  Secondary (Outer) Dressing: Silicone adhesive foam     Wound:  Toes  Preparation for Dressing removal: Open to air  Cleansed/Non-selectively Debrided with:  Wound  cleanser and Gauze  Tory wound: Cleansed with Wound cleanser and Gauze, Prepped with Betadine  Primary Dressing:  LAURA    Advanced Wound Care Discharge Planning  Number of Clinicians necessary to complete wound care: 1  Is patient requiring IV pain medications for dressing changes:  No   Length of time for dressing change 15 min. (This does not include chart review, pre-medication time, set up, clean up or time spent charting.)    Interdisciplinary consultation: Patient, Bedside RN (Brittney)    EVALUATION / RATIONALE FOR TREATMENT:     Date:  05/20/25  Wound Status:  Initial evaluation    Patient with arterial wound to left medial ankle. Per family, patient has been seen by outpatient wound clinic, and reports improved healing with Jessica dressing. Jessica dressing continued to absorb destructive components of wound exudate and create an optimal environment for cellular growth.    Toes are necrotic. There are no advanced wound care dressings to assist with care. Continue with betadine            Goals: Steady decrease in wound area and depth weekly.    NURSING PLAN OF CARE ORDERS:  Dressing changes: See Dressing Care orders    NUTRITION RECOMMENDATIONS   Wound Team Recommendations:  N/A    DIET ORDERS (From admission to next 24h)       Start     Ordered    05/20/25 1535  Diet Order Diet: Cardiac; Second Modifier: (optional): Renal  ALL MEALS        Question Answer Comment   Diet: Cardiac    Second Modifier: (optional) Renal        05/20/25 1535                    PREVENTATIVE INTERVENTIONS:    Q shift Jalen - performed per nursing policy  Q shift pressure point assessments - performed per nursing policy    Surface/Positioning  Low Airloss - Currently in Place  Reposition q 2 hours with pillows - Currently in Place    Anticipated discharge plans:  TBD        Vac Discharge Needs:  Vac Discharge plan is purely a recommendation from wound team and not a requirement for discharge unless otherwise stated by  physician.  Not Applicable Pt not on a wound vac        [1]   Past Surgical History:  Procedure Laterality Date    NY UPPER GI ENDOSCOPY,CTRL BLEED N/A 2/26/2025    Procedure: EGD, WITH CLIP INSERTION;  Surgeon: Frantz Dailey M.D.;  Location: SURGERY SAME DAY [facility];  Service: Gastroenterology    NY UPPER GI ENDOSCOPY,DIAGNOSIS N/A 2/14/2025    Procedure: GASTROSCOPY;  Surgeon: Melany Richards M.D.;  Location: SURGERY SAME DAY [facility];  Service: Gastroenterology    NY UPPER GI ENDOSCOPY,BIOPSY N/A 2/14/2025    Procedure: GASTROSCOPY, WITH BIOPSY;  Surgeon: Melany Richards M.D.;  Location: SURGERY SAME DAY [facility];  Service: Gastroenterology    NY VEIN BYPASS GRAFT,FEM-TIBIAL Left 1/20/2025    Procedure: LEFT LOWER EXTREMITY BYPASS, POPLITIAL TO TIBIAL BYPASS;  Surgeon: Marco Antonio Miller M.D.;  Location: [facility];  Service: Vascular    AORTOGRAM WITH RUNOFF  1/14/2025    Procedure: AORTOGRAM WITH RUNOFF, right peroneal artery intravascular lithotripsy and stent placement;  Surgeon: Marco Antonio Miller M.D.;  Location: [facility];  Service: Vascular    LAPAROSCOPIC INGUINAL HERNIA REPAIR N/A 12/18/2024    Procedure: LAPAROSCOPIC LEFT INGUINAL HERNIA REPAIR WITH MESH, LAPAROSCOPIC REPOSITIONING OF PERITONEAL DIALYSIS CATHETER;  Surgeon: Marco Antonio Miller M.D.;  Location: [facility];  Service: Vascular    CATH PLACEMENT CAPD N/A 12/18/2024    Procedure: REPOSITION, CATHETER, CAPD;  Surgeon: Marco Antonio Miller M.D.;  Location: [facility];  Service: Vascular    CATH PLACEMENT CAPD N/A 09/16/2024    Procedure: LAPAROSCOPIC INSERTION OF PERITONEAL DIALYSIS CATHETER PLACEMENT;  Surgeon: Marco Antonio Miller M.D.;  Location: SURGERY SAME DAY [facility];  Service: Vascular    AV FISTULA CREATION Left 07/01/2024    Procedure: CREATION OF LEFT UPPER EXTREMITY DIALYSIS FISTULA;  Surgeon: Shilo Mercedes M.D.;  Location: [facility];  Service: General    OTHER ORTHOPEDIC  SURGERY  2004    bunion r foot    OTHER      tonsils adenoids    OTHER      dialysis catheter in upper right chest    OTHER      shon iol    OTHER CARDIAC SURGERY      cardiac stents

## 2025-05-20 NOTE — ASSESSMENT & PLAN NOTE
Bilateral lower extremities are cold and pulse is barely palpable.  US of left leg was just done in the ED.  S/P bypass graft on the right leg.  Patient has a chronic wound on big toe bilateral  Patient is being followed by  as outpatient  No signs of infection  Patient might need amputation as outpatient  Vascular surgeon was consulted and cleared for TAVR

## 2025-05-20 NOTE — ASSESSMENT & PLAN NOTE
Last echo showed a LVEF of 20-25% with a grade II LV dysfunction.  Severe aortic stenosis  Dialysis  Data qualified for ACE inhibitors or spironolactone due to end-stage kidney disease  Continue Eliquis 2.5 mg twice daily with metoprolol and rosuvastatin  Cardiac cath was done and planning for TAVR

## 2025-05-20 NOTE — PROCEDURES
Cardiac Catheterization Laboratory Procedure Note    DATE: 5/20/2025    : Dwight Nix MD    PROCEDURES PERFORMED:  Coronary angiography  Ascending aortography  Distal aortography with iliofemoral run-off  Moderate conscious sedation    INDICATIONS:  The patient is a 79-year-old gentleman referred for cardiac catheterization as part of evaluation for transcatheter aortic valve replacement for management of severe symptomatic aortic stenosis.    CONSENT:  The complete alternatives, risks, and benefits of the procedure were explained to the patient.  Signed informed consent was obtained and placed in the chart prior to the procedure.  A timeout was performed prior to beginning the procedure.    MEDICATIONS:  Lidocaine  Fentanyl  Midazolam  Nitroglycerin  Verapamil  Heparin    MODERATE CONSCIOUS SEDATION:  I personally supervised the administration of moderate conscious sedation by the nursing staff for 24 minutes.  Sedation start time: 2:33 PM  Sedation end time: 2:57 PM    CONTRAST: Omnipaque 80 cc    ACCESS: 6-Moldovan Glidesheath in the right radial artery.    ESTIMATED BLOOD LOSS: 10 cc    COMPLICATIONS: None    DESCRIPTION OF PROCEDURE:  The patient was brought to the cardiac catheterization laboratory in the fasting state.  The skin over the right wrist was prepped and draped in the usual sterile fashion.  Lidocaine infiltration was used to anesthetize the tissue over the right radial artery.  Using the micropuncture technique, a 6-Moldovan Glidesheath was inserted in the right radial artery.  A 5-Moldovan Stevan catheter was then advanced over a standard J-wire into the aortic root.  This catheter was then used to engage the ostium of the right coronary artery and cineangiograms were obtained in multiple projections for complete evaluation of the right coronary system.  This catheter was then used to engage the ostium of the left main coronary artery and cineangiograms were obtained in multiple projections  for complete evaluation of the left coronary system.  Following completion of coronary angiography, the Stevan catheter was exchanged for a 6-Malay pigtail catheter, which was used to perform an ascending aortogram for procedural planning purposes.  This catheter was then used to perform a distal aortogram with iliofemoral run-off for procedural planning purposes.  At the completion of the case all wires, catheters, and sheaths were removed.  A TR band was placed using the patent hemostasis technique.    HEMODYNAMICS:   Aortic pressure: 100/52 mmHg    CORONARY ANGIOGRAPHY:  The left main coronary artery has distal 40% disease and bifurcates into the left anterior descending and left circumflex coronary arteries.  The left anterior descending coronary artery is a moderate, transapical vessel with ostial 30% disease, a long segment of mid 40% disease, and distal luminal irregularities.  It supplies a moderate first diagonal branch with ostial 50% disease and a moderate second diagonal branch with luminal irregularities.  The left circumflex coronary artery is a moderate, nondominant vessel with proximal luminal irregularities, mid 50% disease after the takeoff of the first obtuse marginal branch, and distal luminal irregularities.  It supplies a large first obtuse marginal branch with a long segment of widely patent ostial to mid stents, two small distal obtuse marginal branches, and two small posterolateral branches.  The right coronary artery is a small, dominant vessel with calcific ostial 80% disease and then a proximal 99% subtotal chronic occlusion.  The mid vessel is small in caliber and receives competitive flow from left to left collaterals and faintly fills the distal vessel.    ASCENDING AORTOGRAPHY:  Normal ascending aortic diameter.  No angiographically significant aortic regurgitation.    DISTAL AORTOGRAPHY WITH ILIOFEMORAL RUN-OFF:  Large caliber bilateral iliofemoral arterial systems with diffuse mild  and aneurysmal disease.    IMPRESSION:  Severe obstructive one-vessel coronary artery disease with a subtotal chronic occlusion of the proximal right coronary artery.  Widely patent first obtuse marginal branch stents.  Normal ascending aorta diameter at 3.0 cm.    Mild nonobstructive bilateral iliofemoral arterial disease.    RECOMMENDATIONS:  Return to floor with continued care per primary service.  TR band release per protocol.  Continue evaluation for  Transcatheter aortic valve replacement.  Consideration for outpatient intervention on the subtotal occlusion of the proximal right coronary artery depending on patient symptoms and prognosis.    NOTIFICATION:  The patient's family was notified of the results of his cardiac catheterization.

## 2025-05-20 NOTE — PROGRESS NOTES
VASCULAR SURGERY SERVICE                        Progress Note  _________________________________      CT from 11/26/25 reviewed.  Shows a widely patent aortoiliac system with no significant common femoral artery disease.  Access for TAVR appears to be plenty suitable.  Okay to proceed anytime from vascular surgery standpoint    Marco Antonio Miller MD  [facility]   [place] preferred or call my office [phone]  __________________________________________________  Patient:Edy Bennett   MRN:[id number]

## 2025-05-20 NOTE — ASSESSMENT & PLAN NOTE
EKG on admission showed sinus rhythm.  He is on metoprolol outpatient + apixaban  Continue metoprolol and Eliquis   Telemetry

## 2025-05-20 NOTE — HOSPITAL COURSE
79-year-old male with history of severe aortic stenosis, heart failure EF 25%, atrial fibrillation, severe peripheral artery disease and end-stage kidney disease on dialysis who presented 5/19 with worsening shortness of breath.  Patient has severe aortic stenosis and being followed by cardiologist and planning for TAVR, patient came with worsening shortness of breath, no fever or chills or other symptoms.  Patient was on room air, labs around baseline with chronic anemia and chronic kidney disease.  Nephrology was consulted and started with hemodialysis, patient receiving PD as outpatient.  Cardiology was consulted and preparing him for TAVR.

## 2025-05-20 NOTE — ED NOTES
Medication Reconciliation    Medication reconciliation is complete per patient reporting and patient's wife at bedside.  Allergies reviewed.  No outpatient antibiotics in the last 30 days.  Patient is taking apixaban. Last dose taken on 5/18/25 PM.  Patient's home pharmacy is [facility] on [address] [phone].  Dispense history available in [facility]? Yes    Shama Ballard, Pharmacy Intern

## 2025-05-20 NOTE — CARE PLAN
Problem: Knowledge Deficit - Standard  Goal: Patient and family/care givers will demonstrate understanding of plan of care, disease process/condition, diagnostic tests and medications  Outcome: Progressing     Problem: Fall Risk  Goal: Patient will remain free from falls  Outcome: Progressing   The patient is Stable - Low risk of patient condition declining or worsening    Shift Goals  Clinical Goals: Monitor vitals  Patient Goals: Feel better, get procedure  Family Goals: Get updates    Progress made toward(s) clinical / shift goals:      Patient is not progressing towards the following goals:

## 2025-05-20 NOTE — CONSULTS
[facility] -  CONSULTATION NOTE               Author: Ramírez Matta M.D. Date & Time: 5/20/2025  9:04 AM       REASON FOR CONSULTATION:   Evaluation and management of acute and chronic conditions related to Nephrology     CHIEF COMPLAINT:   SOB    HISTORY OF PRESENT ILLNESS:    Edy Bennett is a 79 y.o. male with a past medical history of aortic stenosis, cardiomyopathy, coronary artery disease status post stent placement (x3 at [facility] by Pavel Brown 08/2024), atrial fibrillation on chronic anticoagulation therapy (Eliquis), diabetes mellitus, hypertension and hyperlipidemia, peripheral vascular disease (with left popliteal to posterior tibial artery bypass with ipsilateral reversed greater saphenous vein by Marco Antonio Duran 01/20/25), end stage renal disease on peritoneal dialysis therapy managed by Dr. Arcenio Milian, lifelong nonsmoker, family history of coronary artery disease who presented 5/19/2025 with shortness of breath fatigue and dizziness. He has sent him to [facility] to for possible TAVR. Nephrology was asked to see him for evaluation and management of acute and chronic conditions related to Nephrology.     REVIEW OF SYSTEMS:    10 point ROS was performed and is as per HPI or otherwise negative.    PAST MEDICAL HISTORY:   Past Medical History[1]    PAST SURGICAL HISTORY:   Past Surgical History[2]    FAMILY HISTORY:   Family History   Problem Relation Age of Onset    Cancer Mother         Brain cancer    Heart Disease Father         Several By-pass Surgeries    Cancer Daughter         Thyroid Cancer    Heart Disease Brother         Fatal Heart Attack       SOCIAL HISTORY:   Tobacco Use History[3]  Social History     Substance and Sexual Activity   Alcohol Use Not Currently     Social History     Substance and Sexual Activity   Drug Use Never       HOME MEDICATIONS:   Reviewed and documented in chart.    LABORATORY  "STUDIES:   Recent Labs     05/19/25  1702 05/20/25  0112   SODIUM 137 136   POTASSIUM 5.5 5.9*   CHLORIDE 94* 95*   CO2 20 17*   GLUCOSE 115* 157*   BUN 75* 85*   CREATININE 9.34* 9.29*   CALCIUM 9.2 8.9       ALLERGIES:  Hydroxyzine    VS:  BP 98/50   Pulse 72   Temp 36.3 °C (97.3 °F) (Temporal)   Resp 18   Ht 1.778 m (5' 10\")   Wt 65.5 kg (144 lb 6.4 oz)   SpO2 100%   BMI 20.72 kg/m²     Physical Exam  Vitals and nursing note reviewed.      Constitutional:       General: He is not in acute distress.  HENT:      Mouth/Throat:      Mouth: Mucous membranes are moist.      Pharynx: No posterior oropharyngeal erythema.   Cardiovascular:      Rate and Rhythm: Normal rate and regular rhythm.      Heart sounds: No murmur heard.     No gallop.   Pulmonary:      Effort: Pulmonary effort is normal. No respiratory distress.      Breath sounds: Normal breath sounds. No stridor. No wheezing, rhonchi or rales.   Abdominal:      General: There is no distension.      Tenderness: There is no abdominal tenderness. There is no right CVA tenderness, left CVA tenderness or guarding.   Musculoskeletal:      Right lower leg: Edema present.      Left lower leg: No edema.   Lymphadenopathy:      Cervical: No cervical adenopathy.   Skin:     Findings: Bruising present. No rash.      Comments: Black necrotic ulceration over multiple distal toes, closed without drainage   Neurological:      Mental Status: He is alert and oriented to person, place, and time.      Cranial Nerves: No cranial nerve deficit.      Sensory: No sensory deficit.   Psychiatric:         Mood and Affect: Mood normal.       FLUID BALANCE:  No intake/output data recorded.    IMAGING:  All imaging reviewed from admission to present day    IMPRESSION:  # ESRD  # HTN, controlled   - Goal BP < 140/90  # Anemia of CKD  # CKD-MBD/Renal Osteodystrophy  # Metabolic acidosis  # HFrEF - volume overload  # Hyperkalemia  - EF 20%  # Hx CAD with stent   # PAD  # Severe aortic " stenosis     SUGGESTIONS:  - We will do a short run of HD today for hyperkalemia and then continue CCPD during hospital stay starting this evening.  - PRBC for hb<7  - Dose all meds per ESRD  - Renal diet, low phos   - Cont Phos binders   - No protein restrictions  - Avoid Fleet enema in dialysis patients   - Labs in AM for further recommendations  - Cardiology following    Thank you for the consultation!         [1]   Past Medical History:  Diagnosis Date    A-V fistula ([facility])     Left Arm    Arrhythmia 03/13/2024    Due to kidney failure treatment    Breath shortness     with exertion    Cataract     shon iol    Congestive heart failure ([facility])     Diabetes ([facility])     not on any medication at this time, being monitored at dialysis    Dialysis patient ([facility]    Dialysis patient ([facility]) 01/17/2025    Starting Peritoneal Dialysis @ Home on 1/17/2025. Follows Nephrologist MD Milian.    Foot ulcer ([facility])     left foot. being seen by wound care    Hemorrhagic disorder ([facility])     plavix eliquis    High cholesterol     Hypertension 05/24/2024    states controlled    NSTEMI (non-ST elevated myocardial infarction) ([facility]) 2/25/2025    DARIEN (obstructive sleep apnea) 01/17/2025    O2 @ 2.5L Per Patient's Wife.    Pain     Peritoneal dialysis catheter in place ([facility]) 01/17/2025    Pneumonia     3/24    Renal disorder     Sleep apnea 01/10/2025    Home sleep study done 1/3/2025 - no results yet.    Stroke ([facility]) 2021    tia   [2]   Past Surgical History:  Procedure Laterality Date    MA UPPER GI ENDOSCOPY,CTRL BLEED N/A 2/26/2025    Procedure: EGD, WITH CLIP INSERTION;  Surgeon: Frantz Dailey M.D.;  Location: SURGERY SAME DAY [facility];  Service: Gastroenterology    MA UPPER GI ENDOSCOPY,DIAGNOSIS N/A 2/14/2025    Procedure: GASTROSCOPY;  Surgeon: Melany Richards M.D.;  Location: SURGERY SAME DAY [facility];  Service: Gastroenterology    MA UPPER GI ENDOSCOPY,BIOPSY N/A 2/14/2025     Procedure: GASTROSCOPY, WITH BIOPSY;  Surgeon: Melany Richards M.D.;  Location: SURGERY SAME DAY [facility];  Service: Gastroenterology    DC VEIN BYPASS GRAFT,FEM-TIBIAL Left 1/20/2025    Procedure: LEFT LOWER EXTREMITY BYPASS, POPLITIAL TO TIBIAL BYPASS;  Surgeon: Marco Antonio Miller M.D.;  Location: SURGERY [facility];  Service: Vascular    AORTOGRAM WITH RUNOFF  1/14/2025    Procedure: AORTOGRAM WITH RUNOFF, right peroneal artery intravascular lithotripsy and stent placement;  Surgeon: Marco Antonio Miller M.D.;  Location: SURGERY [facility];  Service: Vascular    LAPAROSCOPIC INGUINAL HERNIA REPAIR N/A 12/18/2024    Procedure: LAPAROSCOPIC LEFT INGUINAL HERNIA REPAIR WITH MESH, LAPAROSCOPIC REPOSITIONING OF PERITONEAL DIALYSIS CATHETER;  Surgeon: Marco Antonio Miller M.D.;  Location: SURGERY [facility];  Service: Vascular    CATH PLACEMENT CAPD N/A 12/18/2024    Procedure: REPOSITION, CATHETER, CAPD;  Surgeon: Marco Antonio Miller M.D.;  Location: SURGERY [facility];  Service: Vascular    CATH PLACEMENT CAPD N/A 09/16/2024    Procedure: LAPAROSCOPIC INSERTION OF PERITONEAL DIALYSIS CATHETER PLACEMENT;  Surgeon: Marco Antonio Miller M.D.;  Location: SURGERY SAME DAY [facility];  Service: Vascular    AV FISTULA CREATION Left 07/01/2024    Procedure: CREATION OF LEFT UPPER EXTREMITY DIALYSIS FISTULA;  Surgeon: Shilo Mercedes M.D.;  Location: SURGERY [facility];  Service: General    OTHER ORTHOPEDIC SURGERY  2004    bunion r foot    OTHER      tonsils adenoids    OTHER      dialysis catheter in upper right chest    OTHER      shon iol    OTHER CARDIAC SURGERY      cardiac stents   [3]   Social History  Tobacco Use   Smoking Status Never   Smokeless Tobacco Never   Tobacco Comments    Used chewing tobacco many years ago.

## 2025-05-20 NOTE — ED NOTES
Pt a&o, resps even and unlabored. Pt denies chest pain/pressure. Call light in reach. Wife at bedside.

## 2025-05-20 NOTE — PROGRESS NOTES
Discussed with Dr. Almonte    Patient admitted from Dr. Asencio's office with LF-LG severe AS. I reviewed the echo images, AS likely very severe but does not appear critical from planimeter.     Will need additional assessment - invasive vs DSE.     Per report he may not be a immediately favorable TAVR candidate due to gangrene and PAD. This will be determined during consultation but BAV may be considered.     I suspect a large component of the present symptoms is likely volume overload/under-dialysis. As he is dialysis dependent fluid removal will need to be directed by nephrology. Dr. Almonte will share the concern with the admitting team.     Full cardiology consult to follow

## 2025-05-20 NOTE — ED NOTES
T8 ACLS certified Tech at bedside. Pt transported to T8 Hall/01. Pt on monitor, vss. All belongings with pt.

## 2025-05-20 NOTE — H&P
[facility] Internal [facility] History & Physical Note    Date of Service  5/19/2025    [facility] Team: R night team  Attending: Jose L Escobar M.d.  Senior Resident: Dr. Sterling Haywood  Intern:  Dr. Gray Medrano  Contact Number: [phone]    Primary Care Physician  Denise Carver M.D.    Consultants  cardiology    Specialist Names: Dr. Marco Antonio Amaya    Code Status  Full Code    Chief Complaint  Chief Complaint   Patient presents with    Sent by MD Dr Asencio called ahead for admission, pt scheduled for procedure and wants procedure to be completed sooner.      Shortness of Breath     With ambulation.  O2 at home at night at times.        History of Presenting Illness (HPI):     Edy Bennett is a 79-year-old male patient with a history of low-flow, low-gradient (LF-LG) severe aortic stenosis (AS), end-stage renal disease on peritoneal dialysis, severe peripheral artery disease status post bypass surgery, coronary artery disease status post PCI, heart failure with reduced ejection fraction (HFrEF), and atrial flutter on anticoagulation with Eliquis. He was admitted on 5/19, referred by cardiologist Dr. Asencio for consideration of a TAVR procedure.    On admission, the patient's labs were remarkable for a hemoglobin of 11.1 with an MCV of 99.1, creatinine of 9.34 with a GFR of 5, and an alkaline phosphatase of 173. The patient reported experiencing shortness of breath recently, which prompted him to seek medical attention.    Vital signs on admission were within normal limits, with an oxygen saturation of 98% on room air. His troponins were elevated at 175, and NT-proBNP was >70,000.  Dr. Marco Antonio Amaya from cardiology was consulted and is considering further management options, taking into account the patient's severe peripheral artery disease with bilateral distal gangrene. He noted that the patient will require additional assessment--either invasive testing or dobutamine stress echocardiography (DSE).  The patient  will remain admitted for further evaluation by cardiology and nephrology, particularly regarding peritoneal dialysis and fluid management.    I discussed the plan of care with attending physician and senior resident.    Review of Systems  Review of Systems   Constitutional: Negative.    HENT: Negative.     Eyes: Negative.    Respiratory:  Positive for shortness of breath. Negative for cough, hemoptysis, sputum production and wheezing.    Cardiovascular: Negative.    Gastrointestinal: Negative.    Genitourinary: Negative.    Musculoskeletal: Negative.    Skin: Negative.    Neurological: Negative.    Endo/Heme/Allergies: Negative.    Psychiatric/Behavioral: Negative.         Past Medical History   has a past medical history of A-V fistula ([facility]), Arrhythmia (03/13/2024), Breath shortness, Cataract, Congestive heart failure ([facility]), Diabetes ([facility]), Dialysis patient ([facility]), Dialysis patient ([facility]) (01/17/2025), Foot ulcer ([facility]), Hemorrhagic disorder ([facility]), High cholesterol, Hypertension (05/24/2024), NSTEMI (non-ST elevated myocardial infarction) ([facility]) (2/25/2025), DARIEN (obstructive sleep apnea) (01/17/2025), Pain, Peritoneal dialysis catheter in place ([facility]) (01/17/2025), Pneumonia, Renal disorder, Sleep apnea (01/10/2025), and Stroke ([facility]) (2021).    Surgical History   has a past surgical history that includes other; other orthopedic surgery (2004); other cardiac surgery; other; other; av fistula creation (Left, 07/01/2024); cath placement capd (N/A, 09/16/2024); laparoscopic inguinal hernia repair (N/A, 12/18/2024); cath placement capd (N/A, 12/18/2024); aortogram with runoff (1/14/2025); pr vein bypass graft,fem-tibial (Left, 1/20/2025); pr upper gi endoscopy,diagnosis (N/A, 2/14/2025); pr upper gi endoscopy,biopsy (N/A, 2/14/2025); and pr upper gi endoscopy,ctrl bleed (N/A, 2/26/2025).     Family History  family history includes Cancer in his daughter and mother; Heart Disease in his brother and father.   Family history  reviewed with patient.     Social History  Tobacco: Denied  Alcohol: Denied  Recreational drugs (illegal or prescription): Denied  Living Situation: Lives with his wife  Recent Travel: Denied  Primary Care Provider: Reviewed Dr. Denise Carver  Other (stressors, spirituality, exposures): Denied    Allergies  Allergies[1]    Medications  Prior to Admission Medications   Prescriptions Last Dose Informant Patient Reported? Taking?   Home Care Oxygen  Significant Other Yes No   Sig: Inhale 2 L/min at bedtime as needed for Shortness of Breath. Oxygen dose range: 2 to 2 L/min  Respiratory route via: Nasal Cannula   Oxygen supplier: [employer]      Indications: hypoxia   acetaminophen (TYLENOL) 500 MG Tab Unknown Significant Other Yes No   Sig: Take 1,000 mg by mouth every 8 hours as needed for Mild Pain. Indications: Pain, 1-4/10 pain   apixaban (ELIQUIS) 2.5mg Tab 5/18/2025 Evening Significant Other Yes Yes   Sig: Take 2.5 mg by mouth 2 times a day. Indications: Atrial Fibrillation   calcitRIOL (ROCALTROL) 0.25 MCG Cap 5/19/2025 Morning Significant Other Yes Yes   Sig: Take 0.25 mcg by mouth 3 times a week. On Mon, Wed and Fri  Indications: Low Amount of Calcium in the Blood   clopidogrel (PLAVIX) 75 MG Tab 5/19/2025 Morning Significant Other Yes Yes   Sig: Take 75 mg by mouth every day. Indications: Coronary Bypass Surgery   gentamicin (GARAMYCIN) 0.1 % cream 5/19/2025 Morning Significant Other Yes Yes   Sig: Apply 1 Application topically every day. Use each and every time cath is accessed for peritoneal dialysis please  Indications: Profolactic   metoprolol SR (TOPROL XL) 50 MG TABLET SR 24 HR 5/19/2025 Morning Significant Other Yes Yes   Sig: Take 50 mg by mouth every morning. Indications: Atrial Fibrillation   midodrine (PROAMATINE) 2.5 MG Tab 5/19/2025 Noon Significant Other No Yes   Sig: Take 1 Tablet by mouth 3 times a day with meals.   montelukast (SINGULAIR) 10 MG Tab 5/19/2025 Morning Significant Other Yes Yes    Sig: Take 10 mg by mouth at bedtime. Indications: Asthma, Hayfever   nitroglycerin (NITROSTAT) 0.4 MG SL Tab Unknown Significant Other Yes No   Sig: Place 0.4 mg under the tongue every 5 minutes as needed for Chest Pain. Indications: Acute Angina Pectoris   omeprazole (PRILOSEC) 40 MG delayed-release capsule 5/19/2025 Morning Significant Other Yes Yes   Sig: Take 40 mg by mouth 2 times a day. Indications: Gastroesophageal Reflux Disease   rosuvastatin (CRESTOR) 20 MG Tab 5/18/2025 Evening Significant Other Yes Yes   Sig: Take 20 mg by mouth at bedtime.     Indications: High Amount of Fats in the Blood   sevelamer carbonate (RENVELA) 800 MG Tab tablet 5/19/2025 Noon Significant Other Yes Yes   Sig: Take 800 mg by mouth 3 times a day with meals. Indications: High Amount of Phosphate in the Blood   tamsulosin (FLOMAX) 0.4 MG capsule 5/19/2025 Morning Significant Other No Yes   Sig: Take 1 Capsule by mouth 1/2 hour after breakfast.   traMADol (ULTRAM) 50 MG Tab Unknown Significant Other Yes No   Sig: Take 50 mg by mouth 1 time a day as needed for Moderate Pain (wound care pain). Indications: Acute Pain, Pain      Facility-Administered Medications: None       Physical Exam  Temp:  [36.9 °C (98.4 °F)] 36.9 °C (98.4 °F)  Pulse:  [65-69] 69  Resp:  [15-19] 15  BP: ()/(54-66) 111/65  SpO2:  [93 %-100 %] 98 %  Blood Pressure : 114/66   Temperature: 36.9 °C (98.4 °F)   Pulse: 65   Respiration: 19   Pulse Oximetry: 100 %       Physical Exam  Constitutional:       General: He is not in acute distress.     Appearance: Normal appearance. He is not ill-appearing, toxic-appearing or diaphoretic.   HENT:      Mouth/Throat:      Mouth: Mucous membranes are moist.      Pharynx: Oropharynx is clear.   Eyes:      Extraocular Movements: Extraocular movements intact.      Conjunctiva/sclera: Conjunctivae normal.      Pupils: Pupils are equal, round, and reactive to light.   Cardiovascular:      Rate and Rhythm: Normal rate and  regular rhythm.      Pulses: Normal pulses.      Heart sounds: Normal heart sounds.   Pulmonary:      Effort: Pulmonary effort is normal.      Breath sounds: Normal breath sounds.   Abdominal:      General: Abdomen is flat. Bowel sounds are normal.      Palpations: Abdomen is soft.      Comments: Peritoneal dialysis catheter in place   Musculoskeletal:         General: Normal range of motion.      Right foot: Abnormal pulse.      Left foot: Abnormal pulse.      Comments: Necrotic changes in his bilateral toes.  Extremities are cold. Pulse is barely palpated bilaterally.   Skin:     Capillary Refill: Capillary refill takes more than 3 seconds.      Findings: Lesion (Necrotic changes to his bilateral toes) present.   Neurological:      General: No focal deficit present.      Mental Status: He is alert and oriented to person, place, and time. Mental status is at baseline.   Psychiatric:         Mood and Affect: Mood normal.         Behavior: Behavior normal.         Thought Content: Thought content normal.         Judgment: Judgment normal.       Laboratory:  Recent Labs     05/19/25  1702   WBC 8.1   RBC 3.45*   HEMOGLOBIN 11.1*   HEMATOCRIT 34.2*   MCV 99.1*   MCH 32.2   MCHC 32.5   RDW 60.4*   PLATELETCT 205   MPV 10.0     Recent Labs     05/19/25  1702   SODIUM 137   POTASSIUM 5.5   CHLORIDE 94*   CO2 20   GLUCOSE 115*   BUN 75*   CREATININE 9.34*   CALCIUM 9.2     Recent Labs     05/19/25  1702   ALTSGPT 25   ASTSGOT 24   ALKPHOSPHAT 173*   TBILIRUBIN 0.3   GLUCOSE 115*         Recent Labs     05/19/25  1702   NTPROBNP >89522*         Recent Labs     05/19/25  1702   TROPONINT 175*       Imaging:  US-EXTREMITY VENOUS LOWER UNILAT LEFT         DX-CHEST-PORTABLE (1 VIEW)   Final Result      No acute cardiopulmonary process.          X-Ray:  I have personally reviewed the images and compared with prior images.    TTE:    Transthoracic 04/08/25    Echo Report        Echocardiography Laboratory      CONCLUSIONS  Compared to the prior study on 2/14/2025: No significant changes   Mildly dilated LV with mild concentric LVH  Severely reduced systolic function, estimated LVEF 20-25%  Grade LV II LV diastolic dysfunction  Normal RV size with reduced systolic function  Concern for low-flow/low-gradient severe AS: Vmax 3.1m/s, SABRA < 1cm2,   SVi < 35, AVAi < 0.6  Mild-moderate MR  No pericardial effusion  Normal IVC size    Assessment/Plan:  Problem Representation:     I anticipate this patient will require at least two midnights for appropriate medical management, necessitating inpatient admission because of severe aortic stenosis management    Patient will need a Med/Surg bed on MEDICAL service .  The need is secondary to severe aortic stenosis.    * Aortic stenosis, severe- (present on admission)  Assessment & Plan  Cardiology on board, evaluating the case.   As per echo report, patient has findings concerning for low-flow/low-gradient severe AS: Vmax 3.1 m/s, SABRA <1 cm2  Patient does report shortness of breath on ambulation.  As per cards, considering further evaluation with invasive vs DSE.  Cards considering TAVR in the context of severe PAD,HFrEF  -Follow cardiology recommendations  -Telemetry monitoring  -Given one dose of lasix in the ED  -NPO in anticipation of procedure  -Holding anticoagulation for now    End stage renal disease (HCC)- (present on admission)  Assessment & Plan  Patient's creatinine on admission was 9.34.  Not overt signs of volume overload based on physical exam.  Was given a dose of lasix in the ED.  No remarkable electrolyte abnormalities  -Continue PD while inpatient  -Continue sevelamer 800 mg TID with meals  -Strict I/Os  -CMP daily + mag  -Nephrology consult    Elevated troponin  Assessment & Plan  Patient denied any sort of chest pain.  Did mention shortness of breath on exertion.  ED provider discussed with cardiology and said no need for heparin drip at this point.  -Trend  troponins   -Follow cardiology recommendations  -EKG stat if chest pain  -Nitroglycerin PRN ordered    Heart failure with reduced ejection fraction (HCC)  Assessment & Plan  Last echo showed a LVEF of 20-25% with a grade II LV dysfunction.  Does have significantly elevated NT-proBNP.  However, physical examination does not show sings of overt volume overload and also chest X-ray was normal.  Was given a dose of lasix in the ED.  On metoprolol outpatient due to history of atrial flutter as well.  -Cardiology evaluation  -Consider another dose of lasix if patient develops signs of volume overload, which is not the case for now.  -Consider GDMT outpatient.    Typical atrial flutter (HCC)- (present on admission)  Assessment & Plan  EKG on admission showed sinus rhythm.  He is on metoprolol outpatient + apixaban  -Continue metoprolol with holding parameters  -Hold apixaban for now in anticipation of procedure by cardiology.  -Follow cardiology recommendations    Peripheral artery disease (HCC)- (present on admission)  Assessment & Plan  Bilateral lower extremities are cold and pulse is barely palpable.  Noted necrotic changes to toes of both feet.  As per patient and wife, chronic and stable changes, no signs of worsening condition.  US of left leg was just done in the ED.  S/P bypass graft on the right leg.  -Hold his clopidogrel on admission, provider can resume as appropriate based on cardiology evaluation and further plan about severe AS.    BPH (benign prostatic hyperplasia)- (present on admission)  Assessment & Plan  -Continue tamsulosin daily    Dyslipidemia- (present on admission)  Assessment & Plan  -Continue rosuvastatin 20 mg daily        VTE prophylaxis: SCDs/TEDs         [1]   Allergies  Allergen Reactions    Hydroxyzine Hives, Itching and Anxiety     Severe Itching and hives.

## 2025-05-20 NOTE — ED NOTES
Report given to MANOJ Abdi at bedside. Pt a&o, resps even and unlabored. Nsr on cardic monitor with no ectopy. Pt has no complaint at this time. Wife at bedside.

## 2025-05-20 NOTE — ED PROVIDER NOTES
ER Provider Note    Scribed for  Ifeanyi Almonte D.O. by Enmanuel Mckee. 5/19/2025   6:15 PM    Primary Care Provider: Denise Carver M.D.    CHIEF COMPLAINT  Chief Complaint   Patient presents with    Sent by MD Dr Asencio called ahead for admission, pt scheduled for procedure and wants procedure to be completed sooner.      Shortness of Breath     With ambulation.  O2 at home at night at times.      EXTERNAL RECORDS REVIEWED  Inpatient Notes discharge on 4/28 for postural dizziness with presyncope.    HPI/ROS  LIMITATION TO HISTORY   Select: : None  OUTSIDE HISTORIAN(S):  Significant other Patient's wife at bedside.     Edy Bennett is a 79 y.o. male who presents to the ED for evaluation of shortness of breath, onset 1 week ago. The patient reports the frequency of the episodes of the shortness of breath has been increasing and they have become more acute, prompting him to present to the ED. He notes he can only walk a short distance before becoming short of breath, and he has started to have these episodes at night as well. He denies chest pain with these episodes. The patient reports his cardiologist Dr. Asencio advised him to present to the ED for admission for expedited TAVR and consideration of watchman. The wife reports he had femoropopliteal bypass in January 2025 and cardiac stent placed as well. She notes the patient is scheduled for echocardiogram and stress test on 5/28/2025 and follow up with Dr. Yoon after. He denies a smoking history.       PAST MEDICAL HISTORY  Past Medical History[1]    SURGICAL HISTORY  Past Surgical History[2]    FAMILY HISTORY  Family History   Problem Relation Age of Onset    Cancer Mother         Brain cancer    Heart Disease Father         Several By-pass Surgeries    Cancer Daughter         Thyroid Cancer    Heart Disease Brother         Fatal Heart Attack       SOCIAL HISTORY   reports that he has never smoked. He has never used smokeless tobacco. He reports that  he does not currently use alcohol. He reports that he does not use drugs.    CURRENT MEDICATIONS  Previous Medications    ACETAMINOPHEN (TYLENOL) 500 MG TAB    Take 1,000 mg by mouth every 8 hours as needed for Mild Pain. Indications: Pain, 1-4/10 pain    APIXABAN (ELIQUIS) 2.5MG TAB    Take 2.5 mg by mouth 2 times a day. Indications: Atrial Fibrillation    CALCITRIOL (ROCALTROL) 0.25 MCG CAP    Take 0.25 mcg by mouth 3 times a week. On Mon, Wed and Fri  Indications: Low Amount of Calcium in the Blood    CLOPIDOGREL (PLAVIX) 75 MG TAB    Take 75 mg by mouth every day. Indications: Coronary Bypass Surgery    GENTAMICIN (GARAMYCIN) 0.1 % CREAM    Apply 1 Application topically every day. Use each and every time cath is accessed for peritoneal dialysis please  Indications: Profolactic    HOME CARE OXYGEN    Inhale 2 L/min at bedtime as needed for Shortness of Breath. Oxygen dose range: 2 to 2 L/min  Respiratory route via: Nasal Cannula   Oxygen supplier: [employer]      Indications: hypoxia    METOPROLOL SR (TOPROL XL) 50 MG TABLET SR 24 HR    Take 50 mg by mouth every morning. Indications: Atrial Fibrillation    MIDODRINE (PROAMATINE) 2.5 MG TAB    Take 1 Tablet by mouth 3 times a day with meals.    MONTELUKAST (SINGULAIR) 10 MG TAB    Take 10 mg by mouth at bedtime. Indications: Asthma, Hayfever    NITROGLYCERIN (NITROSTAT) 0.4 MG SL TAB    Place 0.4 mg under the tongue every 5 minutes as needed for Chest Pain. Indications: Acute Angina Pectoris    OMEPRAZOLE (PRILOSEC) 40 MG DELAYED-RELEASE CAPSULE    Take 40 mg by mouth 2 times a day. Indications: Gastroesophageal Reflux Disease    ROSUVASTATIN (CRESTOR) 20 MG TAB    Take 20 mg by mouth at bedtime.     Indications: High Amount of Fats in the Blood    SEVELAMER CARBONATE (RENVELA) 800 MG TAB TABLET    Take 800 mg by mouth 3 times a day with meals. Indications: High Amount of Phosphate in the Blood    TAMSULOSIN (FLOMAX) 0.4 MG CAPSULE    Take 1 Capsule by mouth 1/2 hour  "after breakfast.    TRAMADOL (ULTRAM) 50 MG TAB    Take 50 mg by mouth 1 time a day as needed for Moderate Pain (wound care pain). Indications: Acute Pain, Pain       ALLERGIES  Allergies[3]     PHYSICAL EXAM  BP 93/54   Pulse 69   Temp 36.9 °C (98.4 °F) (Temporal)   Resp 18   Ht 1.778 m (5' 10\")   Wt 66.7 kg (147 lb 0.8 oz)   SpO2 93%   BMI 21.10 kg/m²    General: No acute distress  Neuro: Awake alert and oriented, muscle strength sensation normal  Neck: Supple  Cardiac: Regular rate and rhythm  Pulmonary: Clear to auscultation bilaterally no distress  Abdomen: Soft nontender nondistended  Back: Nontender  Psych: Normal  Skin: Eschar dry gangrene on the 1st, 2nd, and 4th digit of left foot. Dry gangrene on the 2nd and 4th digit of right foot. Healing wound left leg medial aspect of the ankle.  Left lower extremity swelling.  Pink warm dry. Peritoneal dialysis catheter in  Extremities: Full range of motion, muscle strength sensation intact 1+ distal pulses      DIAGNOSTIC STUDIES/PROCEDURES  Labs:   Labs Reviewed   CBC WITH DIFFERENTIAL - Abnormal; Notable for the following components:       Result Value    RBC 3.45 (*)     Hemoglobin 11.1 (*)     Hematocrit 34.2 (*)     MCV 99.1 (*)     RDW 60.4 (*)     Lymphocytes 12.50 (*)     Eosinophils 9.10 (*)     Immature Granulocytes 1.70 (*)     Eos (Absolute) 0.74 (*)     Immature Granulocytes (abs) 0.14 (*)     All other components within normal limits   COMP METABOLIC PANEL - Abnormal; Notable for the following components:    Chloride 94 (*)     Anion Gap 23.0 (*)     Glucose 115 (*)     Bun 75 (*)     Creatinine 9.34 (*)     Alkaline Phosphatase 173 (*)     All other components within normal limits   PROBRAIN NATRIURETIC PEPTIDE, NT - Abnormal; Notable for the following components:    NT-proBNP >01909 (*)     All other components within normal limits   TROPONIN - Abnormal; Notable for the following components:    Troponin T 175 (*)     All other components " within normal limits   ESTIMATED GFR - Abnormal; Notable for the following components:    GFR (CKD-EPI) 5 (*)     All other components within normal limits   BLOOD CULTURE   BLOOD CULTURE     I have independently interpreted the above labs    EKG:   Results for orders placed or performed during the hospital encounter of 25   EKG (NOW)   Result Value Ref Range    Report       [facility] Emergency Dept.    Test Date:  2025  Pt Name:    SWAPNA ROSS                Department: ER  MRN:        [id number]                      Room:  Gender:     Male                         Technician: [id number]  :        1945                   Requested By:ER TRIAGE PROTOCOL  Order #:    [id number]                    Reading MD: Ifeanyi Almonte    Measurements  Intervals                                Axis  Rate:       65                           P:          74  KY:         181                          QRS:        20  QRSD:       113                          T:          192  QT:         459  QTc:        478    Interpretive Statements  Sinus rhythm  Probable LVH with secondary repol abnrm  Borderline prolonged QT interval  Compared to ECG 2025 09:26:49  T-wave abnormality no longer present  Possible ischemia no longer present  Electronically Signed On 2025 18:15:52 PDT by Ifeanyi Almonte       I have independently interpreted this EKG    Radiology:   This attending emergency physician has independently interpreted the diagnostic imaging associated with this visit and is awaiting the final reading from the radiologist.   Preliminary interpretation is a follows: No acute findings  Radiologist interpretation:   US-EXTREMITY VENOUS LOWER UNILAT LEFT         DX-CHEST-PORTABLE (1 VIEW)   Final Result      No acute cardiopulmonary process.      Normal lower extremity ultrasound.    COURSE & MEDICAL DECISION MAKING     ED OBS: No; Patient does not meet criteria for ED Observation.     INITIAL ASSESSMENT,  COURSE AND PLAN  Differential diagnoses include but not limited to: Heart failure, aortic valvular disease, DVT, PE, dry gangrene       Care Narrative: Patient is a 78 y/o male who presents for worsening shortness of breath over the last week. He is short of breath on exertion as well as at rest. He was advised to present to the ED at the recommendation of his cardiologist Dr. Asencio for expedited TAVR and consideration of watchman procedure.     6:15 PM - Patient was first seen and evaluated at bedside. Patient presents to the ED for episodic shortness of breath for the last week. This has worsened over the last few days with increased frequency. Ordered for EKG, Troponin, pBNP, CMP, CBC w/ diff, Blood culture x2, DX-Chest, and US-Extremity  to evaluate. Patient verbalizes understanding and support with my plan of care.     6:53 PM - I discussed the patient's case and the above findings with Dr. Asencio (Cardiology) who says to admit the patient.     6:54 PM - Paged hospitalist.     7:02 PM - Patient was reevaluated at bedside. I discussed with the patient my conversation with Dr. Asencio and the plan for admission to the hospital. Patient verbalizes understanding and agreement to this plan of care. Patient will be medicated with aspirin 325 mg PO.     7:10 PM - Paged cardiology.     7:51 PM - I discussed the patient's case and the above findings with Dr. Amaya (Cardiology) who recommended aspirin and recommended against heparin and is going to place orders.    7:57 PM - Patient will be medicated with Lasix 40 mg injection.     8:23 PM - I discussed the patient's case and the above findings with Dr. Escobar (Hospitalist) who agreed to hospitalize the patient.          ED COURSE AND ADDITIONAL PROBLEMS    Shortness of breath acute: Multifactorial, heart failure, aortic stenosis, possible type II NSTEMI.    Acute on chronic congestive heart failure, unspecified: Lasix given.  Admitted to the hospital for further management  consideration for nephrology.  Cardiology on board.  Cardiology recommending aspirin but to hold off on heparin for the time being.  Of note patient is denying any chest pain.  He is denying any acute shortness of breath the acute shortness of breath has been on and off over the last week or so.    Peritoneal dialysis catheter in place: To be managed in the inpatient setting.    Dry gangrene acute: In the lower extremity digits.  No evidence of erythema or drainage or fluctuance.  Patient does have flow to the lower extremities.    Elevated troponin acute: Discussed with cardiology and in the setting of no chest pain they recommended aspirin without heparin at this time time there can evaluate in the morning and move forward after trending troponins.    Aortic valve stenosis, etiology of cardiac valve disease unspecified: Discussed with both  and Dr Amaya and admitting MD, and patient will be worked up during this hospitalization to determine the need for intervention.    Medications   aspirin (Asa) tablet 325 mg (325 mg Oral Given 5/1945)   furosemide (Lasix) injection 40 mg (40 mg Intravenous Given 5/19/25 2017)         DISPOSITION AND DISCUSSIONS    I have discussed management of the patient with the following physicians and SHANE's:  Dr. Asencio (Cardiology), Dr. Escobar (Hospitalist), Dr. Amaya (Cardiology)     Discussion of management with other [facility] or appropriate source(s): None     Barriers to care at this time, including but not limited to: None.       DISPOSITION:  Patient will be hospitalized by Dr. Escobar in guarded condition.      FINAL DIAGNOSIS  1. Shortness of breath Acute   2. Acute on chronic congestive heart failure, unspecified heart failure type (HCC) Acute   3. Peritoneal dialysis catheter in place (HCC) Acute   4. Dry gangrene (HCC) Acute   5. Elevated troponin Acute   6. Aortic valve stenosis, etiology of cardiac valve disease unspecified Acute       Enmanuel WHITAKER (Scribe)[place]  for, and in the presence of, Ifeanyi Almonte D.O..    Electronically signed by: Enmanuel Mckee (Scribe), 5/19/2025    I, Ifeanyi Almonte D.O. personally performed the services described in this documentation, as scribed by Enmanuel Mckee in my presence, and it is both accurate and complete.      The note accurately reflects work and decisions made by me.  Ifeanyi Almonte D.O.  5/20/2025  12:42 AM         [1]   Past Medical History:  Diagnosis Date    A-V fistula ([facility])     Left Arm    Arrhythmia 03/13/2024    Due to kidney failure treatment    Breath shortness     with exertion    Cataract     shon iol    Congestive heart failure ([facility])     Diabetes ([facility])     not on any medication at this time, being monitored at dialysis    Dialysis patient ([facility])     monday wednesday friday at [facility]    Dialysis patient ([facility]) 01/17/2025    Starting Peritoneal Dialysis @ Home on 1/17/2025. Follows Nephrologist MD Milian.    Foot ulcer ([facility])     left foot. being seen by wound care    Hemorrhagic disorder ([facility])     plavix eliquis    High cholesterol     Hypertension 05/24/2024    states controlled    NSTEMI (non-ST elevated myocardial infarction) ([facility]) 2/25/2025    DARIEN (obstructive sleep apnea) 01/17/2025    O2 @ 2.5L Per Patient's Wife.    Pain     Peritoneal dialysis catheter in place ([facility]) 01/17/2025    Pneumonia     3/24    Renal disorder     Sleep apnea 01/10/2025    Home sleep study done 1/3/2025 - no results yet.    Stroke ([facility]) 2021    tia   [2]   Past Surgical History:  Procedure Laterality Date    CO UPPER GI ENDOSCOPY,CTRL BLEED N/A 2/26/2025    Procedure: EGD, WITH CLIP INSERTION;  Surgeon: Frantz Dailey M.D.;  Location: SURGERY SAME DAY [facility];  Service: Gastroenterology    CO UPPER GI ENDOSCOPY,DIAGNOSIS N/A 2/14/2025    Procedure: GASTROSCOPY;  Surgeon: Melany Richards M.D.;  Location: SURGERY SAME DAY [facility];  Service: Gastroenterology    CO UPPER GI ENDOSCOPY,BIOPSY N/A 2/14/2025    Procedure: GASTROSCOPY,  WITH BIOPSY;  Surgeon: Melany Richards M.D.;  Location: SURGERY SAME DAY [facility];  Service: Gastroenterology    IA VEIN BYPASS GRAFT,FEM-TIBIAL Left 1/20/2025    Procedure: LEFT LOWER EXTREMITY BYPASS, POPLITIAL TO TIBIAL BYPASS;  Surgeon: Marco Antonio Miller M.D.;  Location: SURGERY [facility];  Service: Vascular    AORTOGRAM WITH RUNOFF  1/14/2025    Procedure: AORTOGRAM WITH RUNOFF, right peroneal artery intravascular lithotripsy and stent placement;  Surgeon: Marco Antonio Miller M.D.;  Location: SURGERY [facility];  Service: Vascular    LAPAROSCOPIC INGUINAL HERNIA REPAIR N/A 12/18/2024    Procedure: LAPAROSCOPIC LEFT INGUINAL HERNIA REPAIR WITH MESH, LAPAROSCOPIC REPOSITIONING OF PERITONEAL DIALYSIS CATHETER;  Surgeon: Marco Antonio Miller M.D.;  Location: SURGERY [facility];  Service: Vascular    CATH PLACEMENT CAPD N/A 12/18/2024    Procedure: REPOSITION, CATHETER, CAPD;  Surgeon: Marco Antonio Miller M.D.;  Location: SURGERY [facility];  Service: Vascular    CATH PLACEMENT CAPD N/A 09/16/2024    Procedure: LAPAROSCOPIC INSERTION OF PERITONEAL DIALYSIS CATHETER PLACEMENT;  Surgeon: Marco Antonio Miller M.D.;  Location: SURGERY SAME DAY [facility];  Service: Vascular    AV FISTULA CREATION Left 07/01/2024    Procedure: CREATION OF LEFT UPPER EXTREMITY DIALYSIS FISTULA;  Surgeon: Shilo Mercedes M.D.;  Location: SURGERY [facility];  Service: General    OTHER ORTHOPEDIC SURGERY  2004    bunion r foot    OTHER      tonsils adenoids    OTHER      dialysis catheter in upper right chest    OTHER      shon iol    OTHER CARDIAC SURGERY      cardiac stents   [3]   Allergies  Allergen Reactions    Hydroxyzine Hives, Itching and Anxiety     Severe Itching and hives.

## 2025-05-20 NOTE — PROGRESS NOTES
Per Dr. Medrano, Dr. Boyer has not yet responded in regards to nephrology order for PD. Patient updated.

## 2025-05-20 NOTE — PROGRESS NOTES
4 Eyes Skin Assessment Completed by MANOJ Christianson and CRISSY Fitzpatrick.    Head WDL  Ears Redness and Blanching  Nose WDL  Mouth WDL  Neck WDL  Breast/Chest WDL  Shoulder Blades Blanching  Spine WDL  (R) Arm/Elbow/Hand Bruising and Scab  (L) Arm/Elbow/Hand Bruising and Scab  Abdomen Blanching; HD cath site right-mid abdomen  Groin WDL  Scrotum/Coccyx/Buttocks Redness and Blanching  (R) Leg Scab and Bruising  (L) Leg Scab and Bruising  (R) Heel/Foot/Toe Redness, Discoloration, Ulcer(s), and Bruising: gangrene on 2nd - 4th toes  (L) Heel/Foot/Toe Redness, Discoloration, Ulcer(s), and Bruising: gangrene on 2nd - 4th toes, existing pressure ulcer of malleolus           Devices In Places Tele Box and Pulse Ox      Interventions In Place Pillows    Possible Skin Injury No    Pictures Uploaded Into Epic Yes  Wound Consult Placed Yes  RN Wound Prevention Protocol Ordered No

## 2025-05-20 NOTE — CONSULTS
MRN: [id number]  Date of palliative consult: 2025  Reason for consult: Goals of care  Referring provider: NIHARIKA Hein  Location of consult: [facility]  Additional consulting services: Nephrology, Cardiology, Infectious disease    HPI:   Edy Bennett is a 79 y.o. male with medical history significant for end-stage renal disease on peritoneal dialysis, severe aortic stenosis, PAD status post bypass, CAD status post PCI, heart failure reduced ejection fraction (%), atrial flutter (anticoagulated on Eliquis), diabetes mellitus, hypertension, hyperlipidemia admitted  after being sent by his cardiologist for consideration of a sooner TAVR procedure.  Patient has been experiencing shortness of breath.  Plan for hemodialysis today.  Patient has been n.p.o. since midnight in preparation for potential procedure.  Palliative care consulted during this hospitalization for goals of care/advance care planning conversation.    Additional Pertinent Medical History: None    ROS:    Review of Systems   Reason unable to perform ROS: sleeping after cath lab procedure.       PE:   Recent vital signs  BMI: Body mass index is 20.72 kg/m².    Temp (24hrs), Av.4 °C (97.6 °F), Min:36.3 °C (97.3 °F), Max:36.9 °C (98.4 °F)  Temperature: 36.3 °C (97.3 °F)  Pulse  Av.6  Min: 65  Max: 87   Blood Pressure : 98/50, NIBP: 116/53       Physical Exam  Vitals and nursing note reviewed.   Constitutional:       General: He is sleeping. He is not in acute distress.     Appearance: He is underweight. He is ill-appearing.      Comments: Sleeping throughout assessment   Pulmonary:      Comments: Several episodes of apnea while laying flat in bed and sleeping  Musculoskeletal:      Right lower leg: No edema.      Left lower leg: No edema.   Feet:      Comments: Necrotic toes to bilateral feet. Clean, dry  Skin:     General: Skin is warm and dry.      Coloration: Skin is pale.   Neurological:      Comments: Opens eyes to  "stimulation, immediately falls back asleep (post cath-lab procedure with sedation)   Psychiatric:      Comments: Unable to assess at this time         ASSESSMENT/PLAN WITH SHARED DECISION MAKING:   PHYSICAL ASPECTS OF CARE  Palliative Performance Scale: 60-70%    # Severe aortic stenosis  # End-stage renal disease on peritoneal dialysis  # PAD  # CAD  # Heart failure reduced ejection fraction  # Atrial flutter  # Type 2 diabetes  # Hypertension  # Hyperlipidemia  # Hyperkalemia  # Elevated troponin    SOCIAL ASPECTS OF CARE  Edy lives locally with his wife Kailey.  They have been  55 years.  He was in the [employer].  They have 2 children, a son and a daughter who both live in [place], grandchildren, and great-grandchildren.  He enjoys golfing and traveling.  He has had home health and physical therapy and last week was able to walk 6 to 8 minutes.  Has also been receiving wound care at home for his necrotic toes.    SPIRITUAL ASPECTS OF CARE   Not addressed during this visit.    GOALS OF CARE/SERIOUS ILLNESS CONVERSATION  Introduced myself to Edy and wife Kailey. Discussed role of palliative care and reason for consult.  Edy mostly asleep during conversation as he just came back from being sedated in the Cath Lab; Kailey agreeable to discuss goals of care.  Ted provides excellent insight and understanding into Edy's current hospitalization as well as the past year of his deteriorating health.  She shares that it has been a \"rough year.\"  Starting in March 2024, he began having renal failure and dialysis was started in May.  He started peritoneal dialysis in October 2024; this is done nightly.  He did have some complications including a hernia due to improper catheter placement and was restarted on hemodialysis at some point, though has been on peritoneal dialysis again since January.  His wife shares that he has had many hospital readmissions, spending up to 40 days in the last few months in the " "hospital.  During that time, he has had gallstones and a duodenal ulcer per his wife.  He is also spent some time in rehabilitation.  In November, they noted that he had necrotic toes.  He has had surgeries for this and she has noted that they have been improving.  She shares that Edy's pain also improved since the necrosis was originally found.    We spent some time discussing Edy's goals and wishes for healthcare. Kailey shares that she and Edy spoke about his wishes after death, including cremation and a memorial.  She shares they have an advance directive at home which she will bring in in the next few days.  They have not, however, discussed his wishes regarding healthcare.  She does know that he would want \"everything done.\"  We discussed his CODE STATUS at length, and she agreed that he would want to keep it full code at this time.  I spent some time discussing risk versus benefit and shared my concern that if he were to experience a natural death and be resuscitated, he would likely suffer greatly only to die anyway. Kailey expressed understanding.     We spent a lengthy amount of time discussing Edy's severe illness, including heart and kidney dysfunction and how these correlate as well as his overall poor prognosis. Kailey has excellent understanding of his disease process and shares that she used to volunteer with hospice. Kailey immediately explained that she gathered from the cardiologist today that there is very likely little that can be done to improve his heart.      Kailey and I decided that the best plan of action for discussing Edy's goals and wishes is for me to come back on Thursday at which point we can meet and discuss at a time when Edy is able to participate. Kailey shares she will be present all day Thursday.    Will meet with patient and wife at bedside on Thursday (I am off on Wednesday) for further goals of care discussion.  At this time, per patient's wife, patient " wishes to remain full code and full treatment.    Provided palliative care contact information and encouraged Kailey to reach out with any questions/needs.     Code Status: Full    ACP Documents: None on file; patient's wife has advanced directive at home and reports she will bring it in    35 minutes spent discussing advance care planning, this time excludes any other billed services.    Interval diagnostic studies and medical documentation entries pertinent to this case were reviewed independently by me. This patient has at least one acute or chronic illness or injury that poses a threat to life or bodily function. This patient suffers from a high risk of morbidity from additional invasive diagnostic testing or intensive treatment. Discussion of recommendations and coordination of care undertaken with primary provider/treatment team.      Gema Carvalho DNP, [facility]  Inpatient Palliative Care Provider  [phone]

## 2025-05-20 NOTE — PROGRESS NOTES
Patient is on daily PD and verbalized concern about making sure he gets it tonight. This RN reached out to patient assigned resident, Dr. Gray Medrano, to ask for an order to be placed in order for Gian to process the patients PD. Dr. Gray Medrano stated he spoke with nephrology for the order. Patient advised of the plan of care.

## 2025-05-20 NOTE — ASSESSMENT & PLAN NOTE
Patient denied any sort of chest pain.  Did mention shortness of breath on exertion.  Cardiac cath was done, cardiology on board  Continue aspirin and statin  Continue telemetry

## 2025-05-20 NOTE — ASSESSMENT & PLAN NOTE
Severe aortic stenosis and came with shortness of breath  Dialysis  Continue telemetry  Cardiology on board and planning for TAVR  Cardiac cath was done  Cleared by ID and vascular  Palliative was consulted

## 2025-05-20 NOTE — PROGRESS NOTES
Hospital Medicine Daily Progress Note    Date of Service  5/20/2025    Chief Complaint  Edy Bennett is a 79 y.o. male admitted 5/19/2025 with severe aortic stenosis    Hospital Course  79-year-old male with history of severe aortic stenosis, heart failure EF 25%, atrial fibrillation, severe peripheral artery disease and end-stage kidney disease on dialysis who presented 5/19 with worsening shortness of breath.  Patient has severe aortic stenosis and being followed by cardiologist and planning for TAVR, patient was sent by cardiologist to ED.  No significant other symptoms.  Patient was on room air, labs around baseline with chronic anemia and chronic kidney disease.  Nephrology was consulted and started with hemodialysis, patient receiving PD as outpatient.  Cardiology was consulted and preparing him for TAVR.    Interval Problem Update  = Evaluated examined the patient at bedside, denied any symptoms, no significant crackles and patient was on room air with no fever.  - Case was discussed with vascular surgeon, patient is cleared for TAVR also discussed with ID Dr. Schaffer, no signs of infection and patient might need amputation for chronic wound on his toes.  - Case was discussed with cardiology team, planning for cardiac cath and possible TAVR this week, okay to resume Eliquis.  - Case was discussed with nephrologist, received hemodialysis today.  Elevated potassium, Lokelma was started.        I have discussed this patient's plan of care and discharge plan at IDT rounds today with Case Management, Nursing, Nursing leadership, and other members of the IDT team.    Consultants/Specialty  Vascular surgeon, ID, cardiology    Code Status  Full Code    Disposition  The patient is not medically cleared for discharge to home or a post-acute facility.  Anticipate discharge to: home with organized home healthcare and close outpatient follow-up    I have placed the appropriate orders for post-discharge needs.    Review  of Systems  Review of Systems   Constitutional:  Positive for malaise/fatigue. Negative for chills, fever and weight loss.   HENT:  Negative for ear pain, hearing loss and tinnitus.    Eyes:  Negative for blurred vision, double vision and photophobia.   Respiratory:  Positive for shortness of breath. Negative for cough and hemoptysis.    Cardiovascular:  Negative for chest pain, palpitations, orthopnea and claudication.   Gastrointestinal:  Negative for abdominal pain, constipation, diarrhea, nausea and vomiting.   Genitourinary:  Negative for dysuria, frequency and urgency.   Musculoskeletal:  Negative for myalgias and neck pain.   Skin:  Negative for rash.   Neurological:  Negative for dizziness, speech change and weakness.        Physical Exam  Temp:  [36.3 °C (97.3 °F)-36.9 °C (98.4 °F)] 36.3 °C (97.3 °F)  Pulse:  [] 99  Resp:  [15-19] 18  BP: ()/(50-79) 151/73  SpO2:  [93 %-100 %] 96 %    Physical Exam  Constitutional:       General: He is not in acute distress.     Appearance: He is not ill-appearing.   Eyes:      General: No scleral icterus.  Cardiovascular:      Rate and Rhythm: Normal rate.      Heart sounds: No murmur heard.  Pulmonary:      Effort: No respiratory distress.      Breath sounds: No wheezing.   Abdominal:      General: There is no distension.      Tenderness: There is no abdominal tenderness. There is no right CVA tenderness, left CVA tenderness or guarding.   Musculoskeletal:      Right lower leg: No edema.      Left lower leg: No edema.   Lymphadenopathy:      Cervical: No cervical adenopathy.   Skin:     Coloration: Skin is not jaundiced.      Findings: No bruising, lesion or rash.   Neurological:      General: No focal deficit present.      Mental Status: He is alert and oriented to person, place, and time. Mental status is at baseline.      Cranial Nerves: No cranial nerve deficit.      Motor: No weakness.      Gait: Gait normal.               Fluids    Intake/Output Summary  (Last 24 hours) at 5/20/2025 1629  Last data filed at 5/20/2025 1245  Gross per 24 hour   Intake 500 ml   Output 1800 ml   Net -1300 ml        Laboratory  Recent Labs     05/19/25  1702 05/20/25  0112   WBC 8.1 9.3   RBC 3.45* 3.43*   HEMOGLOBIN 11.1* 10.8*   HEMATOCRIT 34.2* 34.1*   MCV 99.1* 99.4*   MCH 32.2 31.5   MCHC 32.5 31.7*   RDW 60.4* 60.7*   PLATELETCT 205 191   MPV 10.0 9.9     Recent Labs     05/19/25  1702 05/20/25  0112   SODIUM 137 136   POTASSIUM 5.5 5.9*   CHLORIDE 94* 95*   CO2 20 17*   GLUCOSE 115* 157*   BUN 75* 85*   CREATININE 9.34* 9.29*   CALCIUM 9.2 8.9                   Imaging  US-EXTREMITY VENOUS LOWER UNILAT LEFT   Final Result      DX-CHEST-PORTABLE (1 VIEW)   Final Result      No acute cardiopulmonary process.      CL-LEFT HEART CATHETERIZATION WITH POSSIBLE INTERVENTION    (Results Pending)   EC-ECHOCARDIOGRAM DOBUTAMINE REST/STRESS W/O CONT    (Results Pending)        Assessment/Plan  * Aortic stenosis, severe- (present on admission)  Assessment & Plan  Severe aortic stenosis and came with shortness of breath  Dialysis  Continue telemetry  Cardiology on board and planning for TAVR  Cardiac cath was done  Cleared by ID and vascular  Palliative was consulted    Elevated troponin  Assessment & Plan  Patient denied any sort of chest pain.  Did mention shortness of breath on exertion.  Cardiac cath was done, cardiology on board  Continue aspirin and statin  Continue telemetry    Heart failure with reduced ejection fraction (HCC)  Assessment & Plan  Last echo showed a LVEF of 20-25% with a grade II LV dysfunction.  Severe aortic stenosis  Dialysis  Data qualified for ACE inhibitors or spironolactone due to end-stage kidney disease  Continue Eliquis 2.5 mg twice daily with metoprolol and rosuvastatin  Cardiac cath was done and planning for TAVR    Typical atrial flutter (HCC)- (present on admission)  Assessment & Plan  EKG on admission showed sinus rhythm.  He is on metoprolol outpatient +  apixaban  Continue metoprolol and Eliquis   Telemetry    Peripheral artery disease (HCC)- (present on admission)  Assessment & Plan  Bilateral lower extremities are cold and pulse is barely palpable.  US of left leg was just done in the ED.  S/P bypass graft on the right leg.  Patient has a chronic wound on big toe bilateral  Patient is being followed by  as outpatient  No signs of infection  Patient might need amputation as outpatient  Vascular surgeon was consulted and cleared for TAVR    BPH (benign prostatic hyperplasia)- (present on admission)  Assessment & Plan  -Continue tamsulosin daily  Patient is not making any urine    Dyslipidemia- (present on admission)  Assessment & Plan  -Continue rosuvastatin 20 mg daily    Essential hypertension- (present on admission)  Assessment & Plan  Continue metoprolol    End stage renal disease (HCC)- (present on admission)  Assessment & Plan  Continue dialysis by nephrologist  PD at night  Chronic anemia  Labs daily         VTE prophylaxis:   SCDs/TEDs   therapeutic anticoagulation with eliquis 2.5 mg BID      I have performed a physical exam and reviewed and updated ROS and Plan today (5/20/2025). In review of yesterday's note (5/19/2025), there are no changes except as documented above.    Greater than 51 minutes spent prepping to see patient (e.g. review of tests) obtaining and/or reviewing separately obtained history. Performing a medically appropriate examination and/ evaluation.  Counseling and educating the patient/family/caregiver.  Ordering medications, tests, or procedures.  Referring and communicating with other health care professionals.  Documenting clinical information in EPIC.  Independently interpreting results and communicating results to patient/family/caregiver.  Care coordination

## 2025-05-20 NOTE — PROGRESS NOTES
[facility] Progress Note     Hemodialysis treatment ordered today per Dr. Matta x 2 hours.  Treatment initiated at 1033 completed at 1233.    Pt A/Ox4, VSS, Procedure explained, verbalized understanding, consent signed by Pt. L fa AVF shallow and rolling, (+) Bruit/Thrill, patent using 17g sharp needles.    Pt tolerated tx, SBP trended down to 90s during HD , Pt asymptomatic, BP improved post tx. Pt stable post tx.       See electronic flow sheet for details.        Net UF 1, 300 mL.      Needles removed from LUE AVF access site. Dressings applied and sites held x 10 minutes; verified no bleeding. Positive bruit/thrill post tx.   Staff RN to monitor AVF for breakthrough bleeding. Should breakthrough bleeding occur, staff RN to apply pressure to access sites until bleeding resolved.   Notify Dialysis and Nephrologist for follow-up.     Report given to Primary RN.

## 2025-05-20 NOTE — CONSULTS
Cardiology Initial Consultation    Date of Service  5/20/2025    Referring Physician  Benjamin Pleitez M.D.    Reason for Consultation  Aortic stenosis.     History of Presenting Illness  Edy Bennett is a 79 y.o. male with a past medical history of aortic stenosis, cardiomyopathy, coronary artery disease status post stent placement (x3 at [facility] by Pavel Brown 08/2024), atrial fibrillation on chronic anticoagulation therapy (Eliquis), diabetes mellitus, hypertension and hyperlipidemia, peripheral vascular disease (with left popliteal to posterior tibial artery bypass with ipsilateral reversed greater saphenous vein by Marco Antonio Duran 01/20/25), end stage renal disease on peritoneal dialysis therapy managed by Dr. Milian, lifelong nonsmoker, family history of coronary artery disease who presented 5/19/2025 with shortness of breath fatigue and dizziness. He is followed by Royce Jung, who has sent him to [facility] to expedite TAVR care.      Review of Systems  Review of Systems   Constitutional:  Positive for fatigue. Negative for chills and fever.   HENT: Negative.  Negative for hearing loss.    Eyes: Negative.    Respiratory:  Positive for shortness of breath. Negative for cough.    Cardiovascular: Negative.  Negative for chest pain, palpitations and leg swelling.   Gastrointestinal: Negative.  Negative for abdominal pain, nausea and vomiting.   Genitourinary: Negative.  Negative for dysuria and urgency.   Musculoskeletal: Negative.  Negative for myalgias.   Skin: Negative.  Negative for rash.   Neurological:  Positive for dizziness. Negative for weakness and headaches.   Hematological:  Does not bruise/bleed easily.   Psychiatric/Behavioral: Negative.  The patient is not nervous/anxious.        Past Medical History   has a past medical history of A-V fistula (HCC), Arrhythmia (03/13/2024), Breath shortness, Cataract, Congestive heart failure  ([facility]), Diabetes ([facility]), Dialysis patient ([facility]), Dialysis patient ([facility]) (01/17/2025), Foot ulcer ([facility]), Hemorrhagic disorder ([facility]), High cholesterol, Hypertension (05/24/2024), NSTEMI (non-ST elevated myocardial infarction) ([facility]) (2/25/2025), DARIEN (obstructive sleep apnea) (01/17/2025), Pain, Peritoneal dialysis catheter in place ([facility]) (01/17/2025), Pneumonia, Renal disorder, Sleep apnea (01/10/2025), and Stroke ([facility]) (2021).    He has no past medical history of Acute nasopharyngitis, Anesthesia, Anginal syndrome ([facility]), Arthritis, Asthma, Blood clotting disorder ([facility]), Bowel habit changes, Bronchitis, Cancer ([facility]), Carcinoma in situ of respiratory system, Continuous ambulatory peritoneal dialysis status ([facility]), Coughing blood, Dental disorder, Disorder of thyroid, Emphysema of lung ([facility]), Glaucoma, Gynecological disorder, Heart burn, Heart murmur, Heart valve disease, Hepatitis A, Hepatitis B, Hepatitis C, Hiatus hernia syndrome, Indigestion, Infectious disease, Jaundice, Pacemaker, Pregnant, Psychiatric problem, Rheumatic fever, Seizure ([facility]), Snoring, Tuberculosis, Urinary bladder disorder, or Urinary incontinence.    Surgical History   has a past surgical history that includes other; other orthopedic surgery (2004); other cardiac surgery; other; other; av fistula creation (Left, 07/01/2024); cath placement capd (N/A, 09/16/2024); laparoscopic inguinal hernia repair (N/A, 12/18/2024); cath placement capd (N/A, 12/18/2024); aortogram with runoff (1/14/2025); pr vein bypass graft,fem-tibial (Left, 1/20/2025); pr upper gi endoscopy,diagnosis (N/A, 2/14/2025); pr upper gi endoscopy,biopsy (N/A, 2/14/2025); and pr upper gi endoscopy,ctrl bleed (N/A, 2/26/2025).    Family History  family history includes Cancer in his daughter and mother; Heart Disease in his brother and father.    Social History   reports that he has never smoked. He has never used smokeless tobacco. He reports that he does not currently use alcohol. He  reports that he does not use drugs.    Medications  Prior to Admission Medications   Prescriptions Last Dose Informant Patient Reported? Taking?   Home Care Oxygen 5/9/2025 Significant Other Yes No   Sig: Inhale 2 L/min at bedtime as needed for Shortness of Breath. Oxygen dose range: 2 to 2 L/min  Respiratory route via: Nasal Cannula   Oxygen supplier: [facility] [employer]      Indications: hypoxia   acetaminophen (TYLENOL) 500 MG Tab 5/19/2025 Noon Significant Other Yes Yes   Sig: Take 1,000 mg by mouth every 8 hours as needed for Mild Pain. Indications: Pain, 1-4/10 pain   apixaban (ELIQUIS) 2.5mg Tab 5/18/2025 Evening Significant Other Yes Yes   Sig: Take 2.5 mg by mouth 2 times a day. Indications: Atrial Fibrillation   calcitRIOL (ROCALTROL) 0.25 MCG Cap 5/19/2025 Noon Significant Other Yes Yes   Sig: Take 0.25 mcg by mouth 3 times a week. On Mon, Wed and Fri  Indications: Low Amount of Calcium in the Blood   clopidogrel (PLAVIX) 75 MG Tab 5/19/2025 Morning Significant Other Yes Yes   Sig: Take 75 mg by mouth every day. Indications: Coronary Bypass Surgery   gentamicin (GARAMYCIN) 0.1 % cream 5/19/2025 Morning Significant Other Yes Yes   Sig: Apply 1 Application topically every day. Use each and every time cath is accessed for peritoneal dialysis please  Indications: Profolactic   metoprolol SR (TOPROL XL) 50 MG TABLET SR 24 HR 5/19/2025 Morning Significant Other Yes Yes   Sig: Take 50 mg by mouth every morning. Indications: Atrial Fibrillation   midodrine (PROAMATINE) 2.5 MG Tab 5/19/2025 Noon Significant Other No Yes   Sig: Take 1 Tablet by mouth 3 times a day with meals.   montelukast (SINGULAIR) 10 MG Tab 5/19/2025 Morning Significant Other Yes Yes   Sig: Take 10 mg by mouth at bedtime. Indications: Asthma, Hayfever   nitroglycerin (NITROSTAT) 0.4 MG SL Tab Unknown Significant Other Yes No   Sig: Place 0.4 mg under the tongue every 5 minutes as needed for Chest Pain. Indications: Acute Angina Pectoris   omeprazole  (PRILOSEC) 40 MG delayed-release capsule 5/19/2025 Morning Significant Other Yes Yes   Sig: Take 40 mg by mouth 2 times a day. Indications: Gastroesophageal Reflux Disease   rosuvastatin (CRESTOR) 20 MG Tab 5/18/2025 Evening Significant Other Yes Yes   Sig: Take 20 mg by mouth at bedtime.     Indications: High Amount of Fats in the Blood   sevelamer carbonate (RENVELA) 800 MG Tab tablet 5/19/2025 Noon Significant Other Yes Yes   Sig: Take 800 mg by mouth 3 times a day with meals. Indications: High Amount of Phosphate in the Blood   tamsulosin (FLOMAX) 0.4 MG capsule 5/19/2025 Morning Significant Other No Yes   Sig: Take 1 Capsule by mouth 1/2 hour after breakfast.   traMADol (ULTRAM) 50 MG Tab Unknown Significant Other Yes No   Sig: Take 50 mg by mouth 1 time a day as needed for Moderate Pain (wound care pain). Indications: Acute Pain, Pain      Facility-Administered Medications: None       Allergies  Allergies[1]    Vital signs in last 24 hours  Temp:  [36.3 °C (97.3 °F)-36.9 °C (98.4 °F)] 36.3 °C (97.3 °F)  Pulse:  [65-87] 75  Resp:  [15-19] 17  BP: ()/(53-66) 111/65  SpO2:  [93 %-100 %] 99 %    Physical Exam  Physical Exam  Constitutional:       Appearance: Normal appearance. He is well-developed and normal weight.   HENT:      Head: Normocephalic and atraumatic.      Mouth/Throat:      Mouth: Mucous membranes are moist.   Eyes:      Extraocular Movements: Extraocular movements intact.      Conjunctiva/sclera: Conjunctivae normal.   Cardiovascular:      Rate and Rhythm: Normal rate and regular rhythm.      Pulses: Normal pulses.      Heart sounds: Murmur heard.   Pulmonary:      Effort: Pulmonary effort is normal.      Breath sounds: Normal breath sounds.   Abdominal:      General: Bowel sounds are normal.      Palpations: Abdomen is soft.   Musculoskeletal:         General: Normal range of motion.      Cervical back: Normal range of motion and neck supple.   Skin:     General: Skin is warm and dry.    Neurological:      General: No focal deficit present.      Mental Status: He is alert and oriented to person, place, and time. Mental status is at baseline.   Psychiatric:         Mood and Affect: Mood normal.         Behavior: Behavior normal.         Thought Content: Thought content normal.         Judgment: Judgment normal.     Gangrene toes.     Lab Review  Lab Results   Component Value Date/Time    WBC 9.3 05/20/2025 01:12 AM    RBC 3.43 (L) 05/20/2025 01:12 AM    HEMOGLOBIN 10.8 (L) 05/20/2025 01:12 AM    HEMATOCRIT 34.1 (L) 05/20/2025 01:12 AM    MCV 99.4 (H) 05/20/2025 01:12 AM    MCH 31.5 05/20/2025 01:12 AM    MCHC 31.7 (L) 05/20/2025 01:12 AM    MPV 9.9 05/20/2025 01:12 AM      Lab Results   Component Value Date/Time    SODIUM 136 05/20/2025 01:12 AM    POTASSIUM 5.9 (H) 05/20/2025 01:12 AM    CHLORIDE 95 (L) 05/20/2025 01:12 AM    CO2 17 (L) 05/20/2025 01:12 AM    GLUCOSE 157 (H) 05/20/2025 01:12 AM    BUN 85 (H) 05/20/2025 01:12 AM    CREATININE 9.29 (HH) 05/20/2025 01:12 AM      Lab Results   Component Value Date/Time    ASTSGOT 24 05/19/2025 05:02 PM    ALTSGPT 25 05/19/2025 05:02 PM     Lab Results   Component Value Date/Time    TROPONINT 175 (H) 05/19/2025 05:02 PM       Recent Labs     05/19/25  1702   NTPROBNP >38299*       Cardiac Imaging and Procedures Review  CARDIAC STUDIES/PROCEDURES:    CARDIAC CATHETERIZATION CONCLUSIONS by Pavel Brown at [facility] (08/30/23)  Cardiac catheterization with placement of 3.0 x 15 Gio drug eluding stent to proximal left circumflex artery, 2.5 x 38 [place] drug eluding stent and 2.5 x 12 mm Gio drug eluding stent to first obtuse marginal branch.   (study result reviewed)     ECHOCARDIOGRAM CONCLUSIONS (04/28/25)  Compared to the prior study on 2/14/2025: No significant changes   Mildly dilated LV with mild concentric LVH  Severely reduced systolic function, estimated LVEF 20-25%  Grade LV II LV diastolic dysfunction  Normal RV size  with reduced systolic function  Concern for low-flow/low-gradient severe AS: Vmax 3.1m/s, SABRA < 1cm2,   SVi < 35, AVAi < 0.6  Mild-moderate MR  No pericardial effusion  Normal IVC size  (study result reviewed)     ECHOCARDIOGRAM CONCLUSIONS (02/14/25)  No prior study is available for comparison.   The left ventricle is mildly dilated. Moderate concentric left   ventricular hypertrophy. Moderately reduced left ventricular systolic   function. The left ventricular ejection fraction is visually estimated   to be 30-35%.  Akinesis of the apex, anterioseptal and inferioseptal   wall . Global hypokinesis with regional variation. Grade II diastolic   dysfunction.  Moderately dilated left atrium  Mild to Moderate mitral regurgitation  Mild aortic valve stenosis, in which transvalvular velocities may be   underestimated given concern for low flow, low gradient aortic   stenosis.  Mild tricuspid regurgitation with estimated RV systolic pressure of 40 mmHg  Elevated RA pressure  (study result reviewed)     EKG performed on (05/19/25) was reviewed: EKG personally interpreted shows sinus rhythm.     Assessment/Plan  Aortic stenosis: He is a 79 y.o. male with a past medical history of aortic stenosis, cardiomyopathy, coronary artery disease status post stent placement (at [facility] by Pavel Brown 08/2024), atrial fibrillation on chronic anticoagulation therapy (Eliquis), diabetes mellitus, hypertension and hyperlipidemia, peripheral vascular disease (with left popliteal to posterior tibial artery bypass with ipsilateral reversed greater saphenous vein by Marco Antonio Duran 01/20/25), end stage renal disease on peritoneal dialysis therapy managed by Dr. Milian who presented with symptoms related to to his aortic stenosis. We will perform cardiac catheterization and refer to structural heart program for TAVR   The risks, benefits, and alternatives to coronary angiography with IV sedation were  discussed in great detail. Specific risks mentioned include bleeding, infection, kidney damage, allergic reaction, cardiac perforation with possible tamponade requiring masoud-cardiocentesis or possible open heart surgery. In addition, we discussed that 10% of patients will experience small to moderate bruising at the side of the arterial puncture. Lastly the risks of heart attack, stroke, and death were discussed; the risks of major complications such as heart attack or stroke caused by the angiogram is less than 1%; the risk of death is approximately 1 in 1000. The patient verbalized understanding of these potential complications and wishes to proceed with this procedure.    Thank you for allowing me to participate in the care of this patient.    I will continue to follow this patient    Please contact me with any questions.    Tomas Anderson M.D.   Cardiologist, [facility]  [phone]               [1]   Allergies  Allergen Reactions    Hydroxyzine Hives, Itching and Anxiety     Severe Itching and hives.

## 2025-05-21 ENCOUNTER — APPOINTMENT (OUTPATIENT)
Dept: CARDIOLOGY | Facility: MEDICAL CENTER | Age: 80
DRG: 286 | End: 2025-05-21
Attending: NURSE PRACTITIONER
Payer: MEDICARE

## 2025-05-21 ENCOUNTER — HOME CARE VISIT (OUTPATIENT)
Dept: HOME HEALTH SERVICES | Facility: HOME HEALTHCARE | Age: 80
End: 2025-05-21
Payer: MEDICARE

## 2025-05-21 VITALS
WEIGHT: 142.2 LBS | SYSTOLIC BLOOD PRESSURE: 90 MMHG | TEMPERATURE: 97.2 F | HEART RATE: 81 BPM | HEIGHT: 70 IN | RESPIRATION RATE: 18 BRPM | BODY MASS INDEX: 20.36 KG/M2 | DIASTOLIC BLOOD PRESSURE: 54 MMHG | OXYGEN SATURATION: 100 %

## 2025-05-21 LAB
ANION GAP SERPL CALC-SCNC: 19 MMOL/L (ref 7–16)
BUN SERPL-MCNC: 50 MG/DL (ref 8–22)
CALCIUM SERPL-MCNC: 9.2 MG/DL (ref 8.5–10.5)
CHLORIDE SERPL-SCNC: 95 MMOL/L (ref 96–112)
CO2 SERPL-SCNC: 22 MMOL/L (ref 20–33)
CREAT SERPL-MCNC: 7.11 MG/DL (ref 0.5–1.4)
GFR SERPLBLD CREATININE-BSD FMLA CKD-EPI: 7 ML/MIN/1.73 M 2
GLUCOSE SERPL-MCNC: 149 MG/DL (ref 65–99)
LV EJECT FRACT  99904: 20
POTASSIUM SERPL-SCNC: 4.6 MMOL/L (ref 3.6–5.5)
SODIUM SERPL-SCNC: 136 MMOL/L (ref 135–145)

## 2025-05-21 PROCEDURE — 700102 HCHG RX REV CODE 250 W/ 637 OVERRIDE(OP)

## 2025-05-21 PROCEDURE — 99239 HOSP IP/OBS DSCHRG MGMT >30: CPT | Performed by: INTERNAL MEDICINE

## 2025-05-21 PROCEDURE — 700111 HCHG RX REV CODE 636 W/ 250 OVERRIDE (IP)

## 2025-05-21 PROCEDURE — 90945 DIALYSIS ONE EVALUATION: CPT

## 2025-05-21 PROCEDURE — 99232 SBSQ HOSP IP/OBS MODERATE 35: CPT | Performed by: INTERNAL MEDICINE

## 2025-05-21 PROCEDURE — 93350 STRESS TTE ONLY: CPT

## 2025-05-21 PROCEDURE — 80048 BASIC METABOLIC PNL TOTAL CA: CPT

## 2025-05-21 PROCEDURE — 93350 STRESS TTE ONLY: CPT | Mod: 26 | Performed by: INTERNAL MEDICINE

## 2025-05-21 PROCEDURE — A9270 NON-COVERED ITEM OR SERVICE: HCPCS

## 2025-05-21 PROCEDURE — A9270 NON-COVERED ITEM OR SERVICE: HCPCS | Performed by: INTERNAL MEDICINE

## 2025-05-21 PROCEDURE — 700102 HCHG RX REV CODE 250 W/ 637 OVERRIDE(OP): Performed by: INTERNAL MEDICINE

## 2025-05-21 PROCEDURE — 36415 COLL VENOUS BLD VENIPUNCTURE: CPT

## 2025-05-21 RX ORDER — SODIUM CHLORIDE 9 MG/ML
500 INJECTION, SOLUTION INTRAVENOUS
Status: DISCONTINUED | OUTPATIENT
Start: 2025-05-21 | End: 2025-05-21 | Stop reason: HOSPADM

## 2025-05-21 RX ORDER — DOBUTAMINE HYDROCHLORIDE 100 MG/100ML
INJECTION INTRAVENOUS
Status: COMPLETED
Start: 2025-05-21 | End: 2025-05-21

## 2025-05-21 RX ORDER — DOBUTAMINE HYDROCHLORIDE 100 MG/100ML
0-20 INJECTION INTRAVENOUS CONTINUOUS
Status: DISCONTINUED | OUTPATIENT
Start: 2025-05-21 | End: 2025-05-21 | Stop reason: HOSPADM

## 2025-05-21 RX ADMIN — CLOPIDOGREL BISULFATE 75 MG: 75 TABLET, FILM COATED ORAL at 05:50

## 2025-05-21 RX ADMIN — APIXABAN 2.5 MG: 5 TABLET, FILM COATED ORAL at 05:50

## 2025-05-21 RX ADMIN — TAMSULOSIN HYDROCHLORIDE 0.4 MG: 0.4 CAPSULE ORAL at 09:15

## 2025-05-21 RX ADMIN — OMEPRAZOLE 40 MG: 20 CAPSULE, DELAYED RELEASE ORAL at 05:50

## 2025-05-21 RX ADMIN — SODIUM ZIRCONIUM CYCLOSILICATE 10 G: 10 POWDER, FOR SUSPENSION ORAL at 09:15

## 2025-05-21 RX ADMIN — DOBUTAMINE HYDROCHLORIDE 5 MCG/KG/MIN: 100 INJECTION INTRAVENOUS at 12:22

## 2025-05-21 ASSESSMENT — ENCOUNTER SYMPTOMS
ABDOMINAL PAIN: 0
FEVER: 0
CHILLS: 0
EYES NEGATIVE: 1
NERVOUS/ANXIOUS: 0
VOMITING: 0
MYALGIAS: 0
PALPITATIONS: 0
PSYCHIATRIC NEGATIVE: 1
CONSTITUTIONAL NEGATIVE: 1
COUGH: 0
CARDIOVASCULAR NEGATIVE: 1
RESPIRATORY NEGATIVE: 1
DIZZINESS: 0
WEAKNESS: 0
BRUISES/BLEEDS EASILY: 0
SHORTNESS OF BREATH: 0
NEUROLOGICAL NEGATIVE: 1
MUSCULOSKELETAL NEGATIVE: 1
NAUSEA: 0
GASTROINTESTINAL NEGATIVE: 1
HEADACHES: 0

## 2025-05-21 ASSESSMENT — FIBROSIS 4 INDEX: FIB4 SCORE: 1.94

## 2025-05-21 ASSESSMENT — PAIN DESCRIPTION - PAIN TYPE: TYPE: ACUTE PAIN

## 2025-05-21 NOTE — PROGRESS NOTES
Patient down for cardiac procedure with ACLS RN and transport. Monitor room informed patient on zoll.

## 2025-05-21 NOTE — PROGRESS NOTES
"[facility] -  PROGRESS NOTE               Author: Ramírez Matta M.D. Date & Time: 5/21/2025  6:11 AM     HPI:  Edy Bennett is a 79 y.o. male with a past medical history of aortic stenosis, cardiomyopathy, coronary artery disease status post stent placement (x3 at [facility] by Pavel Brown 08/2024), atrial fibrillation on chronic anticoagulation therapy (Eliquis), diabetes mellitus, hypertension and hyperlipidemia, peripheral vascular disease (with left popliteal to posterior tibial artery bypass with ipsilateral reversed greater saphenous vein by Marco Antonio Duran 01/20/25), end stage renal disease on peritoneal dialysis therapy managed by Dr. Arcenio Milian, lifelong nonsmoker, family history of coronary artery disease who presented 5/19/2025 with shortness of breath fatigue and dizziness. He has sent him to [facility] to for possible TAVR. Nephrology was asked to see him for evaluation and management of acute and chronic conditions related to Nephrology.    DAILY NEPHROLOGY SUMMARY:  5/21 - S/P Cath yesterday. TAVR was recommended. Palliative also on board.     REVIEW OF SYSTEMS:    10 point ROS reviewed and is as per HPI/daily summary or otherwise negative    PMH/PSH/SH/FH:   Reviewed and unchanged since admission note    CURRENT MEDICATIONS:   Reviewed from admission to present day    VS:  /67   Pulse 74   Temp 36.4 °C (97.5 °F) (Temporal)   Resp 18   Ht 1.778 m (5' 10\")   Wt 64.5 kg (142 lb 3.2 oz)   SpO2 99%   BMI 20.40 kg/m²     Physical Exam  Vitals and nursing note reviewed.      Constitutional:       General: He is not in acute distress.  HENT:      Mouth/Throat:      Mouth: Mucous membranes are moist.      Pharynx: No posterior oropharyngeal erythema.   Cardiovascular:      Rate and Rhythm: Normal rate and regular rhythm.      Heart sounds: No murmur heard.     No gallop.   Pulmonary:      Effort: Pulmonary effort " is normal. No respiratory distress.      Breath sounds: Normal breath sounds. No stridor. No wheezing, rhonchi or rales.   Abdominal:      General: There is no distension.      Tenderness: There is no abdominal tenderness. There is no right CVA tenderness, left CVA tenderness or guarding.   Musculoskeletal:      Right lower leg: Edema present.      Left lower leg: No edema.   Lymphadenopathy:      Cervical: No cervical adenopathy.   Skin:     Findings: Bruising present. No rash.      Comments: Black necrotic ulceration over multiple distal toes, closed without drainage   Neurological:      Mental Status: He is alert and oriented to person, place, and time.      Cranial Nerves: No cranial nerve deficit.      Sensory: No sensory deficit.   Psychiatric:         Mood and Affect: Mood normal    Fluids:  In: 500 [Dialysis:500]  Out: 1800     LABS:  Recent Labs     05/19/25  1702 05/20/25  0112   SODIUM 137 136   POTASSIUM 5.5 5.9*   CHLORIDE 94* 95*   CO2 20 17*   GLUCOSE 115* 157*   BUN 75* 85*   CREATININE 9.34* 9.29*   CALCIUM 9.2 8.9       IMAGING:   All imaging reviewed from admission to present day    IMPRESSION:  # ESRD  # HTN, controlled   - Goal BP < 140/90  # Anemia of CKD  # CKD-MBD/Renal Osteodystrophy  # Metabolic acidosis  # HFrEF - volume overload  # Hyperkalemia  - EF 20%  # Hx CAD with stent   # PAD  # Severe aortic stenosis     SUGGESTIONS:  - Continue CCPD during hospital stay starting this evening.  - PRBC for hb<7  - Dose all meds per ESRD  - Renal diet, low phos   - Cont Phos binders   - No protein restrictions  - Avoid Fleet enema in dialysis patients   - Cardiology/Palliative following

## 2025-05-21 NOTE — DISCHARGE PLANNING
Case Management Discharge Planning    Admission Date: 5/19/2025  GMLOS: 5.4  ALOS: 2    6-Clicks ADL Score: 24  6-Clicks Mobility Score: 20      Anticipated Discharge Dispo: Discharge Disposition: Discharged to home/self care (01)  Discharge Address: [address] [address]  Discharge Contact Phone Number: [phone]    DME Needed: Pending hospital course     Action(s) Taken: DC Assessment Complete (See below) Chart reviewed and pt discussed in IDT Rounds echo and stress test are pending to determine possible TAVR.    Escalations Completed: None    Medically Clear: No    Next Steps: F/U with HCM needs     Barriers to Discharge: Medical clearance    Is the patient up for discharge tomorrow: No        Care Transition Team Assessment  LMSW met with pt and spouse at bedside to conduct assessment and verify demographics. Pt is AOX4 though spouse provided all of the following information below. Pt was admitted on 5/19/25 for Aortic stenosis, severe. Please see pt's H&P for prior medical history.    Pt's PCP is YUDY CHRIS M.D.     Spouse verified address on file. Pt lives with spouse in a SS house with no steps to enter.Spouse is a great support for pt.    Pt's emergency contact on file is his spouse Harry Ross [phone].    Prior to admission pt was modified independent with ADLS and IADLS with walker, WC, cane, shower chair, grab bars, higher toilet, etc. Pt does use O2 at BL only at night. BL is 2.5L with galvin.    Pt has [facility] and [facility].    Pt's preferred pharmacy is [facility] [address].    Pt's insurance is Medicare and [employer].    Upon DC pt has means of transportation home via spouse.     Information Source  Orientation Level: Oriented X4  Information Given By: Patient, Spouse  Informant's Name: HARRY ROSS  Who is responsible for making decisions for patient? : Patient    Readmission Evaluation  Is this a readmission?: Yes - unplanned readmission    Elopement Risk  Legal Hold:  No  Ambulatory or Self Mobile in Wheelchair: No-Not an Elopement Risk  Elopement Risk: Not at Risk for Elopement    Interdisciplinary Discharge Planning  Primary Care Physician: YUDY CHRIS M.D.  Lives with - Patient's Self Care Capacity: Spouse  Patient or legal guardian wants to designate a caregiver: No  Support Systems: Family Member(s), Spouse / Significant Other, Friends / Neighbors  Housing / Facility: [address]    Discharge Preparedness  What is your plan after discharge?: Uncertain - pending medical team collaboration  What are your discharge supports?: Spouse  Prior Functional Level: Ambulatory, Independent with Activities of Daily Living, Independent with Medication Management, Uses Cane, Uses Walker, Uses Wheelchair  Difficulity with ADLs: None  Difficulity with IADLs: None    Functional Assesment  Prior Functional Level: Ambulatory, Independent with Activities of Daily Living, Independent with Medication Management, Uses Cane, Uses Walker, Uses Wheelchair    Finances  Financial Barriers to Discharge: No  Prescription Coverage: Yes    Vision / Hearing Impairment  Vision Impairment : Yes  Right Eye Vision: Impaired, Wears Glasses  Left Eye Vision: Impaired, Wears Glasses  Hearing Impairment : No    Advance Directive  Advance Directive?: None    Domestic Abuse  Have you ever been the victim of abuse or violence?: No  Possible Abuse/Neglect Reported to:: Not Applicable    Psychological Assessment  History of Substance Abuse: None  History of Psychiatric Problems: No  Non-compliant with Treatment: No  Newly Diagnosed Illness: No    Discharge Risks or Barriers  Discharge risks or barriers?: No    Anticipated Discharge Information  Discharge Disposition: Discharged to home/self care (01)  Discharge Address: [address] [place] [id number]  Discharge Contact Phone Number: [phone]

## 2025-05-21 NOTE — PROGRESS NOTES
Cardiology Follow Up Progress Note    Date of Service  5/21/2025    Attending Physician  Benjamin Pleitez M.D.    Chief Complaint   Aortic stenosis.     QASIM Bennett is a 79 y.o. male admitted 5/19/2025 with above.    Interim Events  He underwent cardiac catheterization as below.    Review of Systems  Review of Systems   Constitutional: Negative.  Negative for chills and fever.   HENT: Negative.  Negative for hearing loss.    Eyes: Negative.    Respiratory: Negative.  Negative for cough and shortness of breath.    Cardiovascular: Negative.  Negative for chest pain, palpitations and leg swelling.   Gastrointestinal: Negative.  Negative for abdominal pain, nausea and vomiting.   Genitourinary: Negative.  Negative for dysuria and urgency.   Musculoskeletal: Negative.  Negative for myalgias.   Skin: Negative.  Negative for rash.   Neurological: Negative.  Negative for dizziness, weakness and headaches.   Hematological:  Does not bruise/bleed easily.   Psychiatric/Behavioral: Negative.  The patient is not nervous/anxious.        Vital signs in last 24 hours  Temp:  [36.2 °C (97.2 °F)-36.5 °C (97.7 °F)] 36.4 °C (97.5 °F)  Pulse:  [] 71  Resp:  [17-18] 18  BP: ()/(45-86) 104/57  SpO2:  [92 %-100 %] 98 %    Physical Exam  Physical Exam  Constitutional:       Appearance: Normal appearance. He is well-developed and normal weight.   HENT:      Head: Normocephalic and atraumatic.      Mouth/Throat:      Mouth: Mucous membranes are moist.   Eyes:      Extraocular Movements: Extraocular movements intact.      Conjunctiva/sclera: Conjunctivae normal.   Cardiovascular:      Rate and Rhythm: Normal rate and regular rhythm.      Pulses: Normal pulses.      Heart sounds: Murmur heard.   Pulmonary:      Effort: Pulmonary effort is normal.      Breath sounds: Normal breath sounds.   Abdominal:      General: Bowel sounds are normal.      Palpations: Abdomen is soft.   Musculoskeletal:         General: Normal range  of motion.      Cervical back: Normal range of motion and neck supple.   Skin:     General: Skin is warm and dry.   Neurological:      General: No focal deficit present.      Mental Status: He is alert and oriented to person, place, and time. Mental status is at baseline.   Psychiatric:         Mood and Affect: Mood normal.         Behavior: Behavior normal.         Thought Content: Thought content normal.         Judgment: Judgment normal.         Lab Review  Lab Results   Component Value Date/Time    WBC 9.3 05/20/2025 01:12 AM    RBC 3.43 (L) 05/20/2025 01:12 AM    HEMOGLOBIN 10.8 (L) 05/20/2025 01:12 AM    HEMATOCRIT 34.1 (L) 05/20/2025 01:12 AM    MCV 99.4 (H) 05/20/2025 01:12 AM    MCH 31.5 05/20/2025 01:12 AM    MCHC 31.7 (L) 05/20/2025 01:12 AM    MPV 9.9 05/20/2025 01:12 AM      Lab Results   Component Value Date/Time    SODIUM 136 05/21/2025 08:22 AM    POTASSIUM 4.6 05/21/2025 08:22 AM    CHLORIDE 95 (L) 05/21/2025 08:22 AM    CO2 22 05/21/2025 08:22 AM    GLUCOSE 149 (H) 05/21/2025 08:22 AM    BUN 50 (H) 05/21/2025 08:22 AM    CREATININE 7.11 (HH) 05/21/2025 08:22 AM      Lab Results   Component Value Date/Time    ASTSGOT 24 05/19/2025 05:02 PM    ALTSGPT 25 05/19/2025 05:02 PM     Lab Results   Component Value Date/Time    TROPONINT 175 (H) 05/19/2025 05:02 PM       Recent Labs     05/19/25  1702   NTPROBNP >16651*   (Above labs reviewed.)     Current Medications[1]  (Medications reviewed.)    Cardiac Imaging and Procedures Review  CARDIAC STUDIES/PROCEDURES:    CARDIAC CATHETERIZATION CONCLUSIONS by Dwight Harkins (05/20/25)  Severe obstructive one-vessel coronary artery disease with a subtotal chronic occlusion of the proximal right coronary artery.  Widely patent first obtuse marginal branch stents.  Normal ascending aorta diameter at 3.0 cm.    Mild nonobstructive bilateral iliofemoral arterial disease.  (study result reviewed)      CARDIAC CATHETERIZATION CONCLUSIONS by Pavel Brown at  [facility] (08/30/23)  Cardiac catheterization with placement of 3.0 x 15 Gio drug eluding stent to proximal left circumflex artery, 2.5 x 38 Gio drug eluding stent and 2.5 x 12 mm [place] drug eluding stent to first obtuse marginal branch.     ECHOCARDIOGRAM CONCLUSIONS (04/28/25)  Compared to the prior study on 2/14/2025: No significant changes   Mildly dilated LV with mild concentric LVH  Severely reduced systolic function, estimated LVEF 20-25%  Grade LV II LV diastolic dysfunction  Normal RV size with reduced systolic function  Concern for low-flow/low-gradient severe AS: Vmax 3.1m/s, SABRA < 1cm2,   SVi < 35, AVAi < 0.6  Mild-moderate MR  No pericardial effusion  Normal IVC size    ECHOCARDIOGRAM CONCLUSIONS (02/14/25)  No prior study is available for comparison.   The left ventricle is mildly dilated. Moderate concentric left   ventricular hypertrophy. Moderately reduced left ventricular systolic   function. The left ventricular ejection fraction is visually estimated   to be 30-35%.  Akinesis of the apex, anterioseptal and inferioseptal   wall . Global hypokinesis with regional variation. Grade II diastolic   dysfunction.  Moderately dilated left atrium  Mild to Moderate mitral regurgitation  Mild aortic valve stenosis, in which transvalvular velocities may be   underestimated given concern for low flow, low gradient aortic   stenosis.  Mild tricuspid regurgitation with estimated RV systolic pressure of 40 mmHg  Elevated RA pressure    EKG performed on (05/19/25) EKG shows sinus rhythm.    Assessment/Plan  Low flow low gradient severe aortic stenosis: His aortic stenosis has likely reached severe status. We will perform dobutamine stress echocardiogram.  History of  cardiomyopathy, coronary artery disease status post stent placement (at [facility] by Pavel Brown 08/2024), atrial fibrillation on chronic anticoagulation therapy (Eliquis), diabetes mellitus,  hypertension and hyperlipidemia, peripheral vascular disease (with left popliteal to posterior tibial artery bypass with ipsilateral reversed greater saphenous vein by Marco Antonio Duran 01/20/25), end stage renal disease on peritoneal dialysis therapy managed by Dr. Milian     Thank you for allowing me to participate in the care of this patient.  I will continue to follow this patient    Please contact me with any questions.    Tomas Anderson M.D.   Cardiologist, [facility]  [phone]             [1]   Current Facility-Administered Medications:     sodium zirconium cyclosilicate (Lokelma) packet 10 g, 10 g, Oral, BID, Benjamin Pleitez M.D., 10 g at 05/21/25 0915    gentamicin (Garamycin) 0.1 % cream 1 Application, 1 Application, Topical, DAILY, Ramírez Matta M.D.    NS (Bolus) 0.9 % infusion 200 mL, 200 mL, Intravenous, DIALYSIS PRN, Ramírez Matta M.D.    heparin intracatheter (for DIALYSIS USE ONLY) 9,000 Units, 9,000 Units, Intraperitoneal, DIALYSIS PRN, Ramírez Matta M.D., 9,000 Units at 05/20/25 1745    apixaban (Eliquis) tablet 2.5 mg, 2.5 mg, Oral, BID, Benjamin Pleitez M.D., 2.5 mg at 05/21/25 0550    clopidogrel (Plavix) tablet 75 mg, 75 mg, Oral, DAILY, Gray Duran M.D., 75 mg at 05/21/25 0550    [Held by provider] metoprolol SR (Toprol XL) tablet 50 mg, 50 mg, Oral, QAM, Gray Duran M.D., 50 mg at 05/20/25 0613    [Held by provider] midodrine (Proamatine) tablet 2.5 mg, 2.5 mg, Oral, TID WITH MEALS, Gray Duran M.D.    montelukast (Singulair) tablet 10 mg, 10 mg, Oral, QHS, Gray Duran M.D., 10 mg at 05/20/25 2032    nitroglycerin (Nitrostat) tablet 0.4 mg, 0.4 mg, Sublingual, Q5 MIN PRN, Gray Duran M.D.    omeprazole (PriLOSEC) capsule 40 mg, 40 mg, Oral, BID, Gray Duran M.D., 40 mg at 05/21/25 0550    rosuvastatin (Crestor) tablet 20 mg, 20 mg, Oral,  QHS, Gray Duran M.D., 20 mg at 05/20/25 2032    sevelamer carbonate (Renvela) tablet 800 mg, 800 mg, Oral, TID WITH MEALS, Gray Duran M.D., 800 mg at 05/20/25 1643    tamsulosin (Flomax) capsule 0.4 mg, 0.4 mg, Oral, AFTER BREAKFAST, Gray Duran M.D., 0.4 mg at 05/21/25 0915    traMADol (Ultram) 50 MG tablet 50 mg, 50 mg, Oral, QDAY PRN, Gray Duran M.D.

## 2025-05-21 NOTE — PROGRESS NOTES
Monitor Summary  Rhythm: Normal Sinus Rhythm w/ BBB  Rate: 69 - 74  Ectopy: PVCs, Bigeminy , Trigeminy  .18 / .11 / .47

## 2025-05-21 NOTE — PROGRESS NOTES
Dobutamine Stress Test    PRE-PROCEDURE:   PT INPATIENT PLEASE SEE CARDIOLOGY MD NOTES FOR HISTORY   NPO Status: YES  Medications Taken Day of Test: SEE EPIC MAR   Consent: Completed  Time Out: Completed    ASSESSMENT:  Neuro: Within Normal Limits  Pulse Rate: Regular   Resting Blood Pressure: 122/56    CONTRAINDICATIONS: NONE    PROCEDURE PREPARATION:  IV started: # 18 gauge IV started in R Antecubital   Explanation of procedure to patient: done  Crash cart: present  Labetalol, atropine, and nitro at bedside: NONE    PRE-PROCEDURE:  HR: 78  BP: 122/58    5 mcg at 5 minutes:  HR:72  BP:107/52    STOPPED:  HR: 79  BP: 101/51    Please refer to MAR for medications.     POST PROCEDURE (RECOVERY):  HR: 75  BP: 102/51    TEST TERMINATION: Dr. SOLIS  Notified of Test Termination Reason :  COMPLETED PROTOCOL

## 2025-05-21 NOTE — PROGRESS NOTES
[facility] Progress Note     CCPD x 10.5 hours, 2100mL fills x 6 cycles using all 4.25 % PD solution and 2 bags of 2.5% PD solution mixed with Heparin 500 units/liter ordered per Dr. Matta    Orders reviewed,verified and updated, CCPD cycler therapy settings verified.    CCPD treatment initiated at 1745  without any issues  Patient and PD access assessed prior to therapy    Patient AOx4, resting calmly, interactive, (-) abd distention, denies pain/abd discomfort, stable VS.    Patient with intact and patent RLQ PD catheter aseptically connected per policy.    RLQ PD catheter and exit site in good condition, no signs of infection noted, cleansed with antiseptic and dressings changed per policy     Initial drain: 13 mL     Report given to primary care nurse      Patient on dwell 1/5 prior to departure from bedside.     Please contact on call dialysis RN for any issues with persistent alarms/assistance with troubleshooting or patient not tolerating therapy.      For on-call Dialysis RN, pls contact [facility] at [phone] or call [facility] PD service support hotline at [phone]

## 2025-05-21 NOTE — CARE PLAN
The patient is Stable - Low risk of patient condition declining or worsening    Shift Goals  Clinical Goals: Post op vitals  Patient Goals: Sleep  Family Goals: Get updates    Progress made toward(s) clinical / shift goals:    Problem: Knowledge Deficit - Standard  Goal: Patient and family/care givers will demonstrate understanding of plan of care, disease process/condition, diagnostic tests and medications  Description: Target End Date:  1-3 days or as soon as patient condition allowsDocument in Patient Education1.  Patient and family/caregiver oriented to unit, equipment, visitation policy and means for communicating concern2.  Complete/review Learning Assessment3.  Assess knowledge level of disease process/condition, treatment plan, diagnostic tests and medications4.  Explain disease process/condition, treatment plan, diagnostic tests and medications  Outcome: Progressing     Problem: Fall Risk  Goal: Patient will remain free from falls  Description: Target End Date:  Prior to discharge or change in level of careDocument interventions on the Tsang Patric Fall Risk Assessment1.  Assess for fall risk factors2.  Implement fall precautions  Outcome: Progressing       Patient is not progressing towards the following goals:

## 2025-05-21 NOTE — PROGRESS NOTES
[facility] Progress Note        CCPD disconnected aseptically at 0620. Pt stable, VSS, alert and oriented. Procedure explained to pt and pt verbalized understanding.     PD exit site CDI with gauze dressing in place.     Effluent clear yellow with no fibrin noted.     24 hour UF: 1744mL (1731mL total UF + 13mL initial drain - 0mL last fill)      Report given to primary RN.

## 2025-05-22 ENCOUNTER — TELEPHONE (OUTPATIENT)
Dept: CARDIOLOGY | Facility: MEDICAL CENTER | Age: 80
End: 2025-05-22
Payer: MEDICARE

## 2025-05-22 ENCOUNTER — HOME CARE VISIT (OUTPATIENT)
Dept: HOME HEALTH SERVICES | Facility: HOME HEALTHCARE | Age: 80
End: 2025-05-22
Payer: MEDICARE

## 2025-05-22 NOTE — DISCHARGE INSTRUCTIONS
Wound care   Left medial ankle: Nursing to remove old dressing. Gently cleanse wound with Wound Cleanser or NS and gauze. Pat dry. Apply no sting skin barrier to the masoud-wound. Cut a piece of Jessica to fit over wound bed, apply over wound bed and saturate.     Toes: Nursing to gently cleanse the area with moist warm washcloth and no rinse foam soap. Pat dry. Moisten a piece of 4x4 gauze with betadine (or use betadine swab stick), gently paint the black eschar with betadine. Leave area open to air. Nursing to complete every shift.       HF Patient Discharge Instructions  Monitor your weight daily, and maintain a weight chart, to track your weight changes.   Activity as tolerated, unless your Doctor has ordered otherwise. Other activity order.  Follow a low fat, low cholesterol, low salt diet unless instructed otherwise by your Doctor. Read the labels on the back of food products and track your intake of fat, cholesterol and salt.   Fluid Restriction No. If a Fluid Restriction has been ordered by your Doctor, measure fluids with a measuring cup to ensure that you are not exceeding the restriction.   No smoking.  Oxygen No. If your Doctor has ordered that you wear Oxygen at home, it is important to wear it as ordered.  Did you receive an explanation from staff on the importance of taking each of your medications and why it is necessary to keep taking them unless your doctor says to stop? Yes  Were all of your questions answered about how to manage your heart failure and what to do if you have increased signs and symptoms after you go home? Yes  Do you feel like your heart failure care team involved you in the care treatment plan and allowed you to make decisions regarding your care while in the hospital and addressed any discharge needs you might have? Yes    See the educational handout provided at discharge for more information on monitoring your daily weight, activity and diet. This also explains more about Heart  Failure, symptoms of a flare-up and some of the tests that you have undergone.     Warning Signs of a Flare-Up include:  Swelling in the ankles or lower legs.  Shortness of breath, while at rest, or while doing normal activities.   Shortness of breath at night when in bed, or coughing in bed.   Requiring more pillows to sleep at night, or needing to sit up at night to sleep.  Feeling weak, dizzy or fatigued.     When to call your Doctor:  Call your Primary Care Physician or Cardiologist if:   You experience any pain radiating to your jaw or neck.  You have any difficulty breathing.  You experience weight gain of 3 lbs in a day or 5 lbs in a week.   You feel any palpitations or irregular heartbeats.  You become dizzy or lose consciousness.   If you have had an angiogram or had a pacemaker or AICD placed, and experience:  Bleeding, drainage or swelling at the surgical / puncture site.  Fever greater than 100.0 F  Shock from internal defibrillator.  Cool and / or numb extremities.     Please access the [facility] My HF Guide/Heart Failure Interactive Workbook:   [email or web address]

## 2025-05-22 NOTE — PROGRESS NOTES
Patient discharged home. Patient AOX 4.  IV and tele box removed, discharge instructions provided to patient and reviewed with them. All questions answered, patient provided copy of discharge instructions and instructed on when to F/U with MD. Patient wheeled off unit. Patient discharged into the care of spouse.

## 2025-05-22 NOTE — TELEPHONE ENCOUNTER
Vivian ARANDA received call from patient's spouse Kailey requesting call back to discuss cancelling DSE scheduled on 5/28 and to confirm upcoming visit with Dr. Yoon.    Called and left message for Kailey requesting return call.

## 2025-05-22 NOTE — DISCHARGE SUMMARY
Discharge Summary    CHIEF COMPLAINT ON ADMISSION  Chief Complaint   Patient presents with    Sent by MD Dr Asencio called ahead for admission, pt scheduled for procedure and wants procedure to be completed sooner.      Shortness of Breath     With ambulation.  O2 at home at night at times.        Reason for Admission  acute respiratory failure due to heart failure and aortic stenosis    Admission Date  5/19/2025    CODE STATUS  Full Code    HPI & HOSPITAL COURSE    79-year-old male with history of severe aortic stenosis, heart failure EF 25%, atrial fibrillation, severe peripheral artery disease and end-stage kidney disease on dialysis who presented 5/19 with worsening shortness of breath.  Patient has severe aortic stenosis and being followed by cardiologist and planning for TAVR, patient ca cancel addendum for rales me with worsening shortness of breath, no fever or chills or other symptoms.  Patient was on room air, labs around baseline with chronic anemia and chronic kidney disease.  Nephrology was consulted and started with hemodialysis, patient receiving PD as outpatient.  Cardiology was consulted for possible TAVR at the hospital, patient underwent cardiac cath which showed Severe obstructive one-vessel coronary artery disease with a subtotal chronic occlusion of the proximal right coronary artery.  Also patient underwent dobutamine echo stress test which showed patient with moderate AS, cardiologist cleared the patient for discharge to follow-up with Dr. Asencio as outpatient, during hospitalization patient underwent hemodialysis with improving on his shortness of breath and his lungs was clear, patient was walking around with no need of oxygen no shortness of breath.  Patient will be discharged with PD  at home with instruction of following with peritoneal dialysis center, all instruction was provided to the patient, encouraged the patient to follow-up closely with cardiology and nephrology as outpatient.       Patient has severe heart failure with EF 20%, patient is on Eliquis with metoprolol and midodrine with rosuvastatin, unfortunately patient is not qualified for ACE inhibitors, ARB's, spironolactone and SGLT2 due to end-stage kidney disease.    On the day of discharge the patient was alert and oriented x 4, denied any new symptoms, no significant crackles, patient was cleared by cardiology and nephrology for discharge, plan of care was discussed in detail with the patient and wife at bedside, answered all their questions, no need for oxygen and follow-up with PCP and nephrologist.      Therefore, he is discharged in good and stable condition to home with close outpatient follow-up.    The patient met 2-midnight criteria for an inpatient stay at the time of discharge.    Discharge Date  05/21/25      FOLLOW UP ITEMS POST DISCHARGE  Follow-up with nephrology  Follow-up with cardiology  finally gave her    DISCHARGE DIAGNOSES  Principal Problem:    Aortic stenosis, severe (POA: Yes)  Active Problems:    End stage renal disease (HCC) (POA: Yes)    Essential hypertension (POA: Yes)    Dyslipidemia (POA: Yes)    BPH (benign prostatic hyperplasia) (POA: Yes)    Peripheral artery disease (HCC) (POA: Yes)    Typical atrial flutter (HCC) (POA: Yes)    Heart failure with reduced ejection fraction (HCC) (POA: Unknown)    Elevated troponin (POA: Unknown)  Resolved Problems:    * No resolved hospital problems. *      FOLLOW UP  Future Appointments   Date Time Provider Department Center   5/28/2025  3:45 PM [facility] EXAM 12 ECHO [facility]   6/4/2025  1:40 PM Jerardo Yoon M.D. [facility] None   6/19/2025  1:30 PM Marco Antonio Miller M.D. [facility] None     Denise Carver M.D.  [address] [facility] [address]  [phone]    Follow up in 1 week(s)      [facility]  [address] [facility] [address]  [phone]  Follow up on 5/28/2025  walk to at 0800 for continued CHF care      MEDICATIONS ON DISCHARGE      Medication List        START taking these medications        Instructions   sodium zirconium cyclosilicate 10 g packet  Commonly known as: Lokelma   Take 10 g by mouth 2 times a day.  Dose: 10 g            CONTINUE taking these medications        Instructions   apixaban 2.5mg Tabs  Commonly known as: Eliquis   Take 2.5 mg by mouth 2 times a day. Indications: Atrial Fibrillation  Dose: 2.5 mg     calcitRIOL 0.25 MCG Caps  Commonly known as: Rocaltrol   Take 0.25 mcg by mouth 3 times a week. On Mon, Wed and Fri  Indications: Low Amount of Calcium in the Blood  Dose: 0.25 mcg     clopidogrel 75 MG Tabs  Commonly known as: Plavix   Take 75 mg by mouth every day. Indications: Coronary Bypass Surgery  Dose: 75 mg     gentamicin 0.1 % cream  Commonly known as: Garamycin   Apply 1 Application topically every day. Use each and every time cath is accessed for peritoneal dialysis please  Indications: Profolactic  Dose: 1 Application     Home Care Oxygen   Inhale 2 L/min at bedtime as needed for Shortness of Breath. Oxygen dose range: 2 to 2 L/min  Respiratory route via: Nasal Cannula   Oxygen supplier: [facility]      Indications: hypoxia  Dose: 2 L/min     metoprolol SR 50 MG Tb24  Commonly known as: Toprol XL   Take 50 mg by mouth every morning. Indications: Atrial Fibrillation  Dose: 50 mg     midodrine 2.5 MG Tabs  Commonly known as: Proamatine   Take 1 Tablet by mouth 3 times a day with meals.  Dose: 2.5 mg     montelukast 10 MG Tabs  Commonly known as: Singulair   Take 10 mg by mouth at bedtime. Indications: Asthma, Hayfever  Dose: 10 mg     nitroglycerin 0.4 MG Subl  Commonly known as: Nitrostat   Place 0.4 mg under the tongue every 5 minutes as needed for Chest Pain. Indications: Acute Angina Pectoris  Dose: 0.4 mg     omeprazole 40 MG delayed-release capsule  Commonly known as: PriLOSEC   Take 40 mg by mouth 2 times a day. Indications: Gastroesophageal Reflux Disease  Dose: 40 mg     rosuvastatin 20 MG Tabs  Commonly  known as: Crestor   Take 20 mg by mouth at bedtime.     Indications: High Amount of Fats in the Blood  Dose: 20 mg     sevelamer carbonate 800 MG Tabs tablet  Commonly known as: Renvela   Take 800 mg by mouth 3 times a day with meals. Indications: High Amount of Phosphate in the Blood  Dose: 800 mg     tamsulosin 0.4 MG capsule  Commonly known as: Flomax   Take 1 Capsule by mouth 1/2 hour after breakfast.  Dose: 0.4 mg     traMADol 50 MG Tabs  Commonly known as: Ultram   Take 50 mg by mouth 1 time a day as needed for Moderate Pain (wound care pain). Indications: Acute Pain, Pain  Dose: 50 mg     TYLENOL 500 MG Tabs  Generic drug: acetaminophen   Take 1,000 mg by mouth every 8 hours as needed for Mild Pain. Indications: Pain, 1-4/10 pain  Dose: 1,000 mg              Allergies  Allergies[1]    DIET  Orders Placed This Encounter   Procedures    Diet Order Diet: Cardiac     Standing Status:   Standing     Number of Occurrences:   1     Diet::   Cardiac [6]       ACTIVITY  As tolerated.  Weight bearing as tolerated    CONSULTATIONS  Card and nephro     PROCEDURES  Cardiac cath     LABORATORY  Lab Results   Component Value Date    SODIUM 136 05/21/2025    POTASSIUM 4.6 05/21/2025    CHLORIDE 95 (L) 05/21/2025    CO2 22 05/21/2025    GLUCOSE 149 (H) 05/21/2025    BUN 50 (H) 05/21/2025    CREATININE 7.11 (HH) 05/21/2025        Lab Results   Component Value Date    WBC 9.3 05/20/2025    HEMOGLOBIN 10.8 (L) 05/20/2025    HEMATOCRIT 34.1 (L) 05/20/2025    PLATELETCT 191 05/20/2025        Total time of the discharge process exceeds 34 minutes.         [1]   Allergies  Allergen Reactions    Hydroxyzine Hives, Itching and Anxiety     Severe Itching and hives.

## 2025-05-22 NOTE — TELEPHONE ENCOUNTER
Received call back from Kailey. Advised that DSE on 5/28 had been cancelled. Informed Kailey that RN had reached out to Dr. Yoon regarding DSE results for determination on POC. Advised that RN would reach out once response is received. Patient rescheduled to see Dr. Yoon on 5/28/2025. All questions answered.

## 2025-05-24 LAB
BACTERIA BLD CULT: NORMAL
SIGNIFICANT IND 70042: NORMAL
SITE SITE: NORMAL
SOURCE SOURCE: NORMAL

## 2025-05-28 ENCOUNTER — TELEPHONE (OUTPATIENT)
Dept: CARDIOLOGY | Facility: MEDICAL CENTER | Age: 80
End: 2025-05-28

## 2025-05-28 ENCOUNTER — APPOINTMENT (OUTPATIENT)
Dept: CARDIOLOGY | Facility: MEDICAL CENTER | Age: 80
End: 2025-05-28
Attending: INTERNAL MEDICINE
Payer: MEDICARE

## 2025-05-28 VITALS
HEIGHT: 70 IN | HEART RATE: 66 BPM | WEIGHT: 144.9 LBS | BODY MASS INDEX: 20.75 KG/M2 | OXYGEN SATURATION: 98 % | RESPIRATION RATE: 18 BRPM | SYSTOLIC BLOOD PRESSURE: 108 MMHG | DIASTOLIC BLOOD PRESSURE: 50 MMHG

## 2025-05-28 DIAGNOSIS — I35.0 SEVERE AORTIC STENOSIS: ICD-10-CM

## 2025-05-28 DIAGNOSIS — I73.9 PERIPHERAL ARTERY DISEASE (HCC): ICD-10-CM

## 2025-05-28 DIAGNOSIS — I10 ESSENTIAL HYPERTENSION: ICD-10-CM

## 2025-05-28 DIAGNOSIS — I42.9 CARDIOMYOPATHY, UNSPECIFIED TYPE (HCC): Primary | ICD-10-CM

## 2025-05-28 DIAGNOSIS — N18.6 END STAGE RENAL DISEASE (HCC): ICD-10-CM

## 2025-05-28 PROCEDURE — 99214 OFFICE O/P EST MOD 30 MIN: CPT | Performed by: INTERNAL MEDICINE

## 2025-05-28 RX ORDER — LISINOPRIL 5 MG/1
5 TABLET ORAL DAILY
Qty: 100 TABLET | Refills: 3 | Status: SHIPPED | OUTPATIENT
Start: 2025-05-28 | End: 2026-07-02

## 2025-05-28 ASSESSMENT — PATIENT HEALTH QUESTIONNAIRE - PHQ9: CLINICAL INTERPRETATION OF PHQ2 SCORE: 0

## 2025-05-28 ASSESSMENT — FIBROSIS 4 INDEX: FIB4 SCORE: 1.94

## 2025-05-28 NOTE — PROGRESS NOTES
"    Interventional cardiology Initial Consultation Note      Chief Complaint: Aortic valve stenosis    dEy Bennett is a 79 y.o. male  patient presented today for consultation regarding aortic valve stenosis.  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  Overall aortic valve appeared to be moderate during dobutamine stress echocardiogram but it is calcified with decreased excursion.  He came into clinic with walker, he feels mostly okay, he gets tired, gets shortness of breath with activity when he is resting he feels okay.        Past Medical History[1]          Current Medications[2]          Physical Exam:  Ambulatory Vitals  /50 (BP Location: Left arm, Patient Position: Sitting, BP Cuff Size: Adult)   Pulse 66   Resp 18   Ht 1.778 m (5' 10\")   Wt 65.7 kg (144 lb 14.4 oz)   SpO2 98%    Oxygen Therapy:  Pulse Oximetry: 98 %  BP Readings from Last 4 Encounters:   05/28/25 108/50   05/21/25 90/54   05/13/25 100/58   05/07/25 96/60       Weight/BMI: Body mass index is 20.79 kg/m².  Wt Readings from Last 4 Encounters:   05/28/25 65.7 kg (144 lb 14.4 oz)   05/21/25 64.5 kg (142 lb 3.2 oz)   05/07/25 66.2 kg (146 lb)   05/03/25 65.4 kg (144 lb 3.2 oz)           General: Well appearing and in no apparent distress  Neck: carotid bruits absent  Lungs: respiratory sounds  normal  Heart: Regular rhythm,  No palpable thrills on palpation, murmurs present, no rubs,   Lower extremity edema absent.     Echocardiogram 5/21/2025  Moderate aortic stenosis by dobutamine stress echo.  Valve appears to opening more with dobutamine.     Coronary angiogram 5/20/2025  MPRESSION:  Severe obstructive one-vessel coronary artery disease with a subtotal chronic occlusion of the proximal right coronary " artery.  Widely patent first obtuse marginal branch stents.  Normal ascending aorta diameter at 3.0 cm.    Mild nonobstructive bilateral iliofemoral arterial disease.          Medical Decision Making:  Problem List Items Addressed This Visit          Cardiology Medicine Problems    Essential hypertension    Relevant Medications    lisinopril (PRINIVIL) 5 MG Tab       Other    End stage renal disease (HCC)    Peripheral artery disease (HCC)    Relevant Medications    lisinopril (PRINIVIL) 5 MG Tab     Other Visit Diagnoses         Cardiomyopathy, unspecified type (HCC)    -  Primary    Relevant Medications    lisinopril (PRINIVIL) 5 MG Tab    Other Relevant Orders    EC-GENE W/O CONT      Severe aortic stenosis        Relevant Medications    lisinopril (PRINIVIL) 5 MG Tab    Other Relevant Orders    REFERRAL TO CARDIOLOGY          Even with echocardiogram, dobutamine stress echocardiogram, degree of aortic stenosis, its impact on his cardiomyopathy is difficult to assess.  Recommend transesophageal echocardiogram as well to directly visualize the aortic valve, better assess it.    I will also have him see Dr. Marco Antonio andrea or Dr. Yi to get a second opinion at [facility].  Discussed with Dr. Andrea over the phone.    Will optimize medical therapy for his heart failure, start lisinopril, continue metoprolol.    Discussed the plan with the patient, he agrees.      This note was dictated using Dragon speech recognition software.    Jerardo WALL  Interventional cardiologist  [facility], [facility]  [address] [place] [address]  Phone: [phone]  Fax: [phone]                      [1]   Past Medical History:  Diagnosis Date    A-V fistula ([facility])     Left Arm    Arrhythmia 03/13/2024    Due to kidney failure treatment    Breath shortness     with exertion    Cataract     shon iol    Congestive heart failure (HCC)      Diabetes ([facility])     not on any medication at this time, being monitored at dialysis    Dialysis patient ([facility])     monday wednesday friday at [facility]    Dialysis patient ([facility]) 01/17/2025    Starting Peritoneal Dialysis @ Home on 1/17/2025. Follows Nephrologist MD Miilan.    Foot ulcer ([facility])     left foot. being seen by wound care    Hemorrhagic disorder ([facility])     plavix eliquis    High cholesterol     Hypertension 05/24/2024    states controlled    NSTEMI (non-ST elevated myocardial infarction) ([facility]) 2/25/2025    DARIEN (obstructive sleep apnea) 01/17/2025    O2 @ 2.5L Per Patient's Wife.    Pain     Peritoneal dialysis catheter in place ([facility]) 01/17/2025    Pneumonia     3/24    Renal disorder     Sleep apnea 01/10/2025    Home sleep study done 1/3/2025 - no results yet.    Stroke ([facility]) 2021    tia   [2]   Current Outpatient Medications   Medication Sig Dispense Refill    lisinopril (PRINIVIL) 5 MG Tab Take 1 Tablet by mouth every day. 100 Tablet 3    sevelamer carbonate (RENVELA) 800 MG Tab tablet Take 800 mg by mouth 3 times a day with meals. Indications: High Amount of Phosphate in the Blood      metoprolol SR (TOPROL XL) 50 MG TABLET SR 24 HR Take 50 mg by mouth every morning. Indications: Atrial Fibrillation      nitroglycerin (NITROSTAT) 0.4 MG SL Tab Place 0.4 mg under the tongue every 5 minutes as needed for Chest Pain. Indications: Acute Angina Pectoris      omeprazole (PRILOSEC) 40 MG delayed-release capsule Take 40 mg by mouth 2 times a day. Indications: Gastroesophageal Reflux Disease      calcitRIOL (ROCALTROL) 0.25 MCG Cap Take 0.25 mcg by mouth 3 times a week. On Mon, Wed and Fri  Indications: Low Amount of Calcium in the Blood      traMADol (ULTRAM) 50 MG Tab Take 50 mg by mouth 1 time a day as needed for Moderate Pain (wound care pain). Indications: Acute Pain, Pain      Home Care Oxygen Inhale 2 L/min at bedtime as needed for Shortness of Breath. Oxygen dose range: 2 to 2 L/min  Respiratory  route via: Nasal Cannula   Oxygen supplier: [facility]      Indications: hypoxia      acetaminophen (TYLENOL) 500 MG Tab Take 1,000 mg by mouth every 8 hours as needed for Mild Pain. Indications: Pain, 1-4/10 pain      tamsulosin (FLOMAX) 0.4 MG capsule Take 1 Capsule by mouth 1/2 hour after breakfast. 30 Capsule 0    clopidogrel (PLAVIX) 75 MG Tab Take 75 mg by mouth every day. Indications: Coronary Bypass Surgery      gentamicin (GARAMYCIN) 0.1 % cream Apply 1 Application topically every day. Use each and every time cath is accessed for peritoneal dialysis please  Indications: Profolactic      apixaban (ELIQUIS) 2.5mg Tab Take 2.5 mg by mouth 2 times a day. Indications: Atrial Fibrillation      montelukast (SINGULAIR) 10 MG Tab Take 10 mg by mouth at bedtime. Indications: Asthma, Hayfever      rosuvastatin (CRESTOR) 20 MG Tab Take 20 mg by mouth at bedtime.     Indications: High Amount of Fats in the Blood      sodium zirconium cyclosilicate (LOKELMA) 10 g packet Take 10 g by mouth 2 times a day. 600 g 0     No current facility-administered medications for this visit.

## 2025-05-29 NOTE — TELEPHONE ENCOUNTER
Patient is scheduled for a GENE with anesthesia on 06- with Dr. Andrade. Patient   has been instructed to check in at 11:00 am for 1:00 procedure. No meds to hold. Patient has been advised they will need a  home and with them after since they are sedated. Message sent to authorizations. Sent [employer] message to pt with instructions.  FYI sent to Chris. Case scheduled with wife, Kailey

## 2025-06-02 ENCOUNTER — APPOINTMENT (OUTPATIENT)
Dept: ADMISSIONS | Facility: MEDICAL CENTER | Age: 80
End: 2025-06-02
Attending: INTERNAL MEDICINE
Payer: MEDICARE

## 2025-06-03 NOTE — Clinical Note
REFERRAL APPROVAL NOTICE         Sent on Helen 3, 2025                   Edy Juaquin Bennett  [address] [facility]  [place] [address]                   Dear Mr. Bennett,    After a careful review of the medical information and benefit coverage, [facility] has processed your referral. See below for additional details.    If applicable, you must be actively enrolled with your insurance for coverage of the authorized service. If you have any questions regarding your coverage, please contact your insurance directly.    REFERRAL INFORMATION   Referral #:  [id number]  Referred-To Provider    Referred-By Provider:  Cardiovascular Disease (Cardiology)    ANAIS Turner GARRETT B      [address] [place] [address] [address]  [place] [address]  [phone] [facility] [address]  [phone]    Referral Start Date:  05/28/2025  Referral End Date:   05/28/2026             SCHEDULING  If you do not already have an appointment, please call [phone] to make an appointment.     MORE INFORMATION  If you do not already have a [employer] account, sign up at: [email or web address]  You can access your medical information, make appointments, see lab results, billing information, and more.  If you have questions regarding this referral, please contact  the [facility] Referrals department at:             [phone]. Monday - Friday 8:00AM - 5:00PM.     Sincerely,    [facility]

## 2025-06-04 ENCOUNTER — APPOINTMENT (OUTPATIENT)
Dept: CARDIOLOGY | Facility: MEDICAL CENTER | Age: 80
End: 2025-06-04
Attending: INTERNAL MEDICINE
Payer: MEDICARE

## 2025-06-04 ENCOUNTER — PRE-ADMISSION TESTING (OUTPATIENT)
Dept: ADMISSIONS | Facility: MEDICAL CENTER | Age: 80
End: 2025-06-04
Attending: INTERNAL MEDICINE
Payer: MEDICARE

## 2025-06-04 NOTE — PREADMIT AVS NOTE
Current Medications   Medication Instructions    lisinopril (PRINIVIL) 5 MG Tab HOLD 24 HOURS PRIOR TO SURGERY/PROCEDURE.     sevelamer carbonate (RENVELA) 800 MG Tab tablet FOLLOW INSTRUCTIONS FROM SURGEON OR SPECIALIST    metoprolol SR (TOPROL XL) 50 MG TABLET SR 24 HR CONTINUE TAKING MEDICATION AS PRESCRIBED.     nitroglycerin (NITROSTAT) 0.4 MG SL Tab CONTINUE TAKING MEDICATION AS PRESCRIBED.     omeprazole (PRILOSEC) 40 MG delayed-release capsule CONTINUE TAKING MEDICATION AS PRESCRIBED.     calcitRIOL (ROCALTROL) 0.25 MCG Cap FOLLOW INSTRUCTIONS FROM SURGEON OR SPECIALIST    traMADol (ULTRAM) 50 MG Tab CONTINUE TAKING MEDICATION AS PRESCRIBED.     acetaminophen (TYLENOL) 500 MG Tab CONTINUE TAKING MEDICATION AS PRESCRIBED.     tamsulosin (FLOMAX) 0.4 MG capsule CONTINUE TAKING MEDICATION AS PRESCRIBED.     clopidogrel (PLAVIX) 75 MG Tab FOLLOW INSTRUCTIONS FROM SURGEON OR SPECIALIST    gentamicin (GARAMYCIN) 0.1 % cream FOLLOW INSTRUCTIONS FROM SURGEON OR SPECIALIST    apixaban (ELIQUIS) 2.5mg Tab FOLLOW INSTRUCTIONS FROM SURGEON OR SPECIALIST    montelukast (SINGULAIR) 10 MG Tab CONTINUE TAKING MEDICATION AS PRESCRIBED.     rosuvastatin (CRESTOR) 20 MG Tab CONTINUE TAKING MEDICATION AS PRESCRIBED.

## 2025-06-05 ENCOUNTER — HOSPITAL ENCOUNTER (OUTPATIENT)
Facility: MEDICAL CENTER | Age: 80
End: 2025-06-05
Attending: INTERNAL MEDICINE | Admitting: INTERNAL MEDICINE
Payer: MEDICARE

## 2025-06-05 ENCOUNTER — ANESTHESIA EVENT (OUTPATIENT)
Dept: CARDIOLOGY | Facility: MEDICAL CENTER | Age: 80
End: 2025-06-05
Payer: MEDICARE

## 2025-06-05 ENCOUNTER — ANESTHESIA (OUTPATIENT)
Dept: CARDIOLOGY | Facility: MEDICAL CENTER | Age: 80
End: 2025-06-05
Payer: MEDICARE

## 2025-06-05 ENCOUNTER — APPOINTMENT (OUTPATIENT)
Dept: CARDIOLOGY | Facility: MEDICAL CENTER | Age: 80
End: 2025-06-05
Attending: INTERNAL MEDICINE
Payer: MEDICARE

## 2025-06-05 VITALS
OXYGEN SATURATION: 100 % | DIASTOLIC BLOOD PRESSURE: 51 MMHG | TEMPERATURE: 96.7 F | SYSTOLIC BLOOD PRESSURE: 101 MMHG | RESPIRATION RATE: 17 BRPM | HEART RATE: 68 BPM

## 2025-06-05 DIAGNOSIS — I42.9 CARDIOMYOPATHY, UNSPECIFIED TYPE (HCC): ICD-10-CM

## 2025-06-05 LAB
GLUCOSE BLD STRIP.AUTO-MCNC: 96 MG/DL (ref 65–99)
POTASSIUM SERPL-SCNC: 5.2 MMOL/L (ref 3.6–5.5)

## 2025-06-05 PROCEDURE — 700105 HCHG RX REV CODE 258: Performed by: ANESTHESIOLOGY

## 2025-06-05 PROCEDURE — 160046 HCHG PACU - 1ST 60 MINS PHASE II

## 2025-06-05 PROCEDURE — 93312 ECHO TRANSESOPHAGEAL: CPT

## 2025-06-05 PROCEDURE — 82962 GLUCOSE BLOOD TEST: CPT

## 2025-06-05 PROCEDURE — 160015 HCHG STAT PREOP MINUTES

## 2025-06-05 PROCEDURE — 160002 HCHG RECOVERY MINUTES (STAT)

## 2025-06-05 PROCEDURE — 160035 HCHG PACU - 1ST 60 MINS PHASE I

## 2025-06-05 PROCEDURE — 700111 HCHG RX REV CODE 636 W/ 250 OVERRIDE (IP): Performed by: ANESTHESIOLOGY

## 2025-06-05 PROCEDURE — 700101 HCHG RX REV CODE 250: Performed by: ANESTHESIOLOGY

## 2025-06-05 PROCEDURE — 93325 DOPPLER ECHO COLOR FLOW MAPG: CPT | Mod: 26 | Performed by: INTERNAL MEDICINE

## 2025-06-05 PROCEDURE — 93312 ECHO TRANSESOPHAGEAL: CPT | Mod: 26 | Performed by: INTERNAL MEDICINE

## 2025-06-05 PROCEDURE — 84132 ASSAY OF SERUM POTASSIUM: CPT

## 2025-06-05 RX ORDER — DIPHENHYDRAMINE HYDROCHLORIDE 50 MG/ML
12.5 INJECTION, SOLUTION INTRAMUSCULAR; INTRAVENOUS
Status: DISCONTINUED | OUTPATIENT
Start: 2025-06-05 | End: 2025-06-05 | Stop reason: HOSPADM

## 2025-06-05 RX ORDER — LIDOCAINE HYDROCHLORIDE 20 MG/ML
INJECTION, SOLUTION EPIDURAL; INFILTRATION; INTRACAUDAL; PERINEURAL PRN
Status: DISCONTINUED | OUTPATIENT
Start: 2025-06-05 | End: 2025-06-05 | Stop reason: SURG

## 2025-06-05 RX ORDER — HALOPERIDOL 5 MG/ML
1 INJECTION INTRAMUSCULAR
Status: DISCONTINUED | OUTPATIENT
Start: 2025-06-05 | End: 2025-06-05 | Stop reason: HOSPADM

## 2025-06-05 RX ORDER — PHENYLEPHRINE HCL IN 0.9% NACL 1 MG/10 ML
SYRINGE (ML) INTRAVENOUS PRN
Status: DISCONTINUED | OUTPATIENT
Start: 2025-06-05 | End: 2025-06-05 | Stop reason: SURG

## 2025-06-05 RX ORDER — ONDANSETRON 2 MG/ML
4 INJECTION INTRAMUSCULAR; INTRAVENOUS
Status: DISCONTINUED | OUTPATIENT
Start: 2025-06-05 | End: 2025-06-05 | Stop reason: HOSPADM

## 2025-06-05 RX ORDER — SODIUM CHLORIDE 9 MG/ML
INJECTION, SOLUTION INTRAVENOUS
Status: DISCONTINUED | OUTPATIENT
Start: 2025-06-05 | End: 2025-06-05 | Stop reason: SURG

## 2025-06-05 RX ADMIN — LIDOCAINE HYDROCHLORIDE 100 MG: 20 INJECTION, SOLUTION EPIDURAL; INFILTRATION; INTRACAUDAL; PERINEURAL at 12:54

## 2025-06-05 RX ADMIN — PROPOFOL 30 MG: 10 INJECTION, EMULSION INTRAVENOUS at 13:03

## 2025-06-05 RX ADMIN — SODIUM CHLORIDE: 9 INJECTION, SOLUTION INTRAVENOUS at 12:47

## 2025-06-05 RX ADMIN — Medication 100 MCG: at 13:05

## 2025-06-05 RX ADMIN — PROPOFOL 100 MG: 10 INJECTION, EMULSION INTRAVENOUS at 12:54

## 2025-06-05 RX ADMIN — Medication 100 MCG: at 13:17

## 2025-06-05 NOTE — ANESTHESIA TIME REPORT
Anesthesia Start and Stop Event Times       Date Time Event    6/5/2025 1230 Ready for Procedure     1247 Anesthesia Start     1318 Anesthesia Stop          Responsible Staff  06/05/25      Name Role Begin End    Gary Garcia M.D. Anesth 1247 1318          Overtime Reason:  no overtime (within assigned shift)    Comments:

## 2025-06-05 NOTE — ANESTHESIA PREPROCEDURE EVALUATION
Date/Time: 06/05/25 1300    Scheduled providers: Benjamin Andrade M.D.; Gary Garcia M.D.    Procedure: EC-GENE W/O CONT    Diagnosis: Cardiomyopathy, unspecified type (HCC) [I42.9]    Indications: Aortic Stenosis    Location: [facility] - Echocardiology [facility]            Relevant Problems   CARDIAC   (positive) Aortic stenosis   (positive) Aortic stenosis, severe   (positive) CAD (coronary artery disease)   (positive) Essential hypertension   (positive) NSTEMI (non-ST elevated myocardial infarction) (HCC)   (positive) Peripheral arterial disease (HCC)   (positive) Peripheral artery disease (HCC)   (positive) Typical atrial flutter (HCC)      GI   (positive) Duodenal ulcer         (positive) End stage renal disease (HCC)   (positive) Renal disease       Physical Exam    Airway   Mallampati: II  TM distance: >3 FB  Neck ROM: full       Cardiovascular - normal exam  Rhythm: regular  Rate: normal    (-) murmur     Dental - normal exam           Pulmonary - normal examBreath sounds clear to auscultation     Abdominal    Neurological - normal exam                   Anesthesia Plan    ASA 3   ASA physical status 3 criteria: ESRD undergoing regularly scheduled dialysis    Plan - general       Airway plan will be mask          Induction: intravenous          Informed Consent:    Anesthetic plan and risks discussed with patient.           

## 2025-06-05 NOTE — DISCHARGE INSTRUCTIONS
Discharge Instructions:  Transesophageal Echocardiogram (GENE)    GENE is a test that uses sound waves to take pictures of your heart. GENE is done by passing a small probe attached to a flexible tube down the part of the body that moves food from your mouth to your stomach (esophagus).    The pictures give clear images of your heart. This can help your doctor see if there are problems with your heart.   General instructions    Follow instructions from your doctor about what you cannot eat or drink.   You will take out any dentures or dental retainers.   Plan to have a responsible adult take you home from the hospital or clinic.   Plan to have a responsible adult care for you for the time you are told after you leave the hospital or clinic. This is important.  What can I expect after the procedure?    You will be monitored until you leave the hospital or clinic. This includes checking your blood pressure, heart rate, breathing rate, and blood oxygen level.   Your throat may feel sore and numb. This will get better over time. You will not be allowed to eat or drink until the numbness has gone away.   It is common to have a sore throat for a day or two.   It is up to you to get the results of your procedure. Ask how to get your results when they are ready.   Pink tinge sputum is common after your procedure  Follow these instructions at home:    If you were given a sedative during your procedure, do not drive or use machines until your doctor says that it is safe.   Return to your normal activities when your doctor says that it is safe.   Keep all follow-up visits.  Go to ER / call 911:   If you cough up bright red blood or vomit blood   If you have severe pain in throat/stomach.   If you have difficulty breathing that does not go away with rest  Summary    GENE is a test that uses sound waves to take pictures of your heart.   You will be given a medicine to help you relax.   Pink tinge sputum is common after your  procedure     What to Expect Post Anesthesia    Rest and take it easy for the first 24 hours.  A responsible adult is recommended to remain with you during that time.  It is normal to feel sleepy.  We encourage you to not do anything that requires balance, judgment or coordination.    FOR 24 HOURS DO NOT:  Drive, operate machinery or run household appliances.  Drink beer or alcoholic beverages.  Make important decisions or sign legal documents.    To avoid nausea, slowly advance diet as tolerated, avoiding spicy or greasy foods for the first day.  Add more substantial food to your diet according to your provider's instructions.  INCREASE FLUIDS AND FIBER TO AVOID CONSTIPATION.    MILD FLU-LIKE SYMPTOMS ARE NORMAL.  YOU MAY EXPERIENCE GENERALIZED MUSCLE ACHES, THROAT IRRITATION, HEADACHE AND/OR SOME NAUSEA.  If any questions arise, call your provider.  If your provider is not available, please feel free to call the Surgical Center at [phone].    MEDICATIONS: Resume taking daily medication.  Take prescribed pain medication with food.  If no medication is prescribed, you may take non-aspirin pain medication if needed.  PAIN MEDICATION CAN BE VERY CONSTIPATING.  Take a stool softener or laxative such as senokot, pericolace, or milk of magnesia if needed.    Diet    Resume your normal diet as tolerated.  A diet low in cholesterol, fat, and sodium is recommended for good health.       BOWEL FUNCTION:  If you are having problems, use what you normally would or call your provider for suggestions. It also helps to stay regular by including fiber in your diet (for example: bran or fruits and vegetables) and drink plenty of liquids (water, juice, etc.).

## 2025-06-05 NOTE — PROGRESS NOTES
1314 - Arrived to PPU bay 8. Report receive from anesthesiologist and cath lab RN. SBP 77, IVF administered by anesthesiologist and cath lab RN. SBP 80's. Patient remains aox4, denies dizziness or lightheadedness. Denies pain or nausea. Strong cough.     1337 - Kailey, wife, updated patient is in recovery. Kailey to come to bedside.     1349 - Swallow evaluation performed, passed.     1350 - Kailey, wife at bedside.     1355 - Discharge instructions provided to Kailey and patient. Verbalized understanding.     1400 - Sitting EOB, no complication. Standing, no complication. PIV removed. Patient dressing self independently.     1415 - Discharged home with Kailey, spouse by wheelchair. All personal belongings with patient at time of discharge.

## 2025-06-06 NOTE — ANESTHESIA POSTPROCEDURE EVALUATION
Patient: Edy Bennett    Procedure Summary       Date: 06/05/25 Room / Location: [facility] - Echocardiology [facility]    Anesthesia Start: 1247 Anesthesia Stop: 1318    Procedure: EC-GENE W/O CONT Diagnosis:       Cardiomyopathy, unspecified type (HCC)      (Aortic Stenosis)    Scheduled Providers: Benjamin Andrade M.D.; Gary Garcia M.D. Responsible Provider: Gary Garcia M.D.    Anesthesia Type: general ASA Status: 3            Final Anesthesia Type: general  Last vitals  BP   Blood Pressure : 101/51    Temp   35.9 °C (96.7 °F)    Pulse   68   Resp   17    SpO2   100 %      Anesthesia Post Evaluation    Patient location during evaluation: PACU  Patient participation: complete - patient participated  Level of consciousness: awake and alert    Airway patency: patent  Anesthetic complications: no  Cardiovascular status: hemodynamically stable  Respiratory status: acceptable  Hydration status: euvolemic    PONV: none          There were no known notable events for this encounter.     Nurse Pain Score: 0 (NPRS)

## 2025-06-06 NOTE — ANESTHESIA POSTPROCEDURE EVALUATION
Patient: Edy Bennett    Procedure Summary       Date: 06/05/25 Room / Location: [facility] - Echocardiology [facility]    Anesthesia Start: 1247 Anesthesia Stop: 1318    Procedure: EC-GENE W/O CONT Diagnosis:       Cardiomyopathy, unspecified type (HCC)      (Aortic Stenosis)    Scheduled Providers: Benjamin Andrade M.D.; Gary Garcia M.D. Responsible Provider: Gary Garcia M.D.    Anesthesia Type: general ASA Status: 3            Final Anesthesia Type: general  Last vitals  BP   Blood Pressure : 101/51    Temp   35.9 °C (96.7 °F)    Pulse   68   Resp   17    SpO2   100 %      Anesthesia Post Evaluation    Patient location during evaluation: PACU  Patient participation: complete - patient participated  Level of consciousness: awake and alert    Airway patency: patent  Anesthetic complications: no  Cardiovascular status: hemodynamically stable  Respiratory status: acceptable  Hydration status: euvolemic    PONV: none          There were no known notable events for this encounter.     Nurse Pain Score: 0 (NPRS)

## 2025-06-18 ENCOUNTER — HOME CARE VISIT (OUTPATIENT)
Dept: HOME HEALTH SERVICES | Facility: HOME HEALTHCARE | Age: 80
End: 2025-06-18
Payer: MEDICARE

## 2025-06-18 ENCOUNTER — RESULTS FOLLOW-UP (OUTPATIENT)
Dept: CARDIOLOGY | Facility: MEDICAL CENTER | Age: 80
End: 2025-06-18

## 2025-06-19 ENCOUNTER — APPOINTMENT (OUTPATIENT)
Facility: MEDICAL CENTER | Age: 80
End: 2025-06-19
Payer: MEDICARE

## 2025-07-10 ENCOUNTER — TELEPHONE (OUTPATIENT)
Dept: CARDIOLOGY | Facility: MEDICAL CENTER | Age: 80
End: 2025-07-10
Payer: MEDICARE

## 2025-07-10 NOTE — TELEPHONE ENCOUNTER
Received VM from patient's wife Kailey requesting a call back.     Called Kailey back. She states the patient had a telemed visit with Dr. Andrea at [facility] 7/2/2025 and he recommended BAV. Per his note, he recommended the patient be scheduled within 2 weeks. Kailey states she thought they scheduled the procedure for 7/22/2025, but after all said and done, they got a MyChart that the patient is scheduled 8/22/2025. She is concerned the patient isn't doing well, and she has been unable to reach anyone back at [facility] regarding the discrepancy. She is requesting I try and reach [facility] for clarification or discuss with Dr. Yoon to see if he would be able to do the BAV here.     Called [facility] at [phone] (found in Dr. Andrea's note).  provided me with [facility] RN contact numbers of [phone] and [phone]. Spoke with Kinga ARANDA. She states the bed request for 8/22/2025 was in error, and the BAV is planned for 7/22/2025. She states she will call the patient's wife Kailey to update.     Received phone call from Kailey stating she received a phone call from [facility] with the update. She is grateful for the help.

## 2025-07-17 ENCOUNTER — OFFICE VISIT (OUTPATIENT)
Facility: MEDICAL CENTER | Age: 80
End: 2025-07-17
Payer: MEDICARE

## 2025-07-17 VITALS
OXYGEN SATURATION: 99 % | HEIGHT: 70 IN | TEMPERATURE: 97.3 F | DIASTOLIC BLOOD PRESSURE: 48 MMHG | WEIGHT: 141.76 LBS | SYSTOLIC BLOOD PRESSURE: 112 MMHG | HEART RATE: 72 BPM | BODY MASS INDEX: 20.29 KG/M2

## 2025-07-17 DIAGNOSIS — I77.9 PAOD (PERIPHERAL ARTERIAL OCCLUSIVE DISEASE) (HCC): Primary | ICD-10-CM

## 2025-07-17 RX ORDER — MIDODRINE HYDROCHLORIDE 2.5 MG/1
5 TABLET ORAL 3 TIMES DAILY
COMMUNITY

## 2025-07-17 ASSESSMENT — FIBROSIS 4 INDEX: FIB4 SCORE: 1.94

## 2025-07-17 NOTE — PROGRESS NOTES
"                 VASCULAR SURGERY                 Clinic Progress Note  _________________________________    Vitals for Today's Visit  /48 (BP Location: Left arm, Patient Position: Sitting, BP Cuff Size: Adult)   Pulse 72   Temp 36.3 °C (97.3 °F) (Temporal)   Ht 1.778 m (5' 10\")   Wt 64.3 kg (141 lb 12.1 oz)   SpO2 99%   BMI 20.34 kg/m²   _________________________________    7/1/24 Left RC AVF creation (SUNNY/RUI)    9/16/24 lap CAPD cath insertion (CLIFF)    12/18/24 Lap left IHR, reposition CAPD cath (Angela/ROSELYN)    1/14/25 Aortogram, right Peroneal shock/stent, unsuccessful left tibial intervention so will need left pop-distal bypass (Angela/Dar)    1/20/25 Left pop-PT bypass with irGSV (Angela/ROSELYN)      7/17/2025  Feeling well, BLE well perfused with brisk PT doppler signals, toe ulcerations are slowly healing bilaterally    Once heart is addressed, particularly after TAVR then can do the right inguinal hernia    Follow-up in 3 months with repeat surveillance arterial testing or sooner if concerns arise.        Marco Antonio Miller MD  [facility]  [phone]  [address], [place] [address]  "

## 2025-07-24 ENCOUNTER — HOSPITAL ENCOUNTER (OUTPATIENT)
Dept: LAB | Facility: MEDICAL CENTER | Age: 80
End: 2025-07-24
Attending: INTERNAL MEDICINE
Payer: MEDICARE

## 2025-07-24 PROCEDURE — 87040 BLOOD CULTURE FOR BACTERIA: CPT

## 2025-07-24 PROCEDURE — 36415 COLL VENOUS BLD VENIPUNCTURE: CPT

## 2025-07-25 ENCOUNTER — PRE-ADMISSION TESTING (OUTPATIENT)
Dept: ADMISSIONS | Facility: MEDICAL CENTER | Age: 80
DRG: 255 | End: 2025-07-25
Attending: SURGERY
Payer: MEDICARE

## 2025-07-25 ENCOUNTER — ANESTHESIA EVENT (OUTPATIENT)
Dept: SURGERY | Facility: MEDICAL CENTER | Age: 80
DRG: 255 | End: 2025-07-25
Payer: MEDICARE

## 2025-07-25 ENCOUNTER — TELEPHONE (OUTPATIENT)
Dept: SURGERY | Facility: MEDICAL CENTER | Age: 80
End: 2025-07-25
Payer: MEDICARE

## 2025-07-25 NOTE — TELEPHONE ENCOUNTER
Spoke with patient's spouse Kailey confirming to continue plavix and no eliquis the day before or the day of the procedure.

## 2025-07-25 NOTE — PREADMIT AVS NOTE
Current Medications   Medication Instructions    polyethylene glycol/lytes (MIRALAX) 17 g Pack Hold medication day of procedure    midodrine (PROAMATINE) 2.5 MG Tab Continue taking medication as prescribed, including morning of procedure     sevelamer carbonate (RENVELA) 800 MG Tab tablet Takes with meals. Hold morning of surgery.     metoprolol SR (TOPROL XL) 50 MG TABLET SR 24 HR Continue taking medication as prescribed, including morning of procedure     nitroglycerin (NITROSTAT) 0.4 MG SL Tab As needed medication, may take if needed, including morning of procedure     omeprazole (PRILOSEC) 40 MG delayed-release capsule Continue taking medication as prescribed, including morning of procedure     calcitRIOL (ROCALTROL) 0.25 MCG Cap Follow instructions from surgeon or specialist.    traMADol (ULTRAM) 50 MG Tab Continue taking medication as prescribed, including morning of procedure     Home Care Oxygen Continue taking medication as prescribed, including morning of procedure     acetaminophen (TYLENOL) 500 MG Tab Continue taking medication as prescribed, including morning of procedure     tamsulosin (FLOMAX) 0.4 MG capsule Continue taking medication as prescribed, including morning of procedure     clopidogrel (PLAVIX) 75 MG Tab Follow instructions from surgeon or specialist.    gentamicin (GARAMYCIN) 0.1 % cream Continue taking medication as prescribed, including morning of procedure     apixaban (ELIQUIS) 2.5mg Tab Follow instructions from surgeon or specialist.    montelukast (SINGULAIR) 10 MG Tab Okay to take night before sugery.     rosuvastatin (CRESTOR) 20 MG Tab Okay to take night before surgery.

## 2025-07-28 ENCOUNTER — PHARMACY VISIT (OUTPATIENT)
Dept: PHARMACY | Facility: MEDICAL CENTER | Age: 80
End: 2025-07-28
Payer: COMMERCIAL

## 2025-07-28 ENCOUNTER — ANESTHESIA (OUTPATIENT)
Dept: SURGERY | Facility: MEDICAL CENTER | Age: 80
DRG: 255 | End: 2025-07-28
Payer: MEDICARE

## 2025-07-28 ENCOUNTER — HOSPITAL ENCOUNTER (INPATIENT)
Facility: MEDICAL CENTER | Age: 80
LOS: 1 days | DRG: 255 | End: 2025-07-28
Attending: SURGERY | Admitting: SURGERY
Payer: MEDICARE

## 2025-07-28 PROCEDURE — 700105 HCHG RX REV CODE 258: Performed by: STUDENT IN AN ORGANIZED HEALTH CARE EDUCATION/TRAINING PROGRAM

## 2025-07-28 PROCEDURE — RXMED WILLOW AMBULATORY MEDICATION CHARGE: Performed by: SURGERY

## 2025-07-28 PROCEDURE — 700101 HCHG RX REV CODE 250: Performed by: STUDENT IN AN ORGANIZED HEALTH CARE EDUCATION/TRAINING PROGRAM

## 2025-07-28 PROCEDURE — 700111 HCHG RX REV CODE 636 W/ 250 OVERRIDE (IP): Performed by: STUDENT IN AN ORGANIZED HEALTH CARE EDUCATION/TRAINING PROGRAM

## 2025-07-28 RX ORDER — EPHEDRINE SULFATE 50 MG/ML
INJECTION, SOLUTION INTRAVENOUS PRN
Status: DISCONTINUED | OUTPATIENT
Start: 2025-07-28 | End: 2025-07-28 | Stop reason: SURG

## 2025-07-28 RX ORDER — DEXAMETHASONE SODIUM PHOSPHATE 4 MG/ML
INJECTION, SOLUTION INTRA-ARTICULAR; INTRALESIONAL; INTRAMUSCULAR; INTRAVENOUS; SOFT TISSUE PRN
Status: DISCONTINUED | OUTPATIENT
Start: 2025-07-28 | End: 2025-07-28 | Stop reason: SURG

## 2025-07-28 RX ORDER — PHENYLEPHRINE HCL IN 0.9% NACL 1 MG/10 ML
SYRINGE (ML) INTRAVENOUS PRN
Status: DISCONTINUED | OUTPATIENT
Start: 2025-07-28 | End: 2025-07-28 | Stop reason: SURG

## 2025-07-28 RX ORDER — ONDANSETRON 2 MG/ML
INJECTION INTRAMUSCULAR; INTRAVENOUS PRN
Status: DISCONTINUED | OUTPATIENT
Start: 2025-07-28 | End: 2025-07-28 | Stop reason: SURG

## 2025-07-28 RX ORDER — CEFAZOLIN SODIUM 1 G/3ML
INJECTION, POWDER, FOR SOLUTION INTRAMUSCULAR; INTRAVENOUS PRN
Status: DISCONTINUED | OUTPATIENT
Start: 2025-07-28 | End: 2025-07-28 | Stop reason: SURG

## 2025-07-28 RX ORDER — LIDOCAINE HYDROCHLORIDE 40 MG/ML
SOLUTION TOPICAL PRN
Status: DISCONTINUED | OUTPATIENT
Start: 2025-07-28 | End: 2025-07-28 | Stop reason: SURG

## 2025-07-28 RX ORDER — SODIUM CHLORIDE 9 MG/ML
INJECTION, SOLUTION INTRAVENOUS
Status: DISCONTINUED | OUTPATIENT
Start: 2025-07-28 | End: 2025-07-28 | Stop reason: SURG

## 2025-07-28 RX ADMIN — Medication 100 MCG: at 09:14

## 2025-07-28 RX ADMIN — SODIUM CHLORIDE: 9 INJECTION, SOLUTION INTRAVENOUS at 08:50

## 2025-07-28 RX ADMIN — DEXAMETHASONE SODIUM PHOSPHATE 4 MG: 4 INJECTION INTRA-ARTICULAR; INTRALESIONAL; INTRAMUSCULAR; INTRAVENOUS; SOFT TISSUE at 09:03

## 2025-07-28 RX ADMIN — EPHEDRINE SULFATE 10 MG: 50 INJECTION, SOLUTION INTRAVENOUS at 09:09

## 2025-07-28 RX ADMIN — EPHEDRINE SULFATE 10 MG: 50 INJECTION, SOLUTION INTRAVENOUS at 09:11

## 2025-07-28 RX ADMIN — ONDANSETRON 4 MG: 2 INJECTION INTRAMUSCULAR; INTRAVENOUS at 09:03

## 2025-07-28 RX ADMIN — EPHEDRINE SULFATE 20 MG: 50 INJECTION, SOLUTION INTRAVENOUS at 09:18

## 2025-07-28 RX ADMIN — EPHEDRINE SULFATE 10 MG: 50 INJECTION, SOLUTION INTRAVENOUS at 09:03

## 2025-07-28 RX ADMIN — LIDOCAINE HYDROCHLORIDE 4 ML: 40 SOLUTION TOPICAL at 08:58

## 2025-07-28 RX ADMIN — EPHEDRINE SULFATE 10 MG: 50 INJECTION, SOLUTION INTRAVENOUS at 08:57

## 2025-07-28 RX ADMIN — EPHEDRINE SULFATE 10 MG: 50 INJECTION, SOLUTION INTRAVENOUS at 09:05

## 2025-07-28 RX ADMIN — PROPOFOL 50 MG: 10 INJECTION, EMULSION INTRAVENOUS at 08:57

## 2025-07-28 RX ADMIN — FENTANYL CITRATE 50 MCG: 50 INJECTION, SOLUTION INTRAMUSCULAR; INTRAVENOUS at 08:54

## 2025-07-28 RX ADMIN — CEFAZOLIN 2 G: 1 INJECTION, POWDER, FOR SOLUTION INTRAMUSCULAR; INTRAVENOUS at 09:03

## 2025-07-28 RX ADMIN — PROPOFOL 50 MG: 10 INJECTION, EMULSION INTRAVENOUS at 08:58

## 2025-07-28 RX ADMIN — Medication 50 MCG: at 09:11

## 2025-07-28 ASSESSMENT — FIBROSIS 4 INDEX: FIB4 SCORE: 1.94

## 2025-07-28 NOTE — ANESTHESIA TIME REPORT
Anesthesia Start and Stop Event Times       Date Time Event    7/28/2025 0835 Ready for Procedure     0850 Anesthesia Start     0930 Anesthesia Stop          Responsible Staff  07/28/25      Name Role Begin End    Erica Frost M.D. Anesth 0850 0930          Overtime Reason:  no overtime (within assigned shift)    Comments:

## 2025-07-28 NOTE — OR NURSING
Arrived from PACU AXO, Pt's VSS; denies N/V; states pain is at tolerable level. Offloading boot in place to right foot..     @1126 D/c orders received. IV dc'd. Pt changed into clothing with assistance. Discharge reviewed, Pt and family verbalized understanding and questions answered. Patient states ready to d/c home. Pt dc'd in w/c with all belongings.

## 2025-07-28 NOTE — DISCHARGE INSTRUCTIONS
HOME CARE INSTRUCTIONS    ACTIVITY: Rest and take it easy for the first 24 hours.  A responsible adult is recommended to remain with you during that time.  It is normal to feel sleepy.  We encourage you to not do anything that requires balance, judgment or coordination.    FOR 24 HOURS DO NOT:  Drive, operate machinery or run household appliances.  Drink beer or alcoholic beverages.  Make important decisions or sign legal documents.    SPECIAL INSTRUCTIONS:   Patient will go home with an offloading shoe.    Bandage can stay on for 2 days, then it can be removed and stay open to air and it is okay to get the incision wet.    Sutures will stay in for 2 weeks to be removed in the clinic.    DIET: To avoid nausea, slowly advance diet as tolerated, avoiding spicy or greasy foods for the first day.  Add more substantial food to your diet according to your physician's instructions.  Babies can be fed formula or breast milk as soon as they are hungry.  INCREASE FLUIDS AND FIBER TO AVOID CONSTIPATION.    SURGICAL DRESSING/BATHING: See above.  No baths/soaking in water until cleared by Dr.    MEDICATIONS: Resume taking daily medication.  Take prescribed pain medication with food.  If no medication is prescribed, you may take non-aspirin pain medication if needed.  PAIN MEDICATION CAN BE VERY CONSTIPATING.  Take a stool softener or laxative such as senokot, pericolace, or milk of magnesia if needed.    Prescription given for Norco.     A follow-up appointment should be arranged with your doctor in 2 weeks; call to schedule.    You should CALL YOUR PHYSICIAN if you develop:  Fever greater than 101 degrees F.  Pain not relieved by medication, or persistent nausea or vomiting.  Excessive bleeding (blood soaking through dressing) or unexpected drainage from the wound.  Extreme redness or swelling around the incision site, drainage of pus or foul smelling drainage.  Inability to urinate or empty your bladder within 8  hours.  Problems with breathing or chest pain.    You should call 911 if you develop problems with breathing or chest pain.  If you are unable to contact your doctor or surgical center, you should go to the nearest emergency room or urgent care center.  Physician's telephone #: Dr. Miller [phone]    MILD FLU-LIKE SYMPTOMS ARE NORMAL.  YOU MAY EXPERIENCE GENERALIZED MUSCLE ACHES, THROAT IRRITATION, HEADACHE AND/OR SOME NAUSEA.    If any questions arise, call your doctor.  If your doctor is not available, please feel free to call the Surgical Center at [phone].  The Center is open Monday through Friday from 7AM to 7PM.      A registered nurse may call you a few days after your surgery to see how you are doing after your procedure.    You may also receive a survey in the mail within the next two weeks and we ask that you take a few moments to complete the survey and return it to us.  Our goal is to provide you with very good care and we value your comments.     Depression / Suicide Risk    As you are discharged from this [facility] facility, it is important to learn how to keep safe from harming yourself.    Recognize the warning signs:  Abrupt changes in personality, positive or negative- including increase in energy   Giving away possessions  Change in eating patterns- significant weight changes-  positive or negative  Change in sleeping patterns- unable to sleep or sleeping all the time   Unwillingness or inability to communicate  Depression  Unusual sadness, discouragement and loneliness  Talk of wanting to die  Neglect of personal appearance   Rebelliousness- reckless behavior  Withdrawal from people/activities they love  Confusion- inability to concentrate     If you or a loved one observes any of these behaviors or has concerns about self-harm, here's what you can do:  Talk about it- your feelings and reasons for harming yourself  Remove any means that you might use to hurt yourself (examples:  pills, rope, extension cords, firearm)  Get professional help from the community (Mental Health, Substance Abuse, psychological counseling)  Do not be alone:Call your Safe Contact- someone whom you trust who will be there for you.  Call your local CRISIS HOTLINE 249-9347 or 434-395-2676  Call your local Children's Mobile Crisis Response Team [place] (271) 077-8054 or www.[place]  Call the toll free National Suicide Prevention Hotlines   National Suicide Prevention Lifeline 816-993-OEYA (1745)  [facility] Line Network 800-SUICIDE (057-2365)    I acknowledge receipt and understanding of these Home Care instructions.

## 2025-07-28 NOTE — H&P
VASCULAR SURGERY SERVICE  H&P    -------------------------------------------------------------------------------------------------  Date: 7/28/2025    Surgeon: Marco Antonio Miller MD  -------------------------------------------------------------------------------------------------    HPI:  This is a 79 y.o. male who is presenting today for elective surgery: amputation of right fourth toe.  The patient has had a longstanding ulceration of the right fourth toe that recently became red and started draining. He was started on antibiotics and symptoms resolved.  Xrays concerning for osteomyelitis of the right fourth toe.  Patient is also being evaluated for a TAVR at [facility] which could happen sometimes in the next few weeks. The infection issues of the right fourth toe are a barrier to him getting a TAVR procedure and amputation of the toe is recommended.    No specific complaints at this time. Questions addressed.      Past Medical History[1]    Past Surgical History[2]    Current Medications[3]    Social History     Socioeconomic History    Marital status:      Spouse name: Not on file    Number of children: Not on file    Years of education: Not on file    Highest education level: Bachelor's degree (e.g., BA, AB, BS)   Occupational History    Not on file   Tobacco Use    Smoking status: Never    Smokeless tobacco: Never    Tobacco comments:     Used chewing tobacco many years ago.   Vaping Use    Vaping status: Never Used   Substance and Sexual Activity    Alcohol use: Not Currently    Drug use: Never    Sexual activity: Not on file   Other Topics Concern    Not on file   Social History Narrative    Not on file     Social Drivers of Health     Financial Resource Strain: Low Risk  (1/25/2025)    Overall Financial Resource Strain ([facility])     Difficulty of Paying Living Expenses: Not hard at all   Food Insecurity: No Food Insecurity (5/19/2025)    Hunger Vital Sign     Worried About Running Out of Food in  "the Last Year: Never true     Ran Out of Food in the Last Year: Never true   Transportation Needs: No Transportation Needs (5/19/2025)    PRAPARE - Transportation     Lack of Transportation (Medical): No     Lack of Transportation (Non-Medical): No   Physical Activity: Inactive (1/25/2025)    Exercise Vital Sign     Days of Exercise per Week: 0 days     Minutes of Exercise per Session: 0 min   Stress: Stress Concern Present (1/25/2025)    [employer] [place] [employer] - Occupational Stress Questionnaire     Feeling of Stress : To some extent   Social Connections: Feeling Socially Integrated (5/3/2025)    OASIS : Social Isolation     Frequency of experiencing loneliness or isolation: Never   Intimate Partner Violence: Not At Risk (5/19/2025)    Humiliation, Afraid, Rape, and Kick questionnaire     Fear of Current or Ex-Partner: No     Emotionally Abused: No     Physically Abused: No     Sexually Abused: No   Housing Stability: Low Risk  (5/19/2025)    Housing Stability Vital Sign     Unable to Pay for Housing in the Last Year: No     Number of Times Moved in the Last Year: 0     Homeless in the Last Year: No       Family History   Problem Relation Age of Onset    Cancer Mother         Brain cancer    Heart Disease Father         Several By-pass Surgeries    Cancer Daughter         Thyroid Cancer    Heart Disease Brother         Fatal Heart Attack       Allergies:  Iron and Hydroxyzine    Review of Systems:  Noncontributory except as per HPI    Physical Exam:  /64   Pulse 66   Temp 36.1 °C (97 °F) (Temporal)   Resp 18   Ht 1.778 m (5' 10\")   Wt 64.1 kg (141 lb 5 oz)   SpO2 92%     Constitutional: Alert, oriented, no acute distress  HEENT:  Normocephalic and atraumatic, EOMI  Neck:   Supple, no JVD,   Cardiovascular: Regular rate and rhythm,   Pulmonary:  Good air entry bilaterally,    Abdominal:  Soft, non-tender, non-distended     Aortic impulse not widened  Musculoskeletal: No edema, no " tenderness  Neurological:  CN II-XII grossly intact, no focal deficits  Skin:   Skin is warm and dry. No rash noted. Ulceration of the right second and fourth toes, no gross infection. Ulceration of the left first, second, and fourth toes. No gross infection.  Psychiatric:  Normal mood and affect.    Labs:  Recent Labs     07/28/25  0757   WBC 8.8   RBC 2.58*   HEMOGLOBIN 8.6*   HEMATOCRIT 26.4*   .3*   MCH 33.3*   MCHC 32.6   RDW 53.0*   PLATELETCT 215   MPV 10.3                   Assessment/Plan:  This is a 79 y.o. male here today for elective surgery: amputation of right fourth toe    I explained the details of the operation, alternatives, and potential risks, including but not limited to bleeding, infection, and risks of anesthesia.  All questions were answered. Patient understands and agrees to proceed.      Marco Antonio Miller MD  [facility]   ___________________________________________________________________  Patient:Edy Bennett   MRN:[id number]   CSN:[id number] e         [1]   Past Medical History:  Diagnosis Date    A-V fistula ([facility])     Left Arm    Arrhythmia 03/13/2024    Due to kidney failure treatment. Atrial fibrillation    Breath shortness     with exertion    Cataract     shon iol    Congestive heart failure (HCC)     Diabetes ([facility])     not on any medication at this time, being monitored at dialysis    Dialysis patient ([facility])     monday wednesday friday at [facility]    Dialysis patient ([facility]) 01/17/2025    Starting Peritoneal Dialysis @ Home on 1/17/2025. Follows Nephrologist MD Milian.    ESRD on peritoneal dialysis ([facility]) 06/04/2025    Foot ulcer ([facility])     left foot. being seen by wound care    Hemorrhagic disorder ([facility])     plavix eliquis    High cholesterol     Hypertension 05/24/2024    states controlled    NSTEMI (non-ST elevated myocardial infarction) ([facility]) 02/25/2025    DARIEN (obstructive sleep apnea) 01/17/2025    O2 @ 2.5L Per Patient's Wife.    Pain      Peritoneal dialysis catheter in place (HCC) 01/17/2025    Pneumonia     3/24    Renal disorder     Sleep apnea 01/10/2025    no cpap    Stroke ([facility]) 2021    tia   [2]   Past Surgical History:  Procedure Laterality Date    N[place] UPPER GI ENDOSCOPY,CTRL BLEED N/A 02/26/2025    Procedure: EGD, WITH CLIP INSERTION;  Surgeon: Frantz Dailey M.D.;  Location: SURGERY SAME DAY [facility];  Service: Gastroenterology    NJ UPPER GI ENDOSCOPY,DIAGNOSIS N/A 02/14/2025    Procedure: GASTROSCOPY;  Surgeon: Melany Richards M.D.;  Location: SURGERY SAME DAY [facility];  Service: Gastroenterology    NJ UPPER GI ENDOSCOPY,BIOPSY N/A 02/14/2025    Procedure: GASTROSCOPY, WITH BIOPSY;  Surgeon: Melany Richards M.D.;  Location: SURGERY SAME DAY [facility];  Service: Gastroenterology    OTHER  02/2025    Duodenal Clip Placed    NJ VEIN BYPASS GRAFT,FEM-TIBIAL Left 01/20/2025    Procedure: LEFT LOWER EXTREMITY BYPASS, POPLITIAL TO TIBIAL BYPASS;  Surgeon: Marco Antonio Miller M.D.;  Location: [facility];  Service: Vascular    AORTOGRAM WITH RUNOFF  01/14/2025    Procedure: AORTOGRAM WITH RUNOFF, right peroneal artery intravascular lithotripsy and stent placement;  Surgeon: Marco Antonio Miller M.D.;  Location: [facility];  Service: Vascular    LAPAROSCOPIC INGUINAL HERNIA REPAIR N/A 12/18/2024    Procedure: LAPAROSCOPIC LEFT INGUINAL HERNIA REPAIR WITH MESH, LAPAROSCOPIC REPOSITIONING OF PERITONEAL DIALYSIS CATHETER;  Surgeon: Marco Antonio Miller M.D.;  Location: SURGERY [facility];  Service: Vascular    CATH PLACEMENT CAPD N/A 12/18/2024    Procedure: REPOSITION, CATHETER, CAPD;  Surgeon: Marco Antonio Miller M.D.;  Location: SURGERY [facility];  Service: Vascular    CATH PLACEMENT CAPD N/A 09/16/2024    Procedure: LAPAROSCOPIC INSERTION OF PERITONEAL DIALYSIS CATHETER PLACEMENT;  Surgeon: Marco Antonio Miller M.D.;  Location: SURGERY SAME DAY [facility];  Service: Vascular    AV FISTULA CREATION Left 07/01/2024    Procedure:  CREATION OF LEFT UPPER EXTREMITY DIALYSIS FISTULA;  Surgeon: Shilo Mercedes M.D.;  Location: SURGERY [facility];  Service: General    OTHER ORTHOPEDIC SURGERY  2004    bunion r foot    OTHER      tonsils adenoids    OTHER      dialysis catheter in upper right chest    OTHER      shon iol    OTHER CARDIAC SURGERY      cardiac stents   [3]   Current Facility-Administered Medications   Medication Dose Route Frequency Provider Last Rate Last Admin    lidocaine (Xylocaine) 1 % injection 0.5 mL  0.5 mL Intradermal Once PRN Marco Antonio Miller M.D.

## 2025-07-28 NOTE — OR NURSING
Report to Laura ARANDA. Plan of care discussed. Patient denies pain or nausea. Post op shoe in place. VSS. Patient tolerating ice chips without difficulty. Patient expresses readiness to proceed to discharge. Hearing aids in place. Glasses on chart.

## 2025-07-28 NOTE — ANESTHESIA POSTPROCEDURE EVALUATION
Patient: Edy Bennett    Procedure Summary       Date: 07/28/25 Room / Location: [place] [id number] / SURGERY [facility]    Anesthesia Start: 0850 Anesthesia Stop: 0930    Procedure: RIGHT FORTH TOE AMPUTATION (Foot) Diagnosis: (GANGRENE OF RIGHT FOOT)    Surgeons: Marco Antonio Miller M.D. Responsible Provider: Erica Frost M.D.    Anesthesia Type: general ASA Status: 4            Final Anesthesia Type: general  Last vitals  BP   Blood Pressure : 106/64    Temp   36.1 °C (97 °F)    Pulse   66   Resp   18    SpO2   92 %      Anesthesia Post Evaluation    Patient location during evaluation: PACU  Patient participation: complete - patient participated  Level of consciousness: sleepy but conscious    Airway patency: patent  Anesthetic complications: no  Cardiovascular status: hemodynamically stable  Respiratory status: acceptable, face mask, spontaneous ventilation and oral airway  Hydration status: euvolemic    PONV: none          No notable events documented.     Nurse Pain Score: 0 (NPRS)

## 2025-07-28 NOTE — OR NURSING
Patient allergies and NPO status verified, home medication reconciliation completed and belongings secured. Patient verbalizes understanding of pain scale, expected course of stay and plan of care. Surgical site verified with patient. IV access established; call light within reach. No further needs at this time; hourly rounding.

## 2025-07-28 NOTE — ANESTHESIA PREPROCEDURE EVALUATION
Case: [id number] Date/Time: 07/28/25 0815    Procedure: RIGHT TRANSMETARSAL AMPUTATION (Foot)    Anesthesia type: General    Pre-op diagnosis: GANGRENE OF RIGHT FOOT    Location: [facility] OR 10 / SURGERY [facility]    Surgeons: Marco Antonio Miller M.D.          80 yo man with ESRD on PD, CAD s/p PCI on 8/2024, HFrEF (~20%), Afib on Eliquis, DM2, HTN, severe PAD s/p popliteal to post tib artery bypass on 1/2025 with functionally bicuspid aortic valve and severe AS for TAVR for RIGHT transmetatarsal amputation today.     NPO>8hrs, but +N/-V in preop.   Tolerated GA in the past.     Relevant Problems   CARDIAC   (positive) Aortic stenosis   (positive) Aortic stenosis, severe   (positive) CAD (coronary artery disease)   (positive) Essential hypertension   (positive) NSTEMI (non-ST elevated myocardial infarction) (HCC)   (positive) Peripheral arterial disease (HCC)   (positive) Peripheral artery disease (HCC)   (positive) Typical atrial flutter (HCC)      GI   (positive) Duodenal ulcer         (positive) End stage renal disease (HCC)   (positive) Renal disease       Physical Exam    Airway   Mallampati: II  TM distance: >3 FB  Neck ROM: full       Cardiovascular - normal exam  Rhythm: regular  Rate: normal    (-) murmur     Dental - normal exam           Pulmonary - normal examBreath sounds clear to auscultation     Abdominal    Neurological - normal exam                   Anesthesia Plan    ASA 4 (severe symptomatic aortic stenosis)       Plan - general       Airway plan will be ETT          Induction: intravenous    Postoperative Plan: Postoperative administration of opioids is intended.    Pertinent diagnostic labs and testing reviewed    Informed Consent:    Anesthetic plan and risks discussed with patient.    Use of blood products discussed with: patient whom consented to blood products.

## 2025-07-28 NOTE — OP REPORT
VASCULAR SURGERY SERVICE                       Operative Note  _____________________________________________________    Date: 2025    Patient: Edy Bennett  : 1945  MRN: [id number]  _____________________________________________________      Preoperative Diagnosis:  - Gangrene of right fourth toe    Postoperative Diagnosis:  - Gangrene of right fourth toe    Procedure:  - Amputation of right fourth toe through MTP joint    _____________________________________________________    Surgeon:                                 Marco Antonio Miller MD    Assistant:   none    Anesthesia:                             General anesthesia plus local anesthetic    EBL:                                        minimal     Complications:                        none    Disposition:                             Tolerated well, sent to recovery in stable condition  _____________________________________________________      Procedure Summary:  Following informed consent, the patient was placed supine on the operating table, and general anesthesia was administered.  The patient was prepped and draped sterile in the usual fashion. Surgical time-out was called, and everyone was in agreement.  The patient did receive preoperative prophylactic antibiotics.      Local anesthetic was infiltrated around the base of the right fourth toe.  We specifically used 1% plain lidocaine.  An elliptical incision was made around the base of the toe and then the toe was  sharply at the MTP joint and removed.  The wound was irrigated.  The incision was closed with interrupted 2-0 nylon sutures and a sterile bandage was applied.    The patient was then sent to recovery in stable condition.  All counts were correct at the conclusion of the case.       Postoperative Plan:    Patient will go home with an offloading shoe.  Bandage can stay on for 2 days, then it can be removed and stay open to air and it is okay to get the incision wet.   Sutures will stay in for 2 weeks to be removed in the clinic.      Marco Antonio Miller MD  [facility]

## 2025-07-28 NOTE — ANESTHESIA PROCEDURE NOTES
Airway    Date/Time: 7/28/2025 8:58 AM    Performed by: Erica Frost M.D.  Authorized by: Erica Frost M.D.    Location:  OR  Urgency:  Elective  Difficult Airway: No    Indications for Airway Management:  Anesthesia      Spontaneous Ventilation: absent    Sedation Level:  Deep  Preoxygenated: Yes    Patient Position:  Sniffing  Final Airway Type:  Endotracheal airway  Final Endotracheal Airway:  ETT  Cuffed: Yes    Technique Used for Successful ETT Placement:  Direct laryngoscopy  Devices/Methods Used in Placement:  Intubating stylet and cricoid pressure    Insertion Site:  Oral  Blade Type:  Denisse  Laryngoscope Blade/Videolaryngoscope Blade Size:  4  ETT Size (mm):  7.5  Measured from:  Teeth  ETT to Teeth (cm):  24  Placement Verified by: auscultation and capnometry    Cormack-Lehane Classification:  Grade I - full view of glottis  Number of Attempts at Approach:  1

## 2025-07-29 LAB
BACTERIA BLD CULT: NORMAL
BACTERIA BLD CULT: NORMAL
SIGNIFICANT IND 70042: NORMAL
SIGNIFICANT IND 70042: NORMAL
SITE SITE: NORMAL
SITE SITE: NORMAL
SOURCE SOURCE: NORMAL
SOURCE SOURCE: NORMAL

## 2025-08-04 ENCOUNTER — HOSPITAL ENCOUNTER (INPATIENT)
Facility: MEDICAL CENTER | Age: 80
LOS: 3 days | DRG: 280 | End: 2025-08-07
Attending: EMERGENCY MEDICINE | Admitting: STUDENT IN AN ORGANIZED HEALTH CARE EDUCATION/TRAINING PROGRAM
Payer: MEDICARE

## 2025-08-04 ENCOUNTER — APPOINTMENT (OUTPATIENT)
Dept: RADIOLOGY | Facility: MEDICAL CENTER | Age: 80
DRG: 280 | End: 2025-08-04
Attending: STUDENT IN AN ORGANIZED HEALTH CARE EDUCATION/TRAINING PROGRAM
Payer: MEDICARE

## 2025-08-04 DIAGNOSIS — E83.39 HYPERPHOSPHATEMIA: ICD-10-CM

## 2025-08-04 DIAGNOSIS — R79.89 ELEVATED TROPONIN: ICD-10-CM

## 2025-08-04 DIAGNOSIS — R00.1 SYMPTOMATIC BRADYCARDIA: Primary | ICD-10-CM

## 2025-08-04 DIAGNOSIS — I73.9 PERIPHERAL ARTERY DISEASE (HCC): ICD-10-CM

## 2025-08-04 DIAGNOSIS — Z99.2 ESRD ON PERITONEAL DIALYSIS (HCC): ICD-10-CM

## 2025-08-04 DIAGNOSIS — N18.6 ESRD ON PERITONEAL DIALYSIS (HCC): ICD-10-CM

## 2025-08-04 DIAGNOSIS — I35.0 AORTIC VALVE STENOSIS, ETIOLOGY OF CARDIAC VALVE DISEASE UNSPECIFIED: ICD-10-CM

## 2025-08-04 DIAGNOSIS — E83.41 HYPERMAGNESEMIA: ICD-10-CM

## 2025-08-04 PROBLEM — R73.9 HYPERGLYCEMIA: Status: ACTIVE | Noted: 2025-08-04

## 2025-08-04 LAB
ALBUMIN SERPL BCP-MCNC: 3.3 G/DL (ref 3.2–4.9)
ALBUMIN/GLOB SERPL: 1.1 G/DL
ALP SERPL-CCNC: 164 U/L (ref 30–99)
ALT SERPL-CCNC: 17 U/L (ref 2–50)
ANION GAP SERPL CALC-SCNC: 23 MMOL/L (ref 7–16)
APTT PPP: 35.4 SEC (ref 24.7–36)
AST SERPL-CCNC: 27 U/L (ref 12–45)
BASOPHILS # BLD AUTO: 0.9 % (ref 0–1.8)
BASOPHILS # BLD: 0.06 K/UL (ref 0–0.12)
BILIRUB SERPL-MCNC: 0.3 MG/DL (ref 0.1–1.5)
BUN SERPL-MCNC: 78 MG/DL (ref 8–22)
CALCIUM ALBUM COR SERPL-MCNC: 9.7 MG/DL (ref 8.5–10.5)
CALCIUM SERPL-MCNC: 9.1 MG/DL (ref 8.5–10.5)
CHLORIDE SERPL-SCNC: 89 MMOL/L (ref 96–112)
CO2 SERPL-SCNC: 21 MMOL/L (ref 20–33)
CREAT SERPL-MCNC: 9.68 MG/DL (ref 0.5–1.4)
EKG IMPRESSION: NORMAL
EOSINOPHIL # BLD AUTO: 0.17 K/UL (ref 0–0.51)
EOSINOPHIL NFR BLD: 2.5 % (ref 0–6.9)
ERYTHROCYTE [DISTWIDTH] IN BLOOD BY AUTOMATED COUNT: 56.6 FL (ref 35.9–50)
GFR SERPLBLD CREATININE-BSD FMLA CKD-EPI: 5 ML/MIN/1.73 M 2
GLOBULIN SER CALC-MCNC: 3 G/DL (ref 1.9–3.5)
GLUCOSE SERPL-MCNC: 202 MG/DL (ref 65–99)
HCT VFR BLD AUTO: 33.7 % (ref 42–52)
HGB BLD-MCNC: 11 G/DL (ref 14–18)
IMM GRANULOCYTES # BLD AUTO: 0.13 K/UL (ref 0–0.11)
IMM GRANULOCYTES NFR BLD AUTO: 1.9 % (ref 0–0.9)
INR PPP: 1.48 (ref 0.87–1.13)
LACTATE SERPL-SCNC: 2.1 MMOL/L (ref 0.5–2)
LYMPHOCYTES # BLD AUTO: 0.86 K/UL (ref 1–4.8)
LYMPHOCYTES NFR BLD: 12.5 % (ref 22–41)
MAGNESIUM SERPL-MCNC: 3.3 MG/DL (ref 1.5–2.5)
MCH RBC QN AUTO: 33.5 PG (ref 27–33)
MCHC RBC AUTO-ENTMCNC: 32.6 G/DL (ref 32.3–36.5)
MCV RBC AUTO: 102.7 FL (ref 81.4–97.8)
MONOCYTES # BLD AUTO: 0.48 K/UL (ref 0–0.85)
MONOCYTES NFR BLD AUTO: 7 % (ref 0–13.4)
NEUTROPHILS # BLD AUTO: 5.16 K/UL (ref 1.82–7.42)
NEUTROPHILS NFR BLD: 75.2 % (ref 44–72)
NRBC # BLD AUTO: 0.02 K/UL
NRBC BLD-RTO: 0.3 /100 WBC (ref 0–0.2)
PHOSPHATE SERPL-MCNC: 7.8 MG/DL (ref 2.5–4.5)
PLATELET # BLD AUTO: 163 K/UL (ref 164–446)
PMV BLD AUTO: 11.2 FL (ref 9–12.9)
POTASSIUM SERPL-SCNC: 5.1 MMOL/L (ref 3.6–5.5)
PROT SERPL-MCNC: 6.3 G/DL (ref 6–8.2)
PROTHROMBIN TIME: 18 SEC (ref 12–14.6)
RBC # BLD AUTO: 3.28 M/UL (ref 4.7–6.1)
SODIUM SERPL-SCNC: 133 MMOL/L (ref 135–145)
TROPONIN T SERPL-MCNC: 888 NG/L (ref 6–19)
UFH PPP CHRO-ACNC: >1.1 IU/ML
WBC # BLD AUTO: 6.9 K/UL (ref 4.8–10.8)

## 2025-08-04 PROCEDURE — 700111 HCHG RX REV CODE 636 W/ 250 OVERRIDE (IP): Performed by: EMERGENCY MEDICINE

## 2025-08-04 PROCEDURE — 80053 COMPREHEN METABOLIC PANEL: CPT

## 2025-08-04 PROCEDURE — 770020 HCHG ROOM/CARE - TELE (206)

## 2025-08-04 PROCEDURE — 71045 X-RAY EXAM CHEST 1 VIEW: CPT

## 2025-08-04 PROCEDURE — 99223 1ST HOSP IP/OBS HIGH 75: CPT | Mod: AI,25 | Performed by: STUDENT IN AN ORGANIZED HEALTH CARE EDUCATION/TRAINING PROGRAM

## 2025-08-04 PROCEDURE — 36415 COLL VENOUS BLD VENIPUNCTURE: CPT

## 2025-08-04 PROCEDURE — 99497 ADVNCD CARE PLAN 30 MIN: CPT | Performed by: STUDENT IN AN ORGANIZED HEALTH CARE EDUCATION/TRAINING PROGRAM

## 2025-08-04 PROCEDURE — 93005 ELECTROCARDIOGRAM TRACING: CPT | Mod: TC | Performed by: EMERGENCY MEDICINE

## 2025-08-04 PROCEDURE — 94760 N-INVAS EAR/PLS OXIMETRY 1: CPT

## 2025-08-04 PROCEDURE — 96375 TX/PRO/DX INJ NEW DRUG ADDON: CPT

## 2025-08-04 PROCEDURE — 84484 ASSAY OF TROPONIN QUANT: CPT

## 2025-08-04 PROCEDURE — 85025 COMPLETE CBC W/AUTO DIFF WBC: CPT

## 2025-08-04 PROCEDURE — 93005 ELECTROCARDIOGRAM TRACING: CPT | Mod: TC

## 2025-08-04 PROCEDURE — 85730 THROMBOPLASTIN TIME PARTIAL: CPT

## 2025-08-04 PROCEDURE — 83735 ASSAY OF MAGNESIUM: CPT

## 2025-08-04 PROCEDURE — 84100 ASSAY OF PHOSPHORUS: CPT

## 2025-08-04 PROCEDURE — 85610 PROTHROMBIN TIME: CPT

## 2025-08-04 PROCEDURE — 99285 EMERGENCY DEPT VISIT HI MDM: CPT

## 2025-08-04 PROCEDURE — 99223 1ST HOSP IP/OBS HIGH 75: CPT | Performed by: INTERNAL MEDICINE

## 2025-08-04 PROCEDURE — 85520 HEPARIN ASSAY: CPT

## 2025-08-04 PROCEDURE — 96365 THER/PROPH/DIAG IV INF INIT: CPT

## 2025-08-04 PROCEDURE — 83605 ASSAY OF LACTIC ACID: CPT

## 2025-08-04 RX ORDER — ROSUVASTATIN CALCIUM 20 MG/1
20 TABLET, COATED ORAL
Status: DISCONTINUED | OUTPATIENT
Start: 2025-08-05 | End: 2025-08-07 | Stop reason: HOSPADM

## 2025-08-04 RX ORDER — SEVELAMER CARBONATE 800 MG/1
1600 TABLET, FILM COATED ORAL
Status: DISCONTINUED | OUTPATIENT
Start: 2025-08-05 | End: 2025-08-07 | Stop reason: HOSPADM

## 2025-08-04 RX ORDER — MIDODRINE HYDROCHLORIDE 5 MG/1
5 TABLET ORAL 3 TIMES DAILY
Status: DISCONTINUED | OUTPATIENT
Start: 2025-08-05 | End: 2025-08-05

## 2025-08-04 RX ORDER — LABETALOL HYDROCHLORIDE 5 MG/ML
10 INJECTION, SOLUTION INTRAVENOUS EVERY 4 HOURS PRN
Status: DISCONTINUED | OUTPATIENT
Start: 2025-08-04 | End: 2025-08-07 | Stop reason: HOSPADM

## 2025-08-04 RX ORDER — HEPARIN SODIUM 1000 [USP'U]/ML
30 INJECTION, SOLUTION INTRAVENOUS; SUBCUTANEOUS PRN
Status: DISCONTINUED | OUTPATIENT
Start: 2025-08-05 | End: 2025-08-05

## 2025-08-04 RX ORDER — ACETAMINOPHEN 325 MG/1
650 TABLET ORAL EVERY 6 HOURS PRN
Status: DISCONTINUED | OUTPATIENT
Start: 2025-08-04 | End: 2025-08-07 | Stop reason: HOSPADM

## 2025-08-04 RX ORDER — SEVELAMER CARBONATE 800 MG/1
800 TABLET, FILM COATED ORAL
Status: DISCONTINUED | OUTPATIENT
Start: 2025-08-04 | End: 2025-08-07 | Stop reason: HOSPADM

## 2025-08-04 RX ORDER — ONDANSETRON 2 MG/ML
4 INJECTION INTRAMUSCULAR; INTRAVENOUS EVERY 4 HOURS PRN
Status: DISCONTINUED | OUTPATIENT
Start: 2025-08-04 | End: 2025-08-07 | Stop reason: HOSPADM

## 2025-08-04 RX ORDER — INSULIN LISPRO 100 [IU]/ML
1-6 INJECTION, SOLUTION INTRAVENOUS; SUBCUTANEOUS EVERY 6 HOURS
Status: DISCONTINUED | OUTPATIENT
Start: 2025-08-05 | End: 2025-08-06

## 2025-08-04 RX ORDER — HYDROCODONE BITARTRATE AND ACETAMINOPHEN 5; 325 MG/1; MG/1
1-2 TABLET ORAL EVERY 4 HOURS PRN
Refills: 0 | Status: DISCONTINUED | OUTPATIENT
Start: 2025-08-04 | End: 2025-08-07 | Stop reason: HOSPADM

## 2025-08-04 RX ORDER — CLOPIDOGREL BISULFATE 75 MG/1
75 TABLET ORAL EVERY MORNING
Status: DISCONTINUED | OUTPATIENT
Start: 2025-08-05 | End: 2025-08-07 | Stop reason: HOSPADM

## 2025-08-04 RX ORDER — ONDANSETRON 4 MG/1
4 TABLET, ORALLY DISINTEGRATING ORAL EVERY 4 HOURS PRN
Status: DISCONTINUED | OUTPATIENT
Start: 2025-08-04 | End: 2025-08-07 | Stop reason: HOSPADM

## 2025-08-04 RX ORDER — DEXTROSE MONOHYDRATE 25 G/50ML
25 INJECTION, SOLUTION INTRAVENOUS
Status: DISCONTINUED | OUTPATIENT
Start: 2025-08-04 | End: 2025-08-06

## 2025-08-04 RX ORDER — MONTELUKAST SODIUM 10 MG/1
10 TABLET ORAL
Status: DISCONTINUED | OUTPATIENT
Start: 2025-08-05 | End: 2025-08-07 | Stop reason: HOSPADM

## 2025-08-04 RX ORDER — HYDROCODONE BITARTRATE AND ACETAMINOPHEN 5; 325 MG/1; MG/1
1-2 TABLET ORAL EVERY 4 HOURS PRN
COMMUNITY

## 2025-08-04 RX ORDER — HEPARIN SODIUM 1000 [USP'U]/ML
60 INJECTION, SOLUTION INTRAVENOUS; SUBCUTANEOUS ONCE
Status: COMPLETED | OUTPATIENT
Start: 2025-08-04 | End: 2025-08-04

## 2025-08-04 RX ORDER — HEPARIN SODIUM 5000 [USP'U]/100ML
0-30 INJECTION, SOLUTION INTRAVENOUS CONTINUOUS
Status: DISCONTINUED | OUTPATIENT
Start: 2025-08-04 | End: 2025-08-05

## 2025-08-04 RX ORDER — TRAMADOL HYDROCHLORIDE 50 MG/1
50 TABLET ORAL EVERY 6 HOURS PRN
Status: DISCONTINUED | OUTPATIENT
Start: 2025-08-04 | End: 2025-08-07 | Stop reason: HOSPADM

## 2025-08-04 RX ORDER — SEVELAMER CARBONATE 800 MG/1
800-1600 TABLET, FILM COATED ORAL SEE ADMIN INSTRUCTIONS
Status: DISCONTINUED | OUTPATIENT
Start: 2025-08-04 | End: 2025-08-04

## 2025-08-04 RX ORDER — TAMSULOSIN HYDROCHLORIDE 0.4 MG/1
0.4 CAPSULE ORAL
Status: DISCONTINUED | OUTPATIENT
Start: 2025-08-05 | End: 2025-08-07 | Stop reason: HOSPADM

## 2025-08-04 RX ADMIN — HEPARIN SODIUM 12 UNITS/KG/HR: 5000 INJECTION, SOLUTION INTRAVENOUS at 23:33

## 2025-08-04 RX ADMIN — HEPARIN SODIUM 3800 UNITS: 1000 INJECTION, SOLUTION INTRAVENOUS; SUBCUTANEOUS at 23:30

## 2025-08-04 ASSESSMENT — FIBROSIS 4 INDEX: FIB4 SCORE: 1.9

## 2025-08-05 ENCOUNTER — APPOINTMENT (OUTPATIENT)
Dept: CARDIOLOGY | Facility: MEDICAL CENTER | Age: 80
DRG: 280 | End: 2025-08-05
Attending: PHYSICIAN ASSISTANT
Payer: MEDICARE

## 2025-08-05 LAB
ANION GAP SERPL CALC-SCNC: 24 MMOL/L (ref 7–16)
APTT PPP: 126.4 SEC (ref 24.7–36)
APTT PPP: >240 SEC (ref 24.7–36)
BUN SERPL-MCNC: 83 MG/DL (ref 8–22)
CALCIUM SERPL-MCNC: 9.1 MG/DL (ref 8.5–10.5)
CHLORIDE SERPL-SCNC: 91 MMOL/L (ref 96–112)
CO2 SERPL-SCNC: 18 MMOL/L (ref 20–33)
CREAT SERPL-MCNC: 10.4 MG/DL (ref 0.5–1.4)
EKG IMPRESSION: NORMAL
EKG IMPRESSION: NORMAL
ERYTHROCYTE [DISTWIDTH] IN BLOOD BY AUTOMATED COUNT: 55.7 FL (ref 35.9–50)
GFR SERPLBLD CREATININE-BSD FMLA CKD-EPI: 5 ML/MIN/1.73 M 2
GLUCOSE BLD STRIP.AUTO-MCNC: 126 MG/DL (ref 65–99)
GLUCOSE BLD STRIP.AUTO-MCNC: 146 MG/DL (ref 65–99)
GLUCOSE BLD STRIP.AUTO-MCNC: 149 MG/DL (ref 65–99)
GLUCOSE BLD STRIP.AUTO-MCNC: 151 MG/DL (ref 65–99)
GLUCOSE SERPL-MCNC: 132 MG/DL (ref 65–99)
HCT VFR BLD AUTO: 34.7 % (ref 42–52)
HGB BLD-MCNC: 11.3 G/DL (ref 14–18)
LACTATE SERPL-SCNC: 3 MMOL/L (ref 0.5–2)
LACTATE SERPL-SCNC: 3.2 MMOL/L (ref 0.5–2)
LACTATE SERPL-SCNC: 3.3 MMOL/L (ref 0.5–2)
MCH RBC QN AUTO: 33.2 PG (ref 27–33)
MCHC RBC AUTO-ENTMCNC: 32.6 G/DL (ref 32.3–36.5)
MCV RBC AUTO: 102.1 FL (ref 81.4–97.8)
PLATELET # BLD AUTO: 158 K/UL (ref 164–446)
PMV BLD AUTO: 11.2 FL (ref 9–12.9)
POTASSIUM SERPL-SCNC: 5.2 MMOL/L (ref 3.6–5.5)
RBC # BLD AUTO: 3.4 M/UL (ref 4.7–6.1)
SODIUM SERPL-SCNC: 133 MMOL/L (ref 135–145)
TROPONIN T SERPL-MCNC: 883 NG/L (ref 6–19)
WBC # BLD AUTO: 7.3 K/UL (ref 4.8–10.8)

## 2025-08-05 PROCEDURE — 80048 BASIC METABOLIC PNL TOTAL CA: CPT

## 2025-08-05 PROCEDURE — 99233 SBSQ HOSP IP/OBS HIGH 50: CPT | Mod: FS | Performed by: INTERNAL MEDICINE

## 2025-08-05 PROCEDURE — 700111 HCHG RX REV CODE 636 W/ 250 OVERRIDE (IP): Performed by: STUDENT IN AN ORGANIZED HEALTH CARE EDUCATION/TRAINING PROGRAM

## 2025-08-05 PROCEDURE — 85027 COMPLETE CBC AUTOMATED: CPT

## 2025-08-05 PROCEDURE — 82962 GLUCOSE BLOOD TEST: CPT | Performed by: STUDENT IN AN ORGANIZED HEALTH CARE EDUCATION/TRAINING PROGRAM

## 2025-08-05 PROCEDURE — 99291 CRITICAL CARE FIRST HOUR: CPT | Performed by: STUDENT IN AN ORGANIZED HEALTH CARE EDUCATION/TRAINING PROGRAM

## 2025-08-05 PROCEDURE — 700117 HCHG RX CONTRAST REV CODE 255: Performed by: PHYSICIAN ASSISTANT

## 2025-08-05 PROCEDURE — 3E1M39Z IRRIGATION OF PERITONEAL CAVITY USING DIALYSATE, PERCUTANEOUS APPROACH: ICD-10-PCS | Performed by: INTERNAL MEDICINE

## 2025-08-05 PROCEDURE — 700102 HCHG RX REV CODE 250 W/ 637 OVERRIDE(OP): Performed by: INTERNAL MEDICINE

## 2025-08-05 PROCEDURE — 90945 DIALYSIS ONE EVALUATION: CPT

## 2025-08-05 PROCEDURE — A9270 NON-COVERED ITEM OR SERVICE: HCPCS | Performed by: NURSE PRACTITIONER

## 2025-08-05 PROCEDURE — 36415 COLL VENOUS BLD VENIPUNCTURE: CPT

## 2025-08-05 PROCEDURE — 700102 HCHG RX REV CODE 250 W/ 637 OVERRIDE(OP): Performed by: STUDENT IN AN ORGANIZED HEALTH CARE EDUCATION/TRAINING PROGRAM

## 2025-08-05 PROCEDURE — A9270 NON-COVERED ITEM OR SERVICE: HCPCS | Performed by: INTERNAL MEDICINE

## 2025-08-05 PROCEDURE — 83605 ASSAY OF LACTIC ACID: CPT | Mod: 91

## 2025-08-05 PROCEDURE — 700102 HCHG RX REV CODE 250 W/ 637 OVERRIDE(OP): Performed by: NURSE PRACTITIONER

## 2025-08-05 PROCEDURE — 93306 TTE W/DOPPLER COMPLETE: CPT | Mod: 26 | Performed by: INTERNAL MEDICINE

## 2025-08-05 PROCEDURE — 93306 TTE W/DOPPLER COMPLETE: CPT

## 2025-08-05 PROCEDURE — A9270 NON-COVERED ITEM OR SERVICE: HCPCS | Performed by: STUDENT IN AN ORGANIZED HEALTH CARE EDUCATION/TRAINING PROGRAM

## 2025-08-05 PROCEDURE — 770020 HCHG ROOM/CARE - TELE (206)

## 2025-08-05 PROCEDURE — 85730 THROMBOPLASTIN TIME PARTIAL: CPT | Mod: 91

## 2025-08-05 RX ORDER — LORAZEPAM 0.5 MG/1
0.5 TABLET ORAL ONCE
Status: COMPLETED | OUTPATIENT
Start: 2025-08-05 | End: 2025-08-05

## 2025-08-05 RX ORDER — MIDODRINE HYDROCHLORIDE 5 MG/1
2.5 TABLET ORAL 3 TIMES DAILY
Status: DISCONTINUED | OUTPATIENT
Start: 2025-08-06 | End: 2025-08-07 | Stop reason: HOSPADM

## 2025-08-05 RX ORDER — SODIUM BICARBONATE 650 MG/1
1300 TABLET ORAL 2 TIMES DAILY
Status: DISCONTINUED | OUTPATIENT
Start: 2025-08-05 | End: 2025-08-07 | Stop reason: HOSPADM

## 2025-08-05 RX ORDER — HEPARIN SODIUM 5000 [USP'U]/100ML
INJECTION, SOLUTION INTRAVENOUS CONTINUOUS
Status: DISCONTINUED | OUTPATIENT
Start: 2025-08-05 | End: 2025-08-06

## 2025-08-05 RX ORDER — GENTAMICIN SULFATE 1 MG/G
1 CREAM TOPICAL
Status: DISCONTINUED | OUTPATIENT
Start: 2025-08-05 | End: 2025-08-07 | Stop reason: HOSPADM

## 2025-08-05 RX ORDER — HEPARIN SODIUM 1000 [USP'U]/ML
2600 INJECTION, SOLUTION INTRAVENOUS; SUBCUTANEOUS PRN
Status: DISCONTINUED | OUTPATIENT
Start: 2025-08-05 | End: 2025-08-06

## 2025-08-05 RX ORDER — GENTAMICIN SULFATE 1 MG/G
1 CREAM TOPICAL DAILY
Status: DISCONTINUED | OUTPATIENT
Start: 2025-08-05 | End: 2025-08-05

## 2025-08-05 RX ADMIN — MIDODRINE HYDROCHLORIDE 5 MG: 5 TABLET ORAL at 17:49

## 2025-08-05 RX ADMIN — MONTELUKAST 10 MG: 10 TABLET, FILM COATED ORAL at 00:48

## 2025-08-05 RX ADMIN — ROSUVASTATIN CALCIUM 20 MG: 20 TABLET, FILM COATED ORAL at 20:51

## 2025-08-05 RX ADMIN — HYDROCODONE BITARTRATE AND ACETAMINOPHEN 1 TABLET: 5; 325 TABLET ORAL at 13:53

## 2025-08-05 RX ADMIN — MONTELUKAST 10 MG: 10 TABLET, FILM COATED ORAL at 20:50

## 2025-08-05 RX ADMIN — CLOPIDOGREL BISULFATE 75 MG: 75 TABLET, FILM COATED ORAL at 06:01

## 2025-08-05 RX ADMIN — TAMSULOSIN HYDROCHLORIDE 0.4 MG: 0.4 CAPSULE ORAL at 07:58

## 2025-08-05 RX ADMIN — HEPARIN SODIUM 1050 UNITS/HR: 5000 INJECTION, SOLUTION INTRAVENOUS at 00:46

## 2025-08-05 RX ADMIN — HYDROCODONE BITARTRATE AND ACETAMINOPHEN 1 TABLET: 5; 325 TABLET ORAL at 18:38

## 2025-08-05 RX ADMIN — HUMAN ALBUMIN MICROSPHERES AND PERFLUTREN 3 ML: 10; .22 INJECTION, SOLUTION INTRAVENOUS at 11:45

## 2025-08-05 RX ADMIN — LORAZEPAM 0.5 MG: 0.5 TABLET ORAL at 06:37

## 2025-08-05 RX ADMIN — SODIUM BICARBONATE 1300 MG: 650 TABLET ORAL at 12:25

## 2025-08-05 RX ADMIN — INSULIN LISPRO 1 UNITS: 100 INJECTION, SOLUTION INTRAVENOUS; SUBCUTANEOUS at 00:59

## 2025-08-05 RX ADMIN — ROSUVASTATIN CALCIUM 20 MG: 20 TABLET, FILM COATED ORAL at 00:48

## 2025-08-05 RX ADMIN — MIDODRINE HYDROCHLORIDE 5 MG: 5 TABLET ORAL at 06:01

## 2025-08-05 RX ADMIN — MIDODRINE HYDROCHLORIDE 5 MG: 5 TABLET ORAL at 12:26

## 2025-08-05 RX ADMIN — SEVELAMER CARBONATE 1600 MG: 800 TABLET, FILM COATED ORAL at 12:25

## 2025-08-05 RX ADMIN — SEVELAMER CARBONATE 1600 MG: 800 TABLET, FILM COATED ORAL at 17:49

## 2025-08-05 RX ADMIN — SEVELAMER CARBONATE 1600 MG: 800 TABLET, FILM COATED ORAL at 07:58

## 2025-08-05 RX ADMIN — GENTAMICIN SULFATE 1 APPLICATION: 1 CREAM TOPICAL at 20:25

## 2025-08-05 RX ADMIN — SODIUM BICARBONATE 1300 MG: 650 TABLET ORAL at 17:53

## 2025-08-05 ASSESSMENT — PAIN DESCRIPTION - PAIN TYPE
TYPE: ACUTE PAIN;CHRONIC PAIN
TYPE: ACUTE PAIN
TYPE: ACUTE PAIN;CHRONIC PAIN
TYPE: ACUTE PAIN;CHRONIC PAIN

## 2025-08-05 ASSESSMENT — LIFESTYLE VARIABLES
TOTAL SCORE: 0
DOES PATIENT WANT TO STOP DRINKING: NO
TOTAL SCORE: 0
TOTAL SCORE: 0
EVER HAD A DRINK FIRST THING IN THE MORNING TO STEADY YOUR NERVES TO GET RID OF A HANGOVER: NO
AVERAGE NUMBER OF DAYS PER WEEK YOU HAVE A DRINK CONTAINING ALCOHOL: 0
HAVE YOU EVER FELT YOU SHOULD CUT DOWN ON YOUR DRINKING: NO
CONSUMPTION TOTAL: NEGATIVE
ON A TYPICAL DAY WHEN YOU DRINK ALCOHOL HOW MANY DRINKS DO YOU HAVE: 0
HAVE PEOPLE ANNOYED YOU BY CRITICIZING YOUR DRINKING: NO
EVER FELT BAD OR GUILTY ABOUT YOUR DRINKING: NO
ALCOHOL_USE: NO
HOW MANY TIMES IN THE PAST YEAR HAVE YOU HAD 5 OR MORE DRINKS IN A DAY: 0

## 2025-08-05 ASSESSMENT — ENCOUNTER SYMPTOMS
CONFUSION: 0
ABDOMINAL DISTENTION: 0
PALPITATIONS: 0
DECREASED CONCENTRATION: 0
SHORTNESS OF BREATH: 1
DIZZINESS: 0
FATIGUE: 1

## 2025-08-05 ASSESSMENT — COGNITIVE AND FUNCTIONAL STATUS - GENERAL
MOBILITY SCORE: 18
TURNING FROM BACK TO SIDE WHILE IN FLAT BAD: A LITTLE
TOILETING: A LITTLE
WALKING IN HOSPITAL ROOM: A LITTLE
CLIMB 3 TO 5 STEPS WITH RAILING: A LITTLE
STANDING UP FROM CHAIR USING ARMS: A LITTLE
SUGGESTED CMS G CODE MODIFIER DAILY ACTIVITY: CJ
MOVING TO AND FROM BED TO CHAIR: A LITTLE
DRESSING REGULAR LOWER BODY CLOTHING: A LITTLE
MOVING FROM LYING ON BACK TO SITTING ON SIDE OF FLAT BED: A LITTLE
DAILY ACTIVITIY SCORE: 20
HELP NEEDED FOR BATHING: A LITTLE
SUGGESTED CMS G CODE MODIFIER MOBILITY: CK
DRESSING REGULAR UPPER BODY CLOTHING: A LITTLE

## 2025-08-05 ASSESSMENT — FIBROSIS 4 INDEX
FIB4 SCORE: 3.09

## 2025-08-06 ENCOUNTER — APPOINTMENT (OUTPATIENT)
Dept: RADIOLOGY | Facility: MEDICAL CENTER | Age: 80
DRG: 280 | End: 2025-08-06
Attending: STUDENT IN AN ORGANIZED HEALTH CARE EDUCATION/TRAINING PROGRAM
Payer: MEDICARE

## 2025-08-06 PROBLEM — E83.39 HYPERPHOSPHATEMIA: Status: ACTIVE | Noted: 2025-08-06

## 2025-08-06 PROBLEM — E87.1 HYPONATREMIA: Status: ACTIVE | Noted: 2025-08-06

## 2025-08-06 PROBLEM — R94.31 QT PROLONGATION: Status: ACTIVE | Noted: 2025-08-06

## 2025-08-06 LAB
ANION GAP SERPL CALC-SCNC: 24 MMOL/L (ref 7–16)
APTT PPP: 51 SEC (ref 24.7–36)
APTT PPP: 63.2 SEC (ref 24.7–36)
BUN SERPL-MCNC: 84 MG/DL (ref 8–22)
CALCIUM SERPL-MCNC: 8.7 MG/DL (ref 8.5–10.5)
CHLORIDE SERPL-SCNC: 87 MMOL/L (ref 96–112)
CO2 SERPL-SCNC: 17 MMOL/L (ref 20–33)
CREAT SERPL-MCNC: 10.4 MG/DL (ref 0.5–1.4)
EKG IMPRESSION: NORMAL
ERYTHROCYTE [DISTWIDTH] IN BLOOD BY AUTOMATED COUNT: 56 FL (ref 35.9–50)
GFR SERPLBLD CREATININE-BSD FMLA CKD-EPI: 5 ML/MIN/1.73 M 2
GLUCOSE BLD STRIP.AUTO-MCNC: 136 MG/DL (ref 65–99)
GLUCOSE BLD STRIP.AUTO-MCNC: 146 MG/DL (ref 65–99)
GLUCOSE BLD STRIP.AUTO-MCNC: 163 MG/DL (ref 65–99)
GLUCOSE BLD STRIP.AUTO-MCNC: 195 MG/DL (ref 65–99)
GLUCOSE SERPL-MCNC: 174 MG/DL (ref 65–99)
HCT VFR BLD AUTO: 31.6 % (ref 42–52)
HGB BLD-MCNC: 10.2 G/DL (ref 14–18)
MAGNESIUM SERPL-MCNC: 3.1 MG/DL (ref 1.5–2.5)
MCH RBC QN AUTO: 33 PG (ref 27–33)
MCHC RBC AUTO-ENTMCNC: 32.3 G/DL (ref 32.3–36.5)
MCV RBC AUTO: 102.3 FL (ref 81.4–97.8)
PHOSPHATE SERPL-MCNC: 7.5 MG/DL (ref 2.5–4.5)
PLATELET # BLD AUTO: 141 K/UL (ref 164–446)
PMV BLD AUTO: 11.4 FL (ref 9–12.9)
POTASSIUM SERPL-SCNC: 4.6 MMOL/L (ref 3.6–5.5)
RBC # BLD AUTO: 3.09 M/UL (ref 4.7–6.1)
SODIUM SERPL-SCNC: 128 MMOL/L (ref 135–145)
T3FREE SERPL-MCNC: 1.77 PG/ML (ref 2–4.4)
T4 FREE SERPL-MCNC: 1.07 NG/DL (ref 0.93–1.7)
TROPONIN T SERPL-MCNC: 733 NG/L (ref 6–19)
TSH SERPL-ACNC: 6.64 UIU/ML (ref 0.38–5.33)
WBC # BLD AUTO: 7.7 K/UL (ref 4.8–10.8)

## 2025-08-06 PROCEDURE — 82962 GLUCOSE BLOOD TEST: CPT | Performed by: STUDENT IN AN ORGANIZED HEALTH CARE EDUCATION/TRAINING PROGRAM

## 2025-08-06 PROCEDURE — 85027 COMPLETE CBC AUTOMATED: CPT

## 2025-08-06 PROCEDURE — 700102 HCHG RX REV CODE 250 W/ 637 OVERRIDE(OP): Performed by: INTERNAL MEDICINE

## 2025-08-06 PROCEDURE — 97602 WOUND(S) CARE NON-SELECTIVE: CPT

## 2025-08-06 PROCEDURE — 85730 THROMBOPLASTIN TIME PARTIAL: CPT | Mod: 91

## 2025-08-06 PROCEDURE — 700111 HCHG RX REV CODE 636 W/ 250 OVERRIDE (IP): Performed by: STUDENT IN AN ORGANIZED HEALTH CARE EDUCATION/TRAINING PROGRAM

## 2025-08-06 PROCEDURE — 84443 ASSAY THYROID STIM HORMONE: CPT

## 2025-08-06 PROCEDURE — 700102 HCHG RX REV CODE 250 W/ 637 OVERRIDE(OP): Performed by: STUDENT IN AN ORGANIZED HEALTH CARE EDUCATION/TRAINING PROGRAM

## 2025-08-06 PROCEDURE — 700102 HCHG RX REV CODE 250 W/ 637 OVERRIDE(OP): Performed by: NURSE PRACTITIONER

## 2025-08-06 PROCEDURE — 51798 US URINE CAPACITY MEASURE: CPT

## 2025-08-06 PROCEDURE — 80048 BASIC METABOLIC PNL TOTAL CA: CPT

## 2025-08-06 PROCEDURE — 84484 ASSAY OF TROPONIN QUANT: CPT

## 2025-08-06 PROCEDURE — A9270 NON-COVERED ITEM OR SERVICE: HCPCS | Performed by: STUDENT IN AN ORGANIZED HEALTH CARE EDUCATION/TRAINING PROGRAM

## 2025-08-06 PROCEDURE — 83735 ASSAY OF MAGNESIUM: CPT

## 2025-08-06 PROCEDURE — A9270 NON-COVERED ITEM OR SERVICE: HCPCS | Performed by: NURSE PRACTITIONER

## 2025-08-06 PROCEDURE — 99233 SBSQ HOSP IP/OBS HIGH 50: CPT | Mod: FS | Performed by: INTERNAL MEDICINE

## 2025-08-06 PROCEDURE — A9270 NON-COVERED ITEM OR SERVICE: HCPCS | Performed by: INTERNAL MEDICINE

## 2025-08-06 PROCEDURE — 84481 FREE ASSAY (FT-3): CPT

## 2025-08-06 PROCEDURE — 90945 DIALYSIS ONE EVALUATION: CPT

## 2025-08-06 PROCEDURE — 770020 HCHG ROOM/CARE - TELE (206)

## 2025-08-06 PROCEDURE — 84100 ASSAY OF PHOSPHORUS: CPT

## 2025-08-06 PROCEDURE — 84439 ASSAY OF FREE THYROXINE: CPT

## 2025-08-06 PROCEDURE — 93010 ELECTROCARDIOGRAM REPORT: CPT | Performed by: INTERNAL MEDICINE

## 2025-08-06 PROCEDURE — 93925 LOWER EXTREMITY STUDY: CPT

## 2025-08-06 PROCEDURE — 36415 COLL VENOUS BLD VENIPUNCTURE: CPT

## 2025-08-06 PROCEDURE — 93922 UPR/L XTREMITY ART 2 LEVELS: CPT

## 2025-08-06 PROCEDURE — 99291 CRITICAL CARE FIRST HOUR: CPT | Performed by: STUDENT IN AN ORGANIZED HEALTH CARE EDUCATION/TRAINING PROGRAM

## 2025-08-06 PROCEDURE — 93005 ELECTROCARDIOGRAM TRACING: CPT | Mod: TC

## 2025-08-06 RX ORDER — INSULIN LISPRO 100 [IU]/ML
1-6 INJECTION, SOLUTION INTRAVENOUS; SUBCUTANEOUS EVERY 6 HOURS
Status: DISCONTINUED | OUTPATIENT
Start: 2025-08-07 | End: 2025-08-06

## 2025-08-06 RX ORDER — AMOXICILLIN 250 MG
1 CAPSULE ORAL DAILY
Status: DISCONTINUED | OUTPATIENT
Start: 2025-08-06 | End: 2025-08-07 | Stop reason: HOSPADM

## 2025-08-06 RX ORDER — BISACODYL 10 MG
10 SUPPOSITORY, RECTAL RECTAL
Status: DISCONTINUED | OUTPATIENT
Start: 2025-08-06 | End: 2025-08-07 | Stop reason: HOSPADM

## 2025-08-06 RX ORDER — POLYETHYLENE GLYCOL 3350 17 G/17G
1 POWDER, FOR SOLUTION ORAL
Status: DISCONTINUED | OUTPATIENT
Start: 2025-08-06 | End: 2025-08-07 | Stop reason: HOSPADM

## 2025-08-06 RX ORDER — DEXTROSE MONOHYDRATE 25 G/50ML
25 INJECTION, SOLUTION INTRAVENOUS
Status: DISCONTINUED | OUTPATIENT
Start: 2025-08-06 | End: 2025-08-06

## 2025-08-06 RX ORDER — DEXTROSE MONOHYDRATE 25 G/50ML
25 INJECTION, SOLUTION INTRAVENOUS
Status: DISCONTINUED | OUTPATIENT
Start: 2025-08-06 | End: 2025-08-07 | Stop reason: HOSPADM

## 2025-08-06 RX ORDER — INSULIN LISPRO 100 [IU]/ML
1-6 INJECTION, SOLUTION INTRAVENOUS; SUBCUTANEOUS
Status: DISCONTINUED | OUTPATIENT
Start: 2025-08-07 | End: 2025-08-07 | Stop reason: HOSPADM

## 2025-08-06 RX ORDER — POLYETHYLENE GLYCOL 3350 17 G/17G
1 POWDER, FOR SOLUTION ORAL DAILY
Status: DISCONTINUED | OUTPATIENT
Start: 2025-08-06 | End: 2025-08-07 | Stop reason: HOSPADM

## 2025-08-06 RX ADMIN — HEPARIN SODIUM 650 UNITS/HR: 5000 INJECTION, SOLUTION INTRAVENOUS at 08:33

## 2025-08-06 RX ADMIN — TAMSULOSIN HYDROCHLORIDE 0.4 MG: 0.4 CAPSULE ORAL at 08:18

## 2025-08-06 RX ADMIN — APIXABAN 2.5 MG: 2.5 TABLET, FILM COATED ORAL at 12:08

## 2025-08-06 RX ADMIN — HYDROCODONE BITARTRATE AND ACETAMINOPHEN 1 TABLET: 5; 325 TABLET ORAL at 08:27

## 2025-08-06 RX ADMIN — APIXABAN 2.5 MG: 2.5 TABLET, FILM COATED ORAL at 16:44

## 2025-08-06 RX ADMIN — SEVELAMER CARBONATE 1600 MG: 800 TABLET, FILM COATED ORAL at 08:17

## 2025-08-06 RX ADMIN — SODIUM BICARBONATE 1300 MG: 650 TABLET ORAL at 16:44

## 2025-08-06 RX ADMIN — MIDODRINE HYDROCHLORIDE 2.5 MG: 5 TABLET ORAL at 16:44

## 2025-08-06 RX ADMIN — MIDODRINE HYDROCHLORIDE 2.5 MG: 5 TABLET ORAL at 05:09

## 2025-08-06 RX ADMIN — HEPARIN SODIUM 2600 UNITS: 1000 INJECTION, SOLUTION INTRAVENOUS; SUBCUTANEOUS at 10:24

## 2025-08-06 RX ADMIN — INSULIN LISPRO 1 UNITS: 100 INJECTION, SOLUTION INTRAVENOUS; SUBCUTANEOUS at 00:38

## 2025-08-06 RX ADMIN — SEVELAMER CARBONATE 1600 MG: 800 TABLET, FILM COATED ORAL at 16:44

## 2025-08-06 RX ADMIN — POLYETHYLENE GLYCOL 3350 1 PACKET: 17 POWDER, FOR SOLUTION ORAL at 09:35

## 2025-08-06 RX ADMIN — CLOPIDOGREL BISULFATE 75 MG: 75 TABLET, FILM COATED ORAL at 05:09

## 2025-08-06 RX ADMIN — MIDODRINE HYDROCHLORIDE 2.5 MG: 5 TABLET ORAL at 12:04

## 2025-08-06 RX ADMIN — MONTELUKAST 10 MG: 10 TABLET, FILM COATED ORAL at 20:40

## 2025-08-06 RX ADMIN — HYDROCODONE BITARTRATE AND ACETAMINOPHEN 1 TABLET: 5; 325 TABLET ORAL at 01:17

## 2025-08-06 RX ADMIN — SEVELAMER CARBONATE 1600 MG: 800 TABLET, FILM COATED ORAL at 12:04

## 2025-08-06 RX ADMIN — GENTAMICIN SULFATE 1 APPLICATION: 1 CREAM TOPICAL at 17:15

## 2025-08-06 RX ADMIN — SENNOSIDES AND DOCUSATE SODIUM 1 TABLET: 50; 8.6 TABLET ORAL at 09:35

## 2025-08-06 RX ADMIN — ROSUVASTATIN CALCIUM 20 MG: 20 TABLET, FILM COATED ORAL at 20:40

## 2025-08-06 RX ADMIN — SODIUM BICARBONATE 1300 MG: 650 TABLET ORAL at 05:09

## 2025-08-06 ASSESSMENT — CHA2DS2 SCORE
HYPERTENSION: YES
DIABETES: NO
CHA2DS2 VASC SCORE: 5
AGE 65 TO 74: NO
AGE 75 OR GREATER: YES
CHF OR LEFT VENTRICULAR DYSFUNCTION: YES
PRIOR STROKE OR TIA OR THROMBOEMBOLISM: NO
SEX: MALE
VASCULAR DISEASE: YES

## 2025-08-06 ASSESSMENT — PAIN DESCRIPTION - PAIN TYPE
TYPE: ACUTE PAIN

## 2025-08-06 ASSESSMENT — ENCOUNTER SYMPTOMS
ABDOMINAL DISTENTION: 0
CONFUSION: 0
DECREASED CONCENTRATION: 0
FATIGUE: 1
PALPITATIONS: 0
SHORTNESS OF BREATH: 1
DIZZINESS: 0

## 2025-08-06 ASSESSMENT — FIBROSIS 4 INDEX: FIB4 SCORE: 3.58

## 2025-08-07 VITALS
HEART RATE: 78 BPM | SYSTOLIC BLOOD PRESSURE: 97 MMHG | OXYGEN SATURATION: 100 % | RESPIRATION RATE: 18 BRPM | WEIGHT: 163.36 LBS | DIASTOLIC BLOOD PRESSURE: 61 MMHG | HEIGHT: 70 IN | TEMPERATURE: 98.6 F | BODY MASS INDEX: 23.39 KG/M2

## 2025-08-07 LAB
ALBUMIN SERPL BCP-MCNC: 3.1 G/DL (ref 3.2–4.9)
ANION GAP SERPL CALC-SCNC: 21 MMOL/L (ref 7–16)
BUN SERPL-MCNC: 66 MG/DL (ref 8–22)
CALCIUM ALBUM COR SERPL-MCNC: 9.5 MG/DL (ref 8.5–10.5)
CALCIUM SERPL-MCNC: 8.8 MG/DL (ref 8.5–10.5)
CHLORIDE SERPL-SCNC: 89 MMOL/L (ref 96–112)
CO2 SERPL-SCNC: 22 MMOL/L (ref 20–33)
CREAT SERPL-MCNC: 9.3 MG/DL (ref 0.5–1.4)
ERYTHROCYTE [DISTWIDTH] IN BLOOD BY AUTOMATED COUNT: 55.1 FL (ref 35.9–50)
GFR SERPLBLD CREATININE-BSD FMLA CKD-EPI: 5 ML/MIN/1.73 M 2
GLUCOSE BLD STRIP.AUTO-MCNC: 121 MG/DL (ref 65–99)
GLUCOSE BLD STRIP.AUTO-MCNC: 164 MG/DL (ref 65–99)
GLUCOSE SERPL-MCNC: 118 MG/DL (ref 65–99)
HCT VFR BLD AUTO: 31.2 % (ref 42–52)
HGB BLD-MCNC: 10.2 G/DL (ref 14–18)
LACTATE SERPL-SCNC: 2.5 MMOL/L (ref 0.5–2)
MCH RBC QN AUTO: 33 PG (ref 27–33)
MCHC RBC AUTO-ENTMCNC: 32.7 G/DL (ref 32.3–36.5)
MCV RBC AUTO: 101 FL (ref 81.4–97.8)
PHOSPHATE SERPL-MCNC: 6.1 MG/DL (ref 2.5–4.5)
PLATELET # BLD AUTO: 140 K/UL (ref 164–446)
PMV BLD AUTO: 10.6 FL (ref 9–12.9)
POTASSIUM SERPL-SCNC: 3.9 MMOL/L (ref 3.6–5.5)
RBC # BLD AUTO: 3.09 M/UL (ref 4.7–6.1)
SODIUM SERPL-SCNC: 132 MMOL/L (ref 135–145)
WBC # BLD AUTO: 9.5 K/UL (ref 4.8–10.8)

## 2025-08-07 PROCEDURE — 700102 HCHG RX REV CODE 250 W/ 637 OVERRIDE(OP): Performed by: INTERNAL MEDICINE

## 2025-08-07 PROCEDURE — 700102 HCHG RX REV CODE 250 W/ 637 OVERRIDE(OP): Performed by: NURSE PRACTITIONER

## 2025-08-07 PROCEDURE — A9270 NON-COVERED ITEM OR SERVICE: HCPCS | Performed by: INTERNAL MEDICINE

## 2025-08-07 PROCEDURE — A9270 NON-COVERED ITEM OR SERVICE: HCPCS | Performed by: STUDENT IN AN ORGANIZED HEALTH CARE EDUCATION/TRAINING PROGRAM

## 2025-08-07 PROCEDURE — A9270 NON-COVERED ITEM OR SERVICE: HCPCS | Performed by: NURSE PRACTITIONER

## 2025-08-07 PROCEDURE — 99239 HOSP IP/OBS DSCHRG MGMT >30: CPT | Performed by: STUDENT IN AN ORGANIZED HEALTH CARE EDUCATION/TRAINING PROGRAM

## 2025-08-07 PROCEDURE — 700102 HCHG RX REV CODE 250 W/ 637 OVERRIDE(OP): Performed by: STUDENT IN AN ORGANIZED HEALTH CARE EDUCATION/TRAINING PROGRAM

## 2025-08-07 PROCEDURE — 36415 COLL VENOUS BLD VENIPUNCTURE: CPT

## 2025-08-07 PROCEDURE — 80069 RENAL FUNCTION PANEL: CPT

## 2025-08-07 PROCEDURE — 90945 DIALYSIS ONE EVALUATION: CPT

## 2025-08-07 PROCEDURE — 82962 GLUCOSE BLOOD TEST: CPT | Performed by: STUDENT IN AN ORGANIZED HEALTH CARE EDUCATION/TRAINING PROGRAM

## 2025-08-07 PROCEDURE — 83605 ASSAY OF LACTIC ACID: CPT

## 2025-08-07 PROCEDURE — 85027 COMPLETE CBC AUTOMATED: CPT

## 2025-08-07 RX ADMIN — SODIUM BICARBONATE 1300 MG: 650 TABLET ORAL at 04:01

## 2025-08-07 RX ADMIN — HYDROCODONE BITARTRATE AND ACETAMINOPHEN 1 TABLET: 5; 325 TABLET ORAL at 08:19

## 2025-08-07 RX ADMIN — TAMSULOSIN HYDROCHLORIDE 0.4 MG: 0.4 CAPSULE ORAL at 08:19

## 2025-08-07 RX ADMIN — CLOPIDOGREL BISULFATE 75 MG: 75 TABLET, FILM COATED ORAL at 04:01

## 2025-08-07 RX ADMIN — SEVELAMER CARBONATE 1600 MG: 800 TABLET, FILM COATED ORAL at 12:35

## 2025-08-07 RX ADMIN — INSULIN LISPRO 1 UNITS: 100 INJECTION, SOLUTION INTRAVENOUS; SUBCUTANEOUS at 12:40

## 2025-08-07 RX ADMIN — SEVELAMER CARBONATE 1600 MG: 800 TABLET, FILM COATED ORAL at 08:18

## 2025-08-07 RX ADMIN — MIDODRINE HYDROCHLORIDE 2.5 MG: 5 TABLET ORAL at 04:01

## 2025-08-07 RX ADMIN — POLYETHYLENE GLYCOL 3350 1 PACKET: 17 POWDER, FOR SOLUTION ORAL at 04:01

## 2025-08-07 RX ADMIN — APIXABAN 2.5 MG: 2.5 TABLET, FILM COATED ORAL at 04:01

## 2025-08-07 RX ADMIN — SENNOSIDES AND DOCUSATE SODIUM 1 TABLET: 50; 8.6 TABLET ORAL at 04:01

## 2025-08-07 RX ADMIN — MIDODRINE HYDROCHLORIDE 2.5 MG: 5 TABLET ORAL at 12:35

## 2025-08-07 ASSESSMENT — PAIN DESCRIPTION - PAIN TYPE: TYPE: ACUTE PAIN

## 2025-08-19 ENCOUNTER — APPOINTMENT (OUTPATIENT)
Facility: MEDICAL CENTER | Age: 80
End: 2025-08-19
Payer: MEDICARE

## 2025-08-20 ENCOUNTER — APPOINTMENT (OUTPATIENT)
Dept: CARDIOLOGY | Facility: MEDICAL CENTER | Age: 80
End: 2025-08-20
Attending: NURSE PRACTITIONER
Payer: MEDICARE

## 2025-08-28 ENCOUNTER — OFFICE VISIT (OUTPATIENT)
Facility: MEDICAL CENTER | Age: 80
End: 2025-08-28
Payer: MEDICARE

## 2025-08-28 ENCOUNTER — TELEPHONE (OUTPATIENT)
Dept: CARDIOLOGY | Facility: MEDICAL CENTER | Age: 80
End: 2025-08-28

## 2025-08-28 VITALS
WEIGHT: 146.61 LBS | BODY MASS INDEX: 20.99 KG/M2 | HEART RATE: 85 BPM | DIASTOLIC BLOOD PRESSURE: 64 MMHG | TEMPERATURE: 97.9 F | SYSTOLIC BLOOD PRESSURE: 112 MMHG | OXYGEN SATURATION: 100 % | HEIGHT: 70 IN

## 2025-08-28 DIAGNOSIS — Z99.2 ESRD ON PERITONEAL DIALYSIS (HCC): Primary | ICD-10-CM

## 2025-08-28 DIAGNOSIS — N18.6 ESRD ON PERITONEAL DIALYSIS (HCC): Primary | ICD-10-CM

## 2025-08-28 DIAGNOSIS — K40.90 RIGHT INGUINAL HERNIA: ICD-10-CM

## 2025-08-28 PROCEDURE — 99214 OFFICE O/P EST MOD 30 MIN: CPT | Performed by: SURGERY

## 2025-08-28 PROCEDURE — 3078F DIAST BP <80 MM HG: CPT | Performed by: SURGERY

## 2025-08-28 PROCEDURE — 3074F SYST BP LT 130 MM HG: CPT | Performed by: SURGERY

## 2025-08-28 RX ORDER — LISINOPRIL 2.5 MG/1
2.5 TABLET ORAL DAILY
COMMUNITY

## 2025-08-28 RX ORDER — METOPROLOL SUCCINATE 25 MG/1
25 TABLET, EXTENDED RELEASE ORAL DAILY
COMMUNITY

## 2025-08-28 ASSESSMENT — FIBROSIS 4 INDEX: FIB4 SCORE: 3.6

## (undated) DEVICE — COVER LIGHT HANDLE ALC PLUS DISP (18EA/BX)

## (undated) DEVICE — TUBE E-T HI-LO CUFF 7.5MM (10EA/PK)

## (undated) DEVICE — KIT CUSTOM PROCEDURE SINGLE FOR ENDO (15/CA)

## (undated) DEVICE — CANISTER SUCTION RIGID RED 1500CC (40EA/CA)

## (undated) DEVICE — GOWN WARMING STANDARD FLEX - (30/CA)

## (undated) DEVICE — DRAPE IOBAN II 23 IN X 33 IN - (10/BX)

## (undated) DEVICE — PACK LAP CHOLE OR - (2EA/CA)

## (undated) DEVICE — GLOVE BIOGEL PI INDICATOR SZ 7.5 SURGICAL PF LF -(50/BX 4BX/CA)

## (undated) DEVICE — SODIUM CHL IRRIGATION 0.9% 1000ML (12EA/CA)

## (undated) DEVICE — DEVICE HEMOSTATIC CLIPPING RESOLUTION 360 DEGREES (20EA/BX)

## (undated) DEVICE — CHLORAPREP 26 ML APPLICATOR - ORANGE TINT(25/CA)

## (undated) DEVICE — STOPCOCK 3-WAY W/SWIVEL LEVER LOCK (50EA/CA)

## (undated) DEVICE — NEEDLE W/FACET S TIP ECHOGENIC W/STIMULATION CABLE SONOPLEX II 21G X 4IN (10EA/BX)

## (undated) DEVICE — SENSOR OXIMETER ADULT SPO2 RD SET (20EA/BX)

## (undated) DEVICE — SHEATH RO 6F 10CM (10EA/BX)

## (undated) DEVICE — TOWELS CLOTH SURGICAL - (4/PK 20PK/CA)

## (undated) DEVICE — SUTURE 6-0 PROLENE C-1 D/A 24 (36PK/BX)"

## (undated) DEVICE — SYSTEM DISSECTION BALLOON KII OVAL WITH 2 EACH 5MM LOW PROFILE TROCARS (3EA/BX)

## (undated) DEVICE — PAD LAP STERILE 18 X 18 - (5/PK 40PK/CA)

## (undated) DEVICE — CANNULA O2 COMFORT SOFT EAR ADULT 7 FT TUBING (50/CA)

## (undated) DEVICE — SET EXTENSION WITH 2 PORTS (48EA/CA) ***PART #2C8610 IS A SUBSTITUTE*****

## (undated) DEVICE — SUTURE 3-0 VICRYL PLUS SH - 8X 18 INCH (12/BX)

## (undated) DEVICE — CATHETER RUBICON 35 135CM

## (undated) DEVICE — SET BIFURCATED BLOOD - (48EA/CS)

## (undated) DEVICE — COVER PROBE INTRAOPERATIVE KIT (10EA/CA)

## (undated) DEVICE — SPONGE GAUZESTER. 2X2 4-PL - (2/PK 50PK/BX 30BX/CS)

## (undated) DEVICE — SET LEADWIRE 5 LEAD BEDSIDE DISPOSABLE ECG (1SET OF 5/EA)

## (undated) DEVICE — DRAPE LARGE 3 QUARTER - (20/CA)

## (undated) DEVICE — BAG SPONGE COUNT 10.25 X 32 - BLUE (250/CA)

## (undated) DEVICE — MICRODRIP PRIMARY VENTED 60 (48EA/CA) THIS WAS PART #2C8428 WHICH WAS DISCONTINUED

## (undated) DEVICE — GLOVE BIOGEL PI ORTHO SZ 6 SURGICAL PF LF (40PR/BX)

## (undated) DEVICE — DRESSING TRANSPARENT FILM TEGADERM 4 X 4.75" (50EA/BX)"

## (undated) DEVICE — SLEEVE, VASO, THIGH, MED

## (undated) DEVICE — Device

## (undated) DEVICE — MASK OXYGEN VNYL ADLT MED CONC WITH 7 FOOT TUBING - (50EA/CA)

## (undated) DEVICE — DRAPE LOWER EXTREMETY - (6/CA)

## (undated) DEVICE — GUIDE SHEATH STRAIGHT CATAPULT US 7F 90CM (5EA/BX)

## (undated) DEVICE — DECANTER FLD BLS - (50/CA)

## (undated) DEVICE — CLIP MED INTNL HRZN TI ESCP - (25/BX)

## (undated) DEVICE — CLIP SM INTNL HRZN TI ESCP LGT - (24EA/PK 25PK/BX)

## (undated) DEVICE — SUTURE 4-0 VICRYL PLUS FS-1 - 27 INCH (36/BX)

## (undated) DEVICE — KIT ANESTHESIA W/CIRCUIT & 3/LT BAG W/FILTER (20EA/CA)

## (undated) DEVICE — NEPTUNE 4 PORT MANIFOLD - (20/PK)

## (undated) DEVICE — SODIUM CHL. INJ. 0.9% 500ML (24EA/CA 50CA/PF)

## (undated) DEVICE — SYRINGE 30 ML LL (56/BX)

## (undated) DEVICE — SUTURE 4-0 MONOCRYL PLUS PS-1 - 27 INCH (36/BX)

## (undated) DEVICE — CONNECTOR SINGLE 'Y' 9.5FR - (5/BX)

## (undated) DEVICE — SET TUBING PNEUMOCLEAR HIGH FLOW SMOKE EVACUATION (10EA/BX)

## (undated) DEVICE — ELECTRODE DUAL RETURN W/ CORD - (50/PK)

## (undated) DEVICE — GOWN SURGEONS X-LARGE - DISP. (30/CA)

## (undated) DEVICE — COVER LIGHT HANDLE FLEXIBLE - SOFT (2EA/PK 80PK/CA)

## (undated) DEVICE — SYRINGE NON SAFETY 10 CC 21 GA X 1-1/2 IN (100/BX 4BX/CA)

## (undated) DEVICE — KIT  I.V. START (100EA/CA)

## (undated) DEVICE — LACTATED RINGERS INJ 1000 ML - (14EA/CA 60CA/PF)

## (undated) DEVICE — CANISTER SUCTION 3000ML MECHANICAL FILTER AUTO SHUTOFF MEDI-VAC NONSTERILE LF DISP  (40EA/CA)

## (undated) DEVICE — FILTER BLOOD TRANSFUSION - (40/CA) (PALL)

## (undated) DEVICE — KIT SURGIFLO W/OUT THROMBIN - (6EA/BX)

## (undated) DEVICE — SLEEVE VASO DVT COMPRESSION CALF MED - (10PR/CA)

## (undated) DEVICE — DRAPE IOBAN II INCISE 23X17 - (10EA/BX 4BX/CA)

## (undated) DEVICE — FILM CASSETTE ENDO

## (undated) DEVICE — BLOCK BITE MAXI DENTAL RETENTION RIM (100EA/BX)

## (undated) DEVICE — CLOSURE SKIN STRIP 1/2 X 4 IN - (STERI STRIP) (50/BX 4BX/CA)

## (undated) DEVICE — SUCTION INSTRUMENT YANKAUER BULBOUS TIP W/O VENT (50EA/CA)

## (undated) DEVICE — V-18 8/300 STRAIGHT (1/EA)

## (undated) DEVICE — SUTURE 6-0 PROLENE BV-1 D/A 24 (36PK/BX)"

## (undated) DEVICE — DRESSING TRANSPARENT FILM TEGADERM 2.375 X 2.75" (100EA/BX)"

## (undated) DEVICE — TROCAR 5X100 NON BLADED Z-TH - READ KII (6/BX)

## (undated) DEVICE — WIRE HT COMMAND ES .014INX300CM

## (undated) DEVICE — DRAPE 36X28IN RAD CARM BND BG - (25/CA) O

## (undated) DEVICE — VESSELOOP MINI BLUE STERILE - SURG-I-LOOP (10EA/BX)

## (undated) DEVICE — GLOVE BIOGEL SZ 6.5 SURGICAL PF LTX (50PR/BX 4BX/CA)

## (undated) DEVICE — ELECTRODE 850 FOAM ADHESIVE - HYDROGEL RADIOTRNSPRNT (50/PK)

## (undated) DEVICE — GLOVE BIOGEL INDICATOR SZ 6.5 SURGICAL PF LTX - (50PR/BX 4BX/CA)

## (undated) DEVICE — PEN SKIN MARKER W/RULER - (50EA/BX)

## (undated) DEVICE — GLOVE BIOGEL SZ 8 SURGICAL PF LTX - (50PR/BX 4BX/CA)

## (undated) DEVICE — SUTURE 3-0 SILK 12 X 18 IN - (36/BX)

## (undated) DEVICE — BUTTON ENDOSCOPY DISPOSABLE

## (undated) DEVICE — TUBE NG SALEM SUMP 16FR (50EA/CA)

## (undated) DEVICE — TUBE CONNECTING SUCTION - CLEAR PLASTIC STERILE 72 IN (50EA/CA)

## (undated) DEVICE — CONTAINER, SPECIMEN, STERILE

## (undated) DEVICE — GLOVESZ 8.5 BIOGEL PI MICRO - PF LF (50PR/BX)

## (undated) DEVICE — CAP DISCONNECT MINICAP (60/CA)

## (undated) DEVICE — SUTURE 0 VICRYL PLUS UR-6 - 27 INCH (36/BX)

## (undated) DEVICE — BOVIE BLADE COATED - (50/PK)

## (undated) DEVICE — PACK AV FISTULA (2EA/CA)

## (undated) DEVICE — CATHETER RUBICON 18 150CM

## (undated) DEVICE — KIT SURGIFLO W/OUT THROMBIN - (6EA/CA)

## (undated) DEVICE — TUBING CLEARLINK DUO-VENT - C-FLO (48EA/CA)

## (undated) DEVICE — SET INTRO MIRCROPUNCTURE - MPIS-501-SST

## (undated) DEVICE — TROCAR Z THREAD11MM OPTICAL - NON BLADED(6/BX)

## (undated) DEVICE — WATER IRRIGATION STERILE 1000ML (12EA/CA)

## (undated) DEVICE — PACK AV FISTULA (4EA/CA)

## (undated) DEVICE — TOWEL STOP TIMEOUT SAFETY FLAG (40EA/CA)

## (undated) DEVICE — CANISTER SUCTION 3000ML MECHANICAL FILTER AUTO SHUTOFF MEDI-VAC NONSTERILE LF DISP (40EA/CA)

## (undated) DEVICE — SUTURE 5-0 PROLENE BLUE C-1 HS 1 X 30 (36EA/BX)"

## (undated) DEVICE — SUTURE 2-0 VICRYL PLUS CT-1 36 (36PK/BX)"

## (undated) DEVICE — STAPLER SKIN DISP - (6/BX 10BX/CA) VISISTAT

## (undated) DEVICE — GLOVE BIOGEL INDICATOR SZ 7.5 SURGICAL PF LTX - (50PR/BX 4BX/CA)

## (undated) DEVICE — SUTURE ETHILON 2-0 FSLX 30 (36PK/BX)"

## (undated) DEVICE — GLOVE BIOGEL SZ 6 PF LATEX - (50EA/BX 4BX/CA)

## (undated) DEVICE — SPONGE GAUZESTER 4 X 4 4PLY - (128PK/CA)

## (undated) DEVICE — GLOVE SZ 7.5 BIOGEL PI MICRO - PF LF (50PR/BX)

## (undated) DEVICE — ADHESIVE MASTISOL - (48/BX)

## (undated) DEVICE — DRAPE SURGICAL U 77X120 - (10/CA)

## (undated) DEVICE — VESSELOOP MAXI BLUE STERILE- SURG-I-LOOP (10EA/BX)

## (undated) DEVICE — GOWN SURGEONS LARGE - (32/CA)

## (undated) DEVICE — STAPLER 35MM SKIN WIDE ROTATING HEAD (6EA/BX)

## (undated) DEVICE — GLOVE BIOGEL SZ 7 SURGICAL PF LTX - (50PR/BX 4BX/CA)

## (undated) DEVICE — PORT AUXILLARY WATER (50EA/BX)

## (undated) DEVICE — SUTURE 2-0 ETHILON FS - (36/BX) 18 INCH

## (undated) DEVICE — TRANSDUCER ADULT DISP. SINGLE BONDED STERILE - (10EA/CA)

## (undated) DEVICE — TROCAR LAPSCP 100MM 12MM NTHRD - (6/BX)

## (undated) DEVICE — FORCEP RADIAL JAW 4 STANDARD CAPACITY W/NEEDLE 240CM (40EA/BX)

## (undated) DEVICE — SUTURE CV

## (undated) DEVICE — DERMABOND ADVANCED - (12EA/BX)

## (undated) DEVICE — SYRINGE SAFETY 10 ML 18 GA X 1 1/2 BLUNT LL (100/BX 4BX/CA)

## (undated) DEVICE — SPONGE RADIOPAQUE CTN X-LG - STERILE (50PK/CA) MADE TO ORDER ITEM AND HAS A 4-6 WEEK LEAD TIME

## (undated) DEVICE — SUTURE GENERAL

## (undated) DEVICE — MASK PANORAMIC OXYGEN PRO2 (30EA/CA)

## (undated) DEVICE — SET FLUID WARMING STANDARD FLOW - (10/CA)

## (undated) DEVICE — SOD. CHL. INJ. 0.9% 250 ML - (36/CA 50CA/PF)

## (undated) DEVICE — MULTI TORQUE DEVICE DISP (5EA/BX)

## (undated) DEVICE — NEEDLE INSFL 120MM 14GA VRRS - (20/BX)

## (undated) DEVICE — CATHETER TROCAR 20FR. (10/CA)

## (undated) DEVICE — PAD PREP 24 X 48 CUFFED - (100/CA)

## (undated) DEVICE — SYSTEM PREVENA DRESSING CUSTOMIZABLE (5EA/CA)

## (undated) DEVICE — GLOVE BIOGEL PI INDICATOR SZ 7.0 SURGICAL PF LF - (50/BX 4BX/CA)

## (undated) DEVICE — SUTURE 4-0 30CM STRATAFIX SPIRAL PS-2 (12EA/BX)

## (undated) DEVICE — GUIDEWIRE 25CM .35 180CM ANGLED GLIDEWIRE ADVANTAGE (1/BX)

## (undated) DEVICE — PACK ANGIOGRAPHY DRAPE - (2/CA)

## (undated) DEVICE — MASK AIRWAY FLEXIBLE SINGLE-USE SIZE 4 ADULTS (10EA/BX)

## (undated) DEVICE — SUTURE 0 VICRYL PLUS CT-1 - 36 INCH (36/BX)

## (undated) DEVICE — SUTURE 4-0 SILK 12 X 18 INCH - (36/BX)

## (undated) DEVICE — SUTURE 3-0 ETHILON FS-1 - (36/BX) 30 INCH

## (undated) DEVICE — GLOVE SZ 7 BIOGEL PI MICRO - PF LF (50PR/BX 4BX/CA)

## (undated) DEVICE — FORCEPS RETRIEVAL ALLIGATOR - 3.0 115CM; CAT#210320

## (undated) DEVICE — CLIP INTERNAL LIGATE TITANIUM MEDIUM WECK HORIZON (6EA/PK 30PK/BX)

## (undated) DEVICE — GELAQUASONIC 100 ULTRASOUND - 48/BX 20GM STERILE FOIL POUCH

## (undated) DEVICE — BAG RESUSCITATION DISPOSABLE - WITH MASK (10 EA/CA)